# Patient Record
Sex: MALE | Race: WHITE | NOT HISPANIC OR LATINO | Employment: FULL TIME | URBAN - METROPOLITAN AREA
[De-identification: names, ages, dates, MRNs, and addresses within clinical notes are randomized per-mention and may not be internally consistent; named-entity substitution may affect disease eponyms.]

---

## 2017-01-18 ENCOUNTER — ALLSCRIPTS OFFICE VISIT (OUTPATIENT)
Dept: OTHER | Facility: OTHER | Age: 47
End: 2017-01-18

## 2017-02-06 ENCOUNTER — ALLSCRIPTS OFFICE VISIT (OUTPATIENT)
Dept: OTHER | Facility: OTHER | Age: 47
End: 2017-02-06

## 2017-02-06 DIAGNOSIS — E10.9 TYPE 1 DIABETES MELLITUS WITHOUT COMPLICATIONS (HCC): ICD-10-CM

## 2017-02-08 ENCOUNTER — GENERIC CONVERSION - ENCOUNTER (OUTPATIENT)
Dept: OTHER | Facility: OTHER | Age: 47
End: 2017-02-08

## 2017-02-21 ENCOUNTER — LAB (OUTPATIENT)
Dept: LAB | Facility: CLINIC | Age: 47
End: 2017-02-21
Payer: COMMERCIAL

## 2017-02-21 DIAGNOSIS — D64.9 ANEMIA, UNSPECIFIED TYPE: ICD-10-CM

## 2017-02-21 DIAGNOSIS — E10.9 TYPE 1 DIABETES MELLITUS WITHOUT COMPLICATIONS (HCC): ICD-10-CM

## 2017-02-21 DIAGNOSIS — K27.9 PEPTIC ULCER DISEASE: ICD-10-CM

## 2017-02-21 DIAGNOSIS — K22.719 BARRETT'S ESOPHAGUS WITH DYSPLASIA: Primary | ICD-10-CM

## 2017-02-21 LAB
ALBUMIN SERPL BCP-MCNC: 3.8 G/DL (ref 3.5–5)
ALP SERPL-CCNC: 101 U/L (ref 46–116)
ALT SERPL W P-5'-P-CCNC: 23 U/L (ref 12–78)
ANION GAP SERPL CALCULATED.3IONS-SCNC: 8 MMOL/L (ref 4–13)
AST SERPL W P-5'-P-CCNC: 8 U/L (ref 5–45)
BASOPHILS # BLD AUTO: 0.04 THOUSANDS/ΜL (ref 0–0.1)
BASOPHILS NFR BLD AUTO: 0 % (ref 0–1)
BILIRUB SERPL-MCNC: 0.3 MG/DL (ref 0.2–1)
BUN SERPL-MCNC: 26 MG/DL (ref 5–25)
CALCIUM SERPL-MCNC: 8.9 MG/DL (ref 8.3–10.1)
CHLORIDE SERPL-SCNC: 98 MMOL/L (ref 100–108)
CO2 SERPL-SCNC: 29 MMOL/L (ref 21–32)
CREAT SERPL-MCNC: 1.05 MG/DL (ref 0.6–1.3)
EOSINOPHIL # BLD AUTO: 0.34 THOUSAND/ΜL (ref 0–0.61)
EOSINOPHIL NFR BLD AUTO: 3 % (ref 0–6)
ERYTHROCYTE [DISTWIDTH] IN BLOOD BY AUTOMATED COUNT: 12.7 % (ref 11.6–15.1)
EST. AVERAGE GLUCOSE BLD GHB EST-MCNC: 180 MG/DL
FERRITIN SERPL-MCNC: 193 NG/ML (ref 8–388)
GFR SERPL CREATININE-BSD FRML MDRD: >60 ML/MIN/1.73SQ M
GLUCOSE SERPL-MCNC: 261 MG/DL (ref 65–140)
HBA1C MFR BLD: 7.9 % (ref 4.2–6.3)
HCT VFR BLD AUTO: 46.3 % (ref 36.5–49.3)
HGB BLD-MCNC: 16 G/DL (ref 12–17)
IRON SATN MFR SERPL: 23 %
IRON SERPL-MCNC: 78 UG/DL (ref 65–175)
LYMPHOCYTES # BLD AUTO: 3.37 THOUSANDS/ΜL (ref 0.6–4.47)
LYMPHOCYTES NFR BLD AUTO: 27 % (ref 14–44)
MCH RBC QN AUTO: 31.9 PG (ref 26.8–34.3)
MCHC RBC AUTO-ENTMCNC: 34.6 G/DL (ref 31.4–37.4)
MCV RBC AUTO: 92 FL (ref 82–98)
MONOCYTES # BLD AUTO: 0.73 THOUSAND/ΜL (ref 0.17–1.22)
MONOCYTES NFR BLD AUTO: 6 % (ref 4–12)
NEUTROPHILS # BLD AUTO: 7.85 THOUSANDS/ΜL (ref 1.85–7.62)
NEUTS SEG NFR BLD AUTO: 64 % (ref 43–75)
NRBC BLD AUTO-RTO: 0 /100 WBCS
PLATELET # BLD AUTO: 201 THOUSANDS/UL (ref 149–390)
PMV BLD AUTO: 9.7 FL (ref 8.9–12.7)
POTASSIUM SERPL-SCNC: 4.4 MMOL/L (ref 3.5–5.3)
PROT SERPL-MCNC: 7.9 G/DL (ref 6.4–8.2)
RBC # BLD AUTO: 5.01 MILLION/UL (ref 3.88–5.62)
SODIUM SERPL-SCNC: 135 MMOL/L (ref 136–145)
TIBC SERPL-MCNC: 345 UG/DL (ref 250–450)
WBC # BLD AUTO: 12.36 THOUSAND/UL (ref 4.31–10.16)

## 2017-02-21 PROCEDURE — 83540 ASSAY OF IRON: CPT

## 2017-02-21 PROCEDURE — 36415 COLL VENOUS BLD VENIPUNCTURE: CPT

## 2017-02-21 PROCEDURE — 80053 COMPREHEN METABOLIC PANEL: CPT

## 2017-02-21 PROCEDURE — 83550 IRON BINDING TEST: CPT

## 2017-02-21 PROCEDURE — 83036 HEMOGLOBIN GLYCOSYLATED A1C: CPT

## 2017-02-21 PROCEDURE — 85025 COMPLETE CBC W/AUTO DIFF WBC: CPT

## 2017-02-21 PROCEDURE — 82728 ASSAY OF FERRITIN: CPT

## 2017-02-22 ENCOUNTER — GENERIC CONVERSION - ENCOUNTER (OUTPATIENT)
Dept: OTHER | Facility: OTHER | Age: 47
End: 2017-02-22

## 2017-03-01 ENCOUNTER — ALLSCRIPTS OFFICE VISIT (OUTPATIENT)
Dept: OTHER | Facility: OTHER | Age: 47
End: 2017-03-01

## 2017-03-29 ENCOUNTER — ALLSCRIPTS OFFICE VISIT (OUTPATIENT)
Dept: OTHER | Facility: OTHER | Age: 47
End: 2017-03-29

## 2017-06-28 ENCOUNTER — GENERIC CONVERSION - ENCOUNTER (OUTPATIENT)
Dept: OTHER | Facility: OTHER | Age: 47
End: 2017-06-28

## 2017-08-23 ENCOUNTER — GENERIC CONVERSION - ENCOUNTER (OUTPATIENT)
Dept: OTHER | Facility: OTHER | Age: 47
End: 2017-08-23

## 2017-08-25 ENCOUNTER — ALLSCRIPTS OFFICE VISIT (OUTPATIENT)
Dept: OTHER | Facility: OTHER | Age: 47
End: 2017-08-25

## 2017-08-25 DIAGNOSIS — K86.1 OTHER CHRONIC PANCREATITIS (HCC): ICD-10-CM

## 2017-08-25 DIAGNOSIS — F10.10 UNCOMPLICATED ALCOHOL ABUSE: ICD-10-CM

## 2017-08-25 DIAGNOSIS — Z12.5 ENCOUNTER FOR SCREENING FOR MALIGNANT NEOPLASM OF PROSTATE: ICD-10-CM

## 2017-08-25 DIAGNOSIS — E10.9 TYPE 1 DIABETES MELLITUS WITHOUT COMPLICATIONS (HCC): ICD-10-CM

## 2017-08-25 DIAGNOSIS — I10 ESSENTIAL (PRIMARY) HYPERTENSION: ICD-10-CM

## 2017-08-25 DIAGNOSIS — E78.5 HYPERLIPIDEMIA: ICD-10-CM

## 2017-09-05 ENCOUNTER — APPOINTMENT (OUTPATIENT)
Dept: LAB | Facility: CLINIC | Age: 47
End: 2017-09-05
Payer: COMMERCIAL

## 2017-09-05 ENCOUNTER — GENERIC CONVERSION - ENCOUNTER (OUTPATIENT)
Dept: OTHER | Facility: OTHER | Age: 47
End: 2017-09-05

## 2017-09-05 ENCOUNTER — TRANSCRIBE ORDERS (OUTPATIENT)
Dept: LAB | Facility: CLINIC | Age: 47
End: 2017-09-05

## 2017-09-05 DIAGNOSIS — F10.10 UNCOMPLICATED ALCOHOL ABUSE: ICD-10-CM

## 2017-09-05 DIAGNOSIS — I10 ESSENTIAL (PRIMARY) HYPERTENSION: ICD-10-CM

## 2017-09-05 DIAGNOSIS — E10.9 TYPE 1 DIABETES MELLITUS WITHOUT COMPLICATIONS (HCC): ICD-10-CM

## 2017-09-05 DIAGNOSIS — K86.1 OTHER CHRONIC PANCREATITIS (HCC): ICD-10-CM

## 2017-09-05 DIAGNOSIS — E78.5 HYPERLIPIDEMIA: ICD-10-CM

## 2017-09-05 DIAGNOSIS — Z12.5 ENCOUNTER FOR SCREENING FOR MALIGNANT NEOPLASM OF PROSTATE: ICD-10-CM

## 2017-09-05 LAB
ALBUMIN SERPL BCP-MCNC: 3.4 G/DL (ref 3.5–5)
ALP SERPL-CCNC: 116 U/L (ref 46–116)
ALT SERPL W P-5'-P-CCNC: 15 U/L (ref 12–78)
AMYLASE SERPL-CCNC: 49 IU/L (ref 25–115)
ANION GAP SERPL CALCULATED.3IONS-SCNC: 7 MMOL/L (ref 4–13)
AST SERPL W P-5'-P-CCNC: 11 U/L (ref 5–45)
BASOPHILS # BLD AUTO: 0.04 THOUSANDS/ΜL (ref 0–0.1)
BASOPHILS NFR BLD AUTO: 0 % (ref 0–1)
BILIRUB SERPL-MCNC: 0.25 MG/DL (ref 0.2–1)
BUN SERPL-MCNC: 22 MG/DL (ref 5–25)
CALCIUM SERPL-MCNC: 9 MG/DL (ref 8.3–10.1)
CHLORIDE SERPL-SCNC: 103 MMOL/L (ref 100–108)
CHOLEST SERPL-MCNC: 171 MG/DL (ref 50–200)
CO2 SERPL-SCNC: 27 MMOL/L (ref 21–32)
CREAT SERPL-MCNC: 1.04 MG/DL (ref 0.6–1.3)
CREAT UR-MCNC: 228 MG/DL
EOSINOPHIL # BLD AUTO: 0.28 THOUSAND/ΜL (ref 0–0.61)
EOSINOPHIL NFR BLD AUTO: 3 % (ref 0–6)
ERYTHROCYTE [DISTWIDTH] IN BLOOD BY AUTOMATED COUNT: 13.1 % (ref 11.6–15.1)
EST. AVERAGE GLUCOSE BLD GHB EST-MCNC: 203 MG/DL
FOLATE SERPL-MCNC: 7.8 NG/ML (ref 3.1–17.5)
GFR SERPL CREATININE-BSD FRML MDRD: 84 ML/MIN/1.73SQ M
GLUCOSE P FAST SERPL-MCNC: 80 MG/DL (ref 65–99)
HBA1C MFR BLD: 8.7 % (ref 4.2–6.3)
HCT VFR BLD AUTO: 40.6 % (ref 36.5–49.3)
HDLC SERPL-MCNC: 50 MG/DL (ref 40–60)
HGB BLD-MCNC: 13.9 G/DL (ref 12–17)
LDLC SERPL CALC-MCNC: 82 MG/DL (ref 0–100)
LIPASE SERPL-CCNC: 149 U/L (ref 73–393)
LYMPHOCYTES # BLD AUTO: 2.98 THOUSANDS/ΜL (ref 0.6–4.47)
LYMPHOCYTES NFR BLD AUTO: 33 % (ref 14–44)
MAGNESIUM SERPL-MCNC: 1.9 MG/DL (ref 1.6–2.6)
MCH RBC QN AUTO: 30.6 PG (ref 26.8–34.3)
MCHC RBC AUTO-ENTMCNC: 34.2 G/DL (ref 31.4–37.4)
MCV RBC AUTO: 89 FL (ref 82–98)
MICROALBUMIN UR-MCNC: 7.2 MG/L (ref 0–20)
MICROALBUMIN/CREAT 24H UR: 3 MG/G CREATININE (ref 0–30)
MONOCYTES # BLD AUTO: 0.69 THOUSAND/ΜL (ref 0.17–1.22)
MONOCYTES NFR BLD AUTO: 8 % (ref 4–12)
NEUTROPHILS # BLD AUTO: 5.14 THOUSANDS/ΜL (ref 1.85–7.62)
NEUTS SEG NFR BLD AUTO: 56 % (ref 43–75)
NRBC BLD AUTO-RTO: 0 /100 WBCS
PLATELET # BLD AUTO: 270 THOUSANDS/UL (ref 149–390)
PMV BLD AUTO: 9.2 FL (ref 8.9–12.7)
POTASSIUM SERPL-SCNC: 4 MMOL/L (ref 3.5–5.3)
PROT SERPL-MCNC: 7.5 G/DL (ref 6.4–8.2)
PSA SERPL-MCNC: 0.3 NG/ML (ref 0–4)
RBC # BLD AUTO: 4.54 MILLION/UL (ref 3.88–5.62)
SODIUM SERPL-SCNC: 137 MMOL/L (ref 136–145)
TRIGL SERPL-MCNC: 195 MG/DL
TSH SERPL DL<=0.05 MIU/L-ACNC: 2.46 UIU/ML (ref 0.36–3.74)
VIT B12 SERPL-MCNC: 515 PG/ML (ref 100–900)
WBC # BLD AUTO: 9.17 THOUSAND/UL (ref 4.31–10.16)

## 2017-09-05 PROCEDURE — 82607 VITAMIN B-12: CPT

## 2017-09-05 PROCEDURE — 82570 ASSAY OF URINE CREATININE: CPT

## 2017-09-05 PROCEDURE — 83735 ASSAY OF MAGNESIUM: CPT

## 2017-09-05 PROCEDURE — 83036 HEMOGLOBIN GLYCOSYLATED A1C: CPT

## 2017-09-05 PROCEDURE — 85025 COMPLETE CBC W/AUTO DIFF WBC: CPT

## 2017-09-05 PROCEDURE — 82746 ASSAY OF FOLIC ACID SERUM: CPT

## 2017-09-05 PROCEDURE — 83690 ASSAY OF LIPASE: CPT

## 2017-09-05 PROCEDURE — 80061 LIPID PANEL: CPT

## 2017-09-05 PROCEDURE — 80053 COMPREHEN METABOLIC PANEL: CPT

## 2017-09-05 PROCEDURE — 80307 DRUG TEST PRSMV CHEM ANLYZR: CPT

## 2017-09-05 PROCEDURE — 84443 ASSAY THYROID STIM HORMONE: CPT

## 2017-09-05 PROCEDURE — 82043 UR ALBUMIN QUANTITATIVE: CPT

## 2017-09-05 PROCEDURE — G0103 PSA SCREENING: HCPCS

## 2017-09-05 PROCEDURE — 36415 COLL VENOUS BLD VENIPUNCTURE: CPT

## 2017-09-05 PROCEDURE — 82150 ASSAY OF AMYLASE: CPT

## 2017-09-06 ENCOUNTER — GENERIC CONVERSION - ENCOUNTER (OUTPATIENT)
Dept: OTHER | Facility: OTHER | Age: 47
End: 2017-09-06

## 2017-09-07 LAB
LABORATORY COMMENT REPORT: NORMAL
TRAMADOL UR QL SCN: POSITIVE NG/ML

## 2017-09-09 LAB
AMPHETAMINES UR QL SCN: NEGATIVE NG/ML
BARBITURATES UR QL SCN: NEGATIVE NG/ML
BENZODIAZ UR QL SCN: NEGATIVE
BZE UR QL SCN: NEGATIVE NG/ML
CANNABINOIDS UR QL SCN: NEGATIVE NG/ML
METHADONE UR QL SCN: NEGATIVE NG/ML
OPIATES UR QL: NEGATIVE NG/ML
PCP UR QL: NEGATIVE NG/ML
PROPOXYPH UR QL: NEGATIVE NG/ML

## 2017-09-20 ENCOUNTER — GENERIC CONVERSION - ENCOUNTER (OUTPATIENT)
Dept: OTHER | Facility: OTHER | Age: 47
End: 2017-09-20

## 2017-09-25 ENCOUNTER — ALLSCRIPTS OFFICE VISIT (OUTPATIENT)
Dept: OTHER | Facility: OTHER | Age: 47
End: 2017-09-25

## 2017-10-25 ENCOUNTER — GENERIC CONVERSION - ENCOUNTER (OUTPATIENT)
Dept: OTHER | Facility: OTHER | Age: 47
End: 2017-10-25

## 2017-11-24 ENCOUNTER — GENERIC CONVERSION - ENCOUNTER (OUTPATIENT)
Dept: OTHER | Facility: OTHER | Age: 47
End: 2017-11-24

## 2017-12-19 ENCOUNTER — ALLSCRIPTS OFFICE VISIT (OUTPATIENT)
Dept: OTHER | Facility: OTHER | Age: 47
End: 2017-12-19

## 2017-12-19 LAB — HBA1C MFR BLD HPLC: 7.3 %

## 2018-01-03 ENCOUNTER — GENERIC CONVERSION - ENCOUNTER (OUTPATIENT)
Dept: OTHER | Facility: OTHER | Age: 48
End: 2018-01-03

## 2018-01-03 ENCOUNTER — GENERIC CONVERSION - ENCOUNTER (OUTPATIENT)
Dept: FAMILY MEDICINE CLINIC | Facility: CLINIC | Age: 48
End: 2018-01-03

## 2018-01-04 ENCOUNTER — GENERIC CONVERSION - ENCOUNTER (OUTPATIENT)
Dept: OTHER | Facility: OTHER | Age: 48
End: 2018-01-04

## 2018-01-09 NOTE — RESULT NOTES
Verified Results  (1) COMPREHENSIVE METABOLIC PANEL 47VGO0885 31:67YU Sotero Quiñones Order Number: [id number]  TW Order Number: [id number]     Test Name Result Flag Reference   GLUCOSE,RANDM 122 mg/dL     If the patient is fasting, the ADA then defines impaired fasting glucose as > 100 mg/dL and diabetes as > or equal to 123 mg/dL  SODIUM 145 mmol/L  136-145   POTASSIUM 4 7 mmol/L  3 5-5 3   CHLORIDE 106 mmol/L  100-108   CARBON DIOXIDE 28 mmol/L  21-32   ANION GAP (CALC) 11 mmol/L  4-13   BLOOD UREA NITROGEN 12 mg/dL  5-25   CREATININE 1 00 mg/dL  0 60-1 30   Standardized to IDMS reference method   CALCIUM 9 4 mg/dL  8 3-10 1   BILI, TOTAL 0 40 mg/dL  0 20-1 00   ALK PHOSPHATAS 118 U/L H    ALT (SGPT) 27 U/L  12-78   AST(SGOT) 19 U/L  5-45   ALBUMIN 3 7 g/dL  3 5-5 0   TOTAL PROTEIN 8 1 g/dL  6 4-8 2   eGFR Non-African American      >60 0 ml/min/1 73sq [facility] [employer] Disease Education Program recommendations are as follows:  GFR calculation is accurate only with a steady state creatinine  Chronic Kidney disease less than 60 ml/min/1 73 sq  meters  Kidney failure less than 15 ml/min/1 73 sq  meters  (1) HEMOGLOBIN A1C 06Icy9228 08:44AM Sotero Quiñones Order Number: [id number]  TW Order Number: [id number]     Test Name Result Flag Reference   HEMOGLOBIN A1C 7 0 % H 4 2-6 3   EST  AVG  GLUCOSE 154 mg/dl       (1) PSA (SCREEN) (Dx V76 44 Screen for Prostate Cancer) 76Ikp2406 08:44AM Sotero Quiñones Order Number: [id number]  TW Order Number: [id number]     Test Name Result Flag Reference   PROSTATE SPECIFIC ANTIGEN 0 5 ng/mL  0 0-4 0       Plan  Health Maintenance    · (1) PSA (SCREEN) (Dx V76 44 Screen for Prostate Cancer) ; every 1 year;  Last  93LUR5805; Status:Active

## 2018-01-10 NOTE — RESULT NOTES
Verified Results  (1) COMPREHENSIVE METABOLIC PANEL [id number] 70:16AS [facility] Kidney Disease Education Program recommendations are as follows:  GFR calculation is accurate only with a steady state creatinine  Chronic Kidney disease less than 60 ml/min/1 73 sq  meters  Kidney failure less than 15 ml/min/1 73 sq  meters  Test Name Result Flag Reference   GLUCOSE,RANDM 174 mg/dL H    If the patient is fasting, the ADA then defines impaired fasting glucose as > 100 mg/dL and diabetes as > or equal to 123 mg/dL     SODIUM 137 mmol/L  136-145   POTASSIUM 4 9 mmol/L  3 5-5 3   CHLORIDE 102 mmol/L  100-108   CARBON DIOXIDE 21 mmol/L  21-32   ANION GAP (CALC) 14 mmol/L H 4-13   BLOOD UREA NITROGEN 24 mg/dL  5-25   CREATININE 0 80 mg/dL  0 60-1 30   Standardized to IDMS reference method   CALCIUM 8 9 mg/dL  8 3-10 1   BILI, TOTAL 0 16 mg/dL L 0 20-1 00   ALK PHOSPHATAS 121 U/L H    ALT (SGPT) 21 U/L  12-78   AST(SGOT) 13 U/L  5-45   ALBUMIN 3 8 g/dL  3 5-5 0   TOTAL PROTEIN 7 5 g/dL  6 4-8 2   eGFR Non-African American      >60 0 ml/min/1 73sq m     (1) MICROALBUMIN CREATININE RATIO, RANDOM URINE 02Mar2016 10:58AM [place] Faehem Order Number: [id number]     Test Name Result Flag Reference   MICROALBUMIN/ CREAT R 11 mg/g creatinine  0-30   MICROALBUMIN,URINE 12 7 mg/L  0 0-20 0   CREATININE URINE 116 0 mg/dL       (1) LIPID PANEL FASTING W DIRECT LDL REFLEX 87OOF3271 10:58AM [place] [employer]ust[employer] Order Number: [id number]      Triglyceride:         Normal              <150 mg/dl       Borderline High    150-199 mg/dl       High               200-499 mg/dl       Very High          >499 mg/dl  Cholesterol:         Desirable        <200 mg/dl      Borderline High  200-239 mg/dl      High             >239 mg/dl  HDL Cholesterol:        High    >59 mg/dL      Low     <41 mg/dL  LDL Cholesterol:        Optimal          <100 mg/dl         Near Optimal     100-129 mg/dl        Above Optimal Borderline High   130-159 mg/dl          High              160-189 mg/dl          Very High        >189 mg/dl  LDL CALCULATED:    This screening LDL is a calculated result  It does not have the accuracy of the Direct Measured LDL in the monitoring of patients with hyperlipidemia and/or statin therapy  Direct Measure LDL (WOU010) must be ordered separately in these patients       Test Name Result Flag Reference   CHOLESTEROL 169 mg/dL     LDL CHOLESTEROL CALCULATED 71 mg/dL  0-100   TRIGLYCERIDES 319 mg/dL H <=150   HDL,DIRECT 34 mg/dL L 40-60     (1) CBC/PLT/DIFF 08UZY6223 10:58AM [facility] Order Number: [id number]     Order Number: [id number]     Test Name Result Flag Reference   WBC COUNT 8 66 Thousand/uL  4 31-10 16   RBC COUNT 4 93 Million/uL  3 88-5 62   HEMOGLOBIN 14 8 g/dL  12 0-17 0   HEMATOCRIT 44 0 %  36 5-49 3   MCV 89 fL  82-98   MCH 30 0 pg  26 8-34 3   MCHC 33 6 g/dL  31 4-37 4   RDW 13 7 %  11 6-15 1   MPV 9 9 fL  8 9-12 7   PLATELET COUNT 776 Thousands/uL  149-390   NEUTROPHILS RELATIVE PERCENT 71 %  43-75   LYMPHOCYTES RELATIVE PERCENT 19 %  14-44   MONOCYTES RELATIVE PERCENT 6 %  4-12   EOSINOPHILS RELATIVE PERCENT 3 %  0-6   BASOPHILS RELATIVE PERCENT 1 %  0-1   NEUTROPHILS ABSOLUTE COUNT 6 19 Thousands/µL  1 85-7 62   LYMPHOCYTES ABSOLUTE COUNT 1 66 Thousands/µL  0 60-4 47   MONOCYTES ABSOLUTE COUNT 0 55 Thousand/µL  0 17-1 22   EOSINOPHILS ABSOLUTE COUNT 0 22 Thousand/µL  0 00-0 61   BASOPHILS ABSOLUTE COUNT 0 04 Thousands/µL  0 00-0 10     (1) FERRITIN 76DHP9485 10:58AM Chen Jamul Order Number: [id number]     Test Name Result Flag Reference   FERRITIN 74 ng/mL  8-388     (1) IRON 30HAM9369 10:58AM [facility] Order Number: [id number]     Test Name Result Flag Reference   IRON 51 ug/dL L      (1) TIBC 77FKV5979 10:58AM [facility] Order Number: [id number]     Test Name Result Flag Reference   TOTAL IRON BINDING CAPACITY 365 ug/dL 250-450     (1) AMYLASE 72GYK8334 10:58AM Marcus Sessions Order Number: [id number]     Test Name Result Flag Reference   AMYLASE 38 IU/L       (1) LIPASE 38SML8105 10:58AM Marcus Sessions Order Number: [id number]     Test Name Result Flag Reference   LIPASE 88 u/L

## 2018-01-11 NOTE — MISCELLANEOUS
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 (829 14) (N02 57)   2  Recurrent pancreatitis (577 1) (K86 1)   3  Alcohol abuse (305 00) (F10 10)   4  Suicidal ideations (V62 84) (R45 851)   5  MDD (major depressive disorder), single episode (296 20) (F32 9)   6  Chronic narcotic use (305 50) (F11 90)    Plan  Benign essential hypertension    · (1) CBC/PLT/DIFF; Status:Active; Requested for:90Lej2952;    Perform:[facility]; Due:52Asj5545; Ordered; For:Benign essential hypertension; Ordered By:Franko Gutierrez;   · (1) COMPREHENSIVE METABOLIC PANEL; Status:Active; Requested for:16Iqe6291;    Perform:[facility]; Due:57Igs6146; Ordered; For:Benign essential hypertension; Ordered By:Franko Gutierrez;   · (1) MAGNESIUM; Status:Active; Requested for:52Wnq0391;    Perform:[facility]; Due:60Jsy7023; Ordered; For:Benign essential hypertension; Ordered By:Franko Gutierrez;   · (1) MICROALBUMIN CREATININE RATIO, RANDOM URINE; Status:Active; Requested  for:35Yts0945;    Perform:[facility]; Due:07Wuc0615; Ordered; For:Benign essential hypertension; Ordered By:Franko Gutierrez;   · A diet low in sodium and high in potassium, magnesium, and calcium can help your  blood pressure ; Status:Complete;   Done: 29LCA4189   Ordered; For:Benign essential hypertension; Ordered By:Talia Gutierrez;   · Begin a limited exercise program ; Status:Complete;   Done: 32QUW7811   Ordered; For:Benign essential hypertension; Ordered By:Franko Gutierrez;   · Begin or continue regular aerobic exercise  Gradually work up to at least {count1}  sessions of {dur1} of exercise a week ; Status:Complete;   Done: 37NLO4349   Ordered;   For:Benign essential hypertension; Ordered By:Franko Gutierrez;  COPD (chronic obstructive pulmonary disease)    · Spiriva HandiHaler 18 MCG Inhalation Capsule; INHALE CONTENTS OF 1  CAPSULE ONCE DAILY   Rx By: Daryle Baldy; Dispense: 90 Days ; #:3 Capsule; Refill: 3; For: COPD (chronic obstructive pulmonary disease); YOGESH = N; Verified Transmission to [facility]; Last Updated By: System, [employer]; 8/25/2017 2:29:08 PM  Fibromyalgia, Other chronic pain    · TraMADol HCl - 50 MG Oral Tablet; TAKE ONE TO TWO TABLETS BY MOUTH  EVERY 4 TO 6 HOURS AS NEEDED   Rx By: Beverly Lopez; Dispense: 30 Days ; #:240 Tablet; Refill: 0; For: Fibromyalgia, Other chronic pain; YOGESH = N; Print Rx  Hyperlipidemia    · (1) LIPID PANEL FASTING W DIRECT LDL REFLEX; Status:Active; Requested  for:89Tqq8663;    Perform:[facility]; Due:60Smx9804; Ordered;  Sudhakar Moctezuma; Ordered By:Franko Gutierrez;   · (1) TSH; Status:Active; Requested for:45Srx8741;    Perform:[facility]; Due:64Qbp5585; Ordered;  Sudhakar Moctezuma; Ordered By:Franko Gutierrez;  Recurrent pancreatitis    · (1) AMYLASE; Status:Active; Requested for:07Qrm9219;    Perform:[facility]; Due:42Cnd0014; Ordered; For:Recurrent pancreatitis; Ordered By:Franko Gutierrez;   · (1) LIPASE; Status:Active; Requested for:75Ipq4479;    Perform:[facility]; Due:23Vmy8843; Ordered; For:Recurrent pancreatitis; Ordered By:Franko Gutierrez;  Screening for prostate cancer    · (1) PSA (SCREEN) (Dx V76 44 Screen for Prostate Cancer); Status:Active; Requested  for:44Xza9022;    Perform:[facility]; Due:63Ery1261; Ordered; For:Screening for prostate cancer; Ordered By:Franko Gutierrez;  SocHx: Alcohol abuse    · (1) DRUG ABUSE SCREEN, URINE ROUTINE; Status:Active; Requested for:83Qxx2752;    Perform:[facility]; Due:26Iwv1244; Ordered; For:SocHx: Alcohol abuse; Ordered By:Franko Gutierrez;   · (1) FOLATE; Status:Active; Requested for:48Wlj4011;    Perform:[facility]; Due:54Vfs9181; Ordered; For:SocHx: Alcohol abuse; Ordered By:Franko Gutierrez;   · (1) TRAMADOL, URINE; Status:Active; Requested for:84Vks3339;    Perform:[facility]; Due:98Mul4506; Ordered; For:SocHx: Alcohol abuse; Ordered By:Franko Gutierrez;   · (1) VITAMIN B12; Status:Active; Requested for:42Mbu2248;    Perform:[facility]; Due:47Zci9592; Ordered; For:SocHx: Alcohol abuse; Ordered By:Franko Gutierrez;  SocHx: Tobacco use    · You need to quit smoking ; Status:Complete;   Done: 01KZR0031   Ordered; For:SocHx: Tobacco use; Ordered By:Franko Gutierrez;  Type 1 diabetes mellitus without complication    · (1) HEMOGLOBIN A1C; Status:Active; Requested for:12Eyu1348;    Perform:[facility]; Due:72Ftx8566; Ordered; For:Type 1 diabetes mellitus without complication; Ordered By:Steve Gutierrez; Discussion/Summary  Discussion Summary:   Due for labs  Continue current treatment and monitor sugars now that his appetite is normalizing and no longer drinking  Continue with OP psyche  ??vivatrol for the ETOH  RTC four weeks for routine and see if insulin needs adjustment  Medication SE Review and Pt Understands Tx: Possible side effects of new medications were reviewed with the patient/guardian today  The treatment plan was reviewed with the patient/guardian  The patient/guardian understands and agrees with the treatment plan      Chief Complaint  Chief Complaint Free Text Note Form: Pt here for f/u from [facility] admission, pancreatitis and spent one week on behavioral floor  Pt doing better, he's in a partial hospitalization program       History of Present Illness  TCM Communication [facility]: The patient is being contacted for follow-up after hospitalization and Pt scheduled for hospital f/u on 08/25/2017  Hospital records were not available  He was hospitalized at [facility]  The date of discharge: 08/11/2017  Diagnosis: Alcoholic Pancreatitis  He was discharged to home  Medications reviewed and updated today  He scheduled a follow up appointment     Communication performed and completed by   HPI: WM seen for a NICK after being admitted to [place] [address] for recurrent ETOH induced pancreatitis and then to the geriatric psych due to suicide ideations with MDD and MERLYN  Pt lost his job several months ago and his benefits ran out last week, prompting the pt's psych issues to flare and increase ETOH  Pt was unable to afford some of his meds and stopped taking them  Treated conservatively and improved  Since d/c, doing better with no abd pain, no n/v, and appetite improving  Attending partial OP program for ETOH, MDD, and MERLYN  Sugars were in the 300's until the past few days when they were down to the mid 100's  Review of Systems  Complete-Male:   Constitutional: feeling tired, but no fever, not feeling poorly and no chills  Cardiovascular: no chest pain, no intermittent leg claudication, no palpitations and no extremity edema  Respiratory: no shortness of breath, no cough, no orthopnea, no wheezing, no shortness of breath during exertion and no PND  Gastrointestinal: no abdominal pain, no nausea, no constipation and no diarrhea  Genitourinary: no dysuria  Musculoskeletal: No complaints of arthralgia, no myalgias, no joint swelling or stiffness, no limb pain or swelling  Integumentary: No complaints of skin rash or skin lesions, no itching, no skin wound, no dry skin  Neurological: no headache, no confusion and no convulsions  Psychiatric: anxiety and depression, but not suicidal and no sleep disturbances  Endocrine: No complaints of proptosis, no hot flashes, no muscle weakness, no erectile dysfunction, no deepening of the voice, no feelings of weakness  Hematologic/Lymphatic: No complaints of swollen glands, no swollen glands in the neck, does not bleed easily, no easy bruising  Active Problems    1  Acute bronchitis (466 0) (J20 9)   2  Acute gastritis (535 00) (K29 00)   3  Allergic rhinitis (477 9) (J30 9)   4  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)   5  Benign essential hypertension (401 1) (I10)   6  COPD (chronic obstructive pulmonary disease) (496) (J44 9)   7   Fatigue (780 79) (R53 83)   8  Fatty liver (571 8) (K76 0)   9  Fibromyalgia (729 1) (M79 7)   10  Fistula, anal (565 1) (K60 3)   11  Flu vaccine need (V04 81) (Z23)   12  Gallbladder calculus without cholecystitis and no obstruction (574 20) (K80 20)   13  Gastric ulcer (531 90) (K25 9)   14  Generalized anxiety disorder (300 02) (F41 1)   15  Hemorrhoids, external (455 3) (K64 4)   16  Hyperlipidemia (272 4) (E78 5)   17  Iron deficiency anemia (280 9) (D50 9)   18  MDD (major depressive disorder), single episode (296 20) (F32 9)   19  Need for influenza vaccination (V04 81) (Z23)   20  Nephrolithiasis (592 0) (N20 0)   21  Other chronic pain (338 29) (G89 29)   22  Screening for prostate cancer (V76 44) (Z12 5)   23  Sleep disorder (780 50) (G47 9)   24  Tobacco use (305 1) (Z72 0)   25  Type 1 diabetes mellitus with other neurologic complication (334 66) (K78 21)   26  Type 1 diabetes mellitus without complication (976 40) (K42 6)    Past Medical History    1  History of Atypical chest pain (786 59) (R07 89)   2  History of Bronchitis, asthmatic (493 90) (J45 909)   3  History of Cough (786 2) (R05)   4  History of acute sinusitis (V12 69) (Z87 09)   5  History of acute sinusitis (V12 69) (Z87 09)   6  History of allergic rhinitis (V12 69) (Z87 09)   7  History of cholelithiasis (V12 79) (Z87 19)   8  History of esophagitis (V12 79) (Z87 19)   9  History of esophagitis (V12 79) (Z87 19)   10  History of gastritis (V12 79) (Z87 19)   11  History of low back pain (V13 59) (Z87 39)   12  History of nausea (V12 79) (Z87 898)   13  History of nausea and vomiting (V12 79) (Z87 898)   14  History of nausea and vomiting (V12 79) (Z87 898)   15  History of Metatarsalgia, unspecified laterality (726 70) (M77 40)   16  History of Nephrolithiasis (V13 01)   17  History of Nonspecific abnormal finding (796 9) (R68 89)   18  History of Pancreatitis (577 9)   19   History of Visit for pre-operative examination (V72 84) (P98 060)    Surgical History    1  History of Cholecystectomy Laparoscopic   2  History of Foot Surgery   3  History of Knee Arthroscopy (Therapeutic)   4  History of Surgery Vas Deferens Vasectomy  Surgical History Reviewed: The surgical history was reviewed and updated today  Family History  Mother    1  Family history of Cancer   2  Family history of Coronary Artery Disease (V17 49)   3  Family history of Diabetes Mellitus (V18 0)  Father    4  Family history of Coronary Artery Disease (V17 49)   5  Family history of Prostate Cancer (J77 17)  Sister    6  Family history of Diabetes Mellitus (V18 0)  Family History    7  Family history of Coronary Artery Disease (V17 49)  Family History Reviewed: The family history was reviewed and updated today  Social History    · Current smoker (305 1) (F17 200)   · Marital History - Currently    · Never Drank Alcohol   · Tobacco use (305 1) (Z72 0)  Social History Reviewed: The social history was reviewed and updated today  The social history was reviewed and is unchanged  Current Meds   1  BD Pen Needle Short U/F 31G X 8 MM Miscellaneous; use as directed; Therapy: 73PQE1962 to (Last Rx:03Mar2016)  Requested for: 65TJE5323 Ordered   2  Lisinopril 10 MG Oral Tablet; take 1 tablet by mouth twice daily; Therapy: 25Sqt0435 to (Last Rx:70Hnx8752)  Requested for: 70SBL6481 Ordered   3  Omeprazole 20 MG Oral Capsule Delayed Release Recorded   4  OneTouch Ultra Blue In [employer]; TEST three times a day; Therapy: 49FIE2780 to (Last Rx:10Aug2016)  Requested for: 15Jza7381 Ordered   5  OneTouch Verio In [employer]; TEST 3 TIMES DAILY; Therapy: 53HHM6362 to (Evaluate:17Jan2015)  Requested for: 03XGX3774; Last   Rx:21Pmk2836 Ordered   6  Spiriva HandiHaler 18 MCG Inhalation Capsule; INHALE CONTENTS OF 1 CAPSULE   ONCE DAILY; Therapy: 75RDT2569 to (Evaluate:24Mar2018)  Requested for: 26MLS9531; Last   Rx:29Mar2017 Ordered   7   Toujeo SoloStar 300 UNIT/ML Subcutaneous Solution Pen-injector; INJECT 40-50 UNITS   SUBCUTANEOUSLY AT BED TIME; Therapy: 01DZC9450 to (Evaluate:96Yzx7632)  Requested for: 19Daz6038; Last   Rx:82Dpk0376 Ordered   8  TraMADol HCl - 50 MG Oral Tablet; TAKE ONE TO TWO TABLETS BY MOUTH EVERY 4   TO 6 HOURS AS NEEDED; Therapy: 47LMM1179 to (Evaluate:79Xdq0607)  Requested for: 19Fcw7583; Last   Rx:00Roq6592; Status: ACTIVE - Renewal Denied Ordered   9  Venlafaxine HCl  MG Oral Capsule Extended Release 24 Hour; TAKE ONE   CAPSULE BY MOUTH TWICE DAILY; Therapy: 26Pvy8889 to (Evaluate:54Ymh1847)  Requested for: 83Lbw6837; Last   Rx:58Dyh7523 Ordered   10  Ventolin  (90 Base) MCG/ACT Inhalation Aerosol Solution; INHALE 2 PUFFS    EVERY 4 HOURS AS NEEDED; Therapy: 01SJD5668 to (Evaluate:27Jun2017)  Requested for: 48BPD1666; Last    Rx:29Mar2017 Ordered  Medication List Reviewed: The medication list was reviewed and updated today  Allergies    1  No Known Drug Allergies    Vitals  Signs   Recorded: 25Aug2017 02:19PM   Temperature: 99 5 F, Tympanic  Heart Rate: 76  Respiration: 16  Systolic: 419, LUE, Sitting  Diastolic: 70, LUE, Sitting  Height: 5 ft 11 in  Weight: 188 lb   BMI Calculated: 26 22  BSA Calculated: 2 05    Physical Exam    Constitutional   General appearance: No acute distress, well appearing and well nourished  Eyes   Conjunctiva and lids: No swelling, erythema, or discharge  Pupils and irises: Equal, round and reactive to light  Ears, Nose, Mouth, and Throat   Oropharynx: Normal with no erythema, edema, exudate or lesions  Pulmonary   Respiratory effort: No increased work of breathing or signs of respiratory distress  Auscultation of lungs: Clear to auscultation, equal breath sounds bilaterally, no wheezes, no rales, no rhonci  Cardiovascular   Auscultation of heart: Normal rate and rhythm, normal S1 and S2, without murmurs      Examination of extremities for edema and/or varicosities: Normal     Carotid pulses: Normal     Abdomen   Abdomen: Non-tender, no masses  Musculoskeletal   Gait and station: Normal     Psychiatric   Orientation to person, place and time: Normal     Mood and affect: Normal          Health Management  Health Maintenance   (1) PSA (SCREEN) (Dx V76 44 Screen for Prostate Cancer); every 1 year; Last 63WHH8837; Next  Due: 34Cdw1119;  Overdue    Future Appointments    Date/Time Provider Specialty Site   09/05/2017 09:30 AM Hawa Solo [facility]     Signatures   Electronically signed by : CHACE Zambrano ; Aug 25 2017  2:54PM EST                       (Author)

## 2018-01-12 NOTE — RESULT NOTES
Verified Results  (1) CBC/PLT/DIFF 66Evx0377 07:36AM Melinda Alvarenga Order Number: [id number]     Test Name Result Flag Reference   WBC COUNT 9 17 Thousand/uL  4 31-10 16   RBC COUNT 4 54 Million/uL  3 88-5 62   HEMOGLOBIN 13 9 g/dL  12 0-17 0   HEMATOCRIT 40 6 %  36 5-49 3   MCV 89 fL  82-98   MCH 30 6 pg  26 8-34 3   MCHC 34 2 g/dL  31 4-37 4   RDW 13 1 %  11 6-15 1   MPV 9 2 fL  8 9-12 7   PLATELET COUNT 206 Thousands/uL  149-390   nRBC AUTOMATED 0 /100 WBCs     NEUTROPHILS RELATIVE PERCENT 56 %  43-75   LYMPHOCYTES RELATIVE PERCENT 33 %  14-44   MONOCYTES RELATIVE PERCENT 8 %  4-12   EOSINOPHILS RELATIVE PERCENT 3 %  0-6   BASOPHILS RELATIVE PERCENT 0 %  0-1   NEUTROPHILS ABSOLUTE COUNT 5 14 Thousands/? ??L  1 85-7 62   LYMPHOCYTES ABSOLUTE COUNT 2 98 Thousands/? ??L  0 60-4 47   MONOCYTES ABSOLUTE COUNT 0 69 Thousand/? ??L  0 17-1 22   EOSINOPHILS ABSOLUTE COUNT 0 28 Thousand/? ??L  0 00-0 61   BASOPHILS ABSOLUTE COUNT 0 04 Thousands/? ??L  0 00-0 10     (1) COMPREHENSIVE METABOLIC PANEL 97XZB8084 40:63GS Melinda Alvarenga Order Number: [id number]     Test Name Result Flag Reference   SODIUM 137 mmol/L  136-145   POTASSIUM 4 0 mmol/L  3 5-5 3   CHLORIDE 103 mmol/L  100-108   CARBON DIOXIDE 27 mmol/L  21-32   ANION GAP (CALC) 7 mmol/L  4-13   BLOOD UREA NITROGEN 22 mg/dL  5-25   CREATININE 1 04 mg/dL  0 60-1 30   Standardized to IDMS reference method   CALCIUM 9 0 mg/dL  8 3-10 1   BILI, TOTAL 0 25 mg/dL  0 20-1 00   ALK PHOSPHATAS 116 U/L     ALT (SGPT) 15 U/L  12-78   Specimen collection should occur prior to Sulfasalazine and/or Sulfapyridine administration due to the potential for falsely depressed results  AST(SGOT) 11 U/L  5-45   Specimen collection should occur prior to Sulfasalazine administration due to the potential for falsely depressed results     ALBUMIN 3 4 g/dL L 3 5-5 0   TOTAL PROTEIN 7 5 g/dL  6 4-8 2   eGFR 84 ml/min/1 73sq m     National Kidney Disease Education Program recommendations are as follows:  GFR calculation is accurate only with a steady state creatinine  Chronic Kidney disease less than 60 ml/min/1 73 sq  meters  Kidney failure less than 15 ml/min/1 73 sq  meters  GLUCOSE FASTING 80 mg/dL  65-99   Specimen collection should occur prior to Sulfasalazine administration due to the potential for falsely depressed results  Specimen collection should occur prior to Sulfapyridine administration due to the potential for falsely elevated results  (1) MAGNESIUM 44Qrp6469 07:36AM [employer] Order Number: [id number]     Test Name Result Flag Reference   MAGNESIUM 1 9 mg/dL  1 6-2 6     (1) MICROALBUMIN CREATININE RATIO, RANDOM URINE 02Gsx8417 07:36AM [employer] Order Number: [id number]     Test Name Result Flag Reference   MICROALBUMIN/ CREAT R 3 mg/g creatinine  0-30   MICROALBUMIN,URINE 7 2 mg/L  0 0-20 0   CREATININE URINE 228 0 mg/dL       (1) LIPID PANEL FASTING W DIRECT LDL REFLEX 05Sep2017 07:36AM [employer] Order Number: [id number]     Test Name Result Flag Reference   CHOLESTEROL 171 mg/dL     LDL CHOLESTEROL CALCULATED 82 mg/dL  0-100   Triglyceride:        Normal ??? ??? ??? ??? ??? ??? ??? <150 mg/dl   ??? ??? ???Borderline High ??? ??? 150-199 mg/dl   ??? ??? ? ?? High ??? ??? ??? ??? ??? ??? ??? 200-499 mg/dl   ??? ??? ? ??Very High ??? ??? ??? ??? ??? >499 mg/dl      Cholesterol:       Desirable ??? ??? ??? ??? <200 mg/dl   ??? ??? Borderline High ??? 200-239 mg/dl   ??? ??? High ??? ??? ??? ??? ??? ??? >239 mg/dl      This screening LDL is a calculated result  It does not have the accuracy of the Direct Measured LDL in the monitoring of patients with hyperlipidemia and/or statin therapy  Direct Measure LDL (RAV930) must be ordered separately in these patients    HDL Cholesterol:       High ??? ???>59 mg/dL   ??? ??? Low ??? ??? <41 mg/dL      HDL Cholesterol:       High ??? ???>59 mg/dL ??? ??? Low ??? ??? <41 mg/dL   TRIGLYCERIDES 195 mg/dL H <=150   Specimen collection should occur prior to N-Acetylcysteine or Metamizole administration due to the potential for falsely depressed results  HDL,DIRECT 50 mg/dL  40-60   Specimen collection should occur prior to Metamizole administration due to the potential for falsley depressed results  (1) [facility] 05[facility]2017 07[facility]36 [facility]: [id number]     Test Name Result Flag Reference   TSH 2 460 uIU/mL  0 358-3 740   Patients undergoing fluorescein dye angiography may retain small amounts of fluorescein in the body for 48-72 hours post procedure  Samples containing fluorescein can produce falsely depressed TSH values  If the patient had this procedure,a specimen should be resubmitted post fluorescein clearance  (1) HEMOGLOBIN A1C 05Sep2017 07:36 [employer] Order Number: [id number]     Test Name Result Flag Reference   HEMOGLOBIN A1C 8 7 % H 4 2-6 3   EST  AVG   GLUCOSE 203 mg/dl       (1) [facility]YLASE 05Sep2017 07:36AM [facility] Number: [id number]     Test Name Result Flag Reference   AMYLASE 49 IU/L       (1) LIPASE 73INW0330 07:36AM [employer] Order Number: [id number]     Test Name Result Flag Reference   LIPASE 149 u/L       (1) FOLATE 45ECM4879 07:36AM [employer] Order Number: [id number]     Test Name Result Flag Reference   FOLATE 7 8 ng/mL  3 1-17 5     (1) VITAMIN B12 05Sep2017 07:36AM [employer] Order Number: [id number]     Test Name Result Flag Reference   VITAMIN B12 515 pg/mL  100-900     (1) PSA (SCREEN) (Dx V76 44 Screen for Prostate Cancer) 05Sep2017 07:36AM [employer] Order Number: [id number]     Test Name Result Flag Reference   PROSTATE SPECIFIC ANTIGEN 0 3 ng/mL  0 0-4 0   American Urological Association Guidelines define biochemical recurrence of prostate cancer as a detectable or rising PSA value post-radical prostatectomy that is greater than or equal to 0 2 ng/mL with a second confirmatory level of greater than or equal to 0 2 ng/mL  Plan  Health Maintenance    · (1) PSA (SCREEN) (Dx V76 44 Screen for Prostate Cancer) ; every 1 year;  Last  34BYO1110; Status:Active

## 2018-01-13 VITALS
DIASTOLIC BLOOD PRESSURE: 60 MMHG | HEIGHT: 71 IN | SYSTOLIC BLOOD PRESSURE: 90 MMHG | RESPIRATION RATE: 18 BRPM | BODY MASS INDEX: 27.72 KG/M2 | HEART RATE: 82 BPM | TEMPERATURE: 97.9 F | WEIGHT: 198 LBS

## 2018-01-13 VITALS
RESPIRATION RATE: 16 BRPM | HEIGHT: 71 IN | WEIGHT: 198 LBS | SYSTOLIC BLOOD PRESSURE: 98 MMHG | TEMPERATURE: 97.4 F | DIASTOLIC BLOOD PRESSURE: 60 MMHG | BODY MASS INDEX: 27.72 KG/M2 | HEART RATE: 92 BPM

## 2018-01-13 VITALS
BODY MASS INDEX: 28.28 KG/M2 | HEIGHT: 71 IN | WEIGHT: 202 LBS | DIASTOLIC BLOOD PRESSURE: 72 MMHG | SYSTOLIC BLOOD PRESSURE: 112 MMHG | TEMPERATURE: 97.9 F | HEART RATE: 94 BPM | RESPIRATION RATE: 16 BRPM

## 2018-01-14 VITALS
HEART RATE: 76 BPM | WEIGHT: 188 LBS | TEMPERATURE: 99.5 F | RESPIRATION RATE: 16 BRPM | HEIGHT: 71 IN | DIASTOLIC BLOOD PRESSURE: 70 MMHG | SYSTOLIC BLOOD PRESSURE: 118 MMHG | BODY MASS INDEX: 26.32 KG/M2

## 2018-01-14 NOTE — RESULT NOTES
Verified Results  (1) HEMOGLOBIN A1C 18Oct2016 08:18AM [facility] Order Number: [id number]     Test Name Result Flag Reference   HEMOGLOBIN A1C 8 5 % H 4 2-6 3   EST  AVG  GLUCOSE 197 mg/dl       (1) LDL,DIRECT 94HEX4183 08:18AM [facility] Order Number: [id number]     Test Name Result Flag Reference   LDL, DIRECT 112 mg/dl H 0-100   - Patient Instructions: This is a fasting blood test  Water,black tea or black  coffee only after 9:00pm the night before test   Drink 2 glasses of water the morning of test     - Patient Instructions: This is a fasting blood test  Water,black tea or black  coffee only after 9:00pm the night before test Drink 2 glasses of water the morning of test   LDL Cholesterol:        Optimal          <100 mg/dl        Near Optimal     100-129 mg/dl        Above Optimal          Borderline High   130-159 mg/dl          High              160-189 mg/dl          Very High        >189 mg/dl     (1) TRIGLYCERIDE 18Oct2016 08:18AM [facility] Order Number: [id number]     Test Name Result Flag Reference   TRIGLYCERIDES 230 mg/dL H <=150   Specimen collection should occur prior to N-Acetylcysteine or Metamizole administration due to the potential for falsely depressed results  - Patient Instructions:  This is a fasting blood test  Water,black tea or black  coffee only after 9:00pm the night before test Drink 2 glasses of water the morning of test   Triglyceride:         Normal              <150 mg/dl       Borderline High    150-199 mg/dl       High               200-499 mg/dl       Very High          >499 mg/dl

## 2018-01-14 NOTE — RESULT NOTES
Verified Results  (1) HEMOGLOBIN A1C 73IWB8337 10:58AM Franko Gutierrez   5 7-6 4% impaired fasting glucose  >=6 5% diagnosis of diabetes    Falsely low levels are seen in conditions linked to short RBC life span-  hemolytic anemia, and splenomegaly  Falsely elevated levels are seen in situations where there is an increased production of RBC- receipt of erythropoietin or blood transfusions  Adopted from ADA-Clinical Practice Recommendations     Test Name Result Flag Reference   HEMOGLOBIN A1C 6 7 % H 4 0-5 6   EST  AVG   GLUCOSE 146 mg/dl

## 2018-01-15 NOTE — RESULT NOTES
Verified Results  (1) COMPREHENSIVE METABOLIC PANEL 16LGX8571 69:79AK Elliott Strong Order Number: [id number]     Test Name Result Flag Reference   GLUCOSE,RANDM 261 mg/dL H    If the patient is fasting, the ADA then defines impaired fasting glucose as > 100 mg/dL and diabetes as > or equal to 123 mg/dL  SODIUM 135 mmol/L L 136-145   POTASSIUM 4 4 mmol/L  3 5-5 3   CHLORIDE 98 mmol/L L 100-108   CARBON DIOXIDE 29 mmol/L  21-32   ANION GAP (CALC) 8 mmol/L  4-13   BLOOD UREA NITROGEN 26 mg/dL H 5-25   CREATININE 1 05 mg/dL  0 60-1 30   Standardized to IDMS reference method   CALCIUM 8 9 mg/dL  8 3-10 1   BILI, TOTAL 0 30 mg/dL  0 20-1 00   ALK PHOSPHATAS 101 U/L     ALT (SGPT) 23 U/L  12-78   AST(SGOT) 8 U/L  5-45   ALBUMIN 3 8 g/dL  3 5-5 0   TOTAL PROTEIN 7 9 g/dL  6 4-8 2   eGFR Non-African American      >60 0 ml/min/1 73sq [facility] [employer] Disease Education Program recommendations are as follows:  GFR calculation is accurate only with a steady state creatinine  Chronic Kidney disease less than 60 ml/min/1 73 sq  meters  Kidney failure less than 15 ml/min/1 73 sq  meters  (1) HEMOGLOBIN A1C 65Ffn7406 07:24AM Elliott Strong Order Number: [id number]     Test Name Result Flag Reference   HEMOGLOBIN A1C 7 9 % H 4 2-6 3   EST  AVG   GLUCOSE 180 mg/dl

## 2018-01-16 NOTE — MISCELLANEOUS
Message  Return to work or school:   Miriam Sethi is under my professional care  He was seen in my office on 5/18/16   He is able to return to work on  5/23/16      He is released back to work without any restrictions          Signatures   Electronically signed by : Cornelia Steele, ; May 18 2016  2:18PM EST                       (Author)

## 2018-01-17 NOTE — PROGRESS NOTES
Assessment    1  Generalized anxiety disorder (300 02) (F41 1)   2  Major depressive disorder, single episode (296 20) (F32 9)   3  Sleep disorder (780 50) (G47 9)    Plan  Generalized anxiety disorder    · ARIPiprazole 5 MG Oral Tablet (Abilify)  Generalized anxiety disorder, Major depressive disorder, single episode    · Abilify 5 MG Oral Tablet (ARIPiprazole); TAKE 1 TABLET DAILY    Discussion/Summary  Discussion Summary:   Continue current regime  Coupon given for Abilify  RTO 3 months  Counseling Documentation With Imm: The patient was counseled regarding instructions for management, risk factor reductions, patient and family education, risks and benefits of treatment options, importance of compliance with treatment  Medication SE Review and Pt Understands Tx: Possible side effects of new medications were reviewed with the patient/guardian today  The treatment plan was reviewed with the patient/guardian  The patient/guardian understands and agrees with the treatment plan      Chief Complaint  Chief Complaint Free Text Note Form: Pt is here for a FU appt for depression/anxiety  Pt states he is feeling better overall  However, pt states he has good and bad days  Pt states he is worried about the cost of Abilify  History of Present Illness  HPI: Pt presents for follow up on depression and anxiety  He is noting overall improvement  He has more good days than bad  He is concerned about the cost of abilify as it is over $200 for him  Pt denies any new complaints or concerns  He still has some trouble managing multiple responsibilities within his household  Review of Systems  Complete-Male:   Constitutional: as noted in HPI  Eyes: No complaints of eye pain, no red eyes, no discharge from eyes, no itchy eyes  ENT: no complaints of earache, no hearing loss, no nosebleeds, no nasal discharge, no sore throat, no hoarseness     Cardiovascular: No complaints of slow heart rate, no fast heart rate, no chest pain, no palpitations, no leg claudication, no lower extremity  Respiratory: No complaints of shortness of breath, no wheezing, no cough, no SOB on exertion, no orthopnea or PND  Gastrointestinal: No complaints of abdominal pain, no constipation, no nausea or vomiting, no diarrhea or bloody stools  Genitourinary: No complaints of dysuria, no incontinence, no hesitancy, no nocturia, no genital lesion, no testicular pain  Musculoskeletal: No complaints of arthralgia, no myalgias, no joint swelling or stiffness, no limb pain or swelling  Integumentary: No complaints of skin rash or skin lesions, no itching, no skin wound, no dry skin  Neurological: No compliants of headache, no confusion, no convulsions, no numbness or tingling, no dizziness or fainting, no limb weakness, no difficulty walking  Psychiatric: as noted in HPI  Endocrine: No complaints of proptosis, no hot flashes, no muscle weakness, no erectile dysfunction, no deepening of the voice, no feelings of weakness  Hematologic/Lymphatic: No complaints of swollen glands, no swollen glands in the neck, does not bleed easily, no easy bruising  ROS Reviewed:   ROS reviewed  Active Problems    1  Acute bronchitis (466 0) (J20 9)   2  Acute gastritis (535 00) (K29 00)   3  Alcohol abuse, in remission (305 03) (F10 10)   4  Allergic rhinitis (477 9) (J30 9)   5  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)   6  Benign essential hypertension (401 1) (I10)   7  Fatigue (780 79) (R53 83)   8  Fatty liver (571 8) (K76 0)   9  Fibromyalgia (729 1) (M79 7)   10  Fistula, anal (565 1) (K60 3)   11  Flu vaccine need (V04 81) (Z23)   12  Gallbladder calculus without cholecystitis and no obstruction (574 20) (K80 20)   13  Gastric ulcer (531 90) (K25 9)   14  Generalized anxiety disorder (300 02) (F41 1)   15  Hemorrhoids, external (455 3) (K64 4)   16  Hyperlipidemia (272 4) (E78 5)   17  Iron deficiency anemia (280 9) (D50 9)   18   Major depressive disorder, single episode (296 20) (F32 9)   19  Need for influenza vaccination (V04 81) (Z23)   20  Nephrolithiasis (592 0) (N20 0)   21  Other chronic pain (338 29) (G89 29)   22  Pancreatitis (577 0) (K85 9)   23  Screening for prostate cancer (V76 44) (Z12 5)   24  Sleep disorder (780 50) (G47 9)   25  Tobacco use (305 1) (Z72 0)   26  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)   27  Type 1 diabetes mellitus without complication (369 43) (R51 1)    Past Medical History    1  History of Atypical chest pain (786 59) (R07 89)   2  History of Bronchitis, asthmatic (493 90) (J45 909)   3  History of Cough (786 2) (R05)   4  History of acute sinusitis (V12 69) (Z87 09)   5  History of acute sinusitis (V12 69) (Z87 09)   6  History of allergic rhinitis (V12 69) (Z87 09)   7  History of cholelithiasis (V12 79) (Z87 19)   8  History of esophagitis (V12 79) (Z87 19)   9  History of esophagitis (V12 79) (Z87 19)   10  History of gastritis (V12 79) (Z87 19)   11  History of low back pain (V13 59) (Z87 39)   12  History of nausea (V12 79) (Z87 898)   13  History of nausea and vomiting (V12 79) (Z87 898)   14  History of nausea and vomiting (V12 79) (Z87 898)   15  History of Metatarsalgia, unspecified laterality (726 70) (M77 40)   16  History of Nephrolithiasis (V13 01)   17  History of Nonspecific abnormal finding (796 9) (R68 89)   18  History of Pancreatitis (577 9)   19  History of Visit for pre-operative examination (V72 84) (R99 215)  Active Problems And Past Medical History Reviewed: The active problems and past medical history were reviewed and updated today  Surgical History    1  History of Cholecystectomy Laparoscopic   2  History of Foot Surgery   3  History of Knee Arthroscopy   4  History of Surgery Vas Deferens Vasectomy  Surgical History Reviewed: The surgical history was reviewed and updated today  Family History  Mother    1   Family history of Cancer   2  Family history of Coronary Artery Disease (V17 49)   3  Family history of Diabetes Mellitus (V18 0)  Father    4  Family history of Coronary Artery Disease (V17 49)   5  Family history of Prostate Cancer (G17 30)  Sister    6  Family history of Diabetes Mellitus (V18 0)  Family History    7  Family history of Coronary Artery Disease (V17 49)  Family History Reviewed: The family history was reviewed and updated today  Social History    · Current smoker (305 1) (F17 200)   · Marital History - Currently    · Never Drank Alcohol   · Tobacco use (305 1) (Z72 0)  Social History Reviewed: The social history was reviewed and updated today  The social history was reviewed and is unchanged  Current Meds   1  ALPRAZolam 0 5 MG Oral Tablet; TAKE 1 TABLET EVERY 8 HOURS AS NEEDED; Therapy: 99MOR7287 to (Evaluate:98Kkl8005)  Requested for: 08QCX5147; Last Rx:14Jun2016   Ordered   2  ARIPiprazole 5 MG Oral Tablet; Take 1 tablet daily; Therapy: 93LVL7342 to (Last Rx:18May2016)  Requested for: 55DKC2189 Ordered   3  BD Pen Needle Short U/F 31G X 8 MM Miscellaneous; use as directed; Therapy: 08PDW4777 to (Last Rx:03Mar2016)  Requested for: 86JHK1392 Ordered   4  Ferrous Sulfate 325 (65 Fe) MG Oral Tablet; TAKE 1 TABLET DAILY AS DIRECTED; Therapy: 37CFJ1257 to (Brandee Miller)  Requested for: 86GDL0489; Last Rx:09Mar2016   Ordered   5  Lisinopril 10 MG Oral Tablet; take 1 tablet by mouth twice daily; Therapy: 32Nih8936 to (Last Rx:22Apr2016)  Requested for: 22Apr2016 Ordered   6  Omeprazole 20 MG Oral Capsule Delayed Release Recorded   7  OneTouch Ultra Blue In Vitro Strip; TEST 3 TIMES DAILY; Therapy: 46ACP5316 to (Avril Jordan)  Requested for: 56YLS2753; Last Rx:27Mar2015   Ordered   8  OneTouch Verio In [employer]; TEST 3 TIMES DAILY; Therapy: 08RJD9470 to (Evaluate:17Jan2015)  Requested for: 67ZRR7828; Last Rx:75Lcg0502   Ordered   9   Toujeo SoloStar 300 UNIT/ML Subcutaneous Solution Pen-injector; Inject 40-50 units sq q hs;   Therapy: 62ALM8610 to (Last Rx:02Mar2016)  Requested for: 76RGC8042 Ordered   10  TraMADol HCl - 50 MG Oral Tablet; TAKE 1  OR 2 TABLETS EVERY 4-6 PRN; Therapy: 91RGM4608 to (Evaluate:10Qtu8711)  Requested for: 63HMF2211; Last Rx:49Lfg0667    Ordered   11  Vascepa 1 GM Oral Capsule; TAKE 2 CAPSULE Twice daily; Therapy: 99UUD5448 to (Evaluate:69Teu4113) Recorded   12  Venlafaxine HCl  MG Oral Capsule Extended Release 24 Hour; TAKE ONE CAPSULE BY    MOUTH TWICE A DAY; Therapy: 40Dir7690 to (Last Garth Roof)  Requested for: 29Apr2016 Ordered  Medication List Reviewed: The medication list was reviewed and updated today  Allergies    1  No Known Drug Allergies    Vitals  Vital Signs [Data Includes: Current Encounter]    Recorded: 95Vvc5221 08:22AM   Temperature 96 1 F   Heart Rate 84   Respiration 18   Systolic 279   Diastolic 72   Height 5 ft 11 5 in   Weight 190 lb 6 08 oz   BMI Calculated 26 18   BSA Calculated 2 07   O2 Saturation 98     Physical Exam    Constitutional   General appearance: No acute distress, well appearing and well nourished  Eyes   Conjunctiva and lids: No swelling, erythema, or discharge  Pulmonary   Respiratory effort: No increased work of breathing or signs of respiratory distress  Auscultation of lungs: Clear to auscultation, equal breath sounds bilaterally, no wheezes, no rales, no rhonci  Cardiovascular   Auscultation of heart: Normal rate and rhythm, normal S1 and S2, without murmurs  Examination of extremities for edema and/or varicosities: Normal     Carotid pulses: Normal     Abdomen   Abdomen: Non-tender, no masses  Skin   Skin and subcutaneous tissue: Normal without rashes or lesions      Psychiatric   Orientation to person, place and time: Normal     Mood and affect: Normal          Health Management  Health Maintenance   (1) PSA (SCREEN) (Dx V76 44 Screen for Prostate Cancer); every 1 year; Last 48Cau2835; Next  Due: 27XWZ2656; Near Due    Future Appointments    Date/Time Provider Specialty Site   10/18/2016 08:30 AM [facility]   10/17/2016 04:15 PM CHACE Deluca   Internal Medicine [address]   Electronically signed by : Judi Kunz, [facility]; Jul 12 2016  2:47PM EST                       (Author)    Electronically signed by : CHACE Schwartz ; Jul 12 2016  3:00PM EST                       (Author)

## 2018-01-22 VITALS
WEIGHT: 195 LBS | SYSTOLIC BLOOD PRESSURE: 100 MMHG | HEART RATE: 118 BPM | DIASTOLIC BLOOD PRESSURE: 62 MMHG | RESPIRATION RATE: 18 BRPM | BODY MASS INDEX: 27.3 KG/M2 | HEIGHT: 71 IN | TEMPERATURE: 97.7 F | OXYGEN SATURATION: 98 %

## 2018-01-22 VITALS
HEIGHT: 71 IN | HEART RATE: 88 BPM | RESPIRATION RATE: 18 BRPM | WEIGHT: 201.13 LBS | TEMPERATURE: 97.6 F | DIASTOLIC BLOOD PRESSURE: 68 MMHG | SYSTOLIC BLOOD PRESSURE: 114 MMHG | BODY MASS INDEX: 28.16 KG/M2 | OXYGEN SATURATION: 98 %

## 2018-01-22 VITALS
WEIGHT: 199 LBS | HEART RATE: 78 BPM | DIASTOLIC BLOOD PRESSURE: 60 MMHG | BODY MASS INDEX: 27.86 KG/M2 | RESPIRATION RATE: 18 BRPM | SYSTOLIC BLOOD PRESSURE: 100 MMHG | HEIGHT: 71 IN | TEMPERATURE: 97.5 F

## 2018-01-24 VITALS
RESPIRATION RATE: 18 BRPM | TEMPERATURE: 97.5 F | BODY MASS INDEX: 27.58 KG/M2 | OXYGEN SATURATION: 97 % | DIASTOLIC BLOOD PRESSURE: 64 MMHG | SYSTOLIC BLOOD PRESSURE: 112 MMHG | HEART RATE: 103 BPM | WEIGHT: 197 LBS | HEIGHT: 71 IN

## 2018-01-31 ENCOUNTER — TELEPHONE (OUTPATIENT)
Dept: INTERNAL MEDICINE CLINIC | Facility: CLINIC | Age: 48
End: 2018-01-31

## 2018-01-31 RX ORDER — TRAMADOL HYDROCHLORIDE 50 MG/1
TABLET ORAL
COMMUNITY
Start: 2011-11-29 | End: 2018-02-02 | Stop reason: SDUPTHER

## 2018-02-02 DIAGNOSIS — M79.7 FIBROMYALGIA: Primary | ICD-10-CM

## 2018-02-02 DIAGNOSIS — G89.4 CHRONIC PAIN SYNDROME: ICD-10-CM

## 2018-02-02 RX ORDER — TRAMADOL HYDROCHLORIDE 50 MG/1
50 TABLET ORAL EVERY 6 HOURS PRN
Qty: 120 TABLET | Refills: 0 | Status: SHIPPED | OUTPATIENT
Start: 2018-02-02 | End: 2018-05-29

## 2018-02-06 NOTE — TELEPHONE ENCOUNTER
Still waiting for the answer to come through he fax, if I do not hear anything tomorrow I will call the insurance company

## 2018-03-05 DIAGNOSIS — F32.A DEPRESSION, UNSPECIFIED DEPRESSION TYPE: Primary | ICD-10-CM

## 2018-03-05 RX ORDER — RISPERIDONE 0.5 MG/1
TABLET, FILM COATED ORAL
Qty: 60 TABLET | Refills: 3 | Status: SHIPPED | OUTPATIENT
Start: 2018-03-05 | End: 2018-09-12 | Stop reason: SDUPTHER

## 2018-03-07 NOTE — PROGRESS NOTES
To whom it may concern,  This is a letter in regards to my pt, Mr Mosqueda Richa (: 1970)  I am his PCP and he currently takes Ultram for chronic pain and I am the prescribing doctor  Call with any questions  Sincerely,    INDRA Gutierrez DO  Electronically signed Alfredo IZQUIERDO    Dec 11 2017 12:59PM EST

## 2018-03-19 ENCOUNTER — TELEPHONE (OUTPATIENT)
Dept: INTERNAL MEDICINE CLINIC | Facility: CLINIC | Age: 48
End: 2018-03-19

## 2018-04-17 DIAGNOSIS — F41.1 GAD (GENERALIZED ANXIETY DISORDER): Primary | ICD-10-CM

## 2018-04-17 DIAGNOSIS — E10.8 TYPE 1 DIABETES MELLITUS WITH COMPLICATION (HCC): Primary | ICD-10-CM

## 2018-04-17 RX ORDER — HYDROXYZINE PAMOATE 50 MG/1
CAPSULE ORAL
Qty: 90 CAPSULE | Refills: 0 | Status: SHIPPED | OUTPATIENT
Start: 2018-04-17 | End: 2018-05-28 | Stop reason: SDUPTHER

## 2018-04-18 RX ORDER — INSULIN GLARGINE 300 U/ML
INJECTION, SOLUTION SUBCUTANEOUS
Qty: 6 PEN | Refills: 0 | Status: SHIPPED | OUTPATIENT
Start: 2018-04-18 | End: 2018-05-28 | Stop reason: SDUPTHER

## 2018-05-28 DIAGNOSIS — E10.8 TYPE 1 DIABETES MELLITUS WITH COMPLICATION (HCC): ICD-10-CM

## 2018-05-28 DIAGNOSIS — F41.1 GAD (GENERALIZED ANXIETY DISORDER): ICD-10-CM

## 2018-05-29 ENCOUNTER — OFFICE VISIT (OUTPATIENT)
Dept: INTERNAL MEDICINE CLINIC | Facility: CLINIC | Age: 48
End: 2018-05-29
Payer: COMMERCIAL

## 2018-05-29 VITALS
HEART RATE: 84 BPM | BODY MASS INDEX: 26.95 KG/M2 | TEMPERATURE: 97.8 F | DIASTOLIC BLOOD PRESSURE: 80 MMHG | RESPIRATION RATE: 18 BRPM | HEIGHT: 72 IN | SYSTOLIC BLOOD PRESSURE: 130 MMHG | WEIGHT: 199 LBS

## 2018-05-29 DIAGNOSIS — R11.0 NAUSEA: ICD-10-CM

## 2018-05-29 DIAGNOSIS — F10.10 ALCOHOL ABUSE: ICD-10-CM

## 2018-05-29 DIAGNOSIS — R10.13 EPIGASTRIC PAIN: Primary | ICD-10-CM

## 2018-05-29 PROBLEM — G47.00 INSOMNIA: Status: ACTIVE | Noted: 2017-09-05

## 2018-05-29 PROBLEM — K85.90 RECURRENT PANCREATITIS: Status: ACTIVE | Noted: 2017-08-25

## 2018-05-29 PROBLEM — J44.9 COPD (CHRONIC OBSTRUCTIVE PULMONARY DISEASE) (HCC): Status: ACTIVE | Noted: 2017-03-29

## 2018-05-29 PROCEDURE — 99211 OFF/OP EST MAY X REQ PHY/QHP: CPT | Performed by: INTERNAL MEDICINE

## 2018-05-29 PROCEDURE — 3725F SCREEN DEPRESSION PERFORMED: CPT | Performed by: INTERNAL MEDICINE

## 2018-05-29 RX ORDER — HYDROXYZINE PAMOATE 50 MG/1
CAPSULE ORAL
Qty: 90 CAPSULE | Refills: 0 | Status: SHIPPED | OUTPATIENT
Start: 2018-05-29 | End: 2018-07-03 | Stop reason: SDUPTHER

## 2018-05-29 RX ORDER — TRAZODONE HYDROCHLORIDE 50 MG/1
1 TABLET ORAL
COMMUNITY
End: 2018-09-18 | Stop reason: SDUPTHER

## 2018-05-29 RX ORDER — INSULIN GLARGINE 300 U/ML
INJECTION, SOLUTION SUBCUTANEOUS
Qty: 5 PEN | Refills: 0 | Status: SHIPPED | OUTPATIENT
Start: 2018-05-29 | End: 2018-06-11 | Stop reason: SDUPTHER

## 2018-05-29 NOTE — PROGRESS NOTES
Assessment/Plan:    No problem-specific Assessment & Plan notes found for this encounter  Diagnoses and all orders for this visit:    Epigastric pain    Nausea    Alcohol abuse    Other orders  -     glucose blood (ONE TOUCH ULTRA TEST) test strip; by In Vitro route 3 (three) times a day  -     glucose blood (ONETOUCH VERIO) test strip; by In Vitro route 3 (three) times a day  -     traZODone (DESYREL) 50 mg tablet; Take 1 tablet by mouth      A/P: Suspect recurrent pancreatitis, but GERD, gastric ulcers, etc still a possibility  Diabetic and could cause the pancreatitis as well  Needs labs and xrays  HOWEVER, pt has no insurance and just wants a note for work  Discussed the risk and pt doesn't want any testing or the ER  Advised to start clear liquids and if worsens, to call or go to the ER  Discussed diabetic management given his s/s  No s/s of withdrawing and doesn't appear to be at risk of acute withdraw  Advised pt to get back into AA  RTC prn  Will call for an update in 48 hours  Subjective:      Patient ID: Lori Moe is a 46 y o  male  WM, with h/o recurrent alcohol induced pancreatitis, presents with a 1-2 day h/o epigastric pain and nausea w/o vomiting  Has started drinking again over the past several weeks, but denies heavy use  Abusing both whiskey and beer  No longer in AA  No fever or chills  No changes in stools and last BM was earlier today and was normal  No yellowing of the eyes or skin  No pruritis  No GB  Abdominal Pain   Associated symptoms include nausea  Pertinent negatives include no arthralgias, diarrhea, dysuria, fever, frequency, headaches, myalgias or vomiting  The following portions of the patient's history were reviewed and updated as appropriate:   He  has a past medical history of Allergic rhinitis; Bronchitis, asthmatic; Cholelithiasis; and Metatarsalgia    He   Patient Active Problem List    Diagnosis Date Noted    Insomnia 09/05/2017    Recurrent pancreatitis ([facility] ) 08/25/2017    COPD (chronic obstructive pulmonary disease) ([place] [address] ) 03/29/2017    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    Iron deficiency anemia 04/05/2016    Diabetes mellitus type I ([place] [address] ) 04/05/2016    Fibromyalgia 07/29/2013    Fatty liver 12/05/2012    Nephrolithiasis 12/05/2012    Hyperlipidemia 09/11/2012    Benign essential hypertension 07/02/2012    MDD (major depressive disorder), single episode 07/02/2012    Other chronic pain 07/02/2012    Sleep disorder 07/02/2012    Generalized anxiety disorder 06/13/2012     He  has a past surgical history that includes CHOLECYSTECTOMY LAPAROSCOPIC; Foot surgery; Knee arthroscopy; and Vasectomy  His family history includes Cancer in his mother; Coronary artery disease in his family, father, and mother; Diabetes in his mother and sister; Prostate cancer in his father  He  reports that he has been smoking  He has never used smokeless tobacco  He reports that he drinks alcohol  He reports that he does not use drugs    Current Outpatient Prescriptions   Medication Sig Dispense Refill    glucose blood (ONE TOUCH ULTRA TEST) test strip by In Vitro route 3 (three) times a day      glucose blood (ONETOUCH VERIO) test strip by In Vitro route 3 (three) times a day      hydrOXYzine pamoate (VISTARIL) 50 mg capsule TAKE 1 CAPSULE BY MOUTH THREE TIMES DAILY AS NEEDED 90 capsule 0    Insulin Pen Needle (B-D ULTRAFINE III SHORT PEN) 31G X 8 MM MISC by Does not apply route      lisinopril (ZESTRIL) 10 mg tablet Take 1 tablet by mouth 2 (two) times a day      omeprazole (PriLOSEC) 20 mg delayed release capsule Take by mouth      risperiDONE (RisperDAL) 0 5 mg tablet TAKE ONE TABLET BY MOUTH TWICE DAILY 60 tablet 3    TOUJEO SOLOSTAR 300 units/mL CONCETRATED U-300 injection pen INJECT 40-50 UNITS SUBCUTANEOUSLY AT BED TIME 5 pen 0    traZODone (DESYREL) 50 mg tablet Take 1 tablet by mouth      venlafaxine (EFFEXOR XR) 75 mg 24 hr capsule Take 1 capsule by mouth 3 (three) times a day       No current facility-administered medications for this visit  Current Outpatient Prescriptions on File Prior to Visit   Medication Sig    hydrOXYzine pamoate (VISTARIL) 50 mg capsule TAKE 1 CAPSULE BY MOUTH THREE TIMES DAILY AS NEEDED    Insulin Pen Needle (B-D ULTRAFINE III SHORT PEN) 31G X 8 MM MISC by Does not apply route    lisinopril (ZESTRIL) 10 mg tablet Take 1 tablet by mouth 2 (two) times a day    omeprazole (PriLOSEC) 20 mg delayed release capsule Take by mouth    risperiDONE (RisperDAL) 0 5 mg tablet TAKE ONE TABLET BY MOUTH TWICE DAILY    TOUJEO SOLOSTAR 300 units/mL CONCETRATED U-300 injection pen INJECT 40-50 UNITS SUBCUTANEOUSLY AT BED TIME    venlafaxine (EFFEXOR XR) 75 mg 24 hr capsule Take 1 capsule by mouth 3 (three) times a day    [DISCONTINUED] omega-3-acid ethyl esters (LOVAZA) 1 g capsule Take 2 capsules by mouth 2 (two) times a day    [DISCONTINUED] traMADol (ULTRAM) 50 mg tablet Take 1 tablet (50 mg total) by mouth every 6 (six) hours as needed for moderate pain     No current facility-administered medications on file prior to visit  He has No Known Allergies       Review of Systems   Constitutional: Positive for activity change and fatigue  Negative for chills, diaphoresis and fever  HENT: Negative  Eyes: Negative for visual disturbance  Respiratory: Negative for cough, chest tightness, shortness of breath and wheezing  Cardiovascular: Negative for chest pain, palpitations and leg swelling  Gastrointestinal: Positive for abdominal pain and nausea  Negative for abdominal distention, anal bleeding, blood in stool, diarrhea and vomiting  Genitourinary: Negative for difficulty urinating, dysuria and frequency  Musculoskeletal: Negative for arthralgias, gait problem and myalgias  Neurological: Negative for light-headedness and headaches  Psychiatric/Behavioral: Negative for confusion  The patient is not nervous/anxious  Objective:      /80   Pulse 84   Temp 97 8 °F (36 6 °C) (Tympanic)   Resp 18   Ht 5' 11 5" (1 816 m)   Wt 90 3 kg (199 lb)   BMI 27 37 kg/m²          Physical Exam   Constitutional: He is oriented to person, place, and time  He appears well-developed and well-nourished  No distress  HENT:   Head: Normocephalic and atraumatic  Mouth/Throat: Oropharynx is clear and moist  No oropharyngeal exudate  Eyes: Conjunctivae and EOM are normal  Pupils are equal, round, and reactive to light  No scleral icterus  Neck: Neck supple  No JVD present  Cardiovascular: Normal rate, regular rhythm and normal heart sounds  No murmur heard  Pulmonary/Chest: Effort normal and breath sounds normal  No respiratory distress  He has no wheezes  Abdominal: Soft  He exhibits no distension and no mass  There is tenderness  There is no rebound and no guarding  Soft/ND with BS present, but slightly decreased  Diffuse tenderness epigastric and bilat upper quads  No guarding or rebound  No masses  NO HSM>    Musculoskeletal: Normal range of motion  He exhibits no edema  Lymphadenopathy:     He has no cervical adenopathy  Neurological: He is alert and oriented to person, place, and time  Psychiatric: He has a normal mood and affect  His behavior is normal  Judgment and thought content normal    Nursing note and vitals reviewed

## 2018-05-30 LAB
ALBUMIN SERPL BCP-MCNC: 3.9 G/DL (ref 3.5–5.7)
ALP SERPL-CCNC: 109 IU/L (ref 40–150)
ALT SERPL W P-5'-P-CCNC: 10 IU/L (ref 0–50)
AMPHETAMINES (HISTORICAL): NEGATIVE
AMYLASE (HISTORICAL): 339 U/L (ref 29–103)
ANION GAP SERPL CALCULATED.3IONS-SCNC: 14.2 MM/L
APTT PPP: 31.7 SEC (ref 24.4–37.6)
AST SERPL W P-5'-P-CCNC: 12 U/L (ref 8–27)
BACTERIA UR QL AUTO: ABNORMAL
BARBITURATES (HISTORICAL): NEGATIVE
BASOPHILS # BLD AUTO: 0.1 X3/UL (ref 0–0.3)
BASOPHILS # BLD AUTO: 1 % (ref 0–2)
BENZODIAZEPINES (HISTORICAL): NEGATIVE
BILIRUB SERPL-MCNC: 0.7 MG/DL (ref 0.3–1)
BILIRUB UR QL STRIP: NEGATIVE
BUN SERPL-MCNC: 19 MG/DL (ref 7–25)
CALCIUM SERPL-MCNC: 8.8 MG/DL (ref 8.6–10.5)
CANNABINOIDS (HISTORICAL): NEGATIVE
CHLORIDE SERPL-SCNC: 96 MM/L (ref 98–107)
CK SERPL-CCNC: 69 IU/L (ref 30–223)
CLARITY UR: CLEAR
CO2 SERPL-SCNC: 23 MM/L (ref 21–31)
COCAINE (HISTORICAL): NEGATIVE
COLOR UR: YELLOW
CREAT SERPL-MCNC: 1.3 MG/DL (ref 0.7–1.3)
DEPRECATED RDW RBC AUTO: 13.1 % (ref 11.5–14.5)
EGFR (HISTORICAL): 58 GFR
EGFR AFRICAN AMERICAN (HISTORICAL): > 60 GFR
EOSINOPHIL # BLD AUTO: 0.1 X3/UL (ref 0–0.5)
EOSINOPHIL NFR BLD AUTO: 0.9 % (ref 0–5)
ETHANOL (HISTORICAL): < 10 MG/DL
FOLATE SERPL-MCNC: 12.07 NG/ML (ref 0.5–20)
GLUCOSE (HISTORICAL): 282 MG/DL (ref 65–99)
GLUCOSE UR STRIP-MCNC: ABNORMAL MG/DL
HCT VFR BLD AUTO: 44.7 % (ref 42–52)
HGB BLD-MCNC: 15.7 G/DL (ref 14–18)
HGB UR QL STRIP.AUTO: NEGATIVE
HYALINE CASTS #/AREA URNS LPF: ABNORMAL /LPF
INR PPP: 0.99 (ref 0.9–1.5)
IRON SERPL-MCNC: 44 UG/DL (ref 50–212)
KETONES UR STRIP-MCNC: ABNORMAL MG/DL
LEUKOCYTE ESTERASE UR QL STRIP: NEGATIVE
LIPASE SERPL-CCNC: 1289 U/L (ref 11–82)
LYMPHOCYTES # BLD AUTO: 1.5 X3/UL (ref 1.2–4.2)
LYMPHOCYTES NFR BLD AUTO: 10.8 % (ref 20.5–51.1)
MAGNESIUM SERPL-MCNC: 1.4 MG/DL (ref 1.9–2.7)
MCH RBC QN AUTO: 30.2 PG (ref 26–34)
MCHC RBC AUTO-ENTMCNC: 35.1 G/DL (ref 31–36)
MCV RBC AUTO: 86 FL (ref 81–99)
MEDICAL ALCOHOL (HISTORICAL): 0 %
METHADONE (HISTORICAL): NEGATIVE
MONOCYTES # BLD AUTO: 0.7 X3/UL (ref 0–1)
MONOCYTES NFR BLD AUTO: 4.9 % (ref 1.7–12)
MUCUS THREADS (HISTORICAL): ABNORMAL /HPF
NEUTROPHILS # BLD AUTO: 11.7 X3/UL (ref 1.4–6.5)
NEUTS SEG NFR BLD AUTO: 82.4 % (ref 42.2–75.2)
NITRITE UR QL STRIP: NEGATIVE
NON-SQ EPI CELLS URNS QL MICRO: ABNORMAL /HPF
OPIATES (HISTORICAL): NEGATIVE
OSMOLALITY, SERUM (HISTORICAL): 271 MOSM (ref 262–291)
OTHER CELLS (HISTORICAL): ABNORMAL
OXYCODONE (HISTORICAL): NEGATIVE
PH UR STRIP.AUTO: 6 [PH] (ref 4.5–8)
PHENCYCLIDINE URINE (HISTORICAL): NEGATIVE
PLATELET # BLD AUTO: 231 X3/UL (ref 130–400)
PLEASE NOTE (HISTORICAL): YES
PMV BLD AUTO: 6.8 FL (ref 8.6–11.7)
POTASSIUM SERPL-SCNC: 4.2 MM/L (ref 3.5–5.5)
PREALBUMIN (HISTORICAL): 20.9 MG/DL (ref 17–34)
PROPOXYPHENE (HISTORICAL): NEGATIVE
PROT UR STRIP-MCNC: ABNORMAL MG/DL
PROTHROMBIN TIME (HISTORICAL): 11.5 SEC (ref 10.1–12.9)
RBC # BLD AUTO: 5.2 X6/UL (ref 4.3–5.9)
RBC #/AREA URNS AUTO: ABNORMAL /HPF
SODIUM SERPL-SCNC: 129 MM/L (ref 134–143)
SP GR UR STRIP.AUTO: 1.02 (ref 1–1.03)
TOTAL PROTEIN (HISTORICAL): 6.7 G/DL (ref 6.4–8.9)
TROPONIN I SERPL-MCNC: < 0.03 NG/ML
UROBILINOGEN UR QL STRIP.AUTO: 0.2 EU/DL (ref 0.2–8)
VIT B12 SERPL-MCNC: 372 PG/ML (ref 180–914)
WBC # BLD AUTO: 14.1 X3/UL (ref 4.8–10.8)
WBC #/AREA URNS AUTO: ABNORMAL /HPF

## 2018-05-31 LAB
%SAT (HISTORICAL): 14 % (ref 20–55)
ALBUMIN SERPL BCP-MCNC: 3.6 G/DL (ref 3.5–5.7)
ALP SERPL-CCNC: 97 IU/L (ref 40–150)
ALT SERPL W P-5'-P-CCNC: 9 IU/L (ref 0–50)
ANION GAP SERPL CALCULATED.3IONS-SCNC: 13.2 MM/L
AST SERPL W P-5'-P-CCNC: 11 U/L (ref 8–27)
BASOPHILS # BLD AUTO: 0.1 X3/UL (ref 0–0.3)
BASOPHILS # BLD AUTO: 0.5 % (ref 0–2)
BILIRUB SERPL-MCNC: 0.6 MG/DL (ref 0.3–1)
BUN SERPL-MCNC: 16 MG/DL (ref 7–25)
CALCIUM SERPL-MCNC: 8 MG/DL (ref 8.6–10.5)
CHLORIDE SERPL-SCNC: 102 MM/L (ref 98–107)
CO2 SERPL-SCNC: 22 MM/L (ref 21–31)
CREAT SERPL-MCNC: 0.82 MG/DL (ref 0.7–1.3)
DEPRECATED RDW RBC AUTO: 13.3 % (ref 11.5–14.5)
EGFR (HISTORICAL): > 60 GFR
EGFR AFRICAN AMERICAN (HISTORICAL): > 60 GFR
EOSINOPHIL # BLD AUTO: 0.1 X3/UL (ref 0–0.5)
EOSINOPHIL NFR BLD AUTO: 1.3 % (ref 0–5)
GLUCOSE (HISTORICAL): 229 MG/DL (ref 65–99)
HCT VFR BLD AUTO: 39.2 % (ref 42–52)
HGB BLD-MCNC: 13.5 G/DL (ref 14–18)
INR PPP: 1.09 (ref 0.9–1.5)
IRON SERPL-MCNC: 41 UG/DL (ref 50–212)
LIPASE SERPL-CCNC: 426 U/L (ref 11–82)
LYMPHOCYTES # BLD AUTO: 1.5 X3/UL (ref 1.2–4.2)
LYMPHOCYTES NFR BLD AUTO: 13.5 % (ref 20.5–51.1)
MCH RBC QN AUTO: 30.2 PG (ref 26–34)
MCHC RBC AUTO-ENTMCNC: 34.6 G/DL (ref 31–36)
MCV RBC AUTO: 87.4 FL (ref 81–99)
MONOCYTES # BLD AUTO: 0.9 X3/UL (ref 0–1)
MONOCYTES NFR BLD AUTO: 7.9 % (ref 1.7–12)
NEUTROPHILS # BLD AUTO: 8.7 X3/UL (ref 1.4–6.5)
NEUTS SEG NFR BLD AUTO: 76.8 % (ref 42.2–75.2)
OSMOLALITY, SERUM (HISTORICAL): 275 MOSM (ref 262–291)
PLATELET # BLD AUTO: 199 X3/UL (ref 130–400)
PMV BLD AUTO: 6.9 FL (ref 8.6–11.7)
POTASSIUM SERPL-SCNC: 4.2 MM/L (ref 3.5–5.5)
PROTHROMBIN TIME (HISTORICAL): 12.7 SEC (ref 10.1–12.9)
RBC # BLD AUTO: 4.48 X6/UL (ref 4.3–5.9)
SODIUM SERPL-SCNC: 133 MM/L (ref 134–143)
TIBC SERPL-MCNC: 298 UG/DL (ref 250–425)
TOTAL PROTEIN (HISTORICAL): 6.1 G/DL (ref 6.4–8.9)
TRANSFERRIN (HISTORICAL): 213 MG/DL (ref 215–365)
WBC # BLD AUTO: 11.3 X3/UL (ref 4.8–10.8)

## 2018-06-01 ENCOUNTER — TELEPHONE (OUTPATIENT)
Dept: INTERNAL MEDICINE CLINIC | Facility: CLINIC | Age: 48
End: 2018-06-01

## 2018-06-01 LAB
ALBUMIN SERPL BCP-MCNC: 3.2 G/DL (ref 3.5–5.7)
ALP SERPL-CCNC: 83 IU/L (ref 40–150)
ALT SERPL W P-5'-P-CCNC: 7 IU/L (ref 0–50)
ANION GAP SERPL CALCULATED.3IONS-SCNC: 10.1 MM/L
AST SERPL W P-5'-P-CCNC: 15 U/L (ref 8–27)
BASOPHILS # BLD AUTO: 0.1 X3/UL (ref 0–0.3)
BASOPHILS # BLD AUTO: 0.5 % (ref 0–2)
BILIRUB SERPL-MCNC: 0.5 MG/DL (ref 0.3–1)
BUN SERPL-MCNC: 13 MG/DL (ref 7–25)
CALCIUM SERPL-MCNC: 8 MG/DL (ref 8.6–10.5)
CHLORIDE SERPL-SCNC: 104 MM/L (ref 98–107)
CO2 SERPL-SCNC: 24 MM/L (ref 21–31)
CREAT SERPL-MCNC: 0.9 MG/DL (ref 0.7–1.3)
DEPRECATED RDW RBC AUTO: 13.4 % (ref 11.5–14.5)
EGFR (HISTORICAL): > 60 GFR
EGFR AFRICAN AMERICAN (HISTORICAL): > 60 GFR
EOSINOPHIL # BLD AUTO: 0.3 X3/UL (ref 0–0.5)
EOSINOPHIL NFR BLD AUTO: 2.4 % (ref 0–5)
GLUCOSE (HISTORICAL): 104 MG/DL (ref 65–99)
HCT VFR BLD AUTO: 35.1 % (ref 42–52)
HGB BLD-MCNC: 12.2 G/DL (ref 14–18)
LIPASE SERPL-CCNC: 52 U/L (ref 11–82)
LYMPHOCYTES # BLD AUTO: 2 X3/UL (ref 1.2–4.2)
LYMPHOCYTES NFR BLD AUTO: 18.4 % (ref 20.5–51.1)
MAGNESIUM SERPL-MCNC: 2 MG/DL (ref 1.9–2.7)
MCH RBC QN AUTO: 30.5 PG (ref 26–34)
MCHC RBC AUTO-ENTMCNC: 34.9 G/DL (ref 31–36)
MCV RBC AUTO: 87.5 FL (ref 81–99)
MONOCYTES # BLD AUTO: 0.9 X3/UL (ref 0–1)
MONOCYTES NFR BLD AUTO: 8.3 % (ref 1.7–12)
NEUTROPHILS # BLD AUTO: 7.5 X3/UL (ref 1.4–6.5)
NEUTS SEG NFR BLD AUTO: 70.4 % (ref 42.2–75.2)
OSMOLALITY, SERUM (HISTORICAL): 269 MOSM (ref 262–291)
PLATELET # BLD AUTO: 182 X3/UL (ref 130–400)
PMV BLD AUTO: 7.3 FL (ref 8.6–11.7)
POTASSIUM SERPL-SCNC: 4.1 MM/L (ref 3.5–5.5)
RBC # BLD AUTO: 4.01 X6/UL (ref 4.3–5.9)
SODIUM SERPL-SCNC: 134 MM/L (ref 134–143)
TOTAL PROTEIN (HISTORICAL): 5.7 G/DL (ref 6.4–8.9)
WBC # BLD AUTO: 10.7 X3/UL (ref 4.8–10.8)

## 2018-06-01 NOTE — TELEPHONE ENCOUNTER
----- Message from Jessica DO Kalee sent at 5/29/2018 10:21 AM EDT -----  Call pt in two days and get up date on condition

## 2018-06-03 LAB
ALBUMIN SERPL BCP-MCNC: 3 G/DL (ref 3.5–5.7)
ALP SERPL-CCNC: 77 IU/L (ref 40–150)
ALT SERPL W P-5'-P-CCNC: 10 IU/L (ref 0–50)
ANION GAP SERPL CALCULATED.3IONS-SCNC: 13.3 MM/L
AST SERPL W P-5'-P-CCNC: 19 U/L (ref 8–27)
BASOPHILS # BLD AUTO: 0.1 X3/UL (ref 0–0.3)
BASOPHILS # BLD AUTO: 1 % (ref 0–2)
BILIRUB SERPL-MCNC: 0.4 MG/DL (ref 0.3–1)
BUN SERPL-MCNC: 10 MG/DL (ref 7–25)
CALCIUM SERPL-MCNC: 7.9 MG/DL (ref 8.6–10.5)
CHLORIDE SERPL-SCNC: 104 MM/L (ref 98–107)
CO2 SERPL-SCNC: 24 MM/L (ref 21–31)
CREAT SERPL-MCNC: 0.81 MG/DL (ref 0.7–1.3)
DEPRECATED RDW RBC AUTO: 13.1 % (ref 11.5–14.5)
EGFR (HISTORICAL): > 60 GFR
EGFR AFRICAN AMERICAN (HISTORICAL): > 60 GFR
EOSINOPHIL # BLD AUTO: 0.3 X3/UL (ref 0–0.5)
EOSINOPHIL NFR BLD AUTO: 3.3 % (ref 0–5)
GLUCOSE (HISTORICAL): 86 MG/DL (ref 65–99)
HCT VFR BLD AUTO: 33.1 % (ref 42–52)
HGB BLD-MCNC: 11.4 G/DL (ref 14–18)
INR PPP: 1.16 (ref 0.9–1.5)
LIPASE SERPL-CCNC: 14 U/L (ref 11–82)
LYMPHOCYTES # BLD AUTO: 1.8 X3/UL (ref 1.2–4.2)
LYMPHOCYTES NFR BLD AUTO: 19.3 % (ref 20.5–51.1)
MAGNESIUM SERPL-MCNC: 1.7 MG/DL (ref 1.9–2.7)
MCH RBC QN AUTO: 30.6 PG (ref 26–34)
MCHC RBC AUTO-ENTMCNC: 34.4 G/DL (ref 31–36)
MCV RBC AUTO: 89 FL (ref 81–99)
MONOCYTES # BLD AUTO: 0.9 X3/UL (ref 0–1)
MONOCYTES NFR BLD AUTO: 10 % (ref 1.7–12)
NEUTROPHILS # BLD AUTO: 6.2 X3/UL (ref 1.4–6.5)
NEUTS SEG NFR BLD AUTO: 66.4 % (ref 42.2–75.2)
OSMOLALITY, SERUM (HISTORICAL): 272 MOSM (ref 262–291)
PLATELET # BLD AUTO: 204 X3/UL (ref 130–400)
PMV BLD AUTO: 7.1 FL (ref 8.6–11.7)
POTASSIUM SERPL-SCNC: 4.3 MM/L (ref 3.5–5.5)
PROTHROMBIN TIME (HISTORICAL): 13.5 SEC (ref 10.1–12.9)
RBC # BLD AUTO: 3.72 X6/UL (ref 4.3–5.9)
SODIUM SERPL-SCNC: 137 MM/L (ref 134–143)
TOTAL PROTEIN (HISTORICAL): 5.4 G/DL (ref 6.4–8.9)
WBC # BLD AUTO: 9.4 X3/UL (ref 4.8–10.8)

## 2018-06-04 LAB
ALBUMIN SERPL BCP-MCNC: 3 G/DL (ref 3.5–5.7)
ALP SERPL-CCNC: 80 IU/L (ref 40–150)
ALT SERPL W P-5'-P-CCNC: 14 IU/L (ref 0–50)
ANION GAP SERPL CALCULATED.3IONS-SCNC: 11.9 MM/L
AST SERPL W P-5'-P-CCNC: 22 U/L (ref 8–27)
BASOPHILS # BLD AUTO: 0 X3/UL (ref 0–0.3)
BASOPHILS # BLD AUTO: 0.5 % (ref 0–2)
BILIRUB SERPL-MCNC: 0.4 MG/DL (ref 0.3–1)
BUN SERPL-MCNC: 7 MG/DL (ref 7–25)
CALCIUM SERPL-MCNC: 7.8 MG/DL (ref 8.6–10.5)
CHLORIDE SERPL-SCNC: 104 MM/L (ref 98–107)
CO2 SERPL-SCNC: 26 MM/L (ref 21–31)
CREAT SERPL-MCNC: 0.73 MG/DL (ref 0.7–1.3)
DEPRECATED RDW RBC AUTO: 13 % (ref 11.5–14.5)
EGFR (HISTORICAL): > 60 GFR
EGFR AFRICAN AMERICAN (HISTORICAL): > 60 GFR
EOSINOPHIL # BLD AUTO: 0.3 X3/UL (ref 0–0.5)
EOSINOPHIL NFR BLD AUTO: 4.6 % (ref 0–5)
GLUCOSE (HISTORICAL): 77 MG/DL (ref 65–99)
HCT VFR BLD AUTO: 31.7 % (ref 42–52)
HGB BLD-MCNC: 10.9 G/DL (ref 14–18)
INR PPP: 1.24 (ref 0.9–1.5)
LYMPHOCYTES # BLD AUTO: 1.2 X3/UL (ref 1.2–4.2)
LYMPHOCYTES NFR BLD AUTO: 18.2 % (ref 20.5–51.1)
MAGNESIUM SERPL-MCNC: 1.7 MG/DL (ref 1.9–2.7)
MCH RBC QN AUTO: 30.5 PG (ref 26–34)
MCHC RBC AUTO-ENTMCNC: 34.5 G/DL (ref 31–36)
MCV RBC AUTO: 88.4 FL (ref 81–99)
MONOCYTES # BLD AUTO: 0.8 X3/UL (ref 0–1)
MONOCYTES NFR BLD AUTO: 11.6 % (ref 1.7–12)
NEUTROPHILS # BLD AUTO: 4.2 X3/UL (ref 1.4–6.5)
NEUTS SEG NFR BLD AUTO: 65.1 % (ref 42.2–75.2)
OSMOLALITY, SERUM (HISTORICAL): 272 MOSM (ref 262–291)
PLATELET # BLD AUTO: 217 X3/UL (ref 130–400)
PMV BLD AUTO: 7.1 FL (ref 8.6–11.7)
POTASSIUM SERPL-SCNC: 3.9 MM/L (ref 3.5–5.5)
PROTHROMBIN TIME (HISTORICAL): 14.5 SEC (ref 10.1–12.9)
RBC # BLD AUTO: 3.58 X6/UL (ref 4.3–5.9)
SODIUM SERPL-SCNC: 138 MM/L (ref 134–143)
TOTAL PROTEIN (HISTORICAL): 5.7 G/DL (ref 6.4–8.9)
WBC # BLD AUTO: 6.5 X3/UL (ref 4.8–10.8)

## 2018-06-05 LAB
ANION GAP SERPL CALCULATED.3IONS-SCNC: 17.9 MM/L
BASOPHILS # BLD AUTO: 0 X3/UL (ref 0–0.3)
BASOPHILS # BLD AUTO: 0.7 % (ref 0–2)
BUN SERPL-MCNC: 6 MG/DL (ref 7–25)
CALCIUM SERPL-MCNC: 7.6 MG/DL (ref 8.6–10.5)
CHLORIDE SERPL-SCNC: 103 MM/L (ref 98–107)
CO2 SERPL-SCNC: 21 MM/L (ref 21–31)
CREAT SERPL-MCNC: 0.75 MG/DL (ref 0.7–1.3)
DEPRECATED RDW RBC AUTO: 12.9 % (ref 11.5–14.5)
EGFR (HISTORICAL): > 60 GFR
EGFR AFRICAN AMERICAN (HISTORICAL): > 60 GFR
EOSINOPHIL # BLD AUTO: 0.3 X3/UL (ref 0–0.5)
EOSINOPHIL NFR BLD AUTO: 4.7 % (ref 0–5)
GLUCOSE (HISTORICAL): 81 MG/DL (ref 65–99)
HCT VFR BLD AUTO: 30 % (ref 42–52)
HGB BLD-MCNC: 10.7 G/DL (ref 14–18)
LYMPHOCYTES # BLD AUTO: 1.1 X3/UL (ref 1.2–4.2)
LYMPHOCYTES NFR BLD AUTO: 18.9 % (ref 20.5–51.1)
MCH RBC QN AUTO: 31.2 PG (ref 26–34)
MCHC RBC AUTO-ENTMCNC: 35.6 G/DL (ref 31–36)
MCV RBC AUTO: 87.7 FL (ref 81–99)
MONOCYTES # BLD AUTO: 0.6 X3/UL (ref 0–1)
MONOCYTES NFR BLD AUTO: 10.6 % (ref 1.7–12)
NEUTROPHILS # BLD AUTO: 3.9 X3/UL (ref 1.4–6.5)
NEUTS SEG NFR BLD AUTO: 65.1 % (ref 42.2–75.2)
OSMOLALITY, SERUM (HISTORICAL): 272 MOSM (ref 262–291)
PLATELET # BLD AUTO: 239 X3/UL (ref 130–400)
PMV BLD AUTO: 6.9 FL (ref 8.6–11.7)
POTASSIUM SERPL-SCNC: 3.9 MM/L (ref 3.5–5.5)
RBC # BLD AUTO: 3.42 X6/UL (ref 4.3–5.9)
SODIUM SERPL-SCNC: 138 MM/L (ref 134–143)
WBC # BLD AUTO: 6 X3/UL (ref 4.8–10.8)

## 2018-06-06 LAB
ALBUMIN SERPL BCP-MCNC: 2.7 G/DL (ref 3.5–5.7)
ALP SERPL-CCNC: 70 IU/L (ref 40–150)
ALT SERPL W P-5'-P-CCNC: 12 IU/L (ref 0–50)
ANION GAP SERPL CALCULATED.3IONS-SCNC: 14.9 MM/L
AST SERPL W P-5'-P-CCNC: 12 U/L (ref 8–27)
BASOPHILS # BLD AUTO: 0 X3/UL (ref 0–0.3)
BASOPHILS # BLD AUTO: 0.6 % (ref 0–2)
BILIRUB SERPL-MCNC: 0.4 MG/DL (ref 0.3–1)
BUN SERPL-MCNC: 6 MG/DL (ref 7–25)
CALCIUM SERPL-MCNC: 7.7 MG/DL (ref 8.6–10.5)
CHLORIDE SERPL-SCNC: 105 MM/L (ref 98–107)
CO2 SERPL-SCNC: 23 MM/L (ref 21–31)
CREAT SERPL-MCNC: 0.77 MG/DL (ref 0.7–1.3)
DEPRECATED RDW RBC AUTO: 12.8 % (ref 11.5–14.5)
EGFR (HISTORICAL): > 60 GFR
EGFR AFRICAN AMERICAN (HISTORICAL): > 60 GFR
EOSINOPHIL # BLD AUTO: 0.3 X3/UL (ref 0–0.5)
EOSINOPHIL NFR BLD AUTO: 4.7 % (ref 0–5)
GLUCOSE (HISTORICAL): 148 MG/DL (ref 65–99)
HCT VFR BLD AUTO: 30.2 % (ref 42–52)
HGB BLD-MCNC: 10.6 G/DL (ref 14–18)
LIPASE SERPL-CCNC: 6 U/L (ref 11–82)
LYMPHOCYTES # BLD AUTO: 1.3 X3/UL (ref 1.2–4.2)
LYMPHOCYTES NFR BLD AUTO: 20.2 % (ref 20.5–51.1)
MAGNESIUM SERPL-MCNC: 1.8 MG/DL (ref 1.9–2.7)
MCH RBC QN AUTO: 30.5 PG (ref 26–34)
MCHC RBC AUTO-ENTMCNC: 35 G/DL (ref 31–36)
MCV RBC AUTO: 87 FL (ref 81–99)
MONOCYTES # BLD AUTO: 0.6 X3/UL (ref 0–1)
MONOCYTES NFR BLD AUTO: 9.6 % (ref 1.7–12)
NEUTROPHILS # BLD AUTO: 4.1 X3/UL (ref 1.4–6.5)
NEUTS SEG NFR BLD AUTO: 64.9 % (ref 42.2–75.2)
OSMOLALITY, SERUM (HISTORICAL): 278 MOSM (ref 262–291)
PHOSPHATE SERPL-MCNC: 3 MG/DL (ref 3–5.5)
PLATELET # BLD AUTO: 274 X3/UL (ref 130–400)
PMV BLD AUTO: 6.7 FL (ref 8.6–11.7)
POTASSIUM SERPL-SCNC: 3.9 MM/L (ref 3.5–5.5)
RBC # BLD AUTO: 3.47 X6/UL (ref 4.3–5.9)
SODIUM SERPL-SCNC: 139 MM/L (ref 134–143)
TOTAL PROTEIN (HISTORICAL): 5.3 G/DL (ref 6.4–8.9)
WBC # BLD AUTO: 6.3 X3/UL (ref 4.8–10.8)

## 2018-06-07 LAB — CANCER AG19-9 SERPL-ACNC: 6 U/ML (ref 0–35)

## 2018-06-11 ENCOUNTER — TELEPHONE (OUTPATIENT)
Dept: INTERNAL MEDICINE CLINIC | Facility: CLINIC | Age: 48
End: 2018-06-11

## 2018-06-11 DIAGNOSIS — E10.8 TYPE 1 DIABETES MELLITUS WITH COMPLICATION (HCC): ICD-10-CM

## 2018-06-11 NOTE — TELEPHONE ENCOUNTER
Pt called to renew his rx's , also stated he was d/c from [facility], offered him [facility] appt, pt refused, does not have ins right now, wants to hold off for an appt

## 2018-06-11 NOTE — TELEPHONE ENCOUNTER
Will not fill that, he has not had it filled since December  Too risky with a benzodiazepine and alcohol use   He also just had a short supply of oxycodone filled

## 2018-06-14 ENCOUNTER — TELEPHONE (OUTPATIENT)
Dept: INTERNAL MEDICINE CLINIC | Facility: CLINIC | Age: 48
End: 2018-06-14

## 2018-06-14 DIAGNOSIS — E10.9 TYPE 1 DIABETES MELLITUS WITHOUT COMPLICATION (HCC): Primary | ICD-10-CM

## 2018-07-03 DIAGNOSIS — F41.1 GAD (GENERALIZED ANXIETY DISORDER): ICD-10-CM

## 2018-07-03 RX ORDER — HYDROXYZINE PAMOATE 50 MG/1
CAPSULE ORAL
Qty: 90 CAPSULE | Refills: 0 | Status: SHIPPED | OUTPATIENT
Start: 2018-07-03 | End: 2018-07-26 | Stop reason: SDUPTHER

## 2018-07-26 DIAGNOSIS — E10.8 TYPE 1 DIABETES MELLITUS WITH COMPLICATION (HCC): ICD-10-CM

## 2018-07-26 DIAGNOSIS — F41.1 GAD (GENERALIZED ANXIETY DISORDER): ICD-10-CM

## 2018-07-26 RX ORDER — INSULIN GLARGINE 300 U/ML
INJECTION, SOLUTION SUBCUTANEOUS
Qty: 6 PEN | Refills: 0 | Status: ON HOLD | OUTPATIENT
Start: 2018-07-26 | End: 2018-08-14

## 2018-07-26 RX ORDER — HYDROXYZINE PAMOATE 50 MG/1
CAPSULE ORAL
Qty: 90 CAPSULE | Refills: 0 | Status: SHIPPED | OUTPATIENT
Start: 2018-07-26 | End: 2018-11-19 | Stop reason: SDUPTHER

## 2018-08-09 ENCOUNTER — HOSPITAL ENCOUNTER (INPATIENT)
Facility: HOSPITAL | Age: 48
LOS: 3 days | DRG: 282 | End: 2018-08-12
Attending: EMERGENCY MEDICINE | Admitting: HOSPITALIST
Payer: COMMERCIAL

## 2018-08-09 DIAGNOSIS — E87.1 HYPONATREMIA: ICD-10-CM

## 2018-08-09 DIAGNOSIS — R10.11 RIGHT UPPER QUADRANT ABDOMINAL PAIN: ICD-10-CM

## 2018-08-09 DIAGNOSIS — R73.9 HYPERGLYCEMIA: ICD-10-CM

## 2018-08-09 DIAGNOSIS — K85.90 RECURRENT PANCREATITIS: Primary | ICD-10-CM

## 2018-08-09 PROBLEM — F10.10 ALCOHOL ABUSE: Chronic | Status: ACTIVE | Noted: 2018-08-09

## 2018-08-09 PROBLEM — N17.9 ACUTE KIDNEY INJURY (HCC): Status: ACTIVE | Noted: 2018-08-09

## 2018-08-09 PROBLEM — E87.5 ACUTE HYPERKALEMIA: Status: ACTIVE | Noted: 2018-08-09

## 2018-08-09 PROBLEM — Z72.0 TOBACCO ABUSE: Chronic | Status: ACTIVE | Noted: 2018-08-09

## 2018-08-09 PROBLEM — D72.823 LEUKEMOID REACTION: Status: ACTIVE | Noted: 2018-08-09

## 2018-08-09 PROBLEM — F41.9 ANXIETY AND DEPRESSION: Chronic | Status: ACTIVE | Noted: 2018-08-09

## 2018-08-09 PROBLEM — K21.9 GERD (GASTROESOPHAGEAL REFLUX DISEASE): Chronic | Status: ACTIVE | Noted: 2018-08-09

## 2018-08-09 PROBLEM — F32.A ANXIETY AND DEPRESSION: Chronic | Status: ACTIVE | Noted: 2018-08-09

## 2018-08-09 PROBLEM — R10.9 ABDOMINAL PAIN: Status: ACTIVE | Noted: 2018-08-09

## 2018-08-09 LAB
ALBUMIN SERPL BCP-MCNC: 4.4 G/DL (ref 3.5–5.7)
ALP SERPL-CCNC: 120 U/L (ref 40–150)
ALT SERPL W P-5'-P-CCNC: 10 U/L (ref 7–52)
ANION GAP SERPL CALCULATED.3IONS-SCNC: 10 MMOL/L (ref 4–13)
AST SERPL W P-5'-P-CCNC: 9 U/L (ref 13–39)
BASOPHILS # BLD AUTO: 0.1 THOUSANDS/ΜL (ref 0–0.1)
BASOPHILS NFR BLD AUTO: 1 % (ref 0–2)
BILIRUB SERPL-MCNC: 0.8 MG/DL (ref 0.2–1)
BUN SERPL-MCNC: 23 MG/DL (ref 7–25)
CALCIUM SERPL-MCNC: 10.8 MG/DL (ref 8.6–10.5)
CHLORIDE SERPL-SCNC: 87 MMOL/L (ref 98–107)
CO2 SERPL-SCNC: 23 MMOL/L (ref 21–31)
CREAT SERPL-MCNC: 1.36 MG/DL (ref 0.7–1.3)
EOSINOPHIL # BLD AUTO: 0 THOUSAND/ΜL (ref 0–0.61)
EOSINOPHIL NFR BLD AUTO: 0 % (ref 0–5)
ERYTHROCYTE [DISTWIDTH] IN BLOOD BY AUTOMATED COUNT: 13 % (ref 11.5–14.5)
GFR SERPL CREATININE-BSD FRML MDRD: 60 ML/MIN/1.73SQ M
GLUCOSE SERPL-MCNC: 331 MG/DL (ref 65–140)
GLUCOSE SERPL-MCNC: >500 MG/DL (ref 65–140)
GLUCOSE SERPL-MCNC: >700 MG/DL (ref 65–99)
HCT VFR BLD AUTO: 39.6 % (ref 36.5–49.3)
HGB BLD-MCNC: 13.5 G/DL (ref 14–18)
LIPASE SERPL-CCNC: 831 U/L (ref 11–82)
LYMPHOCYTES # BLD AUTO: 1.2 THOUSANDS/ΜL (ref 0.6–4.47)
LYMPHOCYTES NFR BLD AUTO: 11 % (ref 21–51)
MCH RBC QN AUTO: 29.1 PG (ref 26–34)
MCHC RBC AUTO-ENTMCNC: 34 G/DL (ref 31–37)
MCV RBC AUTO: 85 FL (ref 81–99)
MONOCYTES # BLD AUTO: 0.7 THOUSAND/ΜL (ref 0.17–1.22)
MONOCYTES NFR BLD AUTO: 6 % (ref 2–12)
NEUTROPHILS # BLD AUTO: 9.4 THOUSANDS/ΜL (ref 1.4–6.5)
NEUTS SEG NFR BLD AUTO: 82 % (ref 42–75)
NRBC BLD AUTO-RTO: 0 /100 WBCS
PLATELET # BLD AUTO: 208 THOUSANDS/UL (ref 149–390)
PMV BLD AUTO: 7 FL (ref 8.6–11.7)
POTASSIUM SERPL-SCNC: 5.8 MMOL/L (ref 3.5–5.5)
PROT SERPL-MCNC: 7.9 G/DL (ref 6.4–8.9)
RBC # BLD AUTO: 4.63 MILLION/UL (ref 4.3–5.9)
SODIUM SERPL-SCNC: 120 MMOL/L (ref 134–143)
WBC # BLD AUTO: 11.4 THOUSAND/UL (ref 4.8–10.8)

## 2018-08-09 PROCEDURE — 36415 COLL VENOUS BLD VENIPUNCTURE: CPT | Performed by: EMERGENCY MEDICINE

## 2018-08-09 PROCEDURE — 99223 1ST HOSP IP/OBS HIGH 75: CPT | Performed by: HOSPITALIST

## 2018-08-09 PROCEDURE — 80053 COMPREHEN METABOLIC PANEL: CPT | Performed by: EMERGENCY MEDICINE

## 2018-08-09 PROCEDURE — 83690 ASSAY OF LIPASE: CPT | Performed by: EMERGENCY MEDICINE

## 2018-08-09 PROCEDURE — 96374 THER/PROPH/DIAG INJ IV PUSH: CPT

## 2018-08-09 PROCEDURE — 96375 TX/PRO/DX INJ NEW DRUG ADDON: CPT

## 2018-08-09 PROCEDURE — 96361 HYDRATE IV INFUSION ADD-ON: CPT

## 2018-08-09 PROCEDURE — 82948 REAGENT STRIP/BLOOD GLUCOSE: CPT

## 2018-08-09 PROCEDURE — 85025 COMPLETE CBC W/AUTO DIFF WBC: CPT | Performed by: EMERGENCY MEDICINE

## 2018-08-09 PROCEDURE — 96372 THER/PROPH/DIAG INJ SC/IM: CPT

## 2018-08-09 RX ORDER — SODIUM CHLORIDE 9 MG/ML
1000 INJECTION, SOLUTION INTRAVENOUS CONTINUOUS
Status: DISCONTINUED | OUTPATIENT
Start: 2018-08-09 | End: 2018-08-10 | Stop reason: SDUPTHER

## 2018-08-09 RX ORDER — ONDANSETRON 2 MG/ML
4 INJECTION INTRAMUSCULAR; INTRAVENOUS ONCE
Status: COMPLETED | OUTPATIENT
Start: 2018-08-09 | End: 2018-08-09

## 2018-08-09 RX ADMIN — SODIUM CHLORIDE 1000 ML/HR: 0.9 INJECTION, SOLUTION INTRAVENOUS at 21:31

## 2018-08-09 RX ADMIN — ONDANSETRON 4 MG: 2 INJECTION INTRAMUSCULAR; INTRAVENOUS at 21:30

## 2018-08-09 RX ADMIN — INSULIN HUMAN 10 UNITS: 100 INJECTION, SOLUTION PARENTERAL at 21:21

## 2018-08-09 RX ADMIN — HYDROMORPHONE HYDROCHLORIDE 1 MG: 1 INJECTION, SOLUTION INTRAMUSCULAR; INTRAVENOUS; SUBCUTANEOUS at 21:30

## 2018-08-10 ENCOUNTER — APPOINTMENT (INPATIENT)
Dept: CT IMAGING | Facility: HOSPITAL | Age: 48
DRG: 282 | End: 2018-08-10
Payer: COMMERCIAL

## 2018-08-10 LAB
ALBUMIN SERPL BCP-MCNC: 4.1 G/DL (ref 3.5–5.7)
ALP SERPL-CCNC: 121 U/L (ref 40–150)
ALT SERPL W P-5'-P-CCNC: 7 U/L (ref 7–52)
AMYLASE SERPL-CCNC: 176 IU/L (ref 29–103)
ANION GAP SERPL CALCULATED.3IONS-SCNC: 8 MMOL/L (ref 4–13)
AST SERPL W P-5'-P-CCNC: 9 U/L (ref 13–39)
BASOPHILS # BLD AUTO: 0 THOUSANDS/ΜL (ref 0–0.1)
BASOPHILS NFR BLD AUTO: 0 % (ref 0–2)
BILIRUB SERPL-MCNC: 0.6 MG/DL (ref 0.2–1)
BUN SERPL-MCNC: 20 MG/DL (ref 7–25)
CALCIUM SERPL-MCNC: 9.9 MG/DL (ref 8.6–10.5)
CHLORIDE SERPL-SCNC: 99 MMOL/L (ref 98–107)
CO2 SERPL-SCNC: 26 MMOL/L (ref 21–31)
CREAT SERPL-MCNC: 0.98 MG/DL (ref 0.7–1.3)
EOSINOPHIL # BLD AUTO: 0.1 THOUSAND/ΜL (ref 0–0.61)
EOSINOPHIL NFR BLD AUTO: 1 % (ref 0–5)
ERYTHROCYTE [DISTWIDTH] IN BLOOD BY AUTOMATED COUNT: 13.6 % (ref 11.5–14.5)
EST. AVERAGE GLUCOSE BLD GHB EST-MCNC: 252 MG/DL
GFR SERPL CREATININE-BSD FRML MDRD: 90 ML/MIN/1.73SQ M
GLUCOSE SERPL-MCNC: 105 MG/DL (ref 65–140)
GLUCOSE SERPL-MCNC: 108 MG/DL (ref 65–140)
GLUCOSE SERPL-MCNC: 169 MG/DL (ref 65–140)
GLUCOSE SERPL-MCNC: 261 MG/DL (ref 65–99)
GLUCOSE SERPL-MCNC: 270 MG/DL (ref 65–140)
GLUCOSE SERPL-MCNC: 318 MG/DL (ref 65–140)
HBA1C MFR BLD: 10.4 % (ref 4.2–6.3)
HCT VFR BLD AUTO: 36.6 % (ref 36.5–49.3)
HGB BLD-MCNC: 12.6 G/DL (ref 14–18)
LYMPHOCYTES # BLD AUTO: 1.4 THOUSANDS/ΜL (ref 0.6–4.47)
LYMPHOCYTES NFR BLD AUTO: 10 % (ref 21–51)
MAGNESIUM SERPL-MCNC: 1.6 MG/DL (ref 1.9–2.7)
MCH RBC QN AUTO: 29 PG (ref 26–34)
MCHC RBC AUTO-ENTMCNC: 34.5 G/DL (ref 31–37)
MCV RBC AUTO: 84 FL (ref 81–99)
MONOCYTES # BLD AUTO: 1 THOUSAND/ΜL (ref 0.17–1.22)
MONOCYTES NFR BLD AUTO: 7 % (ref 2–12)
NEUTROPHILS # BLD AUTO: 11.9 THOUSANDS/ΜL (ref 1.4–6.5)
NEUTS SEG NFR BLD AUTO: 82 % (ref 42–75)
NRBC BLD AUTO-RTO: 0 /100 WBCS
PLATELET # BLD AUTO: 199 THOUSANDS/UL (ref 149–390)
PMV BLD AUTO: 7.3 FL (ref 8.6–11.7)
POTASSIUM SERPL-SCNC: 3.8 MMOL/L (ref 3.5–5.5)
PREALB SERPL-MCNC: 24.7 MG/DL (ref 18–40)
PROT SERPL-MCNC: 7.4 G/DL (ref 6.4–8.9)
RBC # BLD AUTO: 4.36 MILLION/UL (ref 4.3–5.9)
SODIUM SERPL-SCNC: 133 MMOL/L (ref 134–143)
TRIGL SERPL-MCNC: 416 MG/DL (ref 44–166)
WBC # BLD AUTO: 14.5 THOUSAND/UL (ref 4.8–10.8)

## 2018-08-10 PROCEDURE — 97162 PT EVAL MOD COMPLEX 30 MIN: CPT

## 2018-08-10 PROCEDURE — 82150 ASSAY OF AMYLASE: CPT | Performed by: NURSE PRACTITIONER

## 2018-08-10 PROCEDURE — 97166 OT EVAL MOD COMPLEX 45 MIN: CPT

## 2018-08-10 PROCEDURE — 82948 REAGENT STRIP/BLOOD GLUCOSE: CPT

## 2018-08-10 PROCEDURE — 83036 HEMOGLOBIN GLYCOSYLATED A1C: CPT | Performed by: NURSE PRACTITIONER

## 2018-08-10 PROCEDURE — 84478 ASSAY OF TRIGLYCERIDES: CPT | Performed by: NURSE PRACTITIONER

## 2018-08-10 PROCEDURE — 99285 EMERGENCY DEPT VISIT HI MDM: CPT

## 2018-08-10 PROCEDURE — G8978 MOBILITY CURRENT STATUS: HCPCS

## 2018-08-10 PROCEDURE — G8988 SELF CARE GOAL STATUS: HCPCS

## 2018-08-10 PROCEDURE — 94762 N-INVAS EAR/PLS OXIMTRY CONT: CPT

## 2018-08-10 PROCEDURE — 85025 COMPLETE CBC W/AUTO DIFF WBC: CPT | Performed by: NURSE PRACTITIONER

## 2018-08-10 PROCEDURE — 99233 SBSQ HOSP IP/OBS HIGH 50: CPT | Performed by: HOSPITALIST

## 2018-08-10 PROCEDURE — G8987 SELF CARE CURRENT STATUS: HCPCS

## 2018-08-10 PROCEDURE — 84134 ASSAY OF PREALBUMIN: CPT | Performed by: NURSE PRACTITIONER

## 2018-08-10 PROCEDURE — 83735 ASSAY OF MAGNESIUM: CPT | Performed by: NURSE PRACTITIONER

## 2018-08-10 PROCEDURE — 80053 COMPREHEN METABOLIC PANEL: CPT | Performed by: NURSE PRACTITIONER

## 2018-08-10 PROCEDURE — 74177 CT ABD & PELVIS W/CONTRAST: CPT

## 2018-08-10 PROCEDURE — 94760 N-INVAS EAR/PLS OXIMETRY 1: CPT

## 2018-08-10 PROCEDURE — G8979 MOBILITY GOAL STATUS: HCPCS

## 2018-08-10 RX ORDER — VENLAFAXINE 75 MG/1
75 TABLET ORAL 3 TIMES DAILY
Status: DISCONTINUED | OUTPATIENT
Start: 2018-08-10 | End: 2018-08-12 | Stop reason: HOSPADM

## 2018-08-10 RX ORDER — ASCORBIC ACID, VITAMIN A PALMITATE, CHOLECALCIFEROL, THIAMINE HYDROCHLORIDE, RIBOFLAVIN-5 PHOSPHATE SODIUM, PYRIDOXINE HYDROCHLORIDE, NIACINAMIDE, DEXPANTHENOL, ALPHA-TOCOPHEROL ACETATE, VITAMIN K1, FOLIC ACID, BIOTIN, CYANOCOBALAMIN 200; 3300; 200; 6; 3.6; 6; 40; 15; 10; 150; 600; 60; 5 MG/10ML; [IU]/10ML; [IU]/10ML; MG/10ML; MG/10ML; MG/10ML; MG/10ML; MG/10ML; [IU]/10ML; UG/10ML; UG/10ML; UG/10ML; UG/10ML
10 INJECTION, SOLUTION INTRAVENOUS ONCE
Status: DISCONTINUED | OUTPATIENT
Start: 2018-08-10 | End: 2018-08-10

## 2018-08-10 RX ORDER — HYDROXYZINE HYDROCHLORIDE 25 MG/1
50 TABLET, FILM COATED ORAL 3 TIMES DAILY PRN
Status: DISCONTINUED | OUTPATIENT
Start: 2018-08-10 | End: 2018-08-12 | Stop reason: HOSPADM

## 2018-08-10 RX ORDER — SODIUM CHLORIDE 9 MG/ML
150 INJECTION, SOLUTION INTRAVENOUS CONTINUOUS
Status: DISCONTINUED | OUTPATIENT
Start: 2018-08-10 | End: 2018-08-12 | Stop reason: HOSPADM

## 2018-08-10 RX ORDER — ASCORBIC ACID, VITAMIN A PALMITATE, CHOLECALCIFEROL, THIAMINE HYDROCHLORIDE, RIBOFLAVIN-5 PHOSPHATE SODIUM, PYRIDOXINE HYDROCHLORIDE, NIACINAMIDE, DEXPANTHENOL, ALPHA-TOCOPHEROL ACETATE, VITAMIN K1, FOLIC ACID, BIOTIN, CYANOCOBALAMIN 200; 3300; 200; 6; 3.6; 6; 40; 15; 10; 150; 600; 60; 5 MG/10ML; [IU]/10ML; [IU]/10ML; MG/10ML; MG/10ML; MG/10ML; MG/10ML; MG/10ML; [IU]/10ML; UG/10ML; UG/10ML; UG/10ML; UG/10ML
10 INJECTION, SOLUTION INTRAVENOUS ONCE
Status: COMPLETED | OUTPATIENT
Start: 2018-08-10 | End: 2018-08-10

## 2018-08-10 RX ORDER — TRAZODONE HYDROCHLORIDE 50 MG/1
50 TABLET ORAL
Status: DISCONTINUED | OUTPATIENT
Start: 2018-08-10 | End: 2018-08-12 | Stop reason: HOSPADM

## 2018-08-10 RX ORDER — ACETAMINOPHEN 325 MG/1
650 TABLET ORAL EVERY 6 HOURS PRN
Status: DISCONTINUED | OUTPATIENT
Start: 2018-08-10 | End: 2018-08-12 | Stop reason: HOSPADM

## 2018-08-10 RX ORDER — PANTOPRAZOLE SODIUM 40 MG/1
40 TABLET, DELAYED RELEASE ORAL
Status: DISCONTINUED | OUTPATIENT
Start: 2018-08-10 | End: 2018-08-12 | Stop reason: HOSPADM

## 2018-08-10 RX ORDER — RISPERIDONE 0.25 MG/1
0.5 TABLET, FILM COATED ORAL 2 TIMES DAILY
Status: DISCONTINUED | OUTPATIENT
Start: 2018-08-10 | End: 2018-08-12 | Stop reason: HOSPADM

## 2018-08-10 RX ORDER — FOLIC ACID 1 MG/1
1 TABLET ORAL DAILY
Status: DISCONTINUED | OUTPATIENT
Start: 2018-08-10 | End: 2018-08-12 | Stop reason: HOSPADM

## 2018-08-10 RX ORDER — FENOFIBRATE 145 MG/1
145 TABLET, COATED ORAL DAILY
Status: DISCONTINUED | OUTPATIENT
Start: 2018-08-10 | End: 2018-08-12 | Stop reason: HOSPADM

## 2018-08-10 RX ORDER — LISINOPRIL 10 MG/1
10 TABLET ORAL 2 TIMES DAILY
Status: DISCONTINUED | OUTPATIENT
Start: 2018-08-10 | End: 2018-08-12 | Stop reason: HOSPADM

## 2018-08-10 RX ORDER — THIAMINE MONONITRATE (VIT B1) 100 MG
100 TABLET ORAL DAILY
Status: DISCONTINUED | OUTPATIENT
Start: 2018-08-10 | End: 2018-08-12 | Stop reason: HOSPADM

## 2018-08-10 RX ORDER — NICOTINE 21 MG/24HR
1 PATCH, TRANSDERMAL 24 HOURS TRANSDERMAL DAILY
Status: DISCONTINUED | OUTPATIENT
Start: 2018-08-10 | End: 2018-08-12 | Stop reason: HOSPADM

## 2018-08-10 RX ORDER — FOLIC ACID 5 MG/ML
1 INJECTION, SOLUTION INTRAMUSCULAR; INTRAVENOUS; SUBCUTANEOUS ONCE
Status: COMPLETED | OUTPATIENT
Start: 2018-08-10 | End: 2018-08-10

## 2018-08-10 RX ORDER — ONDANSETRON 2 MG/ML
4 INJECTION INTRAMUSCULAR; INTRAVENOUS EVERY 6 HOURS PRN
Status: DISCONTINUED | OUTPATIENT
Start: 2018-08-10 | End: 2018-08-12 | Stop reason: HOSPADM

## 2018-08-10 RX ORDER — HEPARIN SODIUM 5000 [USP'U]/ML
5000 INJECTION, SOLUTION INTRAVENOUS; SUBCUTANEOUS EVERY 8 HOURS SCHEDULED
Status: DISCONTINUED | OUTPATIENT
Start: 2018-08-10 | End: 2018-08-12 | Stop reason: HOSPADM

## 2018-08-10 RX ADMIN — VENLAFAXINE 75 MG: 75 TABLET ORAL at 15:54

## 2018-08-10 RX ADMIN — HEPARIN SODIUM 5000 UNITS: 5000 INJECTION INTRAVENOUS; SUBCUTANEOUS at 14:47

## 2018-08-10 RX ADMIN — PIPERACILLIN SODIUM AND TAZOBACTAM SODIUM 3.38 G: 3; .375 INJECTION, POWDER, LYOPHILIZED, FOR SOLUTION INTRAVENOUS at 23:01

## 2018-08-10 RX ADMIN — HYDROMORPHONE HYDROCHLORIDE 2 MG: 1 INJECTION, SOLUTION INTRAMUSCULAR; INTRAVENOUS; SUBCUTANEOUS at 23:01

## 2018-08-10 RX ADMIN — TRAZODONE HYDROCHLORIDE 50 MG: 50 TABLET ORAL at 21:28

## 2018-08-10 RX ADMIN — PANTOPRAZOLE SODIUM 40 MG: 40 TABLET, DELAYED RELEASE ORAL at 05:20

## 2018-08-10 RX ADMIN — SODIUM CHLORIDE 200 ML/HR: 9 INJECTION, SOLUTION INTRAVENOUS at 19:45

## 2018-08-10 RX ADMIN — HYDROMORPHONE HYDROCHLORIDE 1 MG: 1 INJECTION, SOLUTION INTRAMUSCULAR; INTRAVENOUS; SUBCUTANEOUS at 01:00

## 2018-08-10 RX ADMIN — RISPERIDONE 0.5 MG: 0.25 TABLET, FILM COATED ORAL at 17:27

## 2018-08-10 RX ADMIN — PIPERACILLIN SODIUM AND TAZOBACTAM SODIUM 3.38 G: 3; .375 INJECTION, POWDER, LYOPHILIZED, FOR SOLUTION INTRAVENOUS at 17:27

## 2018-08-10 RX ADMIN — SODIUM CHLORIDE 200 ML/HR: 9 INJECTION, SOLUTION INTRAVENOUS at 06:28

## 2018-08-10 RX ADMIN — FOLIC ACID 1 MG: 5 INJECTION, SOLUTION INTRAMUSCULAR; INTRAVENOUS; SUBCUTANEOUS at 01:56

## 2018-08-10 RX ADMIN — HEPARIN SODIUM 5000 UNITS: 5000 INJECTION INTRAVENOUS; SUBCUTANEOUS at 08:19

## 2018-08-10 RX ADMIN — Medication 100 MG: at 08:21

## 2018-08-10 RX ADMIN — SODIUM CHLORIDE 200 ML/HR: 9 INJECTION, SOLUTION INTRAVENOUS at 12:40

## 2018-08-10 RX ADMIN — HEPARIN SODIUM 5000 UNITS: 5000 INJECTION INTRAVENOUS; SUBCUTANEOUS at 21:28

## 2018-08-10 RX ADMIN — TRAZODONE HYDROCHLORIDE 50 MG: 50 TABLET ORAL at 00:59

## 2018-08-10 RX ADMIN — HEPARIN SODIUM 5000 UNITS: 5000 INJECTION INTRAVENOUS; SUBCUTANEOUS at 00:58

## 2018-08-10 RX ADMIN — ASCORBIC ACID, VITAMIN A PALMITATE, CHOLECALCIFEROL, THIAMINE HYDROCHLORIDE, RIBOFLAVIN-5 PHOSPHATE SODIUM, PYRIDOXINE HYDROCHLORIDE, NIACINAMIDE, DEXPANTHENOL, ALPHA-TOCOPHEROL ACETATE, VITAMIN K1, FOLIC ACID, BIOTIN, CYANOCOBALAMIN 10 ML: 200; 3300; 200; 6; 3.6; 6; 40; 15; 10; 150; 600; 60; 5 INJECTION, SOLUTION INTRAVENOUS at 01:52

## 2018-08-10 RX ADMIN — HYDROMORPHONE HYDROCHLORIDE 1 MG: 1 INJECTION, SOLUTION INTRAMUSCULAR; INTRAVENOUS; SUBCUTANEOUS at 10:51

## 2018-08-10 RX ADMIN — VENLAFAXINE 75 MG: 75 TABLET ORAL at 21:29

## 2018-08-10 RX ADMIN — INSULIN LISPRO 2 UNITS: 100 INJECTION, SOLUTION INTRAVENOUS; SUBCUTANEOUS at 12:35

## 2018-08-10 RX ADMIN — LISINOPRIL 10 MG: 10 TABLET ORAL at 17:28

## 2018-08-10 RX ADMIN — CEFTRIAXONE 1000 MG: 1 INJECTION, SOLUTION INTRAVENOUS at 01:31

## 2018-08-10 RX ADMIN — HYDROMORPHONE HYDROCHLORIDE 2 MG: 1 INJECTION, SOLUTION INTRAMUSCULAR; INTRAVENOUS; SUBCUTANEOUS at 18:55

## 2018-08-10 RX ADMIN — INSULIN DETEMIR 45 UNITS: 100 INJECTION, SOLUTION SUBCUTANEOUS at 08:19

## 2018-08-10 RX ADMIN — SODIUM CHLORIDE 200 ML/HR: 9 INJECTION, SOLUTION INTRAVENOUS at 01:30

## 2018-08-10 RX ADMIN — RISPERIDONE 0.5 MG: 0.25 TABLET, FILM COATED ORAL at 08:21

## 2018-08-10 RX ADMIN — FOLIC ACID 1 MG: 1 TABLET ORAL at 08:19

## 2018-08-10 RX ADMIN — FENOFIBRATE 145 MG: 145 TABLET, FILM COATED ORAL at 09:51

## 2018-08-10 RX ADMIN — INSULIN LISPRO 6 UNITS: 100 INJECTION, SOLUTION INTRAVENOUS; SUBCUTANEOUS at 08:20

## 2018-08-10 RX ADMIN — PIPERACILLIN SODIUM AND TAZOBACTAM SODIUM 3.38 G: 3; .375 INJECTION, POWDER, LYOPHILIZED, FOR SOLUTION INTRAVENOUS at 12:35

## 2018-08-10 RX ADMIN — IOHEXOL 50 ML: 240 INJECTION, SOLUTION INTRATHECAL; INTRAVASCULAR; INTRAVENOUS; ORAL at 11:30

## 2018-08-10 RX ADMIN — HYDROMORPHONE HYDROCHLORIDE 1 MG: 1 INJECTION, SOLUTION INTRAMUSCULAR; INTRAVENOUS; SUBCUTANEOUS at 04:13

## 2018-08-10 RX ADMIN — IOHEXOL 80 ML: 350 INJECTION, SOLUTION INTRAVENOUS at 13:12

## 2018-08-10 RX ADMIN — LISINOPRIL 10 MG: 10 TABLET ORAL at 08:20

## 2018-08-10 RX ADMIN — HYDROMORPHONE HYDROCHLORIDE 2 MG: 1 INJECTION, SOLUTION INTRAMUSCULAR; INTRAVENOUS; SUBCUTANEOUS at 14:47

## 2018-08-10 RX ADMIN — HYDROMORPHONE HYDROCHLORIDE 1 MG: 1 INJECTION, SOLUTION INTRAMUSCULAR; INTRAVENOUS; SUBCUTANEOUS at 08:12

## 2018-08-10 RX ADMIN — VENLAFAXINE 75 MG: 75 TABLET ORAL at 08:21

## 2018-08-10 RX ADMIN — ASCORBIC ACID, VITAMIN A PALMITATE, CHOLECALCIFEROL, THIAMINE HYDROCHLORIDE, RIBOFLAVIN-5 PHOSPHATE SODIUM, PYRIDOXINE HYDROCHLORIDE, NIACINAMIDE, DEXPANTHENOL, ALPHA-TOCOPHEROL ACETATE, VITAMIN K1, FOLIC ACID, BIOTIN, CYANOCOBALAMIN 10 ML: 200; 3300; 200; 6; 3.6; 6; 40; 15; 10; 150; 600; 60; 5 INJECTION, SOLUTION INTRAVENOUS at 01:54

## 2018-08-10 RX ADMIN — NICOTINE 1 PATCH: 21 PATCH, EXTENDED RELEASE TRANSDERMAL at 08:20

## 2018-08-10 RX ADMIN — INSULIN DETEMIR 45 UNITS: 100 INJECTION, SOLUTION SUBCUTANEOUS at 00:58

## 2018-08-10 NOTE — ASSESSMENT & PLAN NOTE
· NOT Secondary to alcohol abuse  · IS related to Poor Diabetes Control  · Acute on chronic  · Elevated lipase  · Calcium is elevated at 10 8  · Clear liquid diet  · Check a magnesium  · CIWA protocol  · Consult GI  · Start IV fluids at increased rate  · Start ceftriaxone IV daily  · I ordered a banana bag, and oral thiamine and folate and multivitamin as well    · Dilaudid for pain control

## 2018-08-10 NOTE — ED PROVIDER NOTES
History  Chief Complaint   Patient presents with    Abdominal Pain     vomiting    Hyperglycemia - no symptoms     took BGL at home >HI , has no insulin at home      48 YOM WITH A HX OF PANCREATITIS NOW WITH EPIGASTRIC ABDOMINAL PAIN ASSOCIATE WITH MULTIPLE EPISODES OF DIARRHEA, TYPICAL OF EXACERBATIONS OF CHRONIC PANCREATITIS  NO CHANGE IN BLADDER HABITS  NO COUGH OR SOB  BS AT HOME GREATER THAN 500            Prior to Admission Medications   Prescriptions Last Dose Informant Patient Reported? Taking?    Insulin Pen Needle (B-D ULTRAFINE III SHORT PEN) 31G X 8 MM MISC   Yes No   Sig: by Does not apply route   TOUJEO SOLOSTAR 300 units/mL CONCETRATED U-300 injection pen   No No   Sig: INJECT 45 UNITS SUBCUTANEOUSLY ONCE DAILY AT BEDTIME   glucose blood (ONE TOUCH ULTRA TEST) test strip   Yes No   Sig: by In Vitro route 3 (three) times a day   glucose blood (ONETOUCH VERIO) test strip   Yes No   Sig: by In Vitro route 3 (three) times a day   hydrOXYzine pamoate (VISTARIL) 50 mg capsule 8/9/2018 at Unknown time  No Yes   Sig: TAKE 1 CAPSULE BY MOUTH THREE TIMES DAILY AS NEEDED   insulin detemir (LEVEMIR FLEXPEN) 100 Units/mL injection pen   No No   Sig: Inject 45 Units under the skin daily   lisinopril (ZESTRIL) 10 mg tablet 8/9/2018 at Unknown time  Yes Yes   Sig: Take 1 tablet by mouth 2 (two) times a day   omeprazole (PriLOSEC) 20 mg delayed release capsule   Yes No   Sig: Take by mouth   risperiDONE (RisperDAL) 0 5 mg tablet 8/9/2018 at Unknown time  No Yes   Sig: TAKE ONE TABLET BY MOUTH TWICE DAILY   traZODone (DESYREL) 50 mg tablet   Yes No   Sig: Take 1 tablet by mouth   venlafaxine (EFFEXOR XR) 75 mg 24 hr capsule 8/9/2018 at Unknown time  Yes Yes   Sig: Take 1 capsule by mouth 3 (three) times a day      Facility-Administered Medications: None       Past Medical History:   Diagnosis Date    Allergic rhinitis     last assessed: 12/5/2012    Quiros esophagus     Bronchitis, asthmatic     last assessed: 9/15/2014    Cholelithiasis     Chronic pain     Diabetes mellitus ([facility] )     GERD (gastroesophageal reflux disease)     Metatarsalgia     last assessed: 8/11/2014, unspecified laterality, ? cause  PE with changes  To see DPM tomorrow   Pancreatitis        Past Surgical History:   Procedure Laterality Date    CHOLECYSTECTOMY LAPAROSCOPIC      FOOT SURGERY      KNEE ARTHROSCOPY      (therapeutic)    VASECTOMY      vas deferens       Family History   Problem Relation Age of Onset    Cancer Mother     Coronary artery disease Mother     Diabetes Mother     Coronary artery disease Father     Prostate cancer Father     Diabetes Sister     Coronary artery disease Family      I have reviewed and agree with the history as documented  Social History   Substance Use Topics    Smoking status: Current Every Day Smoker     Packs/day: 1 00    Smokeless tobacco: Never Used      Comment: Tobacco use [employer] "How to Quit" literature given to pat   Alcohol use Yes      Comment: Last drink in June        Review of Systems   Constitutional: Positive for appetite change  Negative for chills and fever  HENT: Negative for ear pain, rhinorrhea and sore throat  Eyes: Negative for pain, redness and visual disturbance  Respiratory: Negative for cough and shortness of breath  Cardiovascular: Negative for chest pain and leg swelling  Gastrointestinal: Positive for abdominal pain, diarrhea, nausea and vomiting  Genitourinary: Negative  Negative for dysuria, flank pain, frequency and urgency  Musculoskeletal: Negative  Negative for back pain, myalgias and neck pain  Skin: Negative for rash  Neurological: Negative for dizziness, weakness, light-headedness and headaches  Hematological: Negative  Psychiatric/Behavioral: Negative for agitation, confusion and suicidal ideas  The patient is not nervous/anxious  All other systems reviewed and are negative        Physical Exam  Physical Exam Constitutional: He is oriented to person, place, and time  He appears well-developed and well-nourished  He appears distressed  HENT:   Nose: Nose normal    Mouth/Throat: Oropharynx is clear and moist  No oropharyngeal exudate  Eyes: Conjunctivae and EOM are normal  Pupils are equal, round, and reactive to light  No scleral icterus  Neck: Normal range of motion  Neck supple  No JVD present  No tracheal deviation present  Cardiovascular: Normal rate, regular rhythm and normal heart sounds  No murmur heard  Pulmonary/Chest: Effort normal and breath sounds normal  No respiratory distress  He has no wheezes  He has no rales  Abdominal: Soft  He exhibits no distension and no mass  There is tenderness (FIFFUSE; GREATER IN RUQ AND EPIGASTRIUM)  There is no rebound and no guarding  No hernia  Musculoskeletal: Normal range of motion  He exhibits no edema or tenderness  Neurological: He is alert and oriented to person, place, and time  No cranial nerve deficit or sensory deficit  He exhibits normal muscle tone  5/5 motor, nl sens   Skin: Skin is warm and dry  Psychiatric: He has a normal mood and affect  His behavior is normal    Nursing note and vitals reviewed  Vital Signs  ED Triage Vitals [08/09/18 2047]   Temperature Pulse Respirations Blood Pressure SpO2   98 1 °F (36 7 °C) (!) 115 18 165/85 96 %      Temp Source Heart Rate Source Patient Position - Orthostatic VS BP Location FiO2 (%)   Temporal Monitor Lying -- --      Pain Score       9           Vitals:    08/09/18 2047   BP: 165/85   Pulse: (!) 115   Patient Position - Orthostatic VS: Lying       Visual Acuity      ED Medications  Medications - No data to display    Diagnostic Studies  Results Reviewed     None                 No orders to display              Procedures  Procedures       Phone Contacts  ED Phone Contact    ED Course     AT 2220, LABORATORIES RETURNED WITH A BS OF   GREATER THAN 700 AND LIPASE     THE SODIUM WAS 120    AT 2215, THE PATIENT WAS PRESENTED TO AND ACCEPTED BY MANAV JONES  CritCare Time    Disposition  Final diagnoses:   None     ED Disposition     None      Follow-up Information    None         Patient's Medications   Discharge Prescriptions    No medications on file     No discharge procedures on file      ED Provider  Electronically Signed by           Darlyn Rivera MD  08/09/18 0109       Darlyn Rivera MD  08/09/18 0464

## 2018-08-10 NOTE — NURSING NOTE
CURRENT  CIWA 0  MORE  COMFORTABLE  NOW  AND  PAIN  A 5/10    RESP  EASY  PULE  0X  CONTINUOUS MAINTAINED  AND  SAT  94   NO  C/O  AT  PRESENT  IVF  CONTINUE

## 2018-08-10 NOTE — PROGRESS NOTES
Progress Note - Jose Mallory 1970, 46 y o  male MRN: [id number]    Unit/Bed#: -01 Encounter: [id number]    Primary Care Provider: Deanna Nair DO   Date and time admitted to hospital: 8/9/2018  8:52 PM        Abdominal pain   Assessment & Plan    · Persists  · Secondary to recurrent pancreatitis  · Patient was on Dilaudid at 1 mg IV q 3 hours p r n  -dosing going forward will be 2 mg IV q 4 hours p r n         * Recurrent pancreatitis ([facility] )   Assessment & Plan    · Patient continues to have pain  · He is status post a GI evaluation  · Continue NPO status  · Continue IV fluids, okay for ice chips, and will adjust the pain medication as outlined above  · Will change ceftriaxone to Zosyn  · Will check a CT scan of the abdomen and pelvis  · This could be secondary to hypertriglyceridemia -continue fenofibrate therapy  · Check a lipid panel in the a m    · Will follow additional GI recommendations         Diabetes mellitus type I ([facility] )   Assessment & Plan    · Blood sugars are more controlled  ·  Continue Levemir and regular insulin coverage  · Continue Accu-Cheks AC and HS        Acute hyponatremia   Assessment & Plan    · Resolved        Benign essential hypertension   Assessment & Plan    · We will continue patient's outpatient lisinopril        Acute kidney injury ([facility] )   Assessment & Plan    · This was pre renal and it has resolved with IV fluid therapy        Leukemoid reaction   Assessment & Plan    · Ceftriaxone has been changed to Zosyn for better coverage for patients with pancreatitis         Acute hyperkalemia   Assessment & Plan    · Resolved -  continue to monitor         Iron deficiency anemia   Assessment & Plan    · Hemoglobin is currently stable  · Monitor labs        Insomnia   Assessment & Plan    · Continue trazodone        Other chronic pain   Assessment & Plan    · Patient does have a history of fibromyalgia  · We will continue Dilaudid for pancreatic pain          VTE Pharmacologic Prophylaxis: Pharmacologic: Heparin    Patient Centered Rounds: I have performed bedside rounds with nursing staff today  Discussions with Specialists or Other Care Team Provider:   GI  Education and Discussions with Family / Patient:   Patient    Time Spent for Care: 20 minutes  More than 50% of total time spent on counseling and coordination of care as described above  Current Length of Stay: 1 day(s)    Current Patient Status: Inpatient   Certification Statement: The patient will continue to require additional inpatient hospital stay due to Continued IV antibiotics, IV pain management as well as further management of his pancreatitis    Discharge Plan:   Discharge planning will commence once the patient is medically stable    Code Status: Level 1 - Full Code    Subjective:   Patient seen and examined  Is complaining that the current Dilaudid dose is not lasting long enough  He continues to have abdominal pain  Objective:     Vitals:   Temp (24hrs), Av 7 °F (36 5 °C), Min:97 °F (36 1 °C), Max:98 2 °F (36 8 °C)    HR:  [] 98  Resp:  [18-20] 20  BP: (128-165)/(62-85) 150/62  SpO2:  [84 %-98 %] 96 %  Body mass index is 27 12 kg/m²  Input and Output Summary (last 24 hours): Intake/Output Summary (Last 24 hours) at 08/10/18 1157  Last data filed at 08/10/18 0501   Gross per 24 hour   Intake             1200 ml   Output                0 ml   Net             1200 ml       Physical Exam:     Physical Exam   Constitutional: He is oriented to person, place, and time  He appears well-developed and well-nourished  HENT:   Head: Normocephalic and atraumatic  Nose: Nose normal    Mouth/Throat: Oropharynx is clear and moist    Eyes: Conjunctivae and EOM are normal  Pupils are equal, round, and reactive to light  Neck: Normal range of motion  Neck supple  No JVD present  No thyromegaly present  Cardiovascular: Normal rate, regular rhythm and intact distal pulses    Exam reveals no gallop and no friction rub  No murmur heard  Pulmonary/Chest: Effort normal and breath sounds normal  No respiratory distress  Abdominal: Soft  He exhibits distension  He exhibits no mass  There is tenderness  There is no rebound and no guarding  Bowel sounds hypoactive   Musculoskeletal: Normal range of motion  He exhibits no edema  Lymphadenopathy:     He has no cervical adenopathy  Neurological: He is alert and oriented to person, place, and time  No cranial nerve deficit  Skin: Skin is warm  No rash noted  No erythema  Psychiatric: He has a normal mood and affect  His behavior is normal    Nursing note and vitals reviewed  Additional Data:     Labs:      Results from last 7 days  Lab Units 08/10/18  0509   WBC Thousand/uL 14 50*   HEMOGLOBIN g/dL 12 6*   HEMATOCRIT % 36 6   PLATELETS Thousands/uL 199   NEUTROS PCT % 82*   LYMPHS PCT % 10*   MONOS PCT % 7   EOS PCT % 1       Results from last 7 days  Lab Units 08/10/18  0509   SODIUM mmol/L 133*   POTASSIUM mmol/L 3 8   CHLORIDE mmol/L 99   CO2 mmol/L 26   BUN mg/dL 20   CREATININE mg/dL 0 98   CALCIUM mg/dL 9 9   TOTAL PROTEIN g/dL 7 4   BILIRUBIN TOTAL mg/dL 0 60   ALK PHOS U/L 121   ALT U/L 7   AST U/L 9*   GLUCOSE RANDOM mg/dL 261*           Results from last 7 days  Lab Units 08/10/18  0629 08/10/18  0022 08/09/18  2256 08/09/18  2057   POC GLUCOSE mg/dl 270* 318* 331* >500*           * I Have Reviewed All Lab Data Listed Above  * Additional Pertinent Lab Tests Reviewed:  [facility]  Reviewed    Imaging:  Imaging Reports Reviewed Today Include:   None    Recent Cultures (last 7 days):           Last 24 Hours Medication List:     Current Facility-Administered Medications:  acetaminophen 650 mg Oral Q6H PRN TYE Menjivar    fenofibrate 145 mg Oral Daily Corie Curtis PA-C    folic acid 1 mg Oral Daily TYE Menjivar    heparin (porcine) 5,000 Units Subcutaneous Q8H Franko Corona 1620, TYE HYDROmorphone 2 mg Intravenous Q4H PRN Kenan Negron MD    hydrOXYzine HCL 50 mg Oral TID PRN Marita Osuna, TYE    insulin detemir 45 Units Subcutaneous Daily Irma Osuna, TYE    insulin lispro 2-12 Units Subcutaneous TID AC Irma Osuna, TYE    lisinopril 10 mg Oral BID Irma Osuna, TYE    nicotine 1 patch Transdermal Daily TYE Menjivar    ondansetron 4 mg Intravenous Q6H PRN Irma Osuna, TYE    pantoprazole 40 mg Oral Early Morning TYE Menjivar    piperacillin-tazobactam 3 375 g Intravenous Q6H Kenan Negron MD    risperiDONE 0 5 mg Oral BID TYE Menjivar    sodium chloride 200 mL/hr Intravenous Continuous TYE Menjivar Last Rate: 200 mL/hr (08/10/18 7885)   thiamine 100 mg Oral Daily Irma Osuna, TYE    traZODone 50 mg Oral HS TYE Menjivar    venlafaxine 75 mg Oral TID TYE Galvez         Today, Patient Was Seen By: Kenan Negron MD    ** Please Note: Dictation voice to text software may have been used in the creation of this document   **

## 2018-08-10 NOTE — PHYSICAL THERAPY NOTE
Physical Therapy Evaluation     Patient's Name: Dianna Henderson    Admitting Diagnosis  Recurrent pancreatitis ([facility] ) [K86 1]  Hyponatremia [E87 1]  Abdominal pain [R10 9]  Hyperglycemia [R73 9]  Right upper quadrant abdominal pain [R10 11]    Problem List  Patient Active Problem List   Diagnosis    Quiros esophagus    Benign essential hypertension    COPD (chronic obstructive pulmonary disease) ([place] [address] )    Fatty liver    Fibromyalgia    Generalized anxiety disorder    Hyperlipidemia    Insomnia    Iron deficiency anemia    MDD (major depressive disorder), single episode    Nephrolithiasis    Other chronic pain    Recurrent pancreatitis ([place] [address] )    Sleep disorder    Diabetes mellitus type I ([place] [address] )    Abdominal pain    Acute hyponatremia    Acute hyperkalemia    Acute kidney injury ([place] [address] )    GERD (gastroesophageal reflux disease)    Anxiety and depression    Tobacco abuse    Alcohol abuse    Leukemoid reaction       Past Medical History  Past Medical History:   Diagnosis Date    Allergic rhinitis     last assessed: 12/5/2012    Quiros esophagus     Bronchitis, asthmatic     last assessed: 9/15/2014    Cholelithiasis     Chronic pain     COPD (chronic obstructive pulmonary disease) ([place] [address] )     Diabetes mellitus (HCC)     GERD (gastroesophageal reflux disease)     Hypertension     Metatarsalgia     last assessed: 8/11/2014, unspecified laterality, ? cause  PE with changes  To see DPM tomorrow      Pancreatitis     Psychiatric disorder     Renal disorder        Past Surgical History  Past Surgical History:   Procedure Laterality Date    CHOLECYSTECTOMY LAPAROSCOPIC      FOOT SURGERY      KNEE ARTHROSCOPY      (therapeutic)    VASECTOMY      vas deferens      08/10/18 0856   Note Type   Note type Eval/Treat   Pain Assessment   Pain Assessment 0-10   Pain Score 7   Pain Location Abdomen   Pain Orientation Anterior;Mid   Pain Descriptors Burning;Aching   Pain Frequency Intermittent   Pain Onset Ongoing   Clinical Progression Not changed   Hospital Pain Intervention(s) Medication (See MAR); Ambulation/increased activity  (patient reports decrease pain w/positional changes)   Home Living   Type of [address] [place] [address] Two level;Stairs to enter with rails  (8 [address], full flight of steps to second floor)   Bathroom Shower/Tub Walk-in shower   [facility] Other (Comment)  (none)   Prior Function   Level of [place] Independent with ADLs and functional mobility   Lives With Spouse   Receives Help From Family   ADL Assistance Independent   IADLs Independent   Falls in the last 6 months 0   Restrictions/Precautions   Weight Bearing Precautions Per Order No   Other Precautions Pain   Cognition   Overall Cognitive Status WFL   Arousal/Participation Alert   Orientation Level Oriented X4   Memory Within functional limits   Following Commands Follows one step commands without difficulty   RUE Assessment   RUE Assessment WFL   LUE Assessment   LUE Assessment WFL   RLE Assessment   RLE Assessment WFL   LLE Assessment   LLE Assessment WFL   Bed Mobility   Rolling R 6  Modified independent   Additional items Bedrails   Supine to Sit 6  Modified independent   Additional items Bedrails   Sit to Supine 6  Modified independent   Additional items Bedrails   Transfers   Sit to Stand 6  Modified independent   Additional items Bedrails   Stand to Sit 6  Modified independent   Additional items Bedrails   Additional Comments patient declined progression of mobility due to severe abdominal pain, reports pain is decreased with sitting up  I provided a chair next to the bed, however patient declined as he was tired/wanted to nap     Balance   Static Sitting Normal   Dynamic Sitting Normal   Static Standing Good   Endurance Deficit   Endurance Deficit Yes   Endurance Deficit Description patient reported increase pain with positonal change that decreased with static sitting   Activity Tolerance   Activity Tolerance Patient limited by fatigue;Patient limited by pain   Assessment   Prognosis Good   Problem List Decreased mobility;Pain; Impaired balance;Decreased endurance   Assessment Pt is 46 y o  male seen for PT evaluation s/p admit to [address] on 8/9/2018 w/ Recurrent pancreatitis ([facility] )  PT consulted to assess pt's functional mobility and d/c needs  Order placed for PT eval and tx, w/ activity as tolerated order  Comorbidities affecting pt's physical performance at time of assessment include: tobacco/alcohol abuse, pancreatitis, anxiety, HTN, COPD, anemia  PTA, pt was independent w/ all functional mobility w/ o AD  Personal factors affecting pt at time of IE include: inaccessible home environment, lives in two story house, inability to navigate community distances, inability to perform IADLs, inability to perform ADLs and severe pain inhibiting functional tolerance  Please find objective findings from PT assessment regarding body systems outlined above with impairments and limitations including impaired balance, decreased endurance, pain, decreased activity tolerance and decreased functional mobility tolerance  The following objective measures performed on IE also reveal limitations: Barthel Index: 60/100  Pt's clinical presentation is currently evolving seen in pt's presentation of fluctuating pain levels contributing to decrease activity tolerance  Pt to benefit from continued PT tx to address deficits as defined above and maximize level of functional independent mobility and consistency  From PT/mobility standpoint, recommendation at time of d/c would be anticipate no needs pending progress in order to facilitate return to PLOF  Goals   Patient Goals have less pain, sleep   STG Expiration Date 08/15/18   Short Term Goal #1 Pt will: Perform bed mobility tasks to independent to decrease burden of care   Perform transfers to independent to increase Indep in home environment  Perform ambulation without AD for 150 feet, supervision of 1  To improve activity tolerance, improve ability to perform upright tasks at home and increase Indep in prior living environment  Pt  Will exhibit increased  dynamic ambulatory  balance to Good+, occasional tactile cues during treatment session to increase safety, decrease risk of falls, and enable safe ambulation to toilet/hallway to decrease risk of medical complications due to immobility  LTG Expiration Date 08/17/18   Long Term Goal #1 Perform ambulation without AD for 300 feet, independently To improve activity tolerance, improve ability to perform upright tasks at home and increase Indep in prior living environment  Pt  Will exhibit increased  dynamic ambulatory  balance to normal, without tactile cues during treatment session to increase safety, decrease risk of falls, and enable safe ambulation to toilet/hallway  Plan   Treatment/Interventions Functional transfer training; Therapeutic exercise; Endurance training;Patient/family training;Gait training; Compensatory technique education   PT Frequency 1-2x/wk   Recommendation   Recommendation Home independently   PT - OK to Discharge No   Barthel Index   Feeding 10   Bathing 5   Grooming Score 5   Dressing Score 5   Bladder Score 10   Bowels Score 10   Toilet Use Score 5   Transfers (Bed/Chair) Score 10   Mobility (Level Surface) Score 0   Stairs Score 0   Barthel Index Score 60     Ivana Major, PT

## 2018-08-10 NOTE — NURSING NOTE
TEY HENDERSON  AWARE  CIWA RESULT  WAS  A  3,  AND  THAN  REPEATED,   AND  RESULT  1  LAYO,  THAN  PROCEEDED  TO  D/C  CIWA  NURSE  BASE  PROTOCOL  ALL  VITAMIN  BASED  MEDS WERE  ADDED  TO  PT  PERIPHERAL  IV  OF  1000NN  AT  200/HR,  AS  PER  ASHLEY GAMBLE  ORDERS  PY  IS  RESTING  COMFORTABLY  AND  HIS  PAIN  IS  NOW  A  4-5/10, DOWN FROM ORIGINAL 10/10

## 2018-08-10 NOTE — CASE MANAGEMENT
[address] [place] [address] in the [facility] by Aniceto Moreland for 2017  Network Utilization Review Department  Phone: [phone]; Fax [phone]  ATTENTION: The Network Utilization Review Department is now centralized for our 7 Facilities  Please call with any questions or concerns to [phone] and carefully follow the prompts so that you are directed to the right person  All voicemails are confidential  Fax any determinations, approvals, denials, and requests for initial or continue stay review clinical to [phone]  Due to HIGH CALL volume, it would be easier if you could please send faxed requests to expedite your requests and in part, help us provide discharge notifications faster   /////////////////////////////////////////////////////////////////////////////////////////////////////////////////    Initial Clinical Review    Admission: Date/Time/Statement: 8/9/18 @ 2254     Orders Placed This Encounter   Procedures    Inpatient Admission     Standing Status:   Standing     Number of Occurrences:   1     Order Specific Question:   Admitting Physician     Answer:   Poncho Ferrell [[id number]]     Order Specific Question:   Level of Care     Answer:   Med Surg [[id number]]     Order Specific Question:   Estimated length of stay     Answer:   More than 2 Midnights     Order Specific Question:   Certification     Answer:   I certify that inpatient services are medically necessary for this patient for a duration of greater than two midnights  See H&P and MD Progress Notes for additional information about the patient's course of treatment           ED: Date/Time/Mode of Arrival:   ED Arrival Information     Expected Arrival Acuity Means of Arrival Escorted By Service Admission Type    - 8/9/2018 20:25 Emergent Walk-In - General Medicine Emergency    Arrival Complaint    abdominal pain          Chief Complaint:   Chief Complaint   Patient presents with    Abdominal Pain vomiting    Hyperglycemia - no symptoms     took BGL at home >HI , has no insulin at home      History of Illness:    46 y o  male who presents with left upper quadrant abdominal pain, elevated blood sugar greater than 500 on home machine  Patient states thatHe has been without his insulin for approximately 1 week, he tells me that even though he has diabetes, and has prescriptions for insulin, his insurance requires him to have a referral for every insulin every month  The patient states that he has had a call into the physician's office, however he forgets to call him again to remind him to send for the referral      The patient states that his abdominal pain is his typical pancreatitis pain  He states this is the 1st time he has ever had this significant pancreatic attic pain without having alcohol    He does deny any type of alcohol ingestion in fact he states the last time he had any alcohol was in the end of June of 2018 when he was admitted here for alcoholic pancreatitis      In the emergency department he was started on normal saline solution, he was given Zofran, Dilaudid, 10 units of regular insulin subcutaneously, and 10 units of regular insulin given IV     08/10/18 90 7 kg (200 lb)       08/10/18 0855  --  --  --  --   84 %  None (Room air)  --   SpO2: O2 applied to Pt 's nares @ 2l/m via NC  at 08/10/18 0855   08/10/18 0709  97 9 °F (36 6 °C)  90  20  150/78  95 %  None (Room air)  Lying   08/10/18 0507  --  84  --  128/70  --  --  --   08/10/18 0107  98 °F (36 7 °C)  88  20  132/72  --  --  Lying   08/10/18 0034  --  --  --  --  95 %  OFF  --   08/10/18 0007   97 °F (36 1 °C)  99  18  136/85  98 %  None (Room air)  Sitting   08/09/18 2355  --  --  --  --  98 %  None (Room air)  --   08/09/18 2133  98 2 °F (36 8 °C)  98  --  --  --  --  --   08/09/18 2047  98 1 °F (36 7 °C)   115  18  165/85  96 %  None (Room air)        8/10 @  0009 ciwa score = 3       ciwa score = 1     ciwa score = 0    Abnormal Labs/Diagnostic Test Results:   Na  120   133  K  5 8    3 8  Cl  87     99   crt  1 36   0 98  Glucose  >700   331   261  Ca  10 8    9 9   AST  9   9  Mg  1 6  amulase  176  Lipase  831  WBC  11 40    14 50      ED Treatment:   Medication Administration from 08/09/2018 2025 to 08/10/2018 0005       Date/Time Order Dose Route Action Action by Comments                08/09/2018 2131 sodium chloride 0 9 % infusion 1,000 mL/hr Intravenous New Bag Brandi Martinez RN      08/09/2018 2130 ondansetron (ZOFRAN) injection 4 mg 4 mg Intravenous Given Brandi Martinez RN      08/09/2018 2130 HYDROmorphone (DILAUDID) injection 1 mg 1 mg Intravenous Given Brandi Martinez RN      08/09/2018 2121 insulin regular (HumuLIN R,NovoLIN R) injection 10 Units 10 Units Subcutaneous Given Brandi Martinez RN      08/09/2018 2121 insulin regular (HumuLIN R,NovoLIN R) injection 10 Units 10 Units Intravenous Given Brandi Martinez RN           Past Medical/Surgical History:    Active Ambulatory Problems     Diagnosis Date Noted    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    Benign essential hypertension 07/02/2012    COPD (chronic obstructive pulmonary disease) ([facility] ) 03/29/2017    Fatty liver 12/05/2012    Fibromyalgia 07/29/2013    Generalized anxiety disorder 06/13/2012    Hyperlipidemia 09/11/2012    Insomnia 09/05/2017    Iron deficiency anemia 04/05/2016    MDD (major depressive disorder), single episode 07/02/2012    Nephrolithiasis 12/05/2012    Other chronic pain 07/02/2012    Recurrent pancreatitis ([facility] ) 08/25/2017    Sleep disorder 07/02/2012    Diabetes mellitus type I ([facility] ) 04/05/2016     Resolved Ambulatory Problems     Diagnosis Date Noted    No Resolved Ambulatory Problems     Past Medical History:   Diagnosis Date    Allergic rhinitis     Quiros esophagus     Bronchitis, asthmatic     Cholelithiasis     Chronic pain     COPD (chronic obstructive pulmonary disease) ([facility] )     Diabetes mellitus ([place] [address] )     GERD (gastroesophageal reflux disease)     Hypertension     Metatarsalgia     Pancreatitis     Psychiatric disorder     Renal disorder        Admitting Diagnosis: [facility] ) [K86 1]  Hyponatremia [E87 1]  Abdominal pain [R10 9]  Hyperglycemia [R73 9]  Right upper quadrant abdominal pain [R10 11]    Assessment/Plan:       * Recurrent pancreatitis (HCC)   Assessment & Plan     · NOT Secondary to alcohol abuse  · IS related to Poor Diabetes Control  · Acute on chronic  · Elevated lipase  · Calcium is elevated at 10 8  · Clear liquid diet  · Check a magnesium  · CIWA protocol  · Consult GI  · Start IV fluids at increased rate  · Start ceftriaxone IV daily  · I ordered a banana bag, and oral thiamine and folate and multivitamin as well  · Dilaudid for pain control           Alcohol abuse   Assessment & Plan     · Place patient on CIWA protocol          Abdominal pain   Assessment & Plan     · Secondary to recurrent pancreatitis  · Continue with pain medications of Dilaudid           Diabetes mellitus type I [facility])   Assessment & Plan             Lab Results   Component Value Date     HGBA1C 7 3 12/19/2017              Recent Labs      08/09/18 2057 08/09/18   2256   POCGLU  >500*  331*         Blood Sugar Average: Last 72 hrs:  (P) 165  5      · Accu-Cheks with sliding scale insulin  · Levemir  · Patient's anion gap is 10          Acute hyponatremia   Assessment & Plan     · Does correct to 134  · Continue IV fluids          Acute hyperkalemia   Assessment & Plan     · Will continue IV fluids  · Patient did receive regular insulin in the emergency department  · Monitor labs          Acute kidney injury [facility])   Assessment & Plan     · Continue IV fluids  · Monitor labs          GERD (gastroesophageal reflux disease)   Assessment & Plan     · Continue Prilosec          Anxiety and depression   Assessment & Plan     · Continue Vistaril, risperidone, trazodone, Effexor          Insomnia   Assessment & Plan     · Continue trazodone          Other chronic pain   Assessment & Plan     · Patient does have a history of fibromyalgia  · We will continue Dilaudid for pancreatic pain          Tobacco abuse   Assessment & Plan     · Nicotine patch          Iron deficiency anemia   Assessment & Plan     · Hemoglobin is currently stable  · Monitor labs          Benign essential hypertension   Assessment & Plan     · We will continue patient's outpatient lisinopril          Leukemoid reaction   Assessment & Plan     · This is refractory to his acute on chronic pancreatitis  · We will continue ceftriaxone           VTE Prophylaxis: Heparin     Patient will be admitted on an Inpatient basis with an anticipated length of stay of  > 2 midnights  Justification for Hospital Stay:  Acute on chronic pancreatitis with elevated lipase, elevated blood sugar secondary to pancreatitis      Admission Orders:  Admit telemetry  Sequential compression device to b/l LE   PT/OT evals  CTAP   Continuous pulse ox  I/o  accucks qid w/sliding scale insulin  CIWA scores per protocol  UP w/assist  NPO    Scheduled Meds:   Current Facility-Administered Medications:  acetaminophen 650 mg Oral Q6H PRN TYE Menjivar    cefTRIAXone 1,000 mg Intravenous Q24H TYE Menjivar Last Rate: 1,000 mg (08/10/18 0131)   fenofibrate 145 mg Oral Daily Corie Curtis PA-C    folic acid 1 mg Oral Daily TYE Menjivar    heparin (porcine) 5,000 Units Subcutaneous Q8H [address] TYE Menjivar    HYDROmorphone 1 mg Intravenous Q3H PRN TYE Menjivar    hydrOXYzine HCL 50 mg Oral TID PRN TYE Menjivar    insulin detemir 45 Units Subcutaneous Daily TYE Menjivar    insulin lispro 2-12 Units Subcutaneous TID AC TYE Menjivar    lisinopril 10 mg Oral BID TYE Menjivar    nicotine 1 patch Transdermal Daily TYE Menjivar    ondansetron 4 mg Intravenous Q6H PRN TYE Menjivar    pantoprazole 40 mg Oral Early Morning TYE Menjivar    risperiDONE 0 5 mg Oral BID TYE Menjivar    sodium chloride 200 mL/hr Intravenous Continuous TYE Menjivar Last Rate: 200 mL/hr (08/10/18 8038)   thiamine 100 mg Oral Daily TYE Menjivar    traZODone 50 mg Oral HS TYE Menjivar    venlafaxine 75 mg Oral TID TYE Ortiz        8/10/2018    Dilaudid IV  @  0100,  0413,  9254,  9267  IVF @ 200 cc/hr       GI   Assessment:  Pancreatitis  Abdominal pain  History of alcohol abuse, but no recent alcohol consumption per patient  Uncontrolled diabetes due to lack of Insulin for over 1 week per patient  Anemia  Plan:  NPO today with only ice chips  Continue to monitor labs (lipase daily), control blood glucose, replace fluids, sodium and magnesium  Start medication for hypertriglyceridemia     Continue to monitor H&H, consider repeat EGD if significant decline in H&H or if pain does not resolve once Lipase has normalized

## 2018-08-10 NOTE — SOCIAL WORK
Evaluated the pt at the bedside  Pt lives in a 2 story home with his spouse  Pt has 3 steps in the front of home and 12 steps in the  back  Has 12 steps inside  Pt states he is independent and able to drive  Pt gets his medications from [address]  Pt reports he will have no needs  Pt drove himself to the hospital  CM reviewed d/c planning process including the following: identifying help at home, patient preference for d/c planning needs, availability of treatment team to discuss questions or concerns patient and/or family may have regarding understanding medications and recognizing signs and symptoms once discharged  CM also encouraged patient to follow up with all recommended appointments after discharge  Patient advised of importance for patient and family to participate in managing patients medical well being

## 2018-08-10 NOTE — ASSESSMENT & PLAN NOTE
· Persists  · Secondary to recurrent pancreatitis  · Patient was on Dilaudid at 1 mg IV q 3 hours p r n  -dosing going forward will be 2 mg IV q 4 hours p r n

## 2018-08-10 NOTE — NURSING NOTE
Report received from the  Prior shift, no changes in the pts assess throughout the day, abd pain controlled with the meds that are ordered at this time, ivf's infusing, pt presently in bed in no acute distress watching tv, sats 96% on 2 liters, callbell in reach of the pt, informed to ring with any needs and the pt agree, remains npo

## 2018-08-10 NOTE — PLAN OF CARE
Problem: PHYSICAL THERAPY ADULT  Goal: Performs mobility at highest level of function for planned discharge setting  See evaluation for individualized goals  Treatment/Interventions: Functional transfer training, Therapeutic exercise, Endurance training, Patient/family training, Gait training, Compensatory technique education  Jane De Paz, PT            See flowsheet documentation for full assessment, interventions and recommendations  Outcome: Progressing  Prognosis: Good  Problem List: Decreased mobility, Pain, Impaired balance, Decreased endurance  Assessment: Pt is 46 y o  male seen for PT evaluation s/p admit to [address] [place] [address] on 8/9/2018 w/ Recurrent pancreatitis ([address] )  PT consulted to assess pt's functional mobility and d/c needs  Order placed for PT eval and tx, w/ activity as tolerated order  Comorbidities affecting pt's physical performance at time of assessment include: tobacco/alcohol abuse, pancreatitis, anxiety, HTN, COPD, anemia  PTA, pt was independent w/ all functional mobility w/ o AD  Personal factors affecting pt at time of IE include: inaccessible home environment, lives in two story house, inability to navigate community distances, inability to perform IADLs, inability to perform ADLs and severe pain inhibiting functional tolerance  Please find objective findings from PT assessment regarding body systems outlined above with impairments and limitations including impaired balance, decreased endurance, pain, decreased activity tolerance and decreased functional mobility tolerance  The following objective measures performed on IE also reveal limitations: Barthel Index: 60/100  Pt's clinical presentation is currently evolving seen in pt's presentation of fluctuating pain levels contributing to decrease activity tolerance  Pt to benefit from continued PT tx to address deficits as defined above and maximize level of functional independent mobility and consistency   From PT/mobility standpoint, recommendation at time of d/c would be anticipate no needs pending progress in order to facilitate return to PLOF  Recommendation: Home independently     PT - OK to Discharge: No    See flowsheet documentation for full assessment     Jocelyne Sanchez, PT

## 2018-08-10 NOTE — OCCUPATIONAL THERAPY NOTE
Occupational Therapy Evaluation      Sary Rice     Dx: Pancreatitis, recurrent    8/10/2018    Patient Active Problem List   Diagnosis    Quiros esophagus    Benign essential hypertension    COPD (chronic obstructive pulmonary disease) (HCC)    Fatty liver    Fibromyalgia    Generalized anxiety disorder    Hyperlipidemia    Insomnia    Iron deficiency anemia    MDD (major depressive disorder), single episode    Nephrolithiasis    Other chronic pain    Recurrent pancreatitis (HCC)    Sleep disorder    Diabetes mellitus type I ([facility]    Abdominal pain    Acute hyponatremia    Acute hyperkalemia    Acute kidney injury ([facility]    GERD (gastroesophageal reflux disease)    Anxiety and depression    Tobacco abuse    Alcohol abuse    Leukemoid reaction       Past Medical History:   Diagnosis Date    Allergic rhinitis     last assessed: 12/5/2012    Quiros esophagus     Bronchitis, asthmatic     last assessed: 9/15/2014    Cholelithiasis     Chronic pain     COPD (chronic obstructive pulmonary disease) (HCC)     Diabetes mellitus ([facility]     GERD (gastroesophageal reflux disease)     Hypertension     Metatarsalgia     last assessed: 8/11/2014, unspecified laterality, ? cause  PE with changes  To see DPM tomorrow      Pancreatitis     Psychiatric disorder     Renal disorder        Past Surgical History:   Procedure Laterality Date    CHOLECYSTECTOMY LAPAROSCOPIC      FOOT SURGERY      KNEE ARTHROSCOPY      (therapeutic)    VASECTOMY      vas deferens      08/10/18 0818   Note Type   Note type Eval/Treat   Restrictions/Precautions   Weight Bearing Precautions Per Order No   Pain Assessment   Pain Assessment 0-10   Pain Score 9   Pain Location Abdomen   Pain Orientation (all over)   Pain Descriptors Burning;Aching  (I've had this before, I can't get good relief this time)   Pain Onset Ongoing   Clinical Progression Not changed   Hospital Pain Intervention(s) Medication (See MAR)   Home Living   Type of [address] [place] [address] Two level;Stairs to enter with rails   Bathroom Shower/Tub Walk-in shower   Bathroom Toilet Standard   Bathroom Accessibility Accessible   Prior Function   Level of [place] Independent with ADLs and functional mobility   Lives With Spouse; Family   Receives Help From Family   ADL Assistance Independent   IADLs Independent   Falls in the last 6 months 0   Comments (Pt does all driving, as spouse is legally blind)   Psychosocial   Psychosocial (WDL) WDL   Subjective   Subjective Pt states feels weak overall  (I didn't get my medicine in time, I think that triggered it)   ADL   Eating Assistance 7  Independent   Grooming Assistance 7  Independent   Grooming Deficit Setup   UB Bathing Assistance 5  Supervision/Setup   LB [address] 4  Minimal Assistance   LB Bathing Deficit (does not want to reach LE's r/t pain)   [address] 4  Minimal Assistance   LB Dressing Deficit (r/t pain, does not want to complete)   Toileting Deficit Other (Comment)  (pt declined OOB at this time, I urinal)   Bed Mobility   Rolling R 6  Modified independent   Additional items Bedrails   Rolling L 6  Modified independent   Additional items Bedrails   Supine to Sit 6  Modified independent   Additional items Bedrails   Sit to Supine 6  Modified independent   Additional items Bedrails   Balance   Static Sitting Normal   Dynamic Sitting Normal   Activity Tolerance   Activity Tolerance Patient limited by fatigue;Patient limited by pain   RUE Assessment   RUE Assessment (feels weak when moving UE's)   LUE Assessment   LUE Assessment (feels weak when moving UE's)   Hand Function   Gross Motor Coordination Functional   Fine Motor Coordination Functional   Cognition   Overall Cognitive Status WFL   Arousal/Participation Alert; Cooperative   Attention Within functional limits   Orientation Level Oriented X4   Memory Within functional limits Following Commands Follows all commands and directions without difficulty   Comments (s/w distracted r/t presense of high pain levels)   Assessment   Limitation Decreased endurance;Decreased UE strength;Decreased self-care trans;Decreased high-level ADLs; Decreased ADL status   Prognosis Good   Assessment Pt is a 46 y o  male seen for OT evaluation s/p admit to [address] on 8/9/2018 w/ Recurrent pancreatitis ([address] )  Comorbidities affecting pt's functional performance at time of assessment include: DM, HTN, COPD and Fibromyalgia, Recurrent Pancreatitis, MERLYN, Anxiety and Depression, Chronic Pain, ETOH use, Tobacco use, Sleep D/O, Nephrolithiasis, animia  Personal factors affecting pt at time of IE include:difficulty performing ADLS and difficulty performing IADLS   Prior to admission, pt was I with all self care ADL's, I all IADL's, I ambulation w/o devices  Upon evaluation: Pt requires increased assistance with functional mobility and self care completion r/t high pain levels and fatigue/weakness presently  r/t the following deficits impacting occupational performance: decreased strength, decreased tolerance and increased pain  Pt to benefit from continued skilled OT tx while in the hospital to address deficits as defined above and maximize level of functional independence w ADL's and functional mobility  Occupational Performance areas to address include: bathing/shower, toilet hygiene, dressing and functional mobility  From OT standpoint, recommendation at time of d/c would be d/c home       Goals   Patient Goals pain decreased so I could sleep and function   STG Time Frame 3-5   Short Term Goal  Increase knowledge re: adaptations , as indicated, to good, to increase ability to participate in self care/daily tasks   LTG Time Frame 10-14   Long Term Goal #1 increase UE strength to G/N, for safety with functional transfers and ability to participate in IADL's   Long Term Goal #2 Increase Functional Activity tolerance/functional Mobility in room to > 10', for IADL participation, with good safety demonstrated   Functional Transfer Goals   Pt Will Perform All Functional Transfers (transfers I'ly)   ADL Goals   Pt Will Perform Bathing Independently   Pt Will Perform UE Dressing With stand by assist   Pt Will Perform LE Dressing With mod indep   Pt Will Perform Toileting Independently   Plan   Treatment Interventions Energy conservation   Goal Expiration Date 08/20/18   Treatment Day 0   OT Frequency 3-5x/wk   Recommendation   OT Discharge Recommendation Home with family support   Barthel Index   Feeding 10   Bathing 0   Grooming Score 5   Dressing Score 5   Bladder Score 10   Bowels Score 10   Toilet Use Score 5   Transfers (Bed/Chair) Score 10   Mobility (Level Surface) Score 0   Stairs Score 0   Barthel Index Score [address] [facility], OT

## 2018-08-10 NOTE — ASSESSMENT & PLAN NOTE
· Blood sugars are more controlled  ·  Continue Levemir and regular insulin coverage  · Continue Accu-Cheks AC and HS

## 2018-08-10 NOTE — PLAN OF CARE
Problem: DISCHARGE PLANNING - CARE MANAGEMENT  Goal: Discharge to post-acute care or home with appropriate resources  INTERVENTIONS:  - Conduct assessment to determine patient/family and health care team treatment goals, and need for post-acute services based on payer coverage, community resources, and patient preferences, and barriers to discharge  - Address psychosocial, clinical, and financial barriers to discharge as identified in assessment in conjunction with the patient/family and health care team  - Arrange appropriate level of post-acute services according to patient's   needs and preference and payer coverage in collaboration with the physician and health care team  - Communicate with and update the patient/family, physician, and health care team regarding progress on the discharge plan  - Arrange appropriate transportation to post-acute venues  Pt is independent and drove to the hospital   Will have no needs upon discharge    Outcome: Progressing

## 2018-08-10 NOTE — PLAN OF CARE
Problem: OCCUPATIONAL THERAPY ADULT  Goal: Performs self-care activities at highest level of function for planned discharge setting  See evaluation for individualized goals  Treatment Interventions: Energy conservation          See flowsheet documentation for full assessment, interventions and recommendations  Outcome: Progressing  Limitation: Decreased endurance, Decreased UE strength, Decreased self-care trans, Decreased high-level ADLs, Decreased ADL status  Prognosis: Good  Assessment: Pt is a 46 y o  male seen for OT evaluation s/p admit to [address] on 8/9/2018 w/ Recurrent pancreatitis ([address] )  Comorbidities affecting pt's functional performance at time of assessment include: DM, HTN, COPD and Fibromyalgia, Recurrent Pancreatitis, MERLYN, Anxiety and Depression, Chronic Pain, ETOH use, Tobacco use, Sleep D/O, Nephrolithiasis, animia  Personal factors affecting pt at time of IE include:difficulty performing ADLS and difficulty performing IADLS   Prior to admission, pt was I with all self care ADL's, I all IADL's, I ambulation w/o devices  Upon evaluation: Pt requires increased assistance with functional mobility and self care completion r/t high pain levels and fatigue/weakness presently  r/t the following deficits impacting occupational performance: decreased strength, decreased tolerance and increased pain  Pt to benefit from continued skilled OT tx while in the hospital to address deficits as defined above and maximize level of functional independence w ADL's and functional mobility  Occupational Performance areas to address include: bathing/shower, toilet hygiene, dressing and functional mobility  From OT standpoint, recommendation at time of d/c would be d/c home         OT Discharge Recommendation: Home with family support   Thaddeus Dow OT

## 2018-08-10 NOTE — PLAN OF CARE
Problem: PAIN - ADULT  Goal: Verbalizes/displays adequate comfort level or baseline comfort level  Interventions:  - Encourage patient to monitor pain and request assistance  - Assess pain using appropriate pain scale  - Administer analgesics based on type and severity of pain and evaluate response  - Implement non-pharmacological measures as appropriate and evaluate response  - Consider cultural and social influences on pain and pain management  - Notify physician/advanced practitioner if interventions unsuccessful or patient reports new pain  Outcome: Not Progressing  PT  ADMITTED  WITH  RECURRENT  PANCREATITIIS  TO  ROOM  108-1  PAIN  IS  NOW  10/10    AND  TO  BE  MEDICATED WITH  IV  DILAUDID

## 2018-08-10 NOTE — CONSULTS
Consultation - GI   Sary Rice 46 y o  male MRN: [id number]  Unit/Bed#: -01 Encounter: [id number]      Assessment/Plan     Assessment:  Pancreatitis  Abdominal pain  History of alcohol abuse, but no recent alcohol consumption per patient  Uncontrolled diabetes due to lack of Insulin for over 1 week per patient  Anemia  Plan:  NPO today with only ice chips  Continue to monitor labs (lipase daily), control blood glucose, replace fluids, sodium and magnesium  Start medication for hypertriglyceridemia  Continue to monitor H&H, consider repeat EGD if significant decline in H&H or if pain does not resolve once Lipase has normalized  History of Present Illness   Physician Requesting Consult: Mikey Lantigua MD  Reason for Consult / Principal Problem: Recurrent Pancreatitis  RUQ pain  Hx and PE limited by:   HPI: Sary Rice is a 46y o  year old male who is well known to our office with history of multiple admissions for acute on chronic pancretitis  He presented to the ER last night with severe abdominal pain, significant fatigue, vomiting and diarrhea  Labs revealed hyperkalemia, hyponatremia, hypomagnesia, hyperglycemia (Glucose>500), slight anemia (HGB=13 5),  Hypertriglyceridemia, and Lipase=800  Normal LFT's  He was hospitalized more recently in early June with similar symptoms  This admission however he says he did not drink any alcohol prior to symptom onset  He says he was out of his Insulin for about 1 week due to issues with insurance coverage  He ate dinner Wednesday evening and after that is when his symptoms started  He is currently rating his pain as severe and requesting pain medications  Pain radiates from the mid-abdomen to the chest and back  Pain is constant and exacerbated by eating and drinking  He has a tray of clear liquids, but says he did not drink any of it  Denies dysphagia, choking, headache, blurry vision, sore throat, SOB   Denies constipation, but did have some diarrhea  Denies rectal bleeding, blood in the stool or black stools  Denies hematemesis  Consults    Review of Systems   Constitutional: Positive for fatigue  Negative for appetite change and fever  HENT: Negative for congestion, ear pain, facial swelling, hearing loss and sore throat  Eyes: Negative for pain, redness and visual disturbance  Respiratory: Negative for cough, choking, chest tightness and shortness of breath  Cardiovascular: Positive for chest pain  Negative for palpitations and leg swelling  Abdominal pain radiates into the chest    Gastrointestinal: Positive for abdominal pain, diarrhea, nausea and vomiting  Negative for anal bleeding and blood in stool  Genitourinary: Negative for dysuria, frequency and hematuria  Musculoskeletal: Negative for arthralgias and joint swelling  Skin: Negative for rash  Allergic/Immunologic: Negative for food allergies  Neurological: Negative for dizziness, facial asymmetry, light-headedness, numbness and headaches  Hematological: Negative for adenopathy  Historical Information   Past Medical History:   Diagnosis Date    Allergic rhinitis     last assessed: 12/5/2012    Quiros esophagus     Bronchitis, asthmatic     last assessed: 9/15/2014    Cholelithiasis     Chronic pain     COPD (chronic obstructive pulmonary disease) (HCC)     Diabetes mellitus (HCC)     GERD (gastroesophageal reflux disease)     Hypertension     Metatarsalgia     last assessed: 8/11/2014, unspecified laterality, ? cause  PE with changes  To see DPM tomorrow      Pancreatitis     Psychiatric disorder     Renal disorder      Past Surgical History:   Procedure Laterality Date    CHOLECYSTECTOMY LAPAROSCOPIC      FOOT SURGERY      KNEE ARTHROSCOPY      (therapeutic)    VASECTOMY      vas deferens     Social History   History   Alcohol Use    Yes     Comment: Last drink in June     History   Drug Use No     Comment: chronic narcotic use History   Smoking Status    Current Every Day Smoker    Packs/day: 1 00   Smokeless Tobacco    Never Used     Comment: Tobacco use [facility]'s "How to Quit" literature given to pat       Family History: non-contributory    Meds/Allergies    Current Facility-Administered Medications   Medication Dose Route Frequency Provider Last Rate Last Dose    acetaminophen (TYLENOL) tablet 650 mg  650 mg Oral Q6H PRN TYE Menjivar        cefTRIAXone (ROCEPHIN) IVPB (premix) 1,000 mg  1,000 mg Intravenous Q24H TYE Menjivar 100 mL/hr at 08/10/18 0131 1,000 mg at 99/93/64 3960    folic acid (FOLVITE) tablet 1 mg  1 mg Oral Daily TYE Menjivar        heparin (porcine) subcutaneous injection 5,000 Units  5,000 Units Subcutaneous Q8H [facility] TYE eMnjivar   5,000 Units at 08/10/18 0058    HYDROmorphone (DILAUDID) injection 1 mg  1 mg Intravenous Q3H PRN TYE Andrade   1 mg at 08/10/18 0413    hydrOXYzine HCL (ATARAX) tablet 50 mg  50 mg Oral TID PRN TYE Andrade        insulin detemir (LEVEMIR) subcutaneous injection 45 Units  45 Units Subcutaneous Daily TYE Andrade   45 Units at 08/10/18 0058    insulin lispro (HumaLOG) 100 units/mL subcutaneous injection 2-12 Units  2-12 Units Subcutaneous TID AC TYE Menjivar        lisinopril (ZESTRIL) tablet 10 mg  10 mg Oral BID TYE Menjivar        nicotine (NICODERM CQ) 21 mg/24 hr TD 24 hr patch 1 patch  1 patch Transdermal Daily TYE Menjivar        ondansetron (ZOFRAN) injection 4 mg  4 mg Intravenous Q6H PRN TYE Menjivar        pantoprazole (PROTONIX) EC tablet 40 mg  40 mg Oral Early Morning TYE Menjivar   40 mg at 08/10/18 0520    risperiDONE (RisperDAL) tablet 0 5 mg  0 5 mg Oral BID TYE Menjivar        sodium chloride 0 9 % infusion  200 mL/hr Intravenous Continuous TYE Menjivar 200 mL/hr at 08/10/18 0628 200 mL/hr at 08/10/18 6058    thiamine (VITAMIN B1) tablet 100 mg  100 mg Oral Daily TYE Menjivar        traZODone (DESYREL) tablet 50 mg  50 mg Oral HS TYE Menjivar   50 mg at 08/10/18 0059    venlafaxine (EFFEXOR) tablet 75 mg  75 mg Oral TID TYE Menjivar             No Known Allergies    Objective       Intake/Output Summary (Last 24 hours) at 08/10/18 0807  Last data filed at 08/10/18 0501   Gross per 24 hour   Intake             1200 ml   Output                0 ml   Net             1200 ml       Invasive Devices:   Peripheral IV 08/09/18 Right Arm (Active)   Site Assessment Clean;Dry; Intact 8/10/2018 12:26 AM   Dressing Type Transparent 8/10/2018 12:26 AM   Line Status Infusing 8/10/2018 12:26 AM   Dressing Status Clean;Dry; Intact 8/10/2018 12:26 AM     Vitals:    08/10/18 0709   BP: 150/78   Pulse: 90   Resp: 20   Temp: 97 9 °F (36 6 °C)   SpO2: 95%     Physical Exam   Constitutional: He is oriented to person, place, and time  He appears well-developed and well-nourished  HENT:   Head: Normocephalic and atraumatic  Eyes: Conjunctivae are normal  Pupils are equal, round, and reactive to light  Neck: Neck supple  No JVD present  Cardiovascular: Normal rate and regular rhythm  No murmur heard  Pulmonary/Chest: Effort normal and breath sounds normal    Abdominal: Soft  There is tenderness  Diffuse abdominal tenderness with light palpation with voluntary guarding  Genitourinary:   Genitourinary Comments: Deferred  Musculoskeletal: He exhibits no edema or deformity  Neurological: He is alert and oriented to person, place, and time  No cranial nerve deficit  Skin: Skin is warm and dry  No rash noted  Psychiatric: He has a normal mood and affect  Lab Results: I have personally reviewed pertinent reports  Imaging Studies: I have personally reviewed pertinent reports  EKG, Pathology, and Other Studies: I have personally reviewed pertinent reports        VTE Prophylaxis: Heparin    Counseling / Coordination of Care  Total floor / unit time spent today 60 minutes  Greater than 50% of total time was spent with the patient and / or coordination of care  A description of the counseling / coordination of care: with patient at bedside, nursing staff and reviewing current/past ER and office notes

## 2018-08-10 NOTE — PLAN OF CARE
Problem: METABOLIC, FLUID AND ELECTROLYTES - ADULT  Goal: Glucose maintained within target range  INTERVENTIONS:  - Monitor Blood Glucose as ordered  - Assess for signs and symptoms of hyperglycemia and hypoglycemia  - Administer ordered medications to maintain glucose within target range  - Assess nutritional intake and initiate nutrition service referral as needed  Outcome: Progressing  PT  BLOOD  SUGAR  WILL  BE  MANAGED  AND  RETURNED  TO NORMAL  STATUS

## 2018-08-10 NOTE — ASSESSMENT & PLAN NOTE
· Will continue IV fluids  · Patient did receive regular insulin in the emergency department  · Monitor labs

## 2018-08-10 NOTE — ASSESSMENT & PLAN NOTE
Lab Results   Component Value Date    HGBA1C 7 3 12/19/2017       Recent Labs      08/09/18 2057 08/09/18   2256   POCGLU  >500*  331*       Blood Sugar Average: Last 72 hrs:  (P) 165 5     · Accu-Cheks with sliding scale insulin  · Levemir  · Patient's anion gap is 10

## 2018-08-10 NOTE — H&P
H&P- Yonathanlroy Carlos 1970, 46 y o  male MRN: [id number]    Unit/Bed#: -01 Encounter: [id number]    Primary Care Provider: Kaitlin Garcia DO   Date and time admitted to hospital: 8/9/2018  8:52 PM        * Recurrent pancreatitis ([facility] [address] )   Assessment & Plan    · NOT Secondary to alcohol abuse  · IS related to Poor Diabetes Control  · Acute on chronic  · Elevated lipase  · Calcium is elevated at 10 8  · Clear liquid diet  · Check a magnesium  · CIWA protocol  · Consult GI  · Start IV fluids at increased rate  · Start ceftriaxone IV daily  · I ordered a banana bag, and oral thiamine and folate and multivitamin as well  · Dilaudid for pain control         Alcohol abuse   Assessment & Plan    · Place patient on CIWA protocol        Abdominal pain   Assessment & Plan    · Secondary to recurrent pancreatitis  · Continue with pain medications of Dilaudid          Diabetes mellitus type I [facility])   Assessment & Plan    Lab Results   Component Value Date    HGBA1C 7 3 12/19/2017       Recent Labs      08/09/18 2057 08/09/18   2256   POCGLU  >500*  331*       Blood Sugar Average: Last 72 hrs:  (P) 165 5     · Accu-Cheks with sliding scale insulin  · Levemir  · Patient's anion gap is 10        Acute hyponatremia   Assessment & Plan    · Does correct to 134  · Continue IV fluids        Acute hyperkalemia   Assessment & Plan    · Will continue IV fluids  · Patient did receive regular insulin in the emergency department  · Monitor labs        Acute kidney injury [facility])   Assessment & Plan    · Continue IV fluids  · Monitor labs        GERD (gastroesophageal reflux disease)   Assessment & Plan    · Continue Prilosec        Anxiety and depression   Assessment & Plan    · Continue Vistaril, risperidone, trazodone, Effexor        Insomnia   Assessment & Plan    · Continue trazodone        Other chronic pain   Assessment & Plan    · Patient does have a history of fibromyalgia  · We will continue Dilaudid for pancreatic pain Tobacco abuse   Assessment & Plan    · Nicotine patch        Iron deficiency anemia   Assessment & Plan    · Hemoglobin is currently stable  · Monitor labs        Benign essential hypertension   Assessment & Plan    · We will continue patient's outpatient lisinopril        Leukemoid reaction   Assessment & Plan    · This is refractory to his acute on chronic pancreatitis  · We will continue ceftriaxone              VTE Prophylaxis: Heparin  Code Status:  Full  POLST: POLST is not applicable to this patient  Discussion with family:  Use of IV fluids and IV antibiotics    Anticipated Length of Stay:  Patient will be admitted on an Inpatient basis with an anticipated length of stay of  > 2 midnights  Justification for Hospital Stay:  Acute on chronic pancreatitis with elevated lipase, elevated blood sugar secondary to pancreatitis  Total Time for Visit, including Counseling / Coordination of Care: 45 minutes  Greater than 50% of this total time spent on direct patient counseling and coordination of care  Chief Complaint:  Left upper quadrant abdominal pain, elevated blood sugar greater than 500 on home machine  History of Present Illness:    Vance Moody is a 46 y o  male who presents with left upper quadrant abdominal pain, elevated blood sugar greater than 500 on home machine  Patient states thatHe has been without his insulin for approximately 1 week, he tells me that even though he has diabetes, and has prescriptions for insulin, his insurance requires him to have a referral for every insulin every month  The patient states that he has had a call into the physician's office, however he forgets to call him again to remind him to send for the referral   Patient states that he has checked with the pharmacy numerous times, and that his insulin is they are ready however without the referral they cannot release insulin to him      The patient states that his abdominal pain is his typical pancreatitis pain  He states this is the 1st time he has ever had this significant pancreatic attic pain without having alcohol  He does deny any type of alcohol ingestion in fact he states the last time he had any alcohol was in the end of June of 2018 when he was admitted here for alcoholic pancreatitis  The patient does state that his left upper quadrant abdominal pain is the type of pain that causes him to feel slightly short of breath  He states that when he lays down on his back he feels as if the pain shoots directly through him and into his back  In the emergency department he was started on normal saline solution, he was given Zofran, Dilaudid, 10 units of regular insulin subcutaneously, and 10 units of regular insulin given IV  Review of Systems:  Review of Systems   Constitutional: Positive for appetite change  HENT: Negative  Eyes: Negative  Respiratory: Positive for shortness of breath  Cardiovascular: Negative  Gastrointestinal: Positive for abdominal pain, nausea and vomiting  Endocrine: Negative  Genitourinary: Negative  Musculoskeletal: Negative  Skin: Negative  Allergic/Immunologic: Negative  Neurological: Negative  Hematological: Negative  Psychiatric/Behavioral: Negative  Past Medical and Surgical History:   Past Medical History:   Diagnosis Date    Allergic rhinitis     last assessed: 12/5/2012    Quiros esophagus     Bronchitis, asthmatic     last assessed: 9/15/2014    Cholelithiasis     Chronic pain     COPD (chronic obstructive pulmonary disease) (HCC)     Diabetes mellitus (HCC)     GERD (gastroesophageal reflux disease)     Hypertension     Metatarsalgia     last assessed: 8/11/2014, unspecified laterality, ? cause  PE with changes  To see DPM tomorrow      Pancreatitis     Psychiatric disorder     Renal disorder        Past Surgical History:   Procedure Laterality Date    CHOLECYSTECTOMY LAPAROSCOPIC      FOOT SURGERY      KNEE ARTHROSCOPY      (therapeutic)    VASECTOMY      vas deferens       Meds/Allergies:  Prior to Admission medications    Medication Sig Start Date End Date Taking? Authorizing Provider   hydrOXYzine pamoate (VISTARIL) 50 mg capsule TAKE 1 CAPSULE BY MOUTH THREE TIMES DAILY AS NEEDED 7/26/18  Yes Denisse mSith DO   lisinopril (ZESTRIL) 10 mg tablet Take 1 tablet by mouth 2 (two) times a day 4/17/12  Yes Historical Provider, MD   risperiDONE (RisperDAL) 0 5 mg tablet TAKE ONE TABLET BY MOUTH TWICE DAILY 3/5/18  Yes Denisse Smith DO   venlafaxine (EFFEXOR XR) 75 mg 24 hr capsule Take 1 capsule by mouth 3 (three) times a day 1/4/18  Yes Historical Provider, MD   glucose blood (ONE TOUCH ULTRA TEST) test strip by In Vitro route 3 (three) times a day 12/29/14   Historical Provider, MD   glucose blood (ONETOUCH VERIO) test strip by In Vitro route 3 (three) times a day 12/18/14   Historical Provider, MD   insulin detemir (LEVEMIR FLEXPEN) 100 Units/mL injection pen Inject 45 Units under the skin daily 6/14/18   Meenu Nevarez PA-C   Insulin Pen Needle (B-D ULTRAFINE III SHORT PEN) 31G X 8 MM MISC by Does not apply route 2/16/12   Historical Provider, MD   omeprazole (PriLOSEC) 20 mg delayed release capsule Take by mouth    Historical Provider, MD ALONZO CARTER 300 units/mL CONCETRATED U-300 injection pen INJECT 45 UNITS SUBCUTANEOUSLY ONCE DAILY AT BEDTIME 7/26/18   Meenu Nevarez PA-C   traZODone (DESYREL) 50 mg tablet Take 1 tablet by mouth    Historical Provider, MD     I have reviewed home medications with patient personally      Allergies: No Known Allergies    Social History:  Marital Status: /Civil Union   Occupation:   He is currently unemployed  Patient Pre-hospital Living Situation:   At home  Patient Pre-hospital Level of Mobility:  Full mobility  Patient Pre-hospital Diet Restrictions:  Diabetic  Substance Use History:     History   Alcohol Use    Yes     Comment: Last drink in June     History Smoking Status    Current Every Day Smoker    Packs/day: 1 00   Smokeless Tobacco    Never Used     Comment: Tobacco use [employer]'s "How to Quit" literature given to pat  History   Drug Use No     Comment: chronic narcotic use       Family History:  I have reviewed the patients family history    Physical Exam:   Vitals:   Blood Pressure: (P) 136/85 (08/10/18 0007)  Pulse: (P) 99 (08/10/18 0007)  Temperature: (!) (P) 97 °F (36 1 °C) (08/10/18 0007)  Temp Source: (P) Tympanic (08/10/18 0007)  Respirations: (P) 18 (08/10/18 0007)  Height: (P) 6' (182 9 cm) (08/10/18 0007)  Weight - Scale: (P) 90 7 kg (200 lb) (08/10/18 0007)  SpO2: 98 % (08/10/18 0007)    Physical Exam   Constitutional: He is oriented to person, place, and time  Vital signs are normal  He appears well-developed and well-nourished  He appears distressed  HENT:   Head: Normocephalic and atraumatic  Nose: Nose normal    Mouth/Throat: Mucous membranes are dry  Eyes: Conjunctivae are normal  Pupils are equal, round, and reactive to light  Neck: Neck supple  Cardiovascular: Regular rhythm, S1 normal, S2 normal and normal pulses  Tachycardia present  Pulmonary/Chest: Effort normal and breath sounds normal    Abdominal: Soft  Normal appearance and bowel sounds are normal  There is tenderness in the left upper quadrant  There is CVA tenderness  Musculoskeletal: Normal range of motion  Neurological: He is alert and oriented to person, place, and time  Skin: Skin is warm, dry and intact  Psychiatric: He has a normal mood and affect  His speech is normal and behavior is normal  Thought content normal        Additional Data:   Lab Results: I have personally reviewed pertinent reports          Results from last 7 days  Lab Units 08/09/18  2129   WBC Thousand/uL 11 40*   HEMOGLOBIN g/dL 13 5*   HEMATOCRIT % 39 6   PLATELETS Thousands/uL 208   NEUTROS PCT % 82*   LYMPHS PCT % 11*   MONOS PCT % 6   EOS PCT % 0       Results from last 7 days  Lab Units 08/09/18  2129   SODIUM mmol/L 120*   POTASSIUM mmol/L 5 8*   CHLORIDE mmol/L 87*   CO2 mmol/L 23   BUN mg/dL 23   CREATININE mg/dL 1 36*   CALCIUM mg/dL 10 8*   TOTAL PROTEIN g/dL 7 9   BILIRUBIN TOTAL mg/dL 0 80   ALK PHOS U/L 120   ALT U/L 10   AST U/L 9*   GLUCOSE RANDOM mg/dL >700*           Results from last 7 days  Lab Units 08/09/18  2256 08/09/18  2057   POC GLUCOSE mg/dl 331* >500*           Imaging: I have personally reviewed pertinent reports  No orders to display       EKG, Pathology, and Other Studies Reviewed on Admission:   · EKG:  Not applicable    [facility] Records Reviewed: Yes     ** Please Note: This note has been constructed using a voice recognition system   **

## 2018-08-10 NOTE — ASSESSMENT & PLAN NOTE
· Patient continues to have pain  · He is status post a GI evaluation  · Continue NPO status  · Continue IV fluids, okay for ice chips, and will adjust the pain medication as outlined above  · Will change ceftriaxone to Zosyn  · Will check a CT scan of the abdomen and pelvis  · This could be secondary to hypertriglyceridemia -continue fenofibrate therapy  · Check a lipid panel in the a m    · Will follow additional GI recommendations

## 2018-08-11 ENCOUNTER — APPOINTMENT (INPATIENT)
Dept: MRI IMAGING | Facility: HOSPITAL | Age: 48
DRG: 282 | End: 2018-08-11
Payer: COMMERCIAL

## 2018-08-11 LAB
ALBUMIN SERPL BCP-MCNC: 3.2 G/DL (ref 3.5–5.7)
ALP SERPL-CCNC: 152 U/L (ref 40–150)
ALT SERPL W P-5'-P-CCNC: 99 U/L (ref 7–52)
ANION GAP SERPL CALCULATED.3IONS-SCNC: 7 MMOL/L (ref 4–13)
AST SERPL W P-5'-P-CCNC: 145 U/L (ref 13–39)
BASOPHILS # BLD AUTO: 0 THOUSANDS/ΜL (ref 0–0.1)
BASOPHILS NFR BLD AUTO: 0 % (ref 0–2)
BILIRUB SERPL-MCNC: 1.8 MG/DL (ref 0.2–1)
BUN SERPL-MCNC: 11 MG/DL (ref 7–25)
CALCIUM SERPL-MCNC: 8.5 MG/DL (ref 8.6–10.5)
CHLORIDE SERPL-SCNC: 104 MMOL/L (ref 98–107)
CO2 SERPL-SCNC: 24 MMOL/L (ref 21–31)
CREAT SERPL-MCNC: 0.77 MG/DL (ref 0.7–1.3)
EOSINOPHIL # BLD AUTO: 0.1 THOUSAND/ΜL (ref 0–0.61)
EOSINOPHIL NFR BLD AUTO: 1 % (ref 0–5)
ERYTHROCYTE [DISTWIDTH] IN BLOOD BY AUTOMATED COUNT: 13.4 % (ref 11.5–14.5)
GFR SERPL CREATININE-BSD FRML MDRD: 106 ML/MIN/1.73SQ M
GLUCOSE SERPL-MCNC: 126 MG/DL (ref 65–140)
GLUCOSE SERPL-MCNC: 84 MG/DL (ref 65–99)
GLUCOSE SERPL-MCNC: 85 MG/DL (ref 65–140)
GLUCOSE SERPL-MCNC: 89 MG/DL (ref 65–140)
GLUCOSE SERPL-MCNC: 97 MG/DL (ref 65–140)
HCT VFR BLD AUTO: 35.4 % (ref 36.5–49.3)
HGB BLD-MCNC: 11.9 G/DL (ref 14–18)
INR PPP: 1.13 (ref 0.9–1.5)
LIPASE SERPL-CCNC: 78 U/L (ref 11–82)
LYMPHOCYTES # BLD AUTO: 1.4 THOUSANDS/ΜL (ref 0.6–4.47)
LYMPHOCYTES NFR BLD AUTO: 11 % (ref 21–51)
MAGNESIUM SERPL-MCNC: 1.4 MG/DL (ref 1.9–2.7)
MCH RBC QN AUTO: 28.4 PG (ref 26–34)
MCHC RBC AUTO-ENTMCNC: 33.6 G/DL (ref 31–37)
MCV RBC AUTO: 85 FL (ref 81–99)
MONOCYTES # BLD AUTO: 0.9 THOUSAND/ΜL (ref 0.17–1.22)
MONOCYTES NFR BLD AUTO: 7 % (ref 2–12)
NEUTROPHILS # BLD AUTO: 10.2 THOUSANDS/ΜL (ref 1.4–6.5)
NEUTS SEG NFR BLD AUTO: 81 % (ref 42–75)
NRBC BLD AUTO-RTO: 0 /100 WBCS
PHOSPHATE SERPL-MCNC: 3.4 MG/DL (ref 3–5.5)
PLATELET # BLD AUTO: 168 THOUSANDS/UL (ref 149–390)
PMV BLD AUTO: 7.2 FL (ref 8.6–11.7)
POTASSIUM SERPL-SCNC: 3.9 MMOL/L (ref 3.5–5.5)
PROT SERPL-MCNC: 6 G/DL (ref 6.4–8.9)
PROTHROMBIN TIME: 13.1 SECONDS (ref 10.1–12.9)
RBC # BLD AUTO: 4.18 MILLION/UL (ref 4.3–5.9)
SODIUM SERPL-SCNC: 135 MMOL/L (ref 134–143)
WBC # BLD AUTO: 12.6 THOUSAND/UL (ref 4.8–10.8)

## 2018-08-11 PROCEDURE — 82948 REAGENT STRIP/BLOOD GLUCOSE: CPT

## 2018-08-11 PROCEDURE — 99232 SBSQ HOSP IP/OBS MODERATE 35: CPT | Performed by: HOSPITALIST

## 2018-08-11 PROCEDURE — 85610 PROTHROMBIN TIME: CPT | Performed by: NURSE PRACTITIONER

## 2018-08-11 PROCEDURE — 94762 N-INVAS EAR/PLS OXIMTRY CONT: CPT

## 2018-08-11 PROCEDURE — 74181 MRI ABDOMEN W/O CONTRAST: CPT

## 2018-08-11 PROCEDURE — B40BYZZ PLAIN RADIOGRAPHY OF OTHER INTRA-ABDOMINAL ARTERIES USING OTHER CONTRAST: ICD-10-PCS | Performed by: HOSPITALIST

## 2018-08-11 PROCEDURE — 80053 COMPREHEN METABOLIC PANEL: CPT | Performed by: HOSPITALIST

## 2018-08-11 PROCEDURE — 94760 N-INVAS EAR/PLS OXIMETRY 1: CPT

## 2018-08-11 PROCEDURE — 97116 GAIT TRAINING THERAPY: CPT

## 2018-08-11 PROCEDURE — 83690 ASSAY OF LIPASE: CPT | Performed by: HOSPITALIST

## 2018-08-11 PROCEDURE — 85025 COMPLETE CBC W/AUTO DIFF WBC: CPT | Performed by: HOSPITALIST

## 2018-08-11 PROCEDURE — 83735 ASSAY OF MAGNESIUM: CPT | Performed by: HOSPITALIST

## 2018-08-11 PROCEDURE — 84100 ASSAY OF PHOSPHORUS: CPT | Performed by: HOSPITALIST

## 2018-08-11 RX ORDER — FUROSEMIDE 10 MG/ML
40 INJECTION INTRAMUSCULAR; INTRAVENOUS ONCE
Status: COMPLETED | OUTPATIENT
Start: 2018-08-12 | End: 2018-08-12

## 2018-08-11 RX ADMIN — HYDROMORPHONE HYDROCHLORIDE 2 MG: 1 INJECTION, SOLUTION INTRAMUSCULAR; INTRAVENOUS; SUBCUTANEOUS at 15:26

## 2018-08-11 RX ADMIN — LISINOPRIL 10 MG: 10 TABLET ORAL at 09:06

## 2018-08-11 RX ADMIN — NICOTINE 1 PATCH: 21 PATCH, EXTENDED RELEASE TRANSDERMAL at 09:07

## 2018-08-11 RX ADMIN — PIPERACILLIN SODIUM AND TAZOBACTAM SODIUM 3.38 G: 3; .375 INJECTION, POWDER, LYOPHILIZED, FOR SOLUTION INTRAVENOUS at 12:07

## 2018-08-11 RX ADMIN — PIPERACILLIN SODIUM AND TAZOBACTAM SODIUM 3.38 G: 3; .375 INJECTION, POWDER, LYOPHILIZED, FOR SOLUTION INTRAVENOUS at 22:35

## 2018-08-11 RX ADMIN — FENOFIBRATE 145 MG: 145 TABLET, FILM COATED ORAL at 09:06

## 2018-08-11 RX ADMIN — SODIUM CHLORIDE 200 ML/HR: 9 INJECTION, SOLUTION INTRAVENOUS at 01:04

## 2018-08-11 RX ADMIN — Medication 100 MG: at 09:07

## 2018-08-11 RX ADMIN — HYDROMORPHONE HYDROCHLORIDE 2 MG: 1 INJECTION, SOLUTION INTRAMUSCULAR; INTRAVENOUS; SUBCUTANEOUS at 07:23

## 2018-08-11 RX ADMIN — VENLAFAXINE 75 MG: 75 TABLET ORAL at 09:07

## 2018-08-11 RX ADMIN — VENLAFAXINE 75 MG: 75 TABLET ORAL at 20:02

## 2018-08-11 RX ADMIN — HEPARIN SODIUM 5000 UNITS: 5000 INJECTION INTRAVENOUS; SUBCUTANEOUS at 07:23

## 2018-08-11 RX ADMIN — PIPERACILLIN SODIUM AND TAZOBACTAM SODIUM 3.38 G: 3; .375 INJECTION, POWDER, LYOPHILIZED, FOR SOLUTION INTRAVENOUS at 17:41

## 2018-08-11 RX ADMIN — TRAZODONE HYDROCHLORIDE 50 MG: 50 TABLET ORAL at 22:35

## 2018-08-11 RX ADMIN — SODIUM CHLORIDE 200 ML/HR: 9 INJECTION, SOLUTION INTRAVENOUS at 12:07

## 2018-08-11 RX ADMIN — HYDROMORPHONE HYDROCHLORIDE 2 MG: 1 INJECTION, SOLUTION INTRAMUSCULAR; INTRAVENOUS; SUBCUTANEOUS at 03:02

## 2018-08-11 RX ADMIN — PIPERACILLIN SODIUM AND TAZOBACTAM SODIUM 3.38 G: 3; .375 INJECTION, POWDER, LYOPHILIZED, FOR SOLUTION INTRAVENOUS at 05:49

## 2018-08-11 RX ADMIN — HEPARIN SODIUM 5000 UNITS: 5000 INJECTION INTRAVENOUS; SUBCUTANEOUS at 22:35

## 2018-08-11 RX ADMIN — FOLIC ACID 1 MG: 1 TABLET ORAL at 09:07

## 2018-08-11 RX ADMIN — HYDROMORPHONE HYDROCHLORIDE 2 MG: 1 INJECTION, SOLUTION INTRAMUSCULAR; INTRAVENOUS; SUBCUTANEOUS at 11:07

## 2018-08-11 RX ADMIN — HYDROMORPHONE HYDROCHLORIDE 2 MG: 1 INJECTION, SOLUTION INTRAMUSCULAR; INTRAVENOUS; SUBCUTANEOUS at 20:02

## 2018-08-11 RX ADMIN — SODIUM CHLORIDE 200 ML/HR: 9 INJECTION, SOLUTION INTRAVENOUS at 17:42

## 2018-08-11 RX ADMIN — PANTOPRAZOLE SODIUM 40 MG: 40 TABLET, DELAYED RELEASE ORAL at 05:49

## 2018-08-11 RX ADMIN — SODIUM CHLORIDE 200 ML/HR: 9 INJECTION, SOLUTION INTRAVENOUS at 05:49

## 2018-08-11 RX ADMIN — RISPERIDONE 0.5 MG: 0.25 TABLET, FILM COATED ORAL at 17:41

## 2018-08-11 RX ADMIN — LISINOPRIL 10 MG: 10 TABLET ORAL at 17:41

## 2018-08-11 RX ADMIN — HYDROMORPHONE HYDROCHLORIDE 2 MG: 1 INJECTION, SOLUTION INTRAMUSCULAR; INTRAVENOUS; SUBCUTANEOUS at 23:50

## 2018-08-11 RX ADMIN — RISPERIDONE 0.5 MG: 0.25 TABLET, FILM COATED ORAL at 09:07

## 2018-08-11 RX ADMIN — VENLAFAXINE 75 MG: 75 TABLET ORAL at 15:26

## 2018-08-11 RX ADMIN — HEPARIN SODIUM 5000 UNITS: 5000 INJECTION INTRAVENOUS; SUBCUTANEOUS at 15:25

## 2018-08-11 NOTE — PHYSICAL THERAPY NOTE
Physical Therapy Evaluation     Patient's Name: Bebeto Spencer    Admitting Diagnosis  [facility] [K86 1]  Hyponatremia [E87 1]  Abdominal pain [R10 9]  Hyperglycemia [R73 9]  Right upper quadrant abdominal pain [R10 11]    Problem List  Patient Active Problem List   Diagnosis    Quiros esophagus    Benign essential hypertension    COPD (chronic obstructive pulmonary disease) ([facility] )    Fatty liver    Fibromyalgia    Generalized anxiety disorder    Hyperlipidemia    Insomnia    Iron deficiency anemia    MDD (major depressive disorder), single episode    Nephrolithiasis    Other chronic pain    Recurrent pancreatitis ([facility] )    Sleep disorder    Diabetes mellitus type I ([place] [address] )    Abdominal pain    Acute hyponatremia    Acute hyperkalemia    Acute kidney injury ([place] [address] )    GERD (gastroesophageal reflux disease)    Anxiety and depression    Tobacco abuse    Alcohol abuse    Leukemoid reaction       Past Medical History  Past Medical History:   Diagnosis Date    Allergic rhinitis     last assessed: 12/5/2012    Quiros esophagus     Bronchitis, asthmatic     last assessed: 9/15/2014    Cholelithiasis     Chronic pain     COPD (chronic obstructive pulmonary disease) ([facility] )     Diabetes mellitus (HCC)     GERD (gastroesophageal reflux disease)     Hypertension     Metatarsalgia     last assessed: 8/11/2014, unspecified laterality, ? cause  PE with changes  To see DPM tomorrow      Pancreatitis     Psychiatric disorder     Renal disorder        Past Surgical History  Past Surgical History:   Procedure Laterality Date    CHOLECYSTECTOMY LAPAROSCOPIC      FOOT SURGERY      KNEE ARTHROSCOPY      (therapeutic)    VASECTOMY      vas deferens        08/11/18 1035   Pain Assessment   Pain Assessment 0-10   Pain Score 7   Pain Type Acute pain   Pain Location Abdomen   Pain Descriptors (sharp pain that radiates)   General   Chart Reviewed Yes   Cognition   Overall Cognitive Status [facility]   Arousal/Participation Alert   Attention Within functional limits   Orientation Level Oriented X4   Memory Within functional limits   Following Commands Follows all commands and directions without difficulty   Subjective   Subjective Pt reported that pain was increasing and his pain meds were due soon     Transfers   Sit to Stand 7  Independent   Stand to Sit 7  Independent   Ambulation/Elevation   Gait pattern WNL   Gait Assistance 6  Modified independent   Balance   Dynamic Standing Normal   Ambulatory Normal   Activity Tolerance   Activity Tolerance Patient limited by pain   Assessment   Prognosis Excellent   Barriers to Discharge None   Goals   Patient Goals go fishing   Treatment Day 1   Plan   Progress Discontinue PT   Recommendation   Recommendation Home independently   PT - OK to Discharge Yes   Valarie Connor, PT          Silas Rose, PT

## 2018-08-11 NOTE — PROGRESS NOTES
Progress Note - Lucio Weldon 46 y o  male MRN: [id number]    Unit/Bed#: -01 Encounter: [id number]      Assessment:  1  Transaminitis of unclear etiology-?choledocholithiasis vs alcohol hepatitis  2  Acute pancreatitis  3  History of alcohol abuse  4  GERD    Plan:  1  Monitor LRTs  MRCP report pending  Avoid hepatotoxic meds  Would obtain viral hep panel, SKIP, ASMA, Tsat, cerulplasmin if LRTs continue to trend up  2  Abdominal pain improving  Would start clear liquids and advance diet as tolerated  Pending results of MRCP, may benefit from EUS as outpatient as well to evaluate for etiology of pancreatitis as patient states abstinence from alcohol since June 2018  3  MVI/thiamine/folate  CIWA score  Ativan prn     4  Dietary and lifestyle modification  PPI    Subjective:   Patient seen and examined  Abdominal pain is improving  Denies nausea or vomiting  His LRTs have trended up since yesterday  He had an MRCP this am but results are pending  Objective:     Vitals: Blood pressure 134/72, pulse (!) 107, temperature 99 2 °F (37 3 °C), resp  rate 20, height 6' (1 829 m), weight 90 7 kg (199 lb 15 3 oz), SpO2 93 %  ,Body mass index is 27 12 kg/m²  Intake/Output Summary (Last 24 hours) at 08/11/18 1514  Last data filed at 08/10/18 1817   Gross per 24 hour   Intake             1430 ml   Output              250 ml   Net             1180 ml       Physical Exam: /58 (BP Location: Right arm)   Pulse (!) 112   Temp 98 9 °F (37 2 °C) (Tympanic)   Resp 20   Ht 6' (1 829 m)   Wt 90 7 kg (199 lb 15 3 oz)   SpO2 94%   BMI 27 12 kg/m²     General Appearance:    Alert, cooperative, no distress, appears stated age   Head:    Normocephalic, without obvious abnormality, atraumatic   Eyes:    No scleral icterus     Ears:    Normal TM's and external ear canals, both ears   Nose:   Nares normal, septum midline, mucosa normal, no drainage    or sinus tenderness   Throat:   Lips, mucosa, and tongue normal; teeth and gums normal   Neck:   Supple, symmetrical, trachea midline, no adenopathy;        thyroid:  No enlargement/tenderness/nodules; no carotid    bruit or JVD   Back:     Symmetric, no curvature, ROM normal, no CVA tenderness   Lungs:     Clear to auscultation bilaterally, respirations unlabored   Chest wall:    No tenderness or deformity   Heart:    Regular rate and rhythm, S1 and S2 normal, no murmur, rub   or gallop   Abdomen:     Soft, mild epigastric tenderness to deep palpation, no rebound or guarding, bowel sounds active all four quadrants,     no masses, no organomegaly   Genitalia:    Normal male without lesion, discharge or tenderness   Rectal:    Normal tone, normal prostate, no masses or tenderness;    guaiac negative stool   Extremities:   Extremities normal, atraumatic, no cyanosis or edema   Pulses:   2+ and symmetric all extremities   Skin:   Skin color, texture, turgor normal, no rashes or lesions   Lymph nodes:   Cervical, supraclavicular, and axillary nodes normal   Neurologic:  Alert, awake and oriented x 3        Invasive Devices     Peripheral Intravenous Line            Peripheral IV 08/09/18 Right Arm 1 day                Lab, Imaging and other studies: I have personally reviewed pertinent reports

## 2018-08-11 NOTE — PLAN OF CARE
Problem: PHYSICAL THERAPY ADULT  Goal: Performs mobility at highest level of function for planned discharge setting  See evaluation for individualized goals  Treatment/Interventions: Functional transfer training, Therapeutic exercise, Endurance training, Patient/family training, Gait training, Compensatory technique education  Denise Berry, PT            See flowsheet documentation for full assessment, interventions and recommendations  Outcome: Adequate for Discharge  Prognosis: Excellent  Problem List: Decreased mobility, Pain, Impaired balance, Decreased endurance  Assessment: Pt is 46 y o  male seen for PT evaluation s/p admit to [address] [place] [address] on 8/9/2018 w/ Recurrent pancreatitis ([facility] [address] )  PT consulted to assess pt's functional mobility and d/c needs  Order placed for PT eval and tx, w/ activity as tolerated order  Comorbidities affecting pt's physical performance at time of assessment include: tobacco/alcohol abuse, pancreatitis, anxiety, HTN, COPD, anemia  PTA, pt was independent w/ all functional mobility w/ o AD  Personal factors affecting pt at time of IE include: inaccessible home environment, lives in two story house, inability to navigate community distances, inability to perform IADLs, inability to perform ADLs and severe pain inhibiting functional tolerance  Please find objective findings from PT assessment regarding body systems outlined above with impairments and limitations including impaired balance, decreased endurance, pain, decreased activity tolerance and decreased functional mobility tolerance  The following objective measures performed on IE also reveal limitations: Barthel Index: 60/100  Pt's clinical presentation is currently evolving seen in pt's presentation of fluctuating pain levels contributing to decrease activity tolerance  Pt to benefit from continued PT tx to address deficits as defined above and maximize level of functional independent mobility and consistency   From PT/mobility standpoint, recommendation at time of d/c would be anticipate no needs pending progress in order to facilitate return to PLOF  Barriers to Discharge: None     Recommendation: Home independently     PT - OK to Discharge: Yes    See flowsheet documentation for full assessment     Deepa Terrazas, PT

## 2018-08-11 NOTE — PLAN OF CARE
Problem: Nutrition/Hydration-ADULT  Goal: Nutrient/Hydration intake appropriate for improving, restoring or maintaining nutritional needs  Monitor and assess patient's nutrition/hydration status for malnutrition (ex- brittle hair, bruises, dry skin, pale skin and conjunctiva, muscle wasting, smooth red tongue, and disorientation)  Collaborate with interdisciplinary team and initiate plan and interventions as ordered  Monitor patient's weight and dietary intake as ordered or per policy  Utilize nutrition screening tool and intervene per policy  Determine patient's food preferences and provide high-protein, high-caloric foods as appropriate  INTERVENTIONS:  - Monitor oral intake, urinary output, labs, and treatment plans  - Assess nutrition and hydration status and recommend course of action  - Evaluate amount of meals eaten  - Assist patient with eating if necessary   - Allow adequate time for meals  - Recommend/ encourage appropriate diets, oral nutritional supplements, and vitamin/mineral supplements  - Order, calculate, and assess calorie counts as needed  - Recommend, monitor, and adjust tube feedings and TPN/PPN based on assessed needs  - Assess need for intravenous fluids  - Provide specific nutrition/hydration education as appropriate  - Include patient/family/caregiver in decisions related to nutrition  Outcome: Progressing  Goal: Pt will transition to and tolerate PO diet within the next 24-48 hours

## 2018-08-11 NOTE — PROGRESS NOTES
Progress Note - Electa Seip 1970, 46 y o  male MRN: [id number]    Unit/Bed#: -01 Encounter: [id number]    Primary Care Provider: Azul Petty DO   Date and time admitted to hospital: 8/9/2018  8:52 PM        Abdominal pain   Assessment & Plan    · Has improved  · CT scan confirms recurrent pancreatitis  · This morning his LFTs are abnormal  · Will check an MRCP  · GI is following  · Continue NPO status, continue IV fluids, continue IV pain management with Dilaudid        * Recurrent pancreatitis ([facility] )   Assessment & Plan    · Continue NPO status  · Continue IV fluids, okay for ice chips, will consider diet advancement after the MRCP  · Continue Zosyn  · Hypertriglyceridemia could be a contributing factor-continue fenofibrate therapy           Diabetes mellitus type I ([place] [address] )   Assessment & Plan    · Blood sugars are more controlled  ·  Continue Levemir and regular insulin coverage  · Continue Accu-Cheks AC and HS        Acute hyponatremia   Assessment & Plan    · Resolved        Benign essential hypertension   Assessment & Plan    · We will continue patient's outpatient lisinopril        Acute kidney injury ([facility] )   Assessment & Plan    · This was pre renal and it has resolved with IV fluid therapy        Leukemoid reaction   Assessment & Plan    · White blood cell count has improved - continue Zosyn        Acute hyperkalemia   Assessment & Plan    · Resolved -  continue to monitor         Iron deficiency anemia   Assessment & Plan    · Hemoglobin is currently stable  · Monitor labs        Alcohol abuse   Assessment & Plan    · Place patient on CIWA protocol        GERD (gastroesophageal reflux disease)   Assessment & Plan    · Continue Protonix        Anxiety and depression   Assessment & Plan    · Continue Vistaril, risperidone, trazodone, Effexor        Insomnia   Assessment & Plan    · Continue trazodone        Other chronic pain   Assessment & Plan    · Patient does have a history of fibromyalgia  · We will continue Dilaudid for pancreatic pain          VTE Pharmacologic Prophylaxis: Pharmacologic: Heparin    Patient Centered Rounds: I have performed bedside rounds with nursing staff today  Discussions with Specialists or Other Care Team Provider:   None  Education and Discussions with Family / Patient:   Patient:    Time Spent for Care: 20 minutes  More than 50% of total time spent on counseling and coordination of care as described above  Current Length of Stay: 2 day(s)    Current Patient Status: Inpatient   Certification Statement: The patient will continue to require additional inpatient hospital stay due to Worsening liver function, and continued management of his pancreatitis    Discharge Plan:  Discharge planning will commence once the patient is medically stable    Code Status: Level 1 - Full Code    Subjective:   Patient seen and examined  His pain is better controlled yesterday  Does not feel hungry at this time  Objective:     Vitals:   Temp (24hrs), Av 2 °F (37 3 °C), Min:97 °F (36 1 °C), Max:100 5 °F (38 1 °C)    HR:  [] 108  Resp:  [18-22] 20  BP: (122-150)/(62-82) 132/80  SpO2:  [91 %-97 %] 95 %  Body mass index is 27 12 kg/m²  Input and Output Summary (last 24 hours): Intake/Output Summary (Last 24 hours) at 18 1012  Last data filed at 08/10/18 1817   Gross per 24 hour   Intake             1430 ml   Output              250 ml   Net             1180 ml       Physical Exam:     Physical Exam   Constitutional: He is oriented to person, place, and time  He appears well-developed and well-nourished  HENT:   Head: Normocephalic and atraumatic  Nose: Nose normal    Mouth/Throat: Oropharynx is clear and moist    Eyes: Conjunctivae and EOM are normal  Pupils are equal, round, and reactive to light  Neck: Normal range of motion  Neck supple  No JVD present  No thyromegaly present     Cardiovascular: Normal rate, regular rhythm and intact distal pulses  Exam reveals no gallop and no friction rub  No murmur heard  Pulmonary/Chest: Effort normal and breath sounds normal  No respiratory distress  Abdominal: Soft  Bowel sounds are normal  He exhibits no distension and no mass  There is no tenderness  There is no guarding  Patient has epigastric tenderness to deep palpation   Musculoskeletal: Normal range of motion  He exhibits no edema  Lymphadenopathy:     He has no cervical adenopathy  Neurological: He is alert and oriented to person, place, and time  No cranial nerve deficit  Skin: Skin is warm  No rash noted  No erythema  Psychiatric: He has a normal mood and affect  His behavior is normal    Nursing note and vitals reviewed  Additional Data:     Labs:      Results from last 7 days  Lab Units 08/11/18  0459   WBC Thousand/uL 12 60*   HEMOGLOBIN g/dL 11 9*   HEMATOCRIT % 35 4*   PLATELETS Thousands/uL 168   NEUTROS PCT % 81*   LYMPHS PCT % 11*   MONOS PCT % 7   EOS PCT % 1       Results from last 7 days  Lab Units 08/11/18  0459   SODIUM mmol/L 135   POTASSIUM mmol/L 3 9   CHLORIDE mmol/L 104   CO2 mmol/L 24   BUN mg/dL 11   CREATININE mg/dL 0 77   CALCIUM mg/dL 8 5*   TOTAL PROTEIN g/dL 6 0*   BILIRUBIN TOTAL mg/dL 1 80*   ALK PHOS U/L 152*   ALT U/L 99*   AST U/L 145*   GLUCOSE RANDOM mg/dL 84       Results from last 7 days  Lab Units 08/11/18  0500   INR  1 13       Results from last 7 days  Lab Units 08/11/18  0629 08/10/18  2019 08/10/18  1603 08/10/18  1101 08/10/18  0629 08/10/18  0022 08/09/18  2256 08/09/18  2057   POC GLUCOSE mg/dl 85 105 108 169* 270* 318* 331* >500*       Results from last 7 days  Lab Units 08/10/18  0509   HEMOGLOBIN A1C % 10 4*       * I Have Reviewed All Lab Data Listed Above  * Additional Pertinent Lab Tests Reviewed:  [facility] Admission  Reviewed    Imaging:  Imaging Reports Reviewed Today Include:   CT scan of the abdomen and pelvis    Recent Cultures (last 7 days):           Last 24 Hours Medication List:     Current Facility-Administered Medications:  acetaminophen 650 mg Oral Q6H PRN Irma A Thao, CRMANAV    fenofibrate 145 mg Oral Daily Corie Curtis PA-C    folic acid 1 mg Oral Daily Irma A Thao, TYE    heparin (porcine) 5,000 Units Subcutaneous Q8H [facility] Irma A Thao, SETHNP    HYDROmorphone 2 mg Intravenous Q4H PRN Kiran Saez MD    hydrOXYzine HCL 50 mg Oral TID PRN Worley Zion EPIFANIO Osuna, TYE    insulin detemir 45 Units Subcutaneous Daily Irma A Aurorakes, CRNP    insulin lispro 2-12 Units Subcutaneous TID AC Irma A Thao, TYE    lisinopril 10 mg Oral BID Irma A Aurorakes, TYE    nicotine 1 patch Transdermal Daily Irma A Aurorakes, TYE    ondansetron 4 mg Intravenous Q6H PRN Irma A Thao, CRNP    pantoprazole 40 mg Oral Early Morning Irma A Thao, TYE    piperacillin-tazobactam 3 375 g Intravenous Q6H Kiran Saez MD Last Rate: Stopped (08/11/18 0808)   risperiDONE 0 5 mg Oral BID Irma A Thao, SETHNP    sodium chloride 200 mL/hr Intravenous Continuous TYE Menjivar Last Rate: 200 mL/hr (08/11/18 5645)   thiamine 100 mg Oral Daily Irma A Thao, SETHNP    traZODone 50 mg Oral HS Irma A Thao, TYE    venlafaxine 75 mg Oral TID TYE Dubon         Today, Patient Was Seen By: Kiran Saez MD    ** Please Note: Dictation voice to text software may have been used in the creation of this document   **

## 2018-08-11 NOTE — ASSESSMENT & PLAN NOTE
· Has improved  · CT scan confirms recurrent pancreatitis  · This morning his LFTs are abnormal  · Will check an MRCP  · GI is following  · Continue NPO status, continue IV fluids, continue IV pain management with Dilaudid

## 2018-08-11 NOTE — PLAN OF CARE
DISCHARGE PLANNING - CARE MANAGEMENT     Discharge to post-acute care or home with appropriate resources Progressing        Knowledge Deficit     Patient/family/caregiver demonstrates understanding of disease process, treatment plan, medications, and discharge instructions Progressing        METABOLIC, FLUID AND ELECTROLYTES - ADULT     Electrolytes maintained within normal limits Progressing     Fluid balance maintained Progressing     Glucose maintained within target range Progressing        PAIN - ADULT     Verbalizes/displays adequate comfort level or baseline comfort level Progressing        Potential for Falls     Patient will remain free of falls Progressing

## 2018-08-11 NOTE — ASSESSMENT & PLAN NOTE
· Continue NPO status  · Continue IV fluids, okay for ice chips, will consider diet advancement after the MRCP  · Continue Zosyn  · Hypertriglyceridemia could be a contributing factor-continue fenofibrate therapy

## 2018-08-11 NOTE — PLAN OF CARE
Problem: PAIN - ADULT  Goal: Verbalizes/displays adequate comfort level or baseline comfort level  Interventions:  - Encourage patient to monitor pain and request assistance  - Assess pain using appropriate pain scale  - Administer analgesics based on type and severity of pain and evaluate response  - Implement non-pharmacological measures as appropriate and evaluate response  - Consider cultural and social influences on pain and pain management  - Notify physician/advanced practitioner if interventions unsuccessful or patient reports new pain   Outcome: Progressing  Patient educated on pain scale and pain medications ordered  Assess pain every shift and as needed  Patient verbalized understanding of pain scale and PRN pain medications ordered  Medicated for pain times two thus far with PRN dilaudid IV  Will monitor  Detail Level: Detailed Size Of Lesion: 4mm Size Of Lesion: 5mm Size Of Lesion: 3mm

## 2018-08-12 ENCOUNTER — HOSPITAL ENCOUNTER (INPATIENT)
Facility: HOSPITAL | Age: 48
LOS: 3 days | Discharge: HOME | DRG: 282 | End: 2018-08-15
Attending: INTERNAL MEDICINE | Admitting: INTERNAL MEDICINE
Payer: COMMERCIAL

## 2018-08-12 VITALS
TEMPERATURE: 99.2 F | HEIGHT: 72 IN | DIASTOLIC BLOOD PRESSURE: 72 MMHG | HEART RATE: 114 BPM | BODY MASS INDEX: 27.08 KG/M2 | RESPIRATION RATE: 22 BRPM | SYSTOLIC BLOOD PRESSURE: 134 MMHG | OXYGEN SATURATION: 93 % | WEIGHT: 199.96 LBS

## 2018-08-12 DIAGNOSIS — E13.9 DIABETES MELLITUS TYPE 1.5, MANAGED AS TYPE 1 (HCC): ICD-10-CM

## 2018-08-12 DIAGNOSIS — F17.200 TOBACCO DEPENDENCE: ICD-10-CM

## 2018-08-12 DIAGNOSIS — E78.5 HYPERLIPIDEMIA, UNSPECIFIED HYPERLIPIDEMIA TYPE: ICD-10-CM

## 2018-08-12 DIAGNOSIS — K85.90 RECURRENT PANCREATITIS: Primary | ICD-10-CM

## 2018-08-12 DIAGNOSIS — E10.8 TYPE 1 DIABETES MELLITUS WITH COMPLICATION (HCC): ICD-10-CM

## 2018-08-12 PROBLEM — F10.11 H/O ALCOHOL ABUSE: Status: ACTIVE | Noted: 2018-08-09

## 2018-08-12 PROBLEM — K86.1 ACUTE ON CHRONIC PANCREATITIS (HCC): Status: ACTIVE | Noted: 2018-08-12

## 2018-08-12 LAB
ALBUMIN SERPL BCP-MCNC: 3.1 G/DL (ref 3.5–5.7)
ALP SERPL-CCNC: 162 U/L (ref 40–150)
ALT SERPL W P-5'-P-CCNC: 69 U/L (ref 7–52)
ANION GAP SERPL CALCULATED.3IONS-SCNC: 11 MMOL/L (ref 4–13)
AST SERPL W P-5'-P-CCNC: 48 U/L (ref 13–39)
BASOPHILS # BLD AUTO: 0.1 THOUSANDS/ΜL (ref 0–0.1)
BASOPHILS NFR BLD AUTO: 0 % (ref 0–2)
BILIRUB SERPL-MCNC: 1.3 MG/DL (ref 0.2–1)
BUN SERPL-MCNC: 11 MG/DL (ref 7–25)
CALCIUM SERPL-MCNC: 8.2 MG/DL (ref 8.6–10.5)
CHLORIDE SERPL-SCNC: 102 MMOL/L (ref 98–107)
CO2 SERPL-SCNC: 22 MMOL/L (ref 21–31)
CREAT SERPL-MCNC: 0.89 MG/DL (ref 0.7–1.3)
EOSINOPHIL # BLD AUTO: 0.2 THOUSAND/ΜL (ref 0–0.61)
EOSINOPHIL NFR BLD AUTO: 2 % (ref 0–5)
ERYTHROCYTE [DISTWIDTH] IN BLOOD BY AUTOMATED COUNT: 13.2 % (ref 11.5–14.5)
GFR SERPL CREATININE-BSD FRML MDRD: 100 ML/MIN/1.73SQ M
GLUCOSE SERPL-MCNC: 113 MG/DL (ref 65–140)
GLUCOSE SERPL-MCNC: 120 MG/DL (ref 65–99)
GLUCOSE SERPL-MCNC: 131 MG/DL (ref 65–140)
GLUCOSE SERPL-MCNC: 139 MG/DL (ref 65–140)
GLUCOSE SERPL-MCNC: 51 MG/DL (ref 65–140)
GLUCOSE SERPL-MCNC: 63 MG/DL (ref 65–140)
HCT VFR BLD AUTO: 31.6 % (ref 36.5–49.3)
HGB BLD-MCNC: 10.9 G/DL (ref 14–18)
LYMPHOCYTES # BLD AUTO: 1.2 THOUSANDS/ΜL (ref 0.6–4.47)
LYMPHOCYTES NFR BLD AUTO: 10 % (ref 21–51)
MCH RBC QN AUTO: 29.3 PG (ref 26–34)
MCHC RBC AUTO-ENTMCNC: 34.5 G/DL (ref 31–37)
MCV RBC AUTO: 85 FL (ref 81–99)
MONOCYTES # BLD AUTO: 0.9 THOUSAND/ΜL (ref 0.17–1.22)
MONOCYTES NFR BLD AUTO: 8 % (ref 2–12)
NEUTROPHILS # BLD AUTO: 9.5 THOUSANDS/ΜL (ref 1.4–6.5)
NEUTS SEG NFR BLD AUTO: 80 % (ref 42–75)
NRBC BLD AUTO-RTO: 0 /100 WBCS
PLATELET # BLD AUTO: 144 THOUSANDS/UL (ref 149–390)
PMV BLD AUTO: 6.7 FL (ref 8.6–11.7)
POTASSIUM SERPL-SCNC: 3.5 MMOL/L (ref 3.5–5.5)
PROT SERPL-MCNC: 5.8 G/DL (ref 6.4–8.9)
RBC # BLD AUTO: 3.73 MILLION/UL (ref 4.3–5.9)
SODIUM SERPL-SCNC: 135 MMOL/L (ref 134–143)
WBC # BLD AUTO: 11.9 THOUSAND/UL (ref 4.8–10.8)

## 2018-08-12 PROCEDURE — 99239 HOSP IP/OBS DSCHRG MGMT >30: CPT | Performed by: HOSPITALIST

## 2018-08-12 PROCEDURE — 80053 COMPREHEN METABOLIC PANEL: CPT | Performed by: HOSPITALIST

## 2018-08-12 PROCEDURE — 82948 REAGENT STRIP/BLOOD GLUCOSE: CPT

## 2018-08-12 PROCEDURE — 99223 1ST HOSP IP/OBS HIGH 75: CPT | Performed by: INTERNAL MEDICINE

## 2018-08-12 PROCEDURE — 85025 COMPLETE CBC W/AUTO DIFF WBC: CPT | Performed by: HOSPITALIST

## 2018-08-12 RX ORDER — THIAMINE MONONITRATE (VIT B1) 100 MG
100 TABLET ORAL DAILY
Status: DISCONTINUED | OUTPATIENT
Start: 2018-08-13 | End: 2018-08-15 | Stop reason: HOSPADM

## 2018-08-12 RX ORDER — SODIUM CHLORIDE 9 MG/ML
150 INJECTION, SOLUTION INTRAVENOUS CONTINUOUS
Status: DISCONTINUED | OUTPATIENT
Start: 2018-08-12 | End: 2018-08-12

## 2018-08-12 RX ORDER — ONDANSETRON 2 MG/ML
4 INJECTION INTRAMUSCULAR; INTRAVENOUS EVERY 6 HOURS PRN
Status: DISCONTINUED | OUTPATIENT
Start: 2018-08-12 | End: 2018-08-15 | Stop reason: HOSPADM

## 2018-08-12 RX ORDER — HYDROMORPHONE HCL 110MG/55ML
2 PATIENT CONTROLLED ANALGESIA SYRINGE INTRAVENOUS EVERY 4 HOURS PRN
Status: DISCONTINUED | OUTPATIENT
Start: 2018-08-12 | End: 2018-08-14

## 2018-08-12 RX ORDER — PANTOPRAZOLE SODIUM 40 MG/1
40 TABLET, DELAYED RELEASE ORAL
Status: DISCONTINUED | OUTPATIENT
Start: 2018-08-13 | End: 2018-08-15 | Stop reason: HOSPADM

## 2018-08-12 RX ORDER — FOLIC ACID 1 MG/1
1 TABLET ORAL DAILY
Status: DISCONTINUED | OUTPATIENT
Start: 2018-08-13 | End: 2018-08-15 | Stop reason: HOSPADM

## 2018-08-12 RX ORDER — LISINOPRIL 10 MG/1
10 TABLET ORAL 2 TIMES DAILY
Status: DISCONTINUED | OUTPATIENT
Start: 2018-08-12 | End: 2018-08-15 | Stop reason: HOSPADM

## 2018-08-12 RX ORDER — VENLAFAXINE 37.5 MG/1
75 TABLET ORAL 3 TIMES DAILY
Status: DISCONTINUED | OUTPATIENT
Start: 2018-08-12 | End: 2018-08-15 | Stop reason: HOSPADM

## 2018-08-12 RX ORDER — ACETAMINOPHEN 325 MG/1
650 TABLET ORAL EVERY 6 HOURS PRN
Status: DISCONTINUED | OUTPATIENT
Start: 2018-08-12 | End: 2018-08-15 | Stop reason: HOSPADM

## 2018-08-12 RX ORDER — TRAZODONE HYDROCHLORIDE 50 MG/1
50 TABLET ORAL
Status: DISCONTINUED | OUTPATIENT
Start: 2018-08-12 | End: 2018-08-15 | Stop reason: HOSPADM

## 2018-08-12 RX ORDER — VENLAFAXINE 75 MG/1
75 TABLET ORAL 3 TIMES DAILY
Status: CANCELLED | OUTPATIENT
Start: 2018-08-12

## 2018-08-12 RX ORDER — HYDROXYZINE HYDROCHLORIDE 25 MG/1
50 TABLET, FILM COATED ORAL 3 TIMES DAILY PRN
Status: DISCONTINUED | OUTPATIENT
Start: 2018-08-12 | End: 2018-08-15 | Stop reason: HOSPADM

## 2018-08-12 RX ORDER — DEXTROSE MONOHYDRATE 25 G/50ML
25 INJECTION, SOLUTION INTRAVENOUS ONCE
Status: DISCONTINUED | OUTPATIENT
Start: 2018-08-12 | End: 2018-08-12 | Stop reason: HOSPADM

## 2018-08-12 RX ORDER — SODIUM CHLORIDE, SODIUM LACTATE, POTASSIUM CHLORIDE, CALCIUM CHLORIDE 600; 310; 30; 20 MG/100ML; MG/100ML; MG/100ML; MG/100ML
150 INJECTION, SOLUTION INTRAVENOUS CONTINUOUS
Status: DISCONTINUED | OUTPATIENT
Start: 2018-08-12 | End: 2018-08-13

## 2018-08-12 RX ORDER — THIAMINE MONONITRATE (VIT B1) 100 MG
100 TABLET ORAL DAILY
Status: CANCELLED | OUTPATIENT
Start: 2018-08-13

## 2018-08-12 RX ORDER — FOLIC ACID 1 MG/1
1 TABLET ORAL DAILY
Status: CANCELLED | OUTPATIENT
Start: 2018-08-13

## 2018-08-12 RX ORDER — RISPERIDONE 0.25 MG/1
0.5 TABLET, FILM COATED ORAL 2 TIMES DAILY
Status: DISCONTINUED | OUTPATIENT
Start: 2018-08-12 | End: 2018-08-15 | Stop reason: HOSPADM

## 2018-08-12 RX ORDER — HYDROXYZINE HYDROCHLORIDE 25 MG/1
50 TABLET, FILM COATED ORAL 3 TIMES DAILY PRN
Status: CANCELLED | OUTPATIENT
Start: 2018-08-12

## 2018-08-12 RX ORDER — HEPARIN SODIUM 5000 [USP'U]/ML
5000 INJECTION, SOLUTION INTRAVENOUS; SUBCUTANEOUS EVERY 8 HOURS SCHEDULED
Status: DISCONTINUED | OUTPATIENT
Start: 2018-08-12 | End: 2018-08-15 | Stop reason: HOSPADM

## 2018-08-12 RX ORDER — NICOTINE 21 MG/24HR
1 PATCH, TRANSDERMAL 24 HOURS TRANSDERMAL DAILY
Status: DISCONTINUED | OUTPATIENT
Start: 2018-08-13 | End: 2018-08-15 | Stop reason: HOSPADM

## 2018-08-12 RX ORDER — PANTOPRAZOLE SODIUM 40 MG/1
40 TABLET, DELAYED RELEASE ORAL
Status: CANCELLED | OUTPATIENT
Start: 2018-08-13

## 2018-08-12 RX ORDER — ONDANSETRON 2 MG/ML
4 INJECTION INTRAMUSCULAR; INTRAVENOUS EVERY 6 HOURS PRN
Status: CANCELLED | OUTPATIENT
Start: 2018-08-12

## 2018-08-12 RX ORDER — ACETAMINOPHEN 325 MG/1
650 TABLET ORAL EVERY 6 HOURS PRN
Status: CANCELLED | OUTPATIENT
Start: 2018-08-12

## 2018-08-12 RX ORDER — NICOTINE 21 MG/24HR
1 PATCH, TRANSDERMAL 24 HOURS TRANSDERMAL DAILY
Status: CANCELLED | OUTPATIENT
Start: 2018-08-13

## 2018-08-12 RX ORDER — FENOFIBRATE 145 MG/1
145 TABLET, COATED ORAL DAILY
Status: DISCONTINUED | OUTPATIENT
Start: 2018-08-13 | End: 2018-08-15 | Stop reason: HOSPADM

## 2018-08-12 RX ORDER — TRAZODONE HYDROCHLORIDE 50 MG/1
50 TABLET ORAL
Status: CANCELLED | OUTPATIENT
Start: 2018-08-12

## 2018-08-12 RX ORDER — DEXTROSE MONOHYDRATE 25 G/50ML
INJECTION, SOLUTION INTRAVENOUS
Status: COMPLETED
Start: 2018-08-12 | End: 2018-08-12

## 2018-08-12 RX ORDER — HEPARIN SODIUM 5000 [USP'U]/ML
5000 INJECTION, SOLUTION INTRAVENOUS; SUBCUTANEOUS EVERY 8 HOURS SCHEDULED
Status: CANCELLED | OUTPATIENT
Start: 2018-08-12

## 2018-08-12 RX ORDER — SODIUM CHLORIDE 9 MG/ML
150 INJECTION, SOLUTION INTRAVENOUS CONTINUOUS
Status: CANCELLED | OUTPATIENT
Start: 2018-08-12

## 2018-08-12 RX ORDER — RISPERIDONE 0.25 MG/1
0.5 TABLET, FILM COATED ORAL 2 TIMES DAILY
Status: CANCELLED | OUTPATIENT
Start: 2018-08-12

## 2018-08-12 RX ORDER — FENOFIBRATE 145 MG/1
145 TABLET, COATED ORAL DAILY
Status: CANCELLED | OUTPATIENT
Start: 2018-08-13

## 2018-08-12 RX ORDER — LISINOPRIL 10 MG/1
10 TABLET ORAL 2 TIMES DAILY
Status: CANCELLED | OUTPATIENT
Start: 2018-08-12

## 2018-08-12 RX ADMIN — DEXTROSE MONOHYDRATE 50 ML: 25 INJECTION, SOLUTION INTRAVENOUS at 13:42

## 2018-08-12 RX ADMIN — SODIUM CHLORIDE 150 ML/HR: 9 INJECTION, SOLUTION INTRAVENOUS at 08:26

## 2018-08-12 RX ADMIN — HYDROMORPHONE HYDROCHLORIDE 2 MG: 2 INJECTION INTRAMUSCULAR; INTRAVENOUS; SUBCUTANEOUS at 17:24

## 2018-08-12 RX ADMIN — RISPERIDONE 0.5 MG: 0.25 TABLET ORAL at 17:30

## 2018-08-12 RX ADMIN — HYDROMORPHONE HYDROCHLORIDE 2 MG: 1 INJECTION, SOLUTION INTRAMUSCULAR; INTRAVENOUS; SUBCUTANEOUS at 13:42

## 2018-08-12 RX ADMIN — SODIUM CHLORIDE, SODIUM LACTATE, POTASSIUM CHLORIDE, AND CALCIUM CHLORIDE 150 ML/HR: .6; .31; .03; .02 INJECTION, SOLUTION INTRAVENOUS at 17:57

## 2018-08-12 RX ADMIN — SODIUM CHLORIDE 150 ML/HR: 9 INJECTION, SOLUTION INTRAVENOUS at 00:05

## 2018-08-12 RX ADMIN — LISINOPRIL 10 MG: 10 TABLET ORAL at 17:30

## 2018-08-12 RX ADMIN — FOLIC ACID 1 MG: 1 TABLET ORAL at 08:27

## 2018-08-12 RX ADMIN — VENLAFAXINE 75 MG: 75 TABLET ORAL at 08:27

## 2018-08-12 RX ADMIN — HEPARIN SODIUM 5000 UNITS: 5000 INJECTION INTRAVENOUS; SUBCUTANEOUS at 08:28

## 2018-08-12 RX ADMIN — HEPARIN SODIUM 5000 UNITS: 5000 INJECTION INTRAVENOUS; SUBCUTANEOUS at 13:42

## 2018-08-12 RX ADMIN — LISINOPRIL 10 MG: 10 TABLET ORAL at 08:27

## 2018-08-12 RX ADMIN — PIPERACILLIN SODIUM AND TAZOBACTAM SODIUM 3.38 G: 3; .375 INJECTION, POWDER, LYOPHILIZED, FOR SOLUTION INTRAVENOUS at 11:45

## 2018-08-12 RX ADMIN — INSULIN DETEMIR 45 UNITS: 100 INJECTION, SOLUTION SUBCUTANEOUS at 08:27

## 2018-08-12 RX ADMIN — HYDROMORPHONE HYDROCHLORIDE 2 MG: 2 INJECTION INTRAMUSCULAR; INTRAVENOUS; SUBCUTANEOUS at 21:15

## 2018-08-12 RX ADMIN — RISPERIDONE 0.5 MG: 0.25 TABLET, FILM COATED ORAL at 08:27

## 2018-08-12 RX ADMIN — PANTOPRAZOLE SODIUM 40 MG: 40 TABLET, DELAYED RELEASE ORAL at 05:36

## 2018-08-12 RX ADMIN — FENOFIBRATE 145 MG: 145 TABLET, FILM COATED ORAL at 08:27

## 2018-08-12 RX ADMIN — VENLAFAXINE 75 MG: 37.5 TABLET ORAL at 21:03

## 2018-08-12 RX ADMIN — PIPERACILLIN SODIUM AND TAZOBACTAM SODIUM 3.38 G: 3; .375 INJECTION, POWDER, LYOPHILIZED, FOR SOLUTION INTRAVENOUS at 05:44

## 2018-08-12 RX ADMIN — Medication 100 MG: at 08:27

## 2018-08-12 RX ADMIN — HYDROMORPHONE HYDROCHLORIDE 2 MG: 1 INJECTION, SOLUTION INTRAMUSCULAR; INTRAVENOUS; SUBCUTANEOUS at 05:36

## 2018-08-12 RX ADMIN — FUROSEMIDE 40 MG: 10 INJECTION, SOLUTION INTRAVENOUS at 00:05

## 2018-08-12 RX ADMIN — DEXTROSE MONOHYDRATE 50 ML: 25 INJECTION, SOLUTION INTRAVENOUS at 17:26

## 2018-08-12 RX ADMIN — HEPARIN SODIUM 5000 UNITS: 5000 INJECTION, SOLUTION INTRAVENOUS; SUBCUTANEOUS at 21:04

## 2018-08-12 RX ADMIN — TRAZODONE HYDROCHLORIDE 50 MG: 50 TABLET ORAL at 21:03

## 2018-08-12 RX ADMIN — NICOTINE 1 PATCH: 21 PATCH, EXTENDED RELEASE TRANSDERMAL at 08:27

## 2018-08-12 RX ADMIN — HYDROMORPHONE HYDROCHLORIDE 2 MG: 1 INJECTION, SOLUTION INTRAMUSCULAR; INTRAVENOUS; SUBCUTANEOUS at 09:41

## 2018-08-12 NOTE — ASSESSMENT & PLAN NOTE
· Is waxing and waning  · This is secondary to an acute on chronic pancreatitis  · Patient had an MRCP performed yesterday -results as follows:  Patient appears to have acute pancreatitis superimposed upon chronic  pancreatitis   The main pancreatic duct appears to abruptly truncate in  the area of prior pseudocyst or fat necrosis within the tail of the  pancreas, which is unchanged   However, the main pancreatic duct in the  head and body of the pancreas appears to be mildly more dilated compared  to the most recent MRI which may be indicative of reactive change in acute  pancreatitis, obstruction not excluded  Belinda Benavides may be a small dilated side  branch which may represent IPMN and/or reactive change   Interval  follow-up imaging is advised  · After reviewing these results with the GI physician Stephania Camargo -she recommended we transfer the patient to a larger tertiary care facility for an ERCP and/or an endoscopic ultrasound  · I spoke with Dr Lincoln at [facility] -who accepted the patient in transfer    CBD dilation remains stable, no biliary ductal stone or stricture was  noted   This may represent postcholecystectomy change

## 2018-08-12 NOTE — ASSESSMENT & PLAN NOTE
POA at [facility]  Most likely because of severe hyperglycemia  Improved once sugars are better controlled  Last sodium was 135 this morning

## 2018-08-12 NOTE — PLAN OF CARE
DISCHARGE PLANNING - CARE MANAGEMENT     Discharge to post-acute care or home with appropriate resources Progressing        Knowledge Deficit     Patient/family/caregiver demonstrates understanding of disease process, treatment plan, medications, and discharge instructions Progressing        METABOLIC, FLUID AND ELECTROLYTES - ADULT     Electrolytes maintained within normal limits Progressing     Fluid balance maintained Progressing     Glucose maintained within target range Progressing        Nutrition/Hydration-ADULT     Nutrient/Hydration intake appropriate for improving, restoring or maintaining nutritional needs Progressing        PAIN - ADULT     Verbalizes/displays adequate comfort level or baseline comfort level Progressing        Potential for Falls     Patient will remain free of falls Progressing

## 2018-08-12 NOTE — ASSESSMENT & PLAN NOTE
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  MRCP was done that showed acute pancreatitis, increase in the dilation of the main pancreatic duct in the head and body and also small dilated side branch which may represent intraductal papillary mucinous neoplasm/reactive change which is the reason why patient was transferred to [place] for ERCP/EUS  Will start the patient on clears his is not nauseous anymore  Although still has mild abdominal pain  Start IV fluids Ringer's lactate at 150 cc  As per patient he was given Lasix at [facility] as the doctor noticed some fluid in his lungs on exam   Although he was asymptomatic  He was given 1 dose of Lasix  Continue to monitor respiratory status with IV fluids  Patient was started on IV Zosyn for suspected necrotic pancreatitis but MRI is negative for necrosis so will stop his antibiotics  Consult GI for further evaluation/procedures  Continue Dilaudid p r n  for pain

## 2018-08-12 NOTE — ASSESSMENT & PLAN NOTE
· Continue NPO status  · Continue IV fluids, IV Zosyn, and IV Dilaudid   · Further management as per the GI physician at [facility]

## 2018-08-12 NOTE — EMTALA/ACUTE CARE TRANSFER
[address] [facility] SUR[address] UNIT  [address] [facility] [address] [id number]  [phone]  Dept: [phone]      ACUTE CARE TRANSFER CONSENT    NAME Rajeev Silverman                                         1970                              MRN [id number]    I have been informed of my rights regarding examination, treatment, and transfer   by Dr Thuy Tate MD    Benefits:   ERCP and/or endoscopic ultrasound of the pancreas    Risks:   road traffic accident      Transfer Request:  I acknowledge that my medical condition has been evaluated and explained to me by the treating physician or other qualified medical person and/or my attending physician who has recommended and offered to me further medical examination and treatment  I understand the Hospital's obligation with respect to the treatment and stabilization of my medical condition  I nevertheless request to be transferred  I release the Hospital, the doctor, and any other persons caring for me from all responsibility or liability for any injury or ill effects that may result from my transfer and agree to accept all responsibility for the consequences of my choice to transfer, rather than receive stabilizing treatment at the Hospital  I understand that because the transfer is my request, my insurance may not provide reimbursement for the services  The Hospital will assist and direct me and my family in how to make arrangements for transfer, but the hospital is not liable for any fees charged by the transport service  In spite of this understanding, I refuse to consent to further medical examination and treatment which has been offered to me, and request transfer to    I authorize the performance of emergency medical procedures and treatments upon me in both transit and upon arrival at the receiving facility    Additionally, I authorize the release of any and all medical records to the receiving facility and request they be transported with me, if possible  I authorize the performance of emergency medical procedures and treatments upon me in both transit and upon arrival at the receiving facility  Additionally, I authorize the release of any and all medical records to the receiving facility and request they be transported with me, if possible  I understand that the safest mode of transportation during a medical emergency is an ambulance and that the Hospital advocates the use of this mode of transport  Risks of traveling to the receiving facility by car, including absence of medical control, life sustaining equipment, such as oxygen, and medical personnel has been explained to me and I fully understand them  (EMMANUELLE CORRECT BOX BELOW)  [  ]  I consent to the stated transfer and to be transported by ambulance/helicopter  [  ]  I consent to the stated transfer, but refuse transportation by ambulance and accept full responsibility for my transportation by car  I understand the risks of non-ambulance transfers and I exonerate the Hospital and its staff from any deterioration in my condition that results from this refusal     X___________________________________________    DATE  18  TIME________  Signature of patient or legally responsible individual signing on patient behalf           RELATIONSHIP TO PATIENT_________________________          Provider Certification    NAME Bebeto Spencer                                        [facility] 1970                              MRN [id number]    A medical screening exam was performed on the above named patient    Based on the examination:    Condition Necessitating Transfer abdominal pain, rule out pancreatic malignancy    Patient Condition:   stable    Reason for Transfer:   need for ERCP    Transfer Requirements: Facility     · Space available and qualified personnel available for treatment as acknowledged by    · Agreed to accept transfer and to provide appropriate medical treatment as acknowledged by          · Appropriate medical records of the examination and treatment of the patient are provided at the time of transfer   [address] [facility], [address] _______  · Transfer will be performed by qualified personnel from    and appropriate transfer equipment as required, including the use of necessary and appropriate life support measures  Provider Certification: I have examined the patient and explained the following risks and benefits of being transferred/refusing transfer to the patient/family:         Based on these reasonable risks and benefits to the patient and/or the unborn child(shaina), and based upon the information available at the time of the patients examination, I certify that the medical benefits reasonably to be expected from the provision of appropriate medical treatments at another medical facility outweigh the increasing risks, if any, to the individuals medical condition, and in the case of labor to the unborn child, from effecting the transfer      X____________________________________________ DATE 08/12/18        TIME_______      ORIGINAL - SEND TO MEDICAL RECORDS   COPY - SEND WITH PATIENT DURING TRANSFER

## 2018-08-12 NOTE — H&P
H&P- Joe Bella 1970, 46 y o  male MRN: [id number]    Unit/Bed#: [facility] 612-01 Encounter: [id number]    Primary Care Provider: Omar Huber DO   Date and time admitted to hospital: 8/12/2018  4:37 PM        * Acute on chronic pancreatitis [facility])   Assessment & Plan    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  MRCP was done that showed acute pancreatitis, increase in the dilation of the main pancreatic duct in the head and body and also small dilated side branch which may represent intraductal papillary mucinous neoplasm/reactive change which is the reason why patient was transferred to [place] for ERCP/EUS  Will start the patient on clears his is not nauseous anymore  Although still has mild abdominal pain  Start IV fluids Ringer's lactate at 150 cc  As per patient he was given Lasix at [facility] hospital as the doctor noticed some fluid in his lungs on exam   Although he was asymptomatic  He was given 1 dose of Lasix  Continue to monitor respiratory status with IV fluids  Patient was started on IV Zosyn for suspected necrotic pancreatitis but MRI is negative for necrosis so will stop his antibiotics  Consult GI for further evaluation/procedures  Continue Dilaudid p r n  for pain  Diabetes mellitus type 1 5, managed as type 1 [facility])   Assessment & Plan    Lab Results   Component Value Date    HGBA1C 10 4 (H) 08/10/2018       Recent Labs      08/12/18   0655  08/12/18   1100  08/12/18   1337  08/12/18   1715   POCGLU  113  131  51*  63*       Blood Sugar Average: Last 72 hrs:     He was diagnosed with diabetes approximately 10 years ago  He was told by and endocrinologist that he might have type 1 diabetes as he has family history of type 1 diabetes in his sister    Was initially started on oral anti diabetics but were not helping so was switched to insulin  He also could have diabetes because of chronic pancreatitis  His blood glucose was very high on admission at [facility] > 700 because he was not using his insulin approximately 2 weeks prior as he ran out of insulin and he could not fill the script because he needed prior authorization from insurance  Improved after starting insulin  He was starting on long-acting insulin along with short-acting insulin as per sliding scale  He was getting long-acting insulin Levemir 45 units but he has not been eating or drinking  His blood glucose was low before he left from [facility] hospital any had symptoms of hypoglycemia for which she was given 1 ampule of D50  Here also sugars on arrival were still in 60s so patient had to be given 1 more amp of D50  So for now will stop Levemir although patient has possible type 1 diabetes  Continue with sliding scale insulin  Continue to monitor sugars closely and if continues to be high with sliding scale, can start low-dose long-acting insulin        H/O alcohol abuse   Assessment & Plan    With multiple episodes of acute pancreatitis related to alcohol abuse  As per him, patient has stopped drinking since his last episode in June 2018  Acute hyponatremia   Assessment & Plan    POA at [facility]  Most likely because of severe hyperglycemia  Improved once sugars are better controlled  Last sodium was 135 this morning  VTE Prophylaxis: Heparin  / sequential compression device   Code Status: FUll  POLST: POLST form is not discussed and not completed at this time  Anticipated Length of Stay:  Patient will be admitted on an Inpatient basis with an anticipated length of stay of  > 2 midnights  Justification for Hospital Stay: above    Total Time for Visit, including Counseling / Coordination of Care: 45 minutes    Greater than 50% of this total time spent on direct patient counseling and coordination of care  Chief Complaint:   Transferred from [facility] for GI  Presented there with 2 days of worsening abdominal pain  History of Present Illness:    Rustam Bateman is a 46 y o  male who is transferred from [facility] for her GI evaluation for acute on chronic pancreatitis and possibly requiring ERCP/EUS to rule out pancreatic cancer  AT [facility],  He presented  with 2 days of gradually worsening abdominal pain  The pain started Wednesday evening after dinner and has been gradually worsening  It was mostly epigastric initially but it became generalized and also in his chest and radiated to the back when he laid flat  Pain was worse with eating or drinking so he did not eat or drink for 1-2 days  He has known history of chronic alcohol abuse with multiple episodes of acute pancreatitis, the last 1 was in June 2018  As per patient he has not been drinking alcohol since the last episode  This episode was not related to alcohol use  After being admitted there he was initially made NPO, started on IV fluids and pain medications, CT of the abdomen was done which was concerning for acute pancreatitis  Was evaluated by GI  MRI/MRCP was done that showed acute pancreatitis with increasing dilation of pancreatic duct and a branch of the duct with possible IPMN/reactive changes  GI recommended transferring the patient to [facility] for [employer] which is not available there  He has known history of diabetes mellitus, possibly type 1, and ran out of insulin 2 weeks prior to presentation to [facility] because of problems with insurance authorization  So his sugars were severely elevated on admission there > 700 with hyponatremia and hyperkalemia  Sugars improved after starting IV fluids and insulin and electrolyte abnormalities also improved  Currently,  Patient continues to have abdominal pain but is not as severe  He still requiring IV Dilaudid for pain control    Denies any nausea or vomiting  GI recommended starting him on clears there but he was little reluctant  He has been drinking water without any significant problems  He is very anxious what the findings on the MRI  Denies any unintentional weight loss or loss of appetite  Review of Systems:    Review of Systems   Constitutional: Negative for activity change, appetite change, fatigue and fever  HENT: Negative for congestion and sore throat  Respiratory: Positive for cough  Negative for chest tightness and shortness of breath  Cardiovascular: Negative for chest pain and palpitations  Gastrointestinal: Positive for abdominal pain, nausea and vomiting  Negative for constipation, diarrhea and rectal pain  Endocrine: Negative for cold intolerance and heat intolerance  Genitourinary: Negative for difficulty urinating and flank pain  Musculoskeletal: Negative for arthralgias and myalgias  Skin: Negative for rash  Neurological: Negative for dizziness and light-headedness  Psychiatric/Behavioral: Negative for agitation and behavioral problems  Past Medical and Surgical History:     Past Medical History:   Diagnosis Date    Allergic rhinitis     last assessed: 12/5/2012    Quiros esophagus     Bronchitis, asthmatic     last assessed: 9/15/2014    Cholelithiasis     Chronic pain     COPD (chronic obstructive pulmonary disease) (HCC)     Diabetes mellitus (HCC)     GERD (gastroesophageal reflux disease)     Hypertension     Metatarsalgia     last assessed: 8/11/2014, unspecified laterality, ? cause  PE with changes  To see DPM tomorrow   Pancreatitis     Psychiatric disorder     Renal disorder        Past Surgical History:   Procedure Laterality Date    CHOLECYSTECTOMY LAPAROSCOPIC      FOOT SURGERY      KNEE ARTHROSCOPY      (therapeutic)    VASECTOMY      vas deferens       Meds/Allergies:    Prior to Admission medications    Medication Sig Start Date End Date Taking? Authorizing Provider   glucose blood (ONE TOUCH ULTRA TEST) test strip by In Vitro route 3 (three) times a day 12/29/14   Historical Provider, MD   glucose blood (ONETOUCH VERIO) test strip by In Vitro route 3 (three) times a day 12/18/14   Historical Provider, MD   hydrOXYzine pamoate (VISTARIL) 50 mg capsule TAKE 1 CAPSULE BY MOUTH THREE TIMES DAILY AS NEEDED 7/26/18   Aleksandra Marsh DO   insulin detemir (LEVEMIR FLEXPEN) 100 Units/mL injection pen Inject 45 Units under the skin daily 6/14/18   Meenu Nevarez PA-C   Insulin Pen Needle (B-D ULTRAFINE III SHORT PEN) 31G X 8 MM MISC by Does not apply route 2/16/12   Historical Provider, MD   lisinopril (ZESTRIL) 10 mg tablet Take 1 tablet by mouth 2 (two) times a day 4/17/12   Historical Provider, MD   omeprazole (PriLOSEC) 20 mg delayed release capsule Take by mouth    Historical Provider, MD   risperiDONE (RisperDAL) 0 5 mg tablet TAKE ONE TABLET BY MOUTH TWICE DAILY 3/5/18   Aleksandra Marsh DO   TOUJEO SOLOSTAR 300 units/mL CONCETRATED U-300 injection pen INJECT 45 UNITS SUBCUTANEOUSLY ONCE DAILY AT BEDTIME 7/26/18   Meenu Nevarez PA-C   traZODone (DESYREL) 50 mg tablet Take 1 tablet by mouth    Historical Provider, MD   venlafaxine (EFFEXOR XR) 75 mg 24 hr capsule Take 1 capsule by mouth 3 (three) times a day 1/4/18   Historical Provider, MD     I have reviewed home medications with patient personally  Allergies: No Known Allergies    Social History:     Marital Status: /Civil Union   Occupation:   Patient Pre-hospital Living Situation:   Patient Pre-hospital Level of Mobility:   Patient Pre-hospital Diet Restrictions:   Substance Use History:   History   Alcohol Use    Yes     Comment: Last drink in June     History   Smoking Status    Current Every Day Smoker    Packs/day: 1 00   Smokeless Tobacco    Never Used     Comment: Tobacco use [employer]'s "How to Quit" literature given to pat       History   Drug Use No     Comment: chronic narcotic use Family History:    Family History   Problem Relation Age of Onset   Leatha Silence Cancer Mother     Coronary artery disease Mother     Diabetes Mother     Coronary artery disease Father     Prostate cancer Father     Diabetes Sister     Coronary artery disease Family        Physical Exam:     Vitals:   Blood Pressure: 132/78 (08/12/18 1642)  Pulse: 93 (08/12/18 1642)  Temperature: 98 4 °F (36 9 °C) (08/12/18 1642)  Respirations: 20 (08/12/18 1642)  Height: 6' (182 9 cm) (08/12/18 1640)  Weight - Scale: 88 8 kg (195 lb 12 8 oz) (08/12/18 1640)  SpO2: 97 % (08/12/18 1642)    Physical Exam    Constitutional: Pt appears well-developed and well-nourished  Not in any acute distress  HENT:   Head: Normocephalic and atraumatic  Eyes: EOM are normal    Neck: Neck supple  Cardiovascular: Normal rate, regular rhythm, normal heart sounds  Exam reveals no gallop and no friction rub  No murmur heard  Pulmonary/Chest: Effort normal and breath sounds normal  No respiratory distress  Pt has no wheezes or rales  Abdominal: Soft  Non-distended, tender to palpation generalized more in upper abdomen, no guarding or rigidity  Bowel sounds are normal    Musculoskeletal: Normal range of motion  Neurological: alert and oriented to person, place, and time  Normal strength and sensations  Psychiatric: normal mood and affect  Additional Data:     Lab Results: I have personally reviewed pertinent reports          Results from last 7 days  Lab Units 08/12/18  0627   WBC Thousand/uL 11 90*   HEMOGLOBIN g/dL 10 9*   HEMATOCRIT % 31 6*   PLATELETS Thousands/uL 144*   NEUTROS PCT % 80*   LYMPHS PCT % 10*   MONOS PCT % 8   EOS PCT % 2       Results from last 7 days  Lab Units 08/12/18  0627   SODIUM mmol/L 135   POTASSIUM mmol/L 3 5   CHLORIDE mmol/L 102   CO2 mmol/L 22   BUN mg/dL 11   CREATININE mg/dL 0 89   CALCIUM mg/dL 8 2*   TOTAL PROTEIN g/dL 5 8*   BILIRUBIN TOTAL mg/dL 1 30*   ALK PHOS U/L 162*   ALT U/L 69*   AST U/L 48* GLUCOSE RANDOM mg/dL 120*       Results from last 7 days  Lab Units 08/11/18  0500   INR  1 13       Results from last 7 days  Lab Units 08/12/18  1715 08/12/18  1337 08/12/18  1100 08/12/18  0655 08/11/18  2026 08/11/18  1602 08/11/18  1100 08/11/18  0629 08/10/18  2019 08/10/18  1603 08/10/18  1101 08/10/18  0629   POC GLUCOSE mg/dl 63* 51* 131 113 97 89 126 85 105 108 169* 270*       Results from last 7 days  Lab Units 08/10/18  0509   HEMOGLOBIN A1C % 10 4*       Imaging: I have personally reviewed pertinent reports  No orders to display       [facility] Epic Records Reviewed: Yes     ** Please Note: This note has been constructed using a voice recognition system   **

## 2018-08-12 NOTE — NURSING NOTE
Pt reassessed after decreasing rate of IV fluids  Lung sounds improving with crackle still in right lower lobe  NP notified  Orders received  Will continue to monitor

## 2018-08-12 NOTE — DISCHARGE SUMMARY
Discharge- Dmitriy Cartwright 1970, 46 y o  male MRN: [id number]    Unit/Bed#: -01 Encounter: [id number]    Primary Care Provider: Monik Stack DO   Date and time admitted to hospital: 8/9/2018  8:52 PM        Abdominal pain   Assessment & Plan    · Is waxing and waning  · This is secondary to an acute on chronic pancreatitis  · Patient had an MRCP performed yesterday -results as follows:  Patient appears to have acute pancreatitis superimposed upon chronic  pancreatitis   The main pancreatic duct appears to abruptly truncate in  the area of prior pseudocyst or fat necrosis within the tail of the  pancreas, which is unchanged   However, the main pancreatic duct in the  head and body of the pancreas appears to be mildly more dilated compared  to the most recent MRI which may be indicative of reactive change in acute  pancreatitis, obstruction not excluded   There may be a small dilated side  branch which may represent IPMN and/or reactive change   Interval  follow-up imaging is advised  · After reviewing these results with the GI physician Gypsy Cranker -she recommended we transfer the patient to a larger tertiary care facility for an ERCP and/or an endoscopic ultrasound  · I spoke with Dr Lincoln at [facility] -who accepted the patient in transfer    CBD dilation remains stable, no biliary ductal stone or stricture was  noted   This may represent postcholecystectomy change          * Recurrent pancreatitis ([facility] [address] )   Assessment & Plan    · Continue NPO status  · Continue IV fluids, IV Zosyn, and IV Dilaudid   · Further management as per the GI physician at [facility]        Diabetes mellitus type I ([facility] [address] )   Assessment & Plan    · Blood sugars are more controlled  ·  Continue Levemir and regular insulin coverage  · Continue Accu-Cheks AC and HS        Acute hyponatremia   Assessment & Plan    · Resolved        Benign essential hypertension   Assessment & Plan    · We will continue patient's outpatient lisinopril        Acute kidney injury ([facility] [address] )   Assessment & Plan    · This was pre renal and it has resolved with IV fluid therapy        Leukemoid reaction   Assessment & Plan    · White blood cell count has further improved - continue Zosyn        Acute hyperkalemia   Assessment & Plan    · Resolved -  continue to monitor         Iron deficiency anemia   Assessment & Plan    · Hemoglobin is currently stable  · Monitor labs        Insomnia   Assessment & Plan    · Continue trazodone        Other chronic pain   Assessment & Plan    · Patient does have a history of fibromyalgia  · We will continue Dilaudid for pancreatic pain            Discharging Physician / Practitioner: Ayde Mcbride MD  PCP: Shadia Mayfield DO  Admission Date:   Admission Orders     Ordered        08/09/18 2302  Inpatient Admission  Once             Discharge Date: 08/12/18    Resolved Problems  Date Reviewed: 8/10/2018    None          Consultations During Hospital Stay:  · Gastroenterology    Procedures Performed:   · None    Significant Findings / Test Results:   · MRCP  -see results as outlined above  · CT abdomen pelvis-acute on chronic pancreatitis    Incidental Findings:   · None     Test Results Pending at Discharge (will require follow up): · None     Outpatient Tests Requested:  · None    Complications:  None    Reason for Admission:   Abdominal painReason for Admission:    Hospital Course:     Bebeto Spencer is a 46 y o  male patient who originally presented to the hospital on 8/9/2018 due to abdominal pain and recurrent pancreatitis  In the past, patient has been treated for alcohol induced pancreatitis  Patient did not consume alcohol prior to this admission  He was admitted up to med surg, kept NPO, given IV fluids and IV narcotics for pain management  GI consultation was obtained  CT of his abdomen and pelvis was performed  This was suggestive of the recurrent acute on chronic pancreatitis    On day 2 of his stay here, his LFTs did increase  An MRCP was ordered  Please see above for the findings  Based on the MRCP read, the GI physician recommended that the patient be transferred to higher level of care for an ERCP and/or an endoscopic ultrasound of the pancreas  I spoke with Dr Lincoln at [facility] who accepted the patient in transfer  Further management as per to [facility] physicians and GI physicians    Please see above list of diagnoses and related plan for additional information  Condition at Discharge: fair     Discharge Day Visit / Exam:     Subjective:  No complaints other than ongoing abdominal pain that waxes and wanes but is controlled with Dilaudid  Vitals: Blood Pressure: 129/75 (08/12/18 1057)  Pulse: 103 (08/12/18 1057)  Temperature: 98 1 °F (36 7 °C) (08/12/18 1057)  Temp Source: Tympanic (08/12/18 1057)  Respirations: 20 (08/12/18 1057)  Height: 6' (182 9 cm) (08/11/18 1448)  Weight - Scale: 90 7 kg (199 lb 15 3 oz) (08/11/18 1447)  SpO2: 98 % (08/12/18 1057)  Exam:   Physical Exam   Constitutional: He is oriented to person, place, and time  He appears well-developed and well-nourished  HENT:   Head: Normocephalic and atraumatic  Nose: Nose normal    Mouth/Throat: Oropharynx is clear and moist    Eyes: Conjunctivae and EOM are normal  Pupils are equal, round, and reactive to light  Neck: Normal range of motion  Neck supple  No JVD present  No thyromegaly present  Cardiovascular: Normal rate, regular rhythm and intact distal pulses  Exam reveals no gallop and no friction rub  No murmur heard  Pulmonary/Chest: Effort normal and breath sounds normal  No respiratory distress  Abdominal: Soft  Bowel sounds are normal  He exhibits no distension and no mass  There is tenderness  There is no guarding  Musculoskeletal: Normal range of motion  He exhibits no edema  Lymphadenopathy:     He has no cervical adenopathy     Neurological: He is alert and oriented to person, place, and time  No cranial nerve deficit  Skin: Skin is warm  No rash noted  No erythema  Psychiatric: He has a normal mood and affect  His behavior is normal    Nursing note and vitals reviewed  Discussion with Family:  No family was present the time of my discharge evaluation    Discharge instructions/Information to patient and family:   See after visit summary for information provided to patient and family  Provisions for Follow-Up Care:  See after visit summary for information related to follow-up care and any pertinent home health orders  Disposition:     [address] Transfer to [facility] to [facility] SNF:   · Not Applicable to this Patient - Not Applicable to this Patient    Planned Readmission:  None     Discharge Statement:  I spent 40 minutes discharging the patient  This time was spent on the day of discharge  I had direct contact with the patient on the day of discharge  Greater than 50% of the total time was spent examining patient, answering all patient questions, arranging and discussing plan of care with patient as well as directly providing post-discharge instructions  Additional time then spent on discharge activities  Discharge Medications:  See after visit summary for reconciled discharge medications provided to patient and family        ** Please Note: This note has been constructed using a voice recognition system **

## 2018-08-12 NOTE — ASSESSMENT & PLAN NOTE
Lab Results   Component Value Date    HGBA1C 10 4 (H) 08/10/2018       Recent Labs      08/12/18   0655  08/12/18   1100  08/12/18   1337  08/12/18   1715   POCGLU  113  131  51*  63*       Blood Sugar Average: Last 72 hrs:     He was diagnosed with diabetes approximately 10 years ago  He was told by and endocrinologist that he might have type 1 diabetes as he has family history of type 1 diabetes in his sister  Was initially started on oral anti diabetics but were not helping so was switched to insulin  He also could have diabetes because of chronic pancreatitis  His blood glucose was very high on admission at [address] [place] [address] > 700 because he was not using his insulin approximately 2 weeks prior as he ran out of insulin and he could not fill the script because he needed prior authorization from insurance  Improved after starting insulin  He was starting on long-acting insulin along with short-acting insulin as per sliding scale  He was getting long-acting insulin Levemir 45 units but he has not been eating or drinking  His blood glucose was low before he left from [address] [place] [address] any had symptoms of hypoglycemia for which she was given 1 ampule of D50  Here also sugars on arrival were still in 60s so patient had to be given 1 more amp of D50  So for now will stop Levemir although patient has possible type 1 diabetes  Continue with sliding scale insulin    Continue to monitor sugars closely and if continues to be high with sliding scale, can start low-dose long-acting insulin

## 2018-08-12 NOTE — NURSING NOTE
Patient assessed for crackles in lungs  NP notified, orders received  IV fluid rate turned down to 150ml/hr  Will continue to monitor

## 2018-08-12 NOTE — ASSESSMENT & PLAN NOTE
With multiple episodes of acute pancreatitis related to alcohol abuse  As per him, patient has stopped drinking since his last episode in June 2018

## 2018-08-13 ENCOUNTER — TRANSITIONAL CARE MANAGEMENT (OUTPATIENT)
Dept: FAMILY MEDICINE CLINIC | Facility: CLINIC | Age: 48
End: 2018-08-13

## 2018-08-13 PROBLEM — E87.1 ACUTE HYPONATREMIA: Status: RESOLVED | Noted: 2018-08-09 | Resolved: 2018-08-13

## 2018-08-13 PROBLEM — F17.200 TOBACCO DEPENDENCE: Status: ACTIVE | Noted: 2018-08-13

## 2018-08-13 PROBLEM — R74.01 TRANSAMINITIS: Status: ACTIVE | Noted: 2018-08-13

## 2018-08-13 LAB
ALBUMIN SERPL BCP-MCNC: 2.3 G/DL (ref 3.5–5)
ALP SERPL-CCNC: 153 U/L (ref 46–116)
ALT SERPL W P-5'-P-CCNC: 50 U/L (ref 12–78)
ANION GAP SERPL CALCULATED.3IONS-SCNC: 8 MMOL/L (ref 4–13)
AST SERPL W P-5'-P-CCNC: 23 U/L (ref 5–45)
BILIRUB SERPL-MCNC: 0.62 MG/DL (ref 0.2–1)
BUN SERPL-MCNC: 5 MG/DL (ref 5–25)
CALCIUM SERPL-MCNC: 8.4 MG/DL (ref 8.3–10.1)
CHLORIDE SERPL-SCNC: 105 MMOL/L (ref 100–108)
CO2 SERPL-SCNC: 25 MMOL/L (ref 21–32)
CREAT SERPL-MCNC: 0.77 MG/DL (ref 0.6–1.3)
GFR SERPL CREATININE-BSD FRML MDRD: 106 ML/MIN/1.73SQ M
GLUCOSE SERPL-MCNC: 160 MG/DL (ref 65–140)
GLUCOSE SERPL-MCNC: 181 MG/DL (ref 65–140)
GLUCOSE SERPL-MCNC: 199 MG/DL (ref 65–140)
GLUCOSE SERPL-MCNC: 206 MG/DL (ref 65–140)
GLUCOSE SERPL-MCNC: 220 MG/DL (ref 65–140)
POTASSIUM SERPL-SCNC: 3.5 MMOL/L (ref 3.5–5.3)
PROT SERPL-MCNC: 6.3 G/DL (ref 6.4–8.2)
SODIUM SERPL-SCNC: 138 MMOL/L (ref 136–145)

## 2018-08-13 PROCEDURE — G8988 SELF CARE GOAL STATUS: HCPCS

## 2018-08-13 PROCEDURE — G8987 SELF CARE CURRENT STATUS: HCPCS

## 2018-08-13 PROCEDURE — 99232 SBSQ HOSP IP/OBS MODERATE 35: CPT | Performed by: NURSE PRACTITIONER

## 2018-08-13 PROCEDURE — 99254 IP/OBS CNSLTJ NEW/EST MOD 60: CPT | Performed by: INTERNAL MEDICINE

## 2018-08-13 PROCEDURE — 82948 REAGENT STRIP/BLOOD GLUCOSE: CPT

## 2018-08-13 PROCEDURE — 80053 COMPREHEN METABOLIC PANEL: CPT | Performed by: PHYSICIAN ASSISTANT

## 2018-08-13 PROCEDURE — 97166 OT EVAL MOD COMPLEX 45 MIN: CPT

## 2018-08-13 PROCEDURE — G8989 SELF CARE D/C STATUS: HCPCS

## 2018-08-13 RX ORDER — SODIUM CHLORIDE, SODIUM LACTATE, POTASSIUM CHLORIDE, CALCIUM CHLORIDE 600; 310; 30; 20 MG/100ML; MG/100ML; MG/100ML; MG/100ML
100 INJECTION, SOLUTION INTRAVENOUS CONTINUOUS
Status: DISCONTINUED | OUTPATIENT
Start: 2018-08-13 | End: 2018-08-14

## 2018-08-13 RX ADMIN — FENOFIBRATE 145 MG: 145 TABLET ORAL at 08:00

## 2018-08-13 RX ADMIN — NICOTINE 1 PATCH: 21 PATCH, EXTENDED RELEASE TRANSDERMAL at 09:46

## 2018-08-13 RX ADMIN — INSULIN LISPRO 2 UNITS: 100 INJECTION, SOLUTION INTRAVENOUS; SUBCUTANEOUS at 18:33

## 2018-08-13 RX ADMIN — INSULIN LISPRO 2 UNITS: 100 INJECTION, SOLUTION INTRAVENOUS; SUBCUTANEOUS at 09:16

## 2018-08-13 RX ADMIN — INSULIN LISPRO 2 UNITS: 100 INJECTION, SOLUTION INTRAVENOUS; SUBCUTANEOUS at 21:41

## 2018-08-13 RX ADMIN — HEPARIN SODIUM 5000 UNITS: 5000 INJECTION, SOLUTION INTRAVENOUS; SUBCUTANEOUS at 21:25

## 2018-08-13 RX ADMIN — HYDROMORPHONE HYDROCHLORIDE 2 MG: 2 INJECTION INTRAMUSCULAR; INTRAVENOUS; SUBCUTANEOUS at 20:30

## 2018-08-13 RX ADMIN — RISPERIDONE 0.5 MG: 0.25 TABLET ORAL at 08:00

## 2018-08-13 RX ADMIN — VENLAFAXINE 75 MG: 37.5 TABLET ORAL at 16:38

## 2018-08-13 RX ADMIN — HYDROMORPHONE HYDROCHLORIDE 2 MG: 2 INJECTION INTRAMUSCULAR; INTRAVENOUS; SUBCUTANEOUS at 09:28

## 2018-08-13 RX ADMIN — PANTOPRAZOLE SODIUM 40 MG: 40 TABLET, DELAYED RELEASE ORAL at 05:17

## 2018-08-13 RX ADMIN — SODIUM CHLORIDE, SODIUM LACTATE, POTASSIUM CHLORIDE, AND CALCIUM CHLORIDE 150 ML/HR: .6; .31; .03; .02 INJECTION, SOLUTION INTRAVENOUS at 00:11

## 2018-08-13 RX ADMIN — SODIUM CHLORIDE, SODIUM LACTATE, POTASSIUM CHLORIDE, AND CALCIUM CHLORIDE 100 ML/HR: .6; .31; .03; .02 INJECTION, SOLUTION INTRAVENOUS at 13:50

## 2018-08-13 RX ADMIN — LISINOPRIL 10 MG: 10 TABLET ORAL at 18:52

## 2018-08-13 RX ADMIN — TRAZODONE HYDROCHLORIDE 50 MG: 50 TABLET ORAL at 21:25

## 2018-08-13 RX ADMIN — SODIUM CHLORIDE, SODIUM LACTATE, POTASSIUM CHLORIDE, AND CALCIUM CHLORIDE 100 ML/HR: .6; .31; .03; .02 INJECTION, SOLUTION INTRAVENOUS at 21:27

## 2018-08-13 RX ADMIN — FOLIC ACID 1 MG: 1 TABLET ORAL at 09:46

## 2018-08-13 RX ADMIN — HYDROMORPHONE HYDROCHLORIDE 2 MG: 2 INJECTION INTRAMUSCULAR; INTRAVENOUS; SUBCUTANEOUS at 16:27

## 2018-08-13 RX ADMIN — INSULIN LISPRO 2 UNITS: 100 INJECTION, SOLUTION INTRAVENOUS; SUBCUTANEOUS at 12:28

## 2018-08-13 RX ADMIN — ACETAMINOPHEN 650 MG: 325 TABLET, FILM COATED ORAL at 03:34

## 2018-08-13 RX ADMIN — VENLAFAXINE 75 MG: 37.5 TABLET ORAL at 20:30

## 2018-08-13 RX ADMIN — HYDROMORPHONE HYDROCHLORIDE 2 MG: 2 INJECTION INTRAMUSCULAR; INTRAVENOUS; SUBCUTANEOUS at 13:31

## 2018-08-13 RX ADMIN — SODIUM CHLORIDE, SODIUM LACTATE, POTASSIUM CHLORIDE, AND CALCIUM CHLORIDE 150 ML/HR: .6; .31; .03; .02 INJECTION, SOLUTION INTRAVENOUS at 12:30

## 2018-08-13 RX ADMIN — HYDROMORPHONE HYDROCHLORIDE 2 MG: 2 INJECTION INTRAMUSCULAR; INTRAVENOUS; SUBCUTANEOUS at 01:18

## 2018-08-13 RX ADMIN — SODIUM CHLORIDE, SODIUM LACTATE, POTASSIUM CHLORIDE, AND CALCIUM CHLORIDE 150 ML/HR: .6; .31; .03; .02 INJECTION, SOLUTION INTRAVENOUS at 07:32

## 2018-08-13 RX ADMIN — RISPERIDONE 0.5 MG: 0.25 TABLET ORAL at 18:52

## 2018-08-13 RX ADMIN — ACETAMINOPHEN 650 MG: 325 TABLET, FILM COATED ORAL at 21:25

## 2018-08-13 RX ADMIN — HEPARIN SODIUM 5000 UNITS: 5000 INJECTION, SOLUTION INTRAVENOUS; SUBCUTANEOUS at 07:55

## 2018-08-13 RX ADMIN — LISINOPRIL 10 MG: 10 TABLET ORAL at 08:00

## 2018-08-13 RX ADMIN — VENLAFAXINE 75 MG: 37.5 TABLET ORAL at 08:00

## 2018-08-13 RX ADMIN — HYDROMORPHONE HYDROCHLORIDE 2 MG: 2 INJECTION INTRAMUSCULAR; INTRAVENOUS; SUBCUTANEOUS at 05:18

## 2018-08-13 RX ADMIN — Medication 100 MG: at 08:02

## 2018-08-13 RX ADMIN — HEPARIN SODIUM 5000 UNITS: 5000 INJECTION, SOLUTION INTRAVENOUS; SUBCUTANEOUS at 15:07

## 2018-08-13 NOTE — PLAN OF CARE
DISCHARGE PLANNING     Discharge to home or other facility with appropriate resources Progressing        GASTROINTESTINAL - ADULT     Minimal or absence of nausea and/or vomiting Progressing     Maintains or returns to baseline bowel function Progressing     Maintains adequate nutritional intake Progressing     Establish and maintain optimal ostomy function Progressing        METABOLIC, FLUID AND ELECTROLYTES - ADULT     Electrolytes maintained within normal limits Progressing     Fluid balance maintained Progressing     Glucose maintained within target range Progressing        PAIN - ADULT     Verbalizes/displays adequate comfort level or baseline comfort level Progressing

## 2018-08-13 NOTE — ASSESSMENT & PLAN NOTE
· With multiple episodes of acute pancreatitis related to alcohol abuse as above  · As per him, patient has stopped drinking since his last episode earlier this year in June/July 2018

## 2018-08-13 NOTE — CASE MANAGEMENT
Initial Clinical Review    Admission: Date/Time/Statement: 8/12/18 @ 1705    Orders Placed This Encounter   Procedures    Inpatient Admission     Standing Status:   Standing     Number of Occurrences:   1     Order Specific Question:   Admitting Physician     Answer:   Nati Malin [[id number]]     Order Specific Question:   Level of Care     Answer:   Med Surg [[id number]]     Order Specific Question:   Estimated length of stay     Answer:   More than 2 Midnights     Order Specific Question:   Certification     Answer:   I certify that inpatient services are medically necessary for this patient for a duration of greater than two midnights  See H&P and MD Progress Notes for additional information about the patient's course of treatment  History of Illness: 46 y o  male who is transferred from [facility] for her GI evaluation for acute on chronic pancreatitis and possibly requiring ERCP/EUS to rule out pancreatic cancer      AT [facility],  He presented  with 2 days of gradually worsening abdominal pain  The pain started Wednesday evening after dinner and has been gradually worsening  He has known history of chronic alcohol abuse with multiple episodes of acute pancreatitis, the last 1 was in June 2018  As per patient he has not been drinking alcohol since the last episode  This episode was not related to alcohol use  After being admitted there he was initially made NPO, started on IV fluids and pain medications, CT of the abdomen was done which was concerning for acute pancreatitis  Was evaluated by GI  MRI/MRCP was done that showed acute pancreatitis with increasing dilation of pancreatic duct and a branch of the duct with possible IPMN/reactive changes    GI recommended transferring the patient to [facility] for [employer] which is not available here       He has known history of diabetes mellitus, possibly type 1, and ran out of insulin 2 weeks prior to presentation to [facility] because of problems with insurance authorization  So his sugars were severely elevated on admission there > 700 with hyponatremia and hyperkalemia  Sugars improved after starting IV fluids and insulin and electrolyte abnormalities also improved  88 8 kg   195 lb  12 8 oz    08/13/18 0657  98 1 °F (36 7 °C)   118  18  113/71  --  --   08/12/18 2301  --  --  --  --  --  Nasal cannula   08/12/18 2211  99 1 °F (37 3 °C)  104  18  133/76  92 %  2L    --   08/12/18 1930  --   106  --  --  93 %  --   08/12/18 1900  --   106  --  --  97 %  --   08/12/18 1830  --  101  --  --  92 %  --   08/12/18 1708  --  --  --  --  --  None (Room air)   08/12/18 1642  98 4 °F (36 9 °C)  93  20  132/78  97 %  --   08/12/18 1640  --  --  --  --  --  None (Room air)     Abnormal Labs/Diagnostic Test Results:   Na   135  K  3 5   AST  ALT  ALK PHOS    Tot bili  Wbc  11 90  hgb   10 9  hct   31 6  PT  13 1    Past Medical/Surgical History:    Active Ambulatory Problems     Diagnosis Date Noted    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    Benign essential hypertension 07/02/2012    COPD (chronic obstructive pulmonary disease) ([facility] ) 03/29/2017    Fatty liver 12/05/2012    Fibromyalgia 07/29/2013    Generalized anxiety disorder 06/13/2012    Hyperlipidemia 09/11/2012    Insomnia 09/05/2017    Iron deficiency anemia 04/05/2016    MDD (major depressive disorder), single episode 07/02/2012    Nephrolithiasis 12/05/2012    Other chronic pain 07/02/2012    Recurrent pancreatitis ([facility] ) 08/25/2017    Sleep disorder 07/02/2012    Diabetes mellitus type 1 5, managed as type 1 ([facility] ) 04/05/2016    Abdominal pain 08/09/2018    Acute hyponatremia 08/09/2018    Acute hyperkalemia 08/09/2018    Acute kidney injury ([facility] ) 08/09/2018    GERD (gastroesophageal reflux disease) 08/09/2018    Anxiety and depression 08/09/2018    Tobacco abuse 08/09/2018    H/O alcohol abuse 08/09/2018    Leukemoid reaction 08/09/2018     Resolved Ambulatory Problems Diagnosis Date Noted    No Resolved Ambulatory Problems     Past Medical History:   Diagnosis Date    Allergic rhinitis     Quiros esophagus     Bronchitis, asthmatic     Cholelithiasis     Chronic pain     COPD (chronic obstructive pulmonary disease) (HCC)     Diabetes mellitus (HCC)     GERD (gastroesophageal reflux disease)     Hypertension     Metatarsalgia     Pancreatitis     Psychiatric disorder     Renal disorder        Admitting Diagnosis: Abdominal pain, acute [R10 9]    * Acute on chronic pancreatitis [facility])   Assessment & Plan     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  MRCP was done that showed acute pancreatitis, increase in the dilation of the main pancreatic duct in the head and body and also small dilated side branch which may represent intraductal papillary mucinous neoplasm/reactive change which is the reason why patient was transferred to [place] for ERCP/EUS      Will start the patient on clears his is not nauseous anymore  Although still has mild abdominal pain  Start IV fluids Ringer's lactate at 150 cc  As per patient he was given Lasix at [facility] as the doctor noticed some fluid in his lungs on exam   Although he was asymptomatic  He was given 1 dose of Lasix  Continue to monitor respiratory status with IV fluids  Patient was started on IV Zosyn for suspected necrotic pancreatitis but MRI is negative for necrosis so will stop his antibiotics  Consult GI for further evaluation/procedures  Continue Dilaudid p r n  for pain           Diabetes mellitus type 1 5, managed as type 1 ([facility] )   Assessment & Plan     He was diagnosed with diabetes approximately 10 years ago    He was told by and endocrinologist that he might have type 1 diabetes as he has family history of type 1 diabetes in his sister  Was initially started on oral anti diabetics but were not helping so was switched to insulin  He also could have diabetes because of chronic pancreatitis      His blood glucose was very high on admission at [address] [place] [address] > 700 because he was not using his insulin approximately 2 weeks prior as he ran out of insulin and he could not fill the script because he needed prior authorization from insurance  Improved after starting insulin  He was starting on long-acting insulin along with short-acting insulin as per sliding scale      He was getting long-acting insulin Levemir 45 units but he has not been eating or drinking  His blood glucose was low before he left from [address] [place] [address] any had symptoms of hypoglycemia for which she was given 1 ampule of D50  Here also sugars on arrival were still in 60s so patient had to be given 1 more amp of D50      So for now will stop Levemir although patient has possible type 1 diabetes  Continue with sliding scale insulin  Continue to monitor sugars closely and if continues to be high with sliding scale, can start low-dose long-acting insulin          H/O alcohol abuse   Assessment & Plan     With multiple episodes of acute pancreatitis related to alcohol abuse  As per him, patient has stopped drinking since his last episode in June 2018           Acute hyponatremia   Assessment & Plan     POA at [facility]  Most likely because of severe hyperglycemia  Improved once sugars are better controlled  Last sodium was 135 this morning                    VTE Prophylaxis: Heparin  / sequential compression device      Patient will be admitted on an Inpatient basis with an anticipated length of stay of  > 2 midnights         Admission Orders:  Admit telemetry with continuous pulse ox  PT/OT eval and treat  Consult  GI  I/O  accuck qid w/sliding scale insulin  CIWA protocol for withdrawal  Up w/assist  Diet clears   Up w/assist  IVF @ 150  IV dilaudid  x2 Scheduled Meds:   Current Facility-Administered Medications:  acetaminophen 650 mg Oral Q6H PRN Tushar Morgan MD    fenofibrate 145 mg Oral Daily Tushar Morgan MD    folic acid 1 mg Oral Daily Tushar Morgan MD    heparin (porcine) 5,000 Units Subcutaneous Q8H [facility] Tushar Morgan MD    HYDROmorphone 2 mg Intravenous Q4H PRN Tushar Mogran MD    hydrOXYzine HCL 50 mg Oral TID PRN Tushar Morgan MD    insulin lispro 2-12 Units Subcutaneous TID  Tushar Morgan MD    lactated ringers 150 mL/hr Intravenous Continuous Tushar Morgan MD Last Rate: 150 mL/hr (08/13/18 0732)   lisinopril 10 mg Oral BID Tushar Morgan MD    nicotine 1 patch Transdermal Daily Tushar Morgan MD    ondansetron 4 mg Intravenous Q6H PRN Tushar Morgan MD    pantoprazole 40 mg Oral Early Morning Tushar Morgan MD    risperiDONE 0 5 mg Oral BID Tushar Morgan MD    thiamine 100 mg Oral Daily Tushar Morgan MD    traZODone 50 mg Oral HS Tushar Morgan MD    venlafaxine 75 mg Oral TID Tushar Morgan MD      8/13/2018  Dilaudid IV x2

## 2018-08-13 NOTE — CONSULTS
Consultation -  [facility]  Mago Camacho 46 y o  male MRN: [id number]  [address]Bed#: [address] Encounter: [id number]        Consults    ASSESSMENT/ PLAN:    45 yo male pmh alcoholic pancreatitis, COPD, DM was transferred from [facility] for acute on chronic pancreatitis  1  Acute on chronic pancreatitis: He continues to complain of diffuse abdominal pain worse after clear liquids but improved from admission  His lipase on admission was 831 and this was normal on 8/11  He underwent MRI of the abdomen without contrast, which revealed acute on chronic pancreatitis, the main pancreatic duct appears to abruptly truncated in the area of prior pseudocyst or fat necrosis within the tail of the pancreas, this is unchanged  The main pancreatic duct and head and body of the pancreas is mildly more dilated compared to previous MRI concerning for reactive change versus obstruction  Also possible small dilated side branch which may represent IPMN versus reactive changes  CBD dilation remained stable without any evidence of stone or stricture  He should have EUS with FNA, timing of this procedure is to be determined  -monitor abdominal pain  -pain management   -continue IVF  -continue clear liquid diet for now  -he will need EUS in the future, timing to be determined      2  Transaminitis: LFTs were elevated on 8/11 and started to down trend yesterday from -->48, ALT 99-->69, alk phos 152-->162 and tbili of 1 8-->1 3  Will follow up on LFTs today, if they do not continue to down trend, will check chronic hep panel, SKIP, Anti-smooth muscle antibody and ceruloplasmin   -monitor LFTs    Reason for Consult / Principal Problem: acute on chronic pancreatitis    HPI: Rc Carranza is a 45 yo male with past medical history alcoholic pancreatitis, COPD, diabetes mellitus who originally presented to [facility] on 8/9 with worsening epigastric abdominal pain with vomiting   He was transferred to [facility] for possible ERCP/EUS for abnormal MRI findings  Ce Ross states that he was eating dinner Wednesday evening and was in his normal state of health when he developed diarrhea and upset stomach  The pain worsened and he developed nausea with vomiting  He did not originally think it was pancreatitis given he has not had alcohol since 6/2018  The pain worsened and he presented to the ED  He had CT of the abdomen that revealed acute pancreatitis  MRI without contrast revealed acute on chronic pancreatitis, the main pancreatic duct appears to abruptly truncated in the area of prior pseudocyst or fat necrosis within the tail of the pancreas, this is unchanged  The main pancreatic duct and head and body of the pancreas is mildly more dilated compared to previous MRI concerning for reactive change versus obstruction  Also possible small dilated side branch which may represent IPMN versus reactive changes  He was recommended to be transferred for ERCP vs EUS with FNA  He continues to experience diffuse abdominal pain, he is tolerating clears but does admit to pain  He continues to require pain medication  He denies alcohol use since June 2018    REVIEW OF SYSTEMS:     CONSTITUTIONAL: Denies any fever, chills, or rigors  Poor appetite  HEENT: No earache or tinnitus  Denies hearing loss or visual disturbances  CARDIOVASCULAR: No chest pain or palpitations  RESPIRATORY: Denies any cough, hemoptysis, shortness of breath or dyspnea on exertion  GASTROINTESTINAL: As noted in the History of Present Illness  GENITOURINARY: No problems with urination  Denies any hematuria or dysuria  NEUROLOGIC: No dizziness or vertigo, denies headaches  MUSCULOSKELETAL: Denies any muscle or joint pain  SKIN: Denies skin rashes or itching  ENDOCRINE: Denies excessive thirst  Denies intolerance to heat or cold  PSYCHOSOCIAL: Denies depression or anxiety  Denies any recent memory loss         Historical Information Past Medical History:   Diagnosis Date    Allergic rhinitis     last assessed: 12/5/2012    Quiros esophagus     Bronchitis, asthmatic     last assessed: 9/15/2014    Cholelithiasis     Chronic pain     COPD (chronic obstructive pulmonary disease) (HCC)     Diabetes mellitus (HCC)     GERD (gastroesophageal reflux disease)     Hypertension     Metatarsalgia     last assessed: 8/11/2014, unspecified laterality, ? cause  PE with changes  To see DPM tomorrow   Pancreatitis     Psychiatric disorder     Renal disorder      Past Surgical History:   Procedure Laterality Date    CHOLECYSTECTOMY LAPAROSCOPIC      FOOT SURGERY      KNEE ARTHROSCOPY      (therapeutic)    VASECTOMY      vas deferens     Social History   History   Alcohol Use    Yes     Comment: Last drink in June     History   Drug Use No     Comment: chronic narcotic use     History   Smoking Status    Current Every Day Smoker    Packs/day: 1 00   Smokeless Tobacco    Never Used     Comment: Tobacco use [employer]'s "How to Quit" literature given to pat       Family History   Problem Relation Age of Onset    Cancer Mother     Coronary artery disease Mother     Diabetes Mother     Coronary artery disease Father     Prostate cancer Father     Diabetes Sister     Coronary artery disease Family        Meds/Allergies     Prescriptions Prior to Admission   Medication    glucose blood (ONE TOUCH ULTRA TEST) test strip    glucose blood (ONETOUCH VERIO) test strip    hydrOXYzine pamoate (VISTARIL) 50 mg capsule    insulin detemir (LEVEMIR FLEXPEN) 100 Units/mL injection pen    Insulin Pen Needle (B-D ULTRAFINE III SHORT PEN) 31G X 8 MM MISC    lisinopril (ZESTRIL) 10 mg tablet    omeprazole (PriLOSEC) 20 mg delayed release capsule    risperiDONE (RisperDAL) 0 5 mg tablet    TOUJEO SOLOSTAR 300 units/mL CONCETRATED U-300 injection pen    traZODone (DESYREL) 50 mg tablet    venlafaxine (EFFEXOR XR) 75 mg 24 hr capsule     Current Facility-Administered Medications   Medication Dose Route Frequency    acetaminophen (TYLENOL) tablet 650 mg  650 mg Oral Q6H PRN    fenofibrate (TRICOR) tablet 145 mg  145 mg Oral Daily    folic acid (FOLVITE) tablet 1 mg  1 mg Oral Daily    heparin (porcine) subcutaneous injection 5,000 Units  5,000 Units Subcutaneous Q8H [facility]    HYDROmorphone (DILAUDID) injection 2 mg  2 mg Intravenous Q4H PRN    hydrOXYzine HCL (ATARAX) tablet 50 mg  50 mg Oral TID PRN    insulin lispro (HumaLOG) 100 units/mL subcutaneous injection 2-12 Units  2-12 Units Subcutaneous TID AC    lactated ringers infusion  150 mL/hr Intravenous Continuous    lisinopril (ZESTRIL) tablet 10 mg  10 mg Oral BID    nicotine (NICODERM CQ) 21 mg/24 hr TD 24 hr patch 1 patch  1 patch Transdermal Daily    ondansetron (ZOFRAN) injection 4 mg  4 mg Intravenous Q6H PRN    pantoprazole (PROTONIX) EC tablet 40 mg  40 mg Oral Early Morning    risperiDONE (RisperDAL) tablet 0 5 mg  0 5 mg Oral BID    thiamine (VITAMIN B1) tablet 100 mg  100 mg Oral Daily    traZODone (DESYREL) tablet 50 mg  50 mg Oral HS    venlafaxine (EFFEXOR) tablet 75 mg  75 mg Oral TID       No Known Allergies        Objective     Blood pressure 113/71, pulse (!) 118, temperature 98 1 °F (36 7 °C), temperature source Oral, resp  rate 18, height 6' (1 829 m), weight 88 8 kg (195 lb 12 8 oz), SpO2 92 %  Intake/Output Summary (Last 24 hours) at 08/13/18 0957  Last data filed at 08/13/18 0946   Gross per 24 hour   Intake           2632 5 ml   Output             1580 ml   Net           1052 5 ml         PHYSICAL EXAM:       General Appearance:   A&Ox3, cooperative, no distress, appears stated age    HEENT:   Normocephalic, atraumatic      Neck:  Supple, symmetrical, trachea midline   Lungs:   Clear to auscultation bilaterally; no rales, rhonchi or wheezing    Heart[de-identified]   S1 and S2 normal; regular rate and rhythm; no murmur, rub, or gallop     Abdomen:   Soft, mild diffuse tenderness, mildly distended; normal bowel sounds; no masses, no organomegaly    Genitalia:   Deferred    Rectal:   Deferred    Extremities:  No cyanosis, clubbing or edema        Skin:  Skin color, texture, turgor normal, no rashes or lesions          Lab Results:   Admission on 08/12/2018   Component Date Value    POC Glucose 08/12/2018 139     POC Glucose 08/13/2018 160*       Imaging Studies: I have personally reviewed pertinent imaging studies  No results found  This patient was seen and examined by Dr Chloe Bowling  All perez medical decisions were made by Dr Chloe Bowling  Thank you for allowing us to participate in the care of this patient  We will follow up closely with you

## 2018-08-13 NOTE — ASSESSMENT & PLAN NOTE
· Patient has known history of multiple episodes of acute pancreatitis in the past related to alcohol abuse  Denies any alcohol use since his last episode in July 2018  Also has pancreatic cyst from recurrent pancreatitis  Lipase on admit 831  · MRCP was done that showed acute pancreatitis, increase in the dilation of the main pancreatic duct in the head and body and also small dilated side branch which may represent intraductal papillary mucinous neoplasm/reactive change which is the reason why patient was transferred to [facility] for ERCP/EUS, timing is to be determined  · GI following, appreciate ongoing recommendations  · Tolerating clear liquid diet for now  · IVF, will decrease rate given some rales in right lung base     · Pain management  · Serial abdominal exams

## 2018-08-13 NOTE — ASSESSMENT & PLAN NOTE
Lab Results   Component Value Date    HGBA1C 10 4 (H) 08/10/2018       Recent Labs      08/12/18   1715  08/12/18   2123  08/13/18   0816  08/13/18   1213   POCGLU  63*  139  160*  199*       Blood Sugar Average: Last 72 hrs:  (P) 166   He was diagnosed with diabetes approximately 10 years ago  He was told by and endocrinologist that he might have type 1 diabetes as he has family history of type 1 diabetes in his sister  Was initially started on oral anti diabetics but were not helping so was switched to insulin  He also could have diabetes because of chronic pancreatitis  His blood glucose was very high on admission at [address] [place] [address] > 700 because he was not using his insulin approximately 2 weeks prior as he ran out of insulin and he could not fill the script because he needed prior authorization from insurance  Improved after starting insulin  He was starting on long-acting insulin along with short-acting insulin as per sliding scale  He was getting long-acting insulin Levemir 45 units but he has not been eating or drinking  His blood glucose was low before he left from [address] [place] [address] any had symptoms of hypoglycemia for which she was given 1 ampule of D50  Here also sugars on arrival were still in 60s so patient had to be given 1 more amp of D50  For now continue to hold Levemir although patient has possible type 1 diabetes  Continue with sliding scale insulin for now as patient is only on clear liquid diet  Continue to monitor sugars closely and adjust as needed once dietary allowance has been increased  Will need endocrine f/u as outpatient

## 2018-08-13 NOTE — PROGRESS NOTES
[facility]    Progress Note - Kyree Bangura 1970, 46 y o  male MRN: [id number]    Unit/Bed#: [facility] 612-01 Encounter: [id number]    Primary Care Provider: Efrain Herman DO   Date and time admitted to hospital: 8/12/2018  4:37 PM    * Acute on chronic pancreatitis [facility])   Assessment & Plan    · Patient has known history of multiple episodes of acute pancreatitis in the past related to alcohol abuse  Denies any alcohol use since his last episode in July 2018  Also has pancreatic cyst from recurrent pancreatitis  Lipase on admit 831  · MRCP was done that showed acute pancreatitis, increase in the dilation of the main pancreatic duct in the head and body and also small dilated side branch which may represent intraductal papillary mucinous neoplasm/reactive change which is the reason why patient was transferred to [place] for ERCP/EUS, timing is to be determined  · GI following, appreciate ongoing recommendations  · Tolerating clear liquid diet for now  · IVF, will decrease rate given some rales in right lung base  · Pain management  · Serial abdominal exams            H/O alcohol abuse   Assessment & Plan    · With multiple episodes of acute pancreatitis related to alcohol abuse as above  · As per him, patient has stopped drinking since his last episode earlier this year in June/July 2018  Diabetes mellitus type 1 5, managed as type 1 [facility])   Assessment & Plan    Lab Results   Component Value Date    HGBA1C 10 4 (H) 08/10/2018       Recent Labs      08/12/18   1715  08/12/18   2123  08/13/18   0816  08/13/18   1213   POCGLU  63*  139  160*  199*       Blood Sugar Average: Last 72 hrs:  (P) 166   He was diagnosed with diabetes approximately 10 years ago  He was told by and endocrinologist that he might have type 1 diabetes as he has family history of type 1 diabetes in his sister  Was initially started on oral anti diabetics but were not helping so was switched to insulin    He also could have diabetes because of chronic pancreatitis  His blood glucose was very high on admission at [facility] hospital > 700 because he was not using his insulin approximately 2 weeks prior as he ran out of insulin and he could not fill the script because he needed prior authorization from insurance  Improved after starting insulin  He was starting on long-acting insulin along with short-acting insulin as per sliding scale  He was getting long-acting insulin Levemir 45 units but he has not been eating or drinking  His blood glucose was low before he left from [facility] hospital any had symptoms of hypoglycemia for which she was given 1 ampule of D50  Here also sugars on arrival were still in 60s so patient had to be given 1 more amp of D50  For now continue to hold Levemir although patient has possible type 1 diabetes  Continue with sliding scale insulin for now as patient is only on clear liquid diet  Continue to monitor sugars closely and adjust as needed once dietary allowance has been increased  Will need endocrine f/u as outpatient  Acute hyponatremia-resolved as of 8/13/2018   Assessment & Plan    · POA at [facility], resolved,  today  Transaminitis   Assessment & Plan    · LFT's continue to trend down, alk phos remains mildly elevated  · Continue to trend  Tobacco dependence   Assessment & Plan    · Smokes a pack and half a day, discussed with patient and strongly encouraged cessation  · Continue nicotine patch          Pharmacologic: Heparin  Mechanical VTE Prophylaxis in Place: Yes    Patient Centered Rounds: I have evaluated patient without nursing staff present due to attempted to reach RN, unavailable  Discussed over phone with Mayco George and reviewed plan  Discussions with Specialists or Other Care Team Provider: nursing    Education and Discussions with Family / Patient: Patient     Time Spent for Care: 30 minutes    More than 50% of total time spent on counseling and coordination of care as described above  Current Length of Stay: 1 day(s)    Current Patient Status: Inpatient   Certification Statement: The patient will continue to require additional inpatient hospital stay due to additional Gi recommendations, EUS with FNA--timing to be determined  Discharge Plan / Estimated Discharge Date: Not medically stable  Code Status: Level 1 - Full Code      Subjective:   Patient states that pain has been about the same since being transferred to [place] but overall improved  Denies any nausea, vomiting or diarrhea  States he is tolerating clear liquids without difficulty  States he smokes approximately a pack and half per day, discussed cessation with him  Objective:     Vitals:   Temp (24hrs), Av 7 °F (37 1 °C), Min:98 1 °F (36 7 °C), Max:99 2 °F (37 3 °C)    HR:  [] 118  Resp:  [18-22] 18  BP: (113-134)/(71-78) 113/71  SpO2:  [92 %-97 %] 95 %  Body mass index is 26 56 kg/m²  Input and Output Summary (last 24 hours): Intake/Output Summary (Last 24 hours) at 18 1352  Last data filed at 18 1348   Gross per 24 hour   Intake           3907 5 ml   Output             2730 ml   Net           1177 5 ml       Physical Exam:     Physical Exam   Constitutional: He is oriented to person, place, and time  No distress  HENT:   Head: Normocephalic  Eyes: Pupils are equal, round, and reactive to light  Neck: Normal range of motion  Cardiovascular: Normal rate, regular rhythm and intact distal pulses  Pulmonary/Chest: Effort normal  He has rales (right base)  Abdominal: Soft  Bowel sounds are normal  There is tenderness (epigastric area)  Musculoskeletal: Normal range of motion  He exhibits no edema  Neurological: He is alert and oriented to person, place, and time  Skin: Skin is warm and dry  Psychiatric: He has a normal mood and affect  Nursing note and vitals reviewed        Additional Data: Labs:      Results from last 7 days  Lab Units 08/12/18  0627   WBC Thousand/uL 11 90*   HEMOGLOBIN g/dL 10 9*   HEMATOCRIT % 31 6*   PLATELETS Thousands/uL 144*   NEUTROS PCT % 80*   LYMPHS PCT % 10*   MONOS PCT % 8   EOS PCT % 2       Results from last 7 days  Lab Units 08/13/18  1030   SODIUM mmol/L 138   POTASSIUM mmol/L 3 5   CHLORIDE mmol/L 105   CO2 mmol/L 25   BUN mg/dL 5   CREATININE mg/dL 0 77   CALCIUM mg/dL 8 4   TOTAL PROTEIN g/dL 6 3*   BILIRUBIN TOTAL mg/dL 0 62   ALK PHOS U/L 153*   ALT U/L 50   AST U/L 23   GLUCOSE RANDOM mg/dL 220*       Results from last 7 days  Lab Units 08/11/18  0500   INR  1 13         Recent Cultures (last 7 days):           Last 24 Hours Medication List:     Current Facility-Administered Medications:  acetaminophen 650 mg Oral Q6H PRN Brandee Klein MD    fenofibrate 145 mg Oral Daily Brandee Klein MD    folic acid 1 mg Oral Daily Brandee Klein MD    heparin (porcine) 5,000 Units Subcutaneous Q8H [facility] Brandee Klein MD    HYDROmorphone 2 mg Intravenous Q4H PRN Brandee Klein MD    hydrOXYzine HCL 50 mg Oral TID PRN Brandee Klein MD    insulin lispro 2-12 Units Subcutaneous TID AC Brandee Klein MD    lactated ringers 100 mL/hr Intravenous Continuous TYE Vega Last Rate: 100 mL/hr (08/13/18 1350)   lisinopril 10 mg Oral BID Brandee Klein MD    nicotine 1 patch Transdermal Daily Brandee Klein MD    ondansetron 4 mg Intravenous Q6H PRN Brandee Klein MD    pantoprazole 40 mg Oral Early Morning Brandee Klein MD    risperiDONE 0 5 mg Oral BID Brandee Klein MD    thiamine 100 mg Oral Daily Brandee Klein MD    traZODone 50 mg Oral HS Brandee Klein MD    venlafaxine 75 mg Oral TID Brandee Klein MD         Today, Patient Was Seen By: TYE De Souza    ** Please Note: Dragon 360 Dictation voice to text software may have been used in the creation of this document  **

## 2018-08-13 NOTE — CASE MANAGEMENT
Notification of Discharge  This is a Notification of Discharge from our facility [address]  Please be advised that this patient has been discharge from our facility  Below you will find the admission and discharge date and time including the patients disposition  PRESENTATION DATE: 8/9/2018  8:52 PM  IP ADMISSION DATE: 8/9/18 2254  DISCHARGE DATE: 8/12/2018  3:15 PM  DISPOSITION: [facility] the [facility] Util[facility] Review Department  Phone: [phone]; Fax [phone]  ATTENTION: The Network Utilization Review Department is now centralized for our 9 Facilities  Make a note that we have a new phone and fax numbers for our Department  Please call with any questions or concerns to [phone] and carefully follow the prompts so that you are directed to the right person  All voicemails are confidential  Fax any determinations, approvals, denials, and requests for initial or continue stay review clinical to [phone]  Due to HIGH CALL volume, it would be easier if you could please send faxed requests to expedite your requests and in part, help us provide discharge notifications faster

## 2018-08-13 NOTE — OCCUPATIONAL THERAPY NOTE
[address]  Evaluation     Patient Name: Corrinne Dauphin  NGPIC'I Date: 8/13/2018  Problem List  Patient Active Problem List   Diagnosis    Quiros esophagus    Benign essential hypertension    COPD (chronic obstructive pulmonary disease) ([facility]    Fatty liver    Fibromyalgia    Generalized anxiety disorder    Hyperlipidemia    Insomnia    Iron deficiency anemia    MDD (major depressive disorder), single episode    Nephrolithiasis    Other chronic pain    Recurrent pancreatitis ([facility]    Sleep disorder    Diabetes mellitus type 1 5, managed as type 1 ([facility] )    Abdominal pain    Acute hyperkalemia    [facility]    GERD (gastroesophageal reflux disease)    Anxiety and depression    Tobacco abuse    H/O alcohol abuse    Leukemoid reaction    Acute [facility] chronic [facility]    Transaminitis    Tobacco dependence     Past Medical History  Past Medical History:   Diagnosis Date    Allergic rhinitis     last assessed: 12/5/2012    Quiros esophagus     Bronchitis, asthmatic     last assessed: 9/15/2014    Cholelithiasis     Chronic pain     COPD (chronic obstructive pulmonary disease) (HCC)     Diabetes mellitus (HCC)     GERD (gastroesophageal reflux disease)     Hypertension     Metatarsalgia     last assessed: 8/11/2014, unspecified laterality, ? cause  PE with changes  To see DPM tomorrow      Pancreatitis     Psychiatric disorder     Renal disorder      Past Surgical History  Past Surgical History:   Procedure Laterality Date    CHOLECYSTECTOMY LAPAROSCOPIC      FOOT SURGERY      KNEE ARTHROSCOPY      (therapeutic)    VASECTOMY      vas deferens         08/13/18 1750   Note Type   Note type Eval only   Restrictions/Precautions   Weight Bearing Precautions Per Order No   Other Precautions Pain   Pain Assessment   Pain Assessment 0-10   Pain Score 3   Pain Type Acute pain   Pain Location Abdomen   Pain Orientation Bilateral   Hospital Pain Intervention(s) Ambulation/increased activity   Response to Interventions tolerated   Home Living   Type of [facility] Two level;Stairs to enter with rails   Bathroom Shower/Tub Walk-in shower   Bathroom Toilet Standard   Bathroom Accessibility Accessible   Prior Function   Level of [place] Independent with ADLs and functional mobility   Lives With Spouse; Family   Receives Help From Family   ADL Assistance Independent   IADLs Independent   Falls in the last 6 months 0   Vocational Unemployed   Lifestyle   Autonomy Pt is I w/ ADLs, IADLs, and driving   No DME use at baseline   Reciprocal Relationships Pt lives w/ spouse and 2 teenage children who can assist as needed   Service to Others Currently unemployed   [address] Enjoys hunting and fishing   Psychosocial   Psychosocial (WDL) WDL   ADL   Where Assessed Edge of bed   Eating Assistance 5  Supervision/Setup   Eating Deficit Setup   Grooming Assistance 5  Supervision/Setup   UB Bathing Assistance 5  Supervision/Setup   LB Bathing Assistance 5  Supervision/Setup   UB Dressing Assistance 5  Supervision/Setup   LB Dressing Assistance 5  Supervision/Setup   Toileting Assistance  5  Supervision/Setup   Functional Assistance 5  Supervision/Setup   Bed Mobility   Supine to Sit 6  Modified independent   Sit to Supine 6  Modified independent   Transfers   Sit to Stand 6  Modified independent   Stand to Sit 6  Modified independent   Toilet transfer 6  Modified independent   Balance   Static Sitting Good   Dynamic Sitting Fair +   Static Standing Fair +   Dynamic Standing Fair +   Activity Tolerance   Activity Tolerance Patient tolerated treatment well;Patient limited by pain   Nurse Made Aware Okay to see per Ml ARMIJO Assessment   RUE Assessment WFL  (reports mild shoulder pain)   LUE Assessment   LUE Assessment WFL  (reports mild shoulder pain)   Hand Function   Gross Motor Coordination Functional   Fine Motor Coordination Functional Cognition   Overall Cognitive Status WFL   Arousal/Participation Responsive; Alert; Cooperative   Attention Within functional limits   Orientation Level Oriented X4   Memory Within functional limits   Following Commands Follows all commands and directions without difficulty   Comments Pt pleasant and agreeable to session   Assessment   Limitation Decreased endurance   Prognosis Good   Assessment Pt is a 46 y o  male who was admitted to [facility] on 8/12/2018 with Acute on chronic pancreatitis ([facility] )  Pt's comorbidities include fibromyalgia, generalized anxiety disorder, HLD, major depressive disorder (1 episode), DM, GERD, h/o alcohol abuse, iron deficiency anemia, and tobacco dependence  At baseline pt was I w/ ADLs, IADLs, and driving  No DME use  Pt lives w/ spouse and 2 teenage children in 2  w/ 3 PRASANNA  Currently pt requires S for overall ADLS and is mod I for functional mobility/transfers  Pt's limitations include decreased endurance, pain, and B/L UE weakness  Educated pt on simple UE exercises to complete at home  Pt may benefit from outpatient therapy services to improve UE strength  Pt verbalized understanding and reports he was supposed to go to outpt in the past but did not follow through  Pt reports family will be able to assist upon d/c  Rec pt cont pparticipation in self care w/ nrsg staff after s/u and cont mobility w/ non OT staff as needed while in the [address] [place] [address]  Rec home w/ increased family support upon d/c  No further acute OT needs warranted at this time  D/C OT      Goals   Patient Goals to return to PLOF   Plan   OT Frequency Eval only   Recommendation   OT Discharge Recommendation Home with family support   OT - OK to Discharge Yes   Barthel Index   Feeding 10   Bathing 5   Grooming Score 5   Dressing Score 10   Bladder Score 10   Bowels Score 10   Toilet Use Score 10   Transfers (Bed/Chair) Score 15   Mobility (Level Surface) Score 10   Stairs Score 0   Barthel Index Score 85 Modified Muriel Scale   Modified [place] Scale 2       Cynthia Umanzor, OTR/L

## 2018-08-13 NOTE — SOCIAL WORK
CM met with pt at bedside and explained CM role  Pt lives with his wife Katie Guardado in a 2 story home with 3 PRASANNA  PTA pt was independent with ADL's and did not require the use of DME for ambulation  Pt is able to drive and uses [address]  Pt reports no hx of VNA/STR services  Pt has a hx of Major Depressive Disorder which is treated through his PCP  Pt reports that he had 1 psych admission the past year  Pt does not have a  in the community  Pt reports a hx of ETOH rehabilitation in 2016  CM discussed HOST program with pt for if additional resources were needed  Pt declines referral, and does not feel he needs additional resources  Pt does not have a POA  Pt's primary contact is his wife Soto Case [phone]  Pt reports that he was having difficulty obtaining his prescription for insulin PTA due to the need for a prior authorization  Pt requests that his prescription for insulin be [facility] at [facility] prior to d/c  CM to follow  CM reviewed d/c planning process including the following: identifying help at home, patient preference for d/c planning needs, Discharge Lounge, [facility] Meds to Bed program, availability of treatment team to discuss questions or concerns patient and/or family may have regarding understanding medications and recognizing signs and symptoms once discharged  CM also encouraged patient to follow up with all recommended appointments after discharge  Patient advised of importance for patient and family to participate in managing patients medical well being  Patient/caregiver received discharge checklist   Content reviewed  Patient/caregiver encouraged to participate in discharge plan of care prior to discharge home

## 2018-08-13 NOTE — ASSESSMENT & PLAN NOTE
· Smokes a pack and half a day, discussed with patient and strongly encouraged cessation    · Continue nicotine patch

## 2018-08-13 NOTE — PROGRESS NOTES
I called Ander Voss will call us later today-he was in [place] getting a test done   He needs a NICK appt-js

## 2018-08-13 NOTE — PLAN OF CARE
GASTROINTESTINAL - ADULT     Minimal or absence of nausea and/or vomiting Progressing     Maintains or returns to baseline bowel function Progressing     Maintains adequate nutritional intake Progressing     Establish and maintain optimal ostomy function Progressing        METABOLIC, FLUID AND ELECTROLYTES - ADULT     Electrolytes maintained within normal limits Progressing     Fluid balance maintained Progressing     Glucose maintained within target range Progressing        PAIN - ADULT     Verbalizes/displays adequate comfort level or baseline comfort level Progressing

## 2018-08-14 LAB
ALBUMIN SERPL BCP-MCNC: 2.1 G/DL (ref 3.5–5)
ALP SERPL-CCNC: 134 U/L (ref 46–116)
ALT SERPL W P-5'-P-CCNC: 39 U/L (ref 12–78)
ANION GAP SERPL CALCULATED.3IONS-SCNC: 6 MMOL/L (ref 4–13)
AST SERPL W P-5'-P-CCNC: 13 U/L (ref 5–45)
BASOPHILS # BLD AUTO: 0.04 THOUSANDS/ΜL (ref 0–0.1)
BASOPHILS NFR BLD AUTO: 1 % (ref 0–1)
BILIRUB SERPL-MCNC: 0.52 MG/DL (ref 0.2–1)
BUN SERPL-MCNC: 2 MG/DL (ref 5–25)
CALCIUM SERPL-MCNC: 8.4 MG/DL (ref 8.3–10.1)
CHLORIDE SERPL-SCNC: 105 MMOL/L (ref 100–108)
CO2 SERPL-SCNC: 27 MMOL/L (ref 21–32)
CREAT SERPL-MCNC: 0.6 MG/DL (ref 0.6–1.3)
EOSINOPHIL # BLD AUTO: 0.2 THOUSAND/ΜL (ref 0–0.61)
EOSINOPHIL NFR BLD AUTO: 3 % (ref 0–6)
ERYTHROCYTE [DISTWIDTH] IN BLOOD BY AUTOMATED COUNT: 12.6 % (ref 11.6–15.1)
GFR SERPL CREATININE-BSD FRML MDRD: 117 ML/MIN/1.73SQ M
GLUCOSE SERPL-MCNC: 175 MG/DL (ref 65–140)
GLUCOSE SERPL-MCNC: 190 MG/DL (ref 65–140)
GLUCOSE SERPL-MCNC: 212 MG/DL (ref 65–140)
GLUCOSE SERPL-MCNC: 216 MG/DL (ref 65–140)
GLUCOSE SERPL-MCNC: 228 MG/DL (ref 65–140)
HCT VFR BLD AUTO: 30 % (ref 36.5–49.3)
HGB BLD-MCNC: 9.7 G/DL (ref 12–17)
IMM GRANULOCYTES # BLD AUTO: 0.02 THOUSAND/UL (ref 0–0.2)
IMM GRANULOCYTES NFR BLD AUTO: 0 % (ref 0–2)
LIPASE SERPL-CCNC: 92 U/L (ref 73–393)
LYMPHOCYTES # BLD AUTO: 1.18 THOUSANDS/ΜL (ref 0.6–4.47)
LYMPHOCYTES NFR BLD AUTO: 17 % (ref 14–44)
MCH RBC QN AUTO: 27.9 PG (ref 26.8–34.3)
MCHC RBC AUTO-ENTMCNC: 32.3 G/DL (ref 31.4–37.4)
MCV RBC AUTO: 86 FL (ref 82–98)
MONOCYTES # BLD AUTO: 0.57 THOUSAND/ΜL (ref 0.17–1.22)
MONOCYTES NFR BLD AUTO: 8 % (ref 4–12)
NEUTROPHILS # BLD AUTO: 4.96 THOUSANDS/ΜL (ref 1.85–7.62)
NEUTS SEG NFR BLD AUTO: 71 % (ref 43–75)
NRBC BLD AUTO-RTO: 0 /100 WBCS
PLATELET # BLD AUTO: 176 THOUSANDS/UL (ref 149–390)
PMV BLD AUTO: 9.4 FL (ref 8.9–12.7)
POTASSIUM SERPL-SCNC: 3.3 MMOL/L (ref 3.5–5.3)
PROT SERPL-MCNC: 6 G/DL (ref 6.4–8.2)
RBC # BLD AUTO: 3.48 MILLION/UL (ref 3.88–5.62)
SODIUM SERPL-SCNC: 138 MMOL/L (ref 136–145)
WBC # BLD AUTO: 6.97 THOUSAND/UL (ref 4.31–10.16)

## 2018-08-14 PROCEDURE — 82784 ASSAY IGA/IGD/IGG/IGM EACH: CPT | Performed by: PHYSICIAN ASSISTANT

## 2018-08-14 PROCEDURE — 97116 GAIT TRAINING THERAPY: CPT

## 2018-08-14 PROCEDURE — G8978 MOBILITY CURRENT STATUS: HCPCS

## 2018-08-14 PROCEDURE — G8980 MOBILITY D/C STATUS: HCPCS

## 2018-08-14 PROCEDURE — 97163 PT EVAL HIGH COMPLEX 45 MIN: CPT

## 2018-08-14 PROCEDURE — 82787 IGG 1 2 3 OR 4 EACH: CPT | Performed by: PHYSICIAN ASSISTANT

## 2018-08-14 PROCEDURE — 83690 ASSAY OF LIPASE: CPT | Performed by: PHYSICIAN ASSISTANT

## 2018-08-14 PROCEDURE — 80053 COMPREHEN METABOLIC PANEL: CPT | Performed by: NURSE PRACTITIONER

## 2018-08-14 PROCEDURE — 82948 REAGENT STRIP/BLOOD GLUCOSE: CPT

## 2018-08-14 PROCEDURE — 99232 SBSQ HOSP IP/OBS MODERATE 35: CPT | Performed by: INTERNAL MEDICINE

## 2018-08-14 PROCEDURE — G8979 MOBILITY GOAL STATUS: HCPCS

## 2018-08-14 PROCEDURE — 85025 COMPLETE CBC W/AUTO DIFF WBC: CPT | Performed by: NURSE PRACTITIONER

## 2018-08-14 PROCEDURE — 99232 SBSQ HOSP IP/OBS MODERATE 35: CPT | Performed by: NURSE PRACTITIONER

## 2018-08-14 RX ORDER — ELECTROLYTES/DEXTROSE
SOLUTION, ORAL ORAL
Qty: 45 EACH | Refills: 0 | Status: SHIPPED | OUTPATIENT
Start: 2018-08-14

## 2018-08-14 RX ORDER — INSULIN GLARGINE 100 [IU]/ML
5 INJECTION, SOLUTION SUBCUTANEOUS
Status: DISCONTINUED | OUTPATIENT
Start: 2018-08-14 | End: 2018-08-15 | Stop reason: HOSPADM

## 2018-08-14 RX ORDER — POTASSIUM CHLORIDE 20 MEQ/1
40 TABLET, EXTENDED RELEASE ORAL ONCE
Status: COMPLETED | OUTPATIENT
Start: 2018-08-14 | End: 2018-08-14

## 2018-08-14 RX ORDER — OXYCODONE HYDROCHLORIDE 10 MG/1
10 TABLET ORAL EVERY 4 HOURS PRN
Status: DISCONTINUED | OUTPATIENT
Start: 2018-08-14 | End: 2018-08-15 | Stop reason: HOSPADM

## 2018-08-14 RX ORDER — SENNOSIDES 8.6 MG
1 TABLET ORAL
Status: DISCONTINUED | OUTPATIENT
Start: 2018-08-14 | End: 2018-08-15 | Stop reason: HOSPADM

## 2018-08-14 RX ORDER — POLYETHYLENE GLYCOL 3350 17 G/17G
17 POWDER, FOR SOLUTION ORAL DAILY
Status: DISCONTINUED | OUTPATIENT
Start: 2018-08-14 | End: 2018-08-15 | Stop reason: HOSPADM

## 2018-08-14 RX ORDER — BISACODYL 10 MG
10 SUPPOSITORY, RECTAL RECTAL DAILY PRN
Status: DISCONTINUED | OUTPATIENT
Start: 2018-08-14 | End: 2018-08-15 | Stop reason: HOSPADM

## 2018-08-14 RX ORDER — OXYCODONE HYDROCHLORIDE 5 MG/1
5 TABLET ORAL EVERY 4 HOURS PRN
Status: DISCONTINUED | OUTPATIENT
Start: 2018-08-14 | End: 2018-08-15 | Stop reason: HOSPADM

## 2018-08-14 RX ADMIN — HYDROMORPHONE HYDROCHLORIDE 2 MG: 2 INJECTION INTRAMUSCULAR; INTRAVENOUS; SUBCUTANEOUS at 09:21

## 2018-08-14 RX ADMIN — TRAZODONE HYDROCHLORIDE 50 MG: 50 TABLET ORAL at 22:08

## 2018-08-14 RX ADMIN — VENLAFAXINE 75 MG: 37.5 TABLET ORAL at 17:06

## 2018-08-14 RX ADMIN — HEPARIN SODIUM 5000 UNITS: 5000 INJECTION, SOLUTION INTRAVENOUS; SUBCUTANEOUS at 13:58

## 2018-08-14 RX ADMIN — INSULIN LISPRO 4 UNITS: 100 INJECTION, SOLUTION INTRAVENOUS; SUBCUTANEOUS at 18:20

## 2018-08-14 RX ADMIN — HEPARIN SODIUM 5000 UNITS: 5000 INJECTION, SOLUTION INTRAVENOUS; SUBCUTANEOUS at 08:05

## 2018-08-14 RX ADMIN — VENLAFAXINE 75 MG: 37.5 TABLET ORAL at 20:08

## 2018-08-14 RX ADMIN — FENOFIBRATE 145 MG: 145 TABLET ORAL at 08:07

## 2018-08-14 RX ADMIN — HYDROMORPHONE HYDROCHLORIDE 0.2 MG: 1 INJECTION, SOLUTION INTRAMUSCULAR; INTRAVENOUS; SUBCUTANEOUS at 20:06

## 2018-08-14 RX ADMIN — OXYCODONE HYDROCHLORIDE 5 MG: 5 TABLET ORAL at 11:07

## 2018-08-14 RX ADMIN — Medication 100 MG: at 08:06

## 2018-08-14 RX ADMIN — OXYCODONE HYDROCHLORIDE 10 MG: 10 TABLET ORAL at 22:09

## 2018-08-14 RX ADMIN — OXYCODONE HYDROCHLORIDE 5 MG: 10 TABLET ORAL at 17:05

## 2018-08-14 RX ADMIN — OXYCODONE HYDROCHLORIDE 5 MG: 5 TABLET ORAL at 16:01

## 2018-08-14 RX ADMIN — POTASSIUM CHLORIDE 40 MEQ: 1500 TABLET, EXTENDED RELEASE ORAL at 11:07

## 2018-08-14 RX ADMIN — POLYETHYLENE GLYCOL 3350 17 G: 17 POWDER, FOR SOLUTION ORAL at 22:07

## 2018-08-14 RX ADMIN — INSULIN GLARGINE 5 UNITS: 100 INJECTION, SOLUTION SUBCUTANEOUS at 22:08

## 2018-08-14 RX ADMIN — HYDROMORPHONE HYDROCHLORIDE 2 MG: 2 INJECTION INTRAMUSCULAR; INTRAVENOUS; SUBCUTANEOUS at 05:03

## 2018-08-14 RX ADMIN — LISINOPRIL 10 MG: 10 TABLET ORAL at 08:06

## 2018-08-14 RX ADMIN — RISPERIDONE 0.5 MG: 0.25 TABLET ORAL at 17:05

## 2018-08-14 RX ADMIN — HYDROMORPHONE HYDROCHLORIDE 0.2 MG: 1 INJECTION, SOLUTION INTRAMUSCULAR; INTRAVENOUS; SUBCUTANEOUS at 13:58

## 2018-08-14 RX ADMIN — FOLIC ACID 1 MG: 1 TABLET ORAL at 08:05

## 2018-08-14 RX ADMIN — SENNOSIDES 8.6 MG: 8.6 TABLET, FILM COATED ORAL at 22:08

## 2018-08-14 RX ADMIN — RISPERIDONE 0.5 MG: 0.25 TABLET ORAL at 08:06

## 2018-08-14 RX ADMIN — INSULIN LISPRO 4 UNITS: 100 INJECTION, SOLUTION INTRAVENOUS; SUBCUTANEOUS at 12:44

## 2018-08-14 RX ADMIN — INSULIN LISPRO 2 UNITS: 100 INJECTION, SOLUTION INTRAVENOUS; SUBCUTANEOUS at 22:11

## 2018-08-14 RX ADMIN — NICOTINE 1 PATCH: 21 PATCH, EXTENDED RELEASE TRANSDERMAL at 08:06

## 2018-08-14 RX ADMIN — HEPARIN SODIUM 5000 UNITS: 5000 INJECTION, SOLUTION INTRAVENOUS; SUBCUTANEOUS at 22:08

## 2018-08-14 RX ADMIN — INSULIN LISPRO 2 UNITS: 100 INJECTION, SOLUTION INTRAVENOUS; SUBCUTANEOUS at 09:21

## 2018-08-14 RX ADMIN — LISINOPRIL 10 MG: 10 TABLET ORAL at 17:05

## 2018-08-14 RX ADMIN — HYDROMORPHONE HYDROCHLORIDE 2 MG: 2 INJECTION INTRAMUSCULAR; INTRAVENOUS; SUBCUTANEOUS at 01:00

## 2018-08-14 RX ADMIN — VENLAFAXINE 75 MG: 37.5 TABLET ORAL at 08:07

## 2018-08-14 RX ADMIN — PANTOPRAZOLE SODIUM 40 MG: 40 TABLET, DELAYED RELEASE ORAL at 05:03

## 2018-08-14 NOTE — ASSESSMENT & PLAN NOTE
· LFT's continue to trend down, alk phos remains mildly elevated but continues to trend down as well  · Continue to trend

## 2018-08-14 NOTE — PROGRESS NOTES
Progress Note - Andrea Rasheed 46 y o  male MRN: [id number]    Unit/Bed#: [facility] 1-0 Encounter: [id number]      ASSESSMENT/ PLAN:   45 yo male pmh alcoholic pancreatitis, COPD, DM was [facility] from [facility] for acute on chronic pancreatitis  1  Acute on chronic pancreatitis: Lipase wnl  He continues to have LUQ pain but states it is improved from yesterday  MRI of the abdomen without contrast revealed acute on chronic pancreatitis, the main pancreatic duct appears to abruptly truncated in the area of prior pseudocyst or fat necrosis within the tail of the pancreas, this is unchanged  The main pancreatic duct and head and body of the pancreas is mildly more dilated compared to previous MRI concerning for reactive change versus obstruction  Also possible small dilated side branch which may represent IPMN versus reactive changes  CBD dilation remained stable without any evidence of stone or stricture  He should have EUS as outpatient for further evaluation  Will rule out AI pancreatitis with IgG4  Triglycerides elevated but unlikely source of pancreatitis  He quit drinking alcohol but this could be related to smoking   -monitor abdominal pain  -pain management   -continue IVF  -advised smoking cessation   -advance to full liquid diet, if tolerates advance to low fat  -outpatient EUS     2  Transaminitis: LFTs were elevated on 8/11 and started to down trend 8/12 from -->48, ALT 99-->69, alk phos 152-->162 and tbili of 1 8-->1 3  He has had a hard time getting blood work done and no updated LFTs  -recheck hepatic panel     -discussed with SLIM    Subjective:     Patient was seen and examined  He admits to some improvement in his abdominal pain but continues to experience LUQ pain worse with liquids  Objective:     Vitals: Blood pressure 144/94, pulse 97, temperature 97 9 °F (36 6 °C), resp  rate 18, height 6' (1 829 m), weight 88 8 kg (195 lb 12 8 oz), SpO2 99 %  ,Body mass index is 26 56 kg/m²  Intake/Output Summary (Last 24 hours) at 08/14/18 1111  Last data filed at 08/14/18 2665   Gross per 24 hour   Intake             4905 ml   Output             2870 ml   Net             2035 ml       Physical Exam:   Physical Exam   Constitutional: He is oriented to person, place, and time  He appears well-developed and well-nourished  No distress  HENT:   Head: Normocephalic and atraumatic  Nose: Nose normal    Eyes: Right eye exhibits no discharge  Left eye exhibits no discharge  No scleral icterus  Neck: Normal range of motion  Neck supple  No tracheal deviation present  Cardiovascular: Normal rate, regular rhythm and normal heart sounds  Exam reveals no gallop and no friction rub  No murmur heard  Pulmonary/Chest: Effort normal and breath sounds normal  No stridor  No respiratory distress  He has no wheezes  He has no rales  Abdominal: Soft  Bowel sounds are normal  He exhibits no distension  There is tenderness in the left upper quadrant  There is no rebound and no guarding  Musculoskeletal: Normal range of motion  He exhibits no edema, tenderness or deformity  Neurological: He is alert and oriented to person, place, and time  Skin: Skin is warm and dry  No rash noted  He is not diaphoretic  No erythema  No pallor  Psychiatric: He has a normal mood and affect   His behavior is normal          Invasive Devices     Peripheral Intravenous Line            Peripheral IV 08/13/18 Right;Ventral (anterior) Forearm 1 day                Lab Results:      Results from last 7 days  Lab Units 08/14/18  0619   WBC Thousand/uL 6 97   HEMOGLOBIN g/dL 9 7*   HEMATOCRIT % 30 0*   PLATELETS Thousands/uL 176   NEUTROS PCT % 71   LYMPHS PCT % 17   MONOS PCT % 8   EOS PCT % 3       Results from last 7 days  Lab Units 08/14/18  0619   SODIUM mmol/L 138   POTASSIUM mmol/L 3 3*   CHLORIDE mmol/L 105   CO2 mmol/L 27   BUN mg/dL 2*   CREATININE mg/dL 0 60   CALCIUM mg/dL 8 4   TOTAL PROTEIN g/dL 6 0* BILIRUBIN TOTAL mg/dL 0 52   ALK PHOS U/L 134*   ALT U/L 39   AST U/L 13   GLUCOSE RANDOM mg/dL 175*       Results from last 7 days  Lab Units 08/11/18  0500   INR  1 13       Results from last 7 days  Lab Units 08/14/18  0619  08/10/18  0509   LIPASE u/L 92  < >  --    AMYLASE IU/L  --   --  176*   < > = values in this interval not displayed  Imaging Studies: I have personally reviewed pertinent imaging studies  No results found

## 2018-08-14 NOTE — PHYSICAL THERAPY NOTE
Physical Therapy Tx Session:       08/14/18 1050   Pain Assessment   Pain Assessment 0-10   Pain Score 6   Pain Type Acute pain   Pain Location Abdomen   Pain Orientation Bilateral   Hospital Pain Intervention(s) Repositioned; Ambulation/increased activity; Emotional support; Environmental changes; Rest   Restrictions/Precautions   Other Precautions Pain;Telemetry;Multiple lines   General   Chart Reviewed Yes   Family/Caregiver Present No   Cognition   Overall Cognitive Status WFL   Arousal/Participation Cooperative; Alert   Attention Within functional limits   Orientation Level Oriented X4   Following Commands Follows one step commands without difficulty   Subjective   Subjective pt willing and agreeable to ambulate further distance and to perform stair training;"when I feel better I can do more"   Transfers   Sit to Stand 5  Supervision   Additional items Assist x 1; Armrests; Verbal cues   Stand to Sit 5  Supervision   Additional items Assist x 1; Armrests; Verbal cues   Additional Comments limited number of steps performed 2* IV line length   Ambulation/Elevation   Gait pattern Narrow MIMI; Forward Flexion; Antalgic; Foward flexed; Short stride   Gait Assistance 5  Supervision   Additional items Assist x 1;Verbal cues   Assistive Device None   Distance additional 200 feet without use of DME On various surfaces;no LOB noted and/or observed during upright mobility   Stair Management Assistance 5  Supervision   Additional items Assist x 1;Verbal cues   Stair Management Technique No rails; Alternating pattern; Foreward;Reciprocal   Number of Stairs 5   Balance   Static Sitting Good   Dynamic Sitting Fair   Static Standing Fair   Dynamic Standing Fair   Ambulatory Fair   Activity Tolerance   Activity Tolerance (good)   Equipment Use   Comments pt able to score 23/24 on DGI balance test with results of minimal to no risk for future falls   Assessment   Prognosis Good   Problem List Pain   Assessment pt able to ambulate additional 200 feet without use of DME on various surfaces S level of A,no LOB noted and/or observed  Pt able to perform sit to stand transfers to and from neutral surface S level of A  Pt able to go up and down 5 steps without use of rail reciprical gait pattern S level of A  Limited number of steps performed 2* IV line length  Pt cont to report pain ABD area,dec with mobility  Encouraged and educated pt to ambulate with restorative therapy aide vs family vs self daily, pt agreed  No further skilled inpt PT services needed at this time,D/C from PT  Pt scored 23/24 DGI with results of minimal to no risk for future falls     Goals   Patient Goals to dec the pain   Treatment Day 1   Plan   Treatment/Interventions Spoke to nursing;Spoke to case management   Progress Discontinue PT   Recommendation   Recommendation Home with family support   PT - OK to Discharge Yes  (once cleared by MD)

## 2018-08-14 NOTE — SOCIAL WORK
CM provided with pt's insulin prescription in order to check the cost  CM informed [facility] Dakin with [facility] that the prescriptions are not covered by insurance  As per Charna Dakin, pt's insurance will need to be contacted by a physician in order to either obtain authorizations or to be provided with a list of medications that are covered by insurance  CM notified Marlena Cockayne of same

## 2018-08-14 NOTE — ASSESSMENT & PLAN NOTE
· Patient has known history of multiple episodes of acute pancreatitis in the past related to alcohol abuse  Denies any alcohol use since his last episode in July 2018  Also has pancreatic cyst from recurrent pancreatitis  Lipase on admit 831  · MRCP was done that showed acute pancreatitis, increase in the dilation of the main pancreatic duct in the head and body and also small dilated side branch which may represent intraductal papillary mucinous neoplasm/reactive change which is the reason why patient was transferred to [facility] for ERCP/EUS  As per GI, we will plan for this to be done as outpatient  · GI following, appreciate ongoing recommendations  IGG 1,2,3,4 pending  · Tolerating clear liquid diet, discussed with GI will upgrade diet to diabetic/low fat diet  · Can stop IVF  · Pain management, will add in oral meds and wean off Dilaudid     · Serial abdominal exams  · Encourage continued alcohol cessation and encourage smoking cessation

## 2018-08-14 NOTE — ASSESSMENT & PLAN NOTE
· Smokes a pack and half a day, discussed with patient and strongly encouraged cessation especially with recurrent pancreatitis    · Continue nicotine patch

## 2018-08-14 NOTE — PHYSICAL THERAPY NOTE
Physical Therapy Evaluation:    2 forms of pt ID verified:name,birthdate and pt ID carmen    Patient's Name: Lorene Escamilla    Admitting Diagnosis  Abdominal pain, acute [R10 9]    Problem List  Patient Active Problem List   Diagnosis    Quiros esophagus    Benign essential hypertension    [facility]ch[facility]) [facility]    Fatty liver    Fibromyalgia    Generalized anxiety disorder    Hyperlipidemia    Insomnia    Iron deficiency anemia    MDD (major depressive disorder), single episode    Nephrolithiasis    Other chronic pain    Recurrent pancreatitis ([place] [address] )    Sleep disorder    Diabetes mellitus type 1 5, managed as type 1 ([place] [address] )    Abdominal pain    Acute hyperkalemia    Acute kidney injury ([place] [address] )    GERD (gastroesophageal reflux disease)    Anxiety and depression    Tobacco abuse    H/O alcohol abuse    Leukemoid reaction    Acute on chronic pancreatitis ([place] [address] )    Transaminitis    Tobacco dependence       Past Medical History  Past Medical History:   Diagnosis Date    Allergic rhinitis     last assessed: 12/5/2012    Quiros esophagus     Bronchitis, asthmatic     last assessed: 9/15/2014    Cholelithiasis     Chronic pain     COPD (chronic obstructive pulmonary disease) ([place] [address] )     Diabetes mellitus (HCC)     GERD (gastroesophageal reflux disease)     Hypertension     Metatarsalgia     last assessed: 8/11/2014, unspecified laterality, ? cause  PE with changes  To see DPM tomorrow      Pancreatitis     Psychiatric disorder     Renal disorder        Past Surgical History  Past Surgical History:   Procedure Laterality Date    CHOLECYSTECTOMY LAPAROSCOPIC      FOOT SURGERY      KNEE ARTHROSCOPY      (therapeutic)    VASECTOMY      vas deferens      08/14/18 1035   Note Type   Note type Eval/Treat   Pain Assessment   Pain Assessment 0-10   Pain Score 6   Pain Type Acute pain   Pain Location Abdomen   Pain Orientation Bilateral   Hospital Pain Intervention(s) Repositioned; Ambulation/increased activity; Emotional support; Environmental changes; Rest   Home Living   Type of [address] Two level;Bed/bath upstairs;Stairs to enter without rails  (3 PRASANNA)   Home Equipment Other (Comment)  (no use of DME PTA)   Additional Comments pt reports being completely I PTA,lives with family and wife who are able to A upon D/C   Prior Function   Level of [place] Independent with ADLs and functional mobility  (per pt PTA)   Lives With Spouse; Family  (pt reports 15 y/o child able to A pt as needed during day)   Receives Help From Family  (per pt as needed PTA)   ADL Assistance Independent   IADLs Independent   Falls in the last 6 months 0   Vocational Unemployed   Restrictions/Precautions   Other Precautions Pain;Multiple lines;Telemetry   General   Additional Pertinent History acute ABD pain,acute pancreatitis   Family/Caregiver Present No   Cognition   Overall Cognitive Status WFL   Arousal/Participation Cooperative   Attention Within functional limits   Orientation Level Oriented X4   Following Commands Follows one step commands without difficulty   RLE Assessment   RLE Assessment WFL   LLE Assessment   LLE Assessment WFL   Coordination   Movements are Fluid and Coordinated 0   Coordination and Movement Description slow mobility,pain   Sensation WFL   Light Touch   RLE Light Touch Grossly intact   LLE Light Touch Grossly intact   Bed Mobility   Supine to Sit 5  Supervision   Additional items Assist x 1;Bedrails; Increased time required;Verbal cues   Transfers   Sit to Stand 5  Supervision   Additional items Assist x 1;Bedrails; Increased time required;Verbal cues   Stand to Sit 5  Supervision   Additional items Assist x 1; Armrests; Increased time required;Verbal cues  (for safety and education)   Additional Comments static  BR for use of urinal independently lasting 1-2 minutes   Ambulation/Elevation   Gait pattern Narrow MIMI; Forward Flexion; Foward flexed; Short stride   Gait Assistance 5  Supervision   Additional items Assist x 1;Verbal cues   Assistive Device None   Distance 150 feet without use of DME on tile and hardwood ninfa;no LOB noted and/or observed during upright mobility   Balance   Static Sitting Good   Dynamic Sitting Fair   Static Standing Fair   Dynamic Standing Fair   Ambulatory Fair   Endurance Deficit   Endurance Deficit Yes   Endurance Deficit Description pain   Activity Tolerance   Activity Tolerance Patient limited by pain  (good)   Medical Staff Made Aware CM Anthony Kent)   Nurse Made Aware yes   Assessment   Prognosis Good   Problem List Decreased endurance; Impaired balance;Decreased mobility; Decreased skin integrity;Pain   Assessment Pt is a 47 y/o male admitted to [facility] 2* acute pancreatitis and acute ABD pain  Pt lives with wife and family in 2 story home,(+)PRASANNA without use of rail,no use of DME PTA,reports no recent falls,currently unemployeed and reports being completely independent PTA  Pt reports A from family upon D/C from hospital to home  Pt currently is not at functional mobility baseline,needs Ax1 for mobility,inc ABD pain at rest and during mobility,multiple lines,VS monitored continuously,IV medicine managment and ongoing medical care  Pt demonstrates limited mobility and gait 2* dec endurance,dec balance,ataxic and unsteady gait at times and needs S for transfers,BM and gait without use of DME  Pt would cont to benefit from skilled inpt PT services to maximize functional independence     Goals   Patient Goals to feel better and to go home   STG Expiration Date 08/19/18   Short Term Goal #1 in 3-5 days:pt will be able to ambulate >200 feet without use of DME on various surfaces without LOB and no rest breaks,activity tolerance:45mins/45mins,inc balance 1/2 grade to dec fall risk,inc BLE strength 1/2 grade to inc balance and endurance,up and down 1-2 flights of steps without use of rail S level of A to navigate stairs at home,pt will be able to perform BM and transfers independently to dec caregiver burden   Treatment Day 0   Plan   Treatment/Interventions Functional transfer training;Elevations; Endurance training;Patient/family training;Equipment eval/education; Bed mobility;Gait training;Spoke to nursing;Spoke to case management   PT Frequency Other (Comment)  (3-5x/week;restorative therapy aide for mobility)   Recommendation   Recommendation Home with family support   Barthel Index   Feeding 10   Bathing 5   Grooming Score 5   Dressing Score 10   Bladder Score 10   Bowels Score 10   Toilet Use Score 10   Transfers (Bed/Chair) Score 15   Mobility (Level Surface) Score 10   Stairs Score 0   Barthel Index Score 85           @Estefani Dixon, PT, DPT@

## 2018-08-14 NOTE — ASSESSMENT & PLAN NOTE
Lab Results   Component Value Date    HGBA1C 10 4 (H) 08/10/2018       Recent Labs      08/13/18   1213  08/13/18   1735  08/13/18   2051  08/14/18   0803   POCGLU  199*  181*  206*  190*       Blood Sugar Average: Last 72 hrs:  (P) 426 [id number]   He was diagnosed with diabetes approximately 10 years ago  He was told by and endocrinologist that he might have type 1 diabetes as he has family history of type 1 diabetes in his sister  Was initially started on oral anti diabetics but were not helping so was switched to insulin  He also could have diabetes because of chronic pancreatitis  His blood glucose was very high on admission at [address] [place] [address] > 700 because he was not using his insulin approximately 2 weeks prior as he ran out of insulin and he could not fill the script because he needed prior authorization from insurance  Improved after starting insulin  He was starting on long-acting insulin along with short-acting insulin as per sliding scale  He was getting long-acting insulin Levemir 45 units but he has not been eating or drinking, he takes Toujeo at home 45/50 units  His blood glucose was low before he left from [address] [place] [address] any had symptoms of hypoglycemia for which she was given 1 ampule of D50  Here also sugars on arrival were still in 60s so patient had to be given 1 more amp of D50  Will start 5 units Lantus HS tonight now that diet has been advanced  FBG this morning was 171  Continue with sliding scale insulin for now as patient is only on liquids  Continue to monitor sugars closely and adjust as needed once dietary allowance has been increased  Will need endocrine f/u as outpatient

## 2018-08-14 NOTE — PLAN OF CARE
Problem: PHYSICAL THERAPY ADULT  Goal: Performs mobility at highest level of function for planned discharge setting  See evaluation for individualized goals  Treatment/Interventions: Functional transfer training, Elevations, Endurance training, Patient/family training, Equipment eval/education, Bed mobility, Gait training, Spoke to nursing, Spoke to case management          See flowsheet documentation for full assessment, interventions and recommendations  Prognosis: Good  Problem List: Decreased endurance, Impaired balance, Decreased mobility, Decreased skin integrity, Pain  Assessment: Pt is a 47 y/o male admitted to [facility] 2* acute pancreatitis and acute ABD pain  Pt lives with wife and family in 2 story home,(+)PRASANNA without use of rail,no use of DME PTA,reports no recent falls,currently unemployeed and reports being completely independent PTA  Pt reports A from family upon D/C from hospital to home  Pt currently is not at functional mobility baseline,needs Ax1 for mobility,inc ABD pain at rest and during mobility,multiple lines,VS monitored continuously,IV medicine managment and ongoing medical care  Pt demonstrates limited mobility and gait 2* dec endurance,dec balance,ataxic and unsteady gait at times and needs S for transfers,BM and gait without use of DME  Pt would cont to benefit from skilled inpt PT services to maximize functional independence  Recommendation: Home with family support          See flowsheet documentation for full assessment

## 2018-08-14 NOTE — PROGRESS NOTES
[facility]    Progress Note - Edith Torres 1970, 46 y o  male MRN: [id number]    Unit/Bed#: [facility] 612-01 Encounter: [id number]    Primary Care Provider: Mesha Lowry DO   Date and time admitted to hospital: 8/12/2018  4:37 PM    * Acute on chronic pancreatitis [facility])   Assessment & Plan    · Patient has known history of multiple episodes of acute pancreatitis in the past related to alcohol abuse  Denies any alcohol use since his last episode in July 2018  Also has pancreatic cyst from recurrent pancreatitis  Lipase on admit 831  · MRCP was done that showed acute pancreatitis, increase in the dilation of the main pancreatic duct in the head and body and also small dilated side branch which may represent intraductal papillary mucinous neoplasm/reactive change which is the reason why patient was transferred to [place] for ERCP/EUS  As per GI, we will plan for this to be done as outpatient  · GI following, appreciate ongoing recommendations  IGG 1,2,3,4 pending  · Tolerating clear liquid diet, discussed with GI will upgrade diet to diabetic/low fat diet  · Can stop IVF  · Pain management, will add in oral meds and wean off Dilaudid  · Serial abdominal exams  · Encourage continued alcohol cessation and encourage smoking cessation             H/O alcohol abuse   Assessment & Plan    · With multiple episodes of acute pancreatitis related to alcohol abuse as above  · As per him, patient has stopped drinking since his last episode earlier this year in June/July 2018  Diabetes mellitus type 1 5, managed as type 1 [facility])   Assessment & Plan    Lab Results   Component Value Date    HGBA1C 10 4 (H) 08/10/2018       Recent Labs      08/13/18   1213  08/13/18   1735  08/13/18   2051  08/14/18   0803   POCGLU  199*  181*  206*  190*       Blood Sugar Average: Last 72 hrs:  (P) 203 [id number]   He was diagnosed with diabetes approximately 10 years ago    He was told by and endocrinologist that he might have type 1 diabetes as he has family history of type 1 diabetes in his sister  Was initially started on oral anti diabetics but were not helping so was switched to insulin  He also could have diabetes because of chronic pancreatitis  His blood glucose was very high on admission at [facility] hospital > 700 because he was not using his insulin approximately 2 weeks prior as he ran out of insulin and he could not fill the script because he needed prior authorization from insurance  Improved after starting insulin  He was starting on long-acting insulin along with short-acting insulin as per sliding scale  He was getting long-acting insulin Levemir 45 units but he has not been eating or drinking, he takes Toujeo at home 45/50 units  His blood glucose was low before he left from [facility] hospital any had symptoms of hypoglycemia for which she was given 1 ampule of D50  Here also sugars on arrival were still in 60s so patient had to be given 1 more amp of D50  Will start 5 units Lantus HS tonight now that diet has been advanced  FBG this morning was 171  Continue with sliding scale insulin for now as patient is only on liquids  Continue to monitor sugars closely and adjust as needed once dietary allowance has been increased  Will need endocrine f/u as outpatient  Transaminitis   Assessment & Plan    · LFT's continue to trend down, alk phos remains mildly elevated but continues to trend down as well  · Continue to trend  Tobacco dependence   Assessment & Plan    · Smokes a pack and half a day, discussed with patient and strongly encouraged cessation especially with recurrent pancreatitis  · Continue nicotine patch          Pharmacologic: Heparin  Mechanical VTE Prophylaxis in Place: No    Patient Centered Rounds: I have performed bedside rounds with nursing staff today      Discussions with Specialists or Other Care Team Provider: nursing, GI, case management regarding insulin     Education and Discussions with Family / Patient: Patient     Time Spent for Care: 30 minutes  More than 50% of total time spent on counseling and coordination of care as described above  Current Length of Stay: 2 day(s)    Current Patient Status: Inpatient   Certification Statement: The patient will continue to require additional inpatient hospital stay due to pain control, labs pending, gi recommendations  Discharge Plan / Estimated Discharge Date: Likely tomorrow  If able to tolerate full with toast/crackers then upgrade to low fat tomorrow, if tolerates can be discharged home with plan for EUS at outpatient  Code Status: Level 1 - Full Code      Subjective:   Patient states the pain is better today but still there  Wants to try low-fat diet  Denies any nausea or vomiting  States that he does not have any insulin at home and was not taking it for approximately 2 weeks prior to admission  Objective:     Vitals:   Temp (24hrs), Av 1 °F (36 7 °C), Min:97 9 °F (36 6 °C), Max:98 2 °F (36 8 °C)    HR:  [89-97] 97  Resp:  [18-20] 18  BP: (134-144)/(77-94) 144/94  SpO2:  [88 %-99 %] 99 %  Body mass index is 26 56 kg/m²  Input and Output Summary (last 24 hours): Intake/Output Summary (Last 24 hours) at 18 1057  Last data filed at 18 5766   Gross per 24 hour   Intake             4905 ml   Output             2870 ml   Net             2035 ml       Physical Exam:     Physical Exam   Constitutional: He is oriented to person, place, and time  No distress  HENT:   Head: Normocephalic  Eyes: Pupils are equal, round, and reactive to light  Neck: Normal range of motion  No JVD present  Cardiovascular: Normal rate, regular rhythm and intact distal pulses  No murmur heard  Pulmonary/Chest: Effort normal and breath sounds normal    Abdominal: Soft  Bowel sounds are decreased  There is tenderness (LUQ but improved today )     Musculoskeletal: Normal range of motion  He exhibits no edema  Neurological: He is alert and oriented to person, place, and time  Skin: Skin is warm and dry  Psychiatric: He has a normal mood and affect  Nursing note and vitals reviewed        Additional Data:     Labs:      Results from last 7 days  Lab Units 08/14/18  0619   WBC Thousand/uL 6 97   HEMOGLOBIN g/dL 9 7*   HEMATOCRIT % 30 0*   PLATELETS Thousands/uL 176   NEUTROS PCT % 71   LYMPHS PCT % 17   MONOS PCT % 8   EOS PCT % 3       Results from last 7 days  Lab Units 08/14/18  0619   SODIUM mmol/L 138   POTASSIUM mmol/L 3 3*   CHLORIDE mmol/L 105   CO2 mmol/L 27   BUN mg/dL 2*   CREATININE mg/dL 0 60   CALCIUM mg/dL 8 4   TOTAL PROTEIN g/dL 6 0*   BILIRUBIN TOTAL mg/dL 0 52   ALK PHOS U/L 134*   ALT U/L 39   AST U/L 13   GLUCOSE RANDOM mg/dL 175*       Results from last 7 days  Lab Units 08/11/18  0500   INR  1 13         Recent Cultures (last 7 days):           Last 24 Hours Medication List:     Current Facility-Administered Medications:  acetaminophen 650 mg Oral Q6H PRN Brandee Klein MD    fenofibrate 145 mg Oral Daily Brandee Klein MD    folic acid 1 mg Oral Daily Brandee Klein MD    heparin (porcine) 5,000 Units Subcutaneous Q8H [address] Brandee Klein MD    HYDROmorphone 0 2 mg Intravenous Q6H PRN TYE Vega    hydrOXYzine HCL 50 mg Oral TID PRN Brandee Klein MD    insulin glargine 5 Units Subcutaneous HS TYE Vega    insulin lispro 1-6 Units Subcutaneous HS Maria Guadalupe Renteria PA-C    insulin lispro 2-12 Units Subcutaneous TID AC Brandee Klein MD    lactated ringers 100 mL/hr Intravenous Continuous TYE Gtz Last Rate: Stopped (08/14/18 0804)   lisinopril 10 mg Oral BID Brandee Klein MD    nicotine 1 patch Transdermal Daily Brandee Klein MD    ondansetron 4 mg Intravenous Q6H PRN Brandee Klein MD    oxyCODONE 10 mg Oral Q4H PRN TYE Vega    oxyCODONE 5 mg Oral Q4H PRN Margarita Shukla TYE    pantoprazole 40 mg Oral Early Morning Ivette Ghotra MD    potassium chloride 40 mEq Oral Once Margarita M Ivy, CRMANAV    risperiDONE 0 5 mg Oral BID Ivette Ghotra MD    thiamine 100 mg Oral Daily Ivette Ghotra MD    traZODone 50 mg Oral HS Ivette Ghotra MD    venlafaxine 75 mg Oral TID Ivette Ghotra MD         Today, Patient Was Seen By: TYE Vargas    ** Please Note: Dragon 360 Dictation voice to text software may have been used in the creation of this document   **

## 2018-08-14 NOTE — PLAN OF CARE
DISCHARGE PLANNING     Discharge to home or other facility with appropriate resources Progressing        DISCHARGE PLANNING - CARE MANAGEMENT     Discharge to post-acute care or home with appropriate resources Progressing        GASTROINTESTINAL - ADULT     Minimal or absence of nausea and/or vomiting Progressing     Maintains or returns to baseline bowel function Progressing     Maintains adequate nutritional intake Progressing     Establish and maintain optimal ostomy function Progressing        METABOLIC, FLUID AND ELECTROLYTES - ADULT     Electrolytes maintained within normal limits Progressing     Fluid balance maintained Progressing     Glucose maintained within target range Progressing        PAIN - ADULT     Verbalizes/displays adequate comfort level or baseline comfort level Progressing

## 2018-08-15 ENCOUNTER — TRANSITIONAL CARE MANAGEMENT (OUTPATIENT)
Dept: INTERNAL MEDICINE CLINIC | Facility: CLINIC | Age: 48
End: 2018-08-15

## 2018-08-15 VITALS
WEIGHT: 195.8 LBS | RESPIRATION RATE: 18 BRPM | HEART RATE: 66 BPM | TEMPERATURE: 98.1 F | SYSTOLIC BLOOD PRESSURE: 122 MMHG | HEIGHT: 72 IN | DIASTOLIC BLOOD PRESSURE: 73 MMHG | BODY MASS INDEX: 26.52 KG/M2 | OXYGEN SATURATION: 93 %

## 2018-08-15 LAB
ALBUMIN SERPL BCP-MCNC: 2.3 G/DL (ref 3.5–5)
ALP SERPL-CCNC: 126 U/L (ref 46–116)
ALT SERPL W P-5'-P-CCNC: 31 U/L (ref 12–78)
ANION GAP SERPL CALCULATED.3IONS-SCNC: 7 MMOL/L (ref 4–13)
AST SERPL W P-5'-P-CCNC: 14 U/L (ref 5–45)
BASOPHILS # BLD AUTO: 0.02 THOUSANDS/ΜL (ref 0–0.1)
BASOPHILS NFR BLD AUTO: 0 % (ref 0–1)
BILIRUB SERPL-MCNC: 0.4 MG/DL (ref 0.2–1)
BUN SERPL-MCNC: 3 MG/DL (ref 5–25)
CALCIUM SERPL-MCNC: 8.5 MG/DL (ref 8.3–10.1)
CHLORIDE SERPL-SCNC: 103 MMOL/L (ref 100–108)
CO2 SERPL-SCNC: 28 MMOL/L (ref 21–32)
CREAT SERPL-MCNC: 0.78 MG/DL (ref 0.6–1.3)
EOSINOPHIL # BLD AUTO: 0.19 THOUSAND/ΜL (ref 0–0.61)
EOSINOPHIL NFR BLD AUTO: 3 % (ref 0–6)
ERYTHROCYTE [DISTWIDTH] IN BLOOD BY AUTOMATED COUNT: 12.6 % (ref 11.6–15.1)
GFR SERPL CREATININE-BSD FRML MDRD: 105 ML/MIN/1.73SQ M
GLUCOSE SERPL-MCNC: 164 MG/DL (ref 65–140)
GLUCOSE SERPL-MCNC: 167 MG/DL (ref 65–140)
GLUCOSE SERPL-MCNC: 381 MG/DL (ref 65–140)
HCT VFR BLD AUTO: 30.4 % (ref 36.5–49.3)
HGB BLD-MCNC: 9.8 G/DL (ref 12–17)
IMM GRANULOCYTES # BLD AUTO: 0.02 THOUSAND/UL (ref 0–0.2)
IMM GRANULOCYTES NFR BLD AUTO: 0 % (ref 0–2)
LYMPHOCYTES # BLD AUTO: 1.82 THOUSANDS/ΜL (ref 0.6–4.47)
LYMPHOCYTES NFR BLD AUTO: 28 % (ref 14–44)
MCH RBC QN AUTO: 27.8 PG (ref 26.8–34.3)
MCHC RBC AUTO-ENTMCNC: 32.2 G/DL (ref 31.4–37.4)
MCV RBC AUTO: 86 FL (ref 82–98)
MONOCYTES # BLD AUTO: 0.57 THOUSAND/ΜL (ref 0.17–1.22)
MONOCYTES NFR BLD AUTO: 9 % (ref 4–12)
NEUTROPHILS # BLD AUTO: 3.95 THOUSANDS/ΜL (ref 1.85–7.62)
NEUTS SEG NFR BLD AUTO: 60 % (ref 43–75)
NRBC BLD AUTO-RTO: 0 /100 WBCS
PLATELET # BLD AUTO: 189 THOUSANDS/UL (ref 149–390)
PMV BLD AUTO: 9.2 FL (ref 8.9–12.7)
POTASSIUM SERPL-SCNC: 3.3 MMOL/L (ref 3.5–5.3)
PROT SERPL-MCNC: 6.1 G/DL (ref 6.4–8.2)
RBC # BLD AUTO: 3.53 MILLION/UL (ref 3.88–5.62)
SODIUM SERPL-SCNC: 138 MMOL/L (ref 136–145)
WBC # BLD AUTO: 6.57 THOUSAND/UL (ref 4.31–10.16)

## 2018-08-15 PROCEDURE — 99239 HOSP IP/OBS DSCHRG MGMT >30: CPT | Performed by: NURSE PRACTITIONER

## 2018-08-15 PROCEDURE — 85025 COMPLETE CBC W/AUTO DIFF WBC: CPT | Performed by: NURSE PRACTITIONER

## 2018-08-15 PROCEDURE — 80053 COMPREHEN METABOLIC PANEL: CPT | Performed by: NURSE PRACTITIONER

## 2018-08-15 PROCEDURE — 99232 SBSQ HOSP IP/OBS MODERATE 35: CPT | Performed by: INTERNAL MEDICINE

## 2018-08-15 PROCEDURE — 82948 REAGENT STRIP/BLOOD GLUCOSE: CPT

## 2018-08-15 RX ORDER — OXYCODONE HYDROCHLORIDE 5 MG/1
5 TABLET ORAL EVERY 6 HOURS PRN
Qty: 20 TABLET | Refills: 0 | Status: SHIPPED | OUTPATIENT
Start: 2018-08-15 | End: 2018-08-20

## 2018-08-15 RX ORDER — NICOTINE 21 MG/24HR
1 PATCH, TRANSDERMAL 24 HOURS TRANSDERMAL DAILY
Qty: 28 PATCH | Refills: 0 | Status: SHIPPED | OUTPATIENT
Start: 2018-08-16 | End: 2018-09-04

## 2018-08-15 RX ORDER — FENOFIBRATE 145 MG/1
145 TABLET, COATED ORAL DAILY
Qty: 30 TABLET | Refills: 0 | Status: SHIPPED | OUTPATIENT
Start: 2018-08-16 | End: 2018-11-09 | Stop reason: SDUPTHER

## 2018-08-15 RX ADMIN — RISPERIDONE 0.5 MG: 0.25 TABLET ORAL at 09:43

## 2018-08-15 RX ADMIN — INSULIN LISPRO 2 UNITS: 100 INJECTION, SOLUTION INTRAVENOUS; SUBCUTANEOUS at 09:45

## 2018-08-15 RX ADMIN — HEPARIN SODIUM 5000 UNITS: 5000 INJECTION, SOLUTION INTRAVENOUS; SUBCUTANEOUS at 09:43

## 2018-08-15 RX ADMIN — INSULIN LISPRO 10 UNITS: 100 INJECTION, SOLUTION INTRAVENOUS; SUBCUTANEOUS at 13:29

## 2018-08-15 RX ADMIN — OXYCODONE HYDROCHLORIDE 10 MG: 10 TABLET ORAL at 06:14

## 2018-08-15 RX ADMIN — LISINOPRIL 10 MG: 10 TABLET ORAL at 09:43

## 2018-08-15 RX ADMIN — FENOFIBRATE 145 MG: 145 TABLET ORAL at 09:44

## 2018-08-15 RX ADMIN — OXYCODONE HYDROCHLORIDE 10 MG: 10 TABLET ORAL at 13:29

## 2018-08-15 RX ADMIN — POLYETHYLENE GLYCOL 3350 17 G: 17 POWDER, FOR SOLUTION ORAL at 09:43

## 2018-08-15 RX ADMIN — NICOTINE 1 PATCH: 21 PATCH, EXTENDED RELEASE TRANSDERMAL at 09:43

## 2018-08-15 RX ADMIN — HYDROMORPHONE HYDROCHLORIDE 0.2 MG: 1 INJECTION, SOLUTION INTRAMUSCULAR; INTRAVENOUS; SUBCUTANEOUS at 02:56

## 2018-08-15 RX ADMIN — FOLIC ACID 1 MG: 1 TABLET ORAL at 09:43

## 2018-08-15 RX ADMIN — Medication 100 MG: at 09:43

## 2018-08-15 RX ADMIN — OXYCODONE HYDROCHLORIDE 10 MG: 10 TABLET ORAL at 02:21

## 2018-08-15 RX ADMIN — VENLAFAXINE 75 MG: 37.5 TABLET ORAL at 09:44

## 2018-08-15 RX ADMIN — PANTOPRAZOLE SODIUM 40 MG: 40 TABLET, DELAYED RELEASE ORAL at 06:14

## 2018-08-15 NOTE — DISCHARGE INSTR - AVS FIRST PAGE
· Remain on a low fat diet for now  You may take Oxycodone as needed for moderate to severe pain  · Follow up with your primary care provider within one week  · Check you blood sugars in the morning, before meals and at bedtime to show your primary care doctor  Your insulin has been decreased and will likely need to be titrated back up as your appetite improves  You should receive a call from the gastroenterology office to schedule your outpatient EUS (ultrasound) if you do not hear anything, please call the office at the phone number listed below  · Quit smoking and continue to refrain from drinking  · Your triglyceride levels were elevated and you will be on a new medication Fenofibrate (Tricor) for that

## 2018-08-15 NOTE — PROGRESS NOTES
Progress Note - Lorene Escamilla 46 y o  male MRN: [id number]    Unit/Bed#: [facility] 1-0 Encounter: [id number]      ASSESSMENT/ PLAN:   45 yo male pmh alcoholic pancreatitis, COPD, DM was transferred from [facility] for acute on chronic pancreatitis  1  Acute on chronic pancreatitis: Lipase wnl  He continues to have diffuse abdominal pain worse in LUQ pain but states it is improved from yesterday  MRI of the abdomen without contrast revealed acute on chronic pancreatitis, the main pancreatic duct appears to abruptly truncated in the area of prior pseudocyst or fat necrosis within the tail of the pancreas, this is unchanged   The main pancreatic duct and head and body of the pancreas is mildly more dilated compared to previous MRI concerning for reactive change versus obstruction   Also possible small dilated side branch which may represent IPMN versus reactive changes  CBD dilation remained stable without any evidence of stone or stricture  He should have EUS as outpatient for further evaluation  Will rule out AI pancreatitis with IgG4  Triglycerides elevated but unlikely source of pancreatitis  He quit drinking alcohol but this could be related to smoking   -monitor abdominal pain  -f/u AI markers  -pain management   -continue IVF  -advised smoking cessation   -advance diet as tolerated  -outpatient EUS  -outpatient follow up     2  Transaminitis: LFTs were elevated on 8/11 and are now wnl with the exception of his alk phos which is 126 but also continues to downtrend   -continue to monitor LFTs    Subjective:     Patient seen and examined  Admits to abdominal pain overnight but continued overall improvement     Objective:     Vitals: Blood pressure 122/73, pulse 66, temperature 98 1 °F (36 7 °C), resp  rate 18, height 6' (1 829 m), weight 88 8 kg (195 lb 12 8 oz), SpO2 93 %  ,Body mass index is 26 56 kg/m²        Intake/Output Summary (Last 24 hours) at 08/15/18 1108  Last data filed at 08/15/18 1015 Gross per 24 hour   Intake             1070 ml   Output             2755 ml   Net            -1685 ml       Physical Exam:   Physical Exam   Constitutional: He is oriented to person, place, and time  He appears well-developed and well-nourished  No distress  HENT:   Head: Normocephalic and atraumatic  Nose: Nose normal    Eyes: Right eye exhibits no discharge  Left eye exhibits no discharge  No scleral icterus  Neck: Normal range of motion  Neck supple  No tracheal deviation present  Cardiovascular: Normal rate, regular rhythm and normal heart sounds  Exam reveals no gallop and no friction rub  No murmur heard  Pulmonary/Chest: Effort normal and breath sounds normal  No stridor  No respiratory distress  He has no wheezes  He has no rales  Abdominal: Soft  Bowel sounds are normal  He exhibits no distension and no mass  There is generalized tenderness  There is no rebound and no guarding  Musculoskeletal: Normal range of motion  He exhibits no edema, tenderness or deformity  Neurological: He is alert and oriented to person, place, and time  Skin: Skin is warm and dry  No rash noted  He is not diaphoretic  No erythema  No pallor  Psychiatric: He has a normal mood and affect   His behavior is normal          Invasive Devices     Peripheral Intravenous Line            Peripheral IV 08/13/18 Right;Ventral (anterior) Forearm 2 days                Lab Results:      Results from last 7 days  Lab Units 08/15/18  0526   WBC Thousand/uL 6 57   HEMOGLOBIN g/dL 9 8*   HEMATOCRIT % 30 4*   PLATELETS Thousands/uL 189   NEUTROS PCT % 60   LYMPHS PCT % 28   MONOS PCT % 9   EOS PCT % 3       Results from last 7 days  Lab Units 08/15/18  0526   SODIUM mmol/L 138   POTASSIUM mmol/L 3 3*   CHLORIDE mmol/L 103   CO2 mmol/L 28   BUN mg/dL 3*   CREATININE mg/dL 0 78   CALCIUM mg/dL 8 5   TOTAL PROTEIN g/dL 6 1*   BILIRUBIN TOTAL mg/dL 0 40   ALK PHOS U/L 126*   ALT U/L 31   AST U/L 14   GLUCOSE RANDOM mg/dL 164* Results from last 7 days  Lab Units 08/11/18  0500   INR  1 13       Results from last 7 days  Lab Units 08/14/18  0619  08/10/18  0509   LIPASE u/L 92  < >  --    AMYLASE IU/L  --   --  176*   < > = values in this interval not displayed  Imaging Studies: I have personally reviewed pertinent imaging studies  No results found

## 2018-08-15 NOTE — DISCHARGE SUMMARY
Discharge Summary - [facility] Internal Medicine    Patient Information: Brennen Santoyo 46 y o  male MRN: [id number]  [facility] [address] [facility] Encounter: [id number]    Discharging Physician / Practitioner: TYE March  PCP: Denisse Smith DO  Admission Date: 8/12/2018  Discharge Date: 08/15/18    Reason for Admission:  Abdominal pain, nausea    Diagnosis at discharge:  Acute recurrent pancreatitis    Discharge Diagnoses:     Principal Problem:    Acute on chronic pancreatitis ([facility] [facility] )  Active Problems:    H/O alcohol abuse    Diabetes mellitus type 1 5, managed as type 1 ([facility] [address] )    Transaminitis    Tobacco dependence  Resolved Problems:    Acute hyponatremia      Consultations During Hospital Stay:  · Gastroenterology    Procedures Performed:     · None    Significant Findings / Test Results:     · CT abdomen pelvis with contrast: Redemonstrated pancreatitis  No significant interval change  · MRCP: Patient appears to have acute pancreatitis superimposed upon chronic pancreatitis  The main pancreatic duct appears to abruptly truncate in the area of prior pseudocyst or fat necrosis within the tail of the pancreas, which is unchanged  However, the main pancreatic duct in the head and body of the pancreas appears to be mildly more dilated comparedto the most recent MRI which may be indicative of reactive change in acutepancreatitis, obstruction not excluded  There may be a small dilated side branch which may represent IPMN and/or reactive change  Intervalfollow-up imaging is advised  CBD dilation remains stable, no biliary ductal stone or stricture wasnoted  This may represent postcholecystectomy change  · Triglycerides 416  · A1c 10 4      Incidental Findings:   · None    Test Results Pending at Discharge (will require follow up):   · IGG4     Outpatient Tests Requested:  · Outpatient follow-up with GI for EU S  Office is to call patient to schedule    · Follow-up with primary care provider within 1 week  · Establish care with endocrinology as outpatient as well    Complications:  None    Hospital Course:     Lucio Weldon is a 46 y o  male patient with past medical history of alcohol abuse, recurrent pancreatitis, hypertension, diabetes ? Possibly type 1who originally presented to the hospital on 8/12/2018 after being transferred from [address] [place] [address] for possible EU S  Patient had presented to the ER for recurrent pancreatitis  He does have a history of alcohol abuse but has not been drinking since June  He was started on intravenous fluids and kept NPO  MRCP showed acute pancreatitis with increasing dilatation of pancreatic duct and branch of the duct with possible IPMN/reactive changes  He was transferred to [place] for possible EUS  Gastroenterology was consulted  He was managed with supportive care for acute pancreatitis and patient improved  GI is recommending EUS, but this should be done as outpatient once inflammation has completely resolved  Discussed with GI on day of discharge, patient is tolerating a low-fat diet which I will recommend to continue at discharge  He does have a history of possible type 1 diabetes versus type 2 and ran out of his insulin prior to admission for approximately 2 weeks  I have provided him with a prescription for to shelter  He was previously taking 50 units HS however his sugars have been on the lower side here, likely secondary to the fact he was on clear liquids for quite some time  At discharge, I will recommend that he take 15 U HS  I did discuss with him that his sugars were likely increase as his appetite improves  He states that he does have experience with titrating his own insulin  He does check his blood sugars at home  I did encourage him to check his blood sugars 1st thing in the morning, before meals and at bedtime and keep a log for his primary care provider for further adjustment recommendations    He does not see an endocrinologist but I have recommended that he see 1 as he is not even sure if he is type 1 or type 2 diabetic  On day of discharge the patient is stable  He is tolerating a low-fat diet  Pain has significantly improved but he does have intermittent pain, I will continue oxycodone at discharge for now  Patient is to follow up with GI as above, office will call patient to schedule  I have provided him the phone number in the event he does not hear from them  Patient should also follow up with his primary care provider in 1 week for post hospital visit and blood sugar/insulin management  Patient will also be started on Tricor or for elevated triglycerides in which she should continue at discharge  Patient was strongly encouraged to continue to not drink and to consider smoking cessation as this can contribute to recurrent pancreatitis  He has been provided nicotine patches  Condition at Discharge: stable     Discharge Day Visit / Exam:     Subjective:  Patient states that his pain is significantly better  Denies any nausea or vomiting  Tolerating low-fat diet  Verbalized understanding of all discharge instructions including need for outpatient EUS and further monitoring of his blood sugars  Vitals: Blood Pressure: 122/73 (08/15/18 0655)  Pulse: 66 (08/15/18 0655)  Temperature: 98 1 °F (36 7 °C) (08/15/18 0640)  Temp Source: Oral (08/14/18 2300)  Respirations: 18 (08/15/18 0640)  Height: 6' (182 9 cm) (08/12/18 1640)  Weight - Scale: 88 8 kg (195 lb 12 8 oz) (08/12/18 1640)  SpO2: 93 % (08/15/18 0655)     Exam:   Physical Exam   Constitutional: No distress  HENT:   Head: Normocephalic  Eyes: Pupils are equal, round, and reactive to light  Neck: Normal range of motion  Cardiovascular: Normal rate, regular rhythm and intact distal pulses  No murmur heard  Pulmonary/Chest: Effort normal and breath sounds normal    Abdominal: Soft  Bowel sounds are normal  He exhibits no distension   There is no tenderness  Musculoskeletal: Normal range of motion  He exhibits no edema  Neurological: He is alert  Skin: Skin is warm  Psychiatric: He has a normal mood and affect  Nursing note and vitals reviewed  Discussion with Family:  Patient    Discharge instructions/Information to patient and family:   See after visit summary for information provided to patient and family  Provisions for Follow-Up Care:  See after visit summary for information related to follow-up care and any pertinent home health orders  Disposition:     Home    For Discharges to [facility] SNF:   · Not Applicable to this Patient - Not Applicable to this Patient    Planned Readmission: no     Discharge Statement:  I spent 45 minutes discharging the patient  This time was spent on the day of discharge  I had direct contact with the patient on the day of discharge  Greater than 50% of the total time was spent examining patient, answering all patient questions, arranging and discussing plan of care with patient as well as directly providing post-discharge instructions  Additional time then spent on discharge activities  Discharge Medications:  See after visit summary for reconciled discharge medications provided to patient and family        ** Please Note: This note has been constructed using a voice recognition system **

## 2018-08-15 NOTE — SOCIAL WORK
CM spoke with pt's insurance provider [phone] regarding medication coverage  As per Paris Torrez with [employer], prescription needs to be written for a 27 day supply in order to be covered by insurance  As per Paris Torrez, prior authorization for his prescription for Insulin Glargine (Severo Coward) is active until 2/8/19  CM informed CRNP Margarita of same  CM was provided with additional scripts, and faxed to [facility] for price check

## 2018-08-15 NOTE — NURSING NOTE
Pt d/c instructions reviewed with him  No questions at this time  IV pulled  Masimo white and grey piece in cradle   Pt filled prescriptions with [facility] and getting tolijeo insulin through his regular pharmacy

## 2018-08-16 ENCOUNTER — PATIENT OUTREACH (OUTPATIENT)
Dept: INTERNAL MEDICINE CLINIC | Facility: CLINIC | Age: 48
End: 2018-08-16

## 2018-08-16 LAB
IGG SERPL-MCNC: 876 MG/DL (ref 700–1600)
IGG1 SER-MCNC: 567 MG/DL (ref 248–810)
IGG2 SER-MCNC: 255 MG/DL (ref 130–555)
IGG3 SER-MCNC: 88 MG/DL (ref 15–102)
IGG4 SER-MCNC: 57 MG/DL (ref 2–96)

## 2018-08-20 ENCOUNTER — OFFICE VISIT (OUTPATIENT)
Dept: INTERNAL MEDICINE CLINIC | Facility: CLINIC | Age: 48
End: 2018-08-20
Payer: COMMERCIAL

## 2018-08-20 ENCOUNTER — TELEPHONE (OUTPATIENT)
Dept: GASTROENTEROLOGY | Facility: CLINIC | Age: 48
End: 2018-08-20

## 2018-08-20 ENCOUNTER — TRANSCRIBE ORDERS (OUTPATIENT)
Dept: GASTROENTEROLOGY | Facility: CLINIC | Age: 48
End: 2018-08-20

## 2018-08-20 VITALS
RESPIRATION RATE: 18 BRPM | HEIGHT: 73 IN | DIASTOLIC BLOOD PRESSURE: 84 MMHG | BODY MASS INDEX: 24.65 KG/M2 | TEMPERATURE: 97.7 F | WEIGHT: 186 LBS | SYSTOLIC BLOOD PRESSURE: 150 MMHG | HEART RATE: 90 BPM

## 2018-08-20 DIAGNOSIS — K85.90 ACUTE ON CHRONIC PANCREATITIS (HCC): ICD-10-CM

## 2018-08-20 DIAGNOSIS — F10.11 H/O ALCOHOL ABUSE: ICD-10-CM

## 2018-08-20 DIAGNOSIS — F41.1 GAD (GENERALIZED ANXIETY DISORDER): ICD-10-CM

## 2018-08-20 DIAGNOSIS — Z59.89 INSURANCE COVERAGE PROBLEMS: ICD-10-CM

## 2018-08-20 DIAGNOSIS — I10 ESSENTIAL HYPERTENSION: Primary | ICD-10-CM

## 2018-08-20 DIAGNOSIS — E78.2 MIXED HYPERLIPIDEMIA: ICD-10-CM

## 2018-08-20 DIAGNOSIS — E13.9 DIABETES MELLITUS TYPE 1.5, MANAGED AS TYPE 1 (HCC): ICD-10-CM

## 2018-08-20 DIAGNOSIS — K86.1 ACUTE ON CHRONIC PANCREATITIS (HCC): ICD-10-CM

## 2018-08-20 DIAGNOSIS — I10 BENIGN ESSENTIAL HYPERTENSION: ICD-10-CM

## 2018-08-20 DIAGNOSIS — E10.8 TYPE 1 DIABETES MELLITUS WITH COMPLICATION (HCC): ICD-10-CM

## 2018-08-20 PROCEDURE — 1111F DSCHRG MED/CURRENT MED MERGE: CPT | Performed by: INTERNAL MEDICINE

## 2018-08-20 PROCEDURE — 99496 TRANSJ CARE MGMT HIGH F2F 7D: CPT | Performed by: INTERNAL MEDICINE

## 2018-08-20 RX ORDER — LISINOPRIL 10 MG/1
10 TABLET ORAL 2 TIMES DAILY
Qty: 60 TABLET | Refills: 5 | Status: SHIPPED | OUTPATIENT
Start: 2018-08-20 | End: 2018-08-21 | Stop reason: SDUPTHER

## 2018-08-20 RX ORDER — OXYCODONE HYDROCHLORIDE 5 MG/1
5 TABLET ORAL EVERY 4 HOURS PRN
Qty: 30 TABLET | Refills: 0 | Status: SHIPPED | OUTPATIENT
Start: 2018-08-20 | End: 2018-09-04

## 2018-08-20 RX ORDER — ARIPIPRAZOLE 5 MG/1
5 TABLET ORAL
COMMUNITY
End: 2018-09-04

## 2018-08-20 SDOH — ECONOMIC STABILITY - INCOME SECURITY: OTHER PROBLEMS RELATED TO HOUSING AND ECONOMIC CIRCUMSTANCES: Z59.89

## 2018-08-20 NOTE — PROGRESS NOTES
Assessment/Plan:     No problem-specific Assessment & Plan notes found for this encounter  Diagnoses and all orders for this visit:    Essential hypertension  -     lisinopril (ZESTRIL) 10 mg tablet; Take 1 tablet (10 mg total) by mouth 2 (two) times a day    Acute on chronic pancreatitis (HCC)  -     Amylase; Future  -     CBC and differential; Future  -     Comprehensive metabolic panel; Future  -     Hemoglobin A1C; Future  -     Ambulatory referral to Gastroenterology; Future  -     Lipase; Future  -     oxyCODONE (ROXICODONE) 5 mg immediate release tablet; Take 1 tablet (5 mg total) by mouth every 4 (four) hours as needed for moderate pain Max Daily Amount: 30 mg    Mixed hyperlipidemia    H/O alcohol abuse    Diabetes mellitus type 1 5, managed as type 1 (HCC)  -     Hemoglobin A1C; Future    MERLYN (generalized anxiety disorder)    [facility]    Insurance coverage problems    Benign essential hypertension    Other orders  -     ARIPiprazole (ABILIFY) 5 mg tablet; Take 5 mg by mouth     A/P: Doing a little better  Continue off the ETOH  Now on fenofibrate for the TG  Insulin back on board, sugars are still up and appetite isn't the best  Having issues with the insurance in regards to the insulin  Will increase to the 25 units for several days and see how his sugars and his appetite go  May need to titrate further  Will get the  involved about getting the insulin involved  Will give a few more days of pain meds and monitor his BP  If it stays up, adjust meds  Refer to GI for OP EUS  Check labs  RTC two weeks  Subjective:     Patient ID: Kim Gonzalez is a 46 y o  male  WM presents for TCM after admission to [facility] and subsequent transfer to [facility] for abdominal pain  Pt with h/o ETOH abuse but hasn't drank in several months, type 1 5 dm,  and chronic pancreatitis  W/u revealed acute on chronic pancreatitis   Had an MRCP and found to have no obstructions, but dilated ducts and OP EUS suggested  Pt treated conservatively and improved  Pt's reports being off his insulin several weeks prior to the admission and it was restarted at a lower dose in the hospital due to poor appetite  Since d/c's, doing a little better  Still with some epigastric pain, but less  Ran out of his pain meds  No fever or chills  No CP or SOB  No N/v  Able to take some food and fluids in  Still off the ETOH  Passing stool and urine  No yellowing of the eyes or skin  No pruritis  Sugars are in the 200's, and was on 10 units Toujeo, but just increased to 25 units yesterday  Having issues with the insurance and coverage of the insulin  Review of Systems   Constitutional: Negative for activity change, chills, diaphoresis, fatigue and fever  HENT: Negative  Eyes: Negative for visual disturbance  Respiratory: Negative for cough, chest tightness, shortness of breath and wheezing  Cardiovascular: Negative for chest pain, palpitations and leg swelling  Gastrointestinal: Positive for abdominal pain  Negative for abdominal distention, anal bleeding, blood in stool, constipation, diarrhea, nausea, rectal pain and vomiting  Endocrine: Negative for cold intolerance and heat intolerance  Genitourinary: Negative for difficulty urinating, dysuria and frequency  Musculoskeletal: Negative for arthralgias, gait problem and myalgias  Neurological: Negative for dizziness, tremors, seizures, weakness, light-headedness, numbness and headaches  Psychiatric/Behavioral: Positive for dysphoric mood  Negative for confusion and sleep disturbance  The patient is nervous/anxious  Objective:     Physical Exam   Constitutional: He is oriented to person, place, and time  He appears well-developed and well-nourished  No distress  HENT:   Head: Normocephalic and atraumatic     Mouth/Throat: Oropharynx is clear and moist    Eyes: Conjunctivae and EOM are normal  Pupils are equal, round, and reactive to light  No scleral icterus  Neck: Normal range of motion  Neck supple  No JVD present  Cardiovascular: Normal rate, regular rhythm and normal heart sounds  Pulmonary/Chest: Effort normal and breath sounds normal  No respiratory distress  He has no wheezes  Abdominal: Soft  Bowel sounds are normal  He exhibits no distension  There is tenderness  There is no rebound and no guarding  Soft, ND with slight tenderness epigastric and RUQ area  BS wnl  Musculoskeletal: He exhibits no edema  Neurological: He is alert and oriented to person, place, and time  Psychiatric: He has a normal mood and affect  His behavior is normal  Judgment and thought content normal    Nursing note and vitals reviewed  Vitals:    08/20/18 1233   BP: 150/84   Pulse: 90   Resp: 18   Temp: 97 7 °F (36 5 °C)   TempSrc: Tympanic   Weight: 84 4 kg (186 lb)   Height: 6' 1" (1 854 m)       Transitional Care Management Review:  Anish Moseley is a 46 y o  male here for TCM follow up       During the TCM phone call patient stated:    Date and time hospital follow up call was made:  8/15/2018  1:29 PM  Patient was hopsitalized at:  [facility]  Date of admission:  8/12/18  Date of discharge:  8/15/18  Diagnosis:  panreatitis  Disposition:  Home  I have advised the patient to call PCP with any new or worsening symptoms (please type in name along with any credentials):  damir Nathan DO

## 2018-08-20 NOTE — TELEPHONE ENCOUNTER
Request from disability for patients medical records faxed to [facility] and uploaded to patients chart on 8/20/18

## 2018-08-21 ENCOUNTER — PATIENT OUTREACH (OUTPATIENT)
Dept: INTERNAL MEDICINE CLINIC | Facility: CLINIC | Age: 48
End: 2018-08-21

## 2018-08-21 ENCOUNTER — TELEPHONE (OUTPATIENT)
Dept: INTERNAL MEDICINE CLINIC | Facility: CLINIC | Age: 48
End: 2018-08-21

## 2018-08-21 ENCOUNTER — TELEPHONE (OUTPATIENT)
Dept: GASTROENTEROLOGY | Facility: HOSPITAL | Age: 48
End: 2018-08-21

## 2018-08-21 DIAGNOSIS — I10 ESSENTIAL HYPERTENSION: ICD-10-CM

## 2018-08-21 DIAGNOSIS — E13.9 DIABETES MELLITUS TYPE 1.5, MANAGED AS TYPE 1 (HCC): Primary | ICD-10-CM

## 2018-08-21 RX ORDER — LISINOPRIL 20 MG/1
20 TABLET ORAL DAILY
Qty: 30 TABLET | Refills: 5 | Status: SHIPPED | OUTPATIENT
Start: 2018-08-21 | End: 2019-06-27 | Stop reason: SDUPTHER

## 2018-08-21 NOTE — PROGRESS NOTES
Outpatient Care Management Note:  Received return call from patient  He is still having abdominal pain which makes him hesitate to eat since he knows it will make the pain worse  Per the pharmacy, the oxycodone that was ordered for the pain needs a prior authorization and Dr Dian Ramírez office is working on that  I spoke to the office and assured the patient the office would call as soon as it was completed  He remains independent in his ADL's  Per patient he has not had any alcohol since discharge because he knows that will make the pain worse  Verified that he has my contact information  Instructed to call myself or PCP office with any increase in symptoms, questions or concerns

## 2018-08-21 NOTE — TELEPHONE ENCOUNTER
Insurance will not pat for lisinopril 10mg 2xdaily, can you switch to 20mg once a day with a 90 day supply?

## 2018-08-21 NOTE — TELEPHONE ENCOUNTER
PT SAID 2 OUT OF 3 RX'S NEED PRIOR AUTHOR-    LISINOPRIL AND OXYCODONE NEED PRIOR AUTHOR WITH HIS INSUR[employer]    ALSO, HE SAID THEY DISCUSSED INULIN AND NOTHING WAS RX'D PER PATIENT-    HE WOULD LIKE A CALL ASAP WHEN IT IS APPROVED

## 2018-08-22 ENCOUNTER — TELEPHONE (OUTPATIENT)
Dept: INTERNAL MEDICINE CLINIC | Facility: CLINIC | Age: 48
End: 2018-08-22

## 2018-08-22 DIAGNOSIS — K86.1 ACUTE ON CHRONIC PANCREATITIS (HCC): Primary | ICD-10-CM

## 2018-08-22 DIAGNOSIS — K85.90 ACUTE ON CHRONIC PANCREATITIS (HCC): Primary | ICD-10-CM

## 2018-08-22 RX ORDER — TRAMADOL HYDROCHLORIDE 50 MG/1
50 TABLET ORAL EVERY 6 HOURS PRN
Qty: 30 TABLET | Refills: 0 | Status: SHIPPED | OUTPATIENT
Start: 2018-08-22 | End: 2018-09-04

## 2018-08-22 NOTE — TELEPHONE ENCOUNTER
Oxycodone was denied because of missing information, they are sending me another form to appeal the denialal, in the mean time, he gave the ok for ultram to be called in till this is taken care off

## 2018-08-23 ENCOUNTER — PATIENT OUTREACH (OUTPATIENT)
Dept: INTERNAL MEDICINE CLINIC | Facility: CLINIC | Age: 48
End: 2018-08-23

## 2018-08-23 NOTE — CASE MANAGEMENT
Notification of Discharge  This is a Notification of Discharge from our facility [address]  Please be advised that this patient has been discharge from our facility  Below you will find the admission and discharge date and time including the patients disposition  PRESENTATION DATE: 8/12/2018  4:37 PM  IP ADMISSION DATE: 8/12/18 1637  DISCHARGE DATE: 8/15/2018  2:29 PM  DISPOSITION: [address] [place] [address] in [facility]  Phone: [phone]; Fax [phone]  ATTENTION: The Network Utilization Review Department is now centralized for our 9 Facilities  Make a note that we have a new phone and fax numbers for our Department  Please call with any questions or concerns to [phone] and carefully follow the prompts so that you are directed to the right person  All voicemails are confidential  Fax any determinations, approvals, denials, and requests for initial or continue stay review clinical to [phone]  Due to HIGH CALL volume, it would be easier if you could please send faxed requests to expedite your requests and in part, help us provide discharge notifications faster

## 2018-08-23 NOTE — PROGRESS NOTES
Outpatient Care Management Note:  Message left for [address] regarding his oxycodone  [facility] has denied the pre-authorization, his disease and care do not meet the criteria  As discussed yesterday Dr Minnie Galvan did send an order for Ultram to the pharmacy  Encouraged to call with questions  Contact information left on machine

## 2018-08-28 ENCOUNTER — PATIENT OUTREACH (OUTPATIENT)
Dept: INTERNAL MEDICINE CLINIC | Facility: CLINIC | Age: 48
End: 2018-08-28

## 2018-08-30 ENCOUNTER — PATIENT OUTREACH (OUTPATIENT)
Dept: INTERNAL MEDICINE CLINIC | Facility: CLINIC | Age: 48
End: 2018-08-30

## 2018-08-30 ENCOUNTER — TELEPHONE (OUTPATIENT)
Dept: GASTROENTEROLOGY | Facility: HOSPITAL | Age: 48
End: 2018-08-30

## 2018-08-30 NOTE — PROGRESS NOTES
Outpatient Care Management Note:  Received return call from patient  His pain is improved on the oxycodone  Patient denies any alcohol consumption since discharge  He is concerned that he accidently erased a voicemail concerning scheduling his endoscopic ultrasound  Provided the number for Dr Tyra Moses office and encouraged him to call to schedule an appointment as soon as possible  He is independent with his ADL's and has no other needs at this time  verified that he has my contact information  Encouraged to call with a change in symptoms, questions or cocnerns

## 2018-08-30 NOTE — TELEPHONE ENCOUNTER
Patient called office back  He does not want to come in for office visit prior to procedure, as he feels it is unnecessary   Can patient be scheduled for procedure and follow up at [facility] after? Please advise

## 2018-08-31 ENCOUNTER — APPOINTMENT (OUTPATIENT)
Dept: LAB | Facility: CLINIC | Age: 48
End: 2018-08-31
Payer: COMMERCIAL

## 2018-08-31 DIAGNOSIS — E13.9 DIABETES MELLITUS TYPE 1.5, MANAGED AS TYPE 1 (HCC): ICD-10-CM

## 2018-08-31 DIAGNOSIS — K85.90 ACUTE ON CHRONIC PANCREATITIS (HCC): ICD-10-CM

## 2018-08-31 DIAGNOSIS — K86.1 ACUTE ON CHRONIC PANCREATITIS (HCC): ICD-10-CM

## 2018-08-31 LAB
ALBUMIN SERPL BCP-MCNC: 3.4 G/DL (ref 3.5–5)
ALP SERPL-CCNC: 116 U/L (ref 46–116)
ALT SERPL W P-5'-P-CCNC: 15 U/L (ref 12–78)
AMYLASE SERPL-CCNC: 46 IU/L (ref 25–115)
ANION GAP SERPL CALCULATED.3IONS-SCNC: 12 MMOL/L (ref 4–13)
AST SERPL W P-5'-P-CCNC: 9 U/L (ref 5–45)
BASOPHILS # BLD AUTO: 0.08 THOUSANDS/ΜL (ref 0–0.1)
BASOPHILS NFR BLD AUTO: 1 % (ref 0–1)
BILIRUB SERPL-MCNC: 0.45 MG/DL (ref 0.2–1)
BUN SERPL-MCNC: 15 MG/DL (ref 5–25)
CALCIUM SERPL-MCNC: 9 MG/DL (ref 8.3–10.1)
CHLORIDE SERPL-SCNC: 97 MMOL/L (ref 100–108)
CO2 SERPL-SCNC: 24 MMOL/L (ref 21–32)
CREAT SERPL-MCNC: 1.07 MG/DL (ref 0.6–1.3)
EOSINOPHIL # BLD AUTO: 0.27 THOUSAND/ΜL (ref 0–0.61)
EOSINOPHIL NFR BLD AUTO: 2 % (ref 0–6)
ERYTHROCYTE [DISTWIDTH] IN BLOOD BY AUTOMATED COUNT: 13.6 % (ref 11.6–15.1)
EST. AVERAGE GLUCOSE BLD GHB EST-MCNC: 266 MG/DL
GFR SERPL CREATININE-BSD FRML MDRD: 81 ML/MIN/1.73SQ M
GLUCOSE P FAST SERPL-MCNC: 248 MG/DL (ref 65–99)
HBA1C MFR BLD: 10.9 % (ref 4.2–6.3)
HCT VFR BLD AUTO: 40.7 % (ref 36.5–49.3)
HGB BLD-MCNC: 13.2 G/DL (ref 12–17)
IMM GRANULOCYTES # BLD AUTO: 0.04 THOUSAND/UL (ref 0–0.2)
IMM GRANULOCYTES NFR BLD AUTO: 0 % (ref 0–2)
LIPASE SERPL-CCNC: 116 U/L (ref 73–393)
LYMPHOCYTES # BLD AUTO: 2.45 THOUSANDS/ΜL (ref 0.6–4.47)
LYMPHOCYTES NFR BLD AUTO: 19 % (ref 14–44)
MCH RBC QN AUTO: 28.3 PG (ref 26.8–34.3)
MCHC RBC AUTO-ENTMCNC: 32.4 G/DL (ref 31.4–37.4)
MCV RBC AUTO: 87 FL (ref 82–98)
MONOCYTES # BLD AUTO: 0.55 THOUSAND/ΜL (ref 0.17–1.22)
MONOCYTES NFR BLD AUTO: 4 % (ref 4–12)
NEUTROPHILS # BLD AUTO: 9.22 THOUSANDS/ΜL (ref 1.85–7.62)
NEUTS SEG NFR BLD AUTO: 74 % (ref 43–75)
NRBC BLD AUTO-RTO: 0 /100 WBCS
PLATELET # BLD AUTO: 249 THOUSANDS/UL (ref 149–390)
PMV BLD AUTO: 10.3 FL (ref 8.9–12.7)
POTASSIUM SERPL-SCNC: 3.5 MMOL/L (ref 3.5–5.3)
PROT SERPL-MCNC: 7.8 G/DL (ref 6.4–8.2)
RBC # BLD AUTO: 4.67 MILLION/UL (ref 3.88–5.62)
SODIUM SERPL-SCNC: 133 MMOL/L (ref 136–145)
WBC # BLD AUTO: 12.61 THOUSAND/UL (ref 4.31–10.16)

## 2018-08-31 PROCEDURE — 83036 HEMOGLOBIN GLYCOSYLATED A1C: CPT

## 2018-08-31 PROCEDURE — 85025 COMPLETE CBC W/AUTO DIFF WBC: CPT

## 2018-08-31 PROCEDURE — 83690 ASSAY OF LIPASE: CPT

## 2018-08-31 PROCEDURE — 80053 COMPREHEN METABOLIC PANEL: CPT

## 2018-08-31 PROCEDURE — 36415 COLL VENOUS BLD VENIPUNCTURE: CPT

## 2018-08-31 PROCEDURE — 82150 ASSAY OF AMYLASE: CPT

## 2018-09-04 ENCOUNTER — OFFICE VISIT (OUTPATIENT)
Dept: INTERNAL MEDICINE CLINIC | Facility: CLINIC | Age: 48
End: 2018-09-04
Payer: COMMERCIAL

## 2018-09-04 VITALS
SYSTOLIC BLOOD PRESSURE: 122 MMHG | DIASTOLIC BLOOD PRESSURE: 60 MMHG | HEART RATE: 110 BPM | TEMPERATURE: 97.2 F | OXYGEN SATURATION: 96 % | RESPIRATION RATE: 18 BRPM | WEIGHT: 186 LBS | BODY MASS INDEX: 24.65 KG/M2 | HEIGHT: 73 IN

## 2018-09-04 DIAGNOSIS — E13.9 DIABETES MELLITUS TYPE 1.5, MANAGED AS TYPE 1 (HCC): Primary | ICD-10-CM

## 2018-09-04 DIAGNOSIS — F41.9 ANXIETY AND DEPRESSION: Chronic | ICD-10-CM

## 2018-09-04 DIAGNOSIS — K86.1 ACUTE ON CHRONIC PANCREATITIS (HCC): ICD-10-CM

## 2018-09-04 DIAGNOSIS — I10 BENIGN ESSENTIAL HYPERTENSION: ICD-10-CM

## 2018-09-04 DIAGNOSIS — K85.90 ACUTE ON CHRONIC PANCREATITIS (HCC): ICD-10-CM

## 2018-09-04 DIAGNOSIS — F32.A ANXIETY AND DEPRESSION: Chronic | ICD-10-CM

## 2018-09-04 PROBLEM — N17.9 ACUTE KIDNEY INJURY (HCC): Status: RESOLVED | Noted: 2018-08-09 | Resolved: 2018-09-04

## 2018-09-04 PROBLEM — E87.5 ACUTE HYPERKALEMIA: Status: RESOLVED | Noted: 2018-08-09 | Resolved: 2018-09-04

## 2018-09-04 PROBLEM — Z72.0 TOBACCO ABUSE: Chronic | Status: RESOLVED | Noted: 2018-08-09 | Resolved: 2018-09-04

## 2018-09-04 PROBLEM — R10.9 ABDOMINAL PAIN: Status: RESOLVED | Noted: 2018-08-09 | Resolved: 2018-09-04

## 2018-09-04 PROCEDURE — 3078F DIAST BP <80 MM HG: CPT | Performed by: INTERNAL MEDICINE

## 2018-09-04 PROCEDURE — 99214 OFFICE O/P EST MOD 30 MIN: CPT | Performed by: INTERNAL MEDICINE

## 2018-09-04 PROCEDURE — 3074F SYST BP LT 130 MM HG: CPT | Performed by: INTERNAL MEDICINE

## 2018-09-04 NOTE — PROGRESS NOTES
Assessment/Plan:    No problem-specific Assessment & Plan notes found for this encounter  Diagnoses and all orders for this visit:    Diabetes mellitus type 1 5, managed as type 1 ([facility] [address] )  -     Cancel: Comprehensive metabolic panel; Future  -     Cancel: Hemoglobin A1C; Future  -     Cancel: Microalbumin / creatinine urine ratio  -     insulin glargine (BASAGLAR KWIKPEN) 100 units/mL injection pen; Inject 60 units sq q HS  -     insulin aspart (NOVOLOG FLEXPEN) 100 Units/mL injection pen; Per sliding scale sq q meals  Acute on chronic pancreatitis (HCC)  -     Cancel: Amylase; Future  -     Cancel: CBC and differential; Future  -     Cancel: Comprehensive metabolic panel; Future  -     Cancel: Lipase; Future  -     Cancel: Magnesium; Future    Benign essential hypertension  -     Cancel: Microalbumin / creatinine urine ratio    Anxiety and depression    Other orders  -     Cancel: Lipid Panel with Direct LDL reflex; Future  -     Cancel: TSH, 3rd generation with Free T4 reflex; Future  -     Cancel: PSA, Total Screen; Future      A/P: Doing a little better, but sugars are up and had to change his normal insulin due to insurance coverage  Current dose was what he was on prior  ??due to recent pancreatitis causing his beta cells to stop producing any endogenous insulin or the type of insulin  Will increase his basal to 60 units @ HS and will start a sliding scale of rapid acting insulin with each meal  Will see if the  can get him seen at  sooner  Will continue current treatment otherwise  Due for routine labs, but just had labs and will hold off for several months  DEFERRED the flu vaccine until next visit in two weeks  RTC two weeks for f/u  Subjective:      Patient ID: Dmitriy Cartwright is a 46 y o  male   RTC for f/u dm, acute on chronic pancreatitis, etc  Doing ok, but sugars are still up and had to change insulins due to insurance coverage   Sugars in the -300 and is now up to 50 units sq at HS  Several episodes of low sugars after he failed to eat meals  Abdominal pain is better  No N/V  Appetite is improving  Remains off the ETOH  MERLYN and MDD are stable  No new c/o's  Just had labs done that were suppose to be done right after his last visit  Labs were better except for elevated sugars  Didn't get f/u with GI yet because no one called him yet  The following portions of the patient's history were reviewed and updated as appropriate:   He  has a past medical history of Allergic rhinitis; Anemia; Quiros esophagus; Bronchitis, asthmatic; Cholelithiasis; Chronic pain; COPD (chronic obstructive pulmonary disease) ([facility] ); Depression; Diabetes mellitus ([place] [address] ); GERD (gastroesophageal reflux disease); Hypertension; Metatarsalgia; Pancreatitis; Psychiatric disorder; and Renal disorder  He   Patient Active Problem List    Diagnosis Date Noted    Transaminitis 08/13/2018    Tobacco dependence 08/13/2018    Acute on chronic pancreatitis ([place] [address] ) 08/12/2018    GERD (gastroesophageal reflux disease) 08/09/2018    Anxiety and depression 08/09/2018    H/O alcohol abuse 08/09/2018    Leukemoid reaction 08/09/2018    Insomnia 09/05/2017    Recurrent pancreatitis ([place] [address] ) 08/25/2017    COPD (chronic obstructive pulmonary disease) ([place] [address] ) 03/29/2017    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    Iron deficiency anemia 04/05/2016    Diabetes mellitus type 1 5, managed as type 1 ([place] [address] ) 04/05/2016    Fibromyalgia 07/29/2013    Fatty liver 12/05/2012    Nephrolithiasis 12/05/2012    Hyperlipidemia 09/11/2012    Benign essential hypertension 07/02/2012    MDD (major depressive disorder), single episode 07/02/2012    Other chronic pain 07/02/2012    Sleep disorder 07/02/2012    Generalized anxiety disorder 06/13/2012     He  has a past surgical history that includes CHOLECYSTECTOMY LAPAROSCOPIC; Foot surgery; Knee arthroscopy; and Vasectomy    His family history includes Cancer in his mother; Coronary artery disease in his family, father, and mother; Diabetes in his mother and sister; Prostate cancer in his father  He  reports that he has been smoking  He has been smoking about 1 00 pack per day  He has never used smokeless tobacco  He reports that he drinks alcohol  He reports that he does not use drugs  Current Outpatient Prescriptions   Medication Sig Dispense Refill    fenofibrate (TRICOR) 145 mg tablet Take 1 tablet (145 mg total) by mouth daily 30 tablet 0    glucose blood (ONE TOUCH ULTRA TEST) test strip by In Vitro route 3 (three) times a day      glucose blood (ONETOUCH VERIO) test strip by In Vitro route 3 (three) times a day      hydrOXYzine pamoate (VISTARIL) 50 mg capsule TAKE 1 CAPSULE BY MOUTH THREE TIMES DAILY AS NEEDED 90 capsule 0    insulin glargine (BASAGLAR KWIKPEN) 100 units/mL injection pen Inject 60 units sq q HS  5 pen 0    Insulin Pen Needle (B-D ULTRAFINE III SHORT PEN) 31G X 8 MM MISC by Does not apply route      Insulin Pen Needle (PEN NEEDLES) 29G X 12MM MISC by Does not apply route daily at bedtime Substitute what fits Touejo pen and what is covered by insurance  45 each 0    lisinopril (ZESTRIL) 20 mg tablet Take 1 tablet (20 mg total) by mouth daily 30 tablet 5    omeprazole (PriLOSEC) 20 mg delayed release capsule Take by mouth      risperiDONE (RisperDAL) 0 5 mg tablet TAKE ONE TABLET BY MOUTH TWICE DAILY 60 tablet 3    traZODone (DESYREL) 50 mg tablet Take 1 tablet by mouth      venlafaxine (EFFEXOR XR) 75 mg 24 hr capsule Take 1 capsule by mouth 3 (three) times a day      insulin aspart (NOVOLOG FLEXPEN) 100 Units/mL injection pen Per sliding scale sq q meals  5 pen 0     No current facility-administered medications for this visit        Current Outpatient Prescriptions on File Prior to Visit   Medication Sig    fenofibrate (TRICOR) 145 mg tablet Take 1 tablet (145 mg total) by mouth daily    glucose blood (ONE TOUCH ULTRA TEST) test strip by In Vitro route 3 (three) times a day    glucose blood (ONETOUCH VERIO) test strip by In Vitro route 3 (three) times a day    hydrOXYzine pamoate (VISTARIL) 50 mg capsule TAKE 1 CAPSULE BY MOUTH THREE TIMES DAILY AS NEEDED    Insulin Pen Needle (B-D ULTRAFINE III SHORT PEN) 31G X 8 MM MISC by Does not apply route    Insulin Pen Needle (PEN NEEDLES) 29G X 12MM MISC by Does not apply route daily at bedtime Substitute what fits Touejo pen and what is covered by insurance   lisinopril (ZESTRIL) 20 mg tablet Take 1 tablet (20 mg total) by mouth daily    omeprazole (PriLOSEC) 20 mg delayed release capsule Take by mouth    risperiDONE (RisperDAL) 0 5 mg tablet TAKE ONE TABLET BY MOUTH TWICE DAILY    traZODone (DESYREL) 50 mg tablet Take 1 tablet by mouth    venlafaxine (EFFEXOR XR) 75 mg 24 hr capsule Take 1 capsule by mouth 3 (three) times a day    [DISCONTINUED] insulin glargine (BASAGLAR KWIKPEN) 100 units/mL injection pen Inject 25 units sq q HS   [DISCONTINUED] ARIPiprazole (ABILIFY) 5 mg tablet Take 5 mg by mouth    [DISCONTINUED] nicotine (NICODERM CQ) 21 mg/24 hr TD 24 hr patch Place 1 patch on the skin daily (Patient not taking: Reported on 9/4/2018 )    [DISCONTINUED] oxyCODONE (ROXICODONE) 5 mg immediate release tablet Take 1 tablet (5 mg total) by mouth every 4 (four) hours as needed for moderate pain Max Daily Amount: 30 mg (Patient not taking: Reported on 9/4/2018 )    [DISCONTINUED] traMADol (ULTRAM) 50 mg tablet Take 1 tablet (50 mg total) by mouth every 6 (six) hours as needed for moderate pain (Patient not taking: Reported on 9/4/2018 )     No current facility-administered medications on file prior to visit  He has No Known Allergies       Review of Systems   Constitutional: Positive for fatigue  Negative for activity change, chills, diaphoresis and fever  HENT: Negative  Eyes: Positive for visual disturbance  Respiratory: Negative for cough, chest tightness, shortness of breath and wheezing  Cardiovascular: Negative for chest pain, palpitations and leg swelling  Gastrointestinal: Negative for abdominal pain, constipation, diarrhea, nausea and vomiting  Genitourinary: Negative for difficulty urinating, dysuria and frequency  Musculoskeletal: Negative for arthralgias, gait problem and myalgias  Neurological: Positive for weakness  Negative for dizziness, syncope, light-headedness, numbness and headaches  Psychiatric/Behavioral: Positive for dysphoric mood and sleep disturbance  Negative for confusion and suicidal ideas  The patient is nervous/anxious  Objective:      /60 (BP Location: Left arm, Patient Position: Sitting, Cuff Size: Adult)   Pulse (!) 110   Temp (!) 97 2 °F (36 2 °C) (Tympanic)   Resp 18   Ht 6' 1" (1 854 m)   Wt 84 4 kg (186 lb)   SpO2 96%   BMI 24 54 kg/m²          Physical Exam   Constitutional: He is oriented to person, place, and time  He appears well-developed and well-nourished  No distress  HENT:   Head: Normocephalic and atraumatic  Mouth/Throat: Oropharynx is clear and moist    Eyes: Conjunctivae and EOM are normal  Pupils are equal, round, and reactive to light  No scleral icterus  Neck: Neck supple  No JVD present  Cardiovascular: Normal rate, regular rhythm and normal heart sounds  No murmur heard  Pulmonary/Chest: Effort normal  No respiratory distress  He has no wheezes  Abdominal: Soft  Bowel sounds are normal  He exhibits no distension and no mass  There is no tenderness  There is no rebound and no guarding  Musculoskeletal: He exhibits no edema  Lymphadenopathy:     He has no cervical adenopathy  Neurological: He is alert and oriented to person, place, and time  Psychiatric: He has a normal mood and affect  His behavior is normal  Judgment and thought content normal    Nursing note and vitals reviewed

## 2018-09-05 ENCOUNTER — TELEPHONE (OUTPATIENT)
Dept: INTERNAL MEDICINE CLINIC | Facility: CLINIC | Age: 48
End: 2018-09-05

## 2018-09-12 DIAGNOSIS — F32.A DEPRESSION, UNSPECIFIED DEPRESSION TYPE: ICD-10-CM

## 2018-09-12 RX ORDER — RISPERIDONE 0.5 MG/1
TABLET, FILM COATED ORAL
Qty: 60 TABLET | Refills: 3 | Status: SHIPPED | OUTPATIENT
Start: 2018-09-12 | End: 2018-11-19

## 2018-09-13 ENCOUNTER — PATIENT OUTREACH (OUTPATIENT)
Dept: INTERNAL MEDICINE CLINIC | Facility: CLINIC | Age: 48
End: 2018-09-13

## 2018-09-13 PROBLEM — K86.1 CHRONIC PANCREATITIS (HCC): Status: ACTIVE | Noted: 2018-09-13

## 2018-09-17 ENCOUNTER — PATIENT OUTREACH (OUTPATIENT)
Dept: INTERNAL MEDICINE CLINIC | Facility: CLINIC | Age: 48
End: 2018-09-17

## 2018-09-18 DIAGNOSIS — G47.00 INSOMNIA, UNSPECIFIED TYPE: Primary | ICD-10-CM

## 2018-09-18 RX ORDER — TRAZODONE HYDROCHLORIDE 50 MG/1
50 TABLET ORAL
Qty: 30 TABLET | Refills: 0 | Status: SHIPPED | OUTPATIENT
Start: 2018-09-18 | End: 2018-10-22 | Stop reason: SDUPTHER

## 2018-09-20 ENCOUNTER — TELEPHONE (OUTPATIENT)
Dept: GASTROENTEROLOGY | Facility: CLINIC | Age: 48
End: 2018-09-20

## 2018-09-20 NOTE — TELEPHONE ENCOUNTER
----- Message from Stephania Lemus MD sent at 9/19/2018  5:49 PM EDT -----  Regarding: RE: EUS  We can do EUS and then see hinm  Thanks    ----- Message -----  From: Shira Whitten MA  Sent: 9/13/2018  11:51 AM  To: Stephania Lemus MD  Subject: EUS                                              Dr Ward,    He was transported to [facility] from [place] [address]  Pt is being referred for EUS dx pancreatitis  Pt refuses to come in for an office visit  I put him on the schedule for 10/17/2018  Just to reserve spot  Please let me know if you need to see him first  Thanks!

## 2018-09-20 NOTE — TELEPHONE ENCOUNTER
EUS scheduled with Dr Ward in [facility] on 10/17/2018  I gave pt verbal instructions/mailed  Pt is Diabetic

## 2018-09-21 ENCOUNTER — DOCUMENTATION (OUTPATIENT)
Dept: INTERNAL MEDICINE CLINIC | Facility: CLINIC | Age: 48
End: 2018-09-21

## 2018-09-21 ENCOUNTER — OFFICE VISIT (OUTPATIENT)
Dept: INTERNAL MEDICINE CLINIC | Facility: CLINIC | Age: 48
End: 2018-09-21
Payer: COMMERCIAL

## 2018-09-21 VITALS
RESPIRATION RATE: 18 BRPM | WEIGHT: 190 LBS | OXYGEN SATURATION: 94 % | BODY MASS INDEX: 25.18 KG/M2 | TEMPERATURE: 97.9 F | DIASTOLIC BLOOD PRESSURE: 62 MMHG | SYSTOLIC BLOOD PRESSURE: 102 MMHG | HEIGHT: 73 IN | HEART RATE: 100 BPM

## 2018-09-21 DIAGNOSIS — K86.0 ALCOHOL-INDUCED CHRONIC PANCREATITIS (HCC): ICD-10-CM

## 2018-09-21 DIAGNOSIS — R53.82 CHRONIC FATIGUE: ICD-10-CM

## 2018-09-21 DIAGNOSIS — F41.9 ANXIETY AND DEPRESSION: Chronic | ICD-10-CM

## 2018-09-21 DIAGNOSIS — E13.9 DIABETES MELLITUS TYPE 1.5, MANAGED AS TYPE 1 (HCC): Primary | ICD-10-CM

## 2018-09-21 DIAGNOSIS — R20.2 PARESTHESIA OF BOTH LOWER EXTREMITIES: ICD-10-CM

## 2018-09-21 DIAGNOSIS — G47.9 SLEEP DISORDER: ICD-10-CM

## 2018-09-21 DIAGNOSIS — F32.A ANXIETY AND DEPRESSION: Chronic | ICD-10-CM

## 2018-09-21 PROCEDURE — 3008F BODY MASS INDEX DOCD: CPT | Performed by: INTERNAL MEDICINE

## 2018-09-21 PROCEDURE — 99214 OFFICE O/P EST MOD 30 MIN: CPT | Performed by: INTERNAL MEDICINE

## 2018-09-21 RX ORDER — VENLAFAXINE HYDROCHLORIDE 150 MG/1
150 CAPSULE, EXTENDED RELEASE ORAL DAILY
Qty: 90 CAPSULE | Refills: 0 | Status: SHIPPED | OUTPATIENT
Start: 2018-09-21 | End: 2019-01-31

## 2018-09-21 NOTE — PROGRESS NOTES
Assessment/Plan:    No problem-specific Assessment & Plan notes found for this encounter  Diagnoses and all orders for this visit:    Diabetes mellitus type 1 5, managed as type 1 (HCC)  -     insulin lispro (HumaLOG) 100 units/mL injection pen; Per sliding scale with meals  Alcohol-induced chronic pancreatitis (HCC)    Anxiety and depression  -     venlafaxine (EFFEXOR XR) 150 mg 24 hr capsule; Take 1 capsule (150 mg total) by mouth daily    Paresthesia of both lower extremities  -     EMG 2 limb lower extremity; Future    Sleep disorder  -     venlafaxine (EFFEXOR XR) 150 mg 24 hr capsule; Take 1 capsule (150 mg total) by mouth daily    Chronic fatigue  -     venlafaxine (EFFEXOR XR) 150 mg 24 hr capsule; Take 1 capsule (150 mg total) by mouth daily      A/P: Will try adding humalog  Will notify the  to see if she can help  ??leg issues and most likely combination of diabetic neuropathy and neuropathy form ETOH  Radiculopathy also possible  Sugars need better control  Will increase the SNRI for pain, sleep, and depression  Need to get up to 225mg  Avoid the ultram  Will check an EMG  Discussed sleep hygiene and pt to start his trazodone for both sleep and depression  COntinue current treatment otherwise  RTC two weeks  Refused the flu vaccine  Subjective:      Patient ID: Dianna Henderson is a 46 y o  male  WM RTC for f/u dm, chronic pancreatitis, etc  Doing ok, but reports sugars are around and over 200  Apparently, the insurance wouldn't cover the novalog, but the pt never informed us  No abd pain  Notes increase bilat leg, left greater than the right tingling, burning, and pain  Used to be on ultram  Trouble sleeping, but is napping during the day  Also, increase fatigue and not wanting to do anything  Abstaining from ETOH  The following portions of the patient's history were reviewed and updated as appropriate:   He  has a past medical history of Allergic rhinitis;  Anemia; Quiros esophagus; Bronchitis, asthmatic; Cholelithiasis; Chronic pain; COPD (chronic obstructive pulmonary disease) ([place] [address] ); Depression; Diabetes mellitus ([place] [address] ); GERD (gastroesophageal reflux disease); Hypertension; Kidney stone; Metatarsalgia; Pancreatitis; Psychiatric disorder; and Renal disorder  He   Patient Active Problem List    Diagnosis Date Noted    Chronic pancreatitis ([place] [address] ) 09/13/2018    Transaminitis 08/13/2018    Tobacco dependence 08/13/2018    Acute on chronic pancreatitis ([place] [address] ) 08/12/2018    GERD (gastroesophageal reflux disease) 08/09/2018    Anxiety and depression 08/09/2018    H/O alcohol abuse 08/09/2018    Leukemoid reaction 08/09/2018    Insomnia 09/05/2017    Recurrent pancreatitis ([place] [address] ) 08/25/2017    COPD (chronic obstructive pulmonary disease) ([place] [address] ) 03/29/2017    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    Iron deficiency anemia 04/05/2016    Diabetes mellitus type 1 5, managed as type 1 ([place] [address] ) 04/05/2016    Fibromyalgia 07/29/2013    Fatty liver 12/05/2012    Nephrolithiasis 12/05/2012    Hyperlipidemia 09/11/2012    Benign essential hypertension 07/02/2012    MDD (major depressive disorder), single episode 07/02/2012    Other chronic pain 07/02/2012    Sleep disorder 07/02/2012    Generalized anxiety disorder 06/13/2012     He  has a past surgical history that includes CHOLECYSTECTOMY LAPAROSCOPIC; Foot surgery; Knee arthroscopy; and Vasectomy  His family history includes Cancer in his mother; Coronary artery disease in his family, father, and mother; Diabetes in his mother and sister; Prostate cancer in his father  He  reports that he has been smoking  He has been smoking about 1 00 pack per day  He has never used smokeless tobacco  He reports that he drinks alcohol  He reports that he does not use drugs    Current Outpatient Prescriptions   Medication Sig Dispense Refill    fenofibrate (TRICOR) 145 mg tablet Take 1 tablet (145 mg total) by mouth daily 30 tablet 0    glucose blood (ONE TOUCH ULTRA TEST) test strip by In Vitro route 3 (three) times a day      glucose blood (ONETOUCH VERIO) test strip by In Vitro route 3 (three) times a day      hydrOXYzine pamoate (VISTARIL) 50 mg capsule TAKE 1 CAPSULE BY MOUTH THREE TIMES DAILY AS NEEDED 90 capsule 0    insulin glargine (BASAGLAR KWIKPEN) 100 units/mL injection pen Inject 60 units sq q HS  5 pen 0    Insulin Pen Needle (B-D ULTRAFINE III SHORT PEN) 31G X 8 MM MISC by Does not apply route      Insulin Pen Needle (PEN NEEDLES) 29G X 12MM MISC by Does not apply route daily at bedtime Substitute what fits Touejo pen and what is covered by insurance  45 each 0    lisinopril (ZESTRIL) 20 mg tablet Take 1 tablet (20 mg total) by mouth daily 30 tablet 5    omeprazole (PriLOSEC) 20 mg delayed release capsule Take by mouth      risperiDONE (RisperDAL) 0 5 mg tablet TAKE 1 TABLET BY MOUTH TWICE DAILY 60 tablet 3    traZODone (DESYREL) 50 mg tablet Take 1 tablet (50 mg total) by mouth daily at bedtime 30 tablet 0    insulin lispro (HumaLOG) 100 units/mL injection pen Per sliding scale with meals  5 pen 0    venlafaxine (EFFEXOR XR) 150 mg 24 hr capsule Take 1 capsule (150 mg total) by mouth daily 90 capsule 0     No current facility-administered medications for this visit  Current Outpatient Prescriptions on File Prior to Visit   Medication Sig    fenofibrate (TRICOR) 145 mg tablet Take 1 tablet (145 mg total) by mouth daily    glucose blood (ONE TOUCH ULTRA TEST) test strip by In Vitro route 3 (three) times a day    glucose blood (ONETOUCH VERIO) test strip by In Vitro route 3 (three) times a day    hydrOXYzine pamoate (VISTARIL) 50 mg capsule TAKE 1 CAPSULE BY MOUTH THREE TIMES DAILY AS NEEDED    insulin glargine (BASAGLAR KWIKPEN) 100 units/mL injection pen Inject 60 units sq q HS      Insulin Pen Needle (B-D ULTRAFINE III SHORT PEN) 31G X 8 MM MISC by Does not apply route    Insulin Pen Needle (PEN NEEDLES) 29G X 12MM MISC by Does not apply route daily at bedtime Substitute what fits Touejo pen and what is covered by insurance   lisinopril (ZESTRIL) 20 mg tablet Take 1 tablet (20 mg total) by mouth daily    omeprazole (PriLOSEC) 20 mg delayed release capsule Take by mouth    risperiDONE (RisperDAL) 0 5 mg tablet TAKE 1 TABLET BY MOUTH TWICE DAILY    traZODone (DESYREL) 50 mg tablet Take 1 tablet (50 mg total) by mouth daily at bedtime    [DISCONTINUED] insulin aspart (NOVOLOG FLEXPEN) 100 Units/mL injection pen Per sliding scale sq q meals   [DISCONTINUED] venlafaxine (EFFEXOR XR) 75 mg 24 hr capsule Take 1 capsule by mouth 3 (three) times a day     No current facility-administered medications on file prior to visit  He has No Known Allergies       Review of Systems   Constitutional: Positive for activity change and fatigue  Negative for chills, diaphoresis and fever  Respiratory: Negative for cough, chest tightness, shortness of breath and wheezing  Cardiovascular: Negative for chest pain, palpitations and leg swelling  Gastrointestinal: Negative for abdominal pain, constipation, diarrhea, nausea and vomiting  Genitourinary: Negative for difficulty urinating, dysuria and frequency  Musculoskeletal: Negative for arthralgias, gait problem and myalgias  Burning and tingling of the legs  Neurological: Negative for dizziness, tremors, seizures, weakness, light-headedness, numbness and headaches  Psychiatric/Behavioral: Positive for dysphoric mood and sleep disturbance  Negative for confusion  The patient is nervous/anxious  Objective:      /62 (BP Location: Left arm, Patient Position: Sitting, Cuff Size: Adult)   Pulse 100   Temp 97 9 °F (36 6 °C) (Tympanic)   Resp 18   Ht 6' 1" (1 854 m)   Wt 86 2 kg (190 lb)   SpO2 94%   BMI 25 07 kg/m²          Physical Exam   Constitutional: He is oriented to person, place, and time  He appears well-developed and well-nourished   No distress  HENT:   Head: Normocephalic and atraumatic  Mouth/Throat: Oropharynx is clear and moist    Eyes: Conjunctivae are normal  Pupils are equal, round, and reactive to light  Neck: Neck supple  No JVD present  Cardiovascular: Normal rate, regular rhythm and normal heart sounds  No murmur heard  Pulmonary/Chest: Effort normal and breath sounds normal  No respiratory distress  He has no wheezes  Abdominal: Soft  Bowel sounds are normal  He exhibits no distension  There is no tenderness  Musculoskeletal: He exhibits no edema  Neurological: He is alert and oriented to person, place, and time  He has normal reflexes  No cranial nerve deficit  Coordination normal    Psychiatric: He has a normal mood and affect  His behavior is normal  Judgment and thought content normal    Nursing note and vitals reviewed

## 2018-09-24 ENCOUNTER — PATIENT OUTREACH (OUTPATIENT)
Dept: INTERNAL MEDICINE CLINIC | Facility: CLINIC | Age: 48
End: 2018-09-24

## 2018-09-28 ENCOUNTER — HOSPITAL ENCOUNTER (OUTPATIENT)
Dept: NEUROLOGY | Facility: CLINIC | Age: 48
Discharge: HOME | End: 2018-09-28
Payer: COMMERCIAL

## 2018-09-28 DIAGNOSIS — R20.2 PARESTHESIA OF BOTH LOWER EXTREMITIES: ICD-10-CM

## 2018-09-28 PROCEDURE — 95910 NRV CNDJ TEST 7-8 STUDIES: CPT | Performed by: PSYCHIATRY & NEUROLOGY

## 2018-09-28 PROCEDURE — 95886 MUSC TEST DONE W/N TEST COMP: CPT | Performed by: PSYCHIATRY & NEUROLOGY

## 2018-10-01 ENCOUNTER — PATIENT OUTREACH (OUTPATIENT)
Dept: INTERNAL MEDICINE CLINIC | Facility: CLINIC | Age: 48
End: 2018-10-01

## 2018-10-08 ENCOUNTER — OFFICE VISIT (OUTPATIENT)
Dept: INTERNAL MEDICINE CLINIC | Facility: CLINIC | Age: 48
End: 2018-10-08
Payer: COMMERCIAL

## 2018-10-08 VITALS
OXYGEN SATURATION: 94 % | WEIGHT: 187 LBS | HEIGHT: 73 IN | SYSTOLIC BLOOD PRESSURE: 120 MMHG | RESPIRATION RATE: 18 BRPM | DIASTOLIC BLOOD PRESSURE: 60 MMHG | BODY MASS INDEX: 24.78 KG/M2 | TEMPERATURE: 97.6 F | HEART RATE: 114 BPM

## 2018-10-08 DIAGNOSIS — G47.9 SLEEP DISORDER: ICD-10-CM

## 2018-10-08 DIAGNOSIS — R20.2 PARESTHESIA OF BOTH LOWER EXTREMITIES: ICD-10-CM

## 2018-10-08 DIAGNOSIS — F32.A ANXIETY AND DEPRESSION: ICD-10-CM

## 2018-10-08 DIAGNOSIS — E13.9 DIABETES MELLITUS TYPE 1.5, MANAGED AS TYPE 1 (HCC): Primary | ICD-10-CM

## 2018-10-08 DIAGNOSIS — K86.0 ALCOHOL-INDUCED CHRONIC PANCREATITIS (HCC): ICD-10-CM

## 2018-10-08 DIAGNOSIS — E11.9 ENCOUNTER FOR DIABETIC FOOT EXAM (HCC): ICD-10-CM

## 2018-10-08 DIAGNOSIS — Z23 ENCOUNTER FOR VACCINATION: ICD-10-CM

## 2018-10-08 DIAGNOSIS — F41.9 ANXIETY AND DEPRESSION: ICD-10-CM

## 2018-10-08 PROCEDURE — 90471 IMMUNIZATION ADMIN: CPT | Performed by: INTERNAL MEDICINE

## 2018-10-08 PROCEDURE — 99214 OFFICE O/P EST MOD 30 MIN: CPT | Performed by: INTERNAL MEDICINE

## 2018-10-08 PROCEDURE — 90682 RIV4 VACC RECOMBINANT DNA IM: CPT

## 2018-10-11 ENCOUNTER — TELEPHONE (OUTPATIENT)
Dept: INTERNAL MEDICINE CLINIC | Facility: CLINIC | Age: 48
End: 2018-10-11

## 2018-10-11 NOTE — TELEPHONE ENCOUNTER
I reached out to the pharmacy, there is no problem with his insulin he just never went to pick it up, I called the patient and left him a message that his letting him know it was available  for him to  with no cost

## 2018-10-16 ENCOUNTER — ANESTHESIA EVENT (OUTPATIENT)
Dept: GASTROENTEROLOGY | Facility: HOSPITAL | Age: 48
End: 2018-10-16
Payer: COMMERCIAL

## 2018-10-16 NOTE — ANESTHESIA PREPROCEDURE EVALUATION
Review of Systems/Medical History  Patient summary reviewed  Chart reviewed  No history of anesthetic complications     Cardiovascular  Hyperlipidemia, Hypertension ,    Pulmonary  Smoker (1 ppd, smoked this am) cigarette smoker  , COPD (PRN inhaler) , Asthma ,        GI/Hepatic    GERD , Liver disease (Fatty liver) , Pancreatic problem (Chronic pancreatitis),   Comment: H/O ETOH abuse--d/c 2 months     Kidney stones,        Endo/Other  Diabetes (with neuropathy) type 1 Insulin,      GYN  Negative gynecology ROS          Hematology  Anemia iron deficiency anemia,     Musculoskeletal  Back pain ,   Comment: Fibromyalgia      Neurology   Psychology   Anxiety, Depression ,   Chronic pain (Back, shoulders, LE),            Physical Exam    Airway    Mallampati score: II  TM Distance: >3 FB       Dental   implants,     Cardiovascular  Rhythm: regular,     Pulmonary  Breath sounds clear to auscultation,     Other Findings        Anesthesia Plan  ASA Score- 3     Anesthesia Type- IV sedation with anesthesia with ASA Monitors  Additional Monitors:   Airway Plan:         Plan Factors-    Induction- intravenous  Postoperative Plan-     Informed Consent- Anesthetic plan and risks discussed with patient  I personally reviewed this patient with the CRNA  Discussed and agreed on the Anesthesia Plan with the CRNA  Nate Nelson

## 2018-10-17 ENCOUNTER — ANESTHESIA (OUTPATIENT)
Dept: GASTROENTEROLOGY | Facility: HOSPITAL | Age: 48
End: 2018-10-17
Payer: COMMERCIAL

## 2018-10-17 ENCOUNTER — HOSPITAL ENCOUNTER (OUTPATIENT)
Facility: HOSPITAL | Age: 48
Setting detail: OUTPATIENT SURGERY
Discharge: HOME | End: 2018-10-17
Attending: INTERNAL MEDICINE | Admitting: INTERNAL MEDICINE
Payer: COMMERCIAL

## 2018-10-17 VITALS
OXYGEN SATURATION: 92 % | TEMPERATURE: 97.8 F | RESPIRATION RATE: 16 BRPM | HEART RATE: 89 BPM | WEIGHT: 190 LBS | DIASTOLIC BLOOD PRESSURE: 71 MMHG | BODY MASS INDEX: 25.73 KG/M2 | HEIGHT: 72 IN | SYSTOLIC BLOOD PRESSURE: 102 MMHG

## 2018-10-17 DIAGNOSIS — K86.1 CHRONIC PANCREATITIS, UNSPECIFIED PANCREATITIS TYPE (HCC): ICD-10-CM

## 2018-10-17 PROCEDURE — 43238 EGD US FINE NEEDLE BX/ASPIR: CPT | Performed by: INTERNAL MEDICINE

## 2018-10-17 PROCEDURE — 88342 IMHCHEM/IMCYTCHM 1ST ANTB: CPT | Performed by: PATHOLOGY

## 2018-10-17 PROCEDURE — 88173 CYTOPATH EVAL FNA REPORT: CPT | Performed by: PATHOLOGY

## 2018-10-17 PROCEDURE — 88305 TISSUE EXAM BY PATHOLOGIST: CPT | Performed by: PATHOLOGY

## 2018-10-17 PROCEDURE — 43239 EGD BIOPSY SINGLE/MULTIPLE: CPT | Performed by: INTERNAL MEDICINE

## 2018-10-17 RX ORDER — PROPOFOL 10 MG/ML
INJECTION, EMULSION INTRAVENOUS CONTINUOUS PRN
Status: DISCONTINUED | OUTPATIENT
Start: 2018-10-17 | End: 2018-10-17 | Stop reason: SURG

## 2018-10-17 RX ORDER — SODIUM CHLORIDE 9 MG/ML
50 INJECTION, SOLUTION INTRAVENOUS CONTINUOUS
Status: DISCONTINUED | OUTPATIENT
Start: 2018-10-17 | End: 2018-10-17 | Stop reason: HOSPADM

## 2018-10-17 RX ORDER — LIDOCAINE HYDROCHLORIDE 10 MG/ML
INJECTION, SOLUTION INFILTRATION; PERINEURAL AS NEEDED
Status: DISCONTINUED | OUTPATIENT
Start: 2018-10-17 | End: 2018-10-17 | Stop reason: SURG

## 2018-10-17 RX ORDER — PROPOFOL 10 MG/ML
INJECTION, EMULSION INTRAVENOUS AS NEEDED
Status: DISCONTINUED | OUTPATIENT
Start: 2018-10-17 | End: 2018-10-17 | Stop reason: SURG

## 2018-10-17 RX ORDER — GLYCOPYRROLATE 0.2 MG/ML
INJECTION INTRAMUSCULAR; INTRAVENOUS AS NEEDED
Status: DISCONTINUED | OUTPATIENT
Start: 2018-10-17 | End: 2018-10-17 | Stop reason: SURG

## 2018-10-17 RX ADMIN — PROPOFOL 20 MG: 10 INJECTION, EMULSION INTRAVENOUS at 11:07

## 2018-10-17 RX ADMIN — Medication 20 MG: at 10:42

## 2018-10-17 RX ADMIN — Medication 10 MG: at 10:52

## 2018-10-17 RX ADMIN — GLYCOPYRROLATE 0.2 MG: 0.2 INJECTION, SOLUTION INTRAMUSCULAR; INTRAVENOUS at 10:29

## 2018-10-17 RX ADMIN — PROPOFOL 20 MG: 10 INJECTION, EMULSION INTRAVENOUS at 11:03

## 2018-10-17 RX ADMIN — PROPOFOL 20 MG: 10 INJECTION, EMULSION INTRAVENOUS at 11:00

## 2018-10-17 RX ADMIN — PROPOFOL 20 MG: 10 INJECTION, EMULSION INTRAVENOUS at 10:50

## 2018-10-17 RX ADMIN — LIDOCAINE HYDROCHLORIDE 50 MG: 10 INJECTION, SOLUTION INFILTRATION; PERINEURAL at 10:31

## 2018-10-17 RX ADMIN — PROPOFOL 120 MCG/KG/MIN: 10 INJECTION, EMULSION INTRAVENOUS at 10:31

## 2018-10-17 RX ADMIN — PROPOFOL 150 MG: 10 INJECTION, EMULSION INTRAVENOUS at 10:31

## 2018-10-17 RX ADMIN — Medication 10 MG: at 11:07

## 2018-10-17 RX ADMIN — SODIUM CHLORIDE 50 ML/HR: 0.9 INJECTION, SOLUTION INTRAVENOUS at 10:08

## 2018-10-17 RX ADMIN — PROPOFOL 40 MG: 10 INJECTION, EMULSION INTRAVENOUS at 10:41

## 2018-10-17 NOTE — ANESTHESIA POSTPROCEDURE EVALUATION
Post-Op Assessment Note      CV Status:  Stable    Mental Status:  Somnolent    Hydration Status:  Euvolemic    PONV Controlled:  Controlled    Airway Patency:  Patent    Post Op Vitals Reviewed: Yes          Staff: MARY           BP (!) 82/53 (10/17/18 1117)    Temp 97 8 °F (36 6 °C) (10/17/18 1117)    Pulse 99 (10/17/18 1117)   Resp 16 (10/17/18 1117)    SpO2 92 % (10/17/18 1117)

## 2018-10-17 NOTE — OP NOTE
OPERATIVE REPORT  PATIENT NAME: Ximena Kirby    :  1970  MRN: [id number]  Pt Location: [facility]    SURGERY DATE: 10/17/2018    Surgeon(s) and Role:     Kiya Abraham MD - Primary    Preop Diagnosis:  Chronic pancreatitis, unspecified pancreatitis type ([facility] [address] ) [K86 1]    Post-Op Diagnosis Codes:     * Chronic pancreatitis, unspecified pancreatitis type ([facility] [address] ) [K86 1]    Procedure(s) (LRB):  LINEAR ENDOSCOPIC U/S (N/A)    Specimen(s):  ID Type Source Tests Collected by Time Destination   1 : pancreatic head lesion FNA FNA FINE NEEDLE ASPIRATION Yahaira De La Cruz MD 10/17/2018 1044    2 : antrum & Body Tissue Stomach TISSUE EXAM Yahaira De La Cruz MD 10/17/2018 1110    3 : r/o martinez's Tissue Esophagogastric junction TISSUE EXAM Yahaira De La Cruz MD 10/17/2018 1111        ENDOSCOPIC ULTRASOUND    SEDATION: Monitored anesthesia care, check anesthesia records    INDICATIONS:  Acute on chronic pancreatitis  CONSENT:  Informed consent was obtained for the procedure, including sedation after explaining the risks and benefits of the procedure  Risks including but not limited to bleeding, perforation, infection, and missed lesion  PREPARATION:   Telemetry, pulse oximetry, blood pressure were monitored throughout the procedure  Patient was identified by myself both verbally and by visual inspection of ID band  DESCRIPTION:   Patient was placed in the left lateral decubitus position and was sedated with the above medication  The gastroscope was introduced in to the oropharynx and the esophagus was intubated under direct visualization  Scope was passed down the esophagus up to 2nd part of the duodenum  A careful inspection was made as the gastroscope was withdrawn, including a retroflexed view of the stomach; findings and interventions are described below  FINDINGS:    EGD FINDINGS:    #1  Esophagus- normal esophagus  Possible short-segment Martinez's esophagus at the distal esophagus/GE junction  Biopsies were done    GE junction at 41 cm from the incisors  #2  Stomach- mild erosive gastritis in the antrum otherwise normal stomach  Biopsies were done  #3  Duodenum- normal duodenal bulb and 2nd portion  EUS FINDINGS:    Pancreas-in the region of the pancreatic uncinate/head there was a 2 6x2 cm round lesion which was very heterogenous with significant hyperechoic foci but hypoechoic areas as well  FNA was done using a 25 gauge needle  Three passes were taken  It appeared to be inflammatory cells  The pancreatic duct in the head of the pancreas was dilated to 5 mm  The CBD also measured 6 mm with tapering at the ampulla to 4 mm  The pancreatic parenchyma was only visualize in the head, neck and a short portion of the body  It had significant hyperechoic foci  The pancreatic duct was followed up to the pancreatic body where terminated in the region of a cystic lesion  This cyst had a hyperechoic wall with shadowing  It measured 3 cm  Debris was seen within the cyst   The tail of the pancreas could not be seen  No other cystic lesions were seen  Common bile duct  The CBD measured 6 mm tapering to 4 mm in the ampulla  Liver  Visualized areas of the left in the right hepatic lobes appeared to be normal   Celiac axis  This was normal   Lymphadenopathy  No lymphadenopathy was visualized  IMPRESSIONS:      1  Pancreatic changes suggestive of chronic pancreatitis with significant hyperechoic foci, ectatic duct and pancreatic cystic lesion in the body/tail of the pancreas suggestive of pseudocyst   2  There was a hyperechoic/hypoechoic heterogenous area in the head/uncinate area of the pancreas  FNA was done  This however appeared to be more related to chronic pancreatitis  3  CBD measured 6 mm  4   Possible short-segment Quiros's esophagus of the distal esophagus  Biopsies were done  5  Mild erosive gastritis  Biopsies were done  RECOMMENDATIONS:     1   Follow up with the results of the biopsies  2  Complete smoking and alcohol cessation is recommended  3  Follow up in office  COMPLICATIONS:  None; patient tolerated the procedure well            DISPOSITION: PACU           CONDITION: Stable

## 2018-10-17 NOTE — H&P
History and Physical - [facility]  Everardo Donovan 46 y o  male MRN: [id number]    HPI: Everardo Donovan is a 46y o  year old male who presents with history of acute on chronic pancreatitis  MRI suggested Patient appears to have acute pancreatitis superimposed upon chronic  pancreatitis  The main pancreatic duct appears to abruptly truncate in  the area of prior pseudocyst or fat necrosis within the tail of the  pancreas, which is unchanged  However, the main pancreatic duct in the  head and body of the pancreas appears to be mildly more dilated compared  to the most recent MRI which may be indicative of reactive change in acute  pancreatitis, obstruction not excluded  There may be a small dilated side  branch which may represent IPMN and/or reactive change  He is presenting for EUS for evaluation of the pancreatitis  He has stopped drinking about 3 months ago  Last episode of pancreatitis was 2 months ago  Review of Systems    Historical Information   Past Medical History:   Diagnosis Date    Allergic rhinitis     last assessed: 12/5/2012    Anemia     Anxiety and depression     Quiros esophagus     Cholelithiasis     Chronic pain     COPD (chronic obstructive pulmonary disease) (HCC)     Depression     Diabetes mellitus (HCC)     Emphysema lung (HCC)     Generalized anxiety disorder     GERD (gastroesophageal reflux disease)     Hyperlipidemia     Hypertension     Kidney stone     Metatarsalgia     last assessed: 8/11/2014, unspecified laterality, ? cause  PE with changes  To see DPM tomorrow      Pancreatitis     Psychiatric disorder     Renal disorder     Shortness of breath     with activity     Past Surgical History:   Procedure Laterality Date    CHOLECYSTECTOMY LAPAROSCOPIC      FOOT SURGERY      B/L great toe joint replacement    KNEE ARTHROSCOPY      (therapeutic) right knee    VASECTOMY      vas deferens     Social History   History   Alcohol Use    Yes     Comment: Last drink in June     History   Drug Use No     Comment: chronic narcotic use     History   Smoking Status    Current Every Day Smoker    Packs/day: 1 00   Smokeless Tobacco    Never Used     Comment: Tobacco use [employer] "How to Quit" literature given to pat  Family History   Problem Relation Age of Onset    Cancer Mother     Coronary artery disease Mother     Diabetes Mother     Coronary artery disease Father     Prostate cancer Father     Diabetes Sister     Coronary artery disease Family        Meds/Allergies     Prescriptions Prior to Admission   Medication    fenofibrate (TRICOR) 145 mg tablet    glucose blood (ONE TOUCH ULTRA TEST) test strip    glucose blood (ONETOUCH VERIO) test strip    hydrOXYzine pamoate (VISTARIL) 50 mg capsule    lisinopril (ZESTRIL) 20 mg tablet    omeprazole (PriLOSEC) 20 mg delayed release capsule    risperiDONE (RisperDAL) 0 5 mg tablet    traZODone (DESYREL) 50 mg tablet    venlafaxine (EFFEXOR XR) 150 mg 24 hr capsule    insulin glargine (BASAGLAR KWIKPEN) 100 units/mL injection pen    insulin lispro (HumaLOG) 100 units/mL injection pen    Insulin Pen Needle (B-D ULTRAFINE III SHORT PEN) 31G X 8 MM MISC    Insulin Pen Needle (PEN NEEDLES) 29G X 12MM MISC       No Known Allergies    Objective     Blood pressure 115/68, pulse 85, temperature (!) 97 3 °F (36 3 °C), temperature source Tympanic, resp  rate 18, height 6' (1 829 m), weight 86 2 kg (190 lb), SpO2 98 %  PHYSICAL EXAM    Gen: NAD  CV: RRR  CHEST: Clear  ABD: soft, NT/ND  EXT: no edema  Neuro: AAO      ASSESSMENT/PLAN:  This is a 46y o  year old male here for EUS for evaluation of acute on chronic pancreatitis  PLAN:   Procedure:  EUS

## 2018-10-17 NOTE — DISCHARGE INSTR - AVS FIRST PAGE
OPERATIVE REPORT  PATIENT NAME: Kendall Cuevas    :  1970  MRN: [id number]  Pt Location: [facility]    SURGERY DATE: 10/17/2018    Surgeon(s) and Role:     Mindy Richmond MD - Primary    Preop Diagnosis:  Chronic pancreatitis, unspecified pancreatitis type ([facility] [address] ) [K86 1]    Post-Op Diagnosis Codes:     * Chronic pancreatitis, unspecified pancreatitis type ([facility] [address] ) [K86 1]    Procedure(s) (LRB):  LINEAR ENDOSCOPIC U/S (N/A)    Specimen(s):  ID Type Source Tests Collected by Time Destination   1 : pancreatic head lesion FNA FNA FINE NEEDLE ASPIRATION Sheyla Leary MD 10/17/2018 1044    2 : antrum & Body Tissue Stomach TISSUE EXAM Sheyla Leary MD 10/17/2018 1110    3 : r/o martinez's Tissue Esophagogastric junction TISSUE EXAM hSeyla Leary MD 10/17/2018 1111        ENDOSCOPIC ULTRASOUND    SEDATION: Monitored anesthesia care, check anesthesia records    INDICATIONS:  Acute on chronic pancreatitis  CONSENT:  Informed consent was obtained for the procedure, including sedation after explaining the risks and benefits of the procedure  Risks including but not limited to bleeding, perforation, infection, and missed lesion  PREPARATION:   Telemetry, pulse oximetry, blood pressure were monitored throughout the procedure  Patient was identified by myself both verbally and by visual inspection of ID band  DESCRIPTION:   Patient was placed in the left lateral decubitus position and was sedated with the above medication  The gastroscope was introduced in to the oropharynx and the esophagus was intubated under direct visualization  Scope was passed down the esophagus up to 2nd part of the duodenum  A careful inspection was made as the gastroscope was withdrawn, including a retroflexed view of the stomach; findings and interventions are described below  FINDINGS:    EGD FINDINGS:    #1  Esophagus- normal esophagus  Possible short-segment Martinez's esophagus at the distal esophagus/GE junction  Biopsies were done    GE junction at 41 cm from the incisors  #2  Stomach- mild erosive gastritis in the antrum otherwise normal stomach  Biopsies were done  #3  Duodenum- normal duodenal bulb and 2nd portion  EUS FINDINGS:    Pancreas-in the region of the pancreatic uncinate/head there was a 2 6x2 cm round lesion which was very heterogenous with significant hyperechoic foci but hypoechoic areas as well  FNA was done using a 25 gauge needle  Three passes were taken  It appeared to be inflammatory cells  The pancreatic duct in the head of the pancreas was dilated to 5 mm  The CBD also measured 6 mm with tapering at the ampulla to 4 mm  The pancreatic parenchyma was only visualize in the head, neck and a short portion of the body  It had significant hyperechoic foci  The pancreatic duct was followed up to the pancreatic body where terminated in the region of a cystic lesion  This cyst had a hyperechoic wall with shadowing  It measured 3 cm  Debris was seen within the cyst   The tail of the pancreas could not be seen  No other cystic lesions were seen  Common bile duct  The CBD measured 6 mm tapering to 4 mm in the ampulla  Liver  Visualized areas of the left in the right hepatic lobes appeared to be normal   Celiac axis  This was normal   Lymphadenopathy  No lymphadenopathy was visualized  IMPRESSIONS:      1  Pancreatic changes suggestive of chronic pancreatitis with significant hyperechoic foci, ectatic duct and pancreatic cystic lesion in the body/tail of the pancreas suggestive of pseudocyst   2  There was a hyperechoic/hypoechoic heterogenous area in the head/uncinate area of the pancreas  FNA was done  This however appeared to be more related to chronic pancreatitis  3  CBD measured 6 mm  4   Possible short-segment Quiros's esophagus of the distal esophagus  Biopsies were done  5  Mild erosive gastritis  Biopsies were done  RECOMMENDATIONS:     1   Follow up with the results of the biopsies  2  Complete smoking and alcohol cessation is recommended  3  Follow up in office  COMPLICATIONS:  None; patient tolerated the procedure well            DISPOSITION: PACU           CONDITION: Stable

## 2018-10-22 ENCOUNTER — HOSPITAL ENCOUNTER (INPATIENT)
Facility: HOSPITAL | Age: 48
LOS: 3 days | Discharge: HOME | DRG: 469 | End: 2018-10-26
Attending: EMERGENCY MEDICINE | Admitting: INTERNAL MEDICINE
Payer: COMMERCIAL

## 2018-10-22 DIAGNOSIS — N17.9 AKI (ACUTE KIDNEY INJURY) (HCC): ICD-10-CM

## 2018-10-22 DIAGNOSIS — R10.9 ABDOMINAL PAIN: Primary | ICD-10-CM

## 2018-10-22 DIAGNOSIS — F10.11 H/O ALCOHOL ABUSE: ICD-10-CM

## 2018-10-22 DIAGNOSIS — F41.9 ANXIETY AND DEPRESSION: Chronic | ICD-10-CM

## 2018-10-22 DIAGNOSIS — G47.00 INSOMNIA, UNSPECIFIED TYPE: ICD-10-CM

## 2018-10-22 DIAGNOSIS — F32.A ANXIETY AND DEPRESSION: Chronic | ICD-10-CM

## 2018-10-22 LAB
BASOPHILS # BLD AUTO: 0.1 THOUSANDS/ΜL (ref 0–0.1)
BASOPHILS NFR BLD AUTO: 0 % (ref 0–2)
EOSINOPHIL # BLD AUTO: 0 THOUSAND/ΜL (ref 0–0.61)
EOSINOPHIL NFR BLD AUTO: 0 % (ref 0–5)
ERYTHROCYTE [DISTWIDTH] IN BLOOD BY AUTOMATED COUNT: 15.8 % (ref 11.5–14.5)
HCT VFR BLD AUTO: 44.8 % (ref 36.5–49.3)
HGB BLD-MCNC: 14.4 G/DL (ref 14–18)
LYMPHOCYTES # BLD AUTO: 2 THOUSANDS/ΜL (ref 0.6–4.47)
LYMPHOCYTES NFR BLD AUTO: 11 % (ref 21–51)
MCH RBC QN AUTO: 29 PG (ref 26–34)
MCHC RBC AUTO-ENTMCNC: 32.2 G/DL (ref 31–37)
MCV RBC AUTO: 90 FL (ref 81–99)
MONOCYTES # BLD AUTO: 0.6 THOUSAND/ΜL (ref 0.17–1.22)
MONOCYTES NFR BLD AUTO: 3 % (ref 2–12)
NEUTROPHILS # BLD AUTO: 15.6 THOUSANDS/ΜL (ref 1.4–6.5)
NEUTS SEG NFR BLD AUTO: 85 % (ref 42–75)
NRBC BLD AUTO-RTO: 0 /100 WBCS
PLATELET # BLD AUTO: 295 THOUSANDS/UL (ref 149–390)
PMV BLD AUTO: 6.8 FL (ref 8.6–11.7)
RBC # BLD AUTO: 4.98 MILLION/UL (ref 4.3–5.9)
WBC # BLD AUTO: 18.3 THOUSAND/UL (ref 4.8–10.8)

## 2018-10-22 PROCEDURE — 85025 COMPLETE CBC W/AUTO DIFF WBC: CPT | Performed by: EMERGENCY MEDICINE

## 2018-10-22 PROCEDURE — 96361 HYDRATE IV INFUSION ADD-ON: CPT

## 2018-10-22 PROCEDURE — 96374 THER/PROPH/DIAG INJ IV PUSH: CPT

## 2018-10-22 PROCEDURE — 93005 ELECTROCARDIOGRAM TRACING: CPT

## 2018-10-22 PROCEDURE — 83690 ASSAY OF LIPASE: CPT | Performed by: EMERGENCY MEDICINE

## 2018-10-22 PROCEDURE — 80053 COMPREHEN METABOLIC PANEL: CPT | Performed by: EMERGENCY MEDICINE

## 2018-10-22 PROCEDURE — 96375 TX/PRO/DX INJ NEW DRUG ADDON: CPT

## 2018-10-22 PROCEDURE — 36415 COLL VENOUS BLD VENIPUNCTURE: CPT | Performed by: EMERGENCY MEDICINE

## 2018-10-22 PROCEDURE — 99285 EMERGENCY DEPT VISIT HI MDM: CPT

## 2018-10-22 RX ORDER — PANTOPRAZOLE SODIUM 40 MG/1
40 INJECTION, POWDER, FOR SOLUTION INTRAVENOUS ONCE
Status: COMPLETED | OUTPATIENT
Start: 2018-10-23 | End: 2018-10-22

## 2018-10-22 RX ORDER — SODIUM CHLORIDE 9 MG/ML
100 INJECTION, SOLUTION INTRAVENOUS CONTINUOUS
Status: DISCONTINUED | OUTPATIENT
Start: 2018-10-23 | End: 2018-10-26

## 2018-10-22 RX ORDER — ONDANSETRON 2 MG/ML
4 INJECTION INTRAMUSCULAR; INTRAVENOUS ONCE
Status: COMPLETED | OUTPATIENT
Start: 2018-10-23 | End: 2018-10-22

## 2018-10-22 RX ORDER — TRAZODONE HYDROCHLORIDE 50 MG/1
TABLET ORAL
Qty: 30 TABLET | Refills: 0 | Status: SHIPPED | OUTPATIENT
Start: 2018-10-22 | End: 2018-11-19 | Stop reason: SDUPTHER

## 2018-10-22 RX ADMIN — PANTOPRAZOLE SODIUM 40 MG: 40 INJECTION, POWDER, FOR SOLUTION INTRAVENOUS at 23:57

## 2018-10-22 RX ADMIN — ONDANSETRON 4 MG: 2 INJECTION INTRAMUSCULAR; INTRAVENOUS at 23:57

## 2018-10-22 RX ADMIN — SODIUM CHLORIDE 1000 ML/HR: 9 INJECTION, SOLUTION INTRAVENOUS at 23:46

## 2018-10-23 ENCOUNTER — APPOINTMENT (EMERGENCY)
Dept: RADIOLOGY | Facility: HOSPITAL | Age: 48
DRG: 469 | End: 2018-10-23
Payer: COMMERCIAL

## 2018-10-23 ENCOUNTER — APPOINTMENT (INPATIENT)
Dept: ULTRASOUND IMAGING | Facility: HOSPITAL | Age: 48
DRG: 469 | End: 2018-10-23
Payer: COMMERCIAL

## 2018-10-23 ENCOUNTER — APPOINTMENT (INPATIENT)
Dept: CT IMAGING | Facility: HOSPITAL | Age: 48
DRG: 469 | End: 2018-10-23
Payer: COMMERCIAL

## 2018-10-23 PROBLEM — D72.829 LEUKOCYTOSIS: Status: ACTIVE | Noted: 2018-10-23

## 2018-10-23 PROBLEM — I95.9 HYPOTENSION: Status: ACTIVE | Noted: 2018-10-23

## 2018-10-23 LAB
ALBUMIN SERPL BCP-MCNC: 3.9 G/DL (ref 3.5–5.7)
ALBUMIN SERPL BCP-MCNC: 4 G/DL (ref 3.5–5.7)
ALP SERPL-CCNC: 100 U/L (ref 40–150)
ALP SERPL-CCNC: 108 U/L (ref 40–150)
ALT SERPL W P-5'-P-CCNC: 15 U/L (ref 7–52)
ALT SERPL W P-5'-P-CCNC: 17 U/L (ref 7–52)
ANION GAP SERPL CALCULATED.3IONS-SCNC: 11 MMOL/L (ref 4–13)
ANION GAP SERPL CALCULATED.3IONS-SCNC: 18 MMOL/L (ref 4–13)
AST SERPL W P-5'-P-CCNC: 20 U/L (ref 13–39)
AST SERPL W P-5'-P-CCNC: 24 U/L (ref 13–39)
ATRIAL RATE: 74 BPM
BACTERIA UR QL AUTO: ABNORMAL /HPF
BILIRUB SERPL-MCNC: 0.3 MG/DL (ref 0.2–1)
BILIRUB SERPL-MCNC: 0.4 MG/DL (ref 0.2–1)
BILIRUB UR QL STRIP: NEGATIVE
BUN SERPL-MCNC: 26 MG/DL (ref 7–25)
BUN SERPL-MCNC: 26 MG/DL (ref 7–25)
CALCIUM SERPL-MCNC: 8.8 MG/DL (ref 8.6–10.5)
CALCIUM SERPL-MCNC: 9.3 MG/DL (ref 8.6–10.5)
CHLORIDE SERPL-SCNC: 95 MMOL/L (ref 98–107)
CHLORIDE SERPL-SCNC: 96 MMOL/L (ref 98–107)
CLARITY UR: CLEAR
CO2 SERPL-SCNC: 18 MMOL/L (ref 21–31)
CO2 SERPL-SCNC: 24 MMOL/L (ref 21–31)
COLOR UR: YELLOW
CREAT SERPL-MCNC: 1.35 MG/DL (ref 0.7–1.3)
CREAT SERPL-MCNC: 2.16 MG/DL (ref 0.7–1.3)
CREAT UR-MCNC: 230 MG/DL
ERYTHROCYTE [DISTWIDTH] IN BLOOD BY AUTOMATED COUNT: 15.6 % (ref 11.5–14.5)
GFR SERPL CREATININE-BSD FRML MDRD: 34 ML/MIN/1.73SQ M
GFR SERPL CREATININE-BSD FRML MDRD: 61 ML/MIN/1.73SQ M
GLUCOSE SERPL-MCNC: 102 MG/DL (ref 65–140)
GLUCOSE SERPL-MCNC: 164 MG/DL (ref 65–99)
GLUCOSE SERPL-MCNC: 200 MG/DL (ref 65–140)
GLUCOSE SERPL-MCNC: 204 MG/DL (ref 65–99)
GLUCOSE SERPL-MCNC: 77 MG/DL (ref 65–140)
GLUCOSE SERPL-MCNC: 94 MG/DL (ref 65–140)
GLUCOSE UR STRIP-MCNC: ABNORMAL MG/DL
HCT VFR BLD AUTO: 46.8 % (ref 36.5–49.3)
HGB BLD-MCNC: 15.1 G/DL (ref 14–18)
HGB UR QL STRIP.AUTO: NEGATIVE
HYALINE CASTS #/AREA URNS LPF: ABNORMAL /LPF
KETONES UR STRIP-MCNC: ABNORMAL MG/DL
LEUKOCYTE ESTERASE UR QL STRIP: NEGATIVE
LIPASE SERPL-CCNC: 13 U/L (ref 11–82)
MAGNESIUM SERPL-MCNC: 1.5 MG/DL (ref 1.9–2.7)
MCH RBC QN AUTO: 29.1 PG (ref 26–34)
MCHC RBC AUTO-ENTMCNC: 32.3 G/DL (ref 31–37)
MCV RBC AUTO: 90 FL (ref 81–99)
MICROALBUMIN UR-MCNC: 62 MG/L (ref 0–20)
MICROALBUMIN/CREAT 24H UR: 27 MG/G CREATININE (ref 0–30)
NITRITE UR QL STRIP: NEGATIVE
NON-SQ EPI CELLS URNS QL MICRO: ABNORMAL /HPF
P AXIS: 51 DEGREES
PH UR STRIP.AUTO: 6 [PH] (ref 5–8)
PLATELET # BLD AUTO: 235 THOUSANDS/UL (ref 149–390)
PMV BLD AUTO: 7 FL (ref 8.6–11.7)
POTASSIUM SERPL-SCNC: 4.2 MMOL/L (ref 3.5–5.5)
POTASSIUM SERPL-SCNC: 4.7 MMOL/L (ref 3.5–5.5)
PR INTERVAL: 144 MS
PROT SERPL-MCNC: 7.2 G/DL (ref 6.4–8.9)
PROT SERPL-MCNC: 7.3 G/DL (ref 6.4–8.9)
PROT UR STRIP-MCNC: ABNORMAL MG/DL
PROT UR-MCNC: 60 MG/DL
PROT/CREAT UR: 0.26 MG/G{CREAT} (ref 0–0.1)
QRS AXIS: 80 DEGREES
QRSD INTERVAL: 86 MS
QT INTERVAL: 404 MS
QTC INTERVAL: 448 MS
RBC # BLD AUTO: 5.19 MILLION/UL (ref 4.3–5.9)
RBC #/AREA URNS AUTO: ABNORMAL /HPF
SODIUM 24H UR-SCNC: 75 MOL/L
SODIUM SERPL-SCNC: 131 MMOL/L (ref 134–143)
SODIUM SERPL-SCNC: 131 MMOL/L (ref 134–143)
SP GR UR STRIP.AUTO: 1.02 (ref 1–1.03)
T WAVE AXIS: 44 DEGREES
TSH SERPL DL<=0.05 MIU/L-ACNC: 1.34 UIU/ML (ref 0.45–5.33)
UROBILINOGEN UR QL STRIP.AUTO: 0.2 E.U./DL
VENTRICULAR RATE: 74 BPM
WBC # BLD AUTO: 15.5 THOUSAND/UL (ref 4.8–10.8)
WBC #/AREA URNS AUTO: ABNORMAL /HPF

## 2018-10-23 PROCEDURE — 82948 REAGENT STRIP/BLOOD GLUCOSE: CPT

## 2018-10-23 PROCEDURE — 82570 ASSAY OF URINE CREATININE: CPT | Performed by: INTERNAL MEDICINE

## 2018-10-23 PROCEDURE — 80053 COMPREHEN METABOLIC PANEL: CPT | Performed by: PHYSICIAN ASSISTANT

## 2018-10-23 PROCEDURE — 93010 ELECTROCARDIOGRAM REPORT: CPT | Performed by: INTERNAL MEDICINE

## 2018-10-23 PROCEDURE — 85027 COMPLETE CBC AUTOMATED: CPT | Performed by: PHYSICIAN ASSISTANT

## 2018-10-23 PROCEDURE — 96361 HYDRATE IV INFUSION ADD-ON: CPT

## 2018-10-23 PROCEDURE — 84300 ASSAY OF URINE SODIUM: CPT | Performed by: INTERNAL MEDICINE

## 2018-10-23 PROCEDURE — 83735 ASSAY OF MAGNESIUM: CPT | Performed by: INTERNAL MEDICINE

## 2018-10-23 PROCEDURE — 84156 ASSAY OF PROTEIN URINE: CPT | Performed by: INTERNAL MEDICINE

## 2018-10-23 PROCEDURE — 96375 TX/PRO/DX INJ NEW DRUG ADDON: CPT

## 2018-10-23 PROCEDURE — 81001 URINALYSIS AUTO W/SCOPE: CPT | Performed by: EMERGENCY MEDICINE

## 2018-10-23 PROCEDURE — 84443 ASSAY THYROID STIM HORMONE: CPT | Performed by: INTERNAL MEDICINE

## 2018-10-23 PROCEDURE — 99222 1ST HOSP IP/OBS MODERATE 55: CPT | Performed by: PHYSICIAN ASSISTANT

## 2018-10-23 PROCEDURE — 82043 UR ALBUMIN QUANTITATIVE: CPT | Performed by: INTERNAL MEDICINE

## 2018-10-23 PROCEDURE — 84154 ASSAY OF PSA FREE: CPT | Performed by: INTERNAL MEDICINE

## 2018-10-23 PROCEDURE — 74022 RADEX COMPL AQT ABD SERIES: CPT

## 2018-10-23 PROCEDURE — 84153 ASSAY OF PSA TOTAL: CPT | Performed by: INTERNAL MEDICINE

## 2018-10-23 PROCEDURE — C9113 INJ PANTOPRAZOLE SODIUM, VIA: HCPCS | Performed by: EMERGENCY MEDICINE

## 2018-10-23 PROCEDURE — 76770 US EXAM ABDO BACK WALL COMP: CPT

## 2018-10-23 PROCEDURE — 74176 CT ABD & PELVIS W/O CONTRAST: CPT

## 2018-10-23 RX ORDER — METOCLOPRAMIDE HYDROCHLORIDE 5 MG/ML
10 INJECTION INTRAMUSCULAR; INTRAVENOUS EVERY 6 HOURS PRN
Status: DISCONTINUED | OUTPATIENT
Start: 2018-10-23 | End: 2018-10-26 | Stop reason: HOSPADM

## 2018-10-23 RX ORDER — CHLORDIAZEPOXIDE HYDROCHLORIDE 25 MG/1
25 CAPSULE, GELATIN COATED ORAL DAILY
Status: DISCONTINUED | OUTPATIENT
Start: 2018-10-26 | End: 2018-10-26 | Stop reason: HOSPADM

## 2018-10-23 RX ORDER — HYDROMORPHONE HCL/PF 1 MG/ML
1 SYRINGE (ML) INJECTION ONCE
Status: COMPLETED | OUTPATIENT
Start: 2018-10-23 | End: 2018-10-23

## 2018-10-23 RX ORDER — VENLAFAXINE HYDROCHLORIDE 150 MG/1
150 CAPSULE, EXTENDED RELEASE ORAL DAILY
Status: DISCONTINUED | OUTPATIENT
Start: 2018-10-23 | End: 2018-10-26 | Stop reason: HOSPADM

## 2018-10-23 RX ORDER — PANTOPRAZOLE SODIUM 40 MG/1
40 TABLET, DELAYED RELEASE ORAL
Status: DISCONTINUED | OUTPATIENT
Start: 2018-10-23 | End: 2018-10-26 | Stop reason: HOSPADM

## 2018-10-23 RX ORDER — RISPERIDONE 0.25 MG/1
0.5 TABLET, FILM COATED ORAL 2 TIMES DAILY
Status: DISCONTINUED | OUTPATIENT
Start: 2018-10-23 | End: 2018-10-26 | Stop reason: HOSPADM

## 2018-10-23 RX ORDER — LORAZEPAM 2 MG/ML
1 INJECTION INTRAMUSCULAR EVERY 4 HOURS PRN
Status: DISCONTINUED | OUTPATIENT
Start: 2018-10-23 | End: 2018-10-26 | Stop reason: HOSPADM

## 2018-10-23 RX ORDER — CLONIDINE HYDROCHLORIDE 0.1 MG/1
0.1 TABLET ORAL
Status: DISCONTINUED | OUTPATIENT
Start: 2018-10-23 | End: 2018-10-26 | Stop reason: HOSPADM

## 2018-10-23 RX ORDER — CHLORDIAZEPOXIDE HYDROCHLORIDE 25 MG/1
25 CAPSULE, GELATIN COATED ORAL EVERY 8 HOURS SCHEDULED
Status: DISPENSED | OUTPATIENT
Start: 2018-10-23 | End: 2018-10-25

## 2018-10-23 RX ORDER — CHLORDIAZEPOXIDE HYDROCHLORIDE 25 MG/1
25 CAPSULE, GELATIN COATED ORAL EVERY 12 HOURS
Status: COMPLETED | OUTPATIENT
Start: 2018-10-25 | End: 2018-10-26

## 2018-10-23 RX ORDER — THIAMINE MONONITRATE (VIT B1) 100 MG
100 TABLET ORAL DAILY
Status: DISCONTINUED | OUTPATIENT
Start: 2018-10-23 | End: 2018-10-26 | Stop reason: HOSPADM

## 2018-10-23 RX ORDER — HEPARIN SODIUM 5000 [USP'U]/ML
5000 INJECTION, SOLUTION INTRAVENOUS; SUBCUTANEOUS EVERY 8 HOURS SCHEDULED
Status: DISCONTINUED | OUTPATIENT
Start: 2018-10-23 | End: 2018-10-26 | Stop reason: HOSPADM

## 2018-10-23 RX ORDER — CHLORDIAZEPOXIDE HYDROCHLORIDE 25 MG/1
25 CAPSULE, GELATIN COATED ORAL EVERY 6 HOURS PRN
Status: DISCONTINUED | OUTPATIENT
Start: 2018-10-23 | End: 2018-10-26 | Stop reason: HOSPADM

## 2018-10-23 RX ORDER — FENTANYL CITRATE 50 UG/ML
100 INJECTION, SOLUTION INTRAMUSCULAR; INTRAVENOUS ONCE
Status: COMPLETED | OUTPATIENT
Start: 2018-10-23 | End: 2018-10-23

## 2018-10-23 RX ORDER — METOCLOPRAMIDE HYDROCHLORIDE 5 MG/ML
10 INJECTION INTRAMUSCULAR; INTRAVENOUS ONCE
Status: COMPLETED | OUTPATIENT
Start: 2018-10-23 | End: 2018-10-23

## 2018-10-23 RX ORDER — ONDANSETRON 2 MG/ML
4 INJECTION INTRAMUSCULAR; INTRAVENOUS EVERY 6 HOURS PRN
Status: DISCONTINUED | OUTPATIENT
Start: 2018-10-23 | End: 2018-10-26 | Stop reason: HOSPADM

## 2018-10-23 RX ORDER — FOLIC ACID 1 MG/1
1 TABLET ORAL DAILY
Status: DISCONTINUED | OUTPATIENT
Start: 2018-10-23 | End: 2018-10-26 | Stop reason: HOSPADM

## 2018-10-23 RX ORDER — HYDROXYZINE HYDROCHLORIDE 25 MG/1
50 TABLET, FILM COATED ORAL 3 TIMES DAILY PRN
Status: DISCONTINUED | OUTPATIENT
Start: 2018-10-23 | End: 2018-10-26 | Stop reason: HOSPADM

## 2018-10-23 RX ORDER — TRAZODONE HYDROCHLORIDE 50 MG/1
50 TABLET ORAL
Status: DISCONTINUED | OUTPATIENT
Start: 2018-10-23 | End: 2018-10-26 | Stop reason: HOSPADM

## 2018-10-23 RX ORDER — FENOFIBRATE 145 MG/1
145 TABLET, COATED ORAL DAILY
Status: DISCONTINUED | OUTPATIENT
Start: 2018-10-23 | End: 2018-10-26 | Stop reason: HOSPADM

## 2018-10-23 RX ORDER — NICOTINE 21 MG/24HR
1 PATCH, TRANSDERMAL 24 HOURS TRANSDERMAL DAILY
Status: DISCONTINUED | OUTPATIENT
Start: 2018-10-23 | End: 2018-10-26 | Stop reason: HOSPADM

## 2018-10-23 RX ADMIN — RISPERIDONE 0.5 MG: 0.25 TABLET ORAL at 18:38

## 2018-10-23 RX ADMIN — HEPARIN SODIUM 5000 UNITS: 5000 INJECTION INTRAVENOUS; SUBCUTANEOUS at 06:19

## 2018-10-23 RX ADMIN — HEPARIN SODIUM 5000 UNITS: 5000 INJECTION INTRAVENOUS; SUBCUTANEOUS at 22:29

## 2018-10-23 RX ADMIN — SODIUM CHLORIDE 125 ML/HR: 9 INJECTION, SOLUTION INTRAVENOUS at 17:43

## 2018-10-23 RX ADMIN — ONDANSETRON HYDROCHLORIDE 4 MG: 2 INJECTION INTRAMUSCULAR; INTRAVENOUS at 16:11

## 2018-10-23 RX ADMIN — MORPHINE SULFATE 2 MG: 2 INJECTION, SOLUTION INTRAMUSCULAR; INTRAVENOUS at 03:37

## 2018-10-23 RX ADMIN — RISPERIDONE 0.5 MG: 0.25 TABLET ORAL at 10:02

## 2018-10-23 RX ADMIN — HYDROMORPHONE HYDROCHLORIDE 1 MG: 1 INJECTION, SOLUTION INTRAMUSCULAR; INTRAVENOUS; SUBCUTANEOUS at 02:49

## 2018-10-23 RX ADMIN — INSULIN LISPRO 2 UNITS: 100 INJECTION, SOLUTION INTRAVENOUS; SUBCUTANEOUS at 06:39

## 2018-10-23 RX ADMIN — FENOFIBRATE 145 MG: 145 TABLET, FILM COATED ORAL at 10:02

## 2018-10-23 RX ADMIN — MORPHINE SULFATE 2 MG: 2 INJECTION, SOLUTION INTRAMUSCULAR; INTRAVENOUS at 13:15

## 2018-10-23 RX ADMIN — TRAZODONE HYDROCHLORIDE 50 MG: 50 TABLET ORAL at 22:29

## 2018-10-23 RX ADMIN — MORPHINE SULFATE 2 MG: 2 INJECTION, SOLUTION INTRAMUSCULAR; INTRAVENOUS at 06:12

## 2018-10-23 RX ADMIN — MORPHINE SULFATE 2 MG: 2 INJECTION, SOLUTION INTRAMUSCULAR; INTRAVENOUS at 22:29

## 2018-10-23 RX ADMIN — SODIUM CHLORIDE 1000 ML/HR: 9 INJECTION, SOLUTION INTRAVENOUS at 00:47

## 2018-10-23 RX ADMIN — MORPHINE SULFATE 2 MG: 2 INJECTION, SOLUTION INTRAMUSCULAR; INTRAVENOUS at 09:37

## 2018-10-23 RX ADMIN — HEPARIN SODIUM 5000 UNITS: 5000 INJECTION INTRAVENOUS; SUBCUTANEOUS at 13:51

## 2018-10-23 RX ADMIN — METOCLOPRAMIDE 10 MG: 5 INJECTION, SOLUTION INTRAMUSCULAR; INTRAVENOUS at 02:46

## 2018-10-23 RX ADMIN — Medication 1 TABLET: at 11:12

## 2018-10-23 RX ADMIN — PANTOPRAZOLE SODIUM 40 MG: 40 TABLET, DELAYED RELEASE ORAL at 06:19

## 2018-10-23 RX ADMIN — VENLAFAXINE HYDROCHLORIDE 150 MG: 150 CAPSULE, EXTENDED RELEASE ORAL at 10:02

## 2018-10-23 RX ADMIN — SODIUM CHLORIDE 1000 ML/HR: 9 INJECTION, SOLUTION INTRAVENOUS at 09:48

## 2018-10-23 RX ADMIN — CHLORDIAZEPOXIDE HYDROCHLORIDE 25 MG: 25 CAPSULE ORAL at 22:29

## 2018-10-23 RX ADMIN — MORPHINE SULFATE 2 MG: 2 INJECTION, SOLUTION INTRAMUSCULAR; INTRAVENOUS at 18:38

## 2018-10-23 RX ADMIN — FOLIC ACID 1 MG: 1 TABLET ORAL at 11:13

## 2018-10-23 RX ADMIN — CHLORDIAZEPOXIDE HYDROCHLORIDE 25 MG: 25 CAPSULE ORAL at 11:16

## 2018-10-23 RX ADMIN — Medication 100 MG: at 11:13

## 2018-10-23 RX ADMIN — LORAZEPAM 1 MG: 2 INJECTION INTRAMUSCULAR; INTRAVENOUS at 16:06

## 2018-10-23 RX ADMIN — FENTANYL CITRATE 100 MCG: 50 INJECTION INTRAMUSCULAR; INTRAVENOUS at 00:47

## 2018-10-23 NOTE — SOCIAL WORK
Chart reviewed by case management, assessment completed, pt lives with his wife, he states he is unemployed, he lives in a 2 story home with 12-14 steps inside and 12 steps in the back and 3 steps in the front outside of his home, he has a rx plan at [address], I received a call from his former outpt cm and the pt has his rx for insulin still at the pharmacy waiting for him to pick it up, I did make the doctor aware, pt did state he had quit alcohol in the past and I asked if he would like to speak to someone about stopping again he stated "yes", I did make the doctor aware and a psych and crisis consult will be ordered, pt does have a kx of depression, I will continue to follow and assess for any additional needs, outpt cm referral was made for this patient  CM reviewed d/c planning process including the following: identifying help at home, patient preference for d/c planning needs, availability of treatment team to discuss questions or concerns patient and/or family may have regarding understanding medications and recognizing signs and symptoms once discharged  CM also encouraged patient to follow up with all recommended appointments after discharge  Patient advised of importance for patient and family to participate in managing patients medical well being

## 2018-10-23 NOTE — ASSESSMENT & PLAN NOTE
Patient has a history of chronic pancreatitis though his lipase is normal today, CT abdomen and pelvis is currently pending, will make patient NPO at this time and give pain control

## 2018-10-23 NOTE — PLAN OF CARE
Problem: DISCHARGE PLANNING - CARE MANAGEMENT  Goal: Discharge to post-acute care or home with appropriate resources  INTERVENTIONS:  - Conduct assessment to determine patient/family and health care team treatment goals, and need for post-acute services based on payer coverage, community resources, and patient preferences, and barriers to discharge  - Address psychosocial, clinical, and financial barriers to discharge as identified in assessment in conjunction with the patient/family and health care team  - Arrange appropriate level of post-acute services according to patient's   needs and preference and payer coverage in collaboration with the physician and health care team  - Communicate with and update the patient/family, physician, and health care team regarding progress on the discharge plan  - Arrange appropriate transportation to post-acute venues      D/c  Home with spouse  Outcome: Progressing

## 2018-10-23 NOTE — ASSESSMENT & PLAN NOTE
· This is likely prerenal in origin secondary to volume depletion   · This is resolved     · Nephrology input appreciated  · lisinoprill has been on hold and will resume this in AM    · Continue with IVF   · Follow up with renal function in AM

## 2018-10-23 NOTE — ED PROVIDER NOTES
History  No chief complaint on file  46YEAR-OLD MALE WITH A HISTORY OF PANCREATITIS PRESENTS TO THE EMERGENCY DEPARTMENT WITH ABDOMINAL PAIN APPROXIMATELY 2 HOURS IN DURATION  PAIN IS ESCALATED IN INTENSITY OVER THE PAST 30 MINUTES   ONE EPISODE OF VOMITING  SEVERAL EPISODES OF DIARRHEA  PAIN IS NONDESCRIPT AND NONRADIATING  NO BLOODY EMESIS OR STOOL  THIS PAIN IS DIFFERENT THAN THE PAIN OF HIS PRIOR EPISODES OF PANCREATIC EXACERBATION  DENIES OTHER COMPLAINTS  Cannot display prior to admission medications because the patient has not been admitted in this contact  Past Medical History:   Diagnosis Date    Allergic rhinitis     last assessed: 12/5/2012    Anemia     Anxiety and depression     Quiros esophagus     Cholelithiasis     Chronic pain     COPD (chronic obstructive pulmonary disease) (HCC)     Depression     Diabetes mellitus (HCC)     Emphysema lung (HCC)     Generalized anxiety disorder     GERD (gastroesophageal reflux disease)     Hyperlipidemia     Hypertension     Kidney stone     Metatarsalgia     last assessed: 8/11/2014, unspecified laterality, ? cause  PE with changes  To see DPM tomorrow   Pancreatitis     Psychiatric disorder     Renal disorder     Shortness of breath     with activity       Past Surgical History:   Procedure Laterality Date    CHOLECYSTECTOMY LAPAROSCOPIC      FOOT SURGERY      B/L great toe joint replacement    KNEE ARTHROSCOPY      (therapeutic) right knee    [place] EDG US EXAM SURGICAL ALTER STOM DUODENUM/JEJUNUM N/A 10/17/2018    Procedure: LINEAR ENDOSCOPIC U/S;  Surgeon: Sheyla Leary MD;  Location: [facility];   Service: Gastroenterology    VASECTOMY      vas deferens       Family History   Problem Relation Age of Onset    Cancer Mother     Coronary artery disease Mother     Diabetes Mother     Coronary artery disease Father     Prostate cancer Father     Diabetes Sister     Coronary artery disease Family      I have reviewed and agree with the history as documented  Social History   Substance Use Topics    Smoking status: Current Every Day Smoker     Packs/day: 1 00    Smokeless tobacco: Never Used      Comment: Tobacco use [employer] "How to Quit" literature given to pat   Alcohol use Yes      Comment: Last drink in June        Review of Systems   Constitutional: Negative for chills and fever  HENT: Negative for ear pain, rhinorrhea and sore throat  Eyes: Negative for pain, redness and visual disturbance  Respiratory: Negative for cough and shortness of breath  Cardiovascular: Negative for chest pain and leg swelling  Gastrointestinal: Positive for abdominal pain, diarrhea and nausea  Negative for vomiting  Genitourinary: Negative for dysuria, flank pain, frequency and urgency  Musculoskeletal: Negative for back pain, myalgias and neck pain  Skin: Negative for rash  Neurological: Negative for dizziness, weakness, light-headedness and headaches  Hematological: Negative  Psychiatric/Behavioral: Negative for agitation, confusion and suicidal ideas  The patient is not nervous/anxious  All other systems reviewed and are negative  Physical Exam  Physical Exam   Constitutional: He is oriented to person, place, and time  He appears well-developed and well-nourished  HENT:   Nose: Nose normal    Mouth/Throat: Oropharynx is clear and moist  No oropharyngeal exudate  Eyes: Pupils are equal, round, and reactive to light  Conjunctivae and EOM are normal  No scleral icterus  Neck: Normal range of motion  Neck supple  No JVD present  No tracheal deviation present  Cardiovascular: Normal rate, regular rhythm and normal heart sounds  No murmur heard  Pulmonary/Chest: Effort normal and breath sounds normal  No respiratory distress  He has no wheezes  He has no rales  Abdominal: Soft  Bowel sounds are normal  There is tenderness (DIFFUSE)  There is no guarding     Musculoskeletal: Normal range of motion  He exhibits no edema or tenderness  Neurological: He is alert and oriented to person, place, and time  No cranial nerve deficit or sensory deficit  He exhibits normal muscle tone  5/5 motor, nl sens   Skin: Skin is warm and dry  Psychiatric: He has a normal mood and affect  His behavior is normal    Nursing note and vitals reviewed  Vital Signs  ED Triage Vitals   Temp Pulse Resp BP SpO2   -- -- -- -- --      Temp src Heart Rate Source Patient Position - Orthostatic VS BP Location FiO2 (%)   -- -- -- -- --      Pain Score       --           There were no vitals filed for this visit  Visual Acuity      ED Medications  Medications - No data to display    Diagnostic Studies  Results Reviewed     None                 No orders to display              Procedures  ECG 12 Lead Documentation  Date/Time: 10/22/2018 11:23 PM  Performed by: Ayden Jeffrey  Authorized by: James IZQUIERDO     Indications / Diagnosis:  HYPOTENSION  ECG reviewed by me, the ED Provider: yes    Patient location:  ED  Previous ECG:     Previous ECG:  Unavailable    Comparison to cardiac monitor: No    Interpretation:     Interpretation: normal    Rate:     ECG rate assessment: normal    Rhythm:     Rhythm: sinus rhythm    Ectopy:     Ectopy: none    QRS:     QRS axis:  Normal    QRS intervals:  Normal  Conduction:     Conduction: normal    ST segments:     ST segments:  Normal  T waves:     T waves: normal             Phone Contacts  ED Phone Contact    ED Course                               [facility]  Crit[facility] Time    Disposition  Final diagnoses:   None     ED Disposition     None      Follow-up Information    None         Patient's Medications   Discharge Prescriptions    No medications on file     No discharge procedures on file      ED Provider  Electronically Signed by           Kyaw Skinner MD  10/22/18 9824

## 2018-10-23 NOTE — ASSESSMENT & PLAN NOTE
Lab Results   Component Value Date    HGBA1C 10 9 (H) 08/31/2018       No results for input(s): POCGLU in the last 72 hours  Blood Sugar Average: Last 72 hrs:     Patient is NPO at this time will obtain Accu-Cheks q 6 hours with algorithm 3 correction dose q 6 hours

## 2018-10-23 NOTE — ASSESSMENT & PLAN NOTE
· Continue hydroxyzine, Risperdal, trazodone and Effexor   · Psychiatry will see patient when medically cleared

## 2018-10-23 NOTE — UTILIZATION REVIEW
Initial Clinical Review    Admission: Date/Time/Statement: 10/23/18 @ 0152 INPATIENT    Orders Placed This Encounter   Procedures    Inpatient Admission     Standing Status:   Standing     Number of Occurrences:   1     Order Specific Question:   Admitting Physician     Answer:   Mer Boggs [[id number]]     Order Specific Question:   Level of Care     Answer:   Med Surg [16]     Order Specific Question:   Estimated length of stay     Answer:   More than 2 Midnights     Order Specific Question:   Certification     Answer:   I certify that inpatient services are medically necessary for this patient for a duration of greater than two midnights  See H&P and MD Progress Notes for additional information about the patient's course of treatment  ED: Date/Time/Mode of Arrival:   ED Arrival Information     Expected Arrival Acuity Means of Arrival Escorted By Service Admission Type    - 10/22/2018 23:18 Emergent Ambulance [address] [facility] Ambulance General Medicine Emergency    Arrival Complaint    belly pain        Chief Complaint:   Chief Complaint   Patient presents with    Hypotension     History of Illness:     46 y o  male who presents with abdominal pain x1 day  Patient reports epigastric pain which is 10/10 and achy and sharp in nature without radiation no aggravating or alleviating factors  Patient additionally reports that he had 2 episodes of vomiting today  Patient has a history of chronic pancreatitis,  was recently at [facility] where he underwent an endoscopic ultrasound approximately 5 days ago      Patient has a history of alcohol abuse but he states that he had been sober for approximately 4 months until today to which point he drank approximately a half of a 5th of whiskey  Patient additionally reports that he had 3 near syncopal episodes today where he felt dizzy and lightheaded and fell without injury,  questionable short loss of consciousness          ED Vital Signs:   ED Triage Vitals [10/22/18 2341]   Temperature Pulse Respirations Blood Pressure SpO2   (!) 96 5 °F (35 8 °C) 72 22 (!) 74/41 100 %   Worst Possible Pain        Wt Readings from Last 1 Encounters:   10/23/18 88 6 kg (195 lb 6 oz)     Vital Signs:  WNL    Abnormal Labs/Diagnostic Test Results:     BUN/Creatinine = 26/2 16 (BUN/Creatinine 8/31/18 = 15/1 07)    Sodium = 131, Glucose = 164, WBC = 18 30, ANC = 15 60     CT abdomen and pelvis: no acute intra-abdominal pathology identified  There is complete fatty atrophy of the pancreas with stable presumed calcified pseudocyst in the expected location of the pancreatic tail  Abdomen obstruction series:   No acute intra-abdominal findings  Chronic calcified density in the left upper quadrant abdomen noted  ED Treatment:   Medication Administration from 10/22/2018 2318 to 10/23/2018 0305       Date/Time Order Dose Route Action     10/23/2018 0047 sodium chloride 0 9 % infusion 1,000 mL/hr Intravenous New Bag     10/22/2018 2346 sodium chloride 0 9 % infusion 1,000 mL/hr Intravenous New Bag     10/22/2018 2357 ondansetron (ZOFRAN) injection 4 mg 4 mg Intravenous Given     10/22/2018 2357 pantoprazole (PROTONIX) injection 40 mg 40 mg Intravenous Given     10/23/2018 0047 fentanyl citrate (PF) 100 MCG/2ML 100 mcg 100 mcg Intravenous Given     10/23/2018 0249 HYDROmorphone (DILAUDID) injection 1 mg 1 mg Intravenous Given     10/23/2018 0246 metoclopramide (REGLAN) injection 10 mg 10 mg Intravenous Given        Past Medical/Surgical History:    Active Ambulatory Problems     Diagnosis Date Noted    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    Benign essential hypertension 07/02/2012    COPD (chronic obstructive pulmonary disease) ([facility] [address] ) 03/29/2017    Fatty liver 12/05/2012    Fibromyalgia 07/29/2013    Generalized anxiety disorder 06/13/2012    Hyperlipidemia 09/11/2012    Insomnia 09/05/2017    Iron deficiency anemia 04/05/2016    MDD (major depressive disorder), single episode 07/02/2012  Nephrolithiasis 12/05/2012    Other chronic pain 07/02/2012    [facility] 08/25/2017    Sleep disorder 07/02/2012    Diabetes mellitus type 1 5, managed as type 1 ([place] [address] ) 04/05/2016    Abdominal pain 08/09/2018    [facility]NACIA (acute kidney [facility]) [facility] ) 08/09/2018    GERD (gastroesophageal reflux disease) 08/09/2018    Anxiety and depression 08/09/2018    H/O alcohol abuse 08/09/2018    Leukemoid reaction 08/09/2018    Acute on chronic pancreatitis ([facility] ) 08/12/2018    Transaminitis 08/13/2018    Tobacco dependence 08/13/2018    Chronic pancreatitis ([place] [address] ) 09/13/2018    Paresthesia of both lower extremities      Past Medical History:   Diagnosis Date    Allergic rhinitis     Anemia     Anxiety and depression     Quiros esophagus     Cholelithiasis     Chronic pain     COPD (chronic obstructive pulmonary disease) ([place] [address] )     Depression     Diabetes mellitus (HCC)     Emphysema lung (HCC)     Generalized anxiety disorder     GERD (gastroesophageal reflux disease)     Hyperlipidemia     Hypertension     Kidney stone     Metatarsalgia     Pancreatitis     Psychiatric disorder     Renal disorder     Shortness of breath      Admitting Diagnosis: Abdominal pain [R10 9]  Hypotension [I95 9]  IGNACIA (acute kidney injury) ([place] [address] ) [N17 9]    Age/Sex: 46 y o  male    Assessment/Plan:     IGNACIA (acute kidney injury) ([place] [address] )   Assessment & Plan     This is likely prerenal in origin secondary to dehydration, will hold lisinopril at this time, hydrate with normal saline 125 cc/hour and consult Nephrology       Abdominal pain   Assessment & Plan     Patient has a history of chronic pancreatitis, will make patient NPO at this time and give pain control       Leukocytosis   Assessment & Plan     Unclear etiology   Will hydrate as per above repeat labs in a m       Diabetes mellitus type 1 5, managed as type 1 [facility])   Assessment & Plan             Lab Results   Component Value Date     HGBA1C 10 9 (H) 08/31/2018       Blood Sugar Average: Last 72 hrs:     Patient is NPO at this time will obtain Accu-Cheks q 6 hours with algorithm 3 correction dose q 6 hours       H/O alcohol abuse   Assessment & Plan     Will place on [facility] protocol      Benign essential hypertension   Assessment & Plan     Will hold lisinopril at this time secondary to acute kidney injury, will give Catapres 0 1 mg p  o  q 3 hours p r n  for systolic blood pressure greater greater than 170        Admission Orders: Gastroenterology and Nephrology consults, NPO, CIWA Protocol, OOB  Scheduled Meds:   Current Facility-Administered Medications:  cloNIDine 0 1 mg Oral Q3H PRN   fenofibrate 145 mg Oral Daily   heparin (porcine) 5,000 Units Subcutaneous Q8H [address]   hydrOXYzine HCL 50 mg Oral TID PRN   insulin lispro 1-6 Units Subcutaneous Q6H [address]   metoclopramide 10 mg Intravenous Q6H PRN   morphine injection 2 mg Intravenous Q3H PRN x 3 doses @ 0337, 0612, 0937   nicotine 1 patch Transdermal Daily   ondansetron 4 mg Intravenous Q6H PRN   pantoprazole 40 mg Oral Early Morning   risperiDONE 0 5 mg Oral BID   sodium chloride 1,000 mL/hr Intravenous Continuous   traZODone 50 mg Oral HS   venlafaxine 150 mg Oral Daily     Continuous Infusions:   sodium chloride 125 ml/hr                       Thank you,  [address]  Utilization Review Department  Phone: [phone]; Fax [phone]  ATTENTION: Please call with any questions or concerns to [phone]  and carefully follow the prompts so that you are directed to the right person  Send all requests for admission clinical reviews, approved or denied determinations and any other requests to fax [phone]   All voicemails are confidential

## 2018-10-23 NOTE — CONSULTS
Consultation - Nephrology   Shantell Barton 46 y o  male MRN: [id number]  Unit/Bed#: -01 Encounter: [id number]      A/P:  1  Acute kidney injury: due to volume depletion due to binge drinking yesterday  He is responding to IVF  Will check urine electrolytes and a kidney ultrasound  2  Hyponatremia: due to hypovolemia[de-identified] he is receiving normal saline  3  Abdominal pain: has a pancreatic pseudocyst and vomited after binge drinking  4  DM-insulin requiring with hyperglycemia  Will check a c-peptide  He has not been checking his sugar at home  5  COPD: current smoker         Thank you for allowing us to participate in the care of your patient  Please feel free to contact us regarding the care of this patient, or any other questions/concerns that may be applicable  Patient Active Problem List   Diagnosis    Quiros esophagus    Benign essential hypertension    COPD (chronic obstructive pulmonary disease) (HCC)    Fatty liver    Fibromyalgia    Generalized anxiety disorder    Hyperlipidemia    Insomnia    Iron deficiency anemia    MDD (major depressive disorder), single episode    Nephrolithiasis    Other chronic pain    Recurrent [facility] )    Sleep disorder    Diabetes mellitus type 1 5, managed as type 1 ([address] )    Abdominal pain    IGNACIA (acute kidney injury) ([address] )    GERD (gastroesophageal reflux disease)    Anxiety and depression    H/O alcohol abuse    Leukemoid reaction    Acute on chronic pancreatitis (HCC)    Transaminitis    Tobacco dependence    Chronic pancreatitis (HCC)    Paresthesia of both lower extremities    Leukocytosis    Hypotension       History of Present Illness   Physician Requesting Consult: Brian Aceves MD  Reason for Consult / Principal Problem: acute kidney injury  Hx and PE limited by:   HPI: Shantell Barton is a 46y o  year old male who presents with abnormal kidney function and very concentrated urine    The patient states that he had been sober for 4 months  However, yesterday he drank 16 oz of vodka over a period of the day  He did eat and drink during that time  He then became dizzy and disoriented which concerned his family  He vomited once during the night and then developed epigastric pain  He presented with acute kidney injury and hyponatremia  Says that he has been eating and drinking normally prior to this and his urinary flow is fair but he does urinate frequently at night  He has a history of pancreatitis in the past with a pseudocyst and had been trying to abstain from alcohol  He has no prior history of kidney disease  Takes Advil/Aleve approximately once a week  He has been taking  lisinopril  He has diabetes for 10 years and has just been on basal insulin  Recently short-acting insulin was ordered but he did not use it secondary to insurance reasons    History obtained from chart review and the patient    Review of Systems - Negative except as mentioned above in HPI, more specifics as mentioned below    Review of Systems - General ROS: negative  Ophthalmic ROS: negative  ENT ROS: negative  Respiratory ROS: no cough, shortness of breath, or wheezing  Cardiovascular ROS: no chest pain or dyspnea on exertion  Gastrointestinal ROS: positive for - abdominal pain, diarrhea and nausea/vomiting  Genito-Urinary ROS: positive for - urinary frequency/urgency  Musculoskeletal ROS: negative  Neurological ROS: no TIA or stroke symptoms  Dermatological ROS: negative    Historical Information   Past Medical History:   Diagnosis Date    Allergic rhinitis     last assessed: 12/5/2012    Anemia     Anxiety and depression     Quiros esophagus     Cholelithiasis     Chronic pain     COPD (chronic obstructive pulmonary disease) (HCC)     Depression     Diabetes mellitus (HCC)     Emphysema lung (HCC)     Generalized anxiety disorder     GERD (gastroesophageal reflux disease)     Hyperlipidemia     Hypertension     Kidney stone     Metatarsalgia     last assessed: 8/11/2014, unspecified laterality, ? cause  PE with changes  To see DPM tomorrow   Pancreatitis     Psychiatric disorder     Renal disorder     Shortness of breath     with activity     Past Surgical History:   Procedure Laterality Date    CHOLECYSTECTOMY LAPAROSCOPIC      FOOT SURGERY      B/L great toe joint replacement    KNEE ARTHROSCOPY      (therapeutic) right knee    AR EDG US EXAM SURGICAL ALTER STOM DUODENUM/JEJUNUM N/A 10/17/2018    Procedure: LINEAR ENDOSCOPIC U/S;  Surgeon: Yahaira De La Cruz MD;  Location: [facility]; Service: Gastroenterology    VASECTOMY      vas deferens     Social History   History   Alcohol Use    Yes     Comment: Drank a 5th vodka today     History   Drug Use No     Comment: chronic narcotic use     History   Smoking Status    Current Every Day Smoker    Packs/day: 1 00   Smokeless Tobacco    Never Used     Comment: Tobacco use [employer]'s "How to Quit" literature given to pat       Family History   Problem Relation Age of Onset    Cancer Mother     Coronary artery disease Mother     Diabetes Mother     Coronary artery disease Father     Prostate cancer Father     Diabetes Sister     Coronary artery disease Family        Meds/Allergies   all current active meds have been reviewed, current meds: Current Facility-Administered Medications   Medication Dose Route Frequency    chlordiazePOXIDE (LIBRIUM) capsule 25 mg  25 mg Oral Q8H [address]    Followed by   Karsten Mejia ON 10/25/2018] chlordiazePOXIDE (LIBRIUM) capsule 25 mg  25 mg Oral Q12H    [START ON 10/26/2018] chlordiazePOXIDE (LIBRIUM) capsule 25 mg  25 mg Oral Daily    chlordiazePOXIDE (LIBRIUM) capsule 25 mg  25 mg Oral Q6H PRN    cloNIDine (CATAPRES) tablet 0 1 mg  0 1 mg Oral Q3H PRN    fenofibrate (TRICOR) tablet 145 mg  145 mg Oral Daily    folic acid (FOLVITE) tablet 1 mg  1 mg Oral Daily    heparin (porcine) subcutaneous injection 5,000 Units  5,000 Units Subcutaneous Q8H [address]    hydrOXYzine HCL (ATARAX) tablet 50 mg  50 mg Oral TID PRN    insulin lispro (HumaLOG) 100 units/mL subcutaneous injection 1-6 Units  1-6 Units Subcutaneous Q6H [address]    LORazepam (ATIVAN) 2 mg/mL injection 1 mg  1 mg Intravenous Q4H PRN    metoclopramide (REGLAN) injection 10 mg  10 mg Intravenous Q6H PRN    morphine injection 2 mg  2 mg Intravenous Q3H PRN    multivitamin-minerals (CENTRUM) tablet 1 tablet  1 tablet Oral Daily    nicotine (NICODERM CQ) 21 mg/24 hr TD 24 hr patch 1 patch  1 patch Transdermal Daily    ondansetron (ZOFRAN) injection 4 mg  4 mg Intravenous Q6H PRN    pantoprazole (PROTONIX) EC tablet 40 mg  40 mg Oral Early Morning    risperiDONE (RisperDAL) tablet 0 5 mg  0 5 mg Oral BID    sodium chloride 0 9 % infusion  125 mL/hr Intravenous Continuous    thiamine (VITAMIN B1) tablet 100 mg  100 mg Oral Daily    traZODone (DESYREL) tablet 50 mg  50 mg Oral HS    venlafaxine (EFFEXOR-XR) 24 hr capsule 150 mg  150 mg Oral Daily    and PTA meds:  Prescriptions Prior to Admission   Medication    hydrOXYzine pamoate (VISTARIL) 50 mg capsule    fenofibrate (TRICOR) 145 mg tablet    glucose blood (ONE TOUCH ULTRA TEST) test strip    glucose blood (ONETOUCH VERIO) test strip    insulin glargine (BASAGLAR KWIKPEN) 100 units/mL injection pen    insulin lispro (HumaLOG) 100 units/mL injection pen    Insulin Pen Needle (B-D ULTRAFINE III SHORT PEN) 31G X 8 MM MISC    Insulin Pen Needle (PEN NEEDLES) 29G X 12MM MISC    lisinopril (ZESTRIL) 20 mg tablet    omeprazole (PriLOSEC) 20 mg delayed release capsule    risperiDONE (RisperDAL) 0 5 mg tablet    traZODone (DESYREL) 50 mg tablet    venlafaxine (EFFEXOR XR) 150 mg 24 hr capsule         No Known Allergies    Objective     Intake/Output Summary (Last 24 hours) at 10/23/18 1148  Last data filed at 10/23/18 1001   Gross per 24 hour   Intake             6000 ml   Output              200 ml   Net             5800 ml       Invasive Devices: Physical Exam      I/O last 3 completed shifts: In: 4000 [I V :4000]  Out: 200 [Urine:200]    Vitals:    10/23/18 0728   BP: 142/70   Pulse: 86   Resp: 16   Temp: 97 9 °F (36 6 °C)   SpO2: 98%       Gen: in NAD, Alert/Awake  HEENT: no sclerous icterus, MMM, neck supple  CV: +S1/S2, RRR  Lungs: CTA bilaterally  Abd: +BS, soft mildly tender  Ext: all four extremities are warm  Skin: no rashes noted  Neuro: CN II-XII intact    Current Weight: Weight - Scale: 88 6 kg (195 lb 6 oz)  First Weight: Weight - Scale: 86 2 kg (190 lb)    Lab Results:  I have personally reviewed pertinent labs      CBC: Lab Results   Component Value Date    WBC 15 50 (H) 10/23/2018    HGB 15 1 10/23/2018    HCT 46 8 10/23/2018    MCV 90 10/23/2018     10/23/2018    MCH 29 1 10/23/2018    MCHC 32 3 10/23/2018    RDW 15 6 (H) 10/23/2018    MPV 7 0 (L) 10/23/2018    NRBC 0 10/22/2018     CMP: Lab Results   Component Value Date     (L) 10/23/2018    K 4 7 10/23/2018    CL 96 (L) 10/23/2018    CO2 24 10/23/2018    BUN 26 (H) 10/23/2018    CREATININE 1 35 (H) 10/23/2018    CALCIUM 8 8 10/23/2018    AST 24 10/23/2018    ALT 17 10/23/2018    ALKPHOS 108 10/23/2018    EGFR 61 10/23/2018     Phosphorus: No results found for: PHOS  Magnesium: No results found for: MG  Urinalysis: Lab Results   Component Value Date    COLORU Yellow 10/23/2018    CLARITYU Clear 10/23/2018    SPECGRAV 1 025 10/23/2018    PHUR 6 0 10/23/2018    LEUKOCYTESUR Negative 10/23/2018    NITRITE Negative 10/23/2018    PROTEINUA Trace (A) 10/23/2018    GLUCOSEU Trace (A) 10/23/2018    KETONESU Trace (A) 10/23/2018    BILIRUBINUR Negative 10/23/2018    BLOODU Negative 10/23/2018     Ionized Calcium: No results found for: CAION  Coagulation: No results found for: PT, INR, APTT  Troponin: No results found for: TROPONINI  ABG: No results found for: PHART, KEM5OXG, PO2ART, TJD8WZO, S5OQWSWY, BEART, SOURCE      Results from last 7 days  Lab Units 10/23/18  0520 10/22/18  2358   SODIUM mmol/L 131* 131*   POTASSIUM mmol/L 4 7 4 2   CHLORIDE mmol/L 96* 95*   CO2 mmol/L 24 18*   BUN mg/dL 26* 26*   CREATININE mg/dL 1 35* 2 16*   CALCIUM mg/dL 8 8 9 3   ALK PHOS U/L 108 100   ALT U/L 17 15   AST U/L 24 20       Radiology review:  Procedure: Ct Abdomen Pelvis Wo Contrast    Result Date: 10/23/2018  Narrative: INDICATION:  Leukocytosis, 18 3  Abdominal pain  Fever  Normal lipase  Abscess suspected  Additional History:  Pancreatitis  Cholecystectomy  ORDERING PROVIDER:  WALTER THURMAN  TECHNIQUE:  CT of the abdomen and pelvis was performed without intravenous contrast   RADIATION AMOUNT:  424 60 mGy-cm  COMPARISON:  XR abdomen 10/23/2018  CT AP 8/10/2018  FINDINGS: Abdomen: Chronic interstitial fibrotic changes are seen at the lung bases, nonspecific and right greater than left  Mild centrilobular emphysema is seen bilaterally  Cardiac size is normal  Patient is status post cholecystectomy  The liver, spleen, adrenal glands, and kidneys are suboptimally evaluated on these unenhanced images, but demonstrate no acute pathology  Pancreas is atrophied with a stable peripherally calcified collection in the region of the expected location of the pancreatic tail measuring 3 1 x 5 8 cm in diameter, unchanged compared to the prior study and possibly representing a chronic calcified pseudocyst  There is no free air or lymph node enlargement  Abdominal aorta is not aneurysmal  Pelvis: There is no bowel wall thickening or obstruction  Appendix appears normal   Terminal ileum is unremarkable  There is no free fluid  Lymph nodes are not enlarged  Urinary bladder is unremarkable  Small fat-containing right inguinal hernia is noted  Skeleton:  There are no acute fractures  No suspicious bony lesions  Impression: No definite acute intra-abdominal pathology identified    There is complete fatty atrophy of the pancreas with stable presumed calcified pseudocyst in the expected location of the pancreatic tail  Signed by Saqib Alexandra    Procedure: Xr Abdomen Obstruction Series    Result Date: 10/23/2018  Narrative: INDICATION:  Pain above umbilical area for a couple hours  History of gallbladder surgery and pancreatitis  ORDERING PROVIDER:  Dilan Green  TECHNIQUE:  Single view of the chest and two view(s) of the abdomen (five images)  COMPARISON:  1/18/12 XR  FINDINGS:  The cardiomediastinal silhouette is normal in size  Lungs are hypoaerated  Reticular interstitial pattern of lungs is present suggestive of mild edema  Underlying fibrosis not excluded  There is no consolidation or atelectasis in either lung  There are no pleural effusions  There is no pneumothorax  No acute osseous process  Bowel gas pattern is normal without obstruction or ileus  Calcification in the left upper quadrant again noted measuring approximately 5 3 cm in size  This is presumably a partially calcified pseudocyst of the pancreas  Cholecystectomy clips are noted  No focal osseous abnormalities  Impression: 1  No acute intra-abdominal findings  Chronic calcified density in the left upper quadrant abdomen noted  2   Reticular interstitial pattern of lungs suggestive of mild edema  Underlying fibrosis not excluded  Signed by Magen Rand MD        EKG, Pathology, and Other Studies: reviewed      Counseling / Coordination of Care  Total ADDITIONAL floor / unit time spent today 35 minutes  Greater than 50% of total time was spent with the patient and / or family counseling and / or coordination of care   A description of the counseling / coordination of care: follows    Shira Granados MD

## 2018-10-23 NOTE — ASSESSMENT & PLAN NOTE
Lab Results   Component Value Date    HGBA1C 10 9 (H) 08/31/2018       Recent Labs      10/23/18   0616  10/23/18   1130   POCGLU  200*  102       Blood Sugar Average: Last 72 hrs:  (P) 151   ·  Accu-Cheks q 6 hours with algorithm 3 correction dose q 6 hours  · Diet is advanced to clear   Monitor closely

## 2018-10-23 NOTE — ASSESSMENT & PLAN NOTE
Will hold lisinopril at this time secondary to acute kidney injury, will give Catapres 0 1 mg p  o  q 3 hours p r n  for systolic blood pressure greater greater than 170

## 2018-10-23 NOTE — H&P
H&P- Ximena Payor 1970, 46 y o  male MRN: [id number]    Unit/Bed#: ED 05 Encounter: [id number]    Primary Care Provider: Leidy Garcia DO   Date and time admitted to hospital: 10/22/2018 11:36 PM        IGNACIA (acute kidney injury) [facility])   Assessment & Plan    This is likely prerenal in origin secondary to dehydration, will hold lisinopril at this time, hydrate with normal saline 125 cc/hour and consult Nephrology in a m  Abdominal pain   Assessment & Plan    Patient has a history of chronic pancreatitis though his lipase is normal today, CT abdomen and pelvis is currently pending, will make patient NPO at this time and give pain control  Leukocytosis   Assessment & Plan    Unclear etiology as patient does not have a clear source of infection, CT abdomen is pending as well as UA will hydrate as per above repeat labs in a m  Diabetes mellitus type 1 5, managed as type 1 [facility])   Assessment & Plan    Lab Results   Component Value Date    HGBA1C 10 9 (H) 08/31/2018       No results for input(s): POCGLU in the last 72 hours  Blood Sugar Average: Last 72 hrs:     Patient is NPO at this time will obtain Accu-Cheks q 6 hours with algorithm 3 correction dose q 6 hours  Tobacco dependence   Assessment & Plan    Will give nicotine 21 mg transdermal daily and consult for smoking cessation education in a m       H/O alcohol abuse   Assessment & Plan    Will place on CIWA protocol     Anxiety and depression   Assessment & Plan    Continue pre-hospital Effexor  mg p o  daily     GERD (gastroesophageal reflux disease)   Assessment & Plan    Will give pantoprazole 40 mg p o  daily     Hyperlipidemia   Assessment & Plan    Continue pre-hospital Tricor 145 mg p o  daily     Generalized anxiety disorder   Assessment & Plan    Continue pre-hospital hydroxyzine 50 mg p o  t i d  p r n , Risperdal 0 5 mg p o  b i d , trazodone 50 mg p o  q h s  and Effexor  mg p o  daily     Benign essential hypertension   Assessment & Plan    Will hold lisinopril at this time secondary to acute kidney injury, will give Catapres 0 1 mg p  o  q 3 hours p r n  for systolic blood pressure greater greater than 170     Quiros esophagus   Assessment & Plan    Continue pre-hospital pantoprazole 40 mg p o  daily       VTE Prophylaxis: Heparin  Code Status:   Level 1  POLST: There is no POLST form on file for this patient (pre-hospital)  Discussion with family:  None present at bedside at time of exam    Anticipated Length of Stay:  Patient will be admitted on an Inpatient basis with an anticipated length of stay of  > 2 midnights  Justification for Hospital Stay:   Acute kidney injury, abdominal pain, intractable vomiting, leukocytosis    Total Time for Visit, including Counseling / Coordination of Care: 45 minutes  Greater than 50% of this total time spent on direct patient counseling and coordination of care  Chief Complaint:     Abdominal pain x1 day    History of Present Illness:    Elpidio Sweeney is a 46 y o  male who presents with abdominal pain x1 day  Patient reports epigastric pain which is 10/10 and achy and sharp in nature without radiation no aggravating or alleviating factors  Patient additionally reports that he had 2 episodes of vomiting today  Patient has a history of chronic pancreatitis was recently at [facility] where he underwent an endoscopic ultrasound approximately 5 days ago  Patient has a history of alcohol abuse but he states that he had been sober for approximately 4 months until today to which point he drank approximately a half of a 5th of whiskey  Patient additionally reports that he had 3 near syncopal episodes today where he felt dizzy and lightheaded and fell without injury questionable short loss of consciousness  Patient denies any seizure activity and no loss of bladder or bowel control  Patient denies any fever chills, no recent sick contacts or bad food exposure  Denies any urinary complaints to include hematuria, dysuria or increased urinary frequency  Patient denies history of chronic renal insufficiency though he does state that he has had 1 previous episode of acute kidney injury but he does not currently follow with Nephrology in is on lisinopril for his hypertension  Review of Systems:  Review of Systems   Constitutional: Negative for chills, diaphoresis, fatigue and fever  Respiratory: Negative for cough, chest tightness, shortness of breath and wheezing  Cardiovascular: Negative for chest pain, palpitations and leg swelling  Gastrointestinal: Positive for abdominal pain, diarrhea, nausea and vomiting  Negative for abdominal distention  Genitourinary: Negative for dysuria, frequency and hematuria  Neurological: Positive for dizziness, syncope and light-headedness  Negative for seizures  Past Medical and Surgical History:   Past Medical History:   Diagnosis Date    Allergic rhinitis     last assessed: 12/5/2012    Anemia     Anxiety and depression     Quiros esophagus     Cholelithiasis     Chronic pain     COPD (chronic obstructive pulmonary disease) (HCC)     Depression     Diabetes mellitus (HCC)     Emphysema lung (HCC)     Generalized anxiety disorder     GERD (gastroesophageal reflux disease)     Hyperlipidemia     Hypertension     Kidney stone     Metatarsalgia     last assessed: 8/11/2014, unspecified laterality, ? cause  PE with changes  To see DPM tomorrow   Pancreatitis     Psychiatric disorder     Renal disorder     Shortness of breath     with activity       Past Surgical History:   Procedure Laterality Date    CHOLECYSTECTOMY LAPAROSCOPIC      FOOT SURGERY      B/L great toe joint replacement    KNEE ARTHROSCOPY      (therapeutic) right knee    [place] EDG US EXAM SURGICAL ALTER STOM DUODENUM/JEJUNUM N/A 10/17/2018    Procedure: LINEAR ENDOSCOPIC U/S;  Surgeon: Poppy Larsen MD;  Location: [facility];   Service: Gastroenterology    VASECTOMY      vas deferens       Meds/Allergies:  Prior to Admission medications    Medication Sig Start Date End Date Taking? Authorizing Provider   fenofibrate (TRICOR) 145 mg tablet Take 1 tablet (145 mg total) by mouth daily 8/16/18   TYE Vega   glucose blood (ONE TOUCH ULTRA TEST) test strip by In Vitro route 3 (three) times a day 12/29/14   Historical Provider, MD   glucose blood (ONETOUCH VERIO) test strip by In Vitro route 3 (three) times a day 12/18/14   Historical Provider, MD   hydrOXYzine pamoate (VISTARIL) 50 mg capsule TAKE 1 CAPSULE BY MOUTH THREE TIMES DAILY AS NEEDED 7/26/18   Shad Valles, DO   insulin glargine [facility]) 100 units/mL injection pen Inject 60 units sq q HS  10/8/18   Shad Valles, DO   insulin lispro (HumaLOG) 100 units/mL injection pen Per sliding scale with meals  Patient taking differently: Inject 42 Units under the skin Per sliding scale with meals  9/21/18   Shad Valles, DO   Insulin Pen Needle (B-D ULTRAFINE III SHORT PEN) 31G X 8 MM MISC by Does not apply route 2/16/12   Historical Provider, MD   Insulin Pen Needle (PEN NEEDLES) 29G X 12MM MISC by Does not apply route daily at bedtime Substitute what fits [place] pen and what is covered by insurance  8/14/18   TYE [place]   lisinopril (ZESTRIL) 20 mg tablet Take 1 tablet (20 mg total) by mouth daily 8/21/18   Shad Valles,    omeprazole (PriLOSEC) 20 mg delayed release capsule Take by mouth    Historical Provider, MD   risperiDONE (RisperDAL) 0 5 mg tablet TAKE 1 TABLET BY MOUTH TWICE DAILY 9/12/18   Shad Valles, DO   traZODone (DESYREL) 50 mg tablet TAKE 1 TABLET BY MOUTH DAILY AT BEDTIME 10/22/18   Shad Valles, DO   venlafaxine (EFFEXOR XR) 150 mg 24 hr capsule Take 1 capsule (150 mg total) by mouth daily 9/21/18   Shad Valles, DO     I have reviewed home medications with patient personally      Allergies: No Known Allergies    Social History:  Marital Status: /Civil Union   Occupation:   Unemployed  Patient Pre-hospital Living Situation:   Resides at home  Patient Pre-hospital Level of Mobility:   Full without assist  Patient Pre-hospital Diet Restrictions:   Diabetic  Substance Use History:     History   Alcohol Use    Yes     Comment: Drank a 5th vodka today     History   Smoking Status    Current Every Day Smoker    Packs/day: 1 00   Smokeless Tobacco    Never Used     Comment: Tobacco use [employer]'s "How to Quit" literature given to pat  History   Drug Use No     Comment: chronic narcotic use       Family History:  I have reviewed the patients family history    Physical Exam:   Vitals:   Blood Pressure: 120/65 (10/23/18 0130)  Pulse: 83 (10/23/18 0130)  Temperature: 98 °F (36 7 °C) (10/23/18 0130)  Temp Source: Temporal (10/23/18 0130)  Respirations: 18 (10/23/18 0130)  Height: 6' 1" (185 4 cm) (10/22/18 2341)  Weight - Scale: 86 2 kg (190 lb) (10/22/18 2341)  SpO2: 98 % (10/23/18 0130)    Physical Exam   Constitutional: He is oriented to person, place, and time  He appears well-developed and well-nourished  HENT:   Head: Normocephalic and atraumatic  Mouth/Throat: No oropharyngeal exudate  Eyes: Pupils are equal, round, and reactive to light  EOM are normal  No scleral icterus  Neck: Normal range of motion  Neck supple  No JVD present  Cardiovascular: Normal rate, regular rhythm and normal heart sounds  No murmur heard  Pulmonary/Chest: Effort normal and breath sounds normal  No respiratory distress  He has no wheezes  He has no rales  Abdominal: Soft  Bowel sounds are normal  There is tenderness  There is no rebound and no guarding  Mild tenderness diffusely throughout greatest in the epigastric region   Musculoskeletal: Normal range of motion  He exhibits no edema  Lymphadenopathy:     He has no cervical adenopathy  Neurological: He is alert and oriented to person, place, and time  Skin: Skin is warm and dry  No rash noted   No erythema  Psychiatric: He has a normal mood and affect  His behavior is normal    Nursing note and vitals reviewed  Additional Data:   Lab Results: I have personally reviewed pertinent reports  Results from last 7 days  Lab Units 10/22/18  2352   WBC Thousand/uL 18 30*   HEMOGLOBIN g/dL 14 4   HEMATOCRIT % 44 8   PLATELETS Thousands/uL 295   NEUTROS PCT % 85*   LYMPHS PCT % 11*   MONOS PCT % 3   EOS PCT % 0       Results from last 7 days  Lab Units 10/22/18  2358   SODIUM mmol/L 131*   POTASSIUM mmol/L 4 2   CHLORIDE mmol/L 95*   CO2 mmol/L 18*   BUN mg/dL 26*   CREATININE mg/dL 2 16*   CALCIUM mg/dL 9 3   ALK PHOS U/L 100   ALT U/L 15   AST U/L 20                   Imaging: I have personally reviewed pertinent reports  XR abdomen obstruction series   Final Result by Sylvie Pulliam (10/23 0101)   1  No acute intra-abdominal findings  Chronic calcified density in the   left upper quadrant abdomen noted  2   Reticular interstitial pattern of lungs suggestive of mild edema  Underlying fibrosis not excluded  Signed by Magen Rand MD      CT abdomen pelvis wo contrast    (Results Pending)       EKG, Pathology, and Other Studies Reviewed on Admission:   · EKG:   Normal sinus rhythm without ischemia    NetAccess/Epic Records Reviewed: Yes     ** Please Note: This note has been constructed using a voice recognition system   **

## 2018-10-23 NOTE — ASSESSMENT & PLAN NOTE
Likely secondary to dehydration as patient responded well to IV fluid resuscitation, will obtain orthostatics vital signs Q shift continue to hydrate as per above

## 2018-10-23 NOTE — ASSESSMENT & PLAN NOTE
Continue pre-hospital hydroxyzine 50 mg p o  t i d  p r n , Risperdal 0 5 mg p o  b i d , trazodone 50 mg p o  q h s  and Effexor  mg p o  daily

## 2018-10-23 NOTE — ASSESSMENT & PLAN NOTE
This is likely prerenal in origin secondary to dehydration, will hold lisinopril at this time, hydrate with normal saline 125 cc/hour and consult Nephrology in a m

## 2018-10-23 NOTE — ASSESSMENT & PLAN NOTE
· Will hold lisinopril at this time secondary to acute kidney injury  · BP has been stable   · Will resume lisinopril tomorrow     · will give Catapres 0 1 mg p  o  q 3 hours p r n  for systolic blood pressure greater greater than 170

## 2018-10-23 NOTE — ASSESSMENT & PLAN NOTE
· Unclear etiology as patient does not have a clear source of infection  The patient is afebrile  · This is resolved  · Will monitor this closely

## 2018-10-23 NOTE — ASSESSMENT & PLAN NOTE
Unclear etiology as patient does not have a clear source of infection, CT abdomen is pending as well as UA will hydrate as per above repeat labs in a m

## 2018-10-24 LAB
ALBUMIN SERPL BCP-MCNC: 3.3 G/DL (ref 3.5–5.7)
ALP SERPL-CCNC: 90 U/L (ref 40–150)
ALT SERPL W P-5'-P-CCNC: 16 U/L (ref 7–52)
ANION GAP SERPL CALCULATED.3IONS-SCNC: 8 MMOL/L (ref 4–13)
AST SERPL W P-5'-P-CCNC: 20 U/L (ref 13–39)
BASOPHILS # BLD AUTO: 0 THOUSANDS/ΜL (ref 0–0.1)
BASOPHILS NFR BLD AUTO: 0 % (ref 0–2)
BILIRUB SERPL-MCNC: 0.6 MG/DL (ref 0.2–1)
BUN SERPL-MCNC: 14 MG/DL (ref 7–25)
CALCIUM SERPL-MCNC: 8.5 MG/DL (ref 8.6–10.5)
CHLORIDE SERPL-SCNC: 101 MMOL/L (ref 98–107)
CO2 SERPL-SCNC: 25 MMOL/L (ref 21–31)
CREAT SERPL-MCNC: 0.96 MG/DL (ref 0.7–1.3)
EOSINOPHIL # BLD AUTO: 0 THOUSAND/ΜL (ref 0–0.61)
EOSINOPHIL NFR BLD AUTO: 0 % (ref 0–5)
ERYTHROCYTE [DISTWIDTH] IN BLOOD BY AUTOMATED COUNT: 15.3 % (ref 11.5–14.5)
GFR SERPL CREATININE-BSD FRML MDRD: 92 ML/MIN/1.73SQ M
GLUCOSE SERPL-MCNC: 139 MG/DL (ref 65–140)
GLUCOSE SERPL-MCNC: 156 MG/DL (ref 65–99)
GLUCOSE SERPL-MCNC: 164 MG/DL (ref 65–140)
GLUCOSE SERPL-MCNC: 225 MG/DL (ref 65–140)
GLUCOSE SERPL-MCNC: 236 MG/DL (ref 65–140)
GLUCOSE SERPL-MCNC: 85 MG/DL (ref 65–140)
HCT VFR BLD AUTO: 40.5 % (ref 36.5–49.3)
HGB BLD-MCNC: 12.9 G/DL (ref 14–18)
LIPASE SERPL-CCNC: 19 U/L (ref 11–82)
LYMPHOCYTES # BLD AUTO: 1.8 THOUSANDS/ΜL (ref 0.6–4.47)
LYMPHOCYTES NFR BLD AUTO: 17 % (ref 21–51)
MCH RBC QN AUTO: 29 PG (ref 26–34)
MCHC RBC AUTO-ENTMCNC: 32 G/DL (ref 31–37)
MCV RBC AUTO: 91 FL (ref 81–99)
MONOCYTES # BLD AUTO: 1 THOUSAND/ΜL (ref 0.17–1.22)
MONOCYTES NFR BLD AUTO: 10 % (ref 2–12)
NEUTROPHILS # BLD AUTO: 7.8 THOUSANDS/ΜL (ref 1.4–6.5)
NEUTS SEG NFR BLD AUTO: 73 % (ref 42–75)
NRBC BLD AUTO-RTO: 0 /100 WBCS
PLATELET # BLD AUTO: 176 THOUSANDS/UL (ref 149–390)
PMV BLD AUTO: 7.2 FL (ref 8.6–11.7)
POTASSIUM SERPL-SCNC: 4.3 MMOL/L (ref 3.5–5.5)
PROT SERPL-MCNC: 6.2 G/DL (ref 6.4–8.9)
PSA FREE MFR SERPL: 30 %
PSA FREE SERPL-MCNC: 0.12 NG/ML
PSA SERPL-MCNC: 0.4 NG/ML (ref 0–4)
RBC # BLD AUTO: 4.46 MILLION/UL (ref 4.3–5.9)
SODIUM SERPL-SCNC: 134 MMOL/L (ref 134–143)
URATE SERPL-MCNC: 2.6 MG/DL (ref 2.3–7.6)
WBC # BLD AUTO: 10.7 THOUSAND/UL (ref 4.8–10.8)

## 2018-10-24 PROCEDURE — 84550 ASSAY OF BLOOD/URIC ACID: CPT | Performed by: INTERNAL MEDICINE

## 2018-10-24 PROCEDURE — 87505 NFCT AGENT DETECTION GI: CPT | Performed by: NURSE PRACTITIONER

## 2018-10-24 PROCEDURE — 82948 REAGENT STRIP/BLOOD GLUCOSE: CPT

## 2018-10-24 PROCEDURE — 94760 N-INVAS EAR/PLS OXIMETRY 1: CPT

## 2018-10-24 PROCEDURE — 85025 COMPLETE CBC W/AUTO DIFF WBC: CPT | Performed by: INTERNAL MEDICINE

## 2018-10-24 PROCEDURE — 80053 COMPREHEN METABOLIC PANEL: CPT | Performed by: NURSE PRACTITIONER

## 2018-10-24 PROCEDURE — 99232 SBSQ HOSP IP/OBS MODERATE 35: CPT | Performed by: NURSE PRACTITIONER

## 2018-10-24 PROCEDURE — 87493 C DIFF AMPLIFIED PROBE: CPT | Performed by: NURSE PRACTITIONER

## 2018-10-24 PROCEDURE — 83690 ASSAY OF LIPASE: CPT | Performed by: NURSE PRACTITIONER

## 2018-10-24 RX ORDER — ACETAMINOPHEN 325 MG/1
650 TABLET ORAL EVERY 6 HOURS PRN
Status: DISCONTINUED | OUTPATIENT
Start: 2018-10-24 | End: 2018-10-26 | Stop reason: HOSPADM

## 2018-10-24 RX ORDER — LISINOPRIL 20 MG/1
20 TABLET ORAL DAILY
Status: DISCONTINUED | OUTPATIENT
Start: 2018-10-25 | End: 2018-10-26 | Stop reason: HOSPADM

## 2018-10-24 RX ORDER — MAGNESIUM SULFATE HEPTAHYDRATE 40 MG/ML
2 INJECTION, SOLUTION INTRAVENOUS ONCE
Status: COMPLETED | OUTPATIENT
Start: 2018-10-24 | End: 2018-10-24

## 2018-10-24 RX ADMIN — Medication 100 MG: at 10:18

## 2018-10-24 RX ADMIN — FENOFIBRATE 145 MG: 145 TABLET, FILM COATED ORAL at 10:18

## 2018-10-24 RX ADMIN — MAGNESIUM OXIDE TAB 400 MG (241.3 MG ELEMENTAL MG) 400 MG: 400 (241.3 MG) TAB at 18:08

## 2018-10-24 RX ADMIN — RISPERIDONE 0.5 MG: 0.25 TABLET ORAL at 18:08

## 2018-10-24 RX ADMIN — HEPARIN SODIUM 5000 UNITS: 5000 INJECTION INTRAVENOUS; SUBCUTANEOUS at 13:35

## 2018-10-24 RX ADMIN — FOLIC ACID 1 MG: 1 TABLET ORAL at 10:19

## 2018-10-24 RX ADMIN — CHLORDIAZEPOXIDE HYDROCHLORIDE 25 MG: 25 CAPSULE ORAL at 05:13

## 2018-10-24 RX ADMIN — NICOTINE 1 PATCH: 21 PATCH, EXTENDED RELEASE TRANSDERMAL at 10:18

## 2018-10-24 RX ADMIN — VENLAFAXINE HYDROCHLORIDE 150 MG: 150 CAPSULE, EXTENDED RELEASE ORAL at 10:18

## 2018-10-24 RX ADMIN — MORPHINE SULFATE 2 MG: 2 INJECTION, SOLUTION INTRAMUSCULAR; INTRAVENOUS at 05:12

## 2018-10-24 RX ADMIN — RISPERIDONE 0.5 MG: 0.25 TABLET ORAL at 10:18

## 2018-10-24 RX ADMIN — MORPHINE SULFATE 2 MG: 2 INJECTION, SOLUTION INTRAMUSCULAR; INTRAVENOUS at 15:40

## 2018-10-24 RX ADMIN — INSULIN LISPRO 1 UNITS: 100 INJECTION, SOLUTION INTRAVENOUS; SUBCUTANEOUS at 13:35

## 2018-10-24 RX ADMIN — TRAZODONE HYDROCHLORIDE 50 MG: 50 TABLET ORAL at 22:16

## 2018-10-24 RX ADMIN — CHLORDIAZEPOXIDE HYDROCHLORIDE 25 MG: 25 CAPSULE ORAL at 22:14

## 2018-10-24 RX ADMIN — MORPHINE SULFATE 2 MG: 2 INJECTION, SOLUTION INTRAMUSCULAR; INTRAVENOUS at 10:25

## 2018-10-24 RX ADMIN — ONDANSETRON HYDROCHLORIDE 4 MG: 2 INJECTION INTRAMUSCULAR; INTRAVENOUS at 00:19

## 2018-10-24 RX ADMIN — CHLORDIAZEPOXIDE HYDROCHLORIDE 25 MG: 25 CAPSULE ORAL at 13:35

## 2018-10-24 RX ADMIN — MAGNESIUM OXIDE TAB 400 MG (241.3 MG ELEMENTAL MG) 400 MG: 400 (241.3 MG) TAB at 10:18

## 2018-10-24 RX ADMIN — SODIUM CHLORIDE 125 ML/HR: 9 INJECTION, SOLUTION INTRAVENOUS at 23:08

## 2018-10-24 RX ADMIN — MORPHINE SULFATE 2 MG: 2 INJECTION, SOLUTION INTRAMUSCULAR; INTRAVENOUS at 20:20

## 2018-10-24 RX ADMIN — INSULIN LISPRO 3 UNITS: 100 INJECTION, SOLUTION INTRAVENOUS; SUBCUTANEOUS at 23:13

## 2018-10-24 RX ADMIN — MAGNESIUM SULFATE HEPTAHYDRATE 2 G: 40 INJECTION, SOLUTION INTRAVENOUS at 10:17

## 2018-10-24 RX ADMIN — HEPARIN SODIUM 5000 UNITS: 5000 INJECTION INTRAVENOUS; SUBCUTANEOUS at 22:16

## 2018-10-24 RX ADMIN — SODIUM CHLORIDE 125 ML/HR: 9 INJECTION, SOLUTION INTRAVENOUS at 10:25

## 2018-10-24 RX ADMIN — INSULIN LISPRO 2 UNITS: 100 INJECTION, SOLUTION INTRAVENOUS; SUBCUTANEOUS at 18:08

## 2018-10-24 RX ADMIN — PANTOPRAZOLE SODIUM 40 MG: 40 TABLET, DELAYED RELEASE ORAL at 05:13

## 2018-10-24 RX ADMIN — Medication 1 TABLET: at 10:19

## 2018-10-24 RX ADMIN — HEPARIN SODIUM 5000 UNITS: 5000 INJECTION INTRAVENOUS; SUBCUTANEOUS at 05:13

## 2018-10-24 RX ADMIN — SODIUM CHLORIDE 125 ML/HR: 9 INJECTION, SOLUTION INTRAVENOUS at 02:21

## 2018-10-24 RX ADMIN — ACETAMINOPHEN 650 MG: 325 TABLET ORAL at 02:16

## 2018-10-24 RX ADMIN — MORPHINE SULFATE 2 MG: 2 INJECTION, SOLUTION INTRAMUSCULAR; INTRAVENOUS at 23:49

## 2018-10-24 NOTE — PLAN OF CARE
DISCHARGE PLANNING     Discharge to home or other facility with appropriate resources Progressing        DISCHARGE PLANNING - CARE MANAGEMENT     Discharge to post-acute care or home with appropriate resources Progressing        GASTROINTESTINAL - ADULT     Minimal or absence of nausea and/or vomiting Progressing     Maintains or returns to baseline bowel function Progressing     Maintains adequate nutritional intake Progressing        INFECTION - ADULT     Absence or prevention of progression during hospitalization Progressing     Absence of fever/infection during neutropenic period Progressing        Knowledge Deficit     Patient/family/caregiver demonstrates understanding of disease process, treatment plan, medications, and discharge instructions Progressing        METABOLIC, FLUID AND ELECTROLYTES - ADULT     Electrolytes maintained within normal limits Progressing     Fluid balance maintained Progressing     Glucose maintained within target range Progressing        PAIN - ADULT     Verbalizes/displays adequate comfort level or baseline comfort level Progressing        Potential for Falls     Patient will remain free of falls Progressing        SAFETY ADULT     Maintain or return to baseline ADL function Progressing     Maintain or return mobility status to optimal level Progressing

## 2018-10-24 NOTE — CONSULTS
Consultation - GI   Julio Webber 46 y o  male MRN: [id number]  Unit/Bed#: -01 Encounter: [id number]      Assessment/Plan     Assessment:  Abdominal pain and diarrhea, suspect due to chronic pancreatitis and possibly also due to enterocolitis due to lower abdominal tenderness and diarrhea today  Plan:  Check stool studies  Continue clear liquids until tomorrow  At that point I would advance to soft low fat diet  History of Present Illness   Physician Requesting Consult: Ronda Monday, MD  Reason for Consult / Principal Problem:   Abdominal pain, history of chronic pancreatitis due to          alcohol  Hx and PE limited by:   HPI: Julio Webber is a 46y o  year old male who presents with sudden onset abdominal pain several hours after drinking hard liquor  The patient has a history alcohol-related pancreatitis and a small pseudocyst   CT scan did not reveal any obvious acute pancreatitis  Lipase was normal   The patient has been having diarrhea and lower abdominal pain today  It is unclear as to the cause of this  He denies any rectal bleeding  Consults    Review of Systems   Constitutional: Positive for activity change, appetite change and fatigue  HENT: Negative  Respiratory: Negative  Cardiovascular: Negative  Gastrointestinal: Positive for abdominal pain, diarrhea and nausea  Musculoskeletal: Positive for back pain  Neurological: Negative  Hematological: Negative  Psychiatric/Behavioral: Negative          Historical Information   Past Medical History:   Diagnosis Date    Allergic rhinitis     last assessed: 12/5/2012    Anemia     Anxiety and depression     Quiros esophagus     Cholelithiasis     Chronic pain     COPD (chronic obstructive pulmonary disease) (HCC)     Depression     Diabetes mellitus (HCC)     Emphysema lung (HCC)     Generalized anxiety disorder     GERD (gastroesophageal reflux disease)     Hyperlipidemia     Hypertension     Kidney stone     Metatarsalgia     last assessed: 8/11/2014, unspecified laterality, ? cause  PE with changes  To see DPM tomorrow   Pancreatitis     Psychiatric disorder     Renal disorder     Shortness of breath     with activity     Past Surgical History:   Procedure Laterality Date    CHOLECYSTECTOMY LAPAROSCOPIC      FOOT SURGERY      B/L great toe joint replacement    KNEE ARTHROSCOPY      (therapeutic) right knee    [place] EDG US EXAM SURGICAL ALTER STOM DUODENUM/JEJUNUM N/A 10/17/2018    Procedure: LINEAR ENDOSCOPIC U/S;  Surgeon: Barbara Eckert MD;  Location: [facility]; Service: Gastroenterology    VASECTOMY      vas deferens     Social History   History   Alcohol Use    Yes     Comment: Drank a 5th vodka today     History   Drug Use No     Comment: chronic narcotic use     History   Smoking Status    Current Every Day Smoker    Packs/day: 1 00   Smokeless Tobacco    Never Used     Comment: Tobacco use [employer]'s "How to Quit" literature given to pat       Family History: non-contributory    Meds/Allergies    Current Facility-Administered Medications   Medication Dose Route Frequency Provider Last Rate Last Dose    acetaminophen (TYLENOL) tablet 650 mg  650 mg Oral Q6H PRN Ninfa Malik PA-C   650 mg at 10/24/18 0216    chlordiazePOXIDE (LIBRIUM) capsule 25 mg  25 mg Oral [facility] TYE Nichole   25 mg at 10/24/18 1335    Followed by   Rosy Prajapati ON 10/25/2018] chlordiazePOXIDE (LIBRIUM) capsule 25 mg  25 mg Oral Q12H TYE Nichole        [START ON 10/26/2018] chlordiazePOXIDE (LIBRIUM) capsule 25 mg  25 mg Oral Daily TYE Nichole        chlordiazePOXIDE (LIBRIUM) capsule 25 mg  25 mg Oral Q6H PRN TYE Nichole        cloNIDine (CATAPRES) tablet 0 1 mg  0 1 mg Oral Q3H PRN Ninfa Malik PA-C        fenofibrate (TRICOR) tablet 145 mg  145 mg Oral Daily Ninfa Malik PA-C   145 mg at 68/89/90 4009    folic acid (FOLVITE) tablet 1 mg  1 mg Oral Daily TYE Nichole   1 mg at 10/24/18 1019    heparin (porcine) subcutaneous injection 5,000 Units  5,000 Units Subcutaneous [facility] Emily ZOYA Moe   5,000 Units at 10/24/18 1335    hydrOXYzine HCL (ATARAX) tablet 50 mg  50 mg Oral TID PRN Emily DEBRA MoeC        insulin lispro (HumaLOG) 100 units/mL subcutaneous injection 1-6 Units  1-6 Units Subcutaneous Q6H [address] Emily DEBRA MoeC   1 Units at 10/24/18 1335    [START ON 10/25/2018] lisinopril (ZESTRIL) tablet 20 mg  20 mg Oral Daily April Enamorado, CRNP        LORazepam (ATIVAN) 2 mg/mL injection 1 mg  1 mg Intravenous Q4H PRN April Enamorado, CRNP   1 mg at 10/23/18 1606    magnesium oxide (MAG-OX) tablet 400 mg  400 mg Oral BID April Enamorado, CRNP   400 mg at 10/24/18 1018    metoclopramide (REGLAN) injection 10 mg  10 mg Intravenous Q6H PRN Emily DEBRA MoeC        morphine injection 2 mg  2 mg Intravenous Q3H PRN Emily CYN Moe-C   2 mg at 10/24/18 1540    multivitamin-minerals (CENTRUM) tablet 1 tablet  1 tablet Oral Daily April Enamorado, CRNP   1 tablet at 10/24/18 1019    nicotine (NICODERM CQ) 21 mg/24 hr TD 24 hr patch 1 patch  1 patch Transdermal Daily Emily DEBRA MoeC   1 patch at 10/24/18 1018    ondansetron (ZOFRAN) injection 4 mg  4 mg Intravenous Q6H PRN Emily CYN Moe-C   4 mg at 10/24/18 0019    pantoprazole (PROTONIX) EC tablet 40 mg  40 mg Oral Early Morning Emily DEBRA MoeC   40 mg at 10/24/18 9708    risperiDONE (RisperDAL) tablet 0 5 mg  0 5 mg Oral BID Emily CYN Moe-C   0 5 mg at 10/24/18 1018    sodium chloride 0 9 % infusion  125 mL/hr Intravenous Continuous April Enamorado, CRNP 125 mL/hr at 10/24/18 1540 125 mL/hr at 10/24/18 1540    thiamine (VITAMIN B1) tablet 100 mg  100 mg Oral Daily Apirl Enamorado, CRNP   100 mg at 10/24/18 1018    traZODone (DESYREL) tablet 50 mg  50 mg Oral HS Emily CYN Moe-C   50 mg at 10/23/18 2229    venlafaxine (EFFEXOR-XR) 24 hr capsule 150 mg  150 mg Oral Daily Emily ZOYA Moe   150 mg at 10/24/18 1018     all current active meds have been reviewed    No Known Allergies    Objective       Intake/Output Summary (Last 24 hours) at 10/24/18 1542  Last data filed at 10/24/18 1415   Gross per 24 hour   Intake             1275 ml   Output             1800 ml   Net             -525 ml       Invasive Devices:   Peripheral IV 10/22/18 Left Arm (Active)   Site Assessment Clean;Dry; Intact 10/24/2018 10:18 AM   Dressing Type Transparent 10/24/2018 10:18 AM   Line Status Infusing 10/24/2018 10:18 AM     Vitals:    10/24/18 0600 10/24/18 0827 10/24/18 1300 10/24/18 1545   BP:  140/75 142/80 131/67   BP Location:  Right arm  Right arm   Pulse:  98 82 96   Resp:  18  18   Temp:  100 °F (37 8 °C)  98 4 °F (36 9 °C)   TempSrc:  Temporal  Temporal   SpO2:  95%  98%   Weight: 87 kg (191 lb 14 4 oz)      Height:         Physical Exam   Constitutional: He appears well-developed and well-nourished  HENT:   Head: Atraumatic  Neck: Normal range of motion  Neck supple  Cardiovascular: Normal rate and regular rhythm  Pulmonary/Chest: Effort normal and breath sounds normal    Abdominal: Soft  Bowel sounds are normal  There is tenderness  Mild epigastric tenderness and mild to moderate lower abdominal tenderness   Skin: Skin is warm, dry and intact  Psychiatric: He has a normal mood and affect  Lab Results: I have personally reviewed pertinent reports  Imaging Studies: I have personally reviewed pertinent reports  EKG, Pathology, and Other Studies: I have personally reviewed pertinent reports  VTE Prophylaxis: Reason for no pharmacologic prophylaxis Heparin    Counseling / Coordination of Care  Total floor / unit time spent today 75 minutes  Greater than 50% of total time was spent with the patient and / or family counseling and / coordination of care  A description of the counseling / coordination of care:   Case discussed with hospital physician patient and nurses

## 2018-10-24 NOTE — PROGRESS NOTES
Progress Note - Ciara Ratliff 1970, 46 y o  male MRN: [id number]    Unit/Bed#: -01 Encounter: [id number]    Primary Care Provider: Oneal Torres DO   Date and time admitted to hospital: 10/22/2018 11:36 PM        * IGNACIA (acute kidney injury) [facility])   Assessment & Plan    · This is likely prerenal in origin secondary to volume depletion   · This is resolved  · Nephrology input appreciated  · lisinoprill has been on hold and will resume this in AM    · Continue with IVF   · Follow up with renal function in AM     Abdominal pain   Assessment & Plan    · Patient has a history of chronic pancreatitis though his lipase is normal  · Suspected this to be secondary to alcohol use vs viral gastritis  · CT abdomen and pelvis shows no definite acute intra-abdominal pathology  There is complete fatty atrophy of the pancreas with stable presumed calcified pseudocyst inthe expected location of the pancreatic tail  · Abdominal pain is much improved  Will advance diet to clear  · Pending GI evaluation  Hypotension   Assessment & Plan    · Likely secondary to volume depletion as patient responded well to IV fluid resuscitation  · This is resolved  Leukocytosis   Assessment & Plan    · Unclear etiology as patient does not have a clear source of infection  The patient is afebrile  · This is resolved  · Will monitor this closely       Tobacco dependence   Assessment & Plan    · Will use nicotine patch daily  · Smoking cessation     H/O alcohol abuse   Assessment & Plan    · Continue on CIWA protocol  · No sign of withdrawal     Anxiety and depression   Assessment & Plan    · Continue Effexor     GERD (gastroesophageal reflux disease)   Assessment & Plan    · Continue with PPI     Diabetes mellitus type 1 5, managed as type 1 [facility])   Assessment & Plan    Lab Results   Component Value Date    HGBA1C 10 9 (H) 08/31/2018       Recent Labs      10/23/18   0616  10/23/18   1130   POCGLU  200*  102 Blood Sugar Average: Last 72 hrs:  (P) 151   ·  Accu-Cheks q 6 hours with algorithm 3 correction dose q 6 hours  · Diet is advanced to clear  Monitor closely      Hyperlipidemia   Assessment & Plan    · Continue fenofibrate     Generalized anxiety disorder   Assessment & Plan    · Continue hydroxyzine, Risperdal, trazodone and Effexor   · Psychiatry will see patient when medically cleared  Benign essential hypertension   Assessment & Plan    · Will hold lisinopril at this time secondary to acute kidney injury  · BP has been stable   · Will resume lisinopril tomorrow  · will give Catapres 0 1 mg p  o  q 3 hours p r n  for systolic blood pressure greater greater than 170     Quiros esophagus   Assessment & Plan    · Continue PPI         VTE Pharmacologic Prophylaxis: Pharmacologic: Enoxaparin (Lovenox)    Patient Centered Rounds: I have performed bedside rounds with nursing staff today  Discussions with Specialists or Other Care Team Provider: nursing, renal   Education and Discussions with Family / Patient: patient     Time Spent for Care: 20 minutes  More than 50% of total time spent on counseling and coordination of care as described above  Current Length of Stay: 1 day(s)    Current Patient Status: Inpatient   Certification Statement: The patient will continue to require additional inpatient hospital stay due to IGNACIA     Discharge Plan: pending hospital course likely in AM pending psychiatry evaluation     Code Status: Level 1 - Full Code    Subjective:   Feeling better  Abdominal pain is better today  No n/v      Objective:     Vitals:   Temp (24hrs), Av 1 °F (37 8 °C), Min:98 8 °F (37 1 °C), Max:101 8 °F (38 8 °C)    Temp:  [98 8 °F (37 1 °C)-101 8 °F (38 8 °C)] 100 °F (37 8 °C)  HR:  [] 98  Resp:  [16-18] 18  BP: (114-148)/(55-82) 140/75  SpO2:  [91 %-98 %] 95 %  Body mass index is 26 03 kg/m²  Input and Output Summary (last 24 hours):        Intake/Output Summary (Last 24 hours) at 10/24/18 1119  Last data filed at 10/24/18 0827   Gross per 24 hour   Intake             1000 ml   Output             1600 ml   Net             -600 ml       Physical Exam:     Physical Exam   Constitutional: He is oriented to person, place, and time  He appears well-developed  No distress  HENT:   Head: Normocephalic and atraumatic  Mouth/Throat: Oropharynx is clear and moist    Eyes: Pupils are equal, round, and reactive to light  Conjunctivae and EOM are normal    Neck: Normal range of motion  Neck supple  No thyromegaly present  Cardiovascular: Normal rate, regular rhythm, normal heart sounds and intact distal pulses  Pulmonary/Chest: Effort normal and breath sounds normal  No respiratory distress  He has no wheezes  Abdominal: Soft  Bowel sounds are normal  He exhibits no distension  There is tenderness (mild tenderness on epigastric )  Musculoskeletal: Normal range of motion  He exhibits no edema or deformity  Neurological: He is alert and oriented to person, place, and time  He has normal reflexes  Skin: Skin is warm and dry  No erythema  Psychiatric: His behavior is normal  Thought content normal    Somewhat flat affect     Vitals reviewed  Additional Data:     Labs:      Results from last 7 days  Lab Units 10/24/18  0534   WBC Thousand/uL 10 70   HEMOGLOBIN g/dL 12 9*   HEMATOCRIT % 40 5   PLATELETS Thousands/uL 176   NEUTROS PCT % 73   LYMPHS PCT % 17*   MONOS PCT % 10   EOS PCT % 0       Results from last 7 days  Lab Units 10/24/18  0534   SODIUM mmol/L 134   POTASSIUM mmol/L 4 3   CHLORIDE mmol/L 101   CO2 mmol/L 25   BUN mg/dL 14   CREATININE mg/dL 0 96   CALCIUM mg/dL 8 5*   ALK PHOS U/L 90   ALT U/L 16   AST U/L 20           Results from last 7 days  Lab Units 10/24/18  0641 10/24/18  0005 10/23/18  1850 10/23/18  1632 10/23/18  1130 10/23/18  0616   POC GLUCOSE mg/dl 139 85 94 77 102 200*           * I Have Reviewed All Lab Data Listed Above    * Additional Pertinent Lab Tests Reviewed:  [facility] Admission  Reviewed    Imaging:  Imaging Reports Reviewed Today Include: CT a/p    Recent Cultures (last 7 days):           Last 24 Hours Medication List:     Current Facility-Administered Medications:  acetaminophen 650 mg Oral Q6H PRN Rima Semen, PA-C    chlordiazePOXIDE 25 mg Oral Q8H [address] TYE Guzman    Followed by        Stephanie Mena ON 10/25/2018] chlordiazePOXIDE 25 mg Oral Q12H TYE Guzman    [START ON 10/26/2018] chlordiazePOXIDE 25 mg Oral Daily TYE Guzman    chlordiazePOXIDE 25 mg Oral Q6H PRN TYE Guzman    cloNIDine 0 1 mg Oral Q3H PRN Rima Semen, PA-PRICE    fenofibrate 145 mg Oral Daily [place] Semen, PA-PRICE    folic acid 1 mg Oral Daily TYE Guzman    heparin (porcine) 5,000 Units Subcutaneous [facility] Rima Semen, PA-C    hydrOXYzine HCL 50 mg Oral TID PRN Rima Semen, PA-C    insulin lispro 1-6 Units Subcutaneous Q6H [address] Rima Semen, PA-C    LORazepam 1 mg Intravenous Q4H PRN TYE Guzman    magnesium oxide 400 mg Oral BID TYE Guzman    magnesium sulfate 2 g Intravenous Once TYE Guzman    metoclopramide 10 mg Intravenous Q6H PRN [place] Semen, PA-PRICE    morphine injection 2 mg Intravenous Q3H PRN [place] Semen, PA-C    multivitamin-minerals 1 tablet Oral Daily TYE Guzman    nicotine 1 patch Transdermal Daily [place] Semen, PA-PRICE    ondansetron 4 mg Intravenous Q6H PRN Rima Semen, PA-C    pantoprazole 40 mg Oral Early Morning [place] Semen, PA-C    risperiDONE 0 5 mg Oral BID [place] Semen, PA-C    sodium chloride 100 mL/hr Intravenous Continuous TYE Guzman Last Rate: 125 mL/hr (10/24/18 1025)   thiamine 100 mg Oral Daily TYE Guzman    traZODone 50 mg Oral HS [place] Semen, PA-C    venlafaxine 150 mg Oral Daily Rima Semen, PA-C         Today, Patient Was Seen By: Hayden Peter, CRNP    ** Please Note: Dictation voice to text software may have been used in the creation of this document   **

## 2018-10-24 NOTE — SOCIAL WORK
Chart reviewed by case management, pt contines on a clear diet, Gi consult and psych consult to be completed, will need to reassess d/c plan after the consults have been completed,d/c plan was discussed at d/c planning meeting today

## 2018-10-24 NOTE — PROGRESS NOTES
Progress Note - Nephrology   Jere Hollis 46 y o  male MRN: [id number]  Unit/Bed#: -01 Encounter: [id number]    A/P:  1  Acute kidney injury: due to volume depletion from binge drinking  He is improved with crystalloid  2  Hyponatremia: improved with IVF  3  Pancreatitis: appears stable  4  COPD: stable  5  Barretts esophagus: no reflux today      Follow up reason for today's visit: acute kidney injury    IGNACIA (acute kidney injury) [facility])    Patient Active Problem List   Diagnosis    Quiros esophagus    Benign essential hypertension    COPD (chronic obstructive pulmonary disease) ([place] [address] )    Fatty liver    Fibromyalgia    Generalized anxiety disorder    Hyperlipidemia    Insomnia    Iron deficiency anemia    MDD (major depressive disorder), single episode    Nephrolithiasis    Other chronic pain    Recurrent pancreatitis ([place] [address] )    Sleep disorder    Diabetes mellitus type 1 5, managed as type 1 ([place] [address] )    Abdominal pain    IGNACIA (acute kidney injury) ([place] [address] )    GERD (gastroesophageal reflux disease)    Anxiety and depression    H/O alcohol abuse    Leukemoid reaction    Acute on chronic pancreatitis (HCC)    Transaminitis    Tobacco dependence    Chronic pancreatitis (HCC)    Paresthesia of both lower extremities    Leukocytosis    Hypotension         Subjective:   Denies headaches dizziness chest pain shortness of breath  Has abdominal discomfort on palpation  Is eating and drinking  He does feel better and is urinating well    Objective:     Vitals: Blood pressure 140/75, pulse 98, temperature 100 °F (37 8 °C), temperature source Temporal, resp  rate 18, height 6' (1 829 m), weight 87 kg (191 lb 14 4 oz), SpO2 95 %  ,Body mass index is 26 03 kg/m²      Weight (last 2 days)     Date/Time   Weight    10/24/18 0600  87 (191 9)    10/23/18 1157  87 6 (193 23)    10/23/18 0314  88 6 (195 38)    10/22/18 2341  86 2 (190)                Intake/Output Summary (Last 24 hours) at 10/24/18 0914  Last data filed at 10/23/18 1931   Gross per 24 hour   Intake             3000 ml   Output             1600 ml   Net             1400 ml     I/O last 3 completed shifts: In: 7000 [I V :7000]  Out: 1800 [Urine:1800]         Physical Exam: /75 (BP Location: Right arm)   Pulse 98   Temp 100 °F (37 8 °C) (Temporal)   Resp 18   Ht 6' (1 829 m)   Wt 87 kg (191 lb 14 4 oz)   SpO2 95%   BMI 26 03 kg/m²     General Appearance:    Alert, cooperative, no distress, appears stated age   Head:    Normocephalic, without obvious abnormality, atraumatic   Eyes:    Conjunctiva/corneas clear   Ears:    Normal external ears   Nose:   Nares normal, septum midline, mucosa normal, no drainage    or sinus tenderness   Throat:   Lips, mucosa, and tongue normal; teeth and gums normal   Neck:   Supple, symmetrical, trachea midline, no adenopathy;        thyroid:  No enlargement/tenderness/nodules; no carotid    bruit or JVD   Back:     Symmetric, no curvature, ROM normal, no CVA tenderness   Lungs:     Clear to auscultation bilaterally, respirations unlabored   Chest wall:    No tenderness or deformity   Heart:    Regular rate and rhythm, S1 and S2 normal, no murmur, rub   or gallop   Abdomen:     Soft, mild epigastric-tender, bowel sounds active   Extremities:   Extremities normal, atraumatic, no cyanosis or edema   Skin:   Skin color, texture, turgor normal, no rashes or lesions   Lymph nodes:   Cervical normal   Neurologic:   CNII-XII intact            Lab, Imaging and other studies: I have personally reviewed pertinent labs    CBC: Lab Results   Component Value Date    WBC 10 70 10/24/2018    HGB 12 9 (L) 10/24/2018    HCT 40 5 10/24/2018    MCV 91 10/24/2018     10/24/2018    MCH 29 0 10/24/2018    MCHC 32 0 10/24/2018    RDW 15 3 (H) 10/24/2018    MPV 7 2 (L) 10/24/2018    NRBC 0 10/24/2018     CMP: Lab Results   Component Value Date     10/24/2018    K 4 3 10/24/2018     10/24/2018    CO2 25 10/24/2018    BUN 14 10/24/2018    CREATININE 0 96 10/24/2018    CALCIUM 8 5 (L) 10/24/2018    AST 20 10/24/2018    ALT 16 10/24/2018    ALKPHOS 90 10/24/2018    EGFR 92 10/24/2018         Results from last 7 days  Lab Units 10/24/18  0534 10/23/18  0539 10/22/18  2358   SODIUM mmol/L 134 131* 131*   POTASSIUM mmol/L 4 3 4 7 4 2   CHLORIDE mmol/L 101 96* 95*   CO2 mmol/L 25 24 18*   BUN mg/dL 14 26* 26*   CREATININE mg/dL 0 96 1 35* 2 16*   CALCIUM mg/dL 8 5* 8 8 9 3   ALK PHOS U/L 90 108 100   ALT U/L 16 17 15   AST U/L 20 24 20         Phosphorus: No results found for: PHOS  Magnesium:   Lab Results   Component Value Date    MG 1 5 (L) 10/23/2018     Urinalysis: No results found for: COLORU, CLARITYU, SPECGRAV, PHUR, LEUKOCYTESUR, NITRITE, PROTEINUA, GLUCOSEU, KETONESU, BILIRUBINUR, BLOODU  Ionized Calcium: No results found for: CAION  Coagulation: No results found for: PT, INR, APTT  Troponin: No results found for: TROPONINI  ABG: No results found for: PHART, TMI8MMD, PO2ART, ECG4BPN, Y1PEADXN, BEART, SOURCE  Radiology review:     IMAGING  Procedure: Ct Abdomen Pelvis Wo Contrast    Result Date: 10/23/2018  Narrative: INDICATION:  Leukocytosis, 18 3  Abdominal pain  Fever  Normal lipase  Abscess suspected  Additional History:  Pancreatitis  Cholecystectomy  ORDERING PROVIDER:  WALTER THURMAN  TECHNIQUE:  CT of the abdomen and pelvis was performed without intravenous contrast   RADIATION AMOUNT:  424 60 mGy-cm  COMPARISON:  XR abdomen 10/23/2018  CT AP 8/10/2018  FINDINGS: Abdomen: Chronic interstitial fibrotic changes are seen at the lung bases, nonspecific and right greater than left  Mild centrilobular emphysema is seen bilaterally  Cardiac size is normal  Patient is status post cholecystectomy  The liver, spleen, adrenal glands, and kidneys are suboptimally evaluated on these unenhanced images, but demonstrate no acute pathology    Pancreas is atrophied with a stable peripherally calcified collection in the region of the expected location of the pancreatic tail measuring 3 1 x 5 8 cm in diameter, unchanged compared to the prior study and possibly representing a chronic calcified pseudocyst  There is no free air or lymph node enlargement  Abdominal aorta is not aneurysmal  Pelvis: There is no bowel wall thickening or obstruction  Appendix appears normal   Terminal ileum is unremarkable  There is no free fluid  Lymph nodes are not enlarged  Urinary bladder is unremarkable  Small fat-containing right inguinal hernia is noted  Skeleton:  There are no acute fractures  No suspicious bony lesions  Impression: No definite acute intra-abdominal pathology identified  There is complete fatty atrophy of the pancreas with stable presumed calcified pseudocyst in the expected location of the pancreatic tail  Signed by Mitchell Kwok    Procedure: Xr Abdomen Obstruction Series    Result Date: 10/23/2018  Narrative: INDICATION:  Pain above umbilical area for a couple hours  History of gallbladder surgery and pancreatitis  ORDERING PROVIDER:  Ruth Espinal  TECHNIQUE:  Single view of the chest and two view(s) of the abdomen (five images)  COMPARISON:  1/18/12 XR  FINDINGS:  The cardiomediastinal silhouette is normal in size  Lungs are hypoaerated  Reticular interstitial pattern of lungs is present suggestive of mild edema  Underlying fibrosis not excluded  There is no consolidation or atelectasis in either lung  There are no pleural effusions  There is no pneumothorax  No acute osseous process  Bowel gas pattern is normal without obstruction or ileus  Calcification in the left upper quadrant again noted measuring approximately 5 3 cm in size  This is presumably a partially calcified pseudocyst of the pancreas  Cholecystectomy clips are noted  No focal osseous abnormalities  Impression: 1  No acute intra-abdominal findings  Chronic calcified density in the left upper quadrant abdomen noted   2   Reticular interstitial pattern of lungs suggestive of mild edema  Underlying fibrosis not excluded  Signed by Mohini Forde MD    Procedure: Us Retroperitoneal Complete    Result Date: 10/23/2018  Narrative: INDICATION:  Acute renal failure, Diabetes TECHNIQUE:  Ultrasound of the kidneys  COMPARISON:  CT 10/23/2018, MR 08/11/2018, US 06/04/2018 FINDINGS: The right kidney measures 12 1 cm in length  Renal cortical echotexture is normal   There is no hydronephrosis  There are no stones  There are no cysts  The left kidney measures 12 8 cm in length  Renal cortical echotexture is normal   There is no hydronephrosis  There are no stones  There are no cysts  The bladder is distended  A simple cyst measuring 1 0 x 1 1 x 1 1 cm is incidentally noted within the prostate gland  This may represent a dilated prostatic urethra  Impression: No hydronephrosis bilaterally  Prostatic cyst versus dilated prostatic urethra   Signed by Mohini Forde MD      Current Facility-Administered Medications   Medication Dose Route Frequency    acetaminophen (TYLENOL) tablet 650 mg  650 mg Oral Q6H PRN    chlordiazePOXIDE (LIBRIUM) capsule 25 mg  25 mg Oral Q8H [address]    Followed by   Chris Specking ON 10/25/2018] chlordiazePOXIDE (LIBRIUM) capsule 25 mg  25 mg Oral Q12H    [START ON 10/26/2018] chlordiazePOXIDE (LIBRIUM) capsule 25 mg  25 mg Oral Daily    chlordiazePOXIDE (LIBRIUM) capsule 25 mg  25 mg Oral Q6H PRN    cloNIDine (CATAPRES) tablet 0 1 mg  0 1 mg Oral Q3H PRN    fenofibrate (TRICOR) tablet 145 mg  145 mg Oral Daily    folic acid (FOLVITE) tablet 1 mg  1 mg Oral Daily    heparin (porcine) subcutaneous injection 5,000 Units  5,000 Units Subcutaneous Q8H [address]    hydrOXYzine HCL (ATARAX) tablet 50 mg  50 mg Oral TID PRN    insulin lispro (HumaLOG) 100 units/mL subcutaneous injection 1-6 Units  1-6 Units Subcutaneous Q6H [address]    LORazepam (ATIVAN) 2 mg/mL injection 1 mg  1 mg Intravenous Q4H PRN    magnesium oxide (MAG-OX) tablet 400 mg 400 mg Oral BID    magnesium sulfate 2 g/50 mL IVPB (premix) 2 g  2 g Intravenous Once    metoclopramide (REGLAN) injection 10 mg  10 mg Intravenous Q6H PRN    morphine injection 2 mg  2 mg Intravenous Q3H PRN    multivitamin-minerals (CENTRUM) tablet 1 tablet  1 tablet Oral Daily    nicotine (NICODERM CQ) 21 mg/24 hr TD 24 hr patch 1 patch  1 patch Transdermal Daily    ondansetron (ZOFRAN) injection 4 mg  4 mg Intravenous Q6H PRN    pantoprazole (PROTONIX) EC tablet 40 mg  40 mg Oral Early Morning    risperiDONE (RisperDAL) tablet 0 5 mg  0 5 mg Oral BID    sodium chloride 0 9 % infusion  125 mL/hr Intravenous Continuous    thiamine (VITAMIN B1) tablet 100 mg  100 mg Oral Daily    traZODone (DESYREL) tablet 50 mg  50 mg Oral HS    venlafaxine (EFFEXOR-XR) 24 hr capsule 150 mg  150 mg Oral Daily     There are no discontinued medications      Farrukh Lopez MD

## 2018-10-25 PROBLEM — R19.7 DIARRHEA OF PRESUMED INFECTIOUS ORIGIN: Status: ACTIVE | Noted: 2018-10-25

## 2018-10-25 LAB
ALBUMIN SERPL BCP-MCNC: 3.3 G/DL (ref 3.5–5.7)
ALP SERPL-CCNC: 80 U/L (ref 40–150)
ALT SERPL W P-5'-P-CCNC: 11 U/L (ref 7–52)
ANION GAP SERPL CALCULATED.3IONS-SCNC: 5 MMOL/L (ref 4–13)
AST SERPL W P-5'-P-CCNC: 10 U/L (ref 13–39)
BILIRUB SERPL-MCNC: 0.5 MG/DL (ref 0.2–1)
BUN SERPL-MCNC: 8 MG/DL (ref 7–25)
C DIFF TOX GENS STL QL NAA+PROBE: NORMAL
CALCIUM SERPL-MCNC: 8.5 MG/DL (ref 8.6–10.5)
CAMPYLOBACTER DNA SPEC NAA+PROBE: NORMAL
CHLORIDE SERPL-SCNC: 103 MMOL/L (ref 98–107)
CO2 SERPL-SCNC: 26 MMOL/L (ref 21–31)
CREAT SERPL-MCNC: 0.72 MG/DL (ref 0.7–1.3)
ERYTHROCYTE [DISTWIDTH] IN BLOOD BY AUTOMATED COUNT: 15.4 % (ref 11.5–14.5)
GFR SERPL CREATININE-BSD FRML MDRD: 109 ML/MIN/1.73SQ M
GLUCOSE SERPL-MCNC: 184 MG/DL (ref 65–140)
GLUCOSE SERPL-MCNC: 190 MG/DL (ref 65–140)
GLUCOSE SERPL-MCNC: 190 MG/DL (ref 65–99)
GLUCOSE SERPL-MCNC: 279 MG/DL (ref 65–140)
GLUCOSE SERPL-MCNC: 284 MG/DL (ref 65–140)
HCT VFR BLD AUTO: 38 % (ref 36.5–49.3)
HGB BLD-MCNC: 12.6 G/DL (ref 14–18)
MAGNESIUM SERPL-MCNC: 2 MG/DL (ref 1.9–2.7)
MCH RBC QN AUTO: 29.7 PG (ref 26–34)
MCHC RBC AUTO-ENTMCNC: 33.2 G/DL (ref 31–37)
MCV RBC AUTO: 90 FL (ref 81–99)
PLATELET # BLD AUTO: 152 THOUSANDS/UL (ref 149–390)
PMV BLD AUTO: 6.9 FL (ref 8.6–11.7)
POTASSIUM SERPL-SCNC: 4.1 MMOL/L (ref 3.5–5.5)
PROT SERPL-MCNC: 6.3 G/DL (ref 6.4–8.9)
RBC # BLD AUTO: 4.23 MILLION/UL (ref 4.3–5.9)
SALMONELLA DNA SPEC QL NAA+PROBE: NORMAL
SHIGA TOXIN STX GENE SPEC NAA+PROBE: NORMAL
SHIGELLA DNA SPEC QL NAA+PROBE: NORMAL
SODIUM SERPL-SCNC: 134 MMOL/L (ref 134–143)
WBC # BLD AUTO: 9.1 THOUSAND/UL (ref 4.8–10.8)

## 2018-10-25 PROCEDURE — 83735 ASSAY OF MAGNESIUM: CPT | Performed by: NURSE PRACTITIONER

## 2018-10-25 PROCEDURE — 80053 COMPREHEN METABOLIC PANEL: CPT | Performed by: NURSE PRACTITIONER

## 2018-10-25 PROCEDURE — 99232 SBSQ HOSP IP/OBS MODERATE 35: CPT | Performed by: NURSE PRACTITIONER

## 2018-10-25 PROCEDURE — 82948 REAGENT STRIP/BLOOD GLUCOSE: CPT

## 2018-10-25 PROCEDURE — 99241 PR OFFICE CONSULTATION NEW/ESTAB PATIENT 15 MIN: CPT | Performed by: NURSE PRACTITIONER

## 2018-10-25 PROCEDURE — 85027 COMPLETE CBC AUTOMATED: CPT | Performed by: NURSE PRACTITIONER

## 2018-10-25 RX ORDER — HYDROMORPHONE HCL/PF 1 MG/ML
0.5 SYRINGE (ML) INJECTION
Status: DISCONTINUED | OUTPATIENT
Start: 2018-10-25 | End: 2018-10-26 | Stop reason: HOSPADM

## 2018-10-25 RX ADMIN — HEPARIN SODIUM 5000 UNITS: 5000 INJECTION INTRAVENOUS; SUBCUTANEOUS at 21:01

## 2018-10-25 RX ADMIN — NICOTINE 1 PATCH: 21 PATCH, EXTENDED RELEASE TRANSDERMAL at 08:04

## 2018-10-25 RX ADMIN — FOLIC ACID 1 MG: 1 TABLET ORAL at 08:02

## 2018-10-25 RX ADMIN — SODIUM CHLORIDE 100 ML/HR: 9 INJECTION, SOLUTION INTRAVENOUS at 17:28

## 2018-10-25 RX ADMIN — SODIUM CHLORIDE 125 ML/HR: 9 INJECTION, SOLUTION INTRAVENOUS at 07:58

## 2018-10-25 RX ADMIN — MAGNESIUM OXIDE TAB 400 MG (241.3 MG ELEMENTAL MG) 400 MG: 400 (241.3 MG) TAB at 17:00

## 2018-10-25 RX ADMIN — HYDROMORPHONE HYDROCHLORIDE 0.5 MG: 1 INJECTION, SOLUTION INTRAMUSCULAR; INTRAVENOUS; SUBCUTANEOUS at 13:41

## 2018-10-25 RX ADMIN — INSULIN LISPRO 4 UNITS: 100 INJECTION, SOLUTION INTRAVENOUS; SUBCUTANEOUS at 21:01

## 2018-10-25 RX ADMIN — MAGNESIUM OXIDE TAB 400 MG (241.3 MG ELEMENTAL MG) 400 MG: 400 (241.3 MG) TAB at 08:02

## 2018-10-25 RX ADMIN — LISINOPRIL 20 MG: 20 TABLET ORAL at 08:01

## 2018-10-25 RX ADMIN — VENLAFAXINE HYDROCHLORIDE 150 MG: 150 CAPSULE, EXTENDED RELEASE ORAL at 08:02

## 2018-10-25 RX ADMIN — HEPARIN SODIUM 5000 UNITS: 5000 INJECTION INTRAVENOUS; SUBCUTANEOUS at 13:41

## 2018-10-25 RX ADMIN — HYDROMORPHONE HYDROCHLORIDE 0.5 MG: 1 INJECTION, SOLUTION INTRAMUSCULAR; INTRAVENOUS; SUBCUTANEOUS at 23:20

## 2018-10-25 RX ADMIN — HYDROMORPHONE HYDROCHLORIDE 0.5 MG: 1 INJECTION, SOLUTION INTRAMUSCULAR; INTRAVENOUS; SUBCUTANEOUS at 20:14

## 2018-10-25 RX ADMIN — MORPHINE SULFATE 2 MG: 2 INJECTION, SOLUTION INTRAMUSCULAR; INTRAVENOUS at 04:10

## 2018-10-25 RX ADMIN — HEPARIN SODIUM 5000 UNITS: 5000 INJECTION INTRAVENOUS; SUBCUTANEOUS at 05:14

## 2018-10-25 RX ADMIN — RISPERIDONE 0.5 MG: 0.25 TABLET ORAL at 17:00

## 2018-10-25 RX ADMIN — Medication 100 MG: at 08:02

## 2018-10-25 RX ADMIN — INSULIN LISPRO 4 UNITS: 100 INJECTION, SOLUTION INTRAVENOUS; SUBCUTANEOUS at 12:11

## 2018-10-25 RX ADMIN — HYDROMORPHONE HYDROCHLORIDE 0.5 MG: 1 INJECTION, SOLUTION INTRAMUSCULAR; INTRAVENOUS; SUBCUTANEOUS at 10:31

## 2018-10-25 RX ADMIN — INSULIN LISPRO 1 UNITS: 100 INJECTION, SOLUTION INTRAVENOUS; SUBCUTANEOUS at 07:43

## 2018-10-25 RX ADMIN — FENOFIBRATE 145 MG: 145 TABLET, FILM COATED ORAL at 08:02

## 2018-10-25 RX ADMIN — INSULIN LISPRO 2 UNITS: 100 INJECTION, SOLUTION INTRAVENOUS; SUBCUTANEOUS at 16:54

## 2018-10-25 RX ADMIN — HYDROMORPHONE HYDROCHLORIDE 0.5 MG: 1 INJECTION, SOLUTION INTRAMUSCULAR; INTRAVENOUS; SUBCUTANEOUS at 16:55

## 2018-10-25 RX ADMIN — CHLORDIAZEPOXIDE HYDROCHLORIDE 25 MG: 25 CAPSULE ORAL at 13:41

## 2018-10-25 RX ADMIN — ONDANSETRON HYDROCHLORIDE 4 MG: 2 INJECTION INTRAMUSCULAR; INTRAVENOUS at 07:57

## 2018-10-25 RX ADMIN — RISPERIDONE 0.5 MG: 0.25 TABLET ORAL at 08:02

## 2018-10-25 RX ADMIN — TRAZODONE HYDROCHLORIDE 50 MG: 50 TABLET ORAL at 21:01

## 2018-10-25 RX ADMIN — PANTOPRAZOLE SODIUM 40 MG: 40 TABLET, DELAYED RELEASE ORAL at 05:13

## 2018-10-25 RX ADMIN — CHLORDIAZEPOXIDE HYDROCHLORIDE 25 MG: 25 CAPSULE ORAL at 05:13

## 2018-10-25 RX ADMIN — MORPHINE SULFATE 2 MG: 2 INJECTION, SOLUTION INTRAMUSCULAR; INTRAVENOUS at 07:43

## 2018-10-25 RX ADMIN — Medication 1 TABLET: at 08:02

## 2018-10-25 NOTE — ASSESSMENT & PLAN NOTE
· Patient has a history of chronic pancreatitis though his lipase is normal  · Suspected this to be secondary to alcohol use and/or viral gastritis  · CT abdomen and pelvis shows no definite acute intra-abdominal pathology  There is complete fatty atrophy of the pancreas with stable presumed calcified pseudocyst inthe expected location of the pancreatic tail  · Abdominal pain is improved  However, he is having more abdominal pain this AM  He was mistakenly received a food tray last night- popcorn shrimps and fried potatoes  Patient ate and was having abdominal pain all night per staff  · He is currently back on clear liquid  · GI input is appreciated  · At this time will continue with IV fluid and advance diet at dinner time if able to tolerate

## 2018-10-25 NOTE — ASSESSMENT & PLAN NOTE
· Continue have some diarrhea but that is much improved   · ?viral gastroenteritis  · Stool culture and C diff are negative     · Will continue to monitor closely

## 2018-10-25 NOTE — ASSESSMENT & PLAN NOTE
· This is likely prerenal in origin secondary to volume depletion   · This is resolved     · Nephrology input appreciated  · Will resume lisinopril   · Continue with IVF   · Follow up with renal function in AM

## 2018-10-25 NOTE — PROGRESS NOTES
Progress Note - Bethany Hubbard 1970, 46 y o  male MRN: [id number]    Unit/Bed#: -01 Encounter: [id number]    Primary Care Provider: Mortimer Simple, DO   Date and time admitted to hospital: 10/22/2018 11:36 PM        * IGNACIA (acute kidney injury) [facility])   Assessment & Plan    · This is likely prerenal in origin secondary to volume depletion   · This is resolved  · Nephrology input appreciated  · Will resume lisinopril   · Continue with IVF   · Follow up with renal function in AM     Abdominal pain   Assessment & Plan    · Patient has a history of chronic pancreatitis though his lipase is normal  · Suspected this to be secondary to alcohol use and/or viral gastritis  · CT abdomen and pelvis shows no definite acute intra-abdominal pathology  There is complete fatty atrophy of the pancreas with stable presumed calcified pseudocyst inthe expected location of the pancreatic tail  · Abdominal pain is improved  However, he is having more abdominal pain this AM  He was mistakenly received a food tray last night- popcorn shrimps and fried potatoes  Patient ate and was having abdominal pain all night per staff  · He is currently back on clear liquid  · GI input is appreciated  · At this time will continue with IV fluid and advance diet at dinner time if able to tolerate  Hypotension   Assessment & Plan    · Likely secondary to volume depletion as patient responded well to IV fluid resuscitation  · This is resolved  Leukocytosis   Assessment & Plan    · Unclear etiology as patient does not have a clear source of infection  ?viral gastritis vs reactive  The patient is afebrile  · This is resolved  · Will monitor this closely       Tobacco dependence   Assessment & Plan    · Will use nicotine patch daily  · Smoking cessation     H/O alcohol abuse   Assessment & Plan    · Continue on CIWA protocol  · No sign of withdrawal  · Continue with folic acid, MVI, and thiamine      Anxiety and depression Assessment & Plan    · Continue Effexor  · Pending psych evaluation      GERD (gastroesophageal reflux disease)   Assessment & Plan    · Continue with PPI     Diabetes mellitus type 1 5, managed as type 1 [facility])   Assessment & Plan    Lab Results   Component Value Date    HGBA1C 10 9 (H) 08/31/2018       Recent Labs      10/24/18   1134  10/24/18   1624  10/24/18   2034  10/25/18   0622   POCGLU  164*  225*  236*  184*       Blood Sugar Average: Last 72 hrs:  (P) 150 6   · Accu-Cheks q 6 hours with algorithm 3 correction dose q 6 hours  · Currently on clear liquid  Monitor closely      Hyperlipidemia   Assessment & Plan    · Continue fenofibrate     Generalized anxiety disorder   Assessment & Plan    · Continue hydroxyzine, Risperdal, trazodone and Effexor   · Pending psychiatry evaluation     Benign essential hypertension   Assessment & Plan    · BP has been stable   · Lisinopril is resumed today   · will give Catapres 0 1 mg p  o  q 3 hours p r n  for systolic blood pressure greater greater than 170     Quiros esophagus   Assessment & Plan    · Continue PPI     Diarrhea   · This has stopped this AM    · Stool culture and C diff are negative  · Will monitor closely     VTE Pharmacologic Prophylaxis: Pharmacologic: Heparin    Patient Centered Rounds: I have performed bedside rounds with nursing staff today  Discussions with Specialists or Other Care Team Provider: nursing, renal and GI   Education and Discussions with Family / Patient: patient     Time Spent for Care: 20 minutes  More than 50% of total time spent on counseling and coordination of care as described above  Current Length of Stay: 2 day(s)    Current Patient Status: Inpatient   Certification Statement: The patient will continue to require additional inpatient hospital stay due to abdominal pain     Discharge Plan: pending hospital course  Likely in AM     Code Status: Level 1 - Full Code    Subjective:   Abdominal pain is worse today   Some nausea but no vomiting  No diarrhea  Objective:     Vitals:   Temp (24hrs), Av 3 °F (37 4 °C), Min:98 4 °F (36 9 °C), Max:99 7 °F (37 6 °C)    Temp:  [98 4 °F (36 9 °C)-99 7 °F (37 6 °C)] 99 7 °F (37 6 °C)  HR:  [76-96] 89  Resp:  [18] 18  BP: (115-142)/(64-80) 122/64  SpO2:  [94 %-98 %] 97 %  Body mass index is 25 82 kg/m²  Input and Output Summary (last 24 hours): Intake/Output Summary (Last 24 hours) at 10/25/18 0930  Last data filed at 10/25/18 0553   Gross per 24 hour   Intake              275 ml   Output             2375 ml   Net            -2100 ml       Physical Exam:     Physical Exam   Constitutional: He is oriented to person, place, and time  He appears well-developed and well-nourished  No distress  HENT:   Head: Normocephalic and atraumatic  Mouth/Throat: Oropharynx is clear and moist    Eyes: Pupils are equal, round, and reactive to light  Conjunctivae and EOM are normal    Neck: Normal range of motion  Neck supple  No thyromegaly present  Cardiovascular: Normal rate, regular rhythm, normal heart sounds and intact distal pulses  Pulmonary/Chest: Effort normal and breath sounds normal  No respiratory distress  He has no wheezes  Abdominal: Soft  Bowel sounds are normal  He exhibits no distension  There is tenderness (epigastric and lower quadrant )  Musculoskeletal: Normal range of motion  He exhibits no edema or deformity  Neurological: He is alert and oriented to person, place, and time  He has normal reflexes  Skin: Skin is warm and dry  No erythema  Psychiatric: His behavior is normal  Thought content normal    Somewhat flat affect     Vitals reviewed        Additional Data:     Labs:      Results from last 7 days  Lab Units 10/25/18  0533 10/24/18  0534   WBC Thousand/uL 9 10 10 70   HEMOGLOBIN g/dL 12 6* 12 9*   HEMATOCRIT % 38 0 40 5   PLATELETS Thousands/uL 152 176   NEUTROS PCT %  --  73   LYMPHS PCT %  --  17*   MONOS PCT %  --  10   EOS PCT %  --  0 Results from last 7 days  Lab Units 10/25/18  0533   SODIUM mmol/L 134   POTASSIUM mmol/L 4 1   CHLORIDE mmol/L 103   CO2 mmol/L 26   BUN mg/dL 8   CREATININE mg/dL 0 72   CALCIUM mg/dL 8 5*   ALK PHOS U/L 80   ALT U/L 11   AST U/L 10*           Results from last 7 days  Lab Units 10/25/18  0622 10/24/18  2034 10/24/18  1624 10/24/18  1134 10/24/18  0641 10/24/18  0005 10/23/18  1850 10/23/18  1632 10/23/18  1130 10/23/18  0616   POC GLUCOSE mg/dl 184* 236* 225* 164* 139 85 94 77 102 200*           * I Have Reviewed All Lab Data Listed Above  * Additional Pertinent Lab Tests Reviewed: Fern [facility] Admission  Reviewed      Recent Cultures (last 7 days):       Results from last 7 days  Lab Units 10/24/18  1752   C DIFF TOXIN B  NEGATIVE for C difficle toxin by PCR          Last 24 Hours Medication List:     Current Facility-Administered Medications:  acetaminophen 650 mg Oral Q6H PRN Cristy Solares PA-C    chlordiazePOXIDE 25 mg Oral [facility] TYE Escobar    Followed by        chlordiazePOXIDE 25 mg Oral Q12H TYE Escobar    [START ON 10/26/2018] chlordiazePOXIDE 25 mg Oral Daily TYE Escobar    chlordiazePOXIDE 25 mg Oral Q6H PRN TYE Escobar    cloNIDine 0 1 mg Oral Q3H PRN Cristy Solares PA-C    fenofibrate 145 mg Oral Daily Cristy Solares PA-C    folic acid 1 mg Oral Daily TYE Escobar    heparin (porcine) 5,000 Units Subcutaneous [facility] Cristy Solares PA-C    HYDROmorphone 0 5 mg Intravenous Q3H PRN TYE Escobar    hydrOXYzine HCL 50 mg Oral TID PRN CYN Hobbs-PRICE    insulin lispro 1-6 Units Subcutaneous TID AC CYN Hobbs-PRICE    insulin lispro 1-6 Units Subcutaneous HS Cristy Solares PA-C    lisinopril 20 mg Oral Daily TYE Escobar    LORazepam 1 mg Intravenous Q4H PRN TYE Escobar    magnesium oxide 400 mg Oral BID TYE Escobar    metoclopramide 10 mg Intravenous Q6H PRN Cristy Solares PA-C multivitamin-minerals 1 tablet Oral Daily TYE Mitchell    nicotine 1 patch Transdermal Daily Lowell Carter PA-C    ondansetron 4 mg Intravenous Q6H PRN Lowell Carter PA-C    pantoprazole 40 mg Oral Early Morning Lowell Carter PA-C    risperiDONE 0 5 mg Oral BID Lowell Carter PA-C    sodium chloride 100 mL/hr Intravenous Continuous TYE Mitchell Last Rate: 125 mL/hr (10/25/18 0758)   thiamine 100 mg Oral Daily TYE Mitchell    traZODone 50 mg Oral HS Lowell Carter PA-C    venlafaxine 150 mg Oral Daily Lowell Carter PA-C         Today, Patient Was Seen By: TYE Mitchell    ** Please Note: Dictation voice to text software may have been used in the creation of this document   **

## 2018-10-25 NOTE — CONSULTS
Consultation - Behavioral Health     Identification Data: Samantha Escoto 46 y o  male MRN: [id number]  Unit/Bed#: -01 Encounter: [id number]    10/25/18  3:44 PM    Consults  Physician Requesting Consult: Jena Carrington MD  Principal Problem:IGNACIA (acute kidney injury) [facility])    Reason for Consult:  Depression and anxiety  History of Present Illness   Sophie Babin is a 47-year-old psychiatric patient admitted to the medical surgical unit for chronic pancreatitis  He has psychiatric history of alcoholism, depression, and anxiety  He receives psychiatric outpatient treatment through a psychiatric advanced practitioner located at his primary care physician's office  He is currently taking risperidone and Effexor  He relates that he has recently applied for disability and is having financial difficulties  His wife is legally blind so he is responsible for transporting his children to all of their activities in addition to his full-time job  With his health issues, this has been very stressful  He has started drinking again after 11 years of sobriety  He has been in rehabilitation for alcohol abuse in the past     On initial psychiatric evaluation the patient presented as calm pleasant and cooperative  He relates he is feeling slightly depressed but his major concern right now is anxiety  He denies any suicidal or homicidal ideations as well as any auditory or visual hallucinations  He feels his current psychiatric medications are helpful for controlling his depression although his anxiety has increased lately  He believes this is related to all the issues occurring in his life at this time  He is interested in attending alcoholics anonymous again but is not ready at this time to do so  He is satisfied with his psychiatric care on an outpatient basis            Past Medical History:   Diagnosis Date    Allergic rhinitis     last assessed: 12/5/2012    Anemia     Anxiety and depression     Quiros esophagus     Cholelithiasis     Chronic pain     COPD (chronic obstructive pulmonary disease) (HCC)     Depression     Diabetes mellitus (HCC)     Emphysema lung (HCC)     Generalized anxiety disorder     GERD (gastroesophageal reflux disease)     Hyperlipidemia     Hypertension     Kidney stone     Metatarsalgia     last assessed: 8/11/2014, unspecified laterality, ? cause  PE with changes  To see DPM tomorrow   Pancreatitis     Psychiatric disorder     Renal disorder     Shortness of breath     with activity     Past Surgical History:   Procedure Laterality Date    CHOLECYSTECTOMY LAPAROSCOPIC      FOOT SURGERY      B/L great toe joint replacement    KNEE ARTHROSCOPY      (therapeutic) right knee    FL EDG US EXAM SURGICAL ALTER STOM DUODENUM/JEJUNUM N/A 10/17/2018    Procedure: LINEAR ENDOSCOPIC U/S;  Surgeon: Chloe Goodrich MD;  Location: [facility]; Service: Gastroenterology    VASECTOMY      vas deferens         Medical Review Of Systems:    Pertinent items are noted in HPI  Allergies:    No Known Allergies    Medications: All current active medications have been reviewed      Objective     Vital signs in last 24 hours:    Temp:  [98 4 °F (36 9 °C)-99 7 °F (37 6 °C)] 99 6 °F (37 6 °C)  HR:  [76-96] 80  Resp:  [18] 18  BP: (105-140)/(64-75) 105/73    Intake/Output Summary (Last 24 hours) at 10/25/18 1544  Last data filed at 10/25/18 1456   Gross per 24 hour   Intake              300 ml   Output             2775 ml   Net            -2475 ml       Mental Status Evaluation:    Appearance:  dressed in hospital attire   Behavior:  normal, pleasant, cooperative   Speech:  normal rate and volume   Mood:   Slightly depressed and anxious   Affect:  normal range and intensity           Associations: intact associations   Thought Content:  normal   Perceptual Disturbances: none   Risk Potential: Suicidal ideation - None  Homicidal ideation - None  Potential for aggression - No   Sensorium:  oriented to person, place, time/date and situation   Memory:  recent and remote memory grossly intact   Consciousness:  alert and awake    Attention: attention span and concentration are age appropriate   Intellect: within normal limits       Insight:  fair   Judgment: fair                 Result Date: 10/23/2018    Code Status: Level 1 - Full Code  Advance Directive and Living Will:       Power of :      Assessment/Plan   Major depressive disorder recurrent current episode moderate without psychotic features    Principal Problem:    IGNACIA (acute kidney injury) ([facility] )  Active Problems:    Quiros esophagus    Benign essential hypertension    Generalized anxiety disorder    Hyperlipidemia    Diabetes mellitus type 1 5, managed as type 1 ([place] [id number] )    Abdominal pain    GERD (gastroesophageal reflux disease)    Anxiety and depression    H/O alcohol abuse    Tobacco dependence    Leukocytosis    Hypotension    Diarrhea of presumed infectious origin      Assessment:  Alexa Linda is suffering from depression anxiety for which he is currently being treated on outpatient basis  Importance of receiving help for alcohol addiction was discussed  Patient is aware of community resources although he declines at this time  Suggestion was made for patient to follow up with his psychiatric outpatient provider for medication adjustment for depressive and anxious symptoms  He does not meet criteria for inpatient psychiatric hospitalization at this time but will advise if his symptoms worsen or if he feels he needs to speak with psychiatry care team before he is discharged                 Current Facility-Administered Medications:  acetaminophen 650 mg Oral Q6H PRN Keri Wong PA-C    chlordiazePOXIDE 25 mg Oral Q12H SETH LauNP    [START ON 10/26/2018] chlordiazePOXIDE 25 mg Oral Daily Marc Ambrosia, CRNP    chlordiazePOXIDE 25 mg Oral Q6H PRN Marc Ambrosia, CRNP    cloNIDine 0 1 mg Oral Q3H PRN Keri Wong PA-C    fenofibrate 145 mg Oral Daily Gertrudis Locks, PA-C    folic acid 1 mg Oral Daily Narciso Melani, CRNP    heparin (porcine) 5,000 Units Subcutaneous [facility] Gertrudis Locks, PA-C    HYDROmorphone 0 5 mg Intravenous Q3H PRN Narciso Melani, CRNP    hydrOXYzine HCL 50 mg Oral TID PRN Gertrudis Locks, PA-C    insulin lispro 1-6 Units Subcutaneous TID AC Gertrudis Locks, PA-C    insulin lispro 1-6 Units Subcutaneous HS Gertrudis Locks, PA-C    lisinopril 20 mg Oral Daily Narciso Melani, CRNP    LORazepam 1 mg Intravenous Q4H PRN Narciso Melani, CRNP    magnesium oxide 400 mg Oral BID Narciso Melani, CRNP    metoclopramide 10 mg Intravenous Q6H PRN Gertrudis Locks, PA-C    multivitamin-minerals 1 tablet Oral Daily Narciso Melani, CRNP    nicotine 1 patch Transdermal Daily Gertrudis Locks, PA-C    ondansetron 4 mg Intravenous Q6H PRN Gertrudis Locks, PA-C    pantoprazole 40 mg Oral Early Morning Gertrudis Locks, PA-C    risperiDONE 0 5 mg Oral BID Gertrudis Locks, PA-C    sodium chloride 100 mL/hr Intravenous Continuous Narciso Melani, CRNP Last Rate: 100 mL/hr (10/25/18 1026)   thiamine 100 mg Oral Daily Narciso Melani, CRNP    traZODone 50 mg Oral HS Gertrudis Locks, PA-C    venlafaxine 150 mg Oral Daily Gertrudis Locks, PA-C

## 2018-10-25 NOTE — ASSESSMENT & PLAN NOTE
· Likely secondary to volume depletion as patient responded well to IV fluid resuscitation  · This is resolved

## 2018-10-25 NOTE — ASSESSMENT & PLAN NOTE
· BP has been stable   · Lisinopril is resumed today   · will give Catapres 0 1 mg p  o  q 3 hours p r n  for systolic blood pressure greater greater than 170

## 2018-10-25 NOTE — PROGRESS NOTES
Progress Note - Nephrology   Lukasz Mesa 46 y o  male MRN: [id number]  Unit/Bed#: -01 Encounter: [id number]    A/P:  1  Acute kidney injury: due to volume depletion from binge drinking  He is improved with crystalloid  10/25: electrolyte issues resolved  Will sign off  2  Hyponatremia: improved with IVF  3  Pancreatitis: appears stable  4  COPD: stable  5  Barretts esophagus: no reflux today      Follow up reason for today's visit: acute kidney injury    [facility]IA (acute kidney injury) [facility])    Patient Active Problem List   Diagnosis    Quiros esophagus    Benign essential hypertension    COPD (chronic obstructive pulmonary disease) ([place] [address] )    Fatty liver    Fibromyalgia    Generalized anxiety disorder    Hyperlipidemia    Insomnia    Iron deficiency anemia    MDD (major depressive disorder), single episode    Nephrolithiasis    Other chronic pain    Recurrent pancreatitis ([place] [address] )    Sleep disorder    Diabetes mellitus type 1 5, managed as type 1 ([place] [address] )    Abdominal pain    IGNACIA (acute kidney injury) ([place] [address] )    GERD (gastroesophageal reflux disease)    Anxiety and depression    H/O alcohol abuse    Leukemoid reaction    Acute on chronic pancreatitis (HCC)    Transaminitis    Tobacco dependence    Chronic pancreatitis (HCC)    Paresthesia of both lower extremities    Leukocytosis    Hypotension         Subjective:   Denies headaches dizziness chest pain shortness of breath  Has abdominal discomfort on palpation  Is eating and drinking  He does feel better and is urinating well    Objective:     Vitals: Blood pressure 122/64, pulse 89, temperature 99 7 °F (37 6 °C), temperature source Tympanic, resp  rate 18, height 6' (1 829 m), weight 86 4 kg (190 lb 6 oz), SpO2 97 %  ,Body mass index is 25 82 kg/m²      Weight (last 2 days)     Date/Time   Weight    10/25/18 0553  86 4 (190 38)    10/24/18 0600  87 (191 9)    10/23/18 1157  87 6 (193 23)    10/23/18 0314  88 6 (195 38) Intake/Output Summary (Last 24 hours) at 10/25/18 0935  Last data filed at 10/25/18 0553   Gross per 24 hour   Intake              275 ml   Output             2375 ml   Net            -2100 ml     I/O last 3 completed shifts: In: 275 [P O :275]  Out: 2675 [Urine:2675]         Physical Exam: /64 (BP Location: Right arm)   Pulse 89   Temp 99 7 °F (37 6 °C) (Tympanic)   Resp 18   Ht 6' (1 829 m)   Wt 86 4 kg (190 lb 6 oz)   SpO2 97%   BMI 25 82 kg/m²     General Appearance:    Alert, cooperative, no distress, appears stated age   Head:    Normocephalic, without obvious abnormality, atraumatic   Eyes:    Conjunctiva/corneas clear   Ears:    Normal external ears   Nose:   Nares normal, septum midline, mucosa normal, no drainage    or sinus tenderness   Throat:   Lips, mucosa, and tongue normal; teeth and gums normal   Neck:   Supple, symmetrical, trachea midline, no adenopathy;        thyroid:  No enlargement/tenderness/nodules; no carotid    bruit or JVD   Back:     Symmetric, no curvature, ROM normal, no CVA tenderness   Lungs:     Clear to auscultation bilaterally, respirations unlabored   Chest wall:    No tenderness or deformity   Heart:    Regular rate and rhythm, S1 and S2 normal, no murmur, rub   or gallop   Abdomen:     Soft, mild epigastric-tender, bowel sounds active   Extremities:   Extremities normal, atraumatic, no cyanosis or edema   Skin:   Skin color, texture, turgor normal, no rashes or lesions   Lymph nodes:   Cervical normal   Neurologic:   CNII-XII intact            Lab, Imaging and other studies: I have personally reviewed pertinent labs    CBC:   Lab Results   Component Value Date    WBC 9 10 10/25/2018    HGB 12 6 (L) 10/25/2018    HCT 38 0 10/25/2018    MCV 90 10/25/2018     10/25/2018    MCH 29 7 10/25/2018    MCHC 33 2 10/25/2018    RDW 15 4 (H) 10/25/2018    MPV 6 9 (L) 10/25/2018     CMP:   Lab Results   Component Value Date     10/25/2018    K 4 1 10/25/2018  10/25/2018    CO2 26 10/25/2018    BUN 8 10/25/2018    CREATININE 0 72 10/25/2018    CALCIUM 8 5 (L) 10/25/2018    AST 10 (L) 10/25/2018    ALT 11 10/25/2018    ALKPHOS 80 10/25/2018    EGFR 109 10/25/2018         Results from last 7 days  Lab Units 10/25/18  0533 10/24/18  0534 10/23/18  0539   SODIUM mmol/L 134 134 131*   POTASSIUM mmol/L 4 1 4 3 4 7   CHLORIDE mmol/L 103 101 96*   CO2 mmol/L 26 25 24   BUN mg/dL 8 14 26*   CREATININE mg/dL 0 72 0 96 1 35*   CALCIUM mg/dL 8 5* 8 5* 8 8   ALK PHOS U/L 80 90 108   ALT U/L 11 16 17   AST U/L 10* 20 24         Phosphorus: No results found for: PHOS  Magnesium:   Lab Results   Component Value Date    MG 2 0 10/25/2018     Urinalysis: No results found for: COLORU, CLARITYU, SPECGRAV, PHUR, LEUKOCYTESUR, NITRITE, PROTEINUA, GLUCOSEU, KETONESU, BILIRUBINUR, BLOODU  Ionized Calcium: No results found for: CAION  Coagulation: No results found for: PT, INR, APTT  Troponin: No results found for: TROPONINI  ABG: No results found for: PHART, ORT0NHL, PO2ART, FOA3RHP, F7OMDCFO, BEART, SOURCE  Radiology review:     IMAGING  Procedure: Ct Abdomen Pelvis Wo Contrast    Result Date: 10/23/2018  Narrative: INDICATION:  Leukocytosis, 18 3  Abdominal pain  Fever  Normal lipase  Abscess suspected  Additional History:  Pancreatitis  Cholecystectomy  ORDERING PROVIDER:  WALTER THURMAN  TECHNIQUE:  CT of the abdomen and pelvis was performed without intravenous contrast   RADIATION AMOUNT:  424 60 mGy-cm  COMPARISON:  XR abdomen 10/23/2018  CT AP 8/10/2018  FINDINGS: Abdomen: Chronic interstitial fibrotic changes are seen at the lung bases, nonspecific and right greater than left  Mild centrilobular emphysema is seen bilaterally  Cardiac size is normal  Patient is status post cholecystectomy  The liver, spleen, adrenal glands, and kidneys are suboptimally evaluated on these unenhanced images, but demonstrate no acute pathology    Pancreas is atrophied with a stable peripherally calcified collection in the region of the expected location of the pancreatic tail measuring 3 1 x 5 8 cm in diameter, unchanged compared to the prior study and possibly representing a chronic calcified pseudocyst  There is no free air or lymph node enlargement  Abdominal aorta is not aneurysmal  Pelvis: There is no bowel wall thickening or obstruction  Appendix appears normal   Terminal ileum is unremarkable  There is no free fluid  Lymph nodes are not enlarged  Urinary bladder is unremarkable  Small fat-containing right inguinal hernia is noted  Skeleton:  There are no acute fractures  No suspicious bony lesions  Impression: No definite acute intra-abdominal pathology identified  There is complete fatty atrophy of the pancreas with stable presumed calcified pseudocyst in the expected location of the pancreatic tail  Signed by Jarocho Mc    Procedure: Xr Abdomen Obstruction Series    Result Date: 10/23/2018  Narrative: INDICATION:  Pain above umbilical area for a couple hours  History of gallbladder surgery and pancreatitis  ORDERING PROVIDER:  Lefty Issa  TECHNIQUE:  Single view of the chest and two view(s) of the abdomen (five images)  COMPARISON:  1/18/12 XR  FINDINGS:  The cardiomediastinal silhouette is normal in size  Lungs are hypoaerated  Reticular interstitial pattern of lungs is present suggestive of mild edema  Underlying fibrosis not excluded  There is no consolidation or atelectasis in either lung  There are no pleural effusions  There is no pneumothorax  No acute osseous process  Bowel gas pattern is normal without obstruction or ileus  Calcification in the left upper quadrant again noted measuring approximately 5 3 cm in size  This is presumably a partially calcified pseudocyst of the pancreas  Cholecystectomy clips are noted  No focal osseous abnormalities  Impression: 1  No acute intra-abdominal findings   Chronic calcified density in the left upper quadrant abdomen noted  2   Reticular interstitial pattern of lungs suggestive of mild edema  Underlying fibrosis not excluded  Signed by Lisa Duffy MD    Procedure: Us Retroperitoneal Complete    Result Date: 10/23/2018  Narrative: INDICATION:  Acute renal failure, Diabetes TECHNIQUE:  Ultrasound of the kidneys  COMPARISON:  CT 10/23/2018, MR 08/11/2018, US 06/04/2018 FINDINGS: The right kidney measures 12 1 cm in length  Renal cortical echotexture is normal   There is no hydronephrosis  There are no stones  There are no cysts  The left kidney measures 12 8 cm in length  Renal cortical echotexture is normal   There is no hydronephrosis  There are no stones  There are no cysts  The bladder is distended  A simple cyst measuring 1 0 x 1 1 x 1 1 cm is incidentally noted within the prostate gland  This may represent a dilated prostatic urethra  Impression: No hydronephrosis bilaterally  Prostatic cyst versus dilated prostatic urethra   Signed by Lisa Duffy MD      Current Facility-Administered Medications   Medication Dose Route Frequency    acetaminophen (TYLENOL) tablet 650 mg  650 mg Oral Q6H PRN    chlordiazePOXIDE (LIBRIUM) capsule 25 mg  25 mg Oral Q8H [facility]    Followed by   [facility] chlordiazePOXIDE (LIBRIUM) capsule 25 mg  25 mg Oral Q12H    [START ON 10/26/2018] chlordiazePOXIDE (LIBRIUM) capsule 25 mg  25 mg Oral Daily    chlordiazePOXIDE (LIBRIUM) capsule 25 mg  25 mg Oral Q6H PRN    cloNIDine (CATAPRES) tablet 0 1 mg  0 1 mg Oral Q3H PRN    fenofibrate (TRICOR) tablet 145 mg  145 mg Oral Daily    folic acid (FOLVITE) tablet 1 mg  1 mg Oral Daily    heparin (porcine) subcutaneous injection 5,000 Units  5,000 Units Subcutaneous Q8H [facility]    HYDROmorphone (DILAUDID) injection 0 5 mg  0 5 mg Intravenous Q3H PRN    hydrOXYzine HCL (ATARAX) tablet 50 mg  50 mg Oral TID PRN    insulin lispro (HumaLOG) 100 units/mL subcutaneous injection 1-6 Units  1-6 Units Subcutaneous TID AC    insulin lispro (HumaLOG) 100 units/mL subcutaneous injection 1-6 Units  1-6 Units Subcutaneous HS    lisinopril (ZESTRIL) tablet 20 mg  20 mg Oral Daily    LORazepam (ATIVAN) 2 mg/mL injection 1 mg  1 mg Intravenous Q4H PRN    magnesium oxide (MAG-OX) tablet 400 mg  400 mg Oral BID    metoclopramide (REGLAN) injection 10 mg  10 mg Intravenous Q6H PRN    multivitamin-minerals (CENTRUM) tablet 1 tablet  1 tablet Oral Daily    nicotine (NICODERM CQ) 21 mg/24 hr TD 24 hr patch 1 patch  1 patch Transdermal Daily    ondansetron (ZOFRAN) injection 4 mg  4 mg Intravenous Q6H PRN    pantoprazole (PROTONIX) EC tablet 40 mg  40 mg Oral Early Morning    risperiDONE (RisperDAL) tablet 0 5 mg  0 5 mg Oral BID    sodium chloride 0 9 % infusion  100 mL/hr Intravenous Continuous    thiamine (VITAMIN B1) tablet 100 mg  100 mg Oral Daily    traZODone (DESYREL) tablet 50 mg  50 mg Oral HS    venlafaxine (EFFEXOR-XR) 24 hr capsule 150 mg  150 mg Oral Daily     Medications Discontinued During This Encounter   Medication Reason    insulin lispro (HumaLOG) 100 units/mL subcutaneous injection 1-6 Units     morphine injection 2 mg        Mariela Hastings MD

## 2018-10-25 NOTE — ASSESSMENT & PLAN NOTE
· Unclear etiology as patient does not have a clear source of infection  ?viral gastritis vs reactive  The patient is afebrile  · This is resolved  · Will monitor this closely

## 2018-10-25 NOTE — ASSESSMENT & PLAN NOTE
Lab Results   Component Value Date    HGBA1C 10 9 (H) 08/31/2018       Recent Labs      10/24/18   1134  10/24/18   1624  10/24/18   2034  10/25/18   0622   POCGLU  164*  225*  236*  184*       Blood Sugar Average: Last 72 hrs:  (P) 150 6   · Accu-Cheks q 6 hours with algorithm 3 correction dose q 6 hours  · Currently on clear liquid   Monitor closely

## 2018-10-26 VITALS
SYSTOLIC BLOOD PRESSURE: 126 MMHG | WEIGHT: 192.56 LBS | TEMPERATURE: 97.4 F | OXYGEN SATURATION: 91 % | DIASTOLIC BLOOD PRESSURE: 60 MMHG | RESPIRATION RATE: 18 BRPM | BODY MASS INDEX: 26.08 KG/M2 | HEART RATE: 90 BPM | HEIGHT: 72 IN

## 2018-10-26 LAB
ANION GAP SERPL CALCULATED.3IONS-SCNC: 4 MMOL/L (ref 4–13)
BUN SERPL-MCNC: 7 MG/DL (ref 7–25)
CALCIUM SERPL-MCNC: 8.5 MG/DL (ref 8.6–10.5)
CHLORIDE SERPL-SCNC: 103 MMOL/L (ref 98–107)
CO2 SERPL-SCNC: 30 MMOL/L (ref 21–31)
CREAT SERPL-MCNC: 0.84 MG/DL (ref 0.7–1.3)
ERYTHROCYTE [DISTWIDTH] IN BLOOD BY AUTOMATED COUNT: 15.1 % (ref 11.5–14.5)
GFR SERPL CREATININE-BSD FRML MDRD: 102 ML/MIN/1.73SQ M
GLUCOSE SERPL-MCNC: 226 MG/DL (ref 65–99)
GLUCOSE SERPL-MCNC: 283 MG/DL (ref 65–140)
HCT VFR BLD AUTO: 37.3 % (ref 36.5–49.3)
HGB BLD-MCNC: 12.2 G/DL (ref 14–18)
MCH RBC QN AUTO: 29.7 PG (ref 26–34)
MCHC RBC AUTO-ENTMCNC: 32.8 G/DL (ref 31–37)
MCV RBC AUTO: 91 FL (ref 81–99)
PLATELET # BLD AUTO: 141 THOUSANDS/UL (ref 149–390)
PMV BLD AUTO: 7.1 FL (ref 8.6–11.7)
POTASSIUM SERPL-SCNC: 4.5 MMOL/L (ref 3.5–5.5)
RBC # BLD AUTO: 4.12 MILLION/UL (ref 4.3–5.9)
SODIUM SERPL-SCNC: 137 MMOL/L (ref 134–143)
WBC # BLD AUTO: 5.5 THOUSAND/UL (ref 4.8–10.8)

## 2018-10-26 PROCEDURE — 82948 REAGENT STRIP/BLOOD GLUCOSE: CPT

## 2018-10-26 PROCEDURE — 99239 HOSP IP/OBS DSCHRG MGMT >30: CPT | Performed by: NURSE PRACTITIONER

## 2018-10-26 PROCEDURE — 85027 COMPLETE CBC AUTOMATED: CPT | Performed by: NURSE PRACTITIONER

## 2018-10-26 PROCEDURE — 80048 BASIC METABOLIC PNL TOTAL CA: CPT | Performed by: NURSE PRACTITIONER

## 2018-10-26 RX ORDER — OXYCODONE HYDROCHLORIDE 5 MG/1
5 TABLET ORAL EVERY 4 HOURS PRN
Qty: 30 TABLET | Refills: 0 | Status: SHIPPED | OUTPATIENT
Start: 2018-10-26 | End: 2018-10-30

## 2018-10-26 RX ORDER — KETOROLAC TROMETHAMINE 10 MG/1
10 TABLET, FILM COATED ORAL EVERY 6 HOURS PRN
Qty: 20 TABLET | Refills: 0 | Status: SHIPPED | OUTPATIENT
Start: 2018-10-26 | End: 2018-11-09

## 2018-10-26 RX ORDER — LANOLIN ALCOHOL/MO/W.PET/CERES
100 CREAM (GRAM) TOPICAL DAILY
Qty: 30 TABLET | Refills: 0 | Status: SHIPPED | OUTPATIENT
Start: 2018-10-27 | End: 2019-01-09

## 2018-10-26 RX ORDER — TRAMADOL HYDROCHLORIDE 50 MG/1
50 TABLET ORAL EVERY 6 HOURS PRN
Qty: 30 TABLET | Refills: 0 | Status: SHIPPED | OUTPATIENT
Start: 2018-10-26 | End: 2018-11-05

## 2018-10-26 RX ORDER — NICOTINE 21 MG/24HR
1 PATCH, TRANSDERMAL 24 HOURS TRANSDERMAL DAILY
Qty: 28 PATCH | Refills: 0 | Status: SHIPPED | OUTPATIENT
Start: 2018-10-27 | End: 2018-11-19

## 2018-10-26 RX ORDER — FOLIC ACID 1 MG/1
1 TABLET ORAL DAILY
Qty: 30 TABLET | Refills: 0 | Status: SHIPPED | OUTPATIENT
Start: 2018-10-27 | End: 2019-01-09

## 2018-10-26 RX ADMIN — CHLORDIAZEPOXIDE HYDROCHLORIDE 25 MG: 25 CAPSULE ORAL at 09:02

## 2018-10-26 RX ADMIN — FOLIC ACID 1 MG: 1 TABLET ORAL at 09:02

## 2018-10-26 RX ADMIN — Medication 1 TABLET: at 09:04

## 2018-10-26 RX ADMIN — HYDROMORPHONE HYDROCHLORIDE 0.5 MG: 1 INJECTION, SOLUTION INTRAMUSCULAR; INTRAVENOUS; SUBCUTANEOUS at 09:39

## 2018-10-26 RX ADMIN — VENLAFAXINE HYDROCHLORIDE 150 MG: 150 CAPSULE, EXTENDED RELEASE ORAL at 09:04

## 2018-10-26 RX ADMIN — HEPARIN SODIUM 5000 UNITS: 5000 INJECTION INTRAVENOUS; SUBCUTANEOUS at 05:31

## 2018-10-26 RX ADMIN — PANTOPRAZOLE SODIUM 40 MG: 40 TABLET, DELAYED RELEASE ORAL at 05:32

## 2018-10-26 RX ADMIN — MAGNESIUM OXIDE TAB 400 MG (241.3 MG ELEMENTAL MG) 400 MG: 400 (241.3 MG) TAB at 09:02

## 2018-10-26 RX ADMIN — FENOFIBRATE 145 MG: 145 TABLET, FILM COATED ORAL at 09:02

## 2018-10-26 RX ADMIN — RISPERIDONE 0.5 MG: 0.25 TABLET ORAL at 09:04

## 2018-10-26 RX ADMIN — HYDROMORPHONE HYDROCHLORIDE 0.5 MG: 1 INJECTION, SOLUTION INTRAMUSCULAR; INTRAVENOUS; SUBCUTANEOUS at 12:47

## 2018-10-26 RX ADMIN — INSULIN LISPRO 4 UNITS: 100 INJECTION, SOLUTION INTRAVENOUS; SUBCUTANEOUS at 11:48

## 2018-10-26 RX ADMIN — HYDROMORPHONE HYDROCHLORIDE 0.5 MG: 1 INJECTION, SOLUTION INTRAMUSCULAR; INTRAVENOUS; SUBCUTANEOUS at 03:01

## 2018-10-26 RX ADMIN — NICOTINE 1 PATCH: 21 PATCH, EXTENDED RELEASE TRANSDERMAL at 09:05

## 2018-10-26 RX ADMIN — LISINOPRIL 20 MG: 20 TABLET ORAL at 09:02

## 2018-10-26 RX ADMIN — Medication 100 MG: at 09:02

## 2018-10-26 RX ADMIN — INSULIN LISPRO 2 UNITS: 100 INJECTION, SOLUTION INTRAVENOUS; SUBCUTANEOUS at 09:04

## 2018-10-26 RX ADMIN — SODIUM CHLORIDE 100 ML/HR: 9 INJECTION, SOLUTION INTRAVENOUS at 03:38

## 2018-10-26 RX ADMIN — HYDROMORPHONE HYDROCHLORIDE 0.5 MG: 1 INJECTION, SOLUTION INTRAMUSCULAR; INTRAVENOUS; SUBCUTANEOUS at 06:20

## 2018-10-26 RX ADMIN — CHLORDIAZEPOXIDE HYDROCHLORIDE 25 MG: 25 CAPSULE ORAL at 02:32

## 2018-10-26 NOTE — PLAN OF CARE
Problem: DISCHARGE PLANNING - CARE MANAGEMENT  Goal: Discharge to post-acute care or home with appropriate resources  INTERVENTIONS:  - Conduct assessment to determine patient/family and health care team treatment goals, and need for post-acute services based on payer coverage, community resources, and patient preferences, and barriers to discharge  - Address psychosocial, clinical, and financial barriers to discharge as identified in assessment in conjunction with the patient/family and health care team  - Arrange appropriate level of post-acute services according to patient's   needs and preference and payer coverage in collaboration with the physician and health care team  - Communicate with and update the patient/family, physician, and health care team regarding progress on the discharge plan  - Arrange appropriate transportation to post-acute venues      D/c  Home with spouse   Outcome: Completed Date Met: 10/26/18

## 2018-10-26 NOTE — DISCHARGE SUMMARY
Discharge- Thompson Zhao 1970, 46 y o  male MRN: [id number]    Unit/Bed#: -01 Encounter: [id number]    Primary Care Provider: Sanjiv Nicole DO   Date and time admitted to hospital: 10/22/2018 11:36 PM         * IGNACIA (acute kidney injury) [facility])   Assessment & Plan    · Secondary to volume depletion   · This is resolved  · Nephrology input appreciated  · Continue with lisinopril        Abdominal pain   Assessment & Plan    · Patient has a history of chronic pancreatitis though his lipase is normal  · Suspected this to be secondary to alcohol use and/or viral gastritis  · CT abdomen and pelvis shows no definite acute intra-abdominal pathology  There is complete fatty atrophy of the pancreas with stable presumed calcified pseudocyst inthe expected location of the pancreatic tail  · Abdominal pain is improved  Able to tolerate diet- GI soft, low-fat  · Patient will need to follow up with GI- see Dr Cullen Brunner OP        Diarrhea of presumed infectious origin   Assessment & Plan    · Continue have some diarrhea but that is much improved   · ?viral gastroenteritis  · Stool culture and C diff are negative  · Will continue to monitor closely     Hypotension   Assessment & Plan    · Likely secondary to volume depletion as patient responded well to IV fluid resuscitation  · This is resolved  Leukocytosis   Assessment & Plan    · Unclear etiology as patient does not have a clear source of infection  ?viral gastritis vs reactive  The patient is afebrile  · This is resolved  Tobacco dependence   Assessment & Plan    · Will use nicotine patch daily  · Smoking cessation     H/O alcohol abuse   Assessment & Plan    · Continue on CIWA protocol  No sign of withdrawal   · No sign of withdrawal  · Continue with folic acid, MVI, and thiamine      Anxiety and depression   Assessment & Plan    · Continue Effexor  · Psychiatry input is appreciated     · I was notified by a  that she was contacted by the patient's outpatient   The patient has not followed up with his psychiatrist outpatient for more than a year  GERD (gastroesophageal reflux disease)   Assessment & Plan    · Continue with PPI     Diabetes mellitus type 1 5, managed as type 1 [facility])   Assessment & Plan    Lab Results   Component Value Date    HGBA1C 10 9 (H) 08/31/2018       Recent Labs      10/25/18   1122  10/25/18   1647  10/25/18   2011  10/26/18   1121   POCGLU  284*  190*  279*  283*       Blood Sugar Average: Last 72 hrs:  (P) 181 [id number]   · Accu-Cheks q 6 hours with algorithm 3 correction dose q 6 hours  Hyperlipidemia   Assessment & Plan    · Continue fenofibrate     Generalized anxiety disorder   Assessment & Plan    · Continue hydroxyzine, Risperdal, trazodone and Effexor   · Psychiatry input is appreciated  Benign essential hypertension   Assessment & Plan    · BP has been stable   · Continue with lisinopril  · will give Catapres 0 1 mg p  o  q 3 hours p r n  for systolic blood pressure greater greater than 170     Quiros esophagus   Assessment & Plan    · Continue PPI           Discharging Physician / Practitioner: Augustina Starkey  PCP: Favio Turner DO  Admission Date:   Admission Orders     Ordered        10/23/18 0158  Inpatient Admission  Once             Discharge Date: 10/26/18    Resolved Problems  Date Reviewed: 10/26/2018    None          Consultations During Hospital Stay:  · Psychiatry   · GI     Procedures Performed:   · None     Significant Findings / Test Results:   · 10/23 obstruction series shows no acute intra-abdominal findings  Chronic calcified density in the left upper quadrant of abdomen noted  · CT of abdomen and pelvis shows no definite acute intra-abdominal pathology  There is a complete fatty atrophy of the pancreas with stable presumed calcified pseudocyst in the expected location of the pancreatic tail    · Renal ultrasound shows no hydronephrosis bilaterally  Incidental Findings:   · None      Test Results Pending at Discharge (will require follow up): · None     Outpatient Tests Requested:  · None    Complications:  None    Reason for Admission:   Abdominal pain x1 day    Hospital Course:     Everardo Donovan is a 46 y o  male patient who originally presented to the hospital on 10/22/2018 due to abdominal pain with nausea and vomiting x1 day  Please refer to initial H&P initial presenting complaint  In brief the patient with history of chronic alcoholic pancreatitis and chronic abdominal pain who presented to the ER with increasing pain and nausea and vomiting  The patient states that he has been sober for approximately 4 months until the day of admission he drank approximately half of a 5th of whiskey  He also felt dizzy, lightheaded, and fell without any injury or loss of conscious  Patient was found to have acute kidney injury and was admitted  Nephrology evaluation was obtained  Because of acute kidney injury is secondary to volume depletion  The patient was started on IV fluid with much improvement of renal function currently is back to baseline  Lisinopril was held initially and that was resumed  Additional to abdominal pain with nausea the patient developed loose watery stools  Gastroenterology was consulted  This time gastroenterology recommends to continue supportive care  Stool culture and C diff were sent and they were both negative  This is suspected to be secondary to viral gastritis  Diarrhea is much improved today  Psychiatry evaluation was done  At this time psychiatry recommends the patient to continue follow up with Psychiatry outpatient  No recommendation on changing any medication at this time  The patient does not meet any criteria for inpatient behavior health admission  Alcohol rehab information was given by our      I have a lengthy discussions with the patient regarding to his alcohol cessation  The patient will need to follow up with Gastroenterology very closely as well  He will need continue with GI soft with low fat diet  Of note, I called [address] regarding to his narcotic prescriptions-Oxycodone and tramadol  At this time, the pharmacist states that patient's insurance will not allow him to fill narcotic prescriptions because of narcotic abuse in the past      Please see above list of diagnoses and related plan for additional information  Condition at Discharge: fair     Discharge Day Visit / Exam:     Subjective:  Continue to have some abdominal pain but that is much improved  He is able to tolerate food  Had 1 episodes of loose watery stools today  Nonbloody  No nausea vomiting  Vitals: Blood Pressure: 126/60 (10/26/18 0902)  Pulse: 90 (10/26/18 0713)  Temperature: (!) 97 4 °F (36 3 °C) (10/26/18 0713)  Temp Source: Tympanic (10/26/18 0713)  Respirations: 18 (10/26/18 0713)  Height: 6' (182 9 cm) (10/23/18 0314)  Weight - Scale: 87 3 kg (192 lb 9 oz) (10/26/18 0600)  SpO2: 91 % (10/26/18 1156)  Exam:   Physical Exam   Constitutional: He is oriented to person, place, and time  He appears well-developed and well-nourished  No distress  HENT:   Head: Normocephalic and atraumatic  Mouth/Throat: Oropharynx is clear and moist    Eyes: Pupils are equal, round, and reactive to light  Conjunctivae and EOM are normal    Neck: Normal range of motion  Neck supple  No thyromegaly present  Cardiovascular: Normal rate, regular rhythm, normal heart sounds and intact distal pulses  Pulmonary/Chest: Effort normal and breath sounds normal  No respiratory distress  He has no wheezes  Abdominal: Soft  Bowel sounds are normal  He exhibits no distension  There is tenderness (mild tenderness on epigastric area )  Musculoskeletal: Normal range of motion  He exhibits no edema or deformity  Neurological: He is alert and oriented to person, place, and time   He has normal reflexes  Skin: Skin is warm and dry  No erythema  Psychiatric: His behavior is normal  Thought content normal    Vitals reviewed  Discussion with Family: left message with wife    Discharge instructions/Information to patient and family:   See after visit summary for information provided to patient and family  Provisions for Follow-Up Care:  See after visit summary for information related to follow-up care and any pertinent home health orders  Disposition:     Home    For Discharges to [facility] SNF:   · Not Applicable to this Patient - Not Applicable to this Patient    Planned Readmission: no      Discharge Statement:  I spent 40 minutes discharging the patient  This time was spent on the day of discharge  I had direct contact with the patient on the day of discharge  Greater than 50% of the total time was spent examining patient, answering all patient questions, arranging and discussing plan of care with patient as well as directly providing post-discharge instructions  Additional time then spent on discharge activities  Discharge Medications:  See after visit summary for reconciled discharge medications provided to patient and family        ** Please Note: This note has been constructed using a voice recognition system **

## 2018-10-26 NOTE — NURSING NOTE
Patient's O2 level on room air 91%  Patient denies shortness of breath and does not appear dyspnic  Will continue to monitor

## 2018-10-26 NOTE — ASSESSMENT & PLAN NOTE
· BP has been stable   · Continue with lisinopril  · will give Catapres 0 1 mg p  o  q 3 hours p r n  for systolic blood pressure greater greater than 170

## 2018-10-26 NOTE — ASSESSMENT & PLAN NOTE
· Unclear etiology as patient does not have a clear source of infection  ?viral gastritis vs reactive  The patient is afebrile  · This is resolved

## 2018-10-26 NOTE — ASSESSMENT & PLAN NOTE
Lab Results   Component Value Date    HGBA1C 10 9 (H) 08/31/2018       Recent Labs      10/25/18   1122  10/25/18   1647  10/25/18   2011  10/26/18   1121   POCGLU  284*  190*  279*  283*       Blood Sugar Average: Last 72 hrs:  (P) 181 [id number]   · Accu-Cheks q 6 hours with algorithm 3 correction dose q 6 hours

## 2018-10-26 NOTE — ASSESSMENT & PLAN NOTE
· Continue Effexor  · Psychiatry input is appreciated  · I was notified by a  that she was contacted by the patient's outpatient   The patient has not followed up with his psychiatrist outpatient for more than a year    · I discussed with patient in details regarding to importance of follow up

## 2018-10-26 NOTE — ASSESSMENT & PLAN NOTE
· Secondary to volume depletion   · This is resolved     · Nephrology input appreciated  · Continue with lisinopril

## 2018-10-26 NOTE — SOCIAL WORK
Chart reviewed by case management, d/c order written, I called and spoke to Fifi and I shared with her the information that I received by the outpt  and I was told it did not change her assessment, she stated the pt does not need inpt admission, I did call crisis and spoke with Jb Oneil, she stated she does not need to see the patient unless psych states they need to see the pt, I did obtain information on rehab for alcohol abuse, pt states he wants to go to outpt services, I did speak with his wife this am and she stated she wants me to send him to a reha unit, I explained I cannot send someone to rehab if they do not want help, I did let her know I did ask for a psych and crisis consult, she was made aware the pt will be d/c today, I did ask the pt again if he would like rehab for his alcohol abuse and he declined, his concern was about getting his pain meds, , the doctor is calling the pharmacy to find out what medications are covered with his insurance, pt denies any other d/c needs, family will transport the pt home, d/c plan was discussed at d/c plan meeting today, pt in agreement with the d/cand d/c plan

## 2018-10-26 NOTE — ASSESSMENT & PLAN NOTE
· Continue on CIWA protocol     No sign of withdrawal   · No sign of withdrawal  · Continue with folic acid, MVI, and thiamine

## 2018-10-26 NOTE — ASSESSMENT & PLAN NOTE
· Patient has a history of chronic pancreatitis though his lipase is normal  · Suspected this to be secondary to alcohol use and/or viral gastritis  · CT abdomen and pelvis shows no definite acute intra-abdominal pathology  There is complete fatty atrophy of the pancreas with stable presumed calcified pseudocyst inthe expected location of the pancreatic tail  · Abdominal pain is improved   Able to tolerate diet- GI soft, low-fat  · Patient will need to follow up with GI- see Dr Octavio Torrez OP

## 2018-10-29 ENCOUNTER — PATIENT OUTREACH (OUTPATIENT)
Dept: INTERNAL MEDICINE CLINIC | Facility: CLINIC | Age: 48
End: 2018-10-29

## 2018-10-29 NOTE — UTILIZATION REVIEW
Notification of Discharge  This is a Notification of Discharge from our facility [address]  Please be advised that this patient has been discharge from our facility  Below you will find the admission and discharge date and time including the patients disposition  PRESENTATION DATE: 10/22/2018 11:36 PM  IP ADMISSION DATE: 10/23/18 0152  DISCHARGE DATE: 10/26/2018  3:50 PM  DISPOSITION: [address] [place] [address] in the [facility] [place] Utilization Review Department  Phone: [phone]; Fax [phone]  ATTENTION: The Network Utilization Review Department is now centralized for our 9 Facilities  Make a note that we have a new phone and fax numbers for our Department  Please call with any questions or concerns to [phone] and carefully follow the prompts so that you are directed to the right person  All voicemails are confidential  Fax any determinations, approvals, denials, and requests for initial or continue stay review clinical to [phone]  Due to HIGH CALL volume, it would be easier if you could please send faxed requests to expedite your requests and in part, help us provide discharge notifications faster

## 2018-10-30 ENCOUNTER — TRANSITIONAL CARE MANAGEMENT (OUTPATIENT)
Dept: INTERNAL MEDICINE CLINIC | Facility: CLINIC | Age: 48
End: 2018-10-30

## 2018-10-30 LAB — GLUCOSE SERPL-MCNC: 224 MG/DL (ref 65–140)

## 2018-10-31 ENCOUNTER — PATIENT OUTREACH (OUTPATIENT)
Dept: INTERNAL MEDICINE CLINIC | Facility: CLINIC | Age: 48
End: 2018-10-31

## 2018-10-31 ENCOUNTER — TELEPHONE (OUTPATIENT)
Dept: GASTROENTEROLOGY | Facility: AMBULARY SURGERY CENTER | Age: 48
End: 2018-10-31

## 2018-11-07 ENCOUNTER — PATIENT OUTREACH (OUTPATIENT)
Dept: INTERNAL MEDICINE CLINIC | Facility: CLINIC | Age: 48
End: 2018-11-07

## 2018-11-09 ENCOUNTER — OFFICE VISIT (OUTPATIENT)
Dept: INTERNAL MEDICINE CLINIC | Facility: CLINIC | Age: 48
End: 2018-11-09
Payer: COMMERCIAL

## 2018-11-09 ENCOUNTER — APPOINTMENT (OUTPATIENT)
Dept: LAB | Facility: CLINIC | Age: 48
End: 2018-11-09
Payer: COMMERCIAL

## 2018-11-09 ENCOUNTER — PATIENT OUTREACH (OUTPATIENT)
Dept: INTERNAL MEDICINE CLINIC | Facility: CLINIC | Age: 48
End: 2018-11-09

## 2018-11-09 VITALS
RESPIRATION RATE: 18 BRPM | HEART RATE: 94 BPM | WEIGHT: 191 LBS | SYSTOLIC BLOOD PRESSURE: 122 MMHG | BODY MASS INDEX: 25.87 KG/M2 | DIASTOLIC BLOOD PRESSURE: 76 MMHG | TEMPERATURE: 97.6 F | HEIGHT: 72 IN

## 2018-11-09 DIAGNOSIS — Z91.19 NONCOMPLIANCE: ICD-10-CM

## 2018-11-09 DIAGNOSIS — R10.13 EPIGASTRIC PAIN: ICD-10-CM

## 2018-11-09 DIAGNOSIS — E78.5 HYPERLIPIDEMIA, UNSPECIFIED HYPERLIPIDEMIA TYPE: ICD-10-CM

## 2018-11-09 DIAGNOSIS — K86.0 ALCOHOL-INDUCED CHRONIC PANCREATITIS (HCC): ICD-10-CM

## 2018-11-09 DIAGNOSIS — E13.9 DIABETES MELLITUS TYPE 1.5, MANAGED AS TYPE 1 (HCC): ICD-10-CM

## 2018-11-09 DIAGNOSIS — E86.0 DEHYDRATION: ICD-10-CM

## 2018-11-09 DIAGNOSIS — K52.9 GASTROENTERITIS: ICD-10-CM

## 2018-11-09 DIAGNOSIS — N17.9 AKI (ACUTE KIDNEY INJURY) (HCC): Primary | ICD-10-CM

## 2018-11-09 DIAGNOSIS — N17.9 AKI (ACUTE KIDNEY INJURY) (HCC): ICD-10-CM

## 2018-11-09 DIAGNOSIS — F10.10 ALCOHOL ABUSE, EPISODIC: ICD-10-CM

## 2018-11-09 PROBLEM — K86.1 ACUTE ON CHRONIC PANCREATITIS (HCC): Status: RESOLVED | Noted: 2018-08-12 | Resolved: 2018-11-09

## 2018-11-09 PROBLEM — K85.90 ACUTE ON CHRONIC PANCREATITIS (HCC): Status: RESOLVED | Noted: 2018-08-12 | Resolved: 2018-11-09

## 2018-11-09 LAB
ALBUMIN SERPL BCP-MCNC: 3.3 G/DL (ref 3.5–5)
ALP SERPL-CCNC: 127 U/L (ref 46–116)
ALT SERPL W P-5'-P-CCNC: 18 U/L (ref 12–78)
ANION GAP SERPL CALCULATED.3IONS-SCNC: 2 MMOL/L (ref 4–13)
AST SERPL W P-5'-P-CCNC: 9 U/L (ref 5–45)
BASOPHILS # BLD AUTO: 0.07 THOUSANDS/ΜL (ref 0–0.1)
BASOPHILS NFR BLD AUTO: 1 % (ref 0–1)
BILIRUB SERPL-MCNC: 0.29 MG/DL (ref 0.2–1)
BUN SERPL-MCNC: 15 MG/DL (ref 5–25)
CALCIUM SERPL-MCNC: 9.3 MG/DL (ref 8.3–10.1)
CHLORIDE SERPL-SCNC: 105 MMOL/L (ref 100–108)
CO2 SERPL-SCNC: 28 MMOL/L (ref 21–32)
CREAT SERPL-MCNC: 0.99 MG/DL (ref 0.6–1.3)
EOSINOPHIL # BLD AUTO: 0.56 THOUSAND/ΜL (ref 0–0.61)
EOSINOPHIL NFR BLD AUTO: 5 % (ref 0–6)
ERYTHROCYTE [DISTWIDTH] IN BLOOD BY AUTOMATED COUNT: 14 % (ref 11.6–15.1)
GFR SERPL CREATININE-BSD FRML MDRD: 88 ML/MIN/1.73SQ M
GLUCOSE SERPL-MCNC: 111 MG/DL (ref 65–140)
HCT VFR BLD AUTO: 44.3 % (ref 36.5–49.3)
HGB BLD-MCNC: 14.4 G/DL (ref 12–17)
IMM GRANULOCYTES # BLD AUTO: 0.06 THOUSAND/UL (ref 0–0.2)
IMM GRANULOCYTES NFR BLD AUTO: 1 % (ref 0–2)
LYMPHOCYTES # BLD AUTO: 2.1 THOUSANDS/ΜL (ref 0.6–4.47)
LYMPHOCYTES NFR BLD AUTO: 20 % (ref 14–44)
MCH RBC QN AUTO: 29.7 PG (ref 26.8–34.3)
MCHC RBC AUTO-ENTMCNC: 32.5 G/DL (ref 31.4–37.4)
MCV RBC AUTO: 91 FL (ref 82–98)
MONOCYTES # BLD AUTO: 0.64 THOUSAND/ΜL (ref 0.17–1.22)
MONOCYTES NFR BLD AUTO: 6 % (ref 4–12)
NEUTROPHILS # BLD AUTO: 6.91 THOUSANDS/ΜL (ref 1.85–7.62)
NEUTS SEG NFR BLD AUTO: 67 % (ref 43–75)
NRBC BLD AUTO-RTO: 0 /100 WBCS
PLATELET # BLD AUTO: 261 THOUSANDS/UL (ref 149–390)
PMV BLD AUTO: 9 FL (ref 8.9–12.7)
POTASSIUM SERPL-SCNC: 4.4 MMOL/L (ref 3.5–5.3)
PROT SERPL-MCNC: 7.9 G/DL (ref 6.4–8.2)
RBC # BLD AUTO: 4.85 MILLION/UL (ref 3.88–5.62)
SODIUM SERPL-SCNC: 135 MMOL/L (ref 136–145)
WBC # BLD AUTO: 10.34 THOUSAND/UL (ref 4.31–10.16)

## 2018-11-09 PROCEDURE — 36415 COLL VENOUS BLD VENIPUNCTURE: CPT

## 2018-11-09 PROCEDURE — 80053 COMPREHEN METABOLIC PANEL: CPT

## 2018-11-09 PROCEDURE — 85025 COMPLETE CBC W/AUTO DIFF WBC: CPT

## 2018-11-09 PROCEDURE — 99495 TRANSJ CARE MGMT MOD F2F 14D: CPT | Performed by: INTERNAL MEDICINE

## 2018-11-09 RX ORDER — FENOFIBRATE 145 MG/1
145 TABLET, COATED ORAL DAILY
Qty: 30 TABLET | Refills: 5 | Status: SHIPPED | OUTPATIENT
Start: 2018-11-09 | End: 2019-01-09 | Stop reason: SDUPTHER

## 2018-11-09 NOTE — PROGRESS NOTES
Assessment/Plan:     No problem-specific Assessment & Plan notes found for this encounter  Diagnoses and all orders for this visit:    IGNACIA (acute kidney injury) ([facility] )  -     CBC and differential; Future  -     Comprehensive metabolic panel; Future    Dehydration    Hyperlipidemia, unspecified hyperlipidemia type  -     fenofibrate (TRICOR) 145 mg tablet; Take 1 tablet (145 mg total) by mouth daily    Alcohol abuse, episodic    Gastroenteritis    Epigastric pain  -     CBC and differential; Future  -     Comprehensive metabolic panel; Future    Alcohol-induced chronic pancreatitis ([facility] )    Diabetes mellitus type 1 5, managed as type 1 ([facility] )    Noncompliance     A/P: Doing better, but for how long  Will recheck his labs  Tolerating his diet, meds, and activity  Discussed the need to keep his sugars controlled to keep from getting the pancreatitis again  Discussed the ETOH use and highly recommend AA several times a week and to keep f/u with his psych  Reminded that his meds are at the pharmacy  ??if pt may need pancreatic enzymes  RTC in two months for routine  Subjective:     Patient ID: Kendall Cuevas is a 46 y o  male  WM presents for a brief admission for abdominal pain  Pt with dm and recurrent pancreatitis due to ETOH use  Pt reportedly had been clean and then decided to binge drink  Developed abdominal pain and diarrhea  Admitted after w/u revealed AKD as well  Seen by GI and nephro  Merrill to be dehydrated due to Viral Gastroenteritis and ETOH use with pre-renal azotemia  Administered IVF's and s/s improved  Still smoking, but denies any ETOH use since d/c  Still with some chronic epigastric pain, especially after eating  No fever or chills  No n/v  Stools back to normal  Sugars still around 200, but still hasn't picked up the meal time insulin   Keeps reporting that the med isn't covered, but our  and myself have worked on the issues extensively and there is medicine available, but appears that the pt just hasn't picked it up  Review of Systems   Constitutional: Negative for activity change, chills, diaphoresis, fatigue and fever  HENT: Negative  Respiratory: Negative for cough, chest tightness, shortness of breath and wheezing  Cardiovascular: Negative for chest pain, palpitations and leg swelling  Gastrointestinal: Positive for abdominal pain  Negative for abdominal distention, anal bleeding, blood in stool, constipation, diarrhea, nausea, rectal pain and vomiting  Genitourinary: Negative for difficulty urinating, dysuria and frequency  Musculoskeletal: Negative for arthralgias, gait problem and myalgias  Neurological: Negative for light-headedness and headaches  Psychiatric/Behavioral: Positive for dysphoric mood and sleep disturbance  Negative for confusion and suicidal ideas  The patient is nervous/anxious  Objective:     Physical Exam   Constitutional: He is oriented to person, place, and time  He appears well-developed and well-nourished  No distress  HENT:   Head: Normocephalic and atraumatic  Mouth/Throat: Oropharynx is clear and moist    Eyes: Pupils are equal, round, and reactive to light  Conjunctivae and EOM are normal  No scleral icterus  Cardiovascular: Normal rate, regular rhythm and normal heart sounds  No murmur heard  Pulmonary/Chest: Effort normal and breath sounds normal  No respiratory distress  He has no wheezes  Abdominal: Soft  Bowel sounds are normal  He exhibits no distension and no mass  There is tenderness  There is no rebound and no guarding  Tenderness with deep palpation over the bilat UQ's  Musculoskeletal: He exhibits no edema  Neurological: He is alert and oriented to person, place, and time  Psychiatric: He has a normal mood and affect  His behavior is normal  Judgment and thought content normal    Nursing note and vitals reviewed          Vitals:    11/09/18 1103   BP: 122/76   Pulse: 94   Resp: 18 Temp: 97 6 °F (36 4 °C)   TempSrc: Tympanic   Weight: 86 6 kg (191 lb)   Height: 6' (1 829 m)       Transitional Care Management Review:  Shon Bach is a 46 y o  male here for TCM follow up       During the TCM phone call patient stated:    Date and time hospital follow up call was made:  10/30/2018  3:28 PM  Hospital care reviewed:  Records reviewed  Patient was hopsitalized at:  [address] [place] [address]  Date of admission:  10/22/18  Date of discharge:  10/26/18  Diagnosis:  IGNACIA  Disposition:  Home  Were the patients medicaitons reviewed and updated:  Yes  Current symptoms:  Back pain - right side, Back pain - left side  Back pain, left side, severity:  Mild  Back pain, left side, onset:  Gradual  Back pain, right side, severity:  Mild  Back pain, right side, onset:  Gradual  Post hospital issues:  None  Should patient be enrolled in anticoag monitoring?:  No  Scheduled for follow up?:  Yes  Did you obtain your prescribed medications:  Yes  Do you need help managing your perscriptions or medications:  No  Is transportation to your appointments needed:  No  I have advised the patient to call PCP with any new or worsening symptoms (please type in name along with any credentials):  mel/ma  Have you fallen in the last 12 months:  No  Interperter language line required?:  No  Counseling:  Patient  Counseling topics:  Diagnostic results, Prognosis             Ron Jasmine, DO

## 2018-11-09 NOTE — PROGRESS NOTES
Outpatient Care Management Note:  The attached letter was mailed to this patient after three unsuccessful phone contact attempts

## 2018-11-19 ENCOUNTER — OFFICE VISIT (OUTPATIENT)
Dept: FAMILY MEDICINE CLINIC | Facility: CLINIC | Age: 48
End: 2018-11-19
Payer: COMMERCIAL

## 2018-11-19 VITALS
OXYGEN SATURATION: 95 % | WEIGHT: 189 LBS | HEIGHT: 72 IN | RESPIRATION RATE: 18 BRPM | HEART RATE: 119 BPM | DIASTOLIC BLOOD PRESSURE: 70 MMHG | TEMPERATURE: 98.6 F | SYSTOLIC BLOOD PRESSURE: 118 MMHG | BODY MASS INDEX: 25.6 KG/M2

## 2018-11-19 DIAGNOSIS — F41.1 GAD (GENERALIZED ANXIETY DISORDER): ICD-10-CM

## 2018-11-19 DIAGNOSIS — F41.1 GENERALIZED ANXIETY DISORDER: ICD-10-CM

## 2018-11-19 DIAGNOSIS — G47.00 INSOMNIA, UNSPECIFIED TYPE: ICD-10-CM

## 2018-11-19 DIAGNOSIS — G47.9 SLEEP DISORDER: ICD-10-CM

## 2018-11-19 DIAGNOSIS — F32.5 MAJOR DEPRESSIVE DISORDER WITH SINGLE EPISODE, IN FULL REMISSION (HCC): Primary | ICD-10-CM

## 2018-11-19 PROBLEM — F41.9 ANXIETY AND DEPRESSION: Chronic | Status: RESOLVED | Noted: 2018-08-09 | Resolved: 2018-11-19

## 2018-11-19 PROBLEM — F32.A ANXIETY AND DEPRESSION: Chronic | Status: RESOLVED | Noted: 2018-08-09 | Resolved: 2018-11-19

## 2018-11-19 PROCEDURE — 99214 OFFICE O/P EST MOD 30 MIN: CPT | Performed by: PHYSICIAN ASSISTANT

## 2018-11-19 PROCEDURE — 3008F BODY MASS INDEX DOCD: CPT | Performed by: PHYSICIAN ASSISTANT

## 2018-11-19 RX ORDER — TRAZODONE HYDROCHLORIDE 50 MG/1
TABLET ORAL
Qty: 30 TABLET | Refills: 0 | Status: SHIPPED | OUTPATIENT
Start: 2018-11-19 | End: 2018-12-19 | Stop reason: SDUPTHER

## 2018-11-19 RX ORDER — HYDROXYZINE PAMOATE 50 MG/1
CAPSULE ORAL
Qty: 90 CAPSULE | Refills: 0 | Status: SHIPPED | OUTPATIENT
Start: 2018-11-19 | End: 2018-12-07 | Stop reason: SDUPTHER

## 2018-11-19 RX ORDER — QUETIAPINE FUMARATE 50 MG/1
50 TABLET, EXTENDED RELEASE ORAL
Qty: 30 TABLET | Refills: 2 | Status: SHIPPED | OUTPATIENT
Start: 2018-11-19 | End: 2019-01-07 | Stop reason: SDUPTHER

## 2018-11-19 NOTE — PROGRESS NOTES
Assessment/Plan:    Sleep disorder  Pt counseled to abstain from alcohol as this will interfere with his REM sleep  Will discontinue risperdal in favor of seroquel to improve sleep and augment his effexor  This should also help with his intense anxiety  May be able to discontinue trazodone and hydroxyzine if this is effective  Diagnoses and all orders for this visit:    Major depressive disorder with single episode, in full remission ([facility] )  -     QUEtiapine (SEROQUEL XR) 50 mg; Take 1 tablet (50 mg total) by mouth daily at bedtime    Generalized anxiety disorder  -     QUEtiapine (SEROQUEL XR) 50 mg; Take 1 tablet (50 mg total) by mouth daily at bedtime    Sleep disorder          Subjective:      Patient ID: Irene Escalante is a 46 y o  male  Pt presents for follow up on his depression and anxiety  He notes his anxiety has been quite high lately  He states he has decreased his alcohol use but not stopped it  He admits to financial stressors  He is currently on effexor, hydroxyzine, risperdal and trazodone  Sleep has been very bad lately  He doesn't sleep at night then sleeps all day  The following portions of the patient's history were reviewed and updated as appropriate:   He  has a past medical history of Allergic rhinitis; Anemia; Anxiety and depression; Quiros esophagus; Cholelithiasis; Chronic pain; COPD (chronic obstructive pulmonary disease) ([facility] ); Depression; Diabetes mellitus ([facility] ); Emphyse[facility] lung [facility] ); Generalized anxiety disorder; GERD (gastroesophageal reflux disease); Hyperlipidemia; Hypertension; Kidney stone; Metatarsalgia; Pancreatitis; Psychiatric disorder; Renal disorder; and Shortness of breath    He   Patient Active Problem List    Diagnosis Date Noted    Diarrhea of presumed infectious origin 10/25/2018    Leukocytosis 10/23/2018    Hypotension 10/23/2018    Paresthesia of both lower extremities     Chronic pancreatitis ([facility] ) 09/13/2018    Transaminitis 08/13/2018    Tobacco dependence 08/13/2018    Abdominal pain 08/09/2018    IGNACIA (acute kidney injury) ([place] [address] ) 08/09/2018    GERD (gastroesophageal reflux disease) 08/09/2018    H/O alcohol abuse 08/09/2018    Leukemoid reaction 08/09/2018    Insomnia 09/05/2017    Recurrent pancreatitis ([place] [address] ) 08/25/2017    COPD (chronic obstructive pulmonary disease) ([place] [address] ) 03/29/2017    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    Iron deficiency anemia 04/05/2016    Diabetes mellitus type 1 5, managed as type 1 ([place] [address] ) 04/05/2016    Fibromyalgia 07/29/2013    Fatty liver 12/05/2012    Nephrolithiasis 12/05/2012    Hyperlipidemia 09/11/2012    Benign essential hypertension 07/02/2012    MDD (major depressive disorder), single episode 07/02/2012    Other chronic pain 07/02/2012    Sleep disorder 07/02/2012    Generalized anxiety disorder 06/13/2012     He  has a past surgical history that includes CHOLECYSTECTOMY LAPAROSCOPIC; Vasectomy; Foot surgery; Knee arthroscopy; and pr edg us exam surgical alter stom duodenum/jejunum (N/A, 10/17/2018)  His family history includes Cancer in his mother; Coronary artery disease in his family, father, and mother; Diabetes in his mother and sister; Prostate cancer in his father  He  reports that he has been smoking  He has been smoking about 1 00 pack per day  He has never used smokeless tobacco  He reports that he drinks alcohol  He reports that he does not use drugs    Current Outpatient Prescriptions   Medication Sig Dispense Refill    fenofibrate (TRICOR) 145 mg tablet Take 1 tablet (145 mg total) by mouth daily 30 tablet 5    folic acid (FOLVITE) 1 mg tablet Take 1 tablet (1 mg total) by mouth daily 30 tablet 0    glucose blood (ONE TOUCH ULTRA TEST) test strip by In Vitro route 3 (three) times a day      hydrOXYzine pamoate (VISTARIL) 50 mg capsule TAKE 1 CAPSULE BY MOUTH THREE TIMES DAILY AS NEEDED 90 capsule 0    insulin glargine (BASAGLAR KWIKPEN) 100 units/mL injection pen Inject 60 units sq q HS  5 pen 0    Insulin Pen Needle (B-D ULTRAFINE III SHORT PEN) 31G X 8 MM MISC by Does not apply route      Insulin Pen Needle (PEN NEEDLES) 29G X 12MM MISC by Does not apply route daily at bedtime Substitute what fits [place] pen and what is covered by insurance  45 each 0    lisinopril (ZESTRIL) 20 mg tablet Take 1 tablet (20 mg total) by mouth daily 30 tablet 5    omeprazole (PriLOSEC) 20 mg delayed release capsule Take by mouth      thiamine 100 MG tablet Take 1 tablet (100 mg total) by mouth daily 30 tablet 0    traZODone (DESYREL) 50 mg tablet TAKE 1 TABLET BY MOUTH DAILY AT BEDTIME 30 tablet 0    venlafaxine (EFFEXOR XR) 150 mg 24 hr capsule Take 1 capsule (150 mg total) by mouth daily 90 capsule 0    QUEtiapine (SEROQUEL XR) 50 mg Take 1 tablet (50 mg total) by mouth daily at bedtime 30 tablet 2     No current facility-administered medications for this visit  Current Outpatient Prescriptions on File Prior to Visit   Medication Sig    fenofibrate (TRICOR) 145 mg tablet Take 1 tablet (145 mg total) by mouth daily    folic acid (FOLVITE) 1 mg tablet Take 1 tablet (1 mg total) by mouth daily    glucose blood (ONE TOUCH ULTRA TEST) test strip by In Vitro route 3 (three) times a day    insulin glargine (BASAGLAR KWIKPEN) 100 units/mL injection pen Inject 60 units sq q HS   Insulin Pen Needle (B-D ULTRAFINE III SHORT PEN) 31G X 8 MM MISC by Does not apply route    Insulin Pen Needle (PEN NEEDLES) 29G X 12MM MISC by Does not apply route daily at bedtime Substitute what fits [place] pen and what is covered by insurance      lisinopril (ZESTRIL) 20 mg tablet Take 1 tablet (20 mg total) by mouth daily    omeprazole (PriLOSEC) 20 mg delayed release capsule Take by mouth    thiamine 100 MG tablet Take 1 tablet (100 mg total) by mouth daily    venlafaxine (EFFEXOR XR) 150 mg 24 hr capsule Take 1 capsule (150 mg total) by mouth daily    [DISCONTINUED] risperiDONE (RisperDAL) 0 5 mg tablet TAKE 1 TABLET BY MOUTH TWICE DAILY    [DISCONTINUED] hydrOXYzine pamoate (VISTARIL) 50 mg capsule TAKE 1 CAPSULE BY MOUTH THREE TIMES DAILY AS NEEDED    [DISCONTINUED] insulin lispro (HumaLOG) 100 units/mL injection pen Per sliding scale with meals  (Patient not taking: Reported on 11/19/2018 )    [DISCONTINUED] nicotine (NICODERM CQ) 21 mg/24 hr TD 24 hr patch Place 1 patch on the skin daily (Patient not taking: Reported on 11/9/2018 )    [DISCONTINUED] traZODone (DESYREL) 50 mg tablet TAKE 1 TABLET BY MOUTH DAILY AT BEDTIME     No current facility-administered medications on file prior to visit  He has No Known Allergies       Review of Systems   Constitutional: Negative for chills and fever  HENT: Negative for congestion, ear pain, hearing loss, postnasal drip, rhinorrhea, sinus pain, sinus pressure, sore throat and trouble swallowing  Eyes: Negative for pain and visual disturbance  Respiratory: Negative for cough, chest tightness, shortness of breath and wheezing  Cardiovascular: Negative  Negative for chest pain, palpitations and leg swelling  Gastrointestinal: Negative for abdominal pain, blood in stool, constipation, diarrhea, nausea and vomiting  Endocrine: Negative for cold intolerance, heat intolerance, polydipsia, polyphagia and polyuria  Genitourinary: Negative for difficulty urinating, dysuria, flank pain and urgency  Musculoskeletal: Negative for arthralgias, back pain, gait problem and myalgias  Skin: Negative for rash  Allergic/Immunologic: Negative  Neurological: Negative for dizziness, weakness, light-headedness and headaches  Hematological: Negative  Psychiatric/Behavioral: Positive for dysphoric mood and sleep disturbance  Negative for behavioral problems  The patient is nervous/anxious            Objective:      /70 (BP Location: Left arm, Patient Position: Sitting, Cuff Size: Adult)   Pulse (!) 119   Temp 98 6 °F (37 °C) (Tympanic)   Resp 18   Ht 6' (1 829 m)   Wt 85 7 kg (189 lb)   SpO2 95%   BMI 25 63 kg/m²          Physical Exam   Constitutional: He is oriented to person, place, and time  He appears well-developed and well-nourished  No distress  HENT:   Head: Normocephalic and atraumatic  Right Ear: External ear normal    Left Ear: External ear normal    Nose: Nose normal    Mouth/Throat: Oropharynx is clear and moist  No oropharyngeal exudate  Eyes: Pupils are equal, round, and reactive to light  Conjunctivae and EOM are normal  Right eye exhibits no discharge  Left eye exhibits no discharge  No scleral icterus  Neck: Normal range of motion  Neck supple  No thyromegaly present  Cardiovascular: Normal rate, regular rhythm and normal heart sounds  Exam reveals no gallop and no friction rub  No murmur heard  Pulmonary/Chest: Effort normal and breath sounds normal  No respiratory distress  He has no wheezes  He has no rales  Abdominal: Soft  Bowel sounds are normal  He exhibits no distension  There is no tenderness  Musculoskeletal: Normal range of motion  He exhibits no edema, tenderness or deformity  Neurological: He is alert and oriented to person, place, and time  No cranial nerve deficit  Skin: Skin is warm and dry  He is not diaphoretic  Psychiatric: His behavior is normal  Judgment and thought content normal  His mood appears anxious  He exhibits a depressed mood

## 2018-11-19 NOTE — ASSESSMENT & PLAN NOTE
Pt counseled to abstain from alcohol as this will interfere with his REM sleep  Will discontinue risperdal in favor of seroquel to improve sleep and augment his effexor  This should also help with his intense anxiety  May be able to discontinue trazodone and hydroxyzine if this is effective

## 2018-12-07 DIAGNOSIS — F41.1 GAD (GENERALIZED ANXIETY DISORDER): ICD-10-CM

## 2018-12-08 RX ORDER — HYDROXYZINE PAMOATE 50 MG/1
CAPSULE ORAL
Qty: 90 CAPSULE | Refills: 0 | Status: SHIPPED | OUTPATIENT
Start: 2018-12-08 | End: 2019-02-25 | Stop reason: HOSPADM

## 2018-12-19 DIAGNOSIS — G47.00 INSOMNIA, UNSPECIFIED TYPE: ICD-10-CM

## 2018-12-19 RX ORDER — TRAZODONE HYDROCHLORIDE 50 MG/1
TABLET ORAL
Qty: 30 TABLET | Refills: 0 | Status: SHIPPED | OUTPATIENT
Start: 2018-12-19 | End: 2018-12-26 | Stop reason: SDUPTHER

## 2018-12-26 DIAGNOSIS — G47.00 INSOMNIA, UNSPECIFIED TYPE: ICD-10-CM

## 2018-12-26 RX ORDER — TRAZODONE HYDROCHLORIDE 50 MG/1
50 TABLET ORAL
Qty: 90 TABLET | Refills: 0 | Status: ON HOLD | OUTPATIENT
Start: 2018-12-26 | End: 2019-02-25 | Stop reason: SDUPTHER

## 2019-01-07 DIAGNOSIS — F41.1 GENERALIZED ANXIETY DISORDER: ICD-10-CM

## 2019-01-07 DIAGNOSIS — F33.41 RECURRENT MAJOR DEPRESSIVE DISORDER, IN PARTIAL REMISSION (HCC): Primary | ICD-10-CM

## 2019-01-07 DIAGNOSIS — F32.5 MAJOR DEPRESSIVE DISORDER WITH SINGLE EPISODE, IN FULL REMISSION (HCC): ICD-10-CM

## 2019-01-07 DIAGNOSIS — E13.9 DIABETES MELLITUS TYPE 1.5, MANAGED AS TYPE 1 (HCC): ICD-10-CM

## 2019-01-07 RX ORDER — VENLAFAXINE HYDROCHLORIDE 225 MG/1
TABLET, EXTENDED RELEASE ORAL
Qty: 30 TABLET | Refills: 2 | Status: SHIPPED | OUTPATIENT
Start: 2019-01-07 | End: 2019-01-31

## 2019-01-08 RX ORDER — QUETIAPINE FUMARATE 50 MG/1
TABLET, EXTENDED RELEASE ORAL
Qty: 60 TABLET | Refills: 0 | Status: SHIPPED | OUTPATIENT
Start: 2019-01-08 | End: 2019-01-09 | Stop reason: SDUPTHER

## 2019-01-09 ENCOUNTER — OFFICE VISIT (OUTPATIENT)
Dept: INTERNAL MEDICINE CLINIC | Facility: CLINIC | Age: 49
End: 2019-01-09
Payer: COMMERCIAL

## 2019-01-09 VITALS
WEIGHT: 191 LBS | TEMPERATURE: 97.6 F | DIASTOLIC BLOOD PRESSURE: 62 MMHG | HEART RATE: 84 BPM | RESPIRATION RATE: 16 BRPM | SYSTOLIC BLOOD PRESSURE: 102 MMHG | BODY MASS INDEX: 25.87 KG/M2 | HEIGHT: 72 IN

## 2019-01-09 DIAGNOSIS — F32.5 MAJOR DEPRESSIVE DISORDER WITH SINGLE EPISODE, IN FULL REMISSION (HCC): ICD-10-CM

## 2019-01-09 DIAGNOSIS — E78.5 HYPERLIPIDEMIA, UNSPECIFIED HYPERLIPIDEMIA TYPE: ICD-10-CM

## 2019-01-09 DIAGNOSIS — K22.719 BARRETT'S ESOPHAGUS WITH DYSPLASIA: ICD-10-CM

## 2019-01-09 DIAGNOSIS — E13.9 DIABETES MELLITUS TYPE 1.5, MANAGED AS TYPE 1 (HCC): ICD-10-CM

## 2019-01-09 DIAGNOSIS — K76.0 FATTY LIVER: ICD-10-CM

## 2019-01-09 DIAGNOSIS — D50.8 IRON DEFICIENCY ANEMIA SECONDARY TO INADEQUATE DIETARY IRON INTAKE: ICD-10-CM

## 2019-01-09 DIAGNOSIS — F41.1 GENERALIZED ANXIETY DISORDER: ICD-10-CM

## 2019-01-09 DIAGNOSIS — J44.9 CHRONIC OBSTRUCTIVE PULMONARY DISEASE, UNSPECIFIED COPD TYPE (HCC): Primary | ICD-10-CM

## 2019-01-09 DIAGNOSIS — K21.9 GASTROESOPHAGEAL REFLUX DISEASE WITHOUT ESOPHAGITIS: Chronic | ICD-10-CM

## 2019-01-09 DIAGNOSIS — F17.200 TOBACCO DEPENDENCE: ICD-10-CM

## 2019-01-09 DIAGNOSIS — K86.0 ALCOHOL-INDUCED CHRONIC PANCREATITIS (HCC): ICD-10-CM

## 2019-01-09 DIAGNOSIS — I10 BENIGN ESSENTIAL HYPERTENSION: ICD-10-CM

## 2019-01-09 PROBLEM — K85.90 RECURRENT PANCREATITIS: Status: RESOLVED | Noted: 2017-08-25 | Resolved: 2019-01-09

## 2019-01-09 PROBLEM — D72.829 LEUKOCYTOSIS: Status: RESOLVED | Noted: 2018-10-23 | Resolved: 2019-01-09

## 2019-01-09 PROBLEM — R10.9 ABDOMINAL PAIN: Status: RESOLVED | Noted: 2018-08-09 | Resolved: 2019-01-09

## 2019-01-09 PROBLEM — R19.7 DIARRHEA OF PRESUMED INFECTIOUS ORIGIN: Status: RESOLVED | Noted: 2018-10-25 | Resolved: 2019-01-09

## 2019-01-09 PROBLEM — I95.9 HYPOTENSION: Status: RESOLVED | Noted: 2018-10-23 | Resolved: 2019-01-09

## 2019-01-09 PROBLEM — N17.9 AKI (ACUTE KIDNEY INJURY) (HCC): Status: RESOLVED | Noted: 2018-08-09 | Resolved: 2019-01-09

## 2019-01-09 PROBLEM — D72.823 LEUKEMOID REACTION: Status: RESOLVED | Noted: 2018-08-09 | Resolved: 2019-01-09

## 2019-01-09 PROCEDURE — 99214 OFFICE O/P EST MOD 30 MIN: CPT | Performed by: INTERNAL MEDICINE

## 2019-01-09 PROCEDURE — 3078F DIAST BP <80 MM HG: CPT | Performed by: INTERNAL MEDICINE

## 2019-01-09 PROCEDURE — 3074F SYST BP LT 130 MM HG: CPT | Performed by: INTERNAL MEDICINE

## 2019-01-09 RX ORDER — FENOFIBRATE 145 MG/1
145 TABLET, COATED ORAL DAILY
Qty: 30 TABLET | Refills: 5 | Status: SHIPPED | OUTPATIENT
Start: 2019-01-09 | End: 2019-01-09 | Stop reason: SDUPTHER

## 2019-01-09 RX ORDER — QUETIAPINE FUMARATE 50 MG/1
TABLET, EXTENDED RELEASE ORAL
Qty: 60 TABLET | Refills: 5 | Status: SHIPPED | OUTPATIENT
Start: 2019-01-09 | End: 2019-01-31

## 2019-01-09 RX ORDER — FENOFIBRATE 145 MG/1
145 TABLET, COATED ORAL DAILY
Qty: 30 TABLET | Refills: 5 | Status: SHIPPED | OUTPATIENT
Start: 2019-01-09 | End: 2019-06-27 | Stop reason: SDUPTHER

## 2019-01-09 NOTE — PATIENT INSTRUCTIONS
Type 2 Diabetes in Adults   WHAT YOU NEED TO KNOW:   What is type 2 diabetes? Type 2 diabetes is a disease that affects how your body uses glucose (sugar)  Normally, when the blood sugar level increases, the pancreas makes more insulin  Insulin helps move sugar out of the blood so it can be used for energy  Type 2 diabetes develops because either the body cannot make enough insulin, or it cannot use the insulin correctly  After many years, your pancreas may stop making insulin  What increases my risk for type 2 diabetes? · Obesity    · Physical inactivity    · Older age    · High blood pressure or high cholesterol    · A history of heart disease, gestational diabetes, or polycystic ovary syndrome     · A family member with diabetes    · Being [place] American, , , [place] American, or [facility]  What are the signs and symptoms of type 2 diabetes? You may have high blood sugar levels for a long time before symptoms appear  You may have any of the following:  · More hunger or thirst than usual     · Frequent urination     · Weight loss without trying     · Blurred vision  How is type 2 diabetes diagnosed? You may need tests to check for type 2 diabetes starting at age 39  You may need any of the following:  · An A1c test  shows the average amount of sugar in your blood over the past 2 to 3 months  Your healthcare provider will tell you the A1c level that is right for you  The goal for your A1c is usually below 7%  Your provider can help you make changes if a check shows the A1c is too high  · A fasting plasma glucose test  is when your blood sugar level is tested after you have not eaten for 8 hours  · A 2-hour plasma glucose test  starts with a blood sugar level check after you have not eaten for 8 hours  You are then given a glucose drink  Your blood sugar level is checked after 2 hours       · A random glucose test  may be done any time of day, no matter how long ago you ate   How is type 2 diabetes treated? Type 2 diabetes can be controlled to prevent damage to your heart, blood vessels, and other organs  The goal is to keep your blood sugar at a normal level  You must eat the right foods, and exercise regularly  You may need 1 or more hypoglycemic medicines or insulin if you cannot control your blood sugar level with nutrition and exercise  You may also need medicine to lower your risk for heart disease  An example includes medicine to lower or control your cholesterol  How do I check my blood sugar level? You will be taught how to check a small drop of blood in a glucose monitor  You will need to check your blood sugar level at least 3 times each day if you are on insulin  Ask your healthcare provider when and how often to check during the day  If you check your blood sugar level before a meal , it should be between 80 and 130 mg/dL  If you check your blood sugar level 1 to 2 hours after a meal , it should be less than 180 mg/dL  Ask your healthcare provider if these are good goals for you  Write down your results, and show them to your healthcare provider  Your provider may use the results to make changes to your medicine, food, and exercise schedules  What should I do if my blood sugar level is too low? Your blood sugar level is too low if it goes below 70 mg/dL  If the level is too low, eat or drink 15 grams of fast-acting carbohydrate  These are found naturally in fruits  Fast-acting carbohydrates will raise your blood sugar level quickly  Examples of 15 grams of fast-acting carbohydrate are 4 ounces (½ cup) of fruit juice or 4 ounces of regular soda  Other examples are 2 tablespoons of raisins or 3 to 4 glucose tablets  Check your blood sugar level 15 minutes later  If the level is still low (less than 100 mg/dL), eat another 15 grams of carbohydrate  When the level returns to 100 mg/dL, eat a snack or meal that contains carbohydrates   This will help prevent another drop in blood sugar  Always carefully follow your healthcare provider's instructions on how to treat low blood sugar levels  What do I need to know about nutrition? A dietitian will help you make a meal plan to keep your blood sugar level steady  Do not skip meals  Your blood sugar level may drop too low if you have taken diabetes medicine and do not eat  · Keep track of carbohydrates (sugar and starchy foods)  Your blood sugar level can get too high if you eat too many carbohydrates  Eat fruits, legumes, vegetables, and whole grains  Your dietitian will help you plan meals and snacks that have the right amount of carbohydrates  · Eat low-fat foods , such as skinless chicken and low-fat milk  · Eat less sodium (salt)  Limit high-sodium foods, such as soy sauce, potato chips, and soup  Do not add salt to food you cook  Limit your use of table salt  You should have less than 2,300 mg of sodium per day  · Eat high-fiber foods , such as vegetables, whole-grain breads, and beans  · Limit alcohol  Alcohol affects your blood sugar level and can make it harder to manage your diabetes  Limit alcohol to 1 drink a day if you are a woman  Limit alcohol to 2 drinks a day if you are a man  A drink of alcohol is 12 ounces of beer, 5 ounces of wine, or 1½ ounces of liquor  How much exercise do I need? Exercise can help keep your blood sugar level steady, decrease your risk of heart disease, and help you lose weight  Stretch before and after you exercise  Exercise for at least 150 minutes every week  Spread this amount of exercise over at least 3 days a week  Do not skip exercise more than 2 days in a row  Include muscle strengthening activities 2 to 3 days each week  Older adults should include balance training 2 to 3 times each week  Activities that help increase balance include yoga and jamshid chi  Work with your healthcare provider to create an exercise plan    · Check your blood sugar level before and after exercise  Healthcare providers may tell you to change the amount of insulin you take or food you eat  If your blood sugar level is high, check your blood or urine for ketones before you exercise  Do not exercise if your blood sugar level is high and you have ketones  · If your blood sugar level is less than 100 mg/dL, have a carbohydrate snack before you exercise  Examples are 4 to 6 crackers, ½ banana, 8 ounces (1 cup) of milk, or 4 ounces (½ cup) of juice  Drink water or liquids that do not contain sugar before, during, and after exercise  Ask your dietitian or healthcare provider which liquids you should drink when you exercise  · Do not sit for longer than 30 minutes  If you cannot walk around, at least stand up  This will help you stay active and keep your blood circulating  What else can I do to manage type 2 diabetes? · Check your feet each day for sores  Wear shoes and socks that fit correctly  Do not trim your toenails  Ask your healthcare provider for more information about foot care  · Maintain a healthy weight  Ask your healthcare provider how much you should weigh  A healthy weight can help you control your diabetes and prevent heart disease  Ask your provider to help you create a weight loss plan if you are overweight  Together you can set manageable weight loss goals  · Do not smoke  Nicotine and other chemicals in cigarettes and cigars can cause lung damage and make it more difficult to manage your diabetes  Ask your healthcare provider for information if you currently smoke and need help to quit  Do not use e-cigarettes or smokeless tobacco in place of cigarettes or to help you quit  They still contain nicotine  · Check your blood pressure as directed  Ask your healthcare provider what your blood pressure should be  Most adults with diabetes and high blood pressure should have a systolic blood pressure (first number) less than 140   Your diastolic blood pressure (second number) should be less than 90  · Wear medical alert identification  Wear medical alert jewelry or carry a card that says you have diabetes  Ask your healthcare provider where to get these items  · Ask about vaccines  You have a higher risk for serious illness if you get the flu, pneumonia, or hepatitis  Ask your healthcare provider if you should get a flu, pneumonia, or hepatitis B vaccine, and when to get the vaccine  What are the risks of type 2 diabetes? Uncontrolled diabetes can damage your nerves, veins, and arteries  High blood sugar levels may damage other body tissue and organs over time  Damage to arteries may increase your risk for heart attack and stroke  Nerve damage may also lead to other heart, stomach, and nerve problems  Diabetes is life-threatening if it is not controlled  Control your blood glucose levels to prevent health problems  Call 911 for any of the following:   · You have any of the following signs of a stroke:      ¨ Numbness or drooping on one side of your face     ¨ Weakness in an arm or leg    ¨ Confusion or difficulty speaking    ¨ Dizziness, a severe headache, or vision loss    · You have any of the following signs of a heart attack:      ¨ Squeezing, pressure, or pain in your chest that lasts longer than 5 minutes or returns    ¨ Discomfort or pain in your back, neck, jaw, stomach, or arm     ¨ Trouble breathing    ¨ Nausea or vomiting    ¨ Lightheadedness or a sudden cold sweat, especially with chest pain or trouble breathing  When should I seek immediate care? · You have severe abdominal pain, or the pain spreads to your back  You may also be vomiting  · You have trouble staying awake or focusing  · You are shaking or sweating  · You have blurred or double vision  · Your breath has a fruity, sweet smell  · Your breathing is deep and labored, or rapid and shallow  · Your heartbeat is fast and weak    When should I contact my healthcare provider? · You are vomiting or have diarrhea  · You have an upset stomach and cannot eat the foods on your meal plan  · You feel weak or more tired than usual      · You feel dizzy, have headaches, or are easily irritated  · Your skin is red, warm, dry, or swollen  · You have a wound that does not heal      · You have numbness in your arms or legs  · You have trouble coping with your illness, or you feel anxious or depressed  · You have questions or concerns about your condition or care  CARE AGREEMENT:   You have the right to help plan your care  Learn about your health condition and how it may be treated  Discuss treatment options with your caregivers to decide what care you want to receive  You always have the right to refuse treatment  The above information is an  only  It is not intended as medical advice for individual conditions or treatments  Talk to your doctor, nurse or pharmacist before following any medical regimen to see if it is safe and effective for you  © 2017 [employer] Information is for End User's use only and may not be sold, redistributed or otherwise used for commercial purposes  All illustrations and images included in CareNotes® are the copyrighted property of [employer]  or [employer]

## 2019-01-09 NOTE — PROGRESS NOTES
Assessment/Plan:    No problem-specific Assessment & Plan notes found for this encounter  Diagnoses and all orders for this visit:    Chronic obstructive pulmonary disease, unspecified COPD type ([place] [address] )    Diabetes mellitus type 1 5, managed as type 1 ([place] [address] )  -     Comprehensive metabolic panel; Future  -     Hemoglobin A1C; Future  -     insulin glargine (BASAGLAR KWIKPEN) 100 units/mL injection pen; Inject 60 units sq q HS  Hyperlipidemia, unspecified hyperlipidemia type  -     Lipid Panel with Direct LDL reflex; Future  -     Discontinue: fenofibrate (TRICOR) 145 mg tablet; Take 1 tablet (145 mg total) by mouth daily  -     fenofibrate (TRICOR) 145 mg tablet; Take 1 tablet (145 mg total) by mouth daily    Major depressive disorder with single episode, in full remission (HCC)  -     QUEtiapine (SEROQUEL XR) 50 mg; 1 or 2 tablets daily    Generalized anxiety disorder  -     QUEtiapine (SEROQUEL XR) 50 mg; 1 or 2 tablets daily    Benign essential hypertension    Iron deficiency anemia secondary to inadequate dietary iron intake    Tobacco dependence    Quiros's esophagus with dysplasia    Alcohol-induced chronic pancreatitis ([place] [address] )    Fatty liver    Gastroesophageal reflux disease without esophagitis      A/P: Doing ok, but not really improving  Discussed that pt can't drink at all and should get into NA  Discussed counseling and will look into psych  Recommend increase his at home sugar checks  Recommend he make f/u with psych PA to adjust his meds further  Will check labs  Vaccines up to date  Continue current treatment pending the labs  RTC three months for routine  Subjective:      Patient ID: Mook Contreras is a 44 y o  male   RTC for f/u dm, htn, etc  Doing ok and no new issues, but past issues persists  No recent pancreatitis flares, but admits to drinking yet, but sporadically and not often  No longer going to NA   MERLYN and sleep issues are worse per pt, but seroquel recently added and sleep is a little better, but appears pt has missed several f/u with the psych PA  Sugars less than 150 in the AM, but not checking at any other times  No falls  Still smoking  Pain is no worse and continues to use meds for control  Some fatigue and not as active as he would like  Due for labs  Flu shot is up to date  The following portions of the patient's history were reviewed and updated as appropriate:   He  has a past medical history of Allergic rhinitis; Anemia; Anxiety and depression; Quiros esophagus; Cholelithiasis; Chronic pain; COPD (chronic obstructive pulmonary disease) ([facility] ); Depression; Diabetes mellitus ([facility] ); Emphysema lung ([facility] ); Generalized anxiety disorder; GERD (gastroesophageal reflux disease); Hyperlipidemia; Hypertension; Kidney stone; Metatarsalgia; Pancreatitis; Psychiatric disorder; Renal disorder; and Shortness of breath  He   Patient Active Problem List    Diagnosis Date Noted    Paresthesia of both lower extremities     Chronic pancreatitis ([facility] ) 09/13/2018    Transaminitis 08/13/2018    Tobacco dependence 08/13/2018    GERD (gastroesophageal reflux disease) 08/09/2018    H/O alcohol abuse 08/09/2018    Insomnia 09/05/2017    COPD (chronic obstructive pulmonary disease) ([facility] ) 03/29/2017    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    Iron deficiency anemia 04/05/2016    Diabetes mellitus type 1 5, managed as type 1 ([facility] ) 04/05/2016    Fibromyalgia 07/29/2013    Fatty liver 12/05/2012    Nephrolithiasis 12/05/2012    Hyperlipidemia 09/11/2012    Benign essential hypertension 07/02/2012    MDD (major depressive disorder), single episode 07/02/2012    Other chronic pain 07/02/2012    Sleep disorder 07/02/2012    Generalized anxiety disorder 06/13/2012     He  has a past surgical history that includes CHOLECYSTECTOMY LAPAROSCOPIC; Vasectomy; Foot surgery; Knee arthroscopy; and pr edg us exam surgical alter stom duodenum/jejunum (N/A, 10/17/2018)    His family history includes Cancer in his mother; Coronary artery disease in his family, father, and mother; Diabetes in his mother and sister; Prostate cancer in his father  He  reports that he has been smoking  He has been smoking about 1 00 pack per day  He has never used smokeless tobacco  He reports that he drinks alcohol  He reports that he does not use drugs  Current Outpatient Prescriptions   Medication Sig Dispense Refill    fenofibrate (TRICOR) 145 mg tablet Take 1 tablet (145 mg total) by mouth daily 30 tablet 5    glucose blood (ONE TOUCH ULTRA TEST) test strip by In Vitro route 3 (three) times a day      hydrOXYzine pamoate (VISTARIL) 50 mg capsule TAKE 1 CAPSULE BY MOUTH THREE TIMES DAILY AS NEEDED 90 capsule 0    insulin glargine (BASAGLAR KWIKPEN) 100 units/mL injection pen Inject 60 units sq q HS  10 pen 5    Insulin Pen Needle (B-D ULTRAFINE III SHORT PEN) 31G X 8 MM MISC by Does not apply route      Insulin Pen Needle (PEN NEEDLES) 29G X 12MM MISC by Does not apply route daily at bedtime Substitute what fits [place] pen and what is covered by insurance  45 each 0    lisinopril (ZESTRIL) 20 mg tablet Take 1 tablet (20 mg total) by mouth daily 30 tablet 5    omeprazole (PriLOSEC) 20 mg delayed release capsule Take by mouth      QUEtiapine (SEROQUEL XR) 50 mg 1 or 2 tablets daily 60 tablet 5    traZODone (DESYREL) 50 mg tablet Take 1 tablet (50 mg total) by mouth daily at bedtime 90 tablet 0    venlafaxine (EFFEXOR XR) 150 mg 24 hr capsule Take 1 capsule (150 mg total) by mouth daily 90 capsule 0    venlafaxine 225 MG TB24 TAKE ONE TABLET BY MOUTH ONCE DAILY 30 tablet 2     No current facility-administered medications for this visit        Current Outpatient Prescriptions on File Prior to Visit   Medication Sig    glucose blood (ONE TOUCH ULTRA TEST) test strip by In Vitro route 3 (three) times a day    hydrOXYzine pamoate (VISTARIL) 50 mg capsule TAKE 1 CAPSULE BY MOUTH THREE TIMES DAILY AS NEEDED    Insulin Pen Needle (B-D ULTRAFINE III SHORT PEN) 31G X 8 MM MISC by Does not apply route    Insulin Pen Needle (PEN NEEDLES) 29G X 12MM MISC by Does not apply route daily at bedtime Substitute what fits [place] pen and what is covered by insurance   lisinopril (ZESTRIL) 20 mg tablet Take 1 tablet (20 mg total) by mouth daily    omeprazole (PriLOSEC) 20 mg delayed release capsule Take by mouth    traZODone (DESYREL) 50 mg tablet Take 1 tablet (50 mg total) by mouth daily at bedtime    venlafaxine (EFFEXOR XR) 150 mg 24 hr capsule Take 1 capsule (150 mg total) by mouth daily    venlafaxine 225 MG TB24 TAKE ONE TABLET BY MOUTH ONCE DAILY    [DISCONTINUED] fenofibrate (TRICOR) 145 mg tablet Take 1 tablet (145 mg total) by mouth daily    [DISCONTINUED] insulin glargine (BASAGLAR KWIKPEN) 100 units/mL injection pen Inject 60 units sq q HS   [DISCONTINUED] QUEtiapine (SEROQUEL XR) 50 mg 1 or 2 tablets daily    [DISCONTINUED] folic acid (FOLVITE) 1 mg tablet Take 1 tablet (1 mg total) by mouth daily (Patient not taking: Reported on 1/9/2019 )    [DISCONTINUED] thiamine 100 MG tablet Take 1 tablet (100 mg total) by mouth daily (Patient not taking: Reported on 1/9/2019 )     No current facility-administered medications on file prior to visit  He has No Known Allergies       Review of Systems   Constitutional: Positive for activity change and fatigue  Negative for chills, diaphoresis and fever  HENT: Negative  Eyes: Negative for visual disturbance  Respiratory: Negative for cough, chest tightness, shortness of breath and wheezing  Cardiovascular: Negative for chest pain, palpitations and leg swelling  Gastrointestinal: Negative for abdominal pain, constipation, diarrhea, nausea and vomiting  Endocrine: Negative for cold intolerance and heat intolerance  Genitourinary: Negative for difficulty urinating, dysuria and frequency  Musculoskeletal: Negative for arthralgias, gait problem and myalgias  Neurological: Negative for dizziness, seizures, syncope, weakness, light-headedness and headaches  Psychiatric/Behavioral: Positive for sleep disturbance  Negative for confusion, dysphoric mood and suicidal ideas  The patient is nervous/anxious  Objective:      /62   Pulse 84   Temp 97 6 °F (36 4 °C) (Tympanic)   Resp 16   Ht 6' (1 829 m)   Wt 86 6 kg (191 lb)   BMI 25 90 kg/m²          Physical Exam   Constitutional: He is oriented to person, place, and time  He appears well-developed and well-nourished  No distress  HENT:   Head: Normocephalic and atraumatic  Mouth/Throat: Oropharynx is clear and moist    Eyes: Pupils are equal, round, and reactive to light  Conjunctivae and EOM are normal    Neck: Neck supple  No JVD present  Cardiovascular: Normal rate, regular rhythm and normal heart sounds  No murmur heard  Pulmonary/Chest: Effort normal and breath sounds normal  No respiratory distress  He has no wheezes  He has no rales  Abdominal: Soft  Bowel sounds are normal  He exhibits no distension  There is no tenderness  Musculoskeletal: He exhibits no edema  Neurological: He is alert and oriented to person, place, and time  Psychiatric: His behavior is normal  Judgment and thought content normal    anxious   Nursing note and vitals reviewed

## 2019-01-21 ENCOUNTER — APPOINTMENT (OUTPATIENT)
Dept: LAB | Facility: CLINIC | Age: 49
End: 2019-01-21
Payer: COMMERCIAL

## 2019-01-21 DIAGNOSIS — E13.9 DIABETES MELLITUS TYPE 1.5, MANAGED AS TYPE 1 (HCC): ICD-10-CM

## 2019-01-21 DIAGNOSIS — E78.5 HYPERLIPIDEMIA, UNSPECIFIED HYPERLIPIDEMIA TYPE: ICD-10-CM

## 2019-01-21 LAB
ALBUMIN SERPL BCP-MCNC: 3.5 G/DL (ref 3.5–5)
ALP SERPL-CCNC: 108 U/L (ref 46–116)
ALT SERPL W P-5'-P-CCNC: 13 U/L (ref 12–78)
ANION GAP SERPL CALCULATED.3IONS-SCNC: 4 MMOL/L (ref 4–13)
AST SERPL W P-5'-P-CCNC: 11 U/L (ref 5–45)
BILIRUB SERPL-MCNC: 0.21 MG/DL (ref 0.2–1)
BUN SERPL-MCNC: 19 MG/DL (ref 5–25)
CALCIUM SERPL-MCNC: 9 MG/DL (ref 8.3–10.1)
CHLORIDE SERPL-SCNC: 100 MMOL/L (ref 100–108)
CHOLEST SERPL-MCNC: 196 MG/DL (ref 50–200)
CO2 SERPL-SCNC: 30 MMOL/L (ref 21–32)
CREAT SERPL-MCNC: 1.06 MG/DL (ref 0.6–1.3)
EST. AVERAGE GLUCOSE BLD GHB EST-MCNC: 243 MG/DL
GFR SERPL CREATININE-BSD FRML MDRD: 81 ML/MIN/1.73SQ M
GLUCOSE P FAST SERPL-MCNC: 160 MG/DL (ref 65–99)
HBA1C MFR BLD: 10.1 % (ref 4.2–6.3)
HDLC SERPL-MCNC: 39 MG/DL (ref 40–60)
LDLC SERPL CALC-MCNC: 113 MG/DL (ref 0–100)
POTASSIUM SERPL-SCNC: 4.2 MMOL/L (ref 3.5–5.3)
PROT SERPL-MCNC: 7.8 G/DL (ref 6.4–8.2)
SODIUM SERPL-SCNC: 134 MMOL/L (ref 136–145)
TRIGL SERPL-MCNC: 219 MG/DL

## 2019-01-21 PROCEDURE — 80061 LIPID PANEL: CPT

## 2019-01-21 PROCEDURE — 36415 COLL VENOUS BLD VENIPUNCTURE: CPT

## 2019-01-21 PROCEDURE — 83036 HEMOGLOBIN GLYCOSYLATED A1C: CPT

## 2019-01-21 PROCEDURE — 80053 COMPREHEN METABOLIC PANEL: CPT

## 2019-01-31 ENCOUNTER — OFFICE VISIT (OUTPATIENT)
Dept: INTERNAL MEDICINE CLINIC | Facility: CLINIC | Age: 49
End: 2019-01-31
Payer: COMMERCIAL

## 2019-01-31 VITALS
HEIGHT: 72 IN | HEART RATE: 129 BPM | DIASTOLIC BLOOD PRESSURE: 76 MMHG | RESPIRATION RATE: 18 BRPM | OXYGEN SATURATION: 92 % | TEMPERATURE: 98.7 F | SYSTOLIC BLOOD PRESSURE: 102 MMHG | BODY MASS INDEX: 26.14 KG/M2 | WEIGHT: 193 LBS

## 2019-01-31 DIAGNOSIS — F41.1 GENERALIZED ANXIETY DISORDER: ICD-10-CM

## 2019-01-31 DIAGNOSIS — F32.1 CURRENT MODERATE EPISODE OF MAJOR DEPRESSIVE DISORDER WITHOUT PRIOR EPISODE (HCC): Primary | ICD-10-CM

## 2019-01-31 PROCEDURE — 99214 OFFICE O/P EST MOD 30 MIN: CPT | Performed by: PHYSICIAN ASSISTANT

## 2019-01-31 RX ORDER — DULOXETIN HYDROCHLORIDE 60 MG/1
60 CAPSULE, DELAYED RELEASE ORAL DAILY
Qty: 30 CAPSULE | Refills: 1 | Status: SHIPPED | OUTPATIENT
Start: 2019-01-31 | End: 2019-02-25 | Stop reason: HOSPADM

## 2019-01-31 RX ORDER — VENLAFAXINE HYDROCHLORIDE 75 MG/1
75 TABLET, EXTENDED RELEASE ORAL
Qty: 30 TABLET | Refills: 0 | Status: SHIPPED | OUTPATIENT
Start: 2019-01-31 | End: 2019-02-08

## 2019-01-31 RX ORDER — ARIPIPRAZOLE 5 MG/1
5 TABLET ORAL DAILY
Qty: 30 TABLET | Refills: 1 | Status: ON HOLD | OUTPATIENT
Start: 2019-01-31 | End: 2019-02-25 | Stop reason: SDUPTHER

## 2019-01-31 NOTE — ASSESSMENT & PLAN NOTE
Taper and discontinue effexor  In the meantime start cymbalta  Last LFTs normal  Discontinue seroquel and restart abilify  Directions for cross taper of medications written for pt  Recheck 1 month  I have spent 25 minutes with Patient  today in which greater than 50% of this time was spent in counseling/coordination of care regarding Intructions for management and Patient and family education

## 2019-01-31 NOTE — PROGRESS NOTES
Assessment/Plan:    MDD (major depressive disorder), single episode  Taper and discontinue effexor  In the meantime start cymbalta  Last LFTs normal  Discontinue seroquel and restart abilify  Directions for cross taper of medications written for pt  Recheck 1 month  Diagnoses and all orders for this visit:    Generalized anxiety disorder  -     DULoxetine (CYMBALTA) 60 mg delayed release capsule; Take 1 capsule (60 mg total) by mouth daily  -     ARIPiprazole (ABILIFY) 5 mg tablet; Take 1 tablet (5 mg total) by mouth daily  -     venlafaxine 75 mg 24 hr tablet; Take 1 tablet (75 mg total) by mouth daily with breakfast    Current moderate episode of major depressive disorder without prior episode (HCC)          Subjective:      Patient ID: Lukasz Mesa is a 44 y o  male  Pt presents for follow up on his depression and anxiety  Symptoms remain poorly controlled  He was denied his disability and is worried about having to return to work  He is on seroquel which helps him sleep but makes him too groggy the following day  He did better with abilify and desiring revisiting this  He no longer feels effexor is helpful and desires an alternative  He was on zoloft in the past with little benefit  He admits to passive SI but denies any active thoughts  He states he would not want to do that to his family  He is future oriented and able to contract for safety  The following portions of the patient's history were reviewed and updated as appropriate:   He  has a past medical history of Allergic rhinitis; Anemia; Anxiety and depression; Quiros esophagus; Cholelithiasis; Chronic pain; COPD (chronic obstructive pulmonary disease) ([facility] ); Depression; Diabetes mellitus ([facility] ); Emphysema lung ([facility] ); Generalized anxiety disorder; GERD (gastroesophageal reflux disease); Hyperlipidemia; Hypertension; Kidney stone; Metatarsalgia; Pancreatitis; Psychiatric disorder; Renal disorder; and Shortness of breath    He   Patient Active Problem List    Diagnosis Date Noted    Paresthesia of both lower extremities     Chronic pancreatitis ([facility] ) 09/13/2018    Transaminitis 08/13/2018    Tobacco dependence 08/13/2018    GERD (gastroesophageal reflux disease) 08/09/2018    H/O alcohol abuse 08/09/2018    Insomnia 09/05/2017    COPD (chronic obstructive pulmonary disease) ([place] [id number] ) 03/29/2017    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    Iron deficiency anemia 04/05/2016    Diabetes mellitus type 1 5, managed as type 1 ([place] [id number] ) 04/05/2016    Fibromyalgia 07/29/2013    Fatty liver 12/05/2012    Nephrolithiasis 12/05/2012    Hyperlipidemia 09/11/2012    Benign essential hypertension 07/02/2012    MDD (major depressive disorder), single episode 07/02/2012    Other chronic pain 07/02/2012    Sleep disorder 07/02/2012    Generalized anxiety disorder 06/13/2012     He  has a past surgical history that includes CHOLECYSTECTOMY LAPAROSCOPIC; Vasectomy; Foot surgery; Knee arthroscopy; and pr edg us exam surgical alter stom duodenum/jejunum (N/A, 10/17/2018)  His family history includes Cancer in his mother; Coronary artery disease in his family, father, and mother; Diabetes in his mother and sister; Prostate cancer in his father  He  reports that he has been smoking  He has been smoking about 1 00 pack per day  He has never used smokeless tobacco  He reports that he drinks alcohol  He reports that he does not use drugs  Current Outpatient Prescriptions   Medication Sig Dispense Refill    fenofibrate (TRICOR) 145 mg tablet Take 1 tablet (145 mg total) by mouth daily 30 tablet 5    glucose blood (ONE TOUCH ULTRA TEST) test strip by In Vitro route 3 (three) times a day      hydrOXYzine pamoate (VISTARIL) 50 mg capsule TAKE 1 CAPSULE BY MOUTH THREE TIMES DAILY AS NEEDED 90 capsule 0    insulin glargine (BASAGLAR KWIKPEN) 100 units/mL injection pen Inject 60 units sq q HS   10 pen 5    Insulin Pen Needle (B-D ULTRAFINE III SHORT PEN) 31G X 8 MM MISC by Does not apply route      Insulin Pen Needle (PEN NEEDLES) 29G X 12MM MISC by Does not apply route daily at bedtime Substitute what fits [place] pen and what is covered by insurance  45 each 0    lisinopril (ZESTRIL) 20 mg tablet Take 1 tablet (20 mg total) by mouth daily 30 tablet 5    omeprazole (PriLOSEC) 20 mg delayed release capsule Take by mouth      traZODone (DESYREL) 50 mg tablet Take 1 tablet (50 mg total) by mouth daily at bedtime 90 tablet 0    ARIPiprazole (ABILIFY) 5 mg tablet Take 1 tablet (5 mg total) by mouth daily 30 tablet 1    DULoxetine (CYMBALTA) 60 mg delayed release capsule Take 1 capsule (60 mg total) by mouth daily 30 capsule 1    venlafaxine 75 mg 24 hr tablet Take 1 tablet (75 mg total) by mouth daily with breakfast 30 tablet 0     No current facility-administered medications for this visit  Current Outpatient Prescriptions on File Prior to Visit   Medication Sig    fenofibrate (TRICOR) 145 mg tablet Take 1 tablet (145 mg total) by mouth daily    glucose blood (ONE TOUCH ULTRA TEST) test strip by In Vitro route 3 (three) times a day    hydrOXYzine pamoate (VISTARIL) 50 mg capsule TAKE 1 CAPSULE BY MOUTH THREE TIMES DAILY AS NEEDED    insulin glargine (BASAGLAR KWIKPEN) 100 units/mL injection pen Inject 60 units sq q HS   Insulin Pen Needle (B-D ULTRAFINE III SHORT PEN) 31G X 8 MM MISC by Does not apply route    Insulin Pen Needle (PEN NEEDLES) 29G X 12MM MISC by Does not apply route daily at bedtime Substitute what fits [employer] pen and what is covered by insurance      lisinopril (ZESTRIL) 20 mg tablet Take 1 tablet (20 mg total) by mouth daily    omeprazole (PriLOSEC) 20 mg delayed release capsule Take by mouth    traZODone (DESYREL) 50 mg tablet Take 1 tablet (50 mg total) by mouth daily at bedtime    [DISCONTINUED] QUEtiapine (SEROQUEL XR) 50 mg 1 or 2 tablets daily    [DISCONTINUED] venlafaxine 225 MG TB24 TAKE ONE TABLET BY MOUTH ONCE DAILY    [DISCONTINUED] venlafaxine (EFFEXOR XR) 150 mg 24 hr capsule Take 1 capsule (150 mg total) by mouth daily (Patient not taking: Reported on 1/31/2019 )     No current facility-administered medications on file prior to visit  He has No Known Allergies       Review of Systems   Constitutional: Negative for chills and fever  HENT: Negative for congestion, ear pain, hearing loss, postnasal drip, rhinorrhea, sinus pain, sinus pressure, sore throat and trouble swallowing  Eyes: Negative for pain and visual disturbance  Respiratory: Negative for cough, chest tightness, shortness of breath and wheezing  Cardiovascular: Negative  Negative for chest pain, palpitations and leg swelling  Gastrointestinal: Negative for abdominal pain, blood in stool, constipation, diarrhea, nausea and vomiting  Endocrine: Negative for cold intolerance, heat intolerance, polydipsia, polyphagia and polyuria  Genitourinary: Negative for difficulty urinating, dysuria, flank pain and urgency  Musculoskeletal: Negative for arthralgias, back pain, gait problem and myalgias  Skin: Negative for rash  Allergic/Immunologic: Negative  Neurological: Negative for dizziness, weakness, light-headedness and headaches  Hematological: Negative  Psychiatric/Behavioral: Positive for dysphoric mood  Negative for behavioral problems and sleep disturbance  The patient is nervous/anxious  Objective:      /76 (BP Location: Left arm, Patient Position: Sitting, Cuff Size: Adult)   Pulse (!) 129   Temp 98 7 °F (37 1 °C) (Tympanic)   Resp 18   Ht 6' (1 829 m)   Wt 87 5 kg (193 lb)   SpO2 92%   BMI 26 18 kg/m²          Physical Exam   Constitutional: He is oriented to person, place, and time  He appears well-developed and well-nourished  No distress  HENT:   Head: Normocephalic and atraumatic     Right Ear: External ear normal    Left Ear: External ear normal    Nose: Nose normal    Mouth/Throat: Oropharynx is clear and moist  No oropharyngeal exudate  Eyes: Pupils are equal, round, and reactive to light  Conjunctivae and EOM are normal  Right eye exhibits no discharge  Left eye exhibits no discharge  No scleral icterus  Neck: Normal range of motion  Neck supple  No thyromegaly present  Cardiovascular: Normal rate, regular rhythm and normal heart sounds  Exam reveals no gallop and no friction rub  No murmur heard  Pulmonary/Chest: Effort normal and breath sounds normal  No respiratory distress  He has no wheezes  He has no rales  Abdominal: Soft  Bowel sounds are normal  He exhibits no distension  There is no tenderness  Musculoskeletal: Normal range of motion  He exhibits no edema, tenderness or deformity  Neurological: He is alert and oriented to person, place, and time  No cranial nerve deficit  Skin: Skin is warm and dry  He is not diaphoretic  Psychiatric: His behavior is normal  Judgment and thought content normal  His mood appears anxious  He exhibits a depressed mood

## 2019-02-01 ENCOUNTER — OFFICE VISIT (OUTPATIENT)
Dept: INTERNAL MEDICINE CLINIC | Facility: CLINIC | Age: 49
End: 2019-02-01
Payer: COMMERCIAL

## 2019-02-01 VITALS
TEMPERATURE: 97.7 F | HEART RATE: 113 BPM | BODY MASS INDEX: 26.28 KG/M2 | OXYGEN SATURATION: 98 % | DIASTOLIC BLOOD PRESSURE: 62 MMHG | WEIGHT: 194 LBS | HEIGHT: 72 IN | RESPIRATION RATE: 18 BRPM | SYSTOLIC BLOOD PRESSURE: 104 MMHG

## 2019-02-01 DIAGNOSIS — E11.65 UNCONTROLLED TYPE 2 DIABETES MELLITUS WITH HYPERGLYCEMIA (HCC): Primary | ICD-10-CM

## 2019-02-01 DIAGNOSIS — E78.2 MIXED HYPERLIPIDEMIA: ICD-10-CM

## 2019-02-01 PROCEDURE — 99213 OFFICE O/P EST LOW 20 MIN: CPT | Performed by: INTERNAL MEDICINE

## 2019-02-01 RX ORDER — ROSUVASTATIN CALCIUM 10 MG/1
10 TABLET, COATED ORAL DAILY
Qty: 90 TABLET | Refills: 0 | Status: SHIPPED | OUTPATIENT
Start: 2019-02-01 | End: 2019-06-27 | Stop reason: SDUPTHER

## 2019-02-01 NOTE — PATIENT INSTRUCTIONS
Type 2 Diabetes in Adults   AMBULATORY CARE:   Type 2 diabetes  is a disease that affects how your body uses glucose (sugar)  Normally, when the blood sugar level increases, the pancreas makes more insulin  Insulin helps move sugar out of the blood so it can be used for energy  Type 2 diabetes develops because either the body cannot make enough insulin, or it cannot use the insulin correctly  After many years, your pancreas may stop making insulin  Common symptoms include the following:   · More hunger or thirst than usual     · Frequent urination     · Weight loss without trying     · Blurred vision  Call 911 if you have any of the following:   · You have any of the following signs of a stroke:      ¨ Numbness or drooping on one side of your face     ¨ Weakness in an arm or leg    ¨ Confusion or difficulty speaking    ¨ Dizziness, a severe headache, or vision loss    · You have any of the following signs of a heart attack:      ¨ Squeezing, pressure, or pain in your chest that lasts longer than 5 minutes or returns    ¨ Discomfort or pain in your back, neck, jaw, stomach, or arm     ¨ Trouble breathing    ¨ Nausea or vomiting    ¨ Lightheadedness or a sudden cold sweat, especially with chest pain or trouble breathing  Seek care immediately if:   · You have severe abdominal pain, or the pain spreads to your back  You may also be vomiting  · You have trouble staying awake or focusing  · You are shaking or sweating  · You have blurred or double vision  · Your breath has a fruity, sweet smell  · Your breathing is deep and labored, or rapid and shallow  · Your heartbeat is fast and weak  Contact your healthcare provider if:   · You are vomiting or have diarrhea  · You have an upset stomach and cannot eat the foods on your meal plan  · You feel weak or more tired than usual      · You feel dizzy, have headaches, or are easily irritated  · Your skin is red, warm, dry, or swollen  · You have a wound that does not heal      · You have numbness in your arms or legs  · You have trouble coping with your illness, or you feel anxious or depressed  · You have questions or concerns about your condition or care  Treatment for type 2 diabetes  includes keeping your blood sugar at a normal level  You must eat the right foods, and exercise regularly  You may need medicine if you cannot control your blood sugar level with nutrition and exercise  You may also need medicine to prevent heart disease, a complication of type 2 diabetes  You may  need any of the following:  · Hypoglycemic medicines or insulin  may be given to decrease the amount of sugar in your blood  · Blood pressure medicine  may be given to lower your blood pressure  Your blood pressure should be less than 140/90  · Cholesterol lowering medicine  may be given to prevent heart disease  · Antiplatelets , such as aspirin, help prevent blood clots  Take your antiplatelet medicine exactly as directed  These medicines make it more likely for you to bleed or bruise  If you are told to take aspirin, do not take acetaminophen or ibuprofen instead  · Take your medicine as directed  Contact your healthcare provider if you think your medicine is not helping or if you have side effects  Tell him or her if you are allergic to any medicine  Keep a list of the medicines, vitamins, and herbs you take  Include the amounts, and when and why you take them  Bring the list or the pill bottles to follow-up visits  Carry your medicine list with you in case of an emergency  Check your blood sugar level: You will be taught how to check a small drop of blood in a glucose monitor  You will need to check your blood sugar level at least 3 times each day if you are on insulin  Ask your healthcare provider when and how often to check during the day  If you check your blood sugar level before a meal , it should be between 80 and 130 mg/dL   If you check your blood sugar level 1 to 2 hours after a meal , it should be less than 180 mg/dL  Ask your healthcare provider if these are good goals for you  Write down your results, and show them to your healthcare provider  He may use the results to make changes to your medicine, food, and exercise schedules  If your blood sugar level is too low: Your blood sugar level is too low if it goes below 70 mg/dL  If the level is too low, eat or drink 15 grams of fast-acting carbohydrate  These are found naturally in fruits  Fast-acting carbohydrates will raise your blood sugar level quickly  Examples of 15 grams of fast-acting carbohydrate are 4 ounces (½ cup) of fruit juice or 4 ounces of regular soda  Other examples are 2 tablespoons of raisins or 3 to 4 glucose tablets  Check your blood sugar level 15 minutes later  If the level is still low (less than 100 mg/dL), eat another 15 grams of carbohydrate  When the level returns to 100 mg/dL, eat a snack or meal that contains carbohydrates  This will help prevent another drop in blood sugar  Always carefully follow your healthcare provider's instructions on how to treat low blood sugar levels  Check your feet each day for sores:  Wear shoes and socks that fit correctly  Do not trim your toenails  Ask your healthcare provider for more information about foot care  Follow your meal plan:  A dietitian will help you make a meal plan to keep your blood sugar level steady  Do not skip meals  Your blood sugar level may drop too low if you have taken diabetes medicine and do not eat  · Keep track of carbohydrates (sugar and starchy foods)  Your blood sugar level can get too high if you eat too many carbohydrates  Your dietitian will help you plan meals and snacks that have the right amount of carbohydrates  · Eat low-fat foods , such as skinless chicken and low-fat milk  · Eat less sodium (salt)    Limit high-sodium foods, such as soy sauce, potato chips, and soup  Do not add salt to food you cook  Limit your use of table salt  You should have less than 2,300 mg of sodium per day  · Eat high-fiber foods , such as vegetables, whole grain breads, and beans  · Limit alcohol  Alcohol affects your blood sugar level and can make it harder to manage your diabetes  Limit alcohol to 1 drink a day if you are a woman  Limit alcohol to 2 drinks a day if you are a man  A drink of alcohol is 12 ounces of beer, 5 ounces of wine, or 1½ ounces of liquor  Maintain a healthy weight:  Ask your healthcare provider how much you should weigh  A healthy weight can help you control your diabetes  Ask your provider to help you create a weight loss plan if you are overweight  Together you can set manageable weight loss goals  Exercise as directed:  Exercise can help keep your blood sugar level steady, decrease your risk of heart disease, and help you lose weight  Stretch before and after you exercise  Exercise for at least 150 minutes every week  Spread this amount of exercise over at least 3 days a week  Do not skip exercise more than 2 days in a row  Include muscle strengthening activities 2 to 3 days each week  Older adults should include balance training 2 to 3 times each week  Activities that help increase balance include yoga and jamshid chi  Work with your healthcare provider to create an exercise plan  · Check your blood sugar level before and after exercise  Healthcare providers may tell you to change the amount of insulin you take or food you eat  If your blood sugar level is high, check your blood or urine for ketones before you exercise  Do not exercise if your blood sugar level is high and you have ketones  · If your blood sugar level is less than 100 mg/dL, have a carbohydrate snack before you exercise  Examples are 4 to 6 crackers, ½ banana, 8 ounces (1 cup) of milk, or 4 ounces (½ cup) of juice   Drink water or liquids that do not contain sugar before, during, and after exercise  Ask your dietitian or healthcare provider which liquids you should drink when you exercise  · Do not sit for longer than 30 minutes  If you cannot walk around, at least stand up  This will help you stay active and keep your blood circulating  Do not smoke:  Nicotine and other chemicals in cigarettes and cigars can cause lung damage and make it more difficult to manage your diabetes  Ask your healthcare provider for information if you currently smoke and need help to quit  Do not use e-cigarettes or smokeless tobacco in place of cigarettes or to help you quit  They still contain nicotine  Check your blood pressure as directed:  Ask your healthcare provider what your blood pressure should be  Most adults with diabetes and high blood pressure should have a systolic blood pressure (first number) less than 140  Your diastolic blood pressure (second number) should be less than 90  Wear medical alert identification:  Wear medical alert jewelry or carry a card that says you have diabetes  Ask your healthcare provider where to get these items  Ask about vaccines: You have a higher risk for serious illness if you get the flu, pneumonia, or hepatitis  Ask your healthcare provider if you should get a flu, pneumonia, or hepatitis B vaccine, and when to get the vaccine  Follow up with your healthcare provider as directed: You may need to return to have your A1c checked every 3 months  You will need to return at least once each year to have your feet checked  You will need an eye exam once a year to check for retinopathy  You will also need urine tests every year to check for kidney problems  You may need tests to monitor for heart disease such as an EKG, stress test, blood pressure monitoring, and blood tests  Write down your questions so you remember to ask them during your visits     © 2017 [employer] Information is for End User's use only and may not be sold, redistributed or otherwise used for commercial purposes  All illustrations and images included in CareNotes® are the copyrighted property of [employer]  or [employer]  The above information is an  only  It is not intended as medical advice for individual conditions or treatments  Talk to your doctor, nurse or pharmacist before following any medical regimen to see if it is safe and effective for you

## 2019-02-01 NOTE — PROGRESS NOTES
Assessment/Plan:    No problem-specific Assessment & Plan notes found for this encounter  Diagnoses and all orders for this visit:    Uncontrolled type 2 diabetes mellitus with hyperglycemia (HCC)  -     insulin glargine (BASAGLAR KWIKPEN) 100 units/mL injection pen; 10 units sq q AM  70 units sq q PM  -     rosuvastatin (CRESTOR) 10 MG tablet; Take 1 tablet (10 mg total) by mouth daily    Mixed hyperlipidemia  -     rosuvastatin (CRESTOR) 10 MG tablet; Take 1 tablet (10 mg total) by mouth daily      A/P: I have spent 20 minutes with Patient  today in which greater than 50% of this time was spent in counseling/coordination of care regarding Diagnostic results, Prognosis, Risks and benefits of tx options, Intructions for management, Importance of tx compliance and Risk factor reductions  At this point, will increase PM basal insulin to 70 units and will start AM basal insulin 10 units since I assume that his evening sugars are up as well  Pt to start checking more sugars throughout the day  Since LFT's back to normal and will start crestor  Will need LFT's in several weeks  Will RTC in two weeks for f/u and may need additional adjustment  Subjective:      Patient ID: Yakelin Madison is a 44 y o  male  WM RTC at my request after recent labs continued to show HgA1c greater than 10 and elevated TG/LDL given his diabetes  Pt reports AM sugars around 150, but doesn't check any other readings  Not really watching his diet  Labs did show normal LFT's  No new c/o's and psyche meds just changed  No low sugars reported  The following portions of the patient's history were reviewed and updated as appropriate:   He  has a past medical history of Allergic rhinitis; Anemia; Anxiety and depression; Quiros esophagus; Cholelithiasis; Chronic pain; COPD (chronic obstructive pulmonary disease) ([facility] [facility] ); Depression; Diabetes mellitus ([facility] [facility] ); Emphysema lung ([facility] [address] );  Generalized anxiety disorder; GERD (gastroesophageal reflux disease); Hyperlipidemia; Hypertension; Kidney stone; Metatarsalgia; Pancreatitis; Psychiatric disorder; Renal disorder; and Shortness of breath  He   Patient Active Problem List    Diagnosis Date Noted    Paresthesia of both lower extremities     Chronic pancreatitis ([facility] ) 09/13/2018    Transaminitis 08/13/2018    Tobacco dependence 08/13/2018    GERD (gastroesophageal reflux disease) 08/09/2018    H/O alcohol abuse 08/09/2018    Insomnia 09/05/2017    COPD (chronic obstructive pulmonary disease) ([place] [address] ) 03/29/2017    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    Iron deficiency anemia 04/05/2016    Diabetes mellitus type 1 5, managed as type 1 ([place] [address] ) 04/05/2016    Fibromyalgia 07/29/2013    Fatty liver 12/05/2012    Nephrolithiasis 12/05/2012    Hyperlipidemia 09/11/2012    Benign essential hypertension 07/02/2012    MDD (major depressive disorder), single episode 07/02/2012    Other chronic pain 07/02/2012    Sleep disorder 07/02/2012    Generalized anxiety disorder 06/13/2012     He  has a past surgical history that includes CHOLECYSTECTOMY LAPAROSCOPIC; Vasectomy; Foot surgery; Knee arthroscopy; and pr edg us exam surgical alter stom duodenum/jejunum (N/A, 10/17/2018)  His family history includes Cancer in his mother; Coronary artery disease in his family, father, and mother; Diabetes in his mother and sister; Prostate cancer in his father  He  reports that he has been smoking  He has been smoking about 1 00 pack per day  He has never used smokeless tobacco  He reports that he drinks alcohol  He reports that he does not use drugs    Current Outpatient Prescriptions   Medication Sig Dispense Refill    ARIPiprazole (ABILIFY) 5 mg tablet Take 1 tablet (5 mg total) by mouth daily 30 tablet 1    DULoxetine (CYMBALTA) 60 mg delayed release capsule Take 1 capsule (60 mg total) by mouth daily 30 capsule 1    fenofibrate (TRICOR) 145 mg tablet Take 1 tablet (145 mg total) by mouth daily 30 tablet 5    glucose blood (ONE TOUCH ULTRA TEST) test strip by In Vitro route 3 (three) times a day      hydrOXYzine pamoate (VISTARIL) 50 mg capsule TAKE 1 CAPSULE BY MOUTH THREE TIMES DAILY AS NEEDED 90 capsule 0    Insulin Pen Needle (B-D ULTRAFINE III SHORT PEN) 31G X 8 MM MISC by Does not apply route      Insulin Pen Needle (PEN NEEDLES) 29G X 12MM MISC by Does not apply route daily at bedtime Substitute what fits [place] pen and what is covered by insurance  45 each 0    lisinopril (ZESTRIL) 20 mg tablet Take 1 tablet (20 mg total) by mouth daily 30 tablet 5    omeprazole (PriLOSEC) 20 mg delayed release capsule Take by mouth      traZODone (DESYREL) 50 mg tablet Take 1 tablet (50 mg total) by mouth daily at bedtime 90 tablet 0    venlafaxine 75 mg 24 hr tablet Take 1 tablet (75 mg total) by mouth daily with breakfast 30 tablet 0    insulin glargine (BASAGLAR KWIKPEN) 100 units/mL injection pen 10 units sq q AM  70 units sq q PM 5 pen 1    rosuvastatin (CRESTOR) 10 MG tablet Take 1 tablet (10 mg total) by mouth daily 90 tablet 0     No current facility-administered medications for this visit  Current Outpatient Prescriptions on File Prior to Visit   Medication Sig    ARIPiprazole (ABILIFY) 5 mg tablet Take 1 tablet (5 mg total) by mouth daily    DULoxetine (CYMBALTA) 60 mg delayed release capsule Take 1 capsule (60 mg total) by mouth daily    fenofibrate (TRICOR) 145 mg tablet Take 1 tablet (145 mg total) by mouth daily    glucose blood (ONE TOUCH ULTRA TEST) test strip by In Vitro route 3 (three) times a day    hydrOXYzine pamoate (VISTARIL) 50 mg capsule TAKE 1 CAPSULE BY MOUTH THREE TIMES DAILY AS NEEDED    Insulin Pen Needle (B-D ULTRAFINE III SHORT PEN) 31G X 8 MM MISC by Does not apply route    Insulin Pen Needle (PEN NEEDLES) 29G X 12MM MISC by Does not apply route daily at bedtime Substitute what fits [place] pen and what is covered by insurance      lisinopril (ZESTRIL) 20 mg tablet Take 1 tablet (20 mg total) by mouth daily    omeprazole (PriLOSEC) 20 mg delayed release capsule Take by mouth    traZODone (DESYREL) 50 mg tablet Take 1 tablet (50 mg total) by mouth daily at bedtime    venlafaxine 75 mg 24 hr tablet Take 1 tablet (75 mg total) by mouth daily with breakfast    [DISCONTINUED] insulin glargine (BASAGLAR KWIKPEN) 100 units/mL injection pen Inject 60 units sq q HS  No current facility-administered medications on file prior to visit  He has No Known Allergies       Review of Systems   Constitutional: Negative for activity change, chills, diaphoresis, fatigue and fever  Eyes: Negative for visual disturbance  Respiratory: Negative for cough, chest tightness, shortness of breath and wheezing  Cardiovascular: Negative for chest pain, palpitations and leg swelling  Gastrointestinal: Negative for abdominal pain, constipation, diarrhea, nausea and vomiting  Genitourinary: Negative for difficulty urinating, dysuria and frequency  Musculoskeletal: Negative for arthralgias, gait problem and myalgias  Neurological: Negative for dizziness, seizures, syncope, weakness, light-headedness and headaches  Psychiatric/Behavioral: Positive for dysphoric mood and sleep disturbance  Negative for confusion  The patient is nervous/anxious  Objective:      /62 (BP Location: Left arm, Patient Position: Sitting, Cuff Size: Adult)   Pulse (!) 113   Temp 97 7 °F (36 5 °C) (Tympanic)   Resp 18   Ht 6' (1 829 m)   Wt 88 kg (194 lb)   SpO2 98%   BMI 26 31 kg/m²          Physical Exam   Constitutional: He is oriented to person, place, and time  He appears well-developed and well-nourished  No distress  HENT:   Head: Normocephalic and atraumatic  Mouth/Throat: Oropharynx is clear and moist    Eyes: Pupils are equal, round, and reactive to light  Conjunctivae and EOM are normal    Cardiovascular: Normal rate, regular rhythm and normal heart sounds      No murmur heard   Pulmonary/Chest: Effort normal and breath sounds normal  No respiratory distress  He has no wheezes  He has no rales  Abdominal: Soft  Bowel sounds are normal  He exhibits no distension  There is no tenderness  Neurological: He is alert and oriented to person, place, and time  Psychiatric: He has a normal mood and affect  His behavior is normal  Judgment and thought content normal    Nursing note and vitals reviewed

## 2019-02-05 ENCOUNTER — TELEPHONE (OUTPATIENT)
Dept: INTERNAL MEDICINE CLINIC | Facility: CLINIC | Age: 49
End: 2019-02-05

## 2019-02-05 DIAGNOSIS — F41.1 GAD (GENERALIZED ANXIETY DISORDER): Primary | ICD-10-CM

## 2019-02-05 DIAGNOSIS — E10.9 TYPE 1 DIABETES MELLITUS WITHOUT COMPLICATION (HCC): ICD-10-CM

## 2019-02-05 NOTE — TELEPHONE ENCOUNTER
PT IS APPLYING FOR DISABILITY AND THEY ARE RECOMMENDING A REFERRAL TO ENDOCRINOLOGY AND PSYCHIATRY PER ROBBIE AND HIS SISTER  CAN YOU PLEASE PUT THE REFERRALS IN FOR HIM  HE HAS AN APPT WITH YOU ON 2-19-18

## 2019-02-08 DIAGNOSIS — F41.1 GAD (GENERALIZED ANXIETY DISORDER): Primary | ICD-10-CM

## 2019-02-08 RX ORDER — VENLAFAXINE 75 MG/1
75 TABLET ORAL
Qty: 30 TABLET | Refills: 5 | Status: SHIPPED | OUTPATIENT
Start: 2019-02-08 | End: 2019-02-15

## 2019-02-19 ENCOUNTER — HOSPITAL ENCOUNTER (INPATIENT)
Facility: HOSPITAL | Age: 49
LOS: 6 days | Discharge: HOME | DRG: 751 | End: 2019-02-25
Attending: HOSPITALIST | Admitting: PSYCHIATRY & NEUROLOGY
Payer: COMMERCIAL

## 2019-02-19 ENCOUNTER — OFFICE VISIT (OUTPATIENT)
Dept: INTERNAL MEDICINE CLINIC | Facility: CLINIC | Age: 49
End: 2019-02-19
Payer: COMMERCIAL

## 2019-02-19 ENCOUNTER — HOSPITAL ENCOUNTER (EMERGENCY)
Facility: HOSPITAL | Age: 49
End: 2019-02-19
Attending: EMERGENCY MEDICINE
Payer: COMMERCIAL

## 2019-02-19 ENCOUNTER — APPOINTMENT (EMERGENCY)
Dept: RADIOLOGY | Facility: HOSPITAL | Age: 49
End: 2019-02-19
Payer: COMMERCIAL

## 2019-02-19 VITALS
RESPIRATION RATE: 16 BRPM | TEMPERATURE: 97.6 F | DIASTOLIC BLOOD PRESSURE: 78 MMHG | HEART RATE: 107 BPM | WEIGHT: 192 LBS | BODY MASS INDEX: 26.01 KG/M2 | HEIGHT: 72 IN | OXYGEN SATURATION: 97 % | SYSTOLIC BLOOD PRESSURE: 135 MMHG

## 2019-02-19 VITALS
TEMPERATURE: 98.1 F | HEART RATE: 88 BPM | RESPIRATION RATE: 16 BRPM | SYSTOLIC BLOOD PRESSURE: 110 MMHG | BODY MASS INDEX: 26.01 KG/M2 | WEIGHT: 192 LBS | DIASTOLIC BLOOD PRESSURE: 70 MMHG | HEIGHT: 72 IN

## 2019-02-19 DIAGNOSIS — F41.1 GENERALIZED ANXIETY DISORDER: ICD-10-CM

## 2019-02-19 DIAGNOSIS — F17.200 TOBACCO DEPENDENCE: ICD-10-CM

## 2019-02-19 DIAGNOSIS — Z91.19 NON-COMPLIANCE: ICD-10-CM

## 2019-02-19 DIAGNOSIS — K86.0 ALCOHOL-INDUCED CHRONIC PANCREATITIS (HCC): ICD-10-CM

## 2019-02-19 DIAGNOSIS — R45.851 DEPRESSION WITH SUICIDAL IDEATION: ICD-10-CM

## 2019-02-19 DIAGNOSIS — R45.851 SUICIDAL IDEATIONS: ICD-10-CM

## 2019-02-19 DIAGNOSIS — E11.65 UNCONTROLLED TYPE 2 DIABETES MELLITUS WITH HYPERGLYCEMIA (HCC): Primary | ICD-10-CM

## 2019-02-19 DIAGNOSIS — R45.851 SUICIDAL IDEATION: ICD-10-CM

## 2019-02-19 DIAGNOSIS — F32.A DEPRESSION WITH SUICIDAL IDEATION: ICD-10-CM

## 2019-02-19 DIAGNOSIS — G47.00 INSOMNIA, UNSPECIFIED TYPE: ICD-10-CM

## 2019-02-19 DIAGNOSIS — J44.9 CHRONIC OBSTRUCTIVE PULMONARY DISEASE, UNSPECIFIED COPD TYPE (HCC): ICD-10-CM

## 2019-02-19 DIAGNOSIS — I10 BENIGN ESSENTIAL HYPERTENSION: ICD-10-CM

## 2019-02-19 DIAGNOSIS — E13.9 DIABETES MELLITUS TYPE 1.5, MANAGED AS TYPE 1 (HCC): ICD-10-CM

## 2019-02-19 DIAGNOSIS — F41.9 ANXIETY: ICD-10-CM

## 2019-02-19 DIAGNOSIS — I10 BENIGN ESSENTIAL HYPERTENSION: Primary | ICD-10-CM

## 2019-02-19 DIAGNOSIS — F10.10 ALCOHOL ABUSE: ICD-10-CM

## 2019-02-19 DIAGNOSIS — F32.2 CURRENT SEVERE EPISODE OF MAJOR DEPRESSIVE DISORDER WITHOUT PSYCHOTIC FEATURES WITHOUT PRIOR EPISODE (HCC): Primary | ICD-10-CM

## 2019-02-19 DIAGNOSIS — F10.20 ALCOHOLISM (HCC): ICD-10-CM

## 2019-02-19 LAB
ALBUMIN SERPL BCP-MCNC: 4.1 G/DL (ref 3.5–5)
ALP SERPL-CCNC: 132 U/L (ref 46–116)
ALT SERPL W P-5'-P-CCNC: 27 U/L (ref 12–78)
AMPHETAMINES SERPL QL SCN: NEGATIVE
ANION GAP SERPL CALCULATED.3IONS-SCNC: 11 MMOL/L (ref 4–13)
AST SERPL W P-5'-P-CCNC: 16 U/L (ref 5–45)
ATRIAL RATE: 109 BPM
ATRIAL RATE: 99 BPM
BARBITURATES UR QL: NEGATIVE
BASOPHILS # BLD AUTO: 0.06 THOUSANDS/ΜL (ref 0–0.1)
BASOPHILS NFR BLD AUTO: 1 % (ref 0–1)
BENZODIAZ UR QL: NEGATIVE
BILIRUB SERPL-MCNC: 0.6 MG/DL (ref 0.2–1)
BUN SERPL-MCNC: 16 MG/DL (ref 5–25)
CALCIUM SERPL-MCNC: 9.6 MG/DL (ref 8.3–10.1)
CHLORIDE SERPL-SCNC: 97 MMOL/L (ref 100–108)
CO2 SERPL-SCNC: 27 MMOL/L (ref 21–32)
COCAINE UR QL: NEGATIVE
CREAT SERPL-MCNC: 1.2 MG/DL (ref 0.6–1.3)
EOSINOPHIL # BLD AUTO: 0.12 THOUSAND/ΜL (ref 0–0.61)
EOSINOPHIL NFR BLD AUTO: 2 % (ref 0–6)
ERYTHROCYTE [DISTWIDTH] IN BLOOD BY AUTOMATED COUNT: 12.9 % (ref 11.6–15.1)
ETHANOL SERPL-MCNC: <3 MG/DL (ref 0–3)
GFR SERPL CREATININE-BSD FRML MDRD: 70 ML/MIN/1.73SQ M
GLUCOSE SERPL-MCNC: 208 MG/DL (ref 65–140)
GLUCOSE SERPL-MCNC: 331 MG/DL (ref 65–140)
HCT VFR BLD AUTO: 46.4 % (ref 36.5–49.3)
HGB BLD-MCNC: 16 G/DL (ref 12–17)
IMM GRANULOCYTES # BLD AUTO: 0.03 THOUSAND/UL (ref 0–0.2)
IMM GRANULOCYTES NFR BLD AUTO: 0 % (ref 0–2)
LIPASE SERPL-CCNC: 154 U/L (ref 73–393)
LYMPHOCYTES # BLD AUTO: 1.81 THOUSANDS/ΜL (ref 0.6–4.47)
LYMPHOCYTES NFR BLD AUTO: 26 % (ref 14–44)
MCH RBC QN AUTO: 30.1 PG (ref 26.8–34.3)
MCHC RBC AUTO-ENTMCNC: 34.5 G/DL (ref 31.4–37.4)
MCV RBC AUTO: 87 FL (ref 82–98)
METHADONE UR QL: NEGATIVE
MONOCYTES # BLD AUTO: 0.54 THOUSAND/ΜL (ref 0.17–1.22)
MONOCYTES NFR BLD AUTO: 8 % (ref 4–12)
NEUTROPHILS # BLD AUTO: 4.55 THOUSANDS/ΜL (ref 1.85–7.62)
NEUTS SEG NFR BLD AUTO: 63 % (ref 43–75)
NRBC BLD AUTO-RTO: 0 /100 WBCS
OPIATES UR QL SCN: NEGATIVE
P AXIS: 52 DEGREES
P AXIS: 57 DEGREES
PCP UR QL: NEGATIVE
PLATELET # BLD AUTO: 222 THOUSANDS/UL (ref 149–390)
PMV BLD AUTO: 8.5 FL (ref 8.9–12.7)
POTASSIUM SERPL-SCNC: 4.4 MMOL/L (ref 3.5–5.3)
PR INTERVAL: 124 MS
PR INTERVAL: 128 MS
PROT SERPL-MCNC: 8.4 G/DL (ref 6.4–8.2)
QRS AXIS: 68 DEGREES
QRS AXIS: 73 DEGREES
QRSD INTERVAL: 80 MS
QRSD INTERVAL: 82 MS
QT INTERVAL: 326 MS
QT INTERVAL: 346 MS
QTC INTERVAL: 439 MS
QTC INTERVAL: 444 MS
RBC # BLD AUTO: 5.32 MILLION/UL (ref 3.88–5.62)
SODIUM SERPL-SCNC: 135 MMOL/L (ref 136–145)
T WAVE AXIS: 51 DEGREES
T WAVE AXIS: 60 DEGREES
THC UR QL: NEGATIVE
TROPONIN I SERPL-MCNC: <0.02 NG/ML
TROPONIN I SERPL-MCNC: <0.02 NG/ML
VENTRICULAR RATE: 109 BPM
VENTRICULAR RATE: 99 BPM
WBC # BLD AUTO: 7.11 THOUSAND/UL (ref 4.31–10.16)

## 2019-02-19 PROCEDURE — 80053 COMPREHEN METABOLIC PANEL: CPT | Performed by: EMERGENCY MEDICINE

## 2019-02-19 PROCEDURE — 71046 X-RAY EXAM CHEST 2 VIEWS: CPT

## 2019-02-19 PROCEDURE — 96376 TX/PRO/DX INJ SAME DRUG ADON: CPT

## 2019-02-19 PROCEDURE — 80320 DRUG SCREEN QUANTALCOHOLS: CPT | Performed by: EMERGENCY MEDICINE

## 2019-02-19 PROCEDURE — 82948 REAGENT STRIP/BLOOD GLUCOSE: CPT

## 2019-02-19 PROCEDURE — 96374 THER/PROPH/DIAG INJ IV PUSH: CPT

## 2019-02-19 PROCEDURE — 93005 ELECTROCARDIOGRAM TRACING: CPT

## 2019-02-19 PROCEDURE — 99285 EMERGENCY DEPT VISIT HI MDM: CPT

## 2019-02-19 PROCEDURE — 85025 COMPLETE CBC W/AUTO DIFF WBC: CPT | Performed by: EMERGENCY MEDICINE

## 2019-02-19 PROCEDURE — 93010 ELECTROCARDIOGRAM REPORT: CPT | Performed by: INTERNAL MEDICINE

## 2019-02-19 PROCEDURE — 83690 ASSAY OF LIPASE: CPT | Performed by: EMERGENCY MEDICINE

## 2019-02-19 PROCEDURE — 99215 OFFICE O/P EST HI 40 MIN: CPT | Performed by: INTERNAL MEDICINE

## 2019-02-19 PROCEDURE — 80307 DRUG TEST PRSMV CHEM ANLYZR: CPT | Performed by: EMERGENCY MEDICINE

## 2019-02-19 PROCEDURE — 36415 COLL VENOUS BLD VENIPUNCTURE: CPT | Performed by: EMERGENCY MEDICINE

## 2019-02-19 PROCEDURE — 84484 ASSAY OF TROPONIN QUANT: CPT | Performed by: EMERGENCY MEDICINE

## 2019-02-19 RX ORDER — LORAZEPAM 2 MG/ML
1 INJECTION INTRAMUSCULAR ONCE
Status: COMPLETED | OUTPATIENT
Start: 2019-02-19 | End: 2019-02-19

## 2019-02-19 RX ORDER — ARIPIPRAZOLE 5 MG/1
5 TABLET ORAL DAILY
Status: DISCONTINUED | OUTPATIENT
Start: 2019-02-20 | End: 2019-02-19 | Stop reason: HOSPADM

## 2019-02-19 RX ORDER — DULOXETIN HYDROCHLORIDE 60 MG/1
60 CAPSULE, DELAYED RELEASE ORAL DAILY
Status: DISCONTINUED | OUTPATIENT
Start: 2019-02-20 | End: 2019-02-19 | Stop reason: HOSPADM

## 2019-02-19 RX ORDER — HALOPERIDOL 5 MG/ML
5 INJECTION INTRAMUSCULAR EVERY 6 HOURS PRN
Status: DISCONTINUED | OUTPATIENT
Start: 2019-02-19 | End: 2019-02-25 | Stop reason: HOSPADM

## 2019-02-19 RX ORDER — TRAZODONE HYDROCHLORIDE 50 MG/1
50 TABLET ORAL
Status: DISCONTINUED | OUTPATIENT
Start: 2019-02-19 | End: 2019-02-19 | Stop reason: HOSPADM

## 2019-02-19 RX ORDER — TRAZODONE HYDROCHLORIDE 50 MG/1
50 TABLET ORAL
Status: CANCELLED | OUTPATIENT
Start: 2019-02-19

## 2019-02-19 RX ORDER — IBUPROFEN 800 MG/1
800 TABLET ORAL EVERY 8 HOURS PRN
Status: DISCONTINUED | OUTPATIENT
Start: 2019-02-19 | End: 2019-02-25 | Stop reason: HOSPADM

## 2019-02-19 RX ORDER — IBUPROFEN 400 MG/1
800 TABLET ORAL EVERY 8 HOURS PRN
Status: CANCELLED | OUTPATIENT
Start: 2019-02-19

## 2019-02-19 RX ORDER — HYDROXYZINE HYDROCHLORIDE 25 MG/1
25 TABLET, FILM COATED ORAL EVERY 6 HOURS PRN
Status: CANCELLED | OUTPATIENT
Start: 2019-02-19

## 2019-02-19 RX ORDER — RISPERIDONE 1 MG/1
1 TABLET, ORALLY DISINTEGRATING ORAL EVERY 6 HOURS PRN
Status: CANCELLED | OUTPATIENT
Start: 2019-02-19

## 2019-02-19 RX ORDER — FENOFIBRATE 145 MG/1
145 TABLET, COATED ORAL DAILY
Status: DISCONTINUED | OUTPATIENT
Start: 2019-02-20 | End: 2019-02-19 | Stop reason: HOSPADM

## 2019-02-19 RX ORDER — HYDROXYZINE HYDROCHLORIDE 25 MG/1
25 TABLET, FILM COATED ORAL EVERY 6 HOURS PRN
Status: DISCONTINUED | OUTPATIENT
Start: 2019-02-19 | End: 2019-02-25 | Stop reason: HOSPADM

## 2019-02-19 RX ORDER — PANTOPRAZOLE SODIUM 20 MG/1
20 TABLET, DELAYED RELEASE ORAL
Status: DISCONTINUED | OUTPATIENT
Start: 2019-02-20 | End: 2019-02-19 | Stop reason: HOSPADM

## 2019-02-19 RX ORDER — ACETAMINOPHEN 325 MG/1
650 TABLET ORAL EVERY 4 HOURS PRN
Status: DISCONTINUED | OUTPATIENT
Start: 2019-02-19 | End: 2019-02-25 | Stop reason: HOSPADM

## 2019-02-19 RX ORDER — TRAZODONE HYDROCHLORIDE 50 MG/1
50 TABLET ORAL
Status: DISCONTINUED | OUTPATIENT
Start: 2019-02-19 | End: 2019-02-25 | Stop reason: HOSPADM

## 2019-02-19 RX ORDER — ACETAMINOPHEN 325 MG/1
325 TABLET ORAL EVERY 6 HOURS PRN
Status: CANCELLED | OUTPATIENT
Start: 2019-02-19

## 2019-02-19 RX ORDER — HALOPERIDOL 5 MG
5 TABLET ORAL EVERY 6 HOURS PRN
Status: CANCELLED | OUTPATIENT
Start: 2019-02-19

## 2019-02-19 RX ORDER — LISINOPRIL 10 MG/1
20 TABLET ORAL DAILY
Status: DISCONTINUED | OUTPATIENT
Start: 2019-02-20 | End: 2019-02-19 | Stop reason: HOSPADM

## 2019-02-19 RX ORDER — ACETAMINOPHEN 325 MG/1
650 TABLET ORAL EVERY 6 HOURS PRN
Status: DISCONTINUED | OUTPATIENT
Start: 2019-02-19 | End: 2019-02-25 | Stop reason: HOSPADM

## 2019-02-19 RX ORDER — RISPERIDONE 1 MG/1
1 TABLET, ORALLY DISINTEGRATING ORAL EVERY 6 HOURS PRN
Status: DISCONTINUED | OUTPATIENT
Start: 2019-02-19 | End: 2019-02-25 | Stop reason: HOSPADM

## 2019-02-19 RX ORDER — BENZTROPINE MESYLATE 1 MG/1
2 TABLET ORAL EVERY 6 HOURS PRN
Status: DISCONTINUED | OUTPATIENT
Start: 2019-02-19 | End: 2019-02-25 | Stop reason: HOSPADM

## 2019-02-19 RX ORDER — MAGNESIUM HYDROXIDE/ALUMINUM HYDROXICE/SIMETHICONE 120; 1200; 1200 MG/30ML; MG/30ML; MG/30ML
15 SUSPENSION ORAL EVERY 4 HOURS PRN
Status: CANCELLED | OUTPATIENT
Start: 2019-02-19

## 2019-02-19 RX ORDER — HALOPERIDOL 5 MG
5 TABLET ORAL EVERY 6 HOURS PRN
Status: DISCONTINUED | OUTPATIENT
Start: 2019-02-19 | End: 2019-02-25 | Stop reason: HOSPADM

## 2019-02-19 RX ORDER — BENZTROPINE MESYLATE 1 MG/ML
2 INJECTION INTRAMUSCULAR; INTRAVENOUS EVERY 6 HOURS PRN
Status: CANCELLED | OUTPATIENT
Start: 2019-02-19

## 2019-02-19 RX ORDER — BENZTROPINE MESYLATE 0.5 MG/1
2 TABLET ORAL EVERY 6 HOURS PRN
Status: CANCELLED | OUTPATIENT
Start: 2019-02-19

## 2019-02-19 RX ORDER — ACETAMINOPHEN 325 MG/1
325 TABLET ORAL EVERY 6 HOURS PRN
Status: DISCONTINUED | OUTPATIENT
Start: 2019-02-19 | End: 2019-02-20 | Stop reason: SDUPTHER

## 2019-02-19 RX ORDER — NICOTINE 21 MG/24HR
21 PATCH, TRANSDERMAL 24 HOURS TRANSDERMAL ONCE
Status: DISCONTINUED | OUTPATIENT
Start: 2019-02-19 | End: 2019-02-19 | Stop reason: HOSPADM

## 2019-02-19 RX ORDER — MAGNESIUM HYDROXIDE/ALUMINUM HYDROXICE/SIMETHICONE 120; 1200; 1200 MG/30ML; MG/30ML; MG/30ML
15 SUSPENSION ORAL EVERY 4 HOURS PRN
Status: DISCONTINUED | OUTPATIENT
Start: 2019-02-19 | End: 2019-02-25 | Stop reason: HOSPADM

## 2019-02-19 RX ORDER — ACETAMINOPHEN 325 MG/1
650 TABLET ORAL EVERY 4 HOURS PRN
Status: CANCELLED | OUTPATIENT
Start: 2019-02-19

## 2019-02-19 RX ORDER — HALOPERIDOL 5 MG/ML
5 INJECTION INTRAMUSCULAR EVERY 6 HOURS PRN
Status: CANCELLED | OUTPATIENT
Start: 2019-02-19

## 2019-02-19 RX ORDER — BENZTROPINE MESYLATE 1 MG/ML
2 INJECTION INTRAMUSCULAR; INTRAVENOUS EVERY 6 HOURS PRN
Status: DISCONTINUED | OUTPATIENT
Start: 2019-02-19 | End: 2019-02-25 | Stop reason: HOSPADM

## 2019-02-19 RX ORDER — ACETAMINOPHEN 325 MG/1
650 TABLET ORAL EVERY 6 HOURS PRN
Status: CANCELLED | OUTPATIENT
Start: 2019-02-19

## 2019-02-19 RX ADMIN — LORAZEPAM 1 MG: 2 INJECTION INTRAMUSCULAR; INTRAVENOUS at 12:59

## 2019-02-19 RX ADMIN — LORAZEPAM 1 MG: 2 INJECTION INTRAMUSCULAR; INTRAVENOUS at 16:18

## 2019-02-19 RX ADMIN — NICOTINE 21 MG: 21 PATCH, EXTENDED RELEASE TRANSDERMAL at 19:38

## 2019-02-19 NOTE — ED NOTES
Patient asleep in room, no sign of distress noted  Patient has door opened, tv is on, lights are on  Will continue to monitor       Terra Santos  02/19/19 8815

## 2019-02-19 NOTE — ED NOTES
Per   [facility], patient was moved into room #20/[facility] area  Family member still at patients bedside  No issues noted during transferred from room 18 to room 20  Patient belongings were placed in locker #20         [place]  02/19/19 3136

## 2019-02-19 NOTE — ED NOTES
1:1 started around 1300  Pt gave urine sample and xray is complete  Pt is now resting and his sister at bedside  Will continue to monitor pt        Vannesa Wilkins  02/19/19 8469

## 2019-02-19 NOTE — ED NOTES
Intake / Safety assessment completed with patient  Patient presents to ED from his Dr's office due to suicidal ideation with a plan to drink himself to death, take pills  or shoot himself  He talks about doing something drastic and if he were to do it he would do it away from home  He reports past thoughts of wanting to use a gun  Patient reports increased anxiety and depression  He notes that he self medicates by drinking as he feels it helps to relieve his anxiety  Patient reports that he had his last drink on Saturday  He reports drinking up to a bottle daily  From 2003 to 2014 patient reports he was sober  Patient reports being unemployed as a trigger  He also notes that he gets sick off and on with pancreatitits which triggers his thoughts of wanting to no longer live  Patient denies any homicidal ideation  He also denies any halluncinations  Patient reports having poor sleep and poor appetite  Patient requesting help and states he is willing to sign 201 which he did  201 paper faxed to Hussein Michelle at [phone] for review at [facility]

## 2019-02-19 NOTE — ED NOTES
Patient is resting comfortably with sister and ED tech at bedside   Denies any complaints at this time,      Rosey Ferrera RN  02/19/19 3122

## 2019-02-19 NOTE — ED NOTES
Patient is in room  Sister left patients bed side and per patients request, she has his car keys  Pt is lying in bed, with tv on and lights on  No other issues noted at this time        Jai Veras  02/19/19 1802

## 2019-02-19 NOTE — ED NOTES
Taking over patient observation from previous EDT  Patient is in room, with no complaints or issues  RN collecting repeated blood work and EKG  Will continue to monitor patient       Amos Filomena  02/19/19 9156

## 2019-02-19 NOTE — ED PROVIDER NOTES
History  Chief Complaint   Patient presents with    Psychiatric Evaluation     pt started with SI "some time ago, getting worse the past few days" pt recently had changes to medications, anxiety worsening  pt states he does have a plan "drinking and the anxiety medications "    Alcohol Problem     pt with etoh abuse, drinks one handle of 80 proof liquor a day, last drink saturday  pt has hx of pancreatitis, pt sent from PCP for concerns of acute pancreatitis, pt denies symptoms currently  pt denies ETOH withdrawl symptoms currently other than "severe anxiety and chest pressure "     57-year-old male with a history of alcoholism presents to the emergency department for evaluation of depression with suicidal ideations  Patient states that he has been drinking heavily on a daily basis for many months  His last drink was on Saturday 3 days ago  He admits to feeling bit shaky  He has no history of withdrawal seizures  Patient has been feeling depressed and anxious  His anxiety has been worsening  He attributes his daily alcohol use to his anxiety  Patient has been thinking about suicide and at times thinks about drinking himself to death  He is  and lives with his wife and children  He has noticed that his behavior has having an affect on his relationship with his family  They are unhappy with drinking  Patient has a history of pancreatitis  He did have some chest pain earlier today  It has resolved  He has difficult time describing it but thinks it may be secondary to his anxiety  No vomiting  No fever  No cough        History provided by:  Patient and medical records   used: No    Psychiatric Evaluation   Presenting symptoms: depression and suicidal thoughts    Degree of incapacity (severity):  Severe  Onset quality:  Gradual  Timing:  Constant  Progression:  Worsening  Chronicity:  New  Context: alcohol use and stressful life event    Context: not drug abuse    Treatment compliance:  Most of the time  Relieved by:  Nothing  Worsened by:  Nothing  Ineffective treatments:  Antidepressants  Associated symptoms: abdominal pain, anxiety, appetite change, chest pain and feelings of worthlessness    Risk factors: no hx of suicide attempts and no recent psychiatric admission        Prior to Admission Medications   Prescriptions Last Dose Informant Patient Reported? Taking? ARIPiprazole (ABILIFY) 5 mg tablet   No Yes   Sig: Take 1 tablet (5 mg total) by mouth daily   DULoxetine (CYMBALTA) 60 mg delayed release capsule   No Yes   Sig: Take 1 capsule (60 mg total) by mouth daily   Insulin Pen Needle (B-D ULTRAFINE III SHORT PEN) 31G X 8 MM MISC   Yes No   Sig: by Does not apply route   Insulin Pen Needle (PEN NEEDLES) 29G X 12MM MISC   No No   Sig: by Does not apply route daily at bedtime Substitute what fits [place] pen and what is covered by insurance     fenofibrate (TRICOR) 145 mg tablet   No Yes   Sig: Take 1 tablet (145 mg total) by mouth daily   glucose blood (ONE TOUCH ULTRA TEST) test strip   Yes No   Sig: by In Vitro route 3 (three) times a day   hydrOXYzine pamoate (VISTARIL) 50 mg capsule   No Yes   Sig: TAKE 1 CAPSULE BY MOUTH THREE TIMES DAILY AS NEEDED   insulin glargine (BASAGLAR KWIKPEN) 100 units/mL injection pen   No Yes   Sig: 10 units sq q AM  70 units sq q PM   lisinopril (ZESTRIL) 20 mg tablet   No Yes   Sig: Take 1 tablet (20 mg total) by mouth daily   omeprazole (PriLOSEC) 20 mg delayed release capsule   Yes Yes   Sig: Take by mouth   rosuvastatin (CRESTOR) 10 MG tablet   No Yes   Sig: Take 1 tablet (10 mg total) by mouth daily   traZODone (DESYREL) 50 mg tablet   No Yes   Sig: Take 1 tablet (50 mg total) by mouth daily at bedtime   umeclidinium-vilanterol (ANORO ELLIPTA) 62 5-25 MCG/INH inhaler   No Yes   Sig: Inhale 1 puff daily      Facility-Administered Medications: None       Past Medical History:   Diagnosis Date    Allergic rhinitis     last assessed: 12/5/2012  Anemia     Anxiety and depression     Quiros esophagus     Cholelithiasis     Chronic pain     COPD (chronic obstructive pulmonary disease) (HCC)     Depression     Diabetes mellitus (HCC)     Emphysema lung (HCC)     Generalized anxiety disorder     GERD (gastroesophageal reflux disease)     Hyperlipidemia     Hypertension     Kidney stone     Metatarsalgia     last assessed: 8/11/2014, unspecified laterality, ? cause  PE with changes  To see DPM tomorrow   Pancreatitis     Psychiatric disorder     Renal disorder     Shortness of breath     with activity       Past Surgical History:   Procedure Laterality Date    CHOLECYSTECTOMY LAPAROSCOPIC      FOOT SURGERY      B/L great toe joint replacement    KNEE ARTHROSCOPY      (therapeutic) right knee    VT EDG US EXAM SURGICAL ALTER STOM DUODENUM/JEJUNUM N/A 10/17/2018    Procedure: LINEAR ENDOSCOPIC U/S;  Surgeon: Jeremiah Cintron MD;  Location: [facility]; Service: Gastroenterology    VASECTOMY      vas deferens       Family History   Problem Relation Age of Onset    Cancer Mother     Coronary artery disease Mother     Diabetes Mother     Coronary artery disease Father     Prostate cancer Father     Diabetes Sister     Coronary artery disease Family      I have reviewed and agree with the history as documented  Social History     Tobacco Use    Smoking status: Current Every Day Smoker     Packs/day: 1 00    Smokeless tobacco: Never Used    Tobacco comment: Tobacco use [employer]'s "How to Quit" literature given to pat  Substance Use Topics    Alcohol use: Yes     Comment: Drank a 5th vodka today    Drug use: No     Comment: chronic narcotic use        Review of Systems   Constitutional: Positive for appetite change  Cardiovascular: Positive for chest pain  Gastrointestinal: Positive for abdominal pain  Genitourinary: Negative for dysuria  Neurological: Positive for tremors     Psychiatric/Behavioral: Positive for dysphoric mood and suicidal ideas  The patient is nervous/anxious  All other systems reviewed and are negative  Physical Exam  Physical Exam   Constitutional: He is oriented to person, place, and time  Vital signs are normal  He appears well-developed and well-nourished  HENT:   Head: Normocephalic and atraumatic  Eyes: Pupils are equal, round, and reactive to light  Conjunctivae and EOM are normal    Neck: Normal range of motion  Neck supple  Cardiovascular: Regular rhythm, normal heart sounds and intact distal pulses  Tachycardia present  Pulmonary/Chest: Effort normal and breath sounds normal  No accessory muscle usage  No respiratory distress  He exhibits no tenderness  Abdominal: Soft  Normal appearance and bowel sounds are normal  He exhibits no distension  There is no tenderness  There is no rebound and no guarding  Musculoskeletal: Normal range of motion  He exhibits no edema, tenderness or deformity  Lymphadenopathy:     He has no cervical adenopathy  Neurological: He is alert and oriented to person, place, and time  He has normal strength and normal reflexes  Coordination normal    Skin: Skin is warm, dry and intact  No rash noted  Psychiatric: His behavior is normal  Judgment normal  His mood appears anxious  Cognition and memory are normal  He expresses suicidal ideation  He expresses no homicidal ideation  He expresses no homicidal plans  Nursing note and vitals reviewed        Vital Signs  ED Triage Vitals   Temperature Pulse Respirations Blood Pressure SpO2   02/19/19 1217 02/19/19 1217 02/19/19 1217 02/19/19 1217 02/19/19 1217   97 6 °F (36 4 °C) (!) 111 18 142/69 97 %      Temp src Heart Rate Source Patient Position - Orthostatic VS BP Location FiO2 (%)   -- 02/19/19 1606 02/19/19 1606 02/19/19 1606 --    Monitor Lying Left arm       Pain Score       02/19/19 1606       No Pain           Vitals:    02/19/19 1217 02/19/19 1606   BP: 142/69 135/78   Pulse: (!) 111 (!) 107   Patient Position - Orthostatic VS:  Lying       Visual Acuity      ED Medications  Medications   ARIPiprazole (ABILIFY) tablet 5 mg (has no administration in time range)   DULoxetine (CYMBALTA) delayed release capsule 60 mg (has no administration in time range)   fenofibrate (TRICOR) tablet 145 mg (has no administration in time range)   lisinopril (ZESTRIL) tablet 20 mg (has no administration in time range)   pantoprazole (PROTONIX) EC tablet 20 mg (has no administration in time range)   traZODone (DESYREL) tablet 50 mg (has no administration in time range)   nicotine (NICODERM CQ) 21 mg/24 hr TD 24 hr patch 21 mg (21 mg Transdermal Medication Applied 2/19/19 1938)   LORazepam (ATIVAN) 2 mg/mL injection 1 mg (1 mg Intravenous Given 2/19/19 1259)   LORazepam (ATIVAN) 2 mg/mL injection 1 mg (1 mg Intravenous Given 2/19/19 1618)       Diagnostic Studies  Results Reviewed     Procedure Component Value Units Date/Time    Troponin I [[id number]]  (Normal) Collected:  02/19/19 1509    Lab Status:  Final result Specimen:  Blood from Arm, Right Updated:  02/19/19 1534     Troponin I <0 02 ng/mL     Comprehensive metabolic panel [[id number]]  (Abnormal) Collected:  02/19/19 1259    Lab Status:  Final result Specimen:  Blood from Arm, Right Updated:  02/19/19 1337     Sodium 135 mmol/L      Potassium 4 4 mmol/L      Chloride 97 mmol/L      CO2 27 mmol/L      ANION GAP 11 mmol/L      BUN 16 mg/dL      Creatinine 1 20 mg/dL      Glucose 208 mg/dL      Calcium 9 6 mg/dL      AST 16 U/L      ALT 27 U/L      Alkaline Phosphatase 132 U/L      Total Protein 8 4 g/dL      Albumin 4 1 g/dL      Total Bilirubin 0 60 mg/dL      eGFR 70 ml/min/1 73sq m     Narrative:       National Kidney Disease Education Program recommendations are as follows:  GFR calculation is accurate only with a steady state creatinine  Chronic Kidney disease less than 60 ml/min/1 73 sq  meters  Kidney failure less than 15 ml/min/1 73 sq  meters      Lipase [[id number]] (Normal) Collected:  02/19/19 1259    Lab Status:  Final result Specimen:  Blood from Arm, Right Updated:  02/19/19 1337     Lipase 154 u/L     Rapid drug screen, urine [[id number]]  (Normal) Collected:  02/19/19 1312    Lab Status:  Final result Specimen:  Urine, Clean Catch Updated:  02/19/19 1337     Amph/Meth UR Negative     Barbiturate Ur Negative     Benzodiazepine Urine Negative     Cocaine Urine Negative     Methadone Urine Negative     Opiate Urine Negative     PCP Ur Negative     THC Urine Negative    Narrative:       FOR MEDICAL PURPOSES ONLY  IF CONFIRMATION NEEDED PLEASE CONTACT THE LAB WITHIN 5 DAYS    Drug Screen Cutoff Levels:  AMPHETAMINE/METHAMPHETAMINES  1000 ng/mL  BARBITURATES     200 ng/mL  BENZODIAZEPINES     200 ng/mL  COCAINE      300 ng/mL  METHADONE      300 ng/mL  OPIATES      300 ng/mL  PHENCYCLIDINE     25 ng/mL  THC       50 ng/mL    Ethanol [[id number]]  (Normal) Collected:  02/19/19 1302    Lab Status:  Final result Specimen:  Blood from Arm, Right Updated:  02/19/19 1333     Ethanol Lvl <3 mg/dL     Troponin I [[id number]]  (Normal) Collected:  02/19/19 1259    Lab Status:  Final result Specimen:  Blood from Arm, Right Updated:  02/19/19 1331     Troponin I <0 02 ng/mL     CBC and differential [[id number]]  (Abnormal) Collected:  02/19/19 1300    Lab Status:  Final result Specimen:  Blood from Arm, Right Updated:  02/19/19 1311     WBC 7 11 Thousand/uL      RBC 5 32 Million/uL      Hemoglobin 16 0 g/dL      Hematocrit 46 4 %      MCV 87 fL      MCH 30 1 pg      MCHC 34 5 g/dL      RDW 12 9 %      MPV 8 5 fL      Platelets 688 Thousands/uL      nRBC 0 /100 WBCs      Neutrophils Relative 63 %      Immat GRANS % 0 %      Lymphocytes Relative 26 %      Monocytes Relative 8 %      Eosinophils Relative 2 %      Basophils Relative 1 %      Neutrophils Absolute 4 55 Thousands/µL      Immature Grans Absolute 0 03 Thousand/uL      Lymphocytes Absolute 1 81 Thousands/µL      Monocytes Absolute 0 54 Thousand/µL      Eosinophils Absolute 0 12 Thousand/µL      Basophils Absolute 0 06 Thousands/µL                  XR chest 2 views   Final Result by Boo Macedo MD (02/19 1636)      No acute cardiopulmonary disease  Workstation performed: [id number]                    Procedures  ECG 12 Lead Documentation  Date/Time: 2/19/2019 1:38 PM  Performed by: Keyana Lorenz DO  Authorized by: Keyana Lorenz DO     Indications / Diagnosis:  Chest pain  ECG reviewed by me, the ED Provider: yes    Patient location:  ED  Previous ECG:     Previous ECG:  Compared to current    Comparison ECG info:  October 2018    Similarity:  No change  Interpretation:     Interpretation: normal    Rate:     ECG rate:  99    ECG rate assessment: normal    Rhythm:     Rhythm: sinus tachycardia    Ectopy:     Ectopy: none    QRS:     QRS axis:  Normal  Conduction:     Conduction: normal    ST segments:     ST segments:  Normal  T waves:     T waves: normal      ECG 12 Lead Documentation  Date/Time: 2/19/2019 4:12 PM  Performed by: Keyana Lorenz DO  Authorized by: Keyana Lorenz DO     Indications / Diagnosis:  Repeat  ECG reviewed by me, the ED Provider: yes    Patient location:  ED  Previous ECG:     Previous ECG:  Compared to current    Comparison ECG info:  Earlier today    Similarity:  No change  Interpretation:     Interpretation: non-specific    Quality:     Tracing quality:  Limited by artifact  Rate:     ECG rate:  109    ECG rate assessment: tachycardic    Rhythm:     Rhythm: sinus tachycardia    Ectopy:     Ectopy: none    QRS:     QRS axis:  Normal  ST segments:     ST segments:  Normal           Phone Contacts  ED Phone Contact    ED Course  ED Course as of Feb 19 1948   Tue Feb 19, 2019   1542 Patient is medically cleared for psychiatric evaluation and treatment      1836 Patient evaluated by crisis, he signed a 201 for inpatient psychiatric treatment                                      MDM  Number of Diagnoses or Management Options  Alcoholism ([facility] ): new and requires workup  Anxiety: new and requires workup  Suicidal ideation: new and requires workup     Amount and/or Complexity of Data Reviewed  Clinical lab tests: ordered and reviewed  Tests in the radiology section of CPT®: ordered and reviewed  Tests in the medicine section of CPT®: ordered and reviewed  Decide to obtain previous medical records or to obtain history from someone other than the patient: yes  Discuss the patient with other providers: yes  Independent visualization of images, tracings, or specimens: yes    Patient Progress  Patient progress: stable      Disposition  Final diagnoses:   Suicidal ideation   Alcoholism ([place] [address] )   Anxiety     Time reflects when diagnosis was documented in both MDM as applicable and the Disposition within this note     Time User Action Codes Description Comment    2/19/2019  6:39 PM Stevphen [place] Add [I10] Benign essential hypertension     2/19/2019  6:39 PM Stevphen [place] Modify [I10] Benign essential hypertension     2/19/2019  6:39 PM Stevphen [place] Add [E10 9] Diabetes mellitus type 1 5, managed as type 1 ([place] [address] )     2/19/2019  6:54 PM Anuradha Carreno Add [R45 851] Suicidal ideation     2/19/2019  6:54 PM Anuradha Carreno Add [F10 20] Alcoholism ([place] [address] )     2/19/2019  6:54 PM Mounika Samples Add [F41 9] Anxiety       ED Disposition     ED Disposition Condition Date/Time Comment    Transfer to [address] [place] [address] Feb 19, 2019  6:54 PM Jere Hollis should be transferred out to [facility] and has been medically cleared          MD Documentation      Most Recent Value   Accepting Physician  Minnie Elise [place]     (Name & Tel number)  [employer] [phone]      RN Documentation      Most [facility] Name, Corina Diaz     (Name & Tel number)  [employer] [phone]      Follow-up Information    None Patient's Medications   Discharge Prescriptions    No medications on file     No discharge procedures on file      ED Provider  Electronically Signed by           Jodee Lao DO  02/19/19 5433

## 2019-02-19 NOTE — EMTALA/ACUTE CARE TRANSFER
[address] [address] [address]  Dept: [phone]      EMTALA TRANSFER CONSENT    NAME Za Pierre                                         1970                              MRN [id number]    I have been informed of my rights regarding examination, treatment, and transfer   by Dr Sebastian Hilton DO    Benefits:      Risks:        Consent for Transfer:  I acknowledge that my medical condition has been evaluated and explained to me by the emergency department physician or other qualified medical person and/or my attending physician, who has recommended that I be transferred to the service of  Accepting Physician: Dr Rachel Verdin at [facility] Name, [address] : [place] Narvaez [place]   The above potential benefits of such transfer, the potential risks associated with such transfer, and the probable risks of not being transferred have been explained to me, and I fully understand them  The doctor has explained that, in my case, the benefits of transfer outweigh the risks  I agree to be transferred  I authorize the performance of emergency medical procedures and treatments upon me in both transit and upon arrival at the receiving facility  Additionally, I authorize the release of any and all medical records to the receiving facility and request they be transported with me, if possible  I understand that the safest mode of transportation during a medical emergency is an ambulance and that the Hospital advocates the use of this mode of transport  Risks of traveling to the receiving facility by car, including absence of medical control, life sustaining equipment, such as oxygen, and medical personnel has been explained to me and I fully understand them  (EMMANUELLE CORRECT BOX BELOW)  [  ]  I consent to the stated transfer and to be transported by ambulance/helicopter    [  ]  I consent to the stated transfer, but refuse transportation by ambulance and accept full responsibility for my transportation by car  I understand the risks of non-ambulance transfers and I exonerate the Hospital and its staff from any deterioration in my condition that results from this refusal     X___________________________________________    DATE  19  TIME________  Signature of patient or legally responsible individual signing on patient behalf           RELATIONSHIP TO PATIENT_________________________          Provider Certification    NAME Salud Schneider                                         1970                              MRN [id number]    A medical screening exam was performed on the above named patient  Based on the examination:    Condition Necessitating Transfer The primary encounter diagnosis was Benign essential hypertension  Diagnoses of Diabetes mellitus type 1 5, managed as type 1 ([facility] ), Suicidal ideation, Alcoholism ([facility] ), and Anxiety were also pertinent to this visit  Patient Condition:      Reason for Transfer:      Transfer Requirements: [address]    · Space available and qualified personnel available for treatment as acknowledged by [employer] [phone]  · Agreed to accept transfer and to provide appropriate medical treatment as acknowledged by       Dr Osman Osorio  · Appropriate medical records of the examination and treatment of the patient are provided at the time of transfer   [facility] [address] _______  · Transfer will be performed by qualified personnel from    and appropriate transfer equipment as required, including the use of necessary and appropriate life support measures      Provider Certification: I have examined the patient and explained the following risks and benefits of being transferred/refusing transfer to the patient/family:         Based on these reasonable risks and benefits to the patient and/or the unborn child(shaina), and based upon the information available at the time of the patients examination, I certify that the medical benefits reasonably to be expected from the provision of appropriate medical treatments at another medical facility outweigh the increasing risks, if any, to the individuals medical condition, and in the case of labor to the unborn child, from effecting the transfer      X____________________________________________ DATE 02/19/19        TIME_______      ORIGINAL - SEND TO MEDICAL RECORDS   COPY - SEND WITH PATIENT DURING TRANSFER

## 2019-02-19 NOTE — ED NOTES
Crisis left patients room, family member remains in room with patient  Pt is speaking with family member and is calm and cooperative  No issues noted at this time  Will continue to monitor        Amanuel Geiger  02/19/19 5448

## 2019-02-19 NOTE — PROGRESS NOTES
Assessment/Plan:    No problem-specific Assessment & Plan notes found for this encounter  Diagnoses and all orders for this visit:    Uncontrolled type 2 diabetes mellitus with hyperglycemia ([facility] )    Depression with suicidal ideation    Alcohol abuse    Alcohol-induced chronic pancreatitis ([facility] )    Generalized anxiety disorder    Chronic obstructive pulmonary disease, unspecified COPD type ([place] [address] )  -     umeclidinium-vilanterol (ANORO ELLIPTA) 62 5-25 MCG/INH inhaler; Inhale 1 puff daily    Suicidal ideations    Tobacco dependence    Non-compliance      A/P: I have spent 30 minutes with Patient and family today in which greater than 50% of this time was spent in counseling/coordination of care regarding Risks and benefits of tx options, Intructions for management, Patient and family education, Importance of tx compliance, Risk factor reductions and Impressions  Disscussed that the pt is at high risk of both mental and physical harm to himself and his wife due to starting to drink again and now with suicidal ideation and that he needs help right now given his non compliance with treatment in the past  Pt and wife agreed to go to [facility] ER for eval and possible 203  Spoke with ER MD and triaged pt  Also, pt needs to bring in sugars from any other time but fasting for insulin adjustment  Stop smoking and will start maintenance meds for the COPD  Encouraged to use rescue MDI more  RTC two weeks unless admitted  Subjective:      Patient ID: Ciara Ratliff is a 44 y o  male  WM with h/o MERLYN, depression, ETOH abuse with chronic pancreatitis, RTC with his wife for f/u uncontrolled DM after adding a second AM dose of basal insulin due to uncontrolled sugars  Pt reports AM sugars are less than 120, but has not check any other sugars  Pt's FBS were always good, but HgA1c still high  Wife and pt reports depression and MERLYN are worse and is now back on the ETOH  Has suicidal ideations   Is not in counseling or AA as requested  No attempts, but increase thoughts  Also, h/o COPD and stopped taking his MDI long ago and now c/o increase SOB with wheezing  Still smoking  The following portions of the patient's history were reviewed and updated as appropriate:   He  has a past medical history of Allergic rhinitis, Anemia, Anxiety and depression, Quiros esophagus, Cholelithiasis, Chronic pain, COPD (chronic obstructive pulmonary disease) ([facility] ), Depression, Diabetes mellitus ([facility] ), Emphysema lung ([facility] ), Generalized anxiety disorder, GERD (gastroesophageal reflux disease), Hyperlipidemia, Hypertension, Kidney stone, Metatarsalgia, Pancreatitis, Psychiatric disorder, Renal disorder, and Shortness of breath  He   Patient Active Problem List    Diagnosis Date Noted    Paresthesia of both lower extremities     Chronic pancreatitis ([facility] ) 09/13/2018    Transaminitis 08/13/2018    Tobacco dependence 08/13/2018    GERD (gastroesophageal reflux disease) 08/09/2018    H/O alcohol abuse 08/09/2018    Insomnia 09/05/2017    COPD (chronic obstructive pulmonary disease) ([place] [address] ) 03/29/2017    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    Iron deficiency anemia 04/05/2016    Diabetes mellitus type 1 5, managed as type 1 ([place] [address] ) 04/05/2016    Fibromyalgia 07/29/2013    Fatty liver 12/05/2012    Nephrolithiasis 12/05/2012    Hyperlipidemia 09/11/2012    Benign essential hypertension 07/02/2012    MDD (major depressive disorder), single episode 07/02/2012    Other chronic pain 07/02/2012    Sleep disorder 07/02/2012    Generalized anxiety disorder 06/13/2012     He  has a past surgical history that includes CHOLECYSTECTOMY LAPAROSCOPIC; Vasectomy; Foot surgery; Knee arthroscopy; and pr edg us exam surgical alter stom duodenum/jejunum (N/A, 10/17/2018)  His family history includes Cancer in his mother; Coronary artery disease in his family, father, and mother; Diabetes in his mother and sister; Prostate cancer in his father    He  reports that he has been smoking  He has been smoking about 1 00 pack per day  He has never used smokeless tobacco  He reports that he drinks alcohol  He reports that he does not use drugs  Current Outpatient Medications   Medication Sig Dispense Refill    ARIPiprazole (ABILIFY) 5 mg tablet Take 1 tablet (5 mg total) by mouth daily 30 tablet 1    DULoxetine (CYMBALTA) 60 mg delayed release capsule Take 1 capsule (60 mg total) by mouth daily 30 capsule 1    fenofibrate (TRICOR) 145 mg tablet Take 1 tablet (145 mg total) by mouth daily 30 tablet 5    glucose blood (ONE TOUCH ULTRA TEST) test strip by In Vitro route 3 (three) times a day      hydrOXYzine pamoate (VISTARIL) 50 mg capsule TAKE 1 CAPSULE BY MOUTH THREE TIMES DAILY AS NEEDED 90 capsule 0    insulin glargine (BASAGLAR KWIKPEN) 100 units/mL injection pen 10 units sq q AM  70 units sq q PM 5 pen 1    Insulin Pen Needle (B-D ULTRAFINE III SHORT PEN) 31G X 8 MM MISC by Does not apply route      Insulin Pen Needle (PEN NEEDLES) 29G X 12MM MISC by Does not apply route daily at bedtime Substitute what fits Tou[place]jo pen and what is covered by insurance  45 each 0    lisinopril (ZESTRIL) 20 mg tablet Take 1 tablet (20 mg total) by mouth daily 30 tablet 5    omeprazole (PriLOSEC) 20 mg delayed release capsule Take by mouth      rosuvastatin (CRESTOR) 10 MG tablet Take 1 tablet (10 mg total) by mouth daily 90 tablet 0    traZODone (DESYREL) 50 mg tablet Take 1 tablet (50 mg total) by mouth daily at bedtime 90 tablet 0    umeclidinium-vilanterol (ANORO ELLIPTA) 62 5-25 MCG/INH inhaler Inhale 1 puff daily 3 Inhaler 3     No current facility-administered medications for this visit        Current Outpatient Medications on File Prior to Visit   Medication Sig    ARIPiprazole (ABILIFY) 5 mg tablet Take 1 tablet (5 mg total) by mouth daily    DULoxetine (CYMBALTA) 60 mg delayed release capsule Take 1 capsule (60 mg total) by mouth daily    fenofibrate (TRICOR) 145 mg tablet Take 1 tablet (145 mg total) by mouth daily    glucose blood (ONE TOUCH ULTRA TEST) test strip by In Vitro route 3 (three) times a day    hydrOXYzine pamoate (VISTARIL) 50 mg capsule TAKE 1 CAPSULE BY MOUTH THREE TIMES DAILY AS NEEDED    insulin glargine (BASAGLAR KWIKPEN) 100 units/mL injection pen 10 units sq q AM  70 units sq q PM    Insulin Pen Needle (B-D ULTRAFINE III SHORT PEN) 31G X 8 MM MISC by Does not apply route    Insulin Pen Needle (PEN NEEDLES) 29G X 12MM MISC by Does not apply route daily at bedtime Substitute what fits Tou[place]jo pen and what is covered by insurance   lisinopril (ZESTRIL) 20 mg tablet Take 1 tablet (20 mg total) by mouth daily    omeprazole (PriLOSEC) 20 mg delayed release capsule Take by mouth    rosuvastatin (CRESTOR) 10 MG tablet Take 1 tablet (10 mg total) by mouth daily    traZODone (DESYREL) 50 mg tablet Take 1 tablet (50 mg total) by mouth daily at bedtime     No current facility-administered medications on file prior to visit  He has No Known Allergies       Review of Systems   Constitutional: Positive for activity change  Negative for chills, diaphoresis, fatigue and fever  Respiratory: Positive for shortness of breath and wheezing  Negative for cough and chest tightness  Cardiovascular: Negative for chest pain, palpitations and leg swelling  Gastrointestinal: Negative for abdominal pain, constipation, diarrhea, nausea and vomiting  Genitourinary: Negative for difficulty urinating, dysuria and frequency  Musculoskeletal: Negative for arthralgias, gait problem and myalgias  Neurological: Negative for light-headedness and headaches  Psychiatric/Behavioral: Positive for decreased concentration, dysphoric mood, self-injury, sleep disturbance and suicidal ideas  Negative for confusion and hallucinations  The patient is nervous/anxious            Objective:      /70   Pulse 88   Temp 98 1 °F (36 7 °C) (Tympanic)   Resp 16   Ht 6' (1 829 m)   [place] 87 1 kg (192 lb)   BMI 26 04 kg/m²          Physical Exam   Constitutional: He is oriented to person, place, and time  He appears well-developed and well-nourished  No distress  HENT:   Head: Normocephalic and atraumatic  Mouth/Throat: Oropharynx is clear and moist    Eyes: Pupils are equal, round, and reactive to light  Conjunctivae and EOM are normal    Neck: No JVD present  Cardiovascular: Normal rate, regular rhythm and normal heart sounds  Pulmonary/Chest: Effort normal  No respiratory distress  He has wheezes  He has no rales  Neurological: He is alert and oriented to person, place, and time  Psychiatric: His behavior is normal  Judgment normal    Depressed  Poor eye contact  Discussing suicide  Nursing note and vitals reviewed

## 2019-02-19 NOTE — ED NOTES
Insurance Authorization:   Initial Authorization  Phone call placed to: AMY [place]     Phone number: [phone]     Spoke to Tiffany Bojorquez at [phone]    3 days approved    Level of care: Psychiatric Admit  Voluntary 201  Review on 2/21/19  Authorization # [id number]

## 2019-02-19 NOTE — ED NOTES
Patient is accepted at [facility]  Patient is accepted by [facility] per [address] is arranged with [facility]  Transportation is scheduled for 12:30 pm  Patient may go to the floor at upon arrival        Nurse report is to be called to [phone] prior to patient transfer

## 2019-02-20 PROBLEM — F10.10 ALCOHOL ABUSE: Status: ACTIVE | Noted: 2019-02-20

## 2019-02-20 LAB
GLUCOSE SERPL-MCNC: 128 MG/DL (ref 65–140)
GLUCOSE SERPL-MCNC: 142 MG/DL (ref 65–140)
GLUCOSE SERPL-MCNC: 145 MG/DL (ref 65–140)
GLUCOSE SERPL-MCNC: 164 MG/DL (ref 65–140)

## 2019-02-20 PROCEDURE — 99223 1ST HOSP IP/OBS HIGH 75: CPT | Performed by: HOSPITALIST

## 2019-02-20 PROCEDURE — 82948 REAGENT STRIP/BLOOD GLUCOSE: CPT

## 2019-02-20 RX ORDER — LISINOPRIL 20 MG/1
20 TABLET ORAL DAILY
Status: DISCONTINUED | OUTPATIENT
Start: 2019-02-20 | End: 2019-02-25 | Stop reason: HOSPADM

## 2019-02-20 RX ORDER — TRAZODONE HYDROCHLORIDE 50 MG/1
50 TABLET ORAL
Status: DISCONTINUED | OUTPATIENT
Start: 2019-02-20 | End: 2019-02-25 | Stop reason: HOSPADM

## 2019-02-20 RX ORDER — PANTOPRAZOLE SODIUM 20 MG/1
20 TABLET, DELAYED RELEASE ORAL
Status: DISCONTINUED | OUTPATIENT
Start: 2019-02-20 | End: 2019-02-25 | Stop reason: HOSPADM

## 2019-02-20 RX ORDER — PRAVASTATIN SODIUM 40 MG
80 TABLET ORAL
Status: DISCONTINUED | OUTPATIENT
Start: 2019-02-20 | End: 2019-02-25 | Stop reason: HOSPADM

## 2019-02-20 RX ORDER — HYDROXYZINE HYDROCHLORIDE 25 MG/1
25 TABLET, FILM COATED ORAL 3 TIMES DAILY
Status: DISCONTINUED | OUTPATIENT
Start: 2019-02-20 | End: 2019-02-25 | Stop reason: HOSPADM

## 2019-02-20 RX ORDER — ARIPIPRAZOLE 5 MG/1
5 TABLET ORAL DAILY
Status: DISCONTINUED | OUTPATIENT
Start: 2019-02-20 | End: 2019-02-25 | Stop reason: HOSPADM

## 2019-02-20 RX ADMIN — INSULIN LISPRO 5 UNITS: 100 INJECTION, SOLUTION INTRAVENOUS; SUBCUTANEOUS at 00:21

## 2019-02-20 RX ADMIN — PANTOPRAZOLE SODIUM 20 MG: 20 TABLET, DELAYED RELEASE ORAL at 12:35

## 2019-02-20 RX ADMIN — INSULIN DETEMIR 50 UNITS: 100 INJECTION, SOLUTION SUBCUTANEOUS at 00:15

## 2019-02-20 RX ADMIN — PRAVASTATIN SODIUM 80 MG: 40 TABLET ORAL at 15:59

## 2019-02-20 RX ADMIN — TRAZODONE HYDROCHLORIDE 50 MG: 50 TABLET ORAL at 00:22

## 2019-02-20 RX ADMIN — INSULIN LISPRO 10 UNITS: 100 INJECTION, SOLUTION INTRAVENOUS; SUBCUTANEOUS at 17:17

## 2019-02-20 RX ADMIN — TRAZODONE HYDROCHLORIDE 50 MG: 50 TABLET ORAL at 21:17

## 2019-02-20 RX ADMIN — SERTRALINE HYDROCHLORIDE 50 MG: 50 TABLET ORAL at 15:30

## 2019-02-20 RX ADMIN — INSULIN LISPRO 10 UNITS: 100 INJECTION, SOLUTION INTRAVENOUS; SUBCUTANEOUS at 09:04

## 2019-02-20 RX ADMIN — INSULIN DETEMIR 50 UNITS: 100 INJECTION, SOLUTION SUBCUTANEOUS at 22:24

## 2019-02-20 RX ADMIN — HYDROXYZINE HYDROCHLORIDE 25 MG: 25 TABLET ORAL at 21:17

## 2019-02-20 RX ADMIN — ARIPIPRAZOLE 5 MG: 5 TABLET ORAL at 15:30

## 2019-02-20 RX ADMIN — INSULIN LISPRO 1 UNITS: 100 INJECTION, SOLUTION INTRAVENOUS; SUBCUTANEOUS at 12:35

## 2019-02-20 RX ADMIN — INSULIN LISPRO 10 UNITS: 100 INJECTION, SOLUTION INTRAVENOUS; SUBCUTANEOUS at 12:35

## 2019-02-20 RX ADMIN — RISPERIDONE 1 MG: 1 TABLET, ORALLY DISINTEGRATING ORAL at 19:42

## 2019-02-20 RX ADMIN — HYDROXYZINE HYDROCHLORIDE 25 MG: 25 TABLET ORAL at 00:22

## 2019-02-20 RX ADMIN — HYDROXYZINE HYDROCHLORIDE 25 MG: 25 TABLET ORAL at 15:59

## 2019-02-20 NOTE — ED NOTES
PT stated "Im honestly just going to leave because this is ridiculous," Informed PT I will tell RN  RN and Crisis currently at bedside explaining the process       Heron Sharma  02/19/19 1926

## 2019-02-20 NOTE — H&P
Psychiatric Evaluation - Behavioral Health     Identification Data:Adrian Bowens 44 y o  male MRN: [id number]  Unit/Bed#: [facility] 249-02 Encounter: [id number]    Chief Complaint: " I've been depressed"     History of Present Illness     Ximena Kirby is a 44 y o  male with a history of Major Depressive Disorder and alcohol use who was admitted to the inpatient psychiatric unit on a voluntary 201 commitment basis due to depression, anxiety, alcohol abuse and suicidal ideation  On evaluation after admission in the inpatient psychiatric unit Randy Desai reported he has a longstanding history of alcoholism and depression over the last 5 years  Patient reports he has been feeling increasingly worse with depression though he is on medication  Patient states over the last year he has been since having slowly worsening depression  He is treated by a PA at his PCPs office who he states specializes in mental health and despite being on an antidepressant and second-generation antipsychotic has had continued depression  He reports in the last 2 weeks this has worsened with poor energy and motivation  Sleeping difficulties at night and hypersomnia during the day  He has felt sad, hopeless and helpless and began having thoughts of wanting to hurt himself he states he does not actively want to hurt himself however if he decided to hurt himself he does have access to guns  Patient's wife is contacted over the phone Criss Hamilton at [phone]) who reports and confirms the above she also states that she has recently filed for divorce which is likely contributing factor to patient's increased depression  Patient has had a recent change of medication from Effexor to Cymbalta without significant benefit or improvement in depression      Symptoms prior to admission included worsening depression, suicidal ideation, hopelessness, poor concentration, poor appetite, difficulty sleeping, alcohol abuse, difficulty attending to activities of daily living and isolating  Onset of symptoms was gradual starting several months ago with progressively worsening course since that time  Stressors preceding admission included alcohol use problems, family problems, pending divorce and financial problems  Psychiatric Review Of Systems:    Sleep changes: yes  Appetite changes: yes  Weight changes: no  Energy/anergy: decreased  Interest/pleasure/: decreased  Anhedonia: yes  Anxiety/panic: yes  Charline: no  Guilt:  yes  Hopeless:  yes  Self injurious behavior/risky behavior: no  Suicidal ideation: yes  Homicidal ideation: no  Auditory hallucinations: no  Visual hallucinations: no  Delusional thinking: no  Eating disorder history: no  Obsessive/compulsive symptoms: no    Historical Information     Past Psychiatric History:     Past Inpatient Psychiatric Treatment:   Two past psychiatric hospitalizations at this hospital  Past Outpatient Psychiatric Treatment:    Reports having a PA at his PCPs office who is a specialist in mental health  She provides him with psychiatric medications  Past Suicide Attempts: Patient denied however patient's wife reported the incident where he had taken a gun though it was unloaded with threat of hurting himself  Past Violent Behavior:  None  Past Psychiatric Medication Trials: Effexor, Trazodone and Abilify     Substance Abuse History:    Social History     Tobacco History     Smoking Status  Current Every Day Smoker Smoking Frequency  1 pack/day    Smokeless Tobacco Use  Never Used    Tobacco Comment  Tobacco use [employer] "How to Quit" literature given to pat            Alcohol History     Alcohol Use Status  Yes Comment  Drank a 5th vodka today          Drug Use     Drug Use Status  No Comment  chronic narcotic use          Sexual Activity     Sexually Active  Yes Partners  Female          Activities of Daily Living    Not Asked                 I have assessed this patient for substance use within the past 12 months    Alcohol use: Longstanding history of alcoholism currently uses 3 to 4 times a week drinks a bottle of hard liquor  Recreational drug use:   Cocaine:  denies use  Heroin:  denies use  Marijuana:  denies use  Other drugs: denies use   Longest clean time: none  History of Inpatient/Outpatient rehabilitation program: no  Smoking history: 1 and 1/2 pack per day  Use of caffeine: 2 cup of coffee per day    Family Psychiatric History:     Psychiatric Illness: Mother with depression no history of any substance abuse in family members or suicide attempts  Social History:    Education: college graduate  Learning Disabilities: none  Marital History:   Children: 2 children  Living Arrangement: lives in home with wife and children  Occupational History: unemployed  Functioning Relationships: good support system  Legal History: none   History: None    Traumatic History:     Abuse:  Denied    Past Medical History:    History of Seizures:  None  History of Head injury with loss of consciousness:  None    Past Medical History:   Diagnosis Date    Alcohol abuse 2/20/2019    Allergic rhinitis     last assessed: 12/5/2012    Anemia     Anxiety and depression     Quiros esophagus     Cholelithiasis     Chronic pain     COPD [facility]chronic obstructive [facility] disease) [facility]     Depression     Diabetes mellitus ([facility] )     Emphysema lung ([facility] )     Generalized anxiety disorder     GERD (gastroesophageal reflux disease)     Hyperlipidemia     Hypertension     Kidney stone     Metatarsalgia     last assessed: 8/11/2014, unspecified laterality, ? cause  PE with changes  To see DPM tomorrow      Pancreatitis     Psychiatric disorder     Renal disorder     Shortness of breath     with activity     Past Surgical History:   Procedure Laterality Date    CHOLECYSTECTOMY LAPAROSCOPIC      FOOT SURGERY      B/L great toe joint replacement    KNEE ARTHROSCOPY      (therapeutic) right knee    CA EDG US EXAM SURGICAL ALTER STOM DUODENUM/JEJUNUM N/A 10/17/2018    Procedure: LINEAR ENDOSCOPIC U/S;  Surgeon: Eduard Weber MD;  Location: [facility]; Service: Gastroenterology    VASECTOMY      vas deferens       Medical Review Of Systems:    Pertinent items are noted in HPI  Allergies:    No Known Allergies    Medications: All current active medications have been reviewed  Medications prior to admission:    Prior to Admission Medications   Prescriptions Last Dose Informant Patient Reported? Taking? ARIPiprazole (ABILIFY) 5 mg tablet 2/19/2019 at Unknown time  No Yes   Sig: Take 1 tablet (5 mg total) by mouth daily   DULoxetine (CYMBALTA) 60 mg delayed release capsule 2/19/2019 at Unknown time  No Yes   Sig: Take 1 capsule (60 mg total) by mouth daily   Insulin Pen Needle (B-D ULTRAFINE III SHORT PEN) 31G X 8 MM MISC 2/18/2019 at Unknown time  Yes Yes   Sig: by Does not apply route   Insulin Pen Needle (PEN NEEDLES) 29G X 12MM MISC 2/18/2019 at Unknown time  No Yes   Sig: by Does not apply route daily at bedtime Substitute what fits Touejo pen and what is covered by insurance     fenofibrate (TRICOR) 145 mg tablet 2/19/2019 at Unknown time  No Yes   Sig: Take 1 tablet (145 mg total) by mouth daily   glucose blood (ONE TOUCH ULTRA TEST) test strip 2/19/2019 at Unknown time  Yes Yes   Sig: by In Vitro route 3 (three) times a day   hydrOXYzine pamoate (VISTARIL) 50 mg capsule 2/19/2019 at Unknown time  No Yes   Sig: TAKE 1 CAPSULE BY MOUTH THREE TIMES DAILY AS NEEDED   insulin glargine (BASAGLAR KWIKPEN) 100 units/mL injection pen 2/18/2019 at Unknown time  No Yes   Sig: 10 units sq q AM  70 units sq q PM   lisinopril (ZESTRIL) 20 mg tablet 2/19/2019 at Unknown time  No Yes   Sig: Take 1 tablet (20 mg total) by mouth daily   omeprazole (PriLOSEC) 20 mg delayed release capsule 2/19/2019 at Unknown time  Yes Yes   Sig: Take by mouth   rosuvastatin (CRESTOR) 10 MG tablet 2/18/2019 at Unknown time  No Yes   Sig: Take 1 tablet (10 mg total) by mouth daily   traZODone (DESYREL) 50 mg tablet 2/18/2019 at Unknown time  No Yes   Sig: Take 1 tablet (50 mg total) by mouth daily at bedtime   umeclidinium-vilanterol (ANORO ELLIPTA) 62 5-25 MCG/INH inhaler Past Month at Unknown time  No Yes   Sig: Inhale 1 puff daily      Facility-Administered Medications: None       OBJECTIVE:    Vital signs in last 24 hours:    Temp:  [98 2 °F (36 8 °C)-98 8 °F (37 1 °C)] 98 8 °F (37 1 °C)  HR:  [] 98  Resp:  [14-16] 16  BP: (105-135)/(61-78) 105/67    No intake or output data in the 24 hours ending 02/20/19 1447     Mental Status Evaluation:    Appearance:  age appropriate, Appropriate hygiene well-nourished   Behavior:  cooperative, guarded   Speech:  decreased rate, slow   Mood:  depressed, dysphoric   Affect:  blunted, constricted   Language: naming objects   Thought Process:  organized   Associations: intact associations   Thought Content:  normal   Perceptual Disturbances: none   Risk Potential: Suicidal ideation - None at present  Homicidal ideation - None  Potential for aggression - No   Sensorium:  oriented to person, place and time/date   Memory:  recent and remote memory grossly intact   Consciousness:  alert and awake   Attention: decreased concentration and decreased attention span   Intellect: within normal limits   Fund of Knowledge: awareness of current events: normal   Insight:  impaired   Judgment: impaired   Muscle Strength Muscle Tone: normal  normal   Gait/Station: normal gait/station   Motor Activity: no abnormal movements       Laboratory Results:   I have personally reviewed all pertinent laboratory/tests results    Most Recent Labs:   Lab Results   Component Value Date    WBC 7 11 02/19/2019    RBC 5 32 02/19/2019    HGB 16 0 02/19/2019    HCT 46 4 02/19/2019     02/19/2019    RDW 12 9 02/19/2019    NEUTROABS 4 55 02/19/2019    SODIUM 135 (L) 02/19/2019    K 4 4 02/19/2019    CL 97 (L) 02/19/2019    CO2 27 02/19/2019    BUN 16 02/19/2019 CREATININE 1 20 02/19/2019    GLUC 208 (H) 02/19/2019    GLUF 160 (H) 01/21/2019    CALCIUM 9 6 02/19/2019    AST 16 02/19/2019    ALT 27 02/19/2019    ALKPHOS 132 (H) 02/19/2019    TP 8 4 (H) 02/19/2019    ALB 4 1 02/19/2019    TBILI 0 60 02/19/2019    CHOLESTEROL 196 01/21/2019    HDL 39 (L) 01/21/2019    TRIG 219 (H) 01/21/2019    LDLCALC 113 (H) 01/21/2019    [id number] 1 340 10/23/2018    HGBA1C 10 1 (H) 01/21/2019     01/21/2019       Imaging Studies: Xr Chest 2 Views    Result Date: 2/19/2019  Narrative: CHEST INDICATION:   chest pain  COMPARISON:  None EXAM PERFORMED/VIEWS:  XR CHEST PA & LATERAL Images: 4 FINDINGS: Cardiomediastinal silhouette appears unremarkable  Chronic fibrotic changes at the lung bases, consistent with 10/23/2018 CT  No pneumothorax or pleural effusion  Osseous structures appear within normal limits for patient age  Impression: No acute cardiopulmonary disease  Workstation performed: [id number]       Code Status: Level 1 - Full Code  Advance Directive and Living Will: <no information>    Assessment/Plan   Principal Problem:    Current severe episode of major depressive disorder without psychotic features without prior episode ([facility] )  Active Problems:    Alcohol abuse    Quiros esophagus    Benign essential hypertension    Diabetes mellitus type 1 5, managed as type 1 (HCC)    Ch[facility] pancreatitis ([facility] )      Patient Strengths: cooperative, compliant with medication, motivation for treatment/growth     Patient Barriers: lack of financial means, poor insight, substance abuse    Treatment Plan:     Planned Treatment and Medication Changes:  Initiate Zoloft 50 mg daily for treatment of depression  Continue Abilify 5 mg daily, trazodone 50 mg at bedtime, hydroxyzine 25 mg t i d  Obtain EKG and lipid panel  Patient has access to guns in his home  Confirmed with the wife Maryam [place] that these guns have been removed prior to discharge  Referred to partial program on discharge  All current active medications have been reviewed  Encourage group therapy, milieu therapy and occupational therapy  Behavioral Health checks every 7 minutes      Risks / Benefits of Treatment:    Risks, benefits, and possible side effects of medications explained to patient and patient verbalizes understanding and agreement for treatment  Counseling / Coordination of Care:    Patient's presentation on admission and proposed treatment plan discussed with treatment team   Diagnosis, medication changes and treatment plan reviewed with patient      Inpatient Psychiatric Certification:    Estimated length of stay: 5 midnights      Arvin Walden MD 02/20/19

## 2019-02-20 NOTE — CONSULTS
Consultation - Ten Ureña 44 y o  male MRN: [id number]    Unit/Bed#: [facility] 249-02 Encounter: [id number]      Assessment/Plan     Assessment/Plan:  Suicidal ideations, alcoholism, anxiety  - patient will be treated evaluated by behavior health while inpatient    COPD, Diabetes, Hypertension, Hyperlipidemia, GERD  - patient will be treated by Hospital Medicine while inpatient  - Patient has been started on insulin  He should be started on his home medication as well, which include lisinopril, tricor, prilosec, and crestor  History of Present Illness     HPI: Ten Ureña is a 44y o  year old male who presented to the emergency department due to depression suicidal ideations  Patient has been consuming alcohol on daily basis  Patient denies any history of alcohol withdrawal seizures in the past  Patient with a history of pancreatitis in the past  Last case was 3 months ago, and the time before that was 2 months before that  He denies any current abd pain  He will be followed by his PCP outpatient for further management of his COPD  He is a current everyday smoker as well  Inpatient consult for Medical Clearance for [facility] patient  Consult performed by: Sadie Otoole PA-C  Consult ordered by: Radhika Worthington MD          Review of Systems   Constitutional: Negative for fever  HENT: Negative for ear discharge, facial swelling, sinus pressure and sore throat  Respiratory: Negative for shortness of breath  Cardiovascular: Negative for chest pain  Gastrointestinal: Negative for abdominal pain, diarrhea, nausea and vomiting  Genitourinary: Negative for dysuria, hematuria and urgency  Musculoskeletal: Negative for arthralgias and joint swelling         Historical Information   Past Medical History:   Diagnosis Date    Allergic rhinitis     last assessed: 12/5/2012    Anemia     Anxiety and depression     Quiros esophagus     Cholelithiasis     Chronic pain     COPD [facility]chronic obstructive pulmonary disease) ([facility] )     Depression     Diabetes mellitus ([facility] )     Emphysema lung ([facility] )     Generalized anxiety disorder     GERD (gastroesophageal reflux disease)     Hyperlipidemia     Hypertension     Kidney stone     Metatarsalgia     last assessed: 8/11/2014, unspecified laterality, ? cause  PE with changes  To see DPM tomorrow   Pancreatitis     Psychiatric disorder     Renal disorder     Shortness of breath     with activity     Past Surgical History:   Procedure Laterality Date    CHOLECYSTECTOMY LAPAROSCOPIC      FOOT SURGERY      B/L great toe joint replacement    KNEE ARTHROSCOPY      (therapeutic) right knee    UT EDG US EXAM SURGICAL ALTER STOM DUODENUM/JEJUNUM N/A 10/17/2018    Procedure: LINEAR ENDOSCOPIC U/S;  Surgeon: Mary Sierra MD;  [facility]: [facility]; Service: Gastroenterology    VASECTOMY      vas deferens     Social History   Social History     Substance and Sexual Activity   Alcohol Use Yes    Frequency: 4 or more times a week    Drinks per session: 5 or 6    Binge frequency: Daily or almost daily    Comment: Drank a 5th vodka today     Social History     Substance and Sexual Activity   Drug Use No    Comment: chronic narcotic use     Social History     Tobacco Use   Smoking Status Current Every Day Smoker    Packs/day: 1 00   Smokeless Tobacco Never Used   Tobacco Comment    Tobacco use [employer] "How to Quit" literature given to pat       Family History:   Family History   Problem Relation Age of Onset   Zoe De La Fuentes Cancer Mother     Coronary artery disease Mother     Diabetes Mother     Coronary artery disease Father     Prostate cancer Father     Diabetes Sister     Coronary artery disease Family        Meds/Allergies   current meds:   Current Facility-Administered Medications   Medication Dose Route Frequency    acetaminophen (TYLENOL) tablet 650 mg  650 mg Oral Q6H PRN    acetaminophen (TYLENOL) tablet 650 mg  650 mg Oral Q4H PRN    aluminum-magnesium hydroxide-simethicone (MYLANTA) 200-200-20 mg/5 mL oral suspension 15 mL  15 mL Oral Q4H PRN    benztropine (COGENTIN) injection 2 mg  2 mg Intramuscular Q6H PRN    benztropine (COGENTIN) tablet 2 mg  2 mg Oral Q6H PRN    haloperidol (HALDOL) tablet 5 mg  5 mg Oral Q6H PRN    haloperidol lactate (HALDOL) injection 5 mg  5 mg Intramuscular Q6H PRN    hydrOXYzine HCL (ATARAX) tablet 25 mg  25 mg Oral Q6H PRN    ibuprofen (MOTRIN) tablet 800 mg  800 mg Oral Q8H PRN    insulin detemir (LEVEMIR) subcutaneous injection 50 Units  50 Units Subcutaneous HS    insulin lispro (HumaLOG) 100 units/mL subcutaneous injection 1-6 Units  1-6 Units Subcutaneous HS    insulin lispro (HumaLOG) 100 units/mL subcutaneous injection 1-6 Units  1-6 Units Subcutaneous TID AC    insulin lispro (HumaLOG) 100 units/mL subcutaneous injection 10 Units  10 Units Subcutaneous TID With Meals    magnesium hydroxide (MILK OF MAGNESIA) 400 mg/5 mL oral suspension 20 mL  20 mL Oral Daily PRN    risperiDONE (RisperDAL M-TABS) dispersible tablet 1 mg  1 mg Oral Q6H PRN    traZODone (DESYREL) tablet 50 mg  50 mg Oral HS PRN    and PTA meds:   Prior to Admission Medications   Prescriptions Last Dose Informant Patient Reported? Taking? ARIPiprazole (ABILIFY) 5 mg tablet 2/19/2019 at Unknown time  No Yes   Sig: Take 1 tablet (5 mg total) by mouth daily   DULoxetine (CYMBALTA) 60 mg delayed release capsule 2/19/2019 at Unknown time  No Yes   Sig: Take 1 capsule (60 mg total) by mouth daily   Insulin Pen Needle (B-D ULTRAFINE III SHORT PEN) 31G X 8 MM MISC 2/18/2019 at Unknown time  Yes Yes   Sig: by Does not apply route   Insulin Pen Needle (PEN NEEDLES) 29G X 12MM MISC 2/18/2019 at Unknown time  No Yes   Sig: by Does not apply route daily at bedtime Substitute what fits Touejo pen and what is covered by insurance     fenofibrate (TRICOR) 145 mg tablet 2/19/2019 at Unknown time  No Yes   Sig: Take 1 tablet (145 mg total) by mouth daily   glucose blood (ONE TOUCH ULTRA TEST) test strip 2/19/2019 at Unknown time  Yes Yes   Sig: by In Vitro route 3 (three) times a day   hydrOXYzine pamoate (VISTARIL) 50 mg capsule 2/19/2019 at Unknown time  No Yes   Sig: TAKE 1 CAPSULE BY MOUTH THREE TIMES DAILY AS NEEDED   insulin glargine (BASAGLAR KWIKPEN) 100 units/mL injection pen 2/18/2019 at Unknown time  No Yes   Sig: 10 units sq q AM  70 units sq q PM   lisinopril (ZESTRIL) 20 mg tablet 2/19/2019 at Unknown time  No Yes   Sig: Take 1 tablet (20 mg total) by mouth daily   omeprazole (PriLOSEC) 20 mg delayed release capsule 2/19/2019 at Unknown time  Yes Yes   Sig: Take by mouth   rosuvastatin (CRESTOR) 10 MG tablet 2/18/2019 at Unknown time  No Yes   Sig: Take 1 tablet (10 mg total) by mouth daily   traZODone (DESYREL) 50 mg tablet 2/18/2019 at Unknown time  No Yes   Sig: Take 1 tablet (50 mg total) by mouth daily at bedtime   umeclidinium-vilanterol (ANORO ELLIPTA) 62 5-25 MCG/INH inhaler Past Month at Unknown time  No Yes   Sig: Inhale 1 puff daily      Facility-Administered Medications: None     No Known Allergies    Objective   Vitals: Blood pressure 121/61, pulse 98, temperature 98 8 °F (37 1 °C), temperature source Temporal, resp  rate 16, height 6' (1 829 m), weight 85 7 kg (189 lb), SpO2 96 %  No intake or output data in the 24 hours ending 02/20/19 1015  Invasive Devices          None          Physical Exam   Constitutional: He is oriented to person, place, and time  He appears well-developed and well-nourished  No distress  HENT:   Head: Normocephalic and atraumatic  Mouth/Throat: Oropharynx is clear and moist    Cardiovascular: Normal rate, regular rhythm and normal heart sounds  Pulmonary/Chest: Effort normal and breath sounds normal  No respiratory distress  Neurological: He is alert and oriented to person, place, and time  Skin: Skin is warm and dry  Capillary refill takes less than 2 seconds  He is not diaphoretic     Psychiatric: He has a normal mood and affect  Nursing note and vitals reviewed  Lab Results: I have personally reviewed pertinent reports  Imaging Studies: I have personally reviewed pertinent reports  EKG, Pathology, and Other Studies: I have personally reviewed pertinent reports  VTE Prophylaxis: Reason for no pharmacologic prophylaxis Ambulatory    Code Status: Level 1 - Full Code  Advance Directive and Living Will:      Power of :    POLST:      Counseling / Coordination of Care  Total floor / unit time spent today 30 minutes  Greater than 50% of total time was spent with the patient and / or family counseling and / or coordination of care

## 2019-02-20 NOTE — ED NOTES
Received call from Kyle from [facility]  They are able to transport at 10:30  Yuli at Intake advised of this

## 2019-02-20 NOTE — ED NOTES
Pt transported by [facility] to [facility], no signs of acute distress noted, all belongings sent with Pt, report called to Mone Rivas at [facility]       Dina Estrada RN  02/19/19 9061

## 2019-02-20 NOTE — PLAN OF CARE
PT has been attending all art therapy groups offered thus far and actively engages in directives and has positive social interactions with peers

## 2019-02-20 NOTE — PROGRESS NOTES
Pt was seen on the unit, pt was calm and cooperative during the day, pt appears to be soft spoken ,but is able to verbalize his needs to staff  Pt has been selectively social while out unit   Pt denies any current issues at this time

## 2019-02-20 NOTE — ED NOTES
Pt given nicotine patch, food and beverage as discussed to help Pt feel more calm and be less agitated  Pt sitting up in bed eating, will continue to monitor       Jose Roberto Sibley RN  02/19/19 1948

## 2019-02-20 NOTE — PROGRESS NOTES
Admission Note:  Received 44year old male patient from [address] [place] [address],Unit [address] ER on a 201 with a Dx of depression with increasing anxiety  Upon approach patient's mood and affect is flat and depressed  Patient c/o anxiety and panic attacks  Patient was having SI to OD/shoot himself/drink ETOH to death  Patient denies SI at this time  No c/o pain or hallucinations  Patient is an insulin dependent diabetic  Accucheck was 331  Hospitalist ordered Humalog sliding scale coverage and 50 units of Levemir  Which was given before patient went to bed  Patient has a Hx of HTN, DM, Quiros's esophagus, emphysema, ETOH abuse, and pancreatis  Patient was taken Effexor but was not very affective and insurance would not pay for medications  Patient uses ETOH to self medicate  Patient signed a 72 hour notice on 2/19/19 at [phone] with admission intake  Gave 50 mg Trazodone and Atarax at 0022  Upon reassessment patient is sleeping without difficulty  Will continue to monitor safety and behaviors every 7 minutes

## 2019-02-20 NOTE — ED NOTES
Shift change   PT currently asleep will continue to monitor for safety     Aviva Gonzalez  02/19/19 1918

## 2019-02-20 NOTE — ED NOTES
Crisis at bedside   PT would like to smoke a cigarette and informed hes not allowed will ask provider for a nicotine patch      Dago Christianson  02/19/19 0429

## 2019-02-21 LAB
CHOLEST SERPL-MCNC: 157 MG/DL (ref 0–200)
GLUCOSE SERPL-MCNC: 122 MG/DL (ref 65–140)
GLUCOSE SERPL-MCNC: 177 MG/DL (ref 65–140)
GLUCOSE SERPL-MCNC: 189 MG/DL (ref 65–140)
GLUCOSE SERPL-MCNC: 97 MG/DL (ref 65–140)
HDLC SERPL-MCNC: 44 MG/DL (ref 40–60)
LDLC SERPL CALC-MCNC: 75 MG/DL (ref 75–193)
NONHDLC SERPL-MCNC: 113 MG/DL
TRIGL SERPL-MCNC: 188 MG/DL (ref 44–166)

## 2019-02-21 PROCEDURE — 99232 SBSQ HOSP IP/OBS MODERATE 35: CPT | Performed by: HOSPITALIST

## 2019-02-21 PROCEDURE — 82948 REAGENT STRIP/BLOOD GLUCOSE: CPT

## 2019-02-21 PROCEDURE — 80061 LIPID PANEL: CPT | Performed by: HOSPITALIST

## 2019-02-21 RX ORDER — NICOTINE 21 MG/24HR
1 PATCH, TRANSDERMAL 24 HOURS TRANSDERMAL DAILY
Status: DISCONTINUED | OUTPATIENT
Start: 2019-02-22 | End: 2019-02-25 | Stop reason: HOSPADM

## 2019-02-21 RX ORDER — SERTRALINE HYDROCHLORIDE 100 MG/1
100 TABLET, FILM COATED ORAL DAILY
Status: DISCONTINUED | OUTPATIENT
Start: 2019-02-22 | End: 2019-02-25 | Stop reason: HOSPADM

## 2019-02-21 RX ADMIN — PANTOPRAZOLE SODIUM 20 MG: 20 TABLET, DELAYED RELEASE ORAL at 04:25

## 2019-02-21 RX ADMIN — HYDROXYZINE HYDROCHLORIDE 25 MG: 25 TABLET ORAL at 22:00

## 2019-02-21 RX ADMIN — INSULIN LISPRO 1 UNITS: 100 INJECTION, SOLUTION INTRAVENOUS; SUBCUTANEOUS at 22:01

## 2019-02-21 RX ADMIN — INSULIN LISPRO 1 UNITS: 100 INJECTION, SOLUTION INTRAVENOUS; SUBCUTANEOUS at 16:50

## 2019-02-21 RX ADMIN — PRAVASTATIN SODIUM 80 MG: 40 TABLET ORAL at 17:17

## 2019-02-21 RX ADMIN — HYDROXYZINE HYDROCHLORIDE 25 MG: 25 TABLET ORAL at 07:50

## 2019-02-21 RX ADMIN — INSULIN DETEMIR 50 UNITS: 100 INJECTION, SOLUTION SUBCUTANEOUS at 22:01

## 2019-02-21 RX ADMIN — HYDROXYZINE HYDROCHLORIDE 25 MG: 25 TABLET ORAL at 16:50

## 2019-02-21 RX ADMIN — TRAZODONE HYDROCHLORIDE 50 MG: 50 TABLET ORAL at 22:00

## 2019-02-21 RX ADMIN — INSULIN LISPRO 10 UNITS: 100 INJECTION, SOLUTION INTRAVENOUS; SUBCUTANEOUS at 11:53

## 2019-02-21 RX ADMIN — SERTRALINE HYDROCHLORIDE 50 MG: 50 TABLET ORAL at 07:51

## 2019-02-21 RX ADMIN — INSULIN LISPRO 10 UNITS: 100 INJECTION, SOLUTION INTRAVENOUS; SUBCUTANEOUS at 16:50

## 2019-02-21 RX ADMIN — ARIPIPRAZOLE 5 MG: 5 TABLET ORAL at 07:50

## 2019-02-21 RX ADMIN — INSULIN LISPRO 10 UNITS: 100 INJECTION, SOLUTION INTRAVENOUS; SUBCUTANEOUS at 07:52

## 2019-02-21 NOTE — DISCHARGE INSTR - APPOINTMENTS
The treatment team recommends participation in a partial hospitalization program with continued medication management, group and individual therapy services upon discharge; you agreed to a referral   A referral was made on your behalf to [address] [place] [address] Partial Hospitalization Program located at [address] [place] [address] Phone: [phone]  You will begin the program on Tuesday, February 26th at 8am  Please arrive at 8:00 AM on your first day to complete intake paperwork  Each consecutive day of participation will begin at 8:30 AM   A bagged lunch is included but you are welcome to bring your own lunch if preferred  There is a refrigerator and microwave on site  Please bring your photo ID and insurance card(s) as well as your medications in their prescription bottles  The Treatment Team also recommended Substance Abuse  due to your being admitted positive for ETOH; you declined Out-patient and In-Patient Services at this time  If you wish to seek this service in the future, please contact [place]  Alcohol Commission: [phone] to schedule an appointment  You identified your Primary Care Physician, (PCP), as Dr Chely Toledo [address] Lidia LEE  Ph: [phone]; Fx: [phone], but declined an appointment made on your behalf  Please schedule as needed  Your Summary of Care will be faxed to this provider

## 2019-02-21 NOTE — PROGRESS NOTES
Clinical Pharmacy Note: Medication Education Group     Intervention:  Open Forum    Length of Group: 45 min     Methods/resources used: verbal discussion     Topics Discussed: Medication adherence, side effect management, nonpharmacologic strategies, medication effectiveness and indications      Time spent in group: Entire time      Patient Specific concerns: Renan Barboza presented to group today dressed in street clothing and appeared well-groomed and alert and oriented to person, place and time  Renan Barboza seemed anxious  Patient's affect was mainly pleasant and quiet and he listened attentively in group but did not participate  Patient was respectful of others throughout and interacted appropriately with peers  Renan Barboza did not have specific concerns       Patient understood counseling: yes     Electronically signed by: Parth Zelaya, Pharmacist

## 2019-02-21 NOTE — PROGRESS NOTES
Patient had no signs of withdrawal noted  Patient observed sleeping during most Q 7 minute safety checks (second portion of shift)  No suicidal ideations, acting out or homicidal behaviors  No change in medical condition or complaints voiced  Fluids maintained at bedside to promote hydration

## 2019-02-21 NOTE — PROGRESS NOTES
Pt has been calm and cooperative on the unit, pt appears to be flat and depressed  Pt denies any thoughts of si/hi at this time  Pt has been able to address his needs to staff  Pt has been attending groups that are offered  Pt denies any current issues at this time

## 2019-02-21 NOTE — CASE MANAGEMENT
CM left VM for patient's wife, Rico Quiñones, requesting a return call to clarify an issue  Will check on status of securing firearms access with return call

## 2019-02-21 NOTE — TREATMENT PLAN
TREATMENT PLAN REVIEW - [address] Lee[address] Cantu 44 y o  1970 male MRN: [id number]    [facility] [address] Room / Bed: [place] [address]/[facility] 313-47 Encounter: [id number]          Admit Date/Time:  2/19/2019 11:20 PM    Treatment Team: Attending Provider: Rachael Fowler MD; Consulting Physician: Tu Elkins; Patient Care Assistant: Leatha Hines; Patient Care Assistant: Eliseo Cortes; Patient Care Assistant: Abraham Michael; Patient Care Assistant: Kranthi Rosales;  Registered Nurse: Diya Pennington RN    Diagnosis: Principal Problem:    Current severe episode of major depressive disorder without psychotic features without prior episode [facility])  Active Problems:    Alcohol abuse    Quiros esophagus    Benign essential hypertension    Diabetes mellitus type 1 5, managed as type 1 ([facility] )    Chronic pancreatitis ([facility] )      Patient Strengths/Assets: family ties, patient is on a voluntary commitment, patient is willing to work on problems    Patient Barriers/Limitations: marital/family conflict, poor past treatment response, substance abuse    Short Term Goals: decrease in depressive symptoms, ability to stay safe on the unit, improvement in insight    Long Term Goals: improvement in depression, improvement in anxiety, free of suicidal thoughts, remains sober     Progress Towards Goals: starting psychiatric medications as prescribed    Recommended Treatment: medication management, patient medication education, group therapy, milieu therapy, continued Behavioral Health psychiatric evaluation/assessment process    Treatment Frequency: daily medication monitoring, group and milieu therapy daily, monitoring through interdisciplinary rounds, monitoring through weekly patient care conferences    Expected Discharge Date:  2/25/19    Discharge Plan: attend [place] [address] meetings, discharge to home, referral to partial hospitalization program    Treatment Plan Created/Updated By: Michael May MD

## 2019-02-21 NOTE — PROGRESS NOTES
Progress Note - Behavioral Health     Julio Webber 44 y o  male MRN: [id number]   Unit/Bed#: Elicia Walker 249-02 Encounter: [id number]    Behavior over the last 24 hours: minimal improvement  Lynda Marker seen today, appears depressed and withdrawn  He does however report feeling somewhat better states his anxiety is less  He has been noted to be withdrawn on the unit as well  He does endorse still feeling depressed  Reports his appetite has improved and he did sleep well last night  He is compliant with medication and treatment plan  Sleep: normal  Appetite: improved  Medication side effects: denied  ROS: no complaints    Mental Status Evaluation:    Appearance:  improved grooming, looks stated age   Behavior:  guarded, psychomotor retardation   Speech:  slow, soft   Mood:  depressed   Affect:  blunted, constricted   Thought Process:  organized   Associations: intact associations   Thought Content:  normal   Perceptual Disturbances: none   Risk Potential: Suicidal ideation - None at present  Homicidal ideation - None   Sensorium:  oriented to person, place and time/date   Memory:  recent and remote memory grossly intact   Consciousness:  alert and awake   Attention: attention span and concentration are age appropriate   Insight:  improving   Judgment: improving   Gait/Station: normal gait/station   Motor Activity: no abnormal movements     Vital signs in last 24 hours:    Temp:  [97 6 °F (36 4 °C)-98 2 °F (36 8 °C)] 97 6 °F (36 4 °C)  HR:  [] 89  Resp:  [18] 18  BP: (100-128)/(66-68) 100/68    Laboratory results:   I have personally reviewed all pertinent laboratory/tests results    Most Recent Labs:   Lab Results   Component Value Date    WBC 7 11 02/19/2019    RBC 5 32 02/19/2019    HGB 16 0 02/19/2019    HCT 46 4 02/19/2019     02/19/2019    RDW 12 9 02/19/2019    NEUTROABS 4 55 02/19/2019    SODIUM 135 (L) 02/19/2019    K 4 4 02/19/2019    CL 97 (L) 02/19/2019    CO2 27 02/19/2019    BUN 16 02/19/2019    CREATININE 1 20 02/19/2019    GLUC 208 (H) 02/19/2019    GLUF 160 (H) 01/21/2019    CALCIUM 9 6 02/19/2019    AST 16 02/19/2019    ALT 27 02/19/2019    ALKPHOS 132 (H) 02/19/2019    TP 8 4 (H) 02/19/2019    ALB 4 1 02/19/2019    TBILI 0 60 02/19/2019    CHOLESTEROL 157 02/21/2019    HDL 44 02/21/2019    TRIG 188 (H) 02/21/2019    LDLCALC 75 02/21/2019    NONHDLC 113 02/21/2019    ICN7MATUVHPQ 1 340 10/23/2018    HGBA1C 10 1 (H) 01/21/2019     01/21/2019           Assessment/Plan   Principal Problem:    Current severe episode of major depressive disorder without psychotic features without prior episode ([place] [address] )  Active Problems:    Alcohol abuse    Quiros esophagus    Benign essential hypertension    Diabetes mellitus type 1 5, managed as type 1 ([place] [address] )    Chronic pancreatitis ([place] [address] )    Recommended Treatment:     Planned medication and treatment changes:  Continue Abilify 5 mg daily and Zoloft which will be increased to 100 mg daily starting tomorrow morning  All current active medications have been reviewed    Encourage group therapy, milieu therapy and occupational therapy  Behavioral Health checks every 7 minutes    Current Facility-Administered Medications:  acetaminophen 650 mg Oral Q6H PRN Andrea Alcantar MD   acetaminophen 650 mg Oral Q4H PRN Andrea Alcantar MD   aluminum-magnesium hydroxide-simethicone 15 mL Oral Q4H PRN Andrea Alcantar MD   ARIPiprazole 5 mg Oral Daily Justen Christine MD   benztropine 2 mg Intramuscular Q6H PRN Andrea Alcantar MD   benztropine 2 mg Oral Q6H PRN Andrea Alcantar MD   haloperidol 5 mg Oral Q6H PRN Andrea Alcantar MD   haloperidol lactate 5 mg Intramuscular Q6H PRN Andrea Alcantar MD   hydrOXYzine HCL 25 mg Oral Q6H PRN Andrea Alcantar MD   hydrOXYzine HCL 25 mg Oral TID Justen Christine MD   ibuprofen 800 mg Oral Q8H PRN Andrea Alcantar MD   insulin detemir 50 Units Subcutaneous HS CYN Alba-PRICE   insulin lispro 1-6 Units Subcutaneous HS Ian Alston ZOYA Bnauelos   insulin lispro 1-6 Units Subcutaneous TID KE Banuelos PA-C   insulin lispro 10 Units Subcutaneous TID With Meals Tolu Harding PA-C   lisinopril 20 mg Oral Daily TYE Cerrato   magnesium hydroxide 20 mL Oral Daily PRN [address] MD   [START ON 2/22/2019] nicotine 1 patch Transdermal Daily TYE Cerrato   pantoprazole 20 mg Oral Early Morning TYE Cerrato   pravastatin 80 mg Oral Daily With [employer]TYE   risperiDONE 1 mg Oral Q6H PRN [address] MD   [START ON 2/22/2019] sertraline 100 mg Oral Daily Bryant Alarcon MD   traZODone 50 mg Oral HS PRN MD sriram JiménezZODone 50 mg Oral HS Bryant Alarcon MD       Risks / Benefits of Treatment:    Risks, benefits, and possible side effects of medications explained to patient and patient verbalizes understanding and agreement for treatment  Counseling / Coordination of Care:    Patient's progress discussed with staff in treatment team meeting  Medications, treatment progress and treatment plan reviewed with patient      Bryant Alarcon MD 02/21/19

## 2019-02-21 NOTE — PLAN OF CARE
PT has attended all of the art therapy groups offered where he elects to sit with peers and does actively engage in group directives and in projects of choice  PT mostly displays a depressed mood and affect, however does brighten at times throughout group and has positive social interactions

## 2019-02-21 NOTE — PROGRESS NOTES
Patient appeared very anxious during assessment  Risperidone 1 mg given at 1942 with good result after 45 minutes  No signs of active withdrawal   Eye contact fair  Compliant with meals, medications and some groups  Appears disheveled  Maintained on q 7 minute safety checks

## 2019-02-21 NOTE — CASE MANAGEMENT
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  Reportedly worked briefly afterward, though performance impaired by recurrent need for medical leave due to bouts of alcohol induced pancreatitis, and other physical concerns  During the interview, the patient denied A/V hallucinations/delusions; reported fleeting SI without a current plan  No h/o suicide attempt  Denied HI and does not feel at risk of harm to others; questionable to self  Patient has had 1 previous AIP at [facility] and [facility]; reported "seeing a dx of Bipolar on my paperwork " Patient does report 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; denies intent to use the weapons  Denies current or past Domestic Violence/ Trauma  Reported family h/o Depression/Anxiety on the part of his siblings as well as Alcohol abuse by same  Denied family h/o SI/HI or Dementia  Reports personal h/o alcohol abuse, with a period of sobriety for 11 years until  following a decrease in attendance at [facility]  Patient reported one D&A Rehab treatment at [address] [place] [address] in   Declined referral for D&A treatment post d/c, stating he will resume attendance at [place] [address] as "it worked in the past when I went regularly " Patient smokes 1 1/2 pk of cigarettes/day; declined smoking cessation counseling  Patient has a h/o DUI in [facility]; no other legal issues  He has been  to [place] for 21 years  They have two children, Shirley Boyd: age 12; [place]: age 15 with whom he reports a close relationship  Both parents are   Patient reports fairly frequent contact with 3 brothers, Missy Aguilar and Eddy Councilman   Lists his in-laws and friends as additional supports  Patient is a HS graduate with some college  Lives in a house with his wife and 2 children and is able to return  Patient receives medical services at the office of Dr Carmen Orozco; declined an appt  Made on his behalf  Patient is in agreement with referrl to the [facility] and for Med mgmt and counseling after program completion  He is in agreement with family notification to his wife; declined a family session  Patient listed coping skills as fishing, hunting and watching sports on TV  Following review of the Treatment Plan, the patient signed the document  MIGUELANGEL's obtained for PCP and wife

## 2019-02-22 LAB
GLUCOSE SERPL-MCNC: 103 MG/DL (ref 65–140)
GLUCOSE SERPL-MCNC: 131 MG/DL (ref 65–140)
GLUCOSE SERPL-MCNC: 137 MG/DL (ref 65–140)
GLUCOSE SERPL-MCNC: 171 MG/DL (ref 65–140)
GLUCOSE SERPL-MCNC: 235 MG/DL (ref 65–140)
GLUCOSE SERPL-MCNC: 241 MG/DL (ref 65–140)
GLUCOSE SERPL-MCNC: 52 MG/DL (ref 65–140)

## 2019-02-22 PROCEDURE — 82948 REAGENT STRIP/BLOOD GLUCOSE: CPT

## 2019-02-22 PROCEDURE — 99232 SBSQ HOSP IP/OBS MODERATE 35: CPT | Performed by: NURSE PRACTITIONER

## 2019-02-22 RX ORDER — QUETIAPINE FUMARATE 25 MG/1
50 TABLET, FILM COATED ORAL
Status: DISCONTINUED | OUTPATIENT
Start: 2019-02-22 | End: 2019-02-23

## 2019-02-22 RX ORDER — LORAZEPAM 2 MG/ML
2 INJECTION INTRAMUSCULAR EVERY 4 HOURS PRN
Status: DISCONTINUED | OUTPATIENT
Start: 2019-02-22 | End: 2019-02-22

## 2019-02-22 RX ORDER — GABAPENTIN 100 MG/1
100 CAPSULE ORAL 3 TIMES DAILY
Status: DISCONTINUED | OUTPATIENT
Start: 2019-02-22 | End: 2019-02-25 | Stop reason: HOSPADM

## 2019-02-22 RX ORDER — LORAZEPAM 1 MG/1
1 TABLET ORAL EVERY 4 HOURS PRN
Status: DISCONTINUED | OUTPATIENT
Start: 2019-02-22 | End: 2019-02-25 | Stop reason: HOSPADM

## 2019-02-22 RX ADMIN — QUETIAPINE FUMARATE 50 MG: 25 TABLET ORAL at 21:42

## 2019-02-22 RX ADMIN — INSULIN LISPRO 10 UNITS: 100 INJECTION, SOLUTION INTRAVENOUS; SUBCUTANEOUS at 12:33

## 2019-02-22 RX ADMIN — NICOTINE 1 PATCH: 21 PATCH, EXTENDED RELEASE TRANSDERMAL at 08:41

## 2019-02-22 RX ADMIN — ARIPIPRAZOLE 5 MG: 5 TABLET ORAL at 08:41

## 2019-02-22 RX ADMIN — HYDROXYZINE HYDROCHLORIDE 25 MG: 25 TABLET ORAL at 21:41

## 2019-02-22 RX ADMIN — INSULIN LISPRO 3 UNITS: 100 INJECTION, SOLUTION INTRAVENOUS; SUBCUTANEOUS at 08:40

## 2019-02-22 RX ADMIN — INSULIN DETEMIR 50 UNITS: 100 INJECTION, SOLUTION SUBCUTANEOUS at 21:00

## 2019-02-22 RX ADMIN — GABAPENTIN 100 MG: 100 CAPSULE ORAL at 16:43

## 2019-02-22 RX ADMIN — TRAZODONE HYDROCHLORIDE 50 MG: 50 TABLET ORAL at 21:42

## 2019-02-22 RX ADMIN — HYDROXYZINE HYDROCHLORIDE 25 MG: 25 TABLET ORAL at 08:41

## 2019-02-22 RX ADMIN — PANTOPRAZOLE SODIUM 20 MG: 20 TABLET, DELAYED RELEASE ORAL at 05:35

## 2019-02-22 RX ADMIN — PRAVASTATIN SODIUM 80 MG: 40 TABLET ORAL at 17:28

## 2019-02-22 RX ADMIN — INSULIN LISPRO 10 UNITS: 100 INJECTION, SOLUTION INTRAVENOUS; SUBCUTANEOUS at 08:40

## 2019-02-22 RX ADMIN — INSULIN LISPRO 3 UNITS: 100 INJECTION, SOLUTION INTRAVENOUS; SUBCUTANEOUS at 21:44

## 2019-02-22 RX ADMIN — SERTRALINE HYDROCHLORIDE 100 MG: 100 TABLET ORAL at 08:41

## 2019-02-22 RX ADMIN — HYDROXYZINE HYDROCHLORIDE 25 MG: 25 TABLET ORAL at 16:43

## 2019-02-22 RX ADMIN — GABAPENTIN 100 MG: 100 CAPSULE ORAL at 21:42

## 2019-02-22 RX ADMIN — LISINOPRIL 20 MG: 20 TABLET ORAL at 08:41

## 2019-02-22 NOTE — PLAN OF CARE
PT continues to attend most of the art therapy groups offered where he is an active participant in group directives and in projects of choice  PT easily follows instruction and does display fiar insight into his illness and recovery during group processing

## 2019-02-22 NOTE — PROGRESS NOTES
No signs of active withdrawal   Patient observed sleeping during most Q 7 minute safety checks (second portion of shift)  No suicidal ideations, acting out or homicidal behaviors  No change in medical condition or complaints voiced  Fluids maintained at bedside to promote hydration

## 2019-02-22 NOTE — SOCIAL WORK
SW received call from [facility] at [facility] stating that pt can start on Tuesday 2/16/19 at [address] [address] [address]

## 2019-02-22 NOTE — PROGRESS NOTES
Pt was seen this morning on the unit at the nursing desk  Pt reported that he was  Feeling that his anxiety was a three out of ten  Pt felt the same about his depression  Pt reports feeling better overall ,but appears to be flat and depressed  Pt has been medication, group and meal compliant  Pt denies any additional issues at this time, will continue to monitor

## 2019-02-22 NOTE — PROGRESS NOTES
Patient approached nursing station complaining of "low blood sugar"  At [address] read 52  Given 4 ounces of orange juice and 4 sugars  Accu-check at 0338 was 103  Requested peanut butter and crackers and meat sandwich  Accu-check at 0405 was 171  Matthew Bruner stated he felt better and returned to bed without incident  No further incident of hypo or hyperglycemia  Monitored on q 7 minute safety checks

## 2019-02-22 NOTE — PROGRESS NOTES
No signs of active withdrawal noted  Positive for group  Increase in socialization  Maintained on q 7 minute safety checks  Maintained on Q 7 minute safety checks  No acting out, suicidal ideations, and/or homicidal behaviors  No changes in medical condition or complaints voiced  Fluids maintained at bedside to promote hydration

## 2019-02-22 NOTE — PROGRESS NOTES
Progress Note - Behavioral Health     Olena Iglesias 44 y o  male MRN: [id number]   Unit/Bed#: [facility] 249-02 Encounter: [id number]    Behavior over the last 24 hours: Paul Lora was seen for an inpatient follow-up psychiatric visit this date  Per nursing report, he has been taking his medications as prescribed and is attending groups  At today's visit, he denies any suicidal or homicidal ideation but relates he is extremely anxious  He denies any suicidal or homicidal ideation and relates he is feeling less depressed than when he was admitted  He denies any symptoms of alcohol withdrawal at this time  ROS: no complaints    Mental Status Evaluation:    Appearance:  casually dressed, dressed appropriately   Behavior:  cooperative, calm   Speech:  normal rate and volume   Mood:  depressed, dysphoric, anxious   Affect:  flat   Thought Process:  coherent   Associations: intact associations   Thought Content:  normal, no overt delusions   Perceptual Disturbances: none   Risk Potential: Suicidal ideation - None  Homicidal ideation - None  Potential for aggression - No   Sensorium:  oriented to person, place and time/date   Memory:  recent and remote memory grossly intact   Consciousness:  alert and awake   Attention: attention span and concentration appear shorter than expected for age   Insight:  fair   Judgment: fair   Gait/Station: normal gait/station and normal balance   Motor Activity: no abnormal movements     Vital signs in last 24 hours:    Temp:  [98 7 °F (37 1 °C)] 98 7 °F (37 1 °C)  HR:  [93] 93  Resp:  [18] 18  BP: (135)/(62) 135/62    Laboratory results:  I have personally reviewed all pertinent laboratory/tests results      Progress Toward Goals: progressing    Assessment/Plan   Principal Problem:    Current severe episode of major depressive disorder without psychotic features without prior episode ([facility] )  Active Problems:    Quiros esophagus    Benign essential hypertension    Diabetes mellitus type 1 5, managed as type 1 ([facility] )    Chronic pancreatitis ([facility] )    Alcohol abuse    Recommended Treatment:   Seroquel 50 mg q h s  Added for anxiety  Patient did not wish to take any during the day  Neurontin 100 mg t i d  Initiated  Will continue all other medications as prescribed  Discharge disposition and planning are ongoing  All current active medications have been reviewed    Encourage group therapy, milieu therapy and occupational therapy  Behavioral Health checks every 7 minutes      Current Facility-Administered Medications:  acetaminophen 650 mg Oral Q6H PRN Monse Canseco MD   acetaminophen 650 mg Oral Q4H PRN Monse Canseco MD   aluminum-magnesium hydroxide-simethicone 15 mL Oral Q4H PRN Monse Canseco MD   ARIPiprazole 5 mg Oral Daily Abdoul Ross MD   benztropine 2 mg Intramuscular Q6H PRN Monse Canseco MD   benztropine 2 mg Oral Q6H PRN Monse Canseco MD   haloperidol 5 mg Oral Q6H PRN Monse Canseco MD   haloperidol lactate 5 mg Intramuscular Q6H PRN Monse Canseco MD   hydrOXYzine HCL 25 mg Oral Q6H PRN Monse Canseco MD   hydrOXYzine HCL 25 mg Oral TID Abdoul Ross MD   ibuprofen 800 mg Oral Q8H PRN Monse Canseco MD   insulin detemir 50 Units Subcutaneous HS Mendel Alvarez PA-C   insulin lispro 1-6 Units Subcutaneous HS Mendel Alvarez PA-C   insulin lispro 1-6 Units Subcutaneous TID AC Dorene Banuelos PA-C   insulin lispro 10 Units Subcutaneous TID With Meals Mendel Alvarez PA-C   lisinopril 20 mg Oral Daily TYE Cerrato   magnesium hydroxide 20 mL Oral Daily PRN Monse Canseco MD   nicotine 1 patch Transdermal Daily TYE Cerrato   pantoprazole 20 mg Oral Early Morning TYE Cerrato   pravastatin 80 mg Oral Daily With [employer], TYE   QUEtiapine 50 mg Oral HS TYE Cerrato   risperiDONE 1 mg Oral Q6H PRN Monse Canseco MD   sertraline 100 mg Oral Daily Abdoul Ross MD   traZODone 50 mg Oral HS PRN [place] Agustina Cameron MD   traZODone 50 mg Oral HS Barb Haley MD       Risks / Benefits of Treatment:    Risks, benefits, and possible side effects of medications explained to patient and patient verbalizes understanding and agreement for treatment  Counseling / Coordination of Care:      Patient's progress discussed with staff in treatment team meeting  Medications, treatment progress and treatment plan reviewed with patient

## 2019-02-23 LAB
GLUCOSE SERPL-MCNC: 126 MG/DL (ref 65–140)
GLUCOSE SERPL-MCNC: 128 MG/DL (ref 65–140)
GLUCOSE SERPL-MCNC: 184 MG/DL (ref 65–140)
GLUCOSE SERPL-MCNC: 262 MG/DL (ref 65–140)
GLUCOSE SERPL-MCNC: 311 MG/DL (ref 65–140)

## 2019-02-23 PROCEDURE — 99232 SBSQ HOSP IP/OBS MODERATE 35: CPT | Performed by: NURSE PRACTITIONER

## 2019-02-23 PROCEDURE — 82948 REAGENT STRIP/BLOOD GLUCOSE: CPT

## 2019-02-23 RX ORDER — QUETIAPINE FUMARATE 100 MG/1
100 TABLET, FILM COATED ORAL
Status: DISCONTINUED | OUTPATIENT
Start: 2019-02-23 | End: 2019-02-25 | Stop reason: HOSPADM

## 2019-02-23 RX ADMIN — NICOTINE 1 PATCH: 21 PATCH, EXTENDED RELEASE TRANSDERMAL at 09:14

## 2019-02-23 RX ADMIN — GABAPENTIN 100 MG: 100 CAPSULE ORAL at 09:14

## 2019-02-23 RX ADMIN — ARIPIPRAZOLE 5 MG: 5 TABLET ORAL at 09:13

## 2019-02-23 RX ADMIN — INSULIN LISPRO 5 UNITS: 100 INJECTION, SOLUTION INTRAVENOUS; SUBCUTANEOUS at 12:47

## 2019-02-23 RX ADMIN — INSULIN LISPRO 10 UNITS: 100 INJECTION, SOLUTION INTRAVENOUS; SUBCUTANEOUS at 17:17

## 2019-02-23 RX ADMIN — SERTRALINE HYDROCHLORIDE 100 MG: 100 TABLET ORAL at 09:14

## 2019-02-23 RX ADMIN — HYDROXYZINE HYDROCHLORIDE 25 MG: 25 TABLET ORAL at 09:13

## 2019-02-23 RX ADMIN — LISINOPRIL 20 MG: 20 TABLET ORAL at 09:14

## 2019-02-23 RX ADMIN — INSULIN LISPRO 1 UNITS: 100 INJECTION, SOLUTION INTRAVENOUS; SUBCUTANEOUS at 21:15

## 2019-02-23 RX ADMIN — PANTOPRAZOLE SODIUM 20 MG: 20 TABLET, DELAYED RELEASE ORAL at 06:31

## 2019-02-23 RX ADMIN — INSULIN LISPRO 10 UNITS: 100 INJECTION, SOLUTION INTRAVENOUS; SUBCUTANEOUS at 12:47

## 2019-02-23 RX ADMIN — GABAPENTIN 100 MG: 100 CAPSULE ORAL at 20:57

## 2019-02-23 RX ADMIN — PRAVASTATIN SODIUM 80 MG: 40 TABLET ORAL at 17:17

## 2019-02-23 RX ADMIN — TRAZODONE HYDROCHLORIDE 50 MG: 50 TABLET ORAL at 21:15

## 2019-02-23 RX ADMIN — QUETIAPINE FUMARATE 100 MG: 100 TABLET ORAL at 20:57

## 2019-02-23 RX ADMIN — HYDROXYZINE HYDROCHLORIDE 25 MG: 25 TABLET ORAL at 20:57

## 2019-02-23 RX ADMIN — INSULIN DETEMIR 50 UNITS: 100 INJECTION, SOLUTION SUBCUTANEOUS at 21:15

## 2019-02-23 RX ADMIN — GABAPENTIN 100 MG: 100 CAPSULE ORAL at 17:17

## 2019-02-23 RX ADMIN — HYDROXYZINE HYDROCHLORIDE 25 MG: 25 TABLET ORAL at 17:17

## 2019-02-23 NOTE — PLAN OF CARE
Problem: Alteration in Thoughts and Perception  Goal: Treatment Goal: Gain control of psychotic behaviors/thinking, reduce/eliminate presenting symptoms and demonstrate improved reality functioning upon discharge  Outcome: Progressing  Goal: Refrain from acting on delusional thinking/internal stimuli  Description  Interventions:  - Monitor patient closely, per order   - Utilize least restrictive measures   - Set reasonable limits, give positive feedback for acceptable   - Administer medications as ordered and monitor of potential side effects  Outcome: Progressing  Goal: Agree to be compliant with medication regime, as prescribed and report medication side effects  Description  Interventions:  - Offer appropriate PRN medication and supervise ingestion; conduct aims, as needed   Outcome: Progressing  Goal: Recognize dysfunctional thoughts, communicate reality-based thoughts at the time of discharge  Description  Interventions:  - Provide medication and psycho-education to assist patient in compliance and developing insight into his/her illness   Outcome: Progressing     Problem: Risk for Self Injury/Neglect  Goal: Treatment Goal: Remain safe during length of stay, learn and adopt new coping skills, and be free of self-injurious ideation, impulses and acts at the time of discharge  Outcome: Progressing  Goal: Refrain from harming self  Description  Interventions:  - Monitor patient closely, per order  - Develop a trusting relationship  - Supervise medication ingestion, monitor effects and side effects   Outcome: Progressing  Goal: Recognize maladaptive responses and adopt new coping mechanisms  Outcome: Progressing     Problem: Depression  Goal: Treatment Goal: Demonstrate behavioral control of depressive symptoms, verbalize feelings of improved mood/affect, and adopt new coping skills prior to discharge  Outcome: Progressing  Goal: Verbalize thoughts and feelings  Description  Interventions:  - Assess and re-assess patient's level of risk   - Engage patient in 1:1 interactions, daily, for a minimum of 15 minutes   - Encourage patient to express feelings, fears, frustrations, hopes   Outcome: Progressing  Goal: Refrain from isolation  Description  Interventions:  - Develop a trusting relationship   - Encourage socialization   Outcome: Progressing  Goal: Refrain from self-neglect  Outcome: Progressing  Goal: Complete daily ADLs, including personal hygiene independently, as able  Description  Interventions:  - Observe, teach, and assist patient with ADLS  -  Monitor and promote a balance of rest/activity, with adequate nutrition and elimination   Outcome: Progressing     Problem: Anxiety  Goal: Anxiety is at manageable level  Description  Interventions:  - Assess and monitor patient's anxiety level  - Monitor for signs and symptoms of anxiety both physical and emotional (heart palpitations, chest pain, shortness of breath, headaches, nausea, feeling jumpy, restlessness, irritable, apprehensive)  - Collaborate with interdisciplinary team and initiate plan and interventions as ordered    - [place] patient to unit/surroundings  - Explain treatment plan  - Encourage participation in care  - Encourage verbalization of concerns/fears  - Identify coping mechanisms  - Assist in developing anxiety-reducing skills  - Administer/offer alternative therapies  - Limit or eliminate stimulants  Outcome: Progressing     Problem: DISCHARGE PLANNING  Goal: Discharge to home or other facility with appropriate resources  Description  INTERVENTIONS:  - Identify barriers to discharge w/patient and caregiver  - Arrange for needed discharge resources and transportation as appropriate  - Identify discharge learning needs (meds, wound care, etc )  - Arrange for interpretive services to assist at discharge as needed  - Refer to Case Management Department for coordinating discharge planning if the patient needs post-hospital services based on physician/advanced practitioner order or complex needs related to functional status, cognitive ability, or social support system  Outcome: Progressing     Problem: Ineffective Coping  Goal: Participates in unit activities  Description  Interventions:  - Provide therapeutic environment   - Provide required programming   - Redirect inappropriate behaviors   Outcome: Progressing     Problem: Nutrition/Hydration-ADULT  Goal: Nutrient/Hydration intake appropriate for improving, restoring or maintaining nutritional needs  Description  Monitor and assess patient's nutrition/hydration status for malnutrition (ex- brittle hair, bruises, dry skin, pale skin and conjunctiva, muscle wasting, smooth red tongue, and disorientation)  Collaborate with interdisciplinary team and initiate plan and interventions as ordered  Monitor patient's weight and dietary intake as ordered or per policy  Utilize nutrition screening tool and intervene per policy  Determine patient's food preferences and provide high-protein, high-caloric foods as appropriate       INTERVENTIONS:  - Monitor oral intake, urinary output, labs, and treatment plans  - Assess nutrition and hydration status and recommend course of action  - Evaluate amount of meals eaten  - Assist patient with eating if necessary   - Allow adequate time for meals  - Recommend/ encourage appropriate diets, oral nutritional supplements, and vitamin/mineral supplements  - Order, calculate, and assess calorie counts as needed  - Recommend, monitor, and adjust tube feedings and TPN/PPN based on assessed needs  - Assess need for intravenous fluids  - Provide specific nutrition/hydration education as appropriate  - Include patient/family/caregiver in decisions related to nutrition  Outcome: Progressing

## 2019-02-23 NOTE — PROGRESS NOTES
Pt has been visible on the unit  Attends group, compliant with medication  Pt is non-social, flat, withdrawn with peers  Denies SI/HI/AH/VH  Pt states he just feels anxious  Denies any symptoms of alcohol withdrawal and no symptoms visible  Able to make needs known  Will continue to monitor

## 2019-02-23 NOTE — PROGRESS NOTES
Pt visible on unit, social with select peers  Attending groups  Presents as flat and depressed but brightens with interaction  Pt opted to refuse AM insulin, due to glucose 128 and pt having a recent reading of 42 overnight  Did receive prescribed dose at lunchtime  Complaint with all meals and meds  No S/S of withdrawal  Pleasant and cooperative  Denies current SI, HI, A/T/V  Monitoring continues

## 2019-02-23 NOTE — PROGRESS NOTES
Progress Note - Behavioral Health   Melisa Marr 44 y o  male MRN: [id number]  Unit/Bed#: [facility] 249-02 Encounter: [id number]    The patient was seen for continuing care and reviewed with treatment team     Mental Status Evaluation:  Appearance:  Adequate hygiene and grooming   Behavior:  calm and cooperative   Mood:  improving   Affect: Flat   Speech: Normal rate and Normal volume   Thought Process:  Goal directed and coherent   Thought Content:  Does not verbalize delusional material   Perceptual Disturbances: Denies hallucinations and does not appear to be responding to internal stimuli   Risk Potential: No suicidal or homicidal ideation   Attention/Concentration attention span and concentration were age appropriate   Orientation:   Oriented x 3   Gait/Station: normal gait/station and normal balance   Motor Activity: No abnormal movement noted     Progress Toward Goals: Patient is out of room and in community  Appears flat, does brighten on approach  He reports he notices some improvement in his mood and that his appetite has improved  No longer having suicidal thoughts  Assessment/Plan    Principal Problem:    Current severe episode of major depressive disorder without psychotic features without prior episode ([facility] )  Active Problems:    Quiros esophagus    Benign essential hypertension    Diabetes mellitus type 1 5, managed as type 1 (HCC)    Chronic pancreatitis ([facility] )    Alcohol abuse      Recommended Treatment: Continue with pharmacotherapy, group therapy, milieu therapy and occupational therapy    The patient will be maintained on the following medications:    Current Facility-Administered Medications:  acetaminophen 650 mg Oral Q6H PRN Denzel Steele MD   acetaminophen 650 mg Oral Q4H PRN Denzel Steele MD   aluminum-magnesium hydroxide-simethicone 15 mL Oral Q4H PRN Denzel Steele MD   ARIPiprazole 5 mg Oral Daily Saba Alvarado MD   benztropine 2 mg Intramuscular Q6H PRN Denzel Steele MD benztropine 2 mg Oral Q6H PRN Danya Goodwin MD   gabapentin 100 mg Oral TID TYE Santos   haloperidol 5 mg Oral Q6H PRN Danya Goodwin MD   haloperidol lactate 5 mg Intramuscular Q6H PRN Danya Goodwin MD   hydrOXYzine HCL 25 mg Oral Q6H PRN Danya Goodwin MD   hydrOXYzine HCL 25 mg Oral TID Mateo Gottlieb MD   ibuprofen 800 mg Oral Q8H PRN Danya Goodwin MD   insulin detemir 50 Units Subcutaneous HS Demar Rivero PA-C   insulin lispro 1-6 Units Subcutaneous HS Demar Rivero PA-C   insulin lispro 1-6 Units Subcutaneous TID AC Dorene Banuelos PA-C   insulin lispro 10 Units Subcutaneous TID With Meals Demar Rivero PA-C   lisinopril 20 mg Oral Daily TYE Cerrato   LORazepam 1 mg Oral Q4H PRN TYE Cerrato   magnesium hydroxide 20 mL Oral Daily PRN Danya Goodwin MD   nicotine 1 patch Transdermal Daily TYE Cerrato   pantoprazole 20 mg Oral Early Morning TYE Cerrato   pravastatin 80 mg Oral Daily With [employer], CRMANAV   QUEtiapine 100 mg Oral HS TYE Montero   risperiDONE 1 mg Oral Q6H PRN Danya Goodwin MD   sertraline 100 mg Oral Daily Mateo Gottlieb MD   traZODone 50 mg Oral HS PRN Danya Goodwin MD   traZODone 50 mg Oral HS Mateo Gottlieb MD       Risks, benefits and possible side effects of Medications:   Risks, benefits, and possible side effects of medications explained to patient and patient verbalizes understanding

## 2019-02-24 LAB
GLUCOSE SERPL-MCNC: 116 MG/DL (ref 65–140)
GLUCOSE SERPL-MCNC: 180 MG/DL (ref 65–140)
GLUCOSE SERPL-MCNC: 55 MG/DL (ref 65–140)
GLUCOSE SERPL-MCNC: 77 MG/DL (ref 65–140)

## 2019-02-24 PROCEDURE — 99231 SBSQ HOSP IP/OBS SF/LOW 25: CPT | Performed by: NURSE PRACTITIONER

## 2019-02-24 PROCEDURE — 82948 REAGENT STRIP/BLOOD GLUCOSE: CPT

## 2019-02-24 RX ADMIN — HYDROXYZINE HYDROCHLORIDE 25 MG: 25 TABLET ORAL at 20:57

## 2019-02-24 RX ADMIN — INSULIN DETEMIR 50 UNITS: 100 INJECTION, SOLUTION SUBCUTANEOUS at 21:22

## 2019-02-24 RX ADMIN — GABAPENTIN 100 MG: 100 CAPSULE ORAL at 16:53

## 2019-02-24 RX ADMIN — GABAPENTIN 100 MG: 100 CAPSULE ORAL at 20:57

## 2019-02-24 RX ADMIN — NICOTINE 1 PATCH: 21 PATCH, EXTENDED RELEASE TRANSDERMAL at 09:36

## 2019-02-24 RX ADMIN — INSULIN LISPRO 10 UNITS: 100 INJECTION, SOLUTION INTRAVENOUS; SUBCUTANEOUS at 16:56

## 2019-02-24 RX ADMIN — TRAZODONE HYDROCHLORIDE 50 MG: 50 TABLET ORAL at 21:22

## 2019-02-24 RX ADMIN — ARIPIPRAZOLE 5 MG: 5 TABLET ORAL at 09:36

## 2019-02-24 RX ADMIN — HYDROXYZINE HYDROCHLORIDE 25 MG: 25 TABLET ORAL at 09:36

## 2019-02-24 RX ADMIN — SERTRALINE HYDROCHLORIDE 100 MG: 100 TABLET ORAL at 09:38

## 2019-02-24 RX ADMIN — GABAPENTIN 100 MG: 100 CAPSULE ORAL at 09:38

## 2019-02-24 RX ADMIN — INSULIN LISPRO 10 UNITS: 100 INJECTION, SOLUTION INTRAVENOUS; SUBCUTANEOUS at 07:39

## 2019-02-24 RX ADMIN — HYDROXYZINE HYDROCHLORIDE 25 MG: 25 TABLET ORAL at 16:54

## 2019-02-24 RX ADMIN — PANTOPRAZOLE SODIUM 20 MG: 20 TABLET, DELAYED RELEASE ORAL at 06:30

## 2019-02-24 RX ADMIN — PRAVASTATIN SODIUM 80 MG: 40 TABLET ORAL at 16:54

## 2019-02-24 RX ADMIN — QUETIAPINE FUMARATE 100 MG: 100 TABLET ORAL at 20:57

## 2019-02-24 RX ADMIN — INSULIN LISPRO 10 UNITS: 100 INJECTION, SOLUTION INTRAVENOUS; SUBCUTANEOUS at 12:30

## 2019-02-24 RX ADMIN — LISINOPRIL 20 MG: 20 TABLET ORAL at 09:37

## 2019-02-24 RX ADMIN — INSULIN LISPRO 1 UNITS: 100 INJECTION, SOLUTION INTRAVENOUS; SUBCUTANEOUS at 12:30

## 2019-02-24 NOTE — PROGRESS NOTES
Patient visible on the unit  Patient showered and shaved in the morning  Flat affect but good eye contact  Cooperative with medications  Positive for morning meal  Patient dicussed plan to attend AA and a partial program upon discharge  Patient verbalized no intention to attend rehab upon discharge  Patient verbalized he has a home and family to return to  Denies SI and HI currently, rates depression 2/10, no behavioral problems  Able to make needs known  No behavioral problems, no signs and symptoms of distress  SP 1 and Q 7 minute checks will be maintained for patient safety  Will continue to monitor

## 2019-02-24 NOTE — PROGRESS NOTES
Progress Note - Behavioral Health   Lukasz Mesa 44 y o  male MRN: [id number]  Unit/Bed#: [facility] 249-02 Encounter: [id number]    The patient was seen for continuing care and reviewed with treatment team     Mental Status Evaluation:  Appearance:  Adequate hygiene and grooming   Behavior:  calm and cooperative   Mood:  anxious and depressed   Affect: blunted   Speech: Sparse   Thought Process:  Goal directed and coherent   Thought Content:  Does not verbalize delusional material   Perceptual Disturbances: Denies hallucinations and does not appear to be responding to internal stimuli   Risk Potential: No suicidal or homicidal ideation   Attention/Concentration attention span and concentration were age appropriate   Orientation:   Oriented x 3   Gait/Station: normal gait/station and normal balance   Motor Activity: No abnormal movement noted     Progress Toward Goals: Looks flat and depressed and internally pre-occupied, but reports feeling improved  Reports sleep and appetite Improved, mood improved - still with issues with anxiety  Denies any suicidal thoughts  Isolative and guarded on the unit, brightens minimally on approach  Assessment/Plan    Principal Problem:    Current severe episode of major depressive disorder without psychotic features without prior episode ([facility] )  Active Problems:    Quiros esophagus    Benign essential hypertension    Diabetes mellitus type 1 5, managed as type 1 (HCC)    Chronic pancreatitis ([facility] )    Alcohol abuse      Recommended Treatment: Continue with pharmacotherapy, group therapy, milieu therapy and occupational therapy    The patient will be maintained on the following medications:    Current Facility-Administered Medications:  acetaminophen 650 mg Oral Q6H PRN Aure Hall MD   acetaminophen 650 mg Oral Q4H PRN Aure Hall MD   aluminum-magnesium hydroxide-simethicone 15 mL Oral Q4H PRN Aure Hall MD   ARIPiprazole 5 mg Oral Daily Yvonne Asher MD benztropine 2 mg Intramuscular Q6H PRN Aure Hall MD   benztropine 2 mg Oral Q6H PRN Aure Hall MD   gabapentin 100 mg Oral TID TYE Roy   haloperidol 5 mg Oral Q6H PRN Aure Hall MD   haloperidol lactate 5 mg Intramuscular Q6H PRN Aure Hall MD   hydrOXYzine HCL 25 mg Oral Q6H PRN Aure Hall MD   hydrOXYzine HCL 25 mg Oral TID Yvonne Asher MD   ibuprofen 800 mg Oral Q8H PRN Aure Hall MD   insulin detemir 50 Units Subcutaneous HS Elana Miranda PA-C   insulin lispro 1-6 Units Subcutaneous HS Elana Miranda PA-C   insulin lispro 1-6 Units Subcutaneous TID AC Dorene Banuelos PA-C   insulin lispro 10 Units Subcutaneous TID With Meals Elana Miranda PA-C   lisinopril 20 mg Oral Daily TYE Cerrato   LORazepam 1 mg Oral Q4H PRN TYE Cerrato   magnesium hydroxide 20 mL Oral Daily PRN Aure Hall MD   nicotine 1 patch Transdermal Daily TYE Cerrato   pantoprazole 20 mg Oral Early Morning TYE Cerrato   pravastatin 80 mg Oral Daily With [employer]   QUEtiapine 100 mg Oral HS TYE Perez   risperiDONE 1 mg Oral Q6H PRN Aure Hall MD   sertraline 100 mg Oral Daily Yvonne Asher MD   traZODone 50 mg Oral HS PRN Aure Hall MD   traZODone 50 mg Oral HS Yvonne Asher MD       Risks, benefits and possible side effects of Medications:   Risks, benefits, and possible side effects of medications explained to patient and patient verbalizes understanding

## 2019-02-24 NOTE — PROGRESS NOTES
Patient has been in and out of his room this evening  Attended evening group and snack  When in the community he tends to keep to himself  Appears flat and depressed  Denies any current s/i and reports he is feeling improved  Anxiety controlled    Pleasant during interactions

## 2019-02-25 VITALS
DIASTOLIC BLOOD PRESSURE: 61 MMHG | HEIGHT: 72 IN | HEART RATE: 77 BPM | SYSTOLIC BLOOD PRESSURE: 125 MMHG | WEIGHT: 191 LBS | OXYGEN SATURATION: 98 % | BODY MASS INDEX: 25.87 KG/M2 | RESPIRATION RATE: 18 BRPM | TEMPERATURE: 98.1 F

## 2019-02-25 LAB
GLUCOSE SERPL-MCNC: 142 MG/DL (ref 65–140)
GLUCOSE SERPL-MCNC: 54 MG/DL (ref 65–140)
GLUCOSE SERPL-MCNC: 87 MG/DL (ref 65–140)

## 2019-02-25 PROCEDURE — 82948 REAGENT STRIP/BLOOD GLUCOSE: CPT

## 2019-02-25 PROCEDURE — 99239 HOSP IP/OBS DSCHRG MGMT >30: CPT | Performed by: PSYCHIATRY & NEUROLOGY

## 2019-02-25 RX ORDER — HYDROXYZINE HYDROCHLORIDE 25 MG/1
25 TABLET, FILM COATED ORAL 3 TIMES DAILY
Qty: 90 TABLET | Refills: 0 | Status: SHIPPED | OUTPATIENT
Start: 2019-02-25 | End: 2019-06-07 | Stop reason: ALTCHOICE

## 2019-02-25 RX ORDER — ARIPIPRAZOLE 5 MG/1
5 TABLET ORAL DAILY
Qty: 30 TABLET | Refills: 0 | Status: SHIPPED | OUTPATIENT
Start: 2019-02-25 | End: 2019-06-07 | Stop reason: ALTCHOICE

## 2019-02-25 RX ORDER — TRAZODONE HYDROCHLORIDE 50 MG/1
50 TABLET ORAL
Qty: 30 TABLET | Refills: 0 | Status: SHIPPED | OUTPATIENT
Start: 2019-02-25 | End: 2019-03-04

## 2019-02-25 RX ORDER — GABAPENTIN 100 MG/1
100 CAPSULE ORAL 3 TIMES DAILY
Qty: 90 CAPSULE | Refills: 0 | Status: SHIPPED | OUTPATIENT
Start: 2019-02-25 | End: 2019-03-04

## 2019-02-25 RX ORDER — QUETIAPINE FUMARATE 100 MG/1
100 TABLET, FILM COATED ORAL
Qty: 30 TABLET | Refills: 0 | Status: SHIPPED | OUTPATIENT
Start: 2019-02-25 | End: 2019-06-27 | Stop reason: SDUPTHER

## 2019-02-25 RX ORDER — SERTRALINE HYDROCHLORIDE 100 MG/1
100 TABLET, FILM COATED ORAL DAILY
Qty: 30 TABLET | Refills: 0 | Status: SHIPPED | OUTPATIENT
Start: 2019-02-26 | End: 2019-06-27 | Stop reason: SDUPTHER

## 2019-02-25 RX ADMIN — HYDROXYZINE HYDROCHLORIDE 25 MG: 25 TABLET ORAL at 08:24

## 2019-02-25 RX ADMIN — PANTOPRAZOLE SODIUM 20 MG: 20 TABLET, DELAYED RELEASE ORAL at 06:22

## 2019-02-25 RX ADMIN — LISINOPRIL 20 MG: 20 TABLET ORAL at 08:24

## 2019-02-25 RX ADMIN — GABAPENTIN 100 MG: 100 CAPSULE ORAL at 08:23

## 2019-02-25 RX ADMIN — SERTRALINE HYDROCHLORIDE 100 MG: 100 TABLET ORAL at 08:24

## 2019-02-25 RX ADMIN — ARIPIPRAZOLE 5 MG: 5 TABLET ORAL at 08:24

## 2019-02-25 RX ADMIN — NICOTINE 1 PATCH: 21 PATCH, EXTENDED RELEASE TRANSDERMAL at 09:00

## 2019-02-25 RX ADMIN — INSULIN LISPRO 10 UNITS: 100 INJECTION, SOLUTION INTRAVENOUS; SUBCUTANEOUS at 08:25

## 2019-02-25 NOTE — PROGRESS NOTES
Patient has been visible in the community this evening  Attended evening unit activities  Continues to keep to himself  He still appears flat however he reports he is "feeling really good "  States both his depression and anxiety are down to 2/10  Reports daily improvements

## 2019-02-25 NOTE — DISCHARGE SUMMARY
Discharge Summary - [address] 44 y o  male MRN: [id number]  Unit/Bed#: [facility] 249-02 Encounter: [id number]     Admission Date:   Admission Orders (From admission, onward)    Ordered        02/19/19 2345  DISCHARGE READMIT Admit Patient to [address] [place] [address] (use with Discharge Readmit Navigator in [place] [facility] Discharge Readmit scenario including from any IP unit or different campus ED to [facility])  Nurse to release order when pt  arrives to [facility]  Once     Order ID Start Status   [id number] 02/19/19 2346 Completed                    Discharge Date: 2/25/2019 12:42 PM    Attending Psychiatrist: Sherita Meigs, MD    Reason for Admission/HPI:   History of Present Illness      Pbalo Dorman became more depressed and began drinking alcohol again  He feels that he had lost hope after some psychosocial disappointments  Hospital Course:   Pablo Dorman benefited from the milieu and nursing support  Medications were adjusted  Pablo Dorman and the team developed a plan to attend [address] [place] [address] Program    So, although he did not feel completely remitted, he felt well enough to leave the hospital and begin partial    He was seen by the team to possess the capacity to continue his treatment outside the hospital      Mental Status at time of Discharge:     Appearance:  age appropriate   Behavior:  normal   Speech:  normal pitch and normal volume   Mood:  normal   Affect:  normal   Thought Process:  normal   Thought Content:  normal   Perceptual Disturbances: None   Risk Potential: Suicidal Ideations none   Sensorium:  person, place and situation   Cognition:  grossly intact   Consciousness:  alert and awake    Attention: attention span and concentration were age appropriate   Insight:  age appropriate   Judgment: age appropriate   Gait/Station: normal gait/station   Motor Activity: no abnormal movements       Discharge Diagnosis:   MDD with psychosis       Resolved Problems  Date Reviewed: 2/23/2019    None Discharge Medications:  See after visit summary for reconciled discharge medications provided to patient and family  Discharge instructions/Information to patient and family:   See after visit summary for information provided to patient and family  Provisions for Follow-Up Care:  See after visit summary for information related to follow-up care and any pertinent home health orders  Discharge Statement   I spent 20 minutes discharging the patient  This time was spent on the day of discharge  I had direct contact with the patient on the day of discharge  Additional documentation is required if more than 30 minutes were spent on discharge

## 2019-02-25 NOTE — DISCHARGE INSTRUCTIONS
Aripiprazole (By mouth)   Aripiprazole (ar-i-PIP-ra-zole)  Treats schizophrenia, bipolar disorder, depression, and Tourette syndrome  Also treats irritability associated with autism  Brand Name(s): Abilify   There may be other brand names for this medicine  When This Medicine Should Not Be Used: This medicine is not right for everyone  Do not use it if you had an allergic reaction to aripiprazole  How to Use This Medicine:   Liquid, Tablet, Dissolving Tablet  · Take your medicine as directed  Your dose may need to be changed several times to find what works best for you  · Tablet: Swallow whole  Do not break, crush, or chew it  · Disintegrating tablet: Make sure your hands are dry before you handle the disintegrating tablet  Peel back the foil from the blister pack, then remove the tablet  Do not push the tablet through the foil  Place the tablet in your mouth  After it has melted, swallow or take a drink of water  · This medicine should come with a Medication Guide  Ask your pharmacist for a copy if you do not have one  · Missed dose: Take a dose as soon as you remember  If it is almost time for your next dose, wait until then and take a regular dose  Do not take extra medicine to make up for a missed dose  · Store the medicine in a closed container at room temperature, away from heat, moisture, and direct light  Drugs and Foods to Avoid:   Ask your doctor or pharmacist before using any other medicine, including over-the-counter medicines, vitamins, and herbal products  · Some medicines can affect how aripiprazole works   Tell your doctor if you are using any of the following:   ¨ Carbamazepine, clarithromycin, fluoxetine, itraconazole, ketoconazole, paroxetine, quinidine, or rifampin  ¨ Benzodiazepine or sedative medicine (including lorazepam)  ¨ Blood pressure medicine  Warnings While Using This Medicine:   · Tell your doctor if you are pregnant or breastfeeding, or if you have diabetes, heart failure, heart or blood vessel disease, heart rhythm problems, high or low blood pressure, high cholesterol, or a history of seizures, heart attack or stroke  · For some children, teenagers, and young adults, this medicine may increase mental or emotional problems  This may lead to thoughts of suicide and violence  Talk with your doctor right away if you have any thoughts or behavior changes that concern you  Tell your doctor if you or anyone in your family has a history of bipolar disorder or suicide attempts  · This medicine may cause the following problems:   ¨ Neuroleptic malignant syndrome (NMS), a neurologic disorder than can be life-threatening  ¨ Tardive dyskinesia (muscle movements you cannot control)  ¨ Changes in blood sugar levels  ¨ Unusual changes in behavior, such as gambling urges, binge or compulsive eating, or compulsive shopping, or sexual urges  · This medicine may make you dizzy or drowsy, or may cause trouble with thinking or controlling body movements, which may lead to falls, fractures or other injuries  Do not drive or do anything that could be dangerous until you know how this medicine affects you  Stand or sit up slowly if you feel lightheaded or dizzy  · You may get overheated more easily while you are using this medicine  Be careful when you exercise or you are outside in hot or humid weather  Drink plenty of water to stay hydrated  · Tell your doctor if you have phenylketonuria (PKU)  The disintegrating tablet contains phenylalanine  · Do not stop using this medicine suddenly  Your doctor will need to slowly decrease your dose before you stop it completely  · Your doctor will do lab tests at regular visits to check on the effects of this medicine  Keep all appointments  · Keep all medicine out of the reach of children  Never share your medicine with anyone    Possible Side Effects While Using This Medicine:   Call your doctor right away if you notice any of these side effects:  · Allergic reaction: Itching or hives, swelling in your face or hands, swelling or tingling in your mouth or throat, chest tightness, trouble breathing  · Anxiety, irritability, nervousness, restlessness, or trouble sleeping  · Compulsive behavior or intense urges you cannot control  · Confusion, unusual behavior, depressed mood, or thoughts of hurting yourself or others  · Fever, chills, cough, sore throat, body aches  · Increased hunger or thirst, change in how much or how often you urinate  · Jerky muscle movements you cannot control (often in your face, tongue, or jaw)  · Lightheadedness, dizziness, fainting  · Seizures  · Sweating, uneven heartbeat, or muscle stiffness  · Unusual tiredness or sleepiness  If you notice these less serious side effects, talk with your doctor:   · Headache  · Nausea, vomiting, drooling  · Unusual weight gain  If you notice other side effects that you think are caused by this medicine, tell your doctor  Call your doctor for medical advice about side effects  You may report side effects to FDA at 2-238-FDA-2916  © 2017 [address] Information is for End User's use only and may not be sold, redistributed or otherwise used for commercial purposes  The above information is an  only  It is not intended as medical advice for individual conditions or treatments  Talk to your doctor, nurse or pharmacist before following any medical regimen to see if it is safe and effective for you  Gabapentin (By mouth)   Gabapentin (bret-a-PEN-tin)  Treats seizures and pain caused by shingles  Brand Name(s): ACTIVE-PAC with Gabapentin, Con[employer]ience [employer], Cyclo/Ignacia 10/300 Pack, FusePaq Fanatrex, Ignacia-V, Gralise, [address] Pack, Neurontin, SmartRx Ignacia Kit   There may be other brand names for this medicine  When This Medicine Should Not Be Used: This medicine is not right for everyone   Do not use it if you had an allergic reaction to gabapentin  How to Use This Medicine:   Capsule, Liquid, Tablet  · Take your medicine as directed  Your dose may need to be changed several times to find what works best for you  If you have epilepsy, do not allow more than 12 hours to pass between doses  · Capsule: Swallow the capsule whole with plenty of water  Do not open, crush, or chew it  · Gralise® tablet: Swallow the tablet whole   Do not crush, break, or chew it  · Neurontin® tablet: If you break a tablet into 2 pieces, use the second half as your next dose  If you don't use it within 28 days, throw it away  · Measure the oral liquid medicine with a marked measuring spoon, oral syringe, or medicine cup  · This medicine should come with a Medication Guide  Ask your pharmacist for a copy if you do not have one  · Missed dose: Take a dose as soon as you remember  If it is almost time for your next dose, wait until then and take a regular dose  Do not take extra medicine to make up for a missed dose  · Store the medicine in a closed container at room temperature, away from heat, moisture, and direct light  Store the Neurontin® oral liquid in the refrigerator  Do not freeze  Drugs and Foods to Avoid:   Ask your doctor or pharmacist before using any other medicine, including over-the-counter medicines, vitamins, and herbal products  · Some medicines can affect how gabapentin works  Tell your doctor if you also use any of the following:   ¨ Hydrocodone  ¨ Morphine  · If you take an antacid, wait at least 2 hours before you take gabapentin  · Tell your doctor if you use anything else that makes you sleepy  Some examples are allergy medicine, narcotic pain medicine, and alcohol  Warnings While Using This Medicine:   · Tell your doctor if you are pregnant or breastfeeding, or if you have kidney problems or are receiving dialysis  Tell your doctor if you have a history of depression or mental health problems    · This medicine may increase depression or thoughts of suicide  Tell your doctor right away if you start to feel more depressed or think about hurting yourself  · This medicine may cause a serious allergic reaction called multiorgan hypersensitivity, which can damage organs and be life-threatening  · Do not stop using this medicine suddenly  Your doctor will need to slowly decrease your dose before you stop it completely  If you take this medicine to prevent seizures, your seizures may return or occur more often if you stop this medicine suddenly  · This medicine may make you dizzy or drowsy  Do not drive or do anything else that could be dangerous until you know how this medicine affects you  · Tell any doctor or dentist who treats you that you are using this medicine  This medicine may affect certain medical test results  · Your doctor will check your progress and the effects of this medicine at regular visits  Keep all appointments  · Keep all medicine out of the reach of children  Never share your medicine with anyone    Possible Side Effects While Using This Medicine:   Call your doctor right away if you notice any of these side effects:  · Allergic reaction: Itching or hives, swelling in your face or hands, swelling or tingling in your mouth or throat, chest tightness, trouble breathing  · Behavior problems, aggression, restlessness, trouble concentrating, moodiness (especially in children)  · Blistering, peeling, red skin rash  · Change in how much or how often you urinate, bloody or cloudy urine,  · Chest pain, fast heartbeat, trouble breathing  · Dark urine or pale stools, nausea, vomiting, loss of appetite, stomach pain, yellow skin or eyes  · Fever, rash, swollen or tender glands in the neck, armpit, or groin  · Problems with coordination, shakiness, unsteadiness  · Rapid weight gain, swelling in your hands, ankles, or feet  · Unusual moods or behaviors, thoughts of hurting yourself, feeling depressed  If you notice these less serious side effects, talk with your doctor:   · Dizziness, drowsiness, sleepiness, tiredness  If you notice other side effects that you think are caused by this medicine, tell your doctor  Call your doctor for medical advice about side effects  You may report side effects to FDA at 3-475-FDA-9820  © 2017 [address] [employer] Information is for End User's use only and may not be sold, redistributed or otherwise used for commercial purposes  The above information is an  only  It is not intended as medical advice for individual conditions or treatments  Talk to your doctor, nurse or pharmacist before following any medical regimen to see if it is safe and effective for you  Quetiapine (By mouth)   Quetiapine Fumarate (pjm-TWU-h-peen FUE-ma-rate)  Treats schizophrenia, bipolar disorder, or depression  Brand Name(s): SEROquel, SEROquel XR, Seroquel XR 14-Day Sample Kit   There may be other brand names for this medicine  When This Medicine Should Not Be Used: This medicine is not right for everyone  Do not use if you had an allergic reaction to quetiapine  How to Use This Medicine:   Tablet, Long Acting Tablet  · Take your medicine as directed  Your dose may need to be changed several times to find what works best for you  You need to start with a low dose, even if you have used this medicine before  · Your doctor may tell you to take the medicine at bedtime, because quetiapine can make you sleepy  · Extended-release tablets: Take this medicine either without food or with a light snack (approximately 300 calories)  · Swallow the extended-release tablet whole  Do not crush, break, or chew it  · This medicine should come with a Medication Guide  Ask your pharmacist for a copy if you do not have one  · Missed dose: Take a dose as soon as you remember  If it is almost time for your next dose, wait until then and take a regular dose  Do not take extra medicine to make up for a missed dose    · Store the medicine in a closed container at room temperature, away from heat, moisture, and direct light  Drugs and Foods to Avoid:   Ask your doctor or pharmacist before using any other medicine, including over-the-counter medicines, vitamins, and herbal products  · Some medicines can affect how quetiapine works  Tell your doctor if you are using any of the following:  ¨ Levodopa, methadone, nefazodone, rifampin, Albino's wort  ¨ Blood pressure medicine  ¨ Medicine for heart rhythm problems (including amiodarone, procainamide, quinidine, sotalol)  ¨ Medicine for seizures (including carbamazepine, phenobarbital, phenytoin)  ¨ Medicine to treat an infection (including gatifloxacin, itraconazole, ketoconazole, moxifloxacin, pentamidine)  ¨ Medicine to treat HIV/AIDS (including indinavir, ritonavir)  ¨ Other medicine to treat mental health problems (including chlorpromazine, thioridazine, ziprasidone)  · Tell your doctor if you use anything else that makes you sleepy  Some examples are allergy medicine, narcotic pain medicine, and alcohol  · Do not drink alcohol while you are using this medicine  Warnings While Using This Medicine:   · Tell your doctor if you are pregnant or breastfeeding, or if you have liver disease, breast cancer, diabetes, underactive thyroid, or a history of seizures or neuroleptic malignant syndrome (NMS)  Tell your doctor if you have any kind of blood vessel or heart problems, including low or high blood pressure, heart failure, heart rhythm problems (QT prolongation, slow heartbeat), high cholesterol, or a history of heart attack or stroke    · This medicine may cause the following problems:  ¨ Neuroleptic malignant syndrome (a nerve and muscle problem)  ¨ High blood sugar, cholesterol, or triglyceride levels  ¨ Tardive dyskinesia (a muscle problem that may become permanent)  ¨ Changes in blood pressure, especially in children  ¨ Heart rhythm changes  · This medicine may cause depression or thoughts of suicide  Make sure family members know about this  Always tell your doctor about any behavior changes, depression, intense feelings, or thoughts of hurting yourself or others  · This medicine may make you dizzy or drowsy, or may cause trouble with thinking or controlling body movements, which may lead to falls, fractures or other injuries  Do not drive or do anything that could be dangerous until you know how this medicine affects you  You may also feel lightheaded when getting up suddenly from a lying or sitting position, so stand up slowly  · This medicine may make you bleed, bruise, or get infections more easily  Take precautions to prevent illness and injury  Wash your hands often  · This medicine may make it more difficult for your body to cool down  Be careful to not become overheated during exercise or hot weather, because you could have heat stroke  · Do not stop using this medicine suddenly  Your doctor will need to slowly decrease your dose before you stop it completely  · Your doctor will do lab tests at regular visits to check on the effects of this medicine  Keep all appointments  You may also need to have your eyes tested on a regular basis  · Tell any doctor or dentist who treats you that you are using this medicine  This medicine may affect certain medical test results  · Keep all medicine out of the reach of children  Never share your medicine with anyone    Possible Side Effects While Using This Medicine:   Call your doctor right away if you notice any of these side effects:  · Allergic reaction: Itching or hives, swelling in your face or hands, swelling or tingling in your mouth or throat, chest tightness, trouble breathing  · Changes in mood or behavior, agitation, anxiety, restlessness, or thoughts of hurting yourself or others  · Constant muscle movement that you cannot control (often in your lips, tongue, jaw, arms, or legs)  · Fast, slow, pounding, or uneven heartbeat  · Fever, chills, cough, sore throat, and body aches  · Fever, sweating, confusion, uneven heartbeat, muscle stiffness  · Increase in how much or how often you urinate, increased thirst, increased hunger, or weakness  · Lightheadedness, dizziness, fainting, or clumsiness  · Seizures  · Vision changes  If you notice these less serious side effects, talk with your doctor:   · Constipation, vomiting, nausea, dry mouth  · Headache  · Tiredness, dizziness, or sleepiness  · Trouble swallowing  · Weight gain  If you notice other side effects that you think are caused by this medicine, tell your doctor  Call your doctor for medical advice about side effects  You may report side effects to FDA at 3-187-FDA-9848  © 2017 [address] [employer] Information is for End User's use only and may not be sold, redistributed or otherwise used for commercial purposes  The above information is an  only  It is not intended as medical advice for individual conditions or treatments  Talk to your doctor, nurse or pharmacist before following any medical regimen to see if it is safe and effective for you  Sertraline (By mouth)   Sertraline (SER-tra-jackie)  Treats depression, obsessive-compulsive disorder (OCD), posttraumatic stress disorder (PTSD), premenstrual dysphoric disorder (PMDD), social anxiety disorder, and panic disorder  This medicine is an SSRI  Brand Name(s): Zoloft   There may be other brand names for this medicine  When This Medicine Should Not Be Used: This medicine is not right for everyone  Do not use it if you had an allergic reaction to sertraline  How to Use This Medicine:   Liquid, Tablet  · Take your medicine as directed  Your dose may need to be changed several times to find what works best for you  You may need to take it for a few weeks or months before you feel better    · Oral liquid: Use the dropper provided to remove the medicine and mix it with 1/2 cup (4 ounces) of water, ginger ale, lemon-lime soda, lemonade, or orange juice  Drink the mixture right away  It is normal for it to look a bit hazy  · This medicine should come with a Medication Guide  Ask your pharmacist for a copy if you do not have one  · Missed dose: Take a dose as soon as you remember  If it is almost time for your next dose, wait until then and take a regular dose  Do not take extra medicine to make up for a missed dose  · Store the medicine in a closed container at room temperature, away from heat, moisture, and direct light  Drugs and Foods to Avoid:   Ask your doctor or pharmacist before using any other medicine, including over-the-counter medicines, vitamins, and herbal products  · Do not use this medicine together with pimozide  Do not use this medicine and an MAO inhibitor (MAOI) within 14 days of each other  Do not use the oral liquid form of sertraline if you are also using disulfiram   · Some medicines can affect how sertraline works  Tell your doctor if you are using the following:   ¨ Buspirone, cimetidine, cisapride, diazepam, digitoxin, fentanyl, flecainide, lithium, phenytoin, propafenone, Albino's wort, tramadol, tryptophan supplements, or valproate  ¨ A blood thinner (such as warfarin), a diuretic (water pill), an NSAID pain or arthritis medicine (such as aspirin, diclofenac, ibuprofen), a tricyclic antidepressant, a triptan medicine for migraine headaches  · Do not drink alcohol while you are using this medicine  Warnings While Using This Medicine:   · Tell your doctor if you are pregnant or breastfeeding, or if you have liver disease, bleeding problems, glaucoma, heart disease, or a seizure disorder  · For some children, teenagers, and young adults, this medicine may increase mental or emotional problems  This may lead to thoughts of suicide and violence  Talk with your doctor right away if you have any thoughts or behavior changes that concern you   Tell your doctor if you or anyone in your family has a history of bipolar disorder or suicide attempts  · This medicine may cause the following problems:   ¨ Serotonin syndrome (when taken with certain medicines)  ¨ Low sodium levels (more common in elderly patients and those who take diuretics or become dehydrated)  · Tell your doctor if you are sensitive to latex, because the oral liquid comes with a latex rubber dropper  · This medicine may make you dizzy or drowsy  Do not drive or do anything that could be dangerous until you know how this medicine affects you  · Do not stop using this medicine suddenly  Your doctor will need to slowly decrease your dose before you stop it completely  · Your doctor will check your progress and the effects of this medicine at regular visits  Keep all appointments  · Keep all medicine out of the reach of children  Never share your medicine with anyone  Possible Side Effects While Using This Medicine:   Call your doctor right away if you notice any of these side effects:  · Allergic reaction: Itching or hives, swelling in your face or hands, swelling or tingling in your mouth or throat, chest tightness, trouble breathing  · Anxiety, restlessness, fast heartbeat, fever, sweating, muscle spasms, twitching, nausea, vomiting, diarrhea, seeing or hearing things that are not there  · Blistering, peeling, or red skin rash  · Confusion, weakness, and muscle twitching  · Eye pain, vision changes, seeing halos around lights  · Feeling more excited or energetic than usual  · Thoughts of hurting yourself or others, unusual behavior  · Unusual bleeding or bruising  If you notice these less serious side effects, talk with your doctor:   · Dry mouth  · Loss of appetite, weight loss  · Mild diarrhea, constipation, nausea, vomiting  · Sexual problems  · Sleepiness, or trouble sleeping  If you notice other side effects that you think are caused by this medicine, tell your doctor     Call your doctor for medical advice about side effects  You may report side effects to FDA at 9-581-FDA-4782  © 2017 [address] [employer] Information is for End User's use only and may not be sold, redistributed or otherwise used for commercial purposes  The above information is an  only  It is not intended as medical advice for individual conditions or treatments  Talk to your doctor, nurse or pharmacist before following any medical regimen to see if it is safe and effective for you  Trazodone (By mouth)   Trazodone (TRAZ-oh-done)  Treats depression  Brand Name(s): Oleptro   There may be other brand names for this medicine  When This Medicine Should Not Be Used: This medicine is not right for everyone  Do not use it if you had an allergic reaction to trazodone  How to Use This Medicine:   Tablet, Long Acting Tablet  · Take your medicine as directed  Your dose may need to be changed several times to find what works best for you  · Regular tablet: Take it with or shortly after a meal or light snack  · Extended-release tablet: Take it at the same time each day, preferably at bedtime, without food  · The tablet can be swallowed whole, or you may break the tablet in half along the score line  Do not break the tablet unless your doctor tells you to  Do not crush or chew the tablet  · This medicine should come with a Medication Guide  Ask your pharmacist for a copy if you do not have one  · Missed dose: Take a dose as soon as you remember  If it is almost time for your next dose, wait until then and take a regular dose  Do not take extra medicine to make up for a missed dose  · Store the medicine in a closed container at room temperature, away from heat, moisture, and direct light  Drugs and Foods to Avoid:   Ask your doctor or pharmacist before using any other medicine, including over-the-counter medicines, vitamins, and herbal products    · Do not use trazodone if you currently take an MAO inhibitor (MAOI) or have used an MAOI in the past 14 days  · Tell your doctor if you also use any of the following:  ¨ Carbamazepine, digoxin, phenytoin, indinavir, ritonavir, buspirone, fentanyl, lithium, tryptophan, Albino's wort, tramadol  ¨ Medicine to treat a fungal infection (such as itraconazole, ketoconazole), a diuretic (water pill), blood pressure medicine, an NSAID pain or arthritis medicine (such as aspirin, celecoxib, diclofenac, ibuprofen, naproxen), a blood thinner (such as warfarin), other medicine for depression, or triptan medicine to treat migraine headaches  · Do not drink alcohol while you are using this medicine  · Tell your doctor if you use anything else that makes you sleepy  Some examples are allergy medicine, narcotic pain medicine, and alcohol  Warnings While Using This Medicine:   · Tell your doctor if you are pregnant or breastfeeding, or if you have kidney disease, liver disease, bleeding problems, glaucoma, heart disease, heart rhythm problems, or low blood pressure  Tell your doctor if you recently had a heart attack  · For some children, teenagers, and young adults, this medicine may increase mental or emotional problems  This may lead to thoughts of suicide and violence  Talk with your doctor right away if you have any thoughts or behavior changes that concern you  Tell your doctor if you or anyone in your family has a history of bipolar disorder or suicide attempts  · This medicine may cause the following problems:  ¨ Serotonin syndrome (more likely when used with certain other medicines)  ¨ Heart rhythm problems (QT prolongation)  ¨ Low sodium levels  ¨ Higher risk of bleeding  · Do not stop using this medicine suddenly  Your doctor will need to slowly decrease your dose before you stop it completely  · This medicine may make you dizzy or drowsy  Do not drive or do anything that could be dangerous until you know how this medicine affects you  Stand or sit up slowly if you are dizzy    · Tell any doctor or dentist who treats you that you are using this medicine  You may need to stop using this medicine several days before you have surgery or medical tests  · Your doctor will check your progress and the effects of this medicine at regular visits  Keep all appointments  · Keep all medicine out of the reach of children  Never share your medicine with anyone  Possible Side Effects While Using This Medicine:   Call your doctor right away if you notice any of these side effects:  · Allergic reaction: Itching or hives, swelling in your face or hands, swelling or tingling in your mouth or throat, chest tightness, trouble breathing  · Anxiety, restlessness, fever, sweating, muscle spasms, nausea, vomiting, diarrhea, seeing or hearing things that are not there  · Confusion, weakness, muscle twitching  · Fast, pounding, or uneven heartbeat  · Lightheadedness, dizziness, fainting  · Painful, prolonged erection of your penis  · Sudden increase in energy, feeling irritable, trouble sleeping  · Thoughts of hurting yourself or others, unusual behavior  · Unusual bleeding or bruising  If you notice these less serious side effects, talk with your doctor:   · Constipation, mild nausea  · Dry mouth  · Eye pain, vision changes, seeing halos around lights  · Headache  · Sleepiness or unusual drowsiness  If you notice other side effects that you think are caused by this medicine, tell your doctor  Call your doctor for medical advice about side effects  You may report side effects to FDA at 6-015-FDA-0181  © 2017 [address] [employer] Information is for End User's use only and may not be sold, redistributed or otherwise used for commercial purposes  The above information is an  only  It is not intended as medical advice for individual conditions or treatments  Talk to your doctor, nurse or pharmacist before following any medical regimen to see if it is safe and effective for you

## 2019-02-25 NOTE — PROGRESS NOTES
Patient is visible on the unit, cooperative with medications  Positive for morning meal  Patient denies SI and HI currently, no behavioral problems  Patient states " I feel a lot better " Patient affect flat but good eye contact  Patient looking forward to discharge for this afternoon  Patient has plans to return to [facility] meetings upon discharge  Patient BS 87 this morning, pt request to receive the standing 10 units of insulin as prescribed and verbalized he had eaten a breakfast of high carbs, pt BS did dip and pt verbalized needing a juice, pt BS did come up to 142  Patient verbalized being asymptomatic  No signs or symptoms of distress  SP 1 and Q 7 minute checks will be maintained for patient safety  Will continue to monitor

## 2019-02-27 NOTE — CASE MANAGEMENT
Notified by partial that patient was not answering his phone; but that his wife had contacted the partial stating patient was having difficulty with his prescriptions  I contacted the patient and his pharmacy to find out that the seroquel prescription was not being approved due to being a therapeutic duplication  I contacted [facility] and started the process for prior auth for the seroquel  I was told I would be notified by call or fax in 24 hours regarding the outcome

## 2019-02-28 NOTE — CASE MANAGEMENT
Received fax confirming authorization for patient's seroquel  Called pt's pharmacy and confirmed  Called patient and left message stating that the medication is authorized

## 2019-03-01 ENCOUNTER — OFFICE VISIT (OUTPATIENT)
Dept: PSYCHOLOGY | Facility: CLINIC | Age: 49
End: 2019-03-01
Payer: COMMERCIAL

## 2019-03-01 PROCEDURE — H0035 MH PARTIAL HOSP TX UNDER 24H: HCPCS

## 2019-03-04 ENCOUNTER — APPOINTMENT (OUTPATIENT)
Dept: PSYCHOLOGY | Facility: CLINIC | Age: 49
End: 2019-03-04
Payer: COMMERCIAL

## 2019-03-04 ENCOUNTER — OFFICE VISIT (OUTPATIENT)
Dept: INTERNAL MEDICINE CLINIC | Facility: CLINIC | Age: 49
End: 2019-03-04
Payer: COMMERCIAL

## 2019-03-04 VITALS
BODY MASS INDEX: 26.55 KG/M2 | DIASTOLIC BLOOD PRESSURE: 60 MMHG | SYSTOLIC BLOOD PRESSURE: 118 MMHG | TEMPERATURE: 97.7 F | RESPIRATION RATE: 18 BRPM | HEART RATE: 84 BPM | HEIGHT: 72 IN | WEIGHT: 196 LBS

## 2019-03-04 DIAGNOSIS — E13.9 DIABETES MELLITUS TYPE 1.5, MANAGED AS TYPE 1 (HCC): ICD-10-CM

## 2019-03-04 DIAGNOSIS — F32.2 CURRENT SEVERE EPISODE OF MAJOR DEPRESSIVE DISORDER WITHOUT PSYCHOTIC FEATURES WITHOUT PRIOR EPISODE (HCC): Primary | ICD-10-CM

## 2019-03-04 DIAGNOSIS — F10.10 ALCOHOL ABUSE: ICD-10-CM

## 2019-03-04 PROCEDURE — 3008F BODY MASS INDEX DOCD: CPT | Performed by: INTERNAL MEDICINE

## 2019-03-04 PROCEDURE — 4004F PT TOBACCO SCREEN RCVD TLK: CPT | Performed by: INTERNAL MEDICINE

## 2019-03-04 PROCEDURE — 99214 OFFICE O/P EST MOD 30 MIN: CPT | Performed by: INTERNAL MEDICINE

## 2019-03-05 ENCOUNTER — APPOINTMENT (OUTPATIENT)
Dept: PSYCHOLOGY | Facility: CLINIC | Age: 49
End: 2019-03-05
Payer: COMMERCIAL

## 2019-03-05 PROCEDURE — H0035 MH PARTIAL HOSP TX UNDER 24H: HCPCS

## 2019-03-06 ENCOUNTER — APPOINTMENT (OUTPATIENT)
Dept: PSYCHOLOGY | Facility: CLINIC | Age: 49
End: 2019-03-06
Payer: COMMERCIAL

## 2019-03-06 PROCEDURE — H0035 MH PARTIAL HOSP TX UNDER 24H: HCPCS

## 2019-03-07 ENCOUNTER — APPOINTMENT (OUTPATIENT)
Dept: PSYCHOLOGY | Facility: CLINIC | Age: 49
End: 2019-03-07
Payer: COMMERCIAL

## 2019-03-07 PROCEDURE — H0035 MH PARTIAL HOSP TX UNDER 24H: HCPCS

## 2019-03-08 ENCOUNTER — APPOINTMENT (OUTPATIENT)
Dept: PSYCHOLOGY | Facility: CLINIC | Age: 49
End: 2019-03-08
Payer: COMMERCIAL

## 2019-03-08 PROCEDURE — H0035 MH PARTIAL HOSP TX UNDER 24H: HCPCS

## 2019-03-11 ENCOUNTER — APPOINTMENT (OUTPATIENT)
Dept: PSYCHOLOGY | Facility: CLINIC | Age: 49
End: 2019-03-11
Payer: COMMERCIAL

## 2019-03-11 PROCEDURE — H0035 MH PARTIAL HOSP TX UNDER 24H: HCPCS

## 2019-03-12 ENCOUNTER — APPOINTMENT (OUTPATIENT)
Dept: PSYCHOLOGY | Facility: CLINIC | Age: 49
End: 2019-03-12
Payer: COMMERCIAL

## 2019-03-12 PROCEDURE — H0035 MH PARTIAL HOSP TX UNDER 24H: HCPCS

## 2019-03-13 ENCOUNTER — TELEPHONE (OUTPATIENT)
Dept: INTERNAL MEDICINE CLINIC | Facility: CLINIC | Age: 49
End: 2019-03-13

## 2019-03-13 ENCOUNTER — APPOINTMENT (OUTPATIENT)
Dept: PSYCHOLOGY | Facility: CLINIC | Age: 49
End: 2019-03-13
Payer: COMMERCIAL

## 2019-03-13 PROCEDURE — H0035 MH PARTIAL HOSP TX UNDER 24H: HCPCS

## 2019-03-13 NOTE — TELEPHONE ENCOUNTER
----- Message from Dago Costa DO sent at 3/4/2019  2:20 PM EST -----  Call pt in one week for sugars

## 2019-03-14 ENCOUNTER — APPOINTMENT (OUTPATIENT)
Dept: PSYCHOLOGY | Facility: CLINIC | Age: 49
End: 2019-03-14
Payer: COMMERCIAL

## 2019-03-14 PROCEDURE — H0035 MH PARTIAL HOSP TX UNDER 24H: HCPCS

## 2019-03-15 ENCOUNTER — APPOINTMENT (OUTPATIENT)
Dept: PSYCHOLOGY | Facility: CLINIC | Age: 49
End: 2019-03-15
Payer: COMMERCIAL

## 2019-03-15 PROCEDURE — H0035 MH PARTIAL HOSP TX UNDER 24H: HCPCS

## 2019-03-18 ENCOUNTER — APPOINTMENT (OUTPATIENT)
Dept: PSYCHOLOGY | Facility: CLINIC | Age: 49
End: 2019-03-18
Payer: COMMERCIAL

## 2019-03-19 ENCOUNTER — APPOINTMENT (OUTPATIENT)
Dept: PSYCHOLOGY | Facility: CLINIC | Age: 49
End: 2019-03-19
Payer: COMMERCIAL

## 2019-03-19 PROCEDURE — H0035 MH PARTIAL HOSP TX UNDER 24H: HCPCS

## 2019-03-20 ENCOUNTER — APPOINTMENT (OUTPATIENT)
Dept: PSYCHOLOGY | Facility: CLINIC | Age: 49
End: 2019-03-20
Payer: COMMERCIAL

## 2019-03-20 PROCEDURE — H0035 MH PARTIAL HOSP TX UNDER 24H: HCPCS

## 2019-03-21 ENCOUNTER — APPOINTMENT (OUTPATIENT)
Dept: PSYCHOLOGY | Facility: CLINIC | Age: 49
End: 2019-03-21
Payer: COMMERCIAL

## 2019-03-21 PROCEDURE — H0035 MH PARTIAL HOSP TX UNDER 24H: HCPCS

## 2019-03-22 ENCOUNTER — APPOINTMENT (OUTPATIENT)
Dept: PSYCHOLOGY | Facility: CLINIC | Age: 49
End: 2019-03-22
Payer: COMMERCIAL

## 2019-03-22 PROCEDURE — H0035 MH PARTIAL HOSP TX UNDER 24H: HCPCS

## 2019-03-25 ENCOUNTER — APPOINTMENT (OUTPATIENT)
Dept: PSYCHOLOGY | Facility: CLINIC | Age: 49
End: 2019-03-25
Payer: COMMERCIAL

## 2019-03-25 ENCOUNTER — TELEPHONE (OUTPATIENT)
Dept: INTERNAL MEDICINE CLINIC | Facility: CLINIC | Age: 49
End: 2019-03-25

## 2019-03-25 NOTE — TELEPHONE ENCOUNTER
[address] called asking if the patient is still taking Abilify? This rx has been floating around their system for a little bit and it is requiring a prior auth but nothing was ever done? It was last ordered a month ago? And we haven't heard anything from the patient either, so do you know if he is still taking this or not?

## 2019-03-26 ENCOUNTER — APPOINTMENT (OUTPATIENT)
Dept: PSYCHOLOGY | Facility: CLINIC | Age: 49
End: 2019-03-26
Payer: COMMERCIAL

## 2019-03-27 ENCOUNTER — APPOINTMENT (OUTPATIENT)
Dept: PSYCHOLOGY | Facility: CLINIC | Age: 49
End: 2019-03-27
Payer: COMMERCIAL

## 2019-03-27 PROCEDURE — H0035 MH PARTIAL HOSP TX UNDER 24H: HCPCS

## 2019-03-28 ENCOUNTER — APPOINTMENT (OUTPATIENT)
Dept: PSYCHOLOGY | Facility: CLINIC | Age: 49
End: 2019-03-28
Payer: COMMERCIAL

## 2019-03-29 ENCOUNTER — APPOINTMENT (OUTPATIENT)
Dept: PSYCHOLOGY | Facility: CLINIC | Age: 49
End: 2019-03-29
Payer: COMMERCIAL

## 2019-06-07 ENCOUNTER — OFFICE VISIT (OUTPATIENT)
Dept: PSYCHIATRY | Facility: CLINIC | Age: 49
End: 2019-06-07

## 2019-06-07 DIAGNOSIS — F41.1 GENERALIZED ANXIETY DISORDER: ICD-10-CM

## 2019-06-07 DIAGNOSIS — F33.41 RECURRENT MAJOR DEPRESSIVE DISORDER, IN PARTIAL REMISSION (HCC): Primary | ICD-10-CM

## 2019-06-07 DIAGNOSIS — F10.21 ALCOHOL DEPENDENCE IN EARLY, EARLY PARTIAL, SUSTAINED FULL, OR SUSTAINED PARTIAL REMISSION (HCC): ICD-10-CM

## 2019-06-07 PROCEDURE — 99214 OFFICE O/P EST MOD 30 MIN: CPT | Performed by: HOSPITALIST

## 2019-06-07 RX ORDER — QUETIAPINE FUMARATE 25 MG/1
25 TABLET, FILM COATED ORAL
Start: 2019-06-07 | End: 2019-06-27 | Stop reason: SDUPTHER

## 2019-06-07 RX ORDER — PROPRANOLOL HYDROCHLORIDE 10 MG/1
10 TABLET ORAL 2 TIMES DAILY
Start: 2019-06-07 | End: 2019-06-27 | Stop reason: SDUPTHER

## 2019-06-27 DIAGNOSIS — K21.9 GASTROESOPHAGEAL REFLUX DISEASE WITHOUT ESOPHAGITIS: Primary | ICD-10-CM

## 2019-06-27 DIAGNOSIS — F10.21 ALCOHOL DEPENDENCE IN EARLY, EARLY PARTIAL, SUSTAINED FULL, OR SUSTAINED PARTIAL REMISSION (HCC): ICD-10-CM

## 2019-06-27 DIAGNOSIS — E78.2 MIXED HYPERLIPIDEMIA: ICD-10-CM

## 2019-06-27 DIAGNOSIS — F32.2 CURRENT SEVERE EPISODE OF MAJOR DEPRESSIVE DISORDER WITHOUT PSYCHOTIC FEATURES WITHOUT PRIOR EPISODE (HCC): ICD-10-CM

## 2019-06-27 DIAGNOSIS — I10 ESSENTIAL HYPERTENSION: ICD-10-CM

## 2019-06-27 DIAGNOSIS — E78.5 HYPERLIPIDEMIA, UNSPECIFIED HYPERLIPIDEMIA TYPE: ICD-10-CM

## 2019-06-27 DIAGNOSIS — F33.41 RECURRENT MAJOR DEPRESSIVE DISORDER, IN PARTIAL REMISSION (HCC): ICD-10-CM

## 2019-06-27 DIAGNOSIS — E11.65 UNCONTROLLED TYPE 2 DIABETES MELLITUS WITH HYPERGLYCEMIA (HCC): ICD-10-CM

## 2019-06-27 PROCEDURE — 4010F ACE/ARB THERAPY RXD/TAKEN: CPT | Performed by: INTERNAL MEDICINE

## 2019-06-27 RX ORDER — LISINOPRIL 10 MG/1
10 TABLET ORAL DAILY
Qty: 30 TABLET | Refills: 0 | Status: SHIPPED | OUTPATIENT
Start: 2019-06-27 | End: 2019-07-30 | Stop reason: SDUPTHER

## 2019-06-27 RX ORDER — SERTRALINE HYDROCHLORIDE 100 MG/1
100 TABLET, FILM COATED ORAL DAILY
Qty: 30 TABLET | Refills: 0 | Status: SHIPPED | OUTPATIENT
Start: 2019-06-27 | End: 2019-07-09

## 2019-06-27 RX ORDER — PROPRANOLOL HYDROCHLORIDE 10 MG/1
10 TABLET ORAL 2 TIMES DAILY
Qty: 60 TABLET | Refills: 0 | Status: SHIPPED | OUTPATIENT
Start: 2019-06-27 | End: 2021-05-14 | Stop reason: HOSPADM

## 2019-06-27 RX ORDER — QUETIAPINE FUMARATE 25 MG/1
25 TABLET, FILM COATED ORAL
Qty: 30 TABLET | Refills: 0 | Status: SHIPPED | OUTPATIENT
Start: 2019-06-27 | End: 2019-07-09

## 2019-06-27 RX ORDER — FENOFIBRATE 145 MG/1
145 TABLET, COATED ORAL DAILY
Qty: 30 TABLET | Refills: 5 | Status: SHIPPED | OUTPATIENT
Start: 2019-06-27 | End: 2021-05-14 | Stop reason: HOSPADM

## 2019-06-27 RX ORDER — QUETIAPINE FUMARATE 100 MG/1
100 TABLET, FILM COATED ORAL
Qty: 30 TABLET | Refills: 0 | Status: SHIPPED | OUTPATIENT
Start: 2019-06-27 | End: 2019-07-23 | Stop reason: SDUPTHER

## 2019-06-27 RX ORDER — OMEPRAZOLE 20 MG/1
20 CAPSULE, DELAYED RELEASE ORAL DAILY
Qty: 30 CAPSULE | Refills: 0 | Status: SHIPPED | OUTPATIENT
Start: 2019-06-27 | End: 2021-05-14 | Stop reason: HOSPADM

## 2019-06-27 RX ORDER — ROSUVASTATIN CALCIUM 10 MG/1
10 TABLET, COATED ORAL DAILY
Qty: 90 TABLET | Refills: 0 | Status: SHIPPED | OUTPATIENT
Start: 2019-06-27 | End: 2021-05-14 | Stop reason: HOSPADM

## 2019-07-03 ENCOUNTER — APPOINTMENT (OUTPATIENT)
Dept: RADIOLOGY | Facility: CLINIC | Age: 49
End: 2019-07-03
Payer: COMMERCIAL

## 2019-07-03 ENCOUNTER — APPOINTMENT (OUTPATIENT)
Dept: LAB | Facility: CLINIC | Age: 49
End: 2019-07-03
Payer: COMMERCIAL

## 2019-07-03 ENCOUNTER — OFFICE VISIT (OUTPATIENT)
Dept: INTERNAL MEDICINE CLINIC | Facility: CLINIC | Age: 49
End: 2019-07-03
Payer: COMMERCIAL

## 2019-07-03 VITALS
WEIGHT: 186.6 LBS | HEIGHT: 72 IN | SYSTOLIC BLOOD PRESSURE: 112 MMHG | DIASTOLIC BLOOD PRESSURE: 72 MMHG | BODY MASS INDEX: 25.27 KG/M2 | TEMPERATURE: 98.3 F | RESPIRATION RATE: 18 BRPM | HEART RATE: 103 BPM | OXYGEN SATURATION: 98 %

## 2019-07-03 DIAGNOSIS — E11.65 UNCONTROLLED TYPE 2 DIABETES MELLITUS WITH HYPERGLYCEMIA (HCC): Primary | ICD-10-CM

## 2019-07-03 DIAGNOSIS — R19.5 CHANGE IN STOOL: ICD-10-CM

## 2019-07-03 DIAGNOSIS — E13.9 DIABETES MELLITUS TYPE 1.5, MANAGED AS TYPE 1 (HCC): ICD-10-CM

## 2019-07-03 DIAGNOSIS — F33.41 RECURRENT MAJOR DEPRESSIVE DISORDER, IN PARTIAL REMISSION (HCC): ICD-10-CM

## 2019-07-03 DIAGNOSIS — F10.21 ALCOHOL DEPENDENCE IN EARLY, EARLY PARTIAL, SUSTAINED FULL, OR SUSTAINED PARTIAL REMISSION (HCC): ICD-10-CM

## 2019-07-03 DIAGNOSIS — R53.83 FATIGUE, UNSPECIFIED TYPE: ICD-10-CM

## 2019-07-03 DIAGNOSIS — K62.5 BRBPR (BRIGHT RED BLOOD PER RECTUM): ICD-10-CM

## 2019-07-03 DIAGNOSIS — G47.9 SLEEP DISORDER: ICD-10-CM

## 2019-07-03 DIAGNOSIS — K86.0 ALCOHOL-INDUCED CHRONIC PANCREATITIS (HCC): ICD-10-CM

## 2019-07-03 LAB
ALBUMIN SERPL BCP-MCNC: 3.7 G/DL (ref 3.5–5)
ALP SERPL-CCNC: 79 U/L (ref 46–116)
ALT SERPL W P-5'-P-CCNC: 15 U/L (ref 12–78)
ANION GAP SERPL CALCULATED.3IONS-SCNC: 6 MMOL/L (ref 4–13)
AST SERPL W P-5'-P-CCNC: 12 U/L (ref 5–45)
BASOPHILS # BLD AUTO: 0.07 THOUSANDS/ΜL (ref 0–0.1)
BASOPHILS NFR BLD AUTO: 1 % (ref 0–1)
BILIRUB SERPL-MCNC: 0.35 MG/DL (ref 0.2–1)
BUN SERPL-MCNC: 12 MG/DL (ref 5–25)
CALCIUM SERPL-MCNC: 9.4 MG/DL (ref 8.3–10.1)
CHLORIDE SERPL-SCNC: 105 MMOL/L (ref 100–108)
CO2 SERPL-SCNC: 26 MMOL/L (ref 21–32)
CREAT SERPL-MCNC: 1.14 MG/DL (ref 0.6–1.3)
EOSINOPHIL # BLD AUTO: 0.35 THOUSAND/ΜL (ref 0–0.61)
EOSINOPHIL NFR BLD AUTO: 3 % (ref 0–6)
ERYTHROCYTE [DISTWIDTH] IN BLOOD BY AUTOMATED COUNT: 13.2 % (ref 11.6–15.1)
GFR SERPL CREATININE-BSD FRML MDRD: 74 ML/MIN/1.73SQ M
GLUCOSE SERPL-MCNC: 129 MG/DL (ref 65–140)
HCT VFR BLD AUTO: 42.5 % (ref 36.5–49.3)
HGB BLD-MCNC: 14 G/DL (ref 12–17)
IMM GRANULOCYTES # BLD AUTO: 0.03 THOUSAND/UL (ref 0–0.2)
IMM GRANULOCYTES NFR BLD AUTO: 0 % (ref 0–2)
LYMPHOCYTES # BLD AUTO: 2.27 THOUSANDS/ΜL (ref 0.6–4.47)
LYMPHOCYTES NFR BLD AUTO: 21 % (ref 14–44)
MCH RBC QN AUTO: 29.6 PG (ref 26.8–34.3)
MCHC RBC AUTO-ENTMCNC: 32.9 G/DL (ref 31.4–37.4)
MCV RBC AUTO: 90 FL (ref 82–98)
MONOCYTES # BLD AUTO: 0.73 THOUSAND/ΜL (ref 0.17–1.22)
MONOCYTES NFR BLD AUTO: 7 % (ref 4–12)
NEUTROPHILS # BLD AUTO: 7.25 THOUSANDS/ΜL (ref 1.85–7.62)
NEUTS SEG NFR BLD AUTO: 68 % (ref 43–75)
NRBC BLD AUTO-RTO: 0 /100 WBCS
PLATELET # BLD AUTO: 308 THOUSANDS/UL (ref 149–390)
PMV BLD AUTO: 9.6 FL (ref 8.9–12.7)
POTASSIUM SERPL-SCNC: 3.9 MMOL/L (ref 3.5–5.3)
PROT SERPL-MCNC: 8.2 G/DL (ref 6.4–8.2)
RBC # BLD AUTO: 4.73 MILLION/UL (ref 3.88–5.62)
SL AMB POCT HEMOGLOBIN AIC: 8.7 (ref ?–6.5)
SODIUM SERPL-SCNC: 137 MMOL/L (ref 136–145)
TSH SERPL DL<=0.05 MIU/L-ACNC: 2.22 UIU/ML (ref 0.36–3.74)
WBC # BLD AUTO: 10.7 THOUSAND/UL (ref 4.31–10.16)

## 2019-07-03 PROCEDURE — 36415 COLL VENOUS BLD VENIPUNCTURE: CPT

## 2019-07-03 PROCEDURE — 3045F PR MOST RECENT HEMOGLOBIN A1C LEVEL 7.0-9.0%: CPT | Performed by: INTERNAL MEDICINE

## 2019-07-03 PROCEDURE — 99214 OFFICE O/P EST MOD 30 MIN: CPT | Performed by: INTERNAL MEDICINE

## 2019-07-03 PROCEDURE — 85025 COMPLETE CBC W/AUTO DIFF WBC: CPT

## 2019-07-03 PROCEDURE — 4004F PT TOBACCO SCREEN RCVD TLK: CPT | Performed by: INTERNAL MEDICINE

## 2019-07-03 PROCEDURE — 3008F BODY MASS INDEX DOCD: CPT | Performed by: INTERNAL MEDICINE

## 2019-07-03 PROCEDURE — 80053 COMPREHEN METABOLIC PANEL: CPT

## 2019-07-03 PROCEDURE — 84443 ASSAY THYROID STIM HORMONE: CPT

## 2019-07-03 PROCEDURE — 83036 HEMOGLOBIN GLYCOSYLATED A1C: CPT | Performed by: INTERNAL MEDICINE

## 2019-07-03 PROCEDURE — 74022 RADEX COMPL AQT ABD SERIES: CPT

## 2019-07-03 RX ORDER — AMOXICILLIN 250 MG
1 CAPSULE ORAL DAILY
Qty: 180 TABLET | Refills: 0 | Status: SHIPPED | OUTPATIENT
Start: 2019-07-03 | End: 2019-07-18

## 2019-07-03 RX ORDER — NICOTINE 21 MG/24H
PATCH, EXTENDED RELEASE TRANSDERMAL
Refills: 0 | COMMUNITY
Start: 2019-05-30 | End: 2021-05-14 | Stop reason: HOSPADM

## 2019-07-03 RX ORDER — TRAZODONE HYDROCHLORIDE 50 MG/1
50 TABLET ORAL
Qty: 30 TABLET | Refills: 3 | Status: SHIPPED | OUTPATIENT
Start: 2019-07-03 | End: 2019-07-09 | Stop reason: ALTCHOICE

## 2019-07-03 RX ORDER — NICOTINE POLACRILEX 4 MG/1
LOZENGE ORAL
Refills: 0 | COMMUNITY
Start: 2019-05-31 | End: 2021-05-14 | Stop reason: HOSPADM

## 2019-07-03 RX ORDER — NALOXONE HYDROCHLORIDE 4 MG/.1ML
SPRAY NASAL
Refills: 0 | COMMUNITY
Start: 2019-05-30 | End: 2021-05-14 | Stop reason: HOSPADM

## 2019-07-03 RX ORDER — TRAZODONE HYDROCHLORIDE 50 MG/1
50 TABLET ORAL
Refills: 0 | COMMUNITY
Start: 2019-05-30 | End: 2019-07-03 | Stop reason: SDUPTHER

## 2019-07-03 NOTE — PROGRESS NOTES
Assessment/Plan:  Problem List Items Addressed This Visit        Digestive    Chronic pancreatitis ([facility] [address] )       Endocrine    Diabetes mellitus type 1 5, managed as type 1 (HCC)    Relevant Medications    insulin lispro (HUMALOG KWIKPEN) 100 units/mL injection pen       Other    Recurrent major depressive disorder, in partial remission (HCC)    Relevant Medications    EQ NICOTINE 21 MG/24HR TD 24 hr patch    EQ NICOTINE POLACRILEX 4 MG lozenge    traZODone (DESYREL) 50 mg tablet    Sleep disorder    Relevant Medications    traZODone (DESYREL) 50 mg tablet    Alcohol dependence in early, early partial, sustained full, or sustained partial remission (HCC)    Relevant Medications    EQ NICOTINE 21 MG/24HR TD 24 hr patch    EQ NICOTINE POLACRILEX 4 MG lozenge    traZODone (DESYREL) 50 mg tablet      Other Visit Diagnoses     Uncontrolled type 2 diabetes mellitus with hyperglycemia (formerly [facility])    -  Primary    Relevant Medications    insulin lispro (HUMALOG KWIKPEN) 100 units/mL injection pen    Other Relevant Orders    POCT hemoglobin A1c (Completed)    Ambulatory referral to Ophthalmology    Change in stool        Relevant Medications    senna-docusate sodium (SENOKOT S) 8 6-50 mg per tablet    Other Relevant Orders    CBC and differential    Comprehensive metabolic panel    TSH, 3rd generation    XR abdomen obstruction series    Fatigue, unspecified type        Relevant Orders    CBC and differential    Comprehensive metabolic panel    TSH, 3rd generation    BRBPR (bright red blood per rectum)        Relevant Medications    senna-docusate sodium (SENOKOT S) 8 6-50 mg per tablet    Other Relevant Orders    CBC and differential    Comprehensive metabolic panel    XR abdomen obstruction series           Diagnoses and all orders for this visit:    Uncontrolled type 2 diabetes mellitus with hyperglycemia ([facility] )  -     POCT hemoglobin A1c  -     Ambulatory referral to Ophthalmology;  Future  -     insulin lispro (HUMALOG KWIKPEN) 100 units/mL injection pen; Per sliding scale up to 14 units with each meal     Diabetes mellitus type 1 5, managed as type 1 (HCC)    Alcohol-induced chronic pancreatitis (HCC)    Sleep disorder  -     traZODone (DESYREL) 50 mg tablet; Take 1 tablet (50 mg total) by mouth daily at bedtime    Alcohol dependence in early, early partial, sustained full, or sustained partial remission (HCC)    Recurrent major depressive disorder, in partial remission (HCC)    Change in stool  -     CBC and differential; Future  -     Comprehensive metabolic panel; Future  -     TSH, 3rd generation; Future  -     XR abdomen obstruction series; Future  -     senna-docusate sodium (SENOKOT S) 8 6-50 mg per tablet; Take 1 tablet by mouth daily    Fatigue, unspecified type  -     CBC and differential; Future  -     Comprehensive metabolic panel; Future  -     TSH, 3rd generation; Future    BRBPR (bright red blood per rectum)  -     CBC and differential; Future  -     Comprehensive metabolic panel; Future  -     XR abdomen obstruction series; Future  -     senna-docusate sodium (SENOKOT S) 8 6-50 mg per tablet; Take 1 tablet by mouth daily    Other orders  -     NARCAN 4 MG/0 1ML LIQD; ADMINISTER A SINGLE SPRAY IN ONE NOSTRIL UPON SIGNS OF OPIOID OVERDOSE  CALL 911  REPEAT AFTER 3 MINUTES IF NO RESPONSE   -     EQ NICOTINE 21 MG/24HR TD 24 hr patch; APPLY 1 PATCH TOPICALLY ONCE DAILY  -     EQ NICOTINE POLACRILEX 4 MG lozenge; TAKE 1 LOZENGE EVERY 2 HOURS AS NEEDED FOR SMOKING CRAVING  -     Discontinue: traZODone (DESYREL) 50 mg tablet; Take 50 mg by mouth daily at bedtime        No problem-specific Assessment & Plan notes found for this encounter  A/P: Doing ok and to be congratulated for reaching 100 days  Needs to keep going  Needs to continue with AA and counseling   Pt had basal and rapid acting insulin prescribed by us before, but failed to f/u after he reported the insurance wouldn't cover it and then not picking up the replacement med that was covered  Needs to be more compliant  In house HgA1c was actually better at 8 7, but needs to have better control  Will continue with 20 units of basal insulin bid and will start rapid acting insulin TID with meals based on a sliding scale  PE unimpressive  ??constipation or gastroparesis  Will check an obstructions series and labs  Feel the BRBPR is most likely hemorrhoids or fissure  Will start pericolace and push the fluids  Hold on FIT  Scope three years ago with impressive hemorrhoids  May need a gastric emptying study  Continue current treatment otherwise  RTC two weeks     Subjective:      Patient ID: Vance Moody is a 44 y o  male  WM, known diabetic with recurrent pancreatitis due to chronic ETOH use and depression, presents following two rehab stays recently for ongoing depression and ETOH  Pt released May 25  During his stay, sugars were up and was on both basal and rapid acting insulin  Since d/c sugars remain up  Pt last HgA1c was in the 10 range  Pt has always been uncontrolled and was seen by endo in the past  Pt uncontrolled in the past due depression, pancreatitis, ETOH use, non compliance with meds, diet, and f/u  Now reports being on day #100 sober  Attending intensified counseling and AA  Reports extreme fatigue  Notes infrequent stooling with diarrhea at times and only mucous production  Some BRBPR on the TP and in the bowel  NO black tarry stools, melena, hematochezia, etc  Had colonoscopy in the past  Currently taking 20 units of basal insulin bid   NO abdominal pain, n/v, etc        The following portions of the patient's history were reviewed and updated as appropriate:   He has a past medical history of Alcohol abuse (2/20/2019), Allergic rhinitis, Anemia, Anxiety and depression, Quiros esophagus, Cholelithiasis, Chronic pain, COPD (chronic obstructive pulmonary disease) ([facility] [address] ), Depression, Diabetes mellitus ([facility] [address] ), Emphysema lung ([facility] [address] ), Generalized anxiety disorder, GERD (gastroesophageal reflux disease), Hyperlipidemia, Hypertension, Kidney stone, Metatarsalgia, Pancreatitis, Psychiatric disorder, Renal disorder, and Shortness of breath ,  does not have any pertinent problems on file  ,   has a past surgical history that includes CHOLECYSTECTOMY LAPAROSCOPIC; Vasectomy; Foot surgery; Knee arthroscopy; and pr edg us exam surgical alter stom duodenum/jejunum (N/A, 10/17/2018)  ,  family history includes Cancer in his mother; Coronary artery disease in his family, father, and mother; Diabetes in his mother and sister; Prostate cancer in his father  ,   reports that he has been smoking  He has been smoking about 1 00 pack per day  He has never used smokeless tobacco  He reports that he drank alcohol  He reports that he does not use drugs  ,  has No Known Allergies     Current Outpatient Medications   Medication Sig Dispense Refill    fenofibrate (TRICOR) 145 mg tablet Take 1 tablet (145 mg total) by mouth daily 30 tablet 5    glucose blood (ONE TOUCH ULTRA TEST) test strip by In Vitro route 3 (three) times a day      insulin glargine (BASAGLAR KWIKPEN) 100 units/mL injection pen 20 units sq q AM  20 units sq q PM 5 pen 1    Insulin Pen Needle (B-D ULTRAFINE III SHORT PEN) 31G X 8 MM MISC by Does not apply route      Insulin Pen Needle (PEN NEEDLES) 29G X 12MM MISC by Does not apply route daily at bedtime Substitute what fits [place] pen and what is covered by insurance  45 each 0    lisinopril (ZESTRIL) 10 mg tablet Take 1 tablet (10 mg total) by mouth daily 30 tablet 0    NARCAN 4 MG/0 1ML LIQD ADMINISTER A SINGLE SPRAY IN ONE NOSTRIL UPON SIGNS OF OPIOID OVERDOSE  CALL 911   REPEAT AFTER 3 MINUTES IF NO RESPONSE   0    omeprazole (PriLOSEC) 20 mg delayed release capsule Take 1 capsule (20 mg total) by mouth daily 30 capsule 0    propranolol (INDERAL) 10 mg tablet Take 1 tablet (10 mg total) by mouth 2 (two) times a day 60 tablet 0    QUEtiapine (SEROquel) 100 mg tablet Take 1 tablet (100 mg total) by mouth daily at bedtime for 30 days 30 tablet 0    QUEtiapine (SEROquel) 25 mg tablet Take 1 tablet (25 mg total) by mouth daily at bedtime 30 tablet 0    rosuvastatin (CRESTOR) 10 MG tablet Take 1 tablet (10 mg total) by mouth daily 90 tablet 0    sertraline (ZOLOFT) 100 mg tablet Take 1 tablet (100 mg total) by mouth daily for 30 days 30 tablet 0    traZODone (DESYREL) 50 mg tablet Take 1 tablet (50 mg total) by mouth daily at bedtime 30 tablet 3    umeclidinium-vilanterol (ANORO ELLIPTA) 62 5-25 MCG/INH inhaler Inhale 1 puff daily 3 Inhaler 3    EQ NICOTINE 21 MG/24HR TD 24 hr patch APPLY 1 PATCH TOPICALLY ONCE DAILY  0    EQ NICOTINE POLACRILEX 4 MG lozenge TAKE 1 LOZENGE EVERY 2 HOURS AS NEEDED FOR SMOKING CRAVING  0    insulin lispro (HUMALOG KWIKPEN) 100 units/mL injection pen Per sliding scale up to 14 units with each meal  5 pen 1    senna-docusate sodium (SENOKOT S) 8 6-50 mg per tablet Take 1 tablet by mouth daily 180 tablet 0     No current facility-administered medications for this visit  Review of Systems   Constitutional: Positive for activity change and fatigue  Negative for chills, diaphoresis and fever  HENT: Negative  Eyes: Negative for visual disturbance  Respiratory: Negative for cough, chest tightness, shortness of breath and wheezing  Cardiovascular: Negative for chest pain, palpitations and leg swelling  Gastrointestinal: Positive for anal bleeding, constipation and diarrhea  Negative for abdominal distention, abdominal pain, blood in stool, nausea, rectal pain and vomiting  Genitourinary: Negative for difficulty urinating, dysuria and frequency  Musculoskeletal: Negative for arthralgias, gait problem and myalgias  Neurological: Negative for dizziness, seizures, syncope, weakness, light-headedness and headaches  Psychiatric/Behavioral: Positive for dysphoric mood  Negative for confusion and suicidal ideas   The patient is nervous/anxious  Objective:  Vitals:    07/03/19 1058   BP: 112/72   BP Location: Left arm   Patient Position: Sitting   Cuff Size: Adult   Pulse: 103   Resp: 18   Temp: 98 3 °F (36 8 °C)   TempSrc: Tympanic   SpO2: 98%   Weight: 84 6 kg (186 lb 9 6 oz)   Height: 6' (1 829 m)     Body mass index is 25 31 kg/m²  Physical Exam   Constitutional: He is oriented to person, place, and time  He appears well-developed and well-nourished  No distress  HENT:   Head: Normocephalic and atraumatic  Mouth/Throat: Oropharynx is clear and moist    Eyes: Pupils are equal, round, and reactive to light  Conjunctivae and EOM are normal    Cardiovascular: Normal rate, regular rhythm and normal heart sounds  Pulmonary/Chest: Effort normal and breath sounds normal  No respiratory distress  He has no wheezes  He has no rales  Abdominal: Soft  Bowel sounds are normal  He exhibits no distension and no mass  There is no tenderness  There is no rebound and no guarding  Musculoskeletal: He exhibits no edema  Neurological: He is alert and oriented to person, place, and time  Psychiatric: His behavior is normal  Judgment and thought content normal    Poor eye contact  Little facial expression  Monotone conversation  Nursing note and vitals reviewed

## 2019-07-09 ENCOUNTER — OFFICE VISIT (OUTPATIENT)
Dept: PSYCHIATRY | Facility: CLINIC | Age: 49
End: 2019-07-09

## 2019-07-09 DIAGNOSIS — F32.2 CURRENT SEVERE EPISODE OF MAJOR DEPRESSIVE DISORDER WITHOUT PSYCHOTIC FEATURES WITHOUT PRIOR EPISODE (HCC): ICD-10-CM

## 2019-07-09 DIAGNOSIS — F41.1 GENERALIZED ANXIETY DISORDER: ICD-10-CM

## 2019-07-09 DIAGNOSIS — F10.21 ALCOHOL DEPENDENCE IN EARLY, EARLY PARTIAL, SUSTAINED FULL, OR SUSTAINED PARTIAL REMISSION (HCC): ICD-10-CM

## 2019-07-09 DIAGNOSIS — F33.41 RECURRENT MAJOR DEPRESSIVE DISORDER, IN PARTIAL REMISSION (HCC): Primary | ICD-10-CM

## 2019-07-09 PROCEDURE — 99214 OFFICE O/P EST MOD 30 MIN: CPT | Performed by: HOSPITALIST

## 2019-07-09 RX ORDER — SERTRALINE HYDROCHLORIDE 100 MG/1
200 TABLET, FILM COATED ORAL DAILY
Qty: 60 TABLET | Refills: 0 | Status: SHIPPED | OUTPATIENT
Start: 2019-07-09 | End: 2019-07-23 | Stop reason: SDUPTHER

## 2019-07-09 RX ORDER — QUETIAPINE FUMARATE 25 MG/1
25 TABLET, FILM COATED ORAL 2 TIMES DAILY
Qty: 60 TABLET | Refills: 0 | Status: SHIPPED | OUTPATIENT
Start: 2019-07-09 | End: 2019-07-23 | Stop reason: SDUPTHER

## 2019-07-09 NOTE — PSYCH
MEDICATION MANAGEMENT NOTE        [address]      Name and Date of Birth:  Mago Camacho 44 y o  1970    Date of Visit: July 10, 2019    SUBJECTIVE:  CC: Bebeto Brambila presents today for follow for MDD, ETOH, and anxiety    Bebeto Brambila reports having GI symptoms oover the last month and has seen his medical provider  Diane Arambula denies any side effects from medications unless noted above    Since our last visit, overall symptoms have been feels stressed due to medical issues  [facility] HPI ROS:             ('was' notes: recent => remote)  Medication Side Effects:  none     Depression (10 worst): 2-3 3   Anxiety (10 worst): 6-7 6   Safety concerns (SI, HI, etc): none none   Sleep: Adequate Adequate   Energy: Adequate decreased   Appetite: adequate Poor (due to gi sxs)   Weight Change: none none         Review Of Systems as noted above  In addition:     Constitutional negative   ENT negative   Cardiovascular negative   Respiratory negative   Gastrointestinal abdominal discomfort, diarrhea and bloody stools   Genitourinary negative   Musculoskeletal negative   Integumentary negative   Neurological negative   Endocrine negative   Other Symptoms negative and none     Pain none   Pain Scale 0     History Review: The following portions of the patient's history were reviewed and updated as appropriate: allergies, current medications, past family history, past medical history, past social history and past surgical history       Lab Review: Labs were reviewed and discussed with patient      OBJECTIVE:     MENTAL STATUS EXAM  Appearance:  age appropriate, dressed casually   Behavior:  Pleasant & cooperative   Speech:  soft, paucity of speech   Mood:  "ok"   Affect:  constricted, blunted, appropriates   Language: intact and appropriate for age   Thought Process:  Linear and goal directed   Associations: intact associations   Thought Content:  normal and appropriate   Perceptual Disturbances: no auditory or visual hallcunations   Risk Potential / Abnormal Thoughts: Suicidal ideation - None  Homicidal ideation - None  Potential for aggression - No       Consciousness:  Alert & Awake   Sensorium:  Grossly oriented   Attention: attention span and concentration are age appropriate       Fund of Knowledge:  Memory: awareness of current events: yes  recent and remote memory grossly intact   Insight:  fair   Judgment: fair   Muscle Strength Muscle Tone: normal  normal   Gait/Station: normal gait/station with good balance   Motor Activity: no abnormal movements       Risks, Benefits And Possible Side Effects Of Medications:    AGREE: Risks, benefits, and possible side effects of medications explained to [place] and he (or legal representative) verbalizes understanding and agreement for treatment      Controlled Medication Discussion:     Not applicable    Recent labs:  Appointment on 07/03/2019   Component Date Value    WBC 07/03/2019 10 70*    RBC 07/03/2019 4 73     Hemoglobin 07/03/2019 14 0     Hematocrit 07/03/2019 42 5     MCV 07/03/2019 90     MCH 07/03/2019 29 6     MCHC 07/03/2019 32 9     RDW 07/03/2019 13 2     MPV 07/03/2019 9 6     Platelets 94/48/7814 308     nRBC 07/03/2019 0     Neutrophils Relative 07/03/2019 68     Immat GRANS % 07/03/2019 0     Lymphocytes Relative 07/03/2019 21     Monocytes Relative 07/03/2019 7     Eosinophils Relative 07/03/2019 3     Basophils Relative 07/03/2019 1     Neutrophils Absolute 07/03/2019 7 25     Immature Grans Absolute 07/03/2019 0 03     Lymphocytes Absolute 07/03/2019 2 27     Monocytes Absolute 07/03/2019 0 73     Eosinophils Absolute 07/03/2019 0 35     Basophils Absolute 07/03/2019 0 07     Sodium 07/03/2019 137     Potassium 07/03/2019 3 9     Chloride 07/03/2019 105     CO2 07/03/2019 26     ANION GAP 07/03/2019 6     BUN 07/03/2019 12     Creatinine 07/03/2019 1 14     Glucose 07/03/2019 129     Calcium 07/03/2019 9 4     AST 07/03/2019 12     ALT 07/03/2019 15     Alkaline Phosphatase 07/03/2019 79     Total Protein 07/03/2019 8 2     Albumin 07/03/2019 3 7     Total Bilirubin 07/03/2019 0 35     eGFR 07/03/2019 74     TSH 3RD [employer] 07/03/2019 2 220    Office Visit on 07/03/2019   Component Date Value    Hemoglobin A1C 07/03/2019 8 7*         Psychiatric History  Previous diagnoses include- major depressive disorder, alcohol abuse/dependence, generalized anxiety disorder   Prior outpatient psychiatric treatment:  Prior to inpatient admission in February patient was treated by his PCP psychiatric symptoms  Prior therapy:   None  Prior inpatient psychiatric treatment:   Multiple inpatient psychiatric admissions last 1 being in February 2019  Prior suicide attempts:   None  Prior self harm:   None  Prior violence or aggression:   None      Family Psychiatric History:   Family History   Problem Relation Age of Onset    Cancer Mother     Coronary artery disease Mother     Diabetes Mother     Coronary artery disease Father     Prostate cancer Father     Diabetes Sister     Coronary artery disease Family          Medical / Surgical History:    Past Medical History:   Diagnosis Date    Alcohol abuse 2/20/2019    Allergic rhinitis     last assessed: 12/5/2012    Anemia     Anxiety and depression     Quiros esophagus     Cholelithiasis     Chronic pain     COPD [facility]chronic obstructive pulmonary disease) ([facility] [address] )     Depression     Diabetes mellitus ([facility] [address] )     Emphysema lung ([facility] [address] )     Generalized anxiety disorder     GERD (gastroesophageal reflux disease)     Hyperlipidemia     Hypertension     Kidney stone     Metatarsalgia     last assessed: 8/11/2014, unspecified laterality, ? cause  PE with changes  To see DPM tomorrow      Pancreatitis     Psychiatric disorder     Renal disorder     Shortness of breath     with activity     Past Surgical History:   Procedure Laterality Date    CHOLECYSTECTOMY LAPAROSCOPIC  FOOT SURGERY      B/L great toe joint replacement    KNEE ARTHROSCOPY      (therapeutic) right knee    NH EDG US EXAM SURGICAL ALTER STOM DUODENUM/JEJUNUM N/A 10/17/2018    Procedure: LINEAR ENDOSCOPIC U/S;  Surgeon: Shayy Thurston MD;  Location: [facility]; Service: Gastroenterology    VASECTOMY      vas deferens       Assessment/Plan:       Recurrent major depressive disorder, in partial remission ([facility] [address] )  MERLYN  Alcohol Dependence  Patient will continue on Zoloft 100 mg daily, Seroquel 25 mg twice a day and 100 mg at bedtime, Inderal 10 mg b i d  Continue AA, continue outpatient individual therapy  Advised to see his Gatroenterologist as soon as possible        Patient has been educated about their diagnosis and treatment modalities  They voiced understanding and agreement with the following plan:  Discussed medications and if treatment adjustment was needed/desired  Discussed self monitoring of symptoms, and symptom monitoring tools  Patient has been informed of 24 hours and weekend coverage for urgent situations accessed by calling the main clinic phone number            Psychotherapy in session:  Time spent performing psychotherapy: 30 Minutes

## 2019-07-18 ENCOUNTER — APPOINTMENT (OUTPATIENT)
Dept: LAB | Facility: CLINIC | Age: 49
End: 2019-07-18
Payer: COMMERCIAL

## 2019-07-18 ENCOUNTER — OFFICE VISIT (OUTPATIENT)
Dept: INTERNAL MEDICINE CLINIC | Facility: CLINIC | Age: 49
End: 2019-07-18
Payer: COMMERCIAL

## 2019-07-18 VITALS
OXYGEN SATURATION: 98 % | WEIGHT: 183.8 LBS | HEART RATE: 94 BPM | RESPIRATION RATE: 16 BRPM | TEMPERATURE: 98.7 F | HEIGHT: 72 IN | BODY MASS INDEX: 24.89 KG/M2 | SYSTOLIC BLOOD PRESSURE: 108 MMHG | DIASTOLIC BLOOD PRESSURE: 66 MMHG

## 2019-07-18 DIAGNOSIS — R19.7 DIARRHEA, UNSPECIFIED TYPE: ICD-10-CM

## 2019-07-18 DIAGNOSIS — K62.5 BRBPR (BRIGHT RED BLOOD PER RECTUM): ICD-10-CM

## 2019-07-18 DIAGNOSIS — K86.0 ALCOHOL-INDUCED CHRONIC PANCREATITIS (HCC): ICD-10-CM

## 2019-07-18 DIAGNOSIS — E11.65 UNCONTROLLED TYPE 2 DIABETES MELLITUS WITH HYPERGLYCEMIA (HCC): Primary | ICD-10-CM

## 2019-07-18 DIAGNOSIS — R63.0 LOSS OF APPETITE: ICD-10-CM

## 2019-07-18 DIAGNOSIS — R19.5 CHANGE IN STOOL: ICD-10-CM

## 2019-07-18 DIAGNOSIS — R53.83 FATIGUE, UNSPECIFIED TYPE: ICD-10-CM

## 2019-07-18 DIAGNOSIS — R63.4 WEIGHT LOSS: ICD-10-CM

## 2019-07-18 DIAGNOSIS — F10.21 ALCOHOL DEPENDENCE IN EARLY, EARLY PARTIAL, SUSTAINED FULL, OR SUSTAINED PARTIAL REMISSION (HCC): ICD-10-CM

## 2019-07-18 DIAGNOSIS — R93.89 ABNORMAL X-RAY: ICD-10-CM

## 2019-07-18 LAB
ALBUMIN SERPL BCP-MCNC: 3.3 G/DL (ref 3.5–5)
ALP SERPL-CCNC: 77 U/L (ref 46–116)
ALT SERPL W P-5'-P-CCNC: 12 U/L (ref 12–78)
AMYLASE SERPL-CCNC: 26 IU/L (ref 25–115)
ANION GAP SERPL CALCULATED.3IONS-SCNC: 3 MMOL/L (ref 4–13)
AST SERPL W P-5'-P-CCNC: 11 U/L (ref 5–45)
BASOPHILS # BLD AUTO: 0.06 THOUSANDS/ΜL (ref 0–0.1)
BASOPHILS NFR BLD AUTO: 1 % (ref 0–1)
BILIRUB SERPL-MCNC: 0.33 MG/DL (ref 0.2–1)
BUN SERPL-MCNC: 12 MG/DL (ref 5–25)
CALCIUM SERPL-MCNC: 9 MG/DL (ref 8.3–10.1)
CHLORIDE SERPL-SCNC: 106 MMOL/L (ref 100–108)
CO2 SERPL-SCNC: 26 MMOL/L (ref 21–32)
CREAT SERPL-MCNC: 1.15 MG/DL (ref 0.6–1.3)
EOSINOPHIL # BLD AUTO: 0.36 THOUSAND/ΜL (ref 0–0.61)
EOSINOPHIL NFR BLD AUTO: 4 % (ref 0–6)
ERYTHROCYTE [DISTWIDTH] IN BLOOD BY AUTOMATED COUNT: 13.1 % (ref 11.6–15.1)
GFR SERPL CREATININE-BSD FRML MDRD: 73 ML/MIN/1.73SQ M
GLUCOSE P FAST SERPL-MCNC: 219 MG/DL (ref 65–99)
HCT VFR BLD AUTO: 40.1 % (ref 36.5–49.3)
HGB BLD-MCNC: 13.2 G/DL (ref 12–17)
IMM GRANULOCYTES # BLD AUTO: 0.02 THOUSAND/UL (ref 0–0.2)
IMM GRANULOCYTES NFR BLD AUTO: 0 % (ref 0–2)
LIPASE SERPL-CCNC: 35 U/L (ref 73–393)
LYMPHOCYTES # BLD AUTO: 1.83 THOUSANDS/ΜL (ref 0.6–4.47)
LYMPHOCYTES NFR BLD AUTO: 23 % (ref 14–44)
MCH RBC QN AUTO: 29.8 PG (ref 26.8–34.3)
MCHC RBC AUTO-ENTMCNC: 32.9 G/DL (ref 31.4–37.4)
MCV RBC AUTO: 91 FL (ref 82–98)
MONOCYTES # BLD AUTO: 0.56 THOUSAND/ΜL (ref 0.17–1.22)
MONOCYTES NFR BLD AUTO: 7 % (ref 4–12)
NEUTROPHILS # BLD AUTO: 5.28 THOUSANDS/ΜL (ref 1.85–7.62)
NEUTS SEG NFR BLD AUTO: 65 % (ref 43–75)
NRBC BLD AUTO-RTO: 0 /100 WBCS
PLATELET # BLD AUTO: 267 THOUSANDS/UL (ref 149–390)
PMV BLD AUTO: 9.7 FL (ref 8.9–12.7)
POTASSIUM SERPL-SCNC: 4.6 MMOL/L (ref 3.5–5.3)
PROT SERPL-MCNC: 7.5 G/DL (ref 6.4–8.2)
RBC # BLD AUTO: 4.43 MILLION/UL (ref 3.88–5.62)
SODIUM SERPL-SCNC: 135 MMOL/L (ref 136–145)
WBC # BLD AUTO: 8.11 THOUSAND/UL (ref 4.31–10.16)

## 2019-07-18 PROCEDURE — 85025 COMPLETE CBC W/AUTO DIFF WBC: CPT

## 2019-07-18 PROCEDURE — 36415 COLL VENOUS BLD VENIPUNCTURE: CPT

## 2019-07-18 PROCEDURE — 82150 ASSAY OF AMYLASE: CPT

## 2019-07-18 PROCEDURE — 99214 OFFICE O/P EST MOD 30 MIN: CPT | Performed by: INTERNAL MEDICINE

## 2019-07-18 PROCEDURE — 80053 COMPREHEN METABOLIC PANEL: CPT

## 2019-07-18 PROCEDURE — 83690 ASSAY OF LIPASE: CPT

## 2019-07-18 RX ORDER — CHOLESTYRAMINE 4 G/9G
4 POWDER, FOR SUSPENSION ORAL 2 TIMES DAILY WITH MEALS
Qty: 378 G | Refills: 1 | Status: SHIPPED | OUTPATIENT
Start: 2019-07-18 | End: 2021-05-14 | Stop reason: HOSPADM

## 2019-07-18 RX ORDER — HYDROCORTISONE ACETATE 25 MG/1
25 SUPPOSITORY RECTAL 2 TIMES DAILY
Qty: 28 SUPPOSITORY | Refills: 1 | Status: SHIPPED | OUTPATIENT
Start: 2019-07-18 | End: 2021-05-14 | Stop reason: HOSPADM

## 2019-07-18 NOTE — PROGRESS NOTES
Assessment/Plan:  Problem List Items Addressed This Visit        Digestive    Chronic pancreatitis ([facility] )    Relevant Orders    CBC and differential    Comprehensive metabolic panel    Amylase    Lipase       Other    Alcohol dependence in early, early partial, sustained full, or sustained partial remission ([facility] )      Other Visit Diagnoses     Uncontrolled type 2 diabetes mellitus with hyperglycemia ([facility] )    -  Primary    Change in stool        BRBPR (bright red blood per rectum)        Relevant Medications    cholestyramine (QUESTRAN) 4 GM/DOSE powder    hydrocortisone (ANUSOL-HC) 25 mg suppository    Other Relevant Orders    CBC and differential    Comprehensive metabolic panel    Amylase    Lipase    Clostridium difficile toxin by PCR    Fecal fat, qualitative    Fecal fat, quantitative    Fecal leukocytes    Giardia antigen    Ova and parasite examination    Stool Enteric Bacterial Panel by PCR    White Blood Cells, Stool by Gram Stain    CT abdomen pelvis w wo contrast    Diarrhea, unspecified type        Relevant Medications    cholestyramine (QUESTRAN) 4 GM/DOSE powder    hydrocortisone (ANUSOL-HC) 25 mg suppository    Other Relevant Orders    CBC and differential    Comprehensive metabolic panel    Amylase    Lipase    Clostridium difficile toxin by PCR    Fecal fat, qualitative    Fecal fat, quantitative    Fecal leukocytes    Giardia antigen    Ova and parasite examination    Stool Enteric Bacterial Panel by PCR    White Blood Cells, Stool by Gram Stain    CT abdomen pelvis w wo contrast    Fatigue, unspecified type        Weight loss        Loss of appetite        Abnormal x-ray               Diagnoses and all orders for this visit:    Uncontrolled type 2 diabetes mellitus with hyperglycemia (HCC)    Alcohol-induced chronic pancreatitis (HCC)  -     CBC and differential; Future  -     Comprehensive metabolic panel; Future  -     Amylase; Future  -     Lipase;  Future    Alcohol dependence in early, early partial, sustained full, or sustained partial remission ([facility] )    Change in stool    BRBPR (bright red blood per rectum)  -     CBC and differential; Future  -     Comprehensive metabolic panel; Future  -     Amylase; Future  -     Lipase; Future  -     Clostridium difficile toxin by PCR  -     Fecal fat, qualitative; Future  -     Fecal fat, quantitative; Future  -     Fecal leukocytes; Future  -     Giardia antigen; Future  -     Ova and parasite examination; Future  -     Stool Enteric Bacterial Panel by PCR; Future  -     White Blood Cells, Stool by Gram Stain; Future  -     CT abdomen pelvis w wo contrast; Future  -     cholestyramine (QUESTRAN) 4 GM/DOSE powder; Take 1 packet (4 g total) by mouth 2 (two) times a day with meals  -     hydrocortisone (ANUSOL-HC) 25 mg suppository; Insert 1 suppository (25 mg total) into the rectum 2 (two) times a day    Diarrhea, unspecified type  -     CBC and differential; Future  -     Comprehensive metabolic panel; Future  -     Amylase; Future  -     Lipase; Future  -     Clostridium difficile toxin by PCR  -     Fecal fat, qualitative; Future  -     Fecal fat, quantitative; Future  -     Fecal leukocytes; Future  -     Giardia antigen; Future  -     Ova and parasite examination; Future  -     Stool Enteric Bacterial Panel by PCR; Future  -     White Blood Cells, Stool by Gram Stain; Future  -     CT abdomen pelvis w wo contrast; Future  -     cholestyramine (QUESTRAN) 4 GM/DOSE powder; Take 1 packet (4 g total) by mouth 2 (two) times a day with meals  -     hydrocortisone (ANUSOL-HC) 25 mg suppository; Insert 1 suppository (25 mg total) into the rectum 2 (two) times a day    Fatigue, unspecified type    Weight loss    Loss of appetite    Abnormal x-ray        No problem-specific Assessment & Plan notes found for this encounter  A/P: Sugars still up and awaiting prior auth to go through for the rapid insulin   Appetite is fluctuating and don't want to adjust the basal just yet  Now with diarrhea  Will d/c the stool softeners and add questran  ??infection or colitis or all from pancreatitis  Given PE and obs series finding, will check labs and CT scan  Will check stools samples  Will start anusol for the hemorrhoids  Suspect pt is just feeling normal breast tissue  Will monitor  May still need a gastric emptying study  Will RTC one week for f/u  Continue with therapy  Wants to know about disability  Told we need to get him stable first      Subjective:      Patient ID: Deirdre Garcia is a 44 y o  male  WM RTC for f/u ETOH recurrent pancreatitis, uncontrolled dm, BRBPR, stool changes, etc  Remains off the ETOH and is in intensive thearpy  Sugars still up, but was unable to get the rapid acting insulin since the prior auth is pending  Now reports decrease appetitie and continued BRBPR at times  Now reports he is having diarrhea  No fever or chills  OBS series down last visit showed some continued bowel loop changes ?due to chronic pancreatitis  Now reports some stomach discomfort at times, but no n/v  Was recently scoped by GI and found to have hemohorriods, but reports he can't go back due to owing a lot of money  Also, notes breast masses since he was a child  NO Fh of breast cancer  The following portions of the patient's history were reviewed and updated as appropriate:   He has a past medical history of Alcohol abuse (2/20/2019), Allergic rhinitis, Anemia, Anxiety and depression, Quiros esophagus, Cholelithiasis, Chronic pain, COPD (chronic obstructive pulmonary disease) ([address] ), Depression, Diabetes mellitus ([address] ), Emphysema lung ([address] ), Generalized anxiety disorder, GERD (gastroesophageal reflux disease), Hyperlipidemia, Hypertension, Kidney stone, Metatarsalgia, Pancreatitis, Psychiatric disorder, Renal disorder, and Shortness of breath ,  does not have any pertinent problems on file  ,   has a past surgical history that includes CHOLECYSTECTOMY LAPAROSCOPIC; Vasectomy;  Foot surgery; Knee arthroscopy; and pr edg us exam surgical alter stom duodenum/jejunum (N/A, 10/17/2018)  ,  family history includes Cancer in his mother; Coronary artery disease in his family, father, and mother; Diabetes in his mother and sister; Prostate cancer in his father  ,   reports that he has been smoking  He has been smoking about 1 00 pack per day  He has never used smokeless tobacco  He reports that he drank alcohol  He reports that he does not use drugs  ,  has No Known Allergies     Current Outpatient Medications   Medication Sig Dispense Refill    fenofibrate (TRICOR) 145 mg tablet Take 1 tablet (145 mg total) by mouth daily 30 tablet 5    glucose blood (ONE TOUCH ULTRA TEST) test strip by In Vitro route 3 (three) times a day      insulin glargine (BASAGLAR KWIKPEN) 100 units/mL injection pen 20 units sq q AM  20 units sq q PM 5 pen 1    Insulin Pen Needle (B-D ULTRAFINE III SHORT PEN) 31G X 8 MM MISC by Does not apply route      Insulin Pen Needle (PEN NEEDLES) 29G X 12MM MISC by Does not apply route daily at bedtime Substitute what fits [place] pen and what is covered by insurance   45 each 0    lisinopril (ZESTRIL) 10 mg tablet Take 1 tablet (10 mg total) by mouth daily 30 tablet 0    omeprazole (PriLOSEC) 20 mg delayed release capsule Take 1 capsule (20 mg total) by mouth daily 30 capsule 0    propranolol (INDERAL) 10 mg tablet Take 1 tablet (10 mg total) by mouth 2 (two) times a day 60 tablet 0    QUEtiapine (SEROquel) 100 mg tablet Take 1 tablet (100 mg total) by mouth daily at bedtime for 30 days 30 tablet 0    QUEtiapine (SEROquel) 25 mg tablet Take 1 tablet (25 mg total) by mouth 2 (two) times a day 60 tablet 0    rosuvastatin (CRESTOR) 10 MG tablet Take 1 tablet (10 mg total) by mouth daily 90 tablet 0    sertraline (ZOLOFT) 100 mg tablet Take 2 tablets (200 mg total) by mouth daily for 30 days 60 tablet 0    umeclidinium-vilanterol (ANORO ELLIPTA) 62 5-25 MCG/INH inhaler Inhale 1 puff daily 3 Inhaler 3    cholestyramine (QUESTRAN) 4 GM/DOSE powder Take 1 packet (4 g total) by mouth 2 (two) times a day with meals 378 g 1    EQ NICOTINE 21 MG/24HR TD 24 hr patch APPLY 1 PATCH TOPICALLY ONCE DAILY  0    EQ NICOTINE POLACRILEX 4 MG lozenge TAKE 1 LOZENGE EVERY 2 HOURS AS NEEDED FOR SMOKING CRAVING  0    hydrocortisone (ANUSOL-HC) 25 mg suppository Insert 1 suppository (25 mg total) into the rectum 2 (two) times a day 28 suppository 1    insulin lispro (HUMALOG KWIKPEN) 100 units/mL injection pen Per sliding scale up to 14 units with each meal  (Patient not taking: Reported on 7/18/2019) 5 pen 1    NARCAN 4 MG/0 1ML LIQD ADMINISTER A SINGLE SPRAY IN ONE NOSTRIL UPON SIGNS OF OPIOID OVERDOSE  CALL 911  REPEAT AFTER 3 MINUTES IF NO RESPONSE   0     No current facility-administered medications for this visit  Review of Systems   Constitutional: Positive for activity change, appetite change, fatigue and unexpected weight change  Negative for chills, diaphoresis and fever  Respiratory: Negative for cough, chest tightness, shortness of breath and wheezing  Cardiovascular: Negative for chest pain, palpitations and leg swelling  Gastrointestinal: Positive for abdominal pain, blood in stool and diarrhea  Negative for abdominal distention, anal bleeding, constipation, nausea, rectal pain and vomiting  Genitourinary: Negative for difficulty urinating, dysuria and frequency  Musculoskeletal: Negative for arthralgias, gait problem and myalgias  Neurological: Negative for dizziness, seizures, syncope, weakness, light-headedness and headaches  Psychiatric/Behavioral: Positive for dysphoric mood  Negative for confusion  The patient is nervous/anxious            Objective:  Vitals:    07/18/19 1033   BP: 108/66   BP Location: Left arm   Patient Position: Sitting   Cuff Size: Standard   Pulse: 94   Resp: 16   Temp: 98 7 °F (37 1 °C)   SpO2: 98%   Weight: 83 4 kg (183 lb 12 8 oz)   Height: 6' (1 829 m)     Body mass index is 24 93 kg/m²  Physical Exam   Constitutional: He is oriented to person, place, and time  He appears well-developed and well-nourished  No distress  HENT:   Head: Normocephalic and atraumatic  Mouth/Throat: Oropharynx is clear and moist    Eyes: Pupils are equal, round, and reactive to light  Conjunctivae and EOM are normal    Neck: Neck supple  No JVD present  Cardiovascular: Normal rate, regular rhythm and normal heart sounds  Pulmonary/Chest: Effort normal and breath sounds normal  No respiratory distress  He has no wheezes  He has no rales  Abdominal: Soft  He exhibits no distension and no mass  There is no tenderness  Hyperactive BS w/o any tinkling  Musculoskeletal: He exhibits no edema  Bilat breast symmetrical w/o any dimpling or retractions  Nipples yue w/o d/c  No discrete masses or tenderness  Neurological: He is alert and oriented to person, place, and time  Psychiatric: He has a normal mood and affect  His behavior is normal  Judgment and thought content normal    Nursing note and vitals reviewed

## 2019-07-22 ENCOUNTER — DOCUMENTATION (OUTPATIENT)
Dept: INTERNAL MEDICINE CLINIC | Facility: CLINIC | Age: 49
End: 2019-07-22

## 2019-07-23 ENCOUNTER — APPOINTMENT (OUTPATIENT)
Dept: LAB | Facility: CLINIC | Age: 49
End: 2019-07-23
Payer: COMMERCIAL

## 2019-07-23 ENCOUNTER — OFFICE VISIT (OUTPATIENT)
Dept: PSYCHIATRY | Facility: CLINIC | Age: 49
End: 2019-07-23

## 2019-07-23 DIAGNOSIS — F33.41 RECURRENT MAJOR DEPRESSIVE DISORDER, IN PARTIAL REMISSION (HCC): ICD-10-CM

## 2019-07-23 DIAGNOSIS — R19.7 DIARRHEA, UNSPECIFIED TYPE: ICD-10-CM

## 2019-07-23 DIAGNOSIS — F32.2 CURRENT SEVERE EPISODE OF MAJOR DEPRESSIVE DISORDER WITHOUT PSYCHOTIC FEATURES WITHOUT PRIOR EPISODE (HCC): ICD-10-CM

## 2019-07-23 DIAGNOSIS — K62.5 BRBPR (BRIGHT RED BLOOD PER RECTUM): ICD-10-CM

## 2019-07-23 PROCEDURE — 82705 FATS/LIPIDS FECES QUAL: CPT

## 2019-07-23 PROCEDURE — 99214 OFFICE O/P EST MOD 30 MIN: CPT | Performed by: HOSPITALIST

## 2019-07-23 PROCEDURE — 87177 OVA AND PARASITES SMEARS: CPT

## 2019-07-23 PROCEDURE — 87505 NFCT AGENT DETECTION GI: CPT

## 2019-07-23 PROCEDURE — 87209 SMEAR COMPLEX STAIN: CPT

## 2019-07-23 RX ORDER — QUETIAPINE FUMARATE 100 MG/1
100 TABLET, FILM COATED ORAL
Qty: 30 TABLET | Refills: 2 | Status: SHIPPED | OUTPATIENT
Start: 2019-07-23 | End: 2019-10-30 | Stop reason: SDUPTHER

## 2019-07-23 RX ORDER — SERTRALINE HYDROCHLORIDE 100 MG/1
200 TABLET, FILM COATED ORAL DAILY
Qty: 60 TABLET | Refills: 2 | Status: SHIPPED | OUTPATIENT
Start: 2019-07-23 | End: 2019-11-08 | Stop reason: SDUPTHER

## 2019-07-23 RX ORDER — QUETIAPINE FUMARATE 25 MG/1
25 TABLET, FILM COATED ORAL 2 TIMES DAILY
Qty: 60 TABLET | Refills: 2 | Status: SHIPPED | OUTPATIENT
Start: 2019-07-23 | End: 2019-11-08 | Stop reason: SDUPTHER

## 2019-07-23 NOTE — PSYCH
MEDICATION MANAGEMENT NOTE        [address]      Name and Date of Birth:  Dmitriy Cartwright 44 y o  1970    Date of Visit: July 23, 2019    SUBJECTIVE:  CC: Masoud Napier presents today for follow up  for MDD, ETOH, and anxiety    Masoud Napier reports he continues to have  GI symptoms, he has seen his medical provider  Sx are related to his chronic Pancreatitis  Will be picking up medication to start today    Masoud Napier denies any side effects from medications unless noted above    Since our last visit, still  feels stressed due to medical issues  Reports additionally feeling stressed due to home finances  He is trying to get disability and feel with mental health and medical issues he is unable to work a full time job  He does remain committed to remaining sober and is active with AA as well as his recovery program  Engaged in counseling to motivate him toward positive thinking to remain sober as he says reverts to ETOH when stressors increase  No evidence of psychosis, camilla or agitation  No SI/HI  HPI ROS:             ('was' notes: recent => remote)  Medication Side Effects:  none     Depression (10 worst): 3-4 2-3   Anxiety (10 worst): 7 6-7   Safety concerns (SI, HI, etc): none none   Sleep: Adequate Adequate   Energy: Adequate decreased   Appetite: adequate Poor (due to gi sxs)   Weight Change: none none         Review Of Systems as noted above  In addition:     Constitutional negative   ENT negative   Cardiovascular negative   Respiratory negative   Gastrointestinal abdominal discomfort, diarrhea and bloody stools   Genitourinary negative   Musculoskeletal negative   Integumentary negative   Neurological negative   Endocrine negative   Other Symptoms negative and none     Pain none   Pain Scale 0     History Review:  The following portions of the patient's history were reviewed and updated as appropriate: allergies, current medications, past family history, past medical history, past social history and past surgical history  Lab Review: Labs were reviewed and discussed with patient      OBJECTIVE:     MENTAL STATUS EXAM  Appearance:  age appropriate, dressed casually, well nourished    Behavior:  Pleasant & cooperative, poor eye contact   Speech:  soft, paucity of speech   Mood:  anxious, "ok"   Affect:  constricted, blunted, appropriate to s/c   Language: intact and appropriate for age   Thought Process:  Linear and goal directed   Associations: intact associations   Thought Content:  normal and appropriate   Perceptual Disturbances: no auditory or visual hallcunations   Risk Potential / Abnormal Thoughts: Suicidal ideation - None  Homicidal ideation - None  Potential for aggression - No       Consciousness:  Alert & Awake   Sensorium:  Grossly oriented   Attention: attention span and concentration are age appropriate       Fund of Knowledge:  Memory: awareness of current events: yes  recent and remote memory grossly intact   Insight:  fair   Judgment: fair   Muscle Strength Muscle Tone: normal  normal   Gait/Station: normal gait/station with good balance   Motor Activity: no abnormal movements       Risks, Benefits And Possible Side Effects Of Medications:    AGREE: Risks, benefits, and possible side effects of medications explained to [place] and he (or legal representative) verbalizes understanding and agreement for treatment      Controlled Medication Discussion:     Not applicable    Recent labs:  Appointment on 07/18/2019   Component Date Value    WBC 07/18/2019 8 11     RBC 07/18/2019 4 43     Hemoglobin 07/18/2019 13 2     Hematocrit 07/18/2019 40 1     MCV 07/18/2019 91     MCH 07/18/2019 29 8     MCHC 07/18/2019 32 9     RDW 07/18/2019 13 1     MPV 07/18/2019 9 7     Platelets 74/84/0199 267     nRBC 07/18/2019 0     Neutrophils Relative 07/18/2019 65     Immat GRANS % 07/18/2019 0     Lymphocytes Relative 07/18/2019 23     Monocytes Relative 07/18/2019 7  Eosinophils Relative 07/18/2019 4     Basophils Relative 07/18/2019 1     Neutrophils Absolute 07/18/2019 5 28     Immature Grans Absolute 07/18/2019 0 02     Lymphocytes Absolute 07/18/2019 1 83     Monocytes Absolute 07/18/2019 0 56     Eosinophils Absolute 07/18/2019 0 36     Basophils Absolute 07/18/2019 0 06     Sodium 07/18/2019 135*    Potassium 07/18/2019 4 6     Chloride 07/18/2019 106     CO2 07/18/2019 26     ANION GAP 07/18/2019 3*    BUN 07/18/2019 12     Creatinine 07/18/2019 1 15     Glucose, Fasting 07/18/2019 219*    Calcium 07/18/2019 9 0     AST 07/18/2019 11     ALT 07/18/2019 12     Alkaline Phosphatase 07/18/2019 77     Total Protein 07/18/2019 7 5     Albumin 07/18/2019 3 3*    Total Bilirubin 07/18/2019 0 33     eGFR 07/18/2019 73     Amylase 07/18/2019 26     Lipase 07/18/2019 35*   Appointment on 07/03/2019   Component Date Value    WBC 07/03/2019 10 70*    RBC 07/03/2019 4 73     Hemoglobin 07/03/2019 14 0     Hematocrit 07/03/2019 42 5     MCV 07/03/2019 90     MCH 07/03/2019 29 6     MCHC 07/03/2019 32 9     RDW 07/03/2019 13 2     MPV 07/03/2019 9 6     Platelets 10/45/2492 308     nRBC 07/03/2019 0     Neutrophils Relative 07/03/2019 68     Immat GRANS % 07/03/2019 0     Lymphocytes Relative 07/03/2019 21     Monocytes Relative 07/03/2019 7     Eosinophils Relative 07/03/2019 3     Basophils Relative 07/03/2019 1     Neutrophils Absolute 07/03/2019 7 25     Immature Grans Absolute 07/03/2019 0 03     Lymphocytes Absolute 07/03/2019 2 27     Monocytes Absolute 07/03/2019 0 73     Eosinophils Absolute 07/03/2019 0 35     Basophils Absolute 07/03/2019 0 07     Sodium 07/03/2019 137     Potassium 07/03/2019 3 9     Chloride 07/03/2019 105     CO2 07/03/2019 26     ANION GAP 07/03/2019 6     BUN 07/03/2019 12     Creatinine 07/03/2019 1 14     Glucose 07/03/2019 129     Calcium 07/03/2019 9 4     AST 07/03/2019 12     ALT 07/03/2019 15     Alkaline Phosphatase 07/03/2019 79     Total Protein 07/03/2019 8 2     Albumin 07/03/2019 3 7     Total Bilirubin 07/03/2019 0 35     eGFR 07/03/2019 74     TSH 3RD Renell Asper 07/03/2019 2 220    Office Visit on 07/03/2019   Component Date Value    Hemoglobin A1C 07/03/2019 8 7*         Psychiatric History  Previous diagnoses include- major depressive disorder, alcohol abuse/dependence, generalized anxiety disorder   Prior outpatient psychiatric treatment:  Prior to inpatient admission in February patient was treated by his PCP psychiatric symptoms  Prior therapy:   None  Prior inpatient psychiatric treatment:   Multiple inpatient psychiatric admissions last 1 being in February 2019  Prior suicide attempts:   None  Prior self harm:   None  Prior violence or aggression:   None      Family Psychiatric History:   Family History   Problem Relation Age of Onset    Cancer Mother     Coronary artery disease Mother     Diabetes Mother     Coronary artery disease Father     Prostate cancer Father     Diabetes Sister     Coronary artery disease Family          Medical / Surgical History:    Past Medical History:   Diagnosis Date    Alcohol abuse 2/20/2019    Allergic rhinitis     last assessed: 12/5/2012    Anemia     Anxiety and depression     Quiros esophagus     Cholelithiasis     Chronic pain     COPD [facility]chronic obstructive [facility] disease) [facility]     Depression     Diabetes mellitus ([facility]     Emphysema lung ([facility] )     Generalized anxiety disorder     GERD (gastroesophageal reflux disease)     Hyperlipidemia     Hypertension     Kidney stone     Metatarsalgia     last assessed: 8/11/2014, unspecified laterality, ? cause  PE with changes  To see DPM tomorrow      Pancreatitis     Psychiatric disorder     Renal disorder     Shortness of breath     with activity     Past Surgical History:   Procedure Laterality Date    CHOLECYSTECTOMY LAPAROSCOPIC      FOOT SURGERY B/L great toe joint replacement    KNEE ARTHROSCOPY      (therapeutic) right knee    [facility] EDG US EXAM SURGICAL ALTER STOM DUODENUM/JEJUNUM N/A 10/17/2018    Procedure: LINEAR ENDOSCOPIC U/S;  Surgeon: Brian Watts MD;  Location: [facility]; Service: Gastroenterology    VASECTOMY      vas deferens       Assessment/Plan:       Recurrent major depressive disorder, in partial remission (HCC)  MERLYN  Alcohol Dependence  No change in medication will continue on Zoloft 100 mg daily, Seroquel 25 mg twice a day and 100 mg at bedtime, Inderal 10 mg b i d  Continue AA, continue outpatient individual therapy  Advised to see his Gatroenterologist as soon as possible        Patient has been educated about their diagnosis and treatment modalities  They voiced understanding and agreement with the following plan:  Discussed medications and if treatment adjustment was needed/desired  Discussed self monitoring of symptoms, and symptom monitoring tools  Patient has been informed of 24 hours and weekend coverage for urgent situations accessed by calling the main clinic phone number            Psychotherapy in session:  Time spent performing psychotherapy: 30 Minutes

## 2019-07-24 ENCOUNTER — APPOINTMENT (OUTPATIENT)
Dept: LAB | Facility: CLINIC | Age: 49
End: 2019-07-24
Payer: COMMERCIAL

## 2019-07-24 DIAGNOSIS — R19.7 DIARRHEA, UNSPECIFIED TYPE: ICD-10-CM

## 2019-07-24 DIAGNOSIS — K62.5 BRBPR (BRIGHT RED BLOOD PER RECTUM): ICD-10-CM

## 2019-07-24 LAB
CAMPYLOBACTER DNA SPEC NAA+PROBE: NORMAL
SALMONELLA DNA SPEC QL NAA+PROBE: NORMAL
SHIGA TOXIN STX GENE SPEC NAA+PROBE: NORMAL
SHIGELLA DNA SPEC QL NAA+PROBE: NORMAL

## 2019-07-24 PROCEDURE — 87493 C DIFF AMPLIFIED PROBE: CPT | Performed by: INTERNAL MEDICINE

## 2019-07-24 PROCEDURE — 87329 GIARDIA AG IA: CPT

## 2019-07-24 PROCEDURE — 87205 SMEAR GRAM STAIN: CPT

## 2019-07-24 PROCEDURE — 89055 LEUKOCYTE ASSESSMENT FECAL: CPT

## 2019-07-25 LAB
C DIFF TOX GENS STL QL NAA+PROBE: NORMAL
WBC STL QL MICRO: NORMAL

## 2019-07-26 ENCOUNTER — HOSPITAL ENCOUNTER (OUTPATIENT)
Dept: CT IMAGING | Facility: HOSPITAL | Age: 49
Discharge: HOME | End: 2019-07-26
Payer: COMMERCIAL

## 2019-07-26 DIAGNOSIS — R19.7 DIARRHEA, UNSPECIFIED TYPE: ICD-10-CM

## 2019-07-26 DIAGNOSIS — K62.5 BRBPR (BRIGHT RED BLOOD PER RECTUM): ICD-10-CM

## 2019-07-26 LAB
FAT STL QL: NORMAL
G LAMBLIA AG STL QL IA: NEGATIVE
NEUTRAL FAT STL QL: NORMAL
WBC SPEC QL GRAM STN: NORMAL

## 2019-07-26 PROCEDURE — 74178 CT ABD&PLV WO CNTR FLWD CNTR: CPT

## 2019-07-26 RX ADMIN — IOHEXOL 100 ML: 350 INJECTION, SOLUTION INTRAVENOUS at 11:46

## 2019-07-29 DIAGNOSIS — I10 ESSENTIAL HYPERTENSION: ICD-10-CM

## 2019-07-30 LAB — O+P STL CONC: NORMAL

## 2019-07-30 RX ORDER — LISINOPRIL 10 MG/1
TABLET ORAL
Qty: 30 TABLET | Refills: 0 | Status: SHIPPED | OUTPATIENT
Start: 2019-07-30 | End: 2019-08-12 | Stop reason: SDUPTHER

## 2019-07-31 ENCOUNTER — OFFICE VISIT (OUTPATIENT)
Dept: INTERNAL MEDICINE CLINIC | Facility: CLINIC | Age: 49
End: 2019-07-31
Payer: COMMERCIAL

## 2019-07-31 VITALS
TEMPERATURE: 98.5 F | OXYGEN SATURATION: 93 % | HEIGHT: 72 IN | WEIGHT: 183.6 LBS | RESPIRATION RATE: 18 BRPM | DIASTOLIC BLOOD PRESSURE: 76 MMHG | SYSTOLIC BLOOD PRESSURE: 102 MMHG | HEART RATE: 97 BPM | BODY MASS INDEX: 24.87 KG/M2

## 2019-07-31 DIAGNOSIS — E11.65 UNCONTROLLED TYPE 2 DIABETES MELLITUS WITH HYPERGLYCEMIA (HCC): Primary | ICD-10-CM

## 2019-07-31 DIAGNOSIS — R19.5 CHANGE IN STOOL: ICD-10-CM

## 2019-07-31 DIAGNOSIS — Z91.19 NON COMPLIANCE WITH MEDICAL TREATMENT: ICD-10-CM

## 2019-07-31 DIAGNOSIS — R19.7 DIARRHEA, UNSPECIFIED TYPE: ICD-10-CM

## 2019-07-31 DIAGNOSIS — R63.4 WEIGHT LOSS: ICD-10-CM

## 2019-07-31 DIAGNOSIS — K62.5 BRBPR (BRIGHT RED BLOOD PER RECTUM): ICD-10-CM

## 2019-07-31 PROCEDURE — 4004F PT TOBACCO SCREEN RCVD TLK: CPT | Performed by: INTERNAL MEDICINE

## 2019-07-31 PROCEDURE — 3008F BODY MASS INDEX DOCD: CPT | Performed by: INTERNAL MEDICINE

## 2019-07-31 PROCEDURE — 99213 OFFICE O/P EST LOW 20 MIN: CPT | Performed by: INTERNAL MEDICINE

## 2019-07-31 NOTE — PROGRESS NOTES
Assessment/Plan:  Problem List Items Addressed This Visit     None      Visit Diagnoses     Uncontrolled type 2 diabetes mellitus with hyperglycemia ([facility])    -  Primary    Change in stool        Diarrhea, unspecified type        BRBPR (bright red blood per rectum)        Weight loss        Non compliance with medical treatment               Diagnoses and all orders for this visit:    Uncontrolled type 2 diabetes mellitus with hyperglycemia ([address] )    Change in stool    Diarrhea, unspecified type    BRBPR (bright red blood per rectum)    Weight loss    Non compliance with medical treatment        No problem-specific Assessment & Plan notes found for this encounter  A/P: Discussed labs and awaiting CT  Did not start the insulin again  Starting to question his commitment to treatment  This is the 2 or 3 time he didn't get his insulin  First, he said wasn't approved and now it is on back order when he called two days ago  Pt never calls that there is an issues unless we call him or until he returns for a f/u  Pt's insulin is approved by the insurance and even if that particular pharmacy "has a low supply" their are other pharmacies with the meds, but again, the pt made no effort to obtain his meds  In regards to disability, I again told him that I can not make a decision at this time until his sugars are controlled and that since he doesn't seem to be actively trying to improve his sugars by getting his meds, a decision is further impossible  Recommended he seek disability based on his behavioral issues  Suspect the fatigue due to depression and uncontrolled sugars  Will continue current treatment and highly recommend he get his insulin somewhere  Will hold on a gastric emptying study  Keep f/u with AA  RTC one month for f/u  Subjective:      Patient ID: Brittaney Walker is a 44 y o  male      WM RTC for f/u several issues including uncontrolled dm, hemorrhoids, chronic diarrhea, fatigue, etc  Labs, including stool studies, were acceptable  CT of the abdomen is pending  Was suppose to start, again, meal based rapid insulin, but didn't  Reports he didn't  the insulin due to the pharmacy saying it was back ordered  Diarrhea is a little better  Still fatigued  BRBPR is better  Attends AA and reports being sober yet  Still inquiring about disability  The following portions of the patient's history were reviewed and updated as appropriate:   He has a past medical history of Alcohol abuse (2/20/2019), Allergic rhinitis, Anemia, Anxiety and depression, Quiros esophagus, Cholelithiasis, Chronic pain, COPD (chronic obstructive pulmonary disease) ([facility] ), Depression, Diabetes mellitus ([facility] ), Emphysema lung ([facility] ), Generalized anxiety disorder, GERD (gastroesophageal reflux disease), Hyperlipidemia, Hypertension, Kidney stone, Metatarsalgia, Pancreatitis, Psychiatric disorder, Renal disorder, and Shortness of breath ,  does not have any pertinent problems on file  ,   has a past surgical history that includes CHOLECYSTECTOMY LAPAROSCOPIC; Vasectomy; Foot surgery; Knee arthroscopy; and pr edg us exam surgical alter stom duodenum/jejunum (N/A, 10/17/2018)  ,  family history includes Cancer in his mother; Coronary artery disease in his family, father, and mother; Diabetes in his mother and sister; Prostate cancer in his father  ,   reports that he has been smoking  He has been smoking about 1 00 pack per day  He has never used smokeless tobacco  He reports that he drank alcohol  He reports that he does not use drugs  ,  has No Known Allergies     Current Outpatient Medications   Medication Sig Dispense Refill    cholestyramine (QUESTRAN) 4 GM/DOSE powder Take 1 packet (4 g total) by mouth 2 (two) times a day with meals 378 g 1    fenofibrate (TRICOR) 145 mg tablet Take 1 tablet (145 mg total) by mouth daily 30 tablet 5    glucose blood (ONE TOUCH ULTRA TEST) test strip by In Vitro route 3 (three) times a day      hydrocortisone (ANUSOL-HC) 25 mg suppository Insert 1 suppository (25 mg total) into the rectum 2 (two) times a day 28 suppository 1    insulin glargine (BASAGLAR KWIKPEN) 100 units/mL injection pen 20 units sq q AM  20 units sq q PM 5 pen 1    insulin lispro (HUMALOG KWIKPEN) 100 units/mL injection pen Per sliding scale up to 14 units with each meal  5 pen 1    Insulin Pen Needle (B-D ULTRAFINE III SHORT PEN) 31G X 8 MM MISC by Does not apply route      Insulin Pen Needle (PEN NEEDLES) 29G X 12MM MISC by Does not apply route daily at bedtime Substitute what fits [place] pen and what is covered by insurance  45 each 0    lisinopril (ZESTRIL) 10 mg tablet TAKE 1 TABLET BY MOUTH ONCE DAILY 30 tablet 0    omeprazole (PriLOSEC) 20 mg delayed release capsule Take 1 capsule (20 mg total) by mouth daily 30 capsule 0    propranolol (INDERAL) 10 mg tablet Take 1 tablet (10 mg total) by mouth 2 (two) times a day 60 tablet 0    QUEtiapine (SEROquel) 100 mg tablet Take 1 tablet (100 mg total) by mouth daily at bedtime for 30 days 30 tablet 2    QUEtiapine (SEROquel) 25 mg tablet Take 1 tablet (25 mg total) by mouth 2 (two) times a day 60 tablet 2    rosuvastatin (CRESTOR) 10 MG tablet Take 1 tablet (10 mg total) by mouth daily 90 tablet 0    sertraline (ZOLOFT) 100 mg tablet Take 2 tablets (200 mg total) by mouth daily for 30 days 60 tablet 2    umeclidinium-vilanterol (ANORO ELLIPTA) 62 5-25 MCG/INH inhaler Inhale 1 puff daily 3 Inhaler 3    EQ NICOTINE 21 MG/24HR TD 24 hr patch APPLY 1 PATCH TOPICALLY ONCE DAILY  0    EQ NICOTINE POLACRILEX 4 MG lozenge TAKE 1 LOZENGE EVERY 2 HOURS AS NEEDED FOR SMOKING CRAVING  0    NARCAN 4 MG/0 1ML LIQD ADMINISTER A SINGLE SPRAY IN ONE NOSTRIL UPON SIGNS OF OPIOID OVERDOSE  CALL 911  REPEAT AFTER 3 MINUTES IF NO RESPONSE   0     No current facility-administered medications for this visit  Review of Systems   Constitutional: Positive for fatigue   Negative for activity change, chills, diaphoresis and fever  Respiratory: Negative for cough, chest tightness, shortness of breath and wheezing  Cardiovascular: Negative for chest pain, palpitations and leg swelling  Gastrointestinal: Positive for diarrhea  Negative for abdominal pain, constipation, nausea and vomiting  Genitourinary: Negative for difficulty urinating, dysuria and frequency  Musculoskeletal: Negative for arthralgias, gait problem and myalgias  Neurological: Negative for dizziness, seizures, syncope, light-headedness and headaches  Psychiatric/Behavioral: Positive for dysphoric mood and sleep disturbance  Negative for confusion  The patient is nervous/anxious  Objective:  Vitals:    07/31/19 0739   BP: 102/76   BP Location: Left arm   Patient Position: Sitting   Cuff Size: Large   Pulse: 97   Resp: 18   Temp: 98 5 °F (36 9 °C)   SpO2: 93%   Weight: 83 3 kg (183 lb 9 6 oz)   Height: 6' (1 829 m)     Body mass index is 24 9 kg/m²  Physical Exam   Constitutional: He is oriented to person, place, and time  He appears well-developed and well-nourished  No distress  HENT:   Head: Normocephalic and atraumatic  Mouth/Throat: Oropharynx is clear and moist    Eyes: Pupils are equal, round, and reactive to light  Conjunctivae and EOM are normal    Neck: Neck supple  No JVD present  Cardiovascular: Normal rate, regular rhythm and normal heart sounds  Pulmonary/Chest: Effort normal and breath sounds normal  No respiratory distress  He has no wheezes  He has no rales  Neurological: He is alert and oriented to person, place, and time  Psychiatric: He has a normal mood and affect  His behavior is normal  Judgment and thought content normal    Nursing note and vitals reviewed

## 2019-08-01 ENCOUNTER — TELEPHONE (OUTPATIENT)
Dept: INTERNAL MEDICINE CLINIC | Facility: CLINIC | Age: 49
End: 2019-08-01

## 2019-08-01 NOTE — TELEPHONE ENCOUNTER
Pt stopped in this afternoon  He said that he recd a call from our office that the [facility] was approved but when he went to [facility] said it was not approved      Can you call [facility] and the patient to find out  what is going on with this Rx     thanks

## 2019-08-02 DIAGNOSIS — IMO0002 MASS: Primary | ICD-10-CM

## 2019-08-06 ENCOUNTER — OFFICE VISIT (OUTPATIENT)
Dept: PSYCHIATRY | Facility: CLINIC | Age: 49
End: 2019-08-06
Payer: COMMERCIAL

## 2019-08-06 DIAGNOSIS — F33.41 RECURRENT MAJOR DEPRESSIVE DISORDER, IN PARTIAL REMISSION (HCC): ICD-10-CM

## 2019-08-06 DIAGNOSIS — F10.11 H/O ALCOHOL ABUSE: ICD-10-CM

## 2019-08-06 DIAGNOSIS — F41.1 GENERALIZED ANXIETY DISORDER: Primary | ICD-10-CM

## 2019-08-06 PROCEDURE — 99213 OFFICE O/P EST LOW 20 MIN: CPT | Performed by: HOSPITALIST

## 2019-08-06 PROCEDURE — 99214 OFFICE O/P EST MOD 30 MIN: CPT | Performed by: HOSPITALIST

## 2019-08-06 NOTE — PSYCH
Bárbara Liu  MEDICATION MANAGEMENT NOTE        [facility]      Name and Date of Birth:  Charleen Titus 44 y o  1970    Date of Visit: August 6, 2019    SUBJECTIVE:  CC: Oumou Saeed presents today for follow up  for MDD, ETOH, and anxiety    Oumou Saeed reports " things are busy" States he has been busy with his kids, feels he is reconnecting with them  He reports his GI symptoms have improved and has seen his medical provider  Sx are related to his chronic Pancreatitis  Had CT abdo and found to have a lesion on buttock for which he needs US  Oumou Saeed denies any side effects from medications unless noted above    Since our last visit, still  feels much less stressed due to medical issues  Reports ongoing feeling stressed due to home finances  He is trying to get disability and has a disability hearing coming up on 8/21   Feels very fatigued on some days related to his DM and chronic Pancreatitis  He continues to remain committed to remaining sober and is active with AA as well as his recovery program  Engaged in counseling to motivate him toward positive thinking to remain sober as he says reverts to ETOH when stressors increase  Has become a sponsor for a person  as well  No evidence of psychosis, camilla or agitation  No SI/HI  HPI ROS:             ('was' notes: recent => remote)  Medication Side Effects:  none     Depression (10 worst): 1-2 3-4   Anxiety (10 worst): 5 7   Safety concerns (SI, HI, etc): none none   Sleep: Adequate Adequate   Energy: Adequate decreased   Appetite: adequate Poor (due to gi sxs)   Weight Change: none none         Review Of Systems as noted above   In addition:     Constitutional negative   ENT negative   Cardiovascular negative   Respiratory negative   Gastrointestinal improved   Genitourinary negative   Musculoskeletal negative   Integumentary negative   Neurological negative   Endocrine negative   Other Symptoms negative and none     Pain none Pain Scale 0     History Review: The following portions of the patient's history were reviewed and updated as appropriate: allergies, current medications, past family history, past medical history, past social history and past surgical history  Lab Review: Labs were reviewed and discussed with patient      OBJECTIVE:     MENTAL STATUS EXAM  Appearance:  age appropriate, dressed casually, well nourished    Behavior:  Pleasant & cooperative   Speech:  Regular rate and rhythm, soft   Mood:  anxious, "good"   Affect:  constricted, blunted, appropriate to s/c   Language: intact and appropriate for age   Thought Process:  Linear and goal directed   Associations: intact associations   Thought Content:  normal and appropriate   Perceptual Disturbances: no auditory or visual hallcunations   Risk Potential / Abnormal Thoughts: Suicidal ideation - None  Homicidal ideation - None  Potential for aggression - No       Consciousness:  Alert & Awake   Sensorium:  Grossly oriented   Attention: attention span and concentration are age appropriate       Fund of Knowledge:  Memory: awareness of current events: yes  recent and remote memory grossly intact   Insight:  fair   Judgment: fair   Muscle Strength Muscle Tone: normal  normal   Gait/Station: normal gait/station with good balance   Motor Activity: no abnormal movements       Risks, Benefits And Possible Side Effects Of Medications:    AGREE: Risks, benefits, and possible side effects of medications explained to [place] and he (or legal representative) verbalizes understanding and agreement for treatment  Controlled Medication Discussion:     Not applicable    Recent labs:  Appointment on 07/24/2019   Component Date Value    White Blood Cells, Stool 07/24/2019 No white blood cells seen       Giardia Ag, Stl 07/24/2019 Negative     Fecal Leukocytes 07/24/2019 2+ WBC's (3-10/hpf)    Appointment on 07/23/2019   Component Date Value    Ova + Parasite Exam 07/23/2019 No ova, cysts, or parasites seen     One negative specimen does not rule out the possibility of a  parasitic infection   Salmonella sp PCR 07/23/2019 None Detected     Shigella sp/Enteroinvasi* 07/23/2019 None Detected     Campylobacter sp (jejuni* 07/23/2019 None Detected     Shiga toxin 1/Shiga toxi* 07/23/2019 None Detected     Fat Qual Neutral, Stl 07/23/2019 Normal     Fat,Totall 07/23/2019 Normal    Appointment on 07/18/2019   Component Date Value    WBC 07/18/2019 8 11     RBC 07/18/2019 4 43     Hemoglobin 07/18/2019 13 2     Hematocrit 07/18/2019 40 1     MCV 07/18/2019 91     MCH 07/18/2019 29 8     MCHC 07/18/2019 32 9     RDW 07/18/2019 13 1     MPV 07/18/2019 9 7     Platelets 41/25/2524 267     nRBC 07/18/2019 0     Neutrophils Relative 07/18/2019 65     Immat GRANS % 07/18/2019 0     Lymphocytes Relative 07/18/2019 23     Monocytes Relative 07/18/2019 7     Eosinophils Relative 07/18/2019 4     Basophils Relative 07/18/2019 1     Neutrophils Absolute 07/18/2019 5 28     Immature Grans Absolute 07/18/2019 0 02     Lymphocytes Absolute 07/18/2019 1 83     Monocytes Absolute 07/18/2019 0 56     Eosinophils Absolute 07/18/2019 0 36     Basophils Absolute 07/18/2019 0 06     Sodium 07/18/2019 135*    Potassium 07/18/2019 4 6     Chloride 07/18/2019 106     CO2 07/18/2019 26     ANION GAP 07/18/2019 3*    BUN 07/18/2019 12     Creatinine 07/18/2019 1 15     Glucose, Fasting 07/18/2019 219*    Calcium 07/18/2019 9 0     AST 07/18/2019 11     ALT 07/18/2019 12     Alkaline Phosphatase 07/18/2019 77     Total Protein 07/18/2019 7 5     Albumin 07/18/2019 3 3*    Total Bilirubin 07/18/2019 0 33     eGFR 07/18/2019 73     Amylase 07/18/2019 26     Lipase 07/18/2019 35*   Office Visit on 07/18/2019   Component Date Value    C difficile toxin by PCR 07/24/2019 NEGATIVE for C difficle toxin by PCR  Psychiatric History  Previous diagnoses include- major depressive disorder, alcohol abuse/dependence, generalized anxiety disorder   Prior outpatient psychiatric treatment:  Prior to inpatient admission in February patient was treated by his PCP psychiatric symptoms  Prior therapy:   None  Prior inpatient psychiatric treatment:   Multiple inpatient psychiatric admissions last 1 being in February 2019  Prior suicide attempts:   None  Prior self harm:   None  Prior violence or aggression:   None      Family Psychiatric History:   Family History   Problem Relation Age of Onset    Cancer Mother     Coronary artery disease Mother     Diabetes Mother     Coronary artery disease Father     Prostate cancer Father     Diabetes Sister     Coronary artery disease Family          Medical / Surgical History:    Past Medical History:   Diagnosis Date    Alcohol abuse 2/20/2019    Allergic rhinitis     last assessed: 12/5/2012    Anemia     Anxiety and depression     Quiros esophagus     Cholelithiasis     Chronic pain     COPD [facility]chronic obstructive [facility] disease) ([facility] )     Depression     Diabetes mellitus ([facility] )     Emphysema lung ([facility] )     Generalized anxiety disorder     GERD (gastroesophageal reflux disease)     Hyperlipidemia     Hypertension     Kidney stone     Metatarsalgia     last assessed: 8/11/2014, unspecified laterality, ? cause  PE with changes  To see DPM tomorrow   Pancreatitis     Psychiatric disorder     Renal disorder     Shortness of breath     with activity     Past Surgical History:   Procedure Laterality Date    CHOLECYSTECTOMY LAPAROSCOPIC      FOOT SURGERY      B/L great toe joint replacement    KNEE ARTHROSCOPY      (therapeutic) right knee    NE EDG US EXAM SURGICAL ALTER STOM DUODENUM/JEJUNUM N/A 10/17/2018    Procedure: LINEAR ENDOSCOPIC U/S;  Surgeon: Reema Crespo MD;  Location: [facility];   Service: Gastroenterology    VASECTOMY      vas deferens Assessment/Plan:       Recurrent major depressive disorder, in partial remission (HCC)  MERLYN  Alcohol Dependence  Continue on Zoloft 200 mg daily, Seroquel 25 mg twice a day and 100 mg at bedtime, Inderal 10 mg b i d  Continue AA, continue outpatient individual therapy    Patient has been educated about their diagnosis and treatment modalities  They voiced understanding and agreement with the following plan:  Discussed medications and if treatment adjustment was needed/desired  Discussed self monitoring of symptoms, and symptom monitoring tools  Patient has been informed of 24 hours and weekend coverage for urgent situations accessed by calling the main clinic phone number            Psychotherapy in session:  Time spent performing psychotherapy: 30 Minutes

## 2019-08-07 ENCOUNTER — TRANSCRIBE ORDERS (OUTPATIENT)
Dept: ADMINISTRATIVE | Facility: HOSPITAL | Age: 49
End: 2019-08-07

## 2019-08-07 DIAGNOSIS — IMO0002 MASS: Primary | ICD-10-CM

## 2019-08-09 ENCOUNTER — HOSPITAL ENCOUNTER (OUTPATIENT)
Dept: ULTRASOUND IMAGING | Facility: HOSPITAL | Age: 49
Discharge: HOME | End: 2019-08-09
Payer: COMMERCIAL

## 2019-08-09 DIAGNOSIS — IMO0002 MASS: ICD-10-CM

## 2019-08-09 PROCEDURE — 76705 ECHO EXAM OF ABDOMEN: CPT

## 2019-08-12 DIAGNOSIS — I10 ESSENTIAL HYPERTENSION: ICD-10-CM

## 2019-08-12 PROCEDURE — 4010F ACE/ARB THERAPY RXD/TAKEN: CPT | Performed by: INTERNAL MEDICINE

## 2019-08-12 RX ORDER — LISINOPRIL 10 MG/1
10 TABLET ORAL DAILY
Qty: 90 TABLET | Refills: 1 | Status: SHIPPED | OUTPATIENT
Start: 2019-08-12 | End: 2021-05-14 | Stop reason: HOSPADM

## 2019-08-15 ENCOUNTER — TELEPHONE (OUTPATIENT)
Dept: INTERNAL MEDICINE CLINIC | Facility: CLINIC | Age: 49
End: 2019-08-15

## 2019-08-30 ENCOUNTER — TELEPHONE (OUTPATIENT)
Dept: INTERNAL MEDICINE CLINIC | Facility: CLINIC | Age: 49
End: 2019-08-30

## 2019-09-24 DIAGNOSIS — E11.65 UNCONTROLLED TYPE 2 DIABETES MELLITUS WITH HYPERGLYCEMIA (HCC): ICD-10-CM

## 2019-10-22 DIAGNOSIS — G47.9 SLEEP DISORDER: ICD-10-CM

## 2019-10-23 RX ORDER — TRAZODONE HYDROCHLORIDE 50 MG/1
TABLET ORAL
Qty: 30 TABLET | Refills: 3 | OUTPATIENT
Start: 2019-10-23

## 2019-10-30 DIAGNOSIS — F32.2 CURRENT SEVERE EPISODE OF MAJOR DEPRESSIVE DISORDER WITHOUT PSYCHOTIC FEATURES WITHOUT PRIOR EPISODE (HCC): ICD-10-CM

## 2019-11-03 RX ORDER — QUETIAPINE FUMARATE 100 MG/1
TABLET, FILM COATED ORAL
Qty: 30 TABLET | Refills: 2 | Status: SHIPPED | OUTPATIENT
Start: 2019-11-03 | End: 2019-11-08 | Stop reason: SDUPTHER

## 2019-11-08 ENCOUNTER — OFFICE VISIT (OUTPATIENT)
Dept: PSYCHIATRY | Facility: CLINIC | Age: 49
End: 2019-11-08
Payer: COMMERCIAL

## 2019-11-08 DIAGNOSIS — F41.1 GENERALIZED ANXIETY DISORDER: Primary | ICD-10-CM

## 2019-11-08 DIAGNOSIS — F32.2 CURRENT SEVERE EPISODE OF MAJOR DEPRESSIVE DISORDER WITHOUT PSYCHOTIC FEATURES WITHOUT PRIOR EPISODE (HCC): ICD-10-CM

## 2019-11-08 DIAGNOSIS — F33.41 RECURRENT MAJOR DEPRESSIVE DISORDER, IN PARTIAL REMISSION (HCC): ICD-10-CM

## 2019-11-08 DIAGNOSIS — F10.21 ALCOHOL DEPENDENCE IN EARLY, EARLY PARTIAL, SUSTAINED FULL, OR SUSTAINED PARTIAL REMISSION (HCC): ICD-10-CM

## 2019-11-08 PROCEDURE — 99214 OFFICE O/P EST MOD 30 MIN: CPT | Performed by: HOSPITALIST

## 2019-11-08 RX ORDER — SERTRALINE HYDROCHLORIDE 100 MG/1
200 TABLET, FILM COATED ORAL DAILY
Qty: 60 TABLET | Refills: 2 | Status: SHIPPED | OUTPATIENT
Start: 2019-11-08 | End: 2019-12-31 | Stop reason: SDUPTHER

## 2019-11-08 RX ORDER — QUETIAPINE FUMARATE 100 MG/1
100 TABLET, FILM COATED ORAL
Qty: 30 TABLET | Refills: 2 | Status: SHIPPED | OUTPATIENT
Start: 2019-11-08 | End: 2019-12-31 | Stop reason: SDUPTHER

## 2019-11-08 RX ORDER — QUETIAPINE FUMARATE 25 MG/1
25 TABLET, FILM COATED ORAL 2 TIMES DAILY
Qty: 60 TABLET | Refills: 2 | Status: SHIPPED | OUTPATIENT
Start: 2019-11-08 | End: 2019-12-31 | Stop reason: SDUPTHER

## 2019-11-08 NOTE — PSYCH
Claire Britton  MEDICATION MANAGEMENT NOTE        [facility]      Name and Date of Birth:  Vonn Hodgkin 44 y o  1970    Date of Visit: November 8, 2019    SUBJECTIVE:  CC: Jaren Vale presents today for follow up  for MDD, ETOH, and anxiety  He missed his last appointment 9/20/19  Jaren Vale reports increased stress and anxiety due his financial issues at home and exacerbation of his medical issues  He feels very fatigued most days due to his poorly controlled DM and sequela of this chronic condition  This precludes him from doing many tasks around the house  Denies overt depression but has worry over the above  Low energy and motivation  Poor sleep and appetite  NO SI/HI  He continues to remain committed to remaining sober and is active with AA as well as his recovery program  Engaged in counseling to motivate him toward positive thinking to remain sober as he says reverts to ETOH when stressors increase  No evidence of psychosis, camilla or agitation  No SI/HI  Jaren Vale denies any side effects from medications unless noted above     HPI ROS:        Review Of Systems as noted above  In addition:       History Review: The following portions of the patient's history were reviewed and updated as appropriate: allergies, current medications, past family history, past medical history, past social history and past surgical history       Lab Review: Labs were reviewed and discussed with patient      OBJECTIVE:     MENTAL STATUS EXAM  Appearance:  age appropriate, dressed casually, thin   Behavior:  Pleasant & cooperative   Speech:  Regular rate and rhythm, soft   Mood:  anxious   Affect:  constricted, blunted, appropriate to s/c   Language: intact and appropriate for age   Thought Process:  Linear and goal directed   Associations: intact associations   Thought Content:  normal and appropriate   Perceptual Disturbances: no auditory or visual hallcunations   Risk Potential / Abnormal Thoughts: Suicidal ideation - None  Homicidal ideation - None  Potential for aggression - No       Consciousness:  Alert & Awake   Sensorium:  Grossly oriented   Attention: attention span and concentration are age appropriate       Fund of Knowledge:  Memory: awareness of current events: yes  recent and remote memory grossly intact   Insight:  fair   Judgment: fair   Muscle Strength Muscle Tone: normal  normal   Gait/Station: normal gait/station with good balance   Motor Activity: no abnormal movements       Risks, Benefits And Possible Side Effects Of Medications:    AGREE: Risks, benefits, and possible side effects of medications explained to [place] and he (or legal representative) verbalizes understanding and agreement for treatment  Controlled Medication Discussion:     Not applicable    Recent labs:  No visits with results within 1 Month(s) from this visit  Latest known visit with results is:   Appointment on 07/24/2019   Component Date Value    White Blood Cells, Stool 07/24/2019 No white blood cells seen       Giardia Ag, Stl 07/24/2019 Negative     Fecal Leukocytes 07/24/2019 2+ WBC's (3-10/hpf)          Psychiatric History  Previous diagnoses include- major depressive disorder, alcohol abuse/dependence, generalized anxiety disorder   Prior outpatient psychiatric treatment:  Prior to inpatient admission in February patient was treated by his PCP psychiatric symptoms  Prior therapy:   None  Prior inpatient psychiatric treatment:   Multiple inpatient psychiatric admissions last 1 being in February 2019  Prior suicide attempts:   None  Prior self harm:   None  Prior violence or aggression:   None      Family Psychiatric History:   Family History   Problem Relation Age of Onset    Cancer Mother     Coronary artery disease Mother     Diabetes Mother     Coronary artery disease Father     Prostate cancer Father     Diabetes Sister     Coronary artery disease Family          Medical / Surgical History:    Past Medical History:   Diagnosis Date    Alcohol abuse 2/20/2019    Allergic rhinitis     last assessed: 12/5/2012    Anemia     Anxiety and depression     Quiros esophagus     Cholelithiasis     Chronic pain     COPD (chronic obstructive pulmonary disease) (HCC)     Depression     Diabetes mellitus (HCC)     Emphysema lung (HCC)     Generalized anxiety disorder     GERD (gastroesophageal reflux disease)     Hyperlipidemia     Hypertension     Kidney stone     Metatarsalgia     last assessed: 8/11/2014, unspecified laterality, ? cause  PE with changes  To see DPM tomorrow   Pancreatitis     Psychiatric disorder     Renal disorder     Shortness of breath     with activity     Past Surgical History:   Procedure Laterality Date    CHOLECYSTECTOMY LAPAROSCOPIC      FOOT SURGERY      B/L great toe joint replacement    KNEE ARTHROSCOPY      (therapeutic) right knee    [place] EDG US EXAM SURGICAL ALTER STOM DUODENUM/JEJUNUM N/A 10/17/2018    Procedure: LINEAR ENDOSCOPIC U/S;  Surgeon: Slime Ortiz MD;  Location: [facility]; Service: Gastroenterology    VASECTOMY      vas deferens       Assessment/Plan:       Recurrent major depressive disorder, in partial remission (HCC)  MERLYN  Alcohol Dependence  No Changes of medication today  Continue on Zoloft 200 mg daily, Seroquel 25 mg twice a day and 100 mg at bedtime, Inderal 10 mg b i d  Continue AA, continue outpatient individual therapy    Patient has been educated about their diagnosis and treatment modalities  They voiced understanding and agreement with the following plan:  Discussed medications and if treatment adjustment was needed/desired  Discussed self monitoring of symptoms, and symptom monitoring tools  Patient has been informed of 24 hours and weekend coverage for urgent situations accessed by calling the main clinic phone number            Psychotherapy in session:  Time spent performing psychotherapy: 30 Minutes

## 2019-11-18 ENCOUNTER — OFFICE VISIT (OUTPATIENT)
Dept: URGENT CARE | Facility: CLINIC | Age: 49
End: 2019-11-18
Payer: COMMERCIAL

## 2019-11-18 VITALS
RESPIRATION RATE: 16 BRPM | DIASTOLIC BLOOD PRESSURE: 69 MMHG | OXYGEN SATURATION: 95 % | SYSTOLIC BLOOD PRESSURE: 137 MMHG | TEMPERATURE: 98.6 F | HEART RATE: 108 BPM

## 2019-11-18 DIAGNOSIS — B96.89 ACUTE BACTERIAL BRONCHITIS: Primary | ICD-10-CM

## 2019-11-18 DIAGNOSIS — J20.8 ACUTE BACTERIAL BRONCHITIS: Primary | ICD-10-CM

## 2019-11-18 PROCEDURE — G0382 LEV 3 HOSP TYPE B ED VISIT: HCPCS | Performed by: PHYSICIAN ASSISTANT

## 2019-11-18 PROCEDURE — 99203 OFFICE O/P NEW LOW 30 MIN: CPT | Performed by: PHYSICIAN ASSISTANT

## 2019-11-18 PROCEDURE — 99283 EMERGENCY DEPT VISIT LOW MDM: CPT | Performed by: PHYSICIAN ASSISTANT

## 2019-11-18 RX ORDER — BENZONATATE 100 MG/1
100 CAPSULE ORAL 3 TIMES DAILY PRN
Qty: 20 CAPSULE | Refills: 0 | Status: SHIPPED | OUTPATIENT
Start: 2019-11-18 | End: 2021-05-14 | Stop reason: HOSPADM

## 2019-11-18 RX ORDER — AZITHROMYCIN 250 MG/1
TABLET, FILM COATED ORAL
Qty: 6 TABLET | Refills: 0 | Status: SHIPPED | OUTPATIENT
Start: 2019-11-18 | End: 2019-11-22

## 2019-11-18 NOTE — PROGRESS NOTES
800 11          NAME: Diomedes Frausto is a 44 y o  male  : 1970    MRN: [id number]  DATE: 2019  TIME: 2:58 PM    Assessment and Plan   Acute bacterial bronchitis [J20 8, B96 89]  1  Acute bacterial bronchitis  azithromycin (ZITHROMAX) 250 mg tablet    benzonatate (TESSALON PERLES) 100 mg capsule       Patient Instructions   I have prescribed an antibiotic for the infection  Please take the antibiotic as prescribed and finish the entire prescription  I recommend that the patient takes an over the counter probiotic or eats yogurt with live cultures in it [place]) to keep good bacteria in the gut and help prevent diarrhea  Wash hands frequently to prevent the spread of infection  Can use over the counter cough and cold medications to help with symptoms  Can use inhaler as needed for wheezing  Ibuprofen and/or tylenol as needed for pain or fever  If not improving over the next 3-5 days, follow up with PCP  To present to the ER if symptoms worsen  Chief Complaint     Chief Complaint   Patient presents with    Cough     Pt c/o a cough and congestion for a week  History of Present Illness   Diomedes Frausto presents to the clinic c/o    Cough   This is a new problem  The current episode started in the past 7 days  The problem has been gradually worsening  The problem occurs constantly  The cough is productive of sputum  Associated symptoms include nasal congestion and wheezing  Pertinent negatives include no chest pain, chills, ear congestion, ear pain, eye redness, fever, headaches, heartburn, hemoptysis, myalgias, postnasal drip, rash, rhinorrhea, sore throat, shortness of breath, sweats or weight loss  Nothing aggravates the symptoms  He has tried OTC cough suppressant for the symptoms  The treatment provided no relief  His past medical history is significant for bronchitis         Review of Systems   Review of Systems   Constitutional: Negative for activity change, appetite change, chills, diaphoresis, fatigue, fever and weight loss  HENT: Positive for congestion  Negative for ear discharge, ear pain, facial swelling, postnasal drip, rhinorrhea, sinus pressure, sinus pain, sneezing and sore throat  Eyes: Negative for photophobia, pain, discharge, redness, itching and visual disturbance  Respiratory: Positive for cough and wheezing  Negative for apnea, hemoptysis, chest tightness and shortness of breath  Cardiovascular: Negative for chest pain  Gastrointestinal: Negative for abdominal distention, abdominal pain, constipation, diarrhea, heartburn, nausea and vomiting  Genitourinary: Negative for dysuria, flank pain, frequency, hematuria and urgency  Musculoskeletal: Negative for arthralgias, back pain, gait problem, joint swelling, myalgias, neck pain and neck stiffness  Skin: Negative for color change, rash and wound  Allergic/Immunologic: Negative for immunocompromised state  Neurological: Negative for dizziness and headaches  Hematological: Negative for adenopathy  Psychiatric/Behavioral: Negative for confusion           Current Medications     Long-Term Medications   Medication Sig Dispense Refill    cholestyramine (QUESTRAN) 4 GM/DOSE powder Take 1 packet (4 g total) by mouth 2 (two) times a day with meals 378 g 1    EQ NICOTINE POLACRILEX 4 MG lozenge TAKE 1 LOZENGE EVERY 2 HOURS AS NEEDED FOR SMOKING CRAVING  0    fenofibrate (TRICOR) 145 mg tablet Take 1 tablet (145 mg total) by mouth daily 30 tablet 5    hydrocortisone (ANUSOL-HC) 25 mg suppository Insert 1 suppository (25 mg total) into the rectum 2 (two) times a day 28 suppository 1    insulin glargine (BASAGLAR KWIKPEN) 100 units/mL injection pen 20 units sq q AM  20 units sq q PM 5 pen 1    insulin lispro (HUMALOG KWIKPEN) 100 units/mL injection pen Per sliding scale up to 14 units with each meal  5 pen 1    Insulin Pen Needle (B-D ULTRAFINE III SHORT PEN) 31G X 8 MM MISC by Does not apply route      Insulin Pen Needle (PEN NEEDLES) 29G X 12MM MISC by Does not apply route daily at bedtime Substitute what fits [place] pen and what is covered by insurance  45 each 0    lisinopril (ZESTRIL) 10 mg tablet Take 1 tablet (10 mg total) by mouth daily 90 tablet 1    NARCAN 4 MG/0 1ML LIQD ADMINISTER A SINGLE SPRAY IN ONE NOSTRIL UPON SIGNS OF OPIOID OVERDOSE  CALL 911   REPEAT AFTER 3 MINUTES IF NO RESPONSE   0    omeprazole (PriLOSEC) 20 mg delayed release capsule Take 1 capsule (20 mg total) by mouth daily 30 capsule 0    propranolol (INDERAL) 10 mg tablet Take 1 tablet (10 mg total) by mouth 2 (two) times a day 60 tablet 0    QUEtiapine (SEROquel) 100 mg tablet Take 1 tablet (100 mg total) by mouth daily at bedtime 30 tablet 2    QUEtiapine (SEROquel) 25 mg tablet Take 1 tablet (25 mg total) by mouth 2 (two) times a day 60 tablet 2    rosuvastatin (CRESTOR) 10 MG tablet Take 1 tablet (10 mg total) by mouth daily 90 tablet 0    sertraline (ZOLOFT) 100 mg tablet Take 2 tablets (200 mg total) by mouth daily 60 tablet 2    umeclidinium-vilanterol (ANORO ELLIPTA) 62 5-25 MCG/INH inhaler Inhale 1 puff daily 3 Inhaler 3       Current Allergies     Allergies as of 11/18/2019    (No Known Allergies)            The following portions of the patient's history were reviewed and updated as appropriate: allergies, current medications, past family history, past medical history, past social history, past surgical history and problem list   Past Medical History:   Diagnosis Date    Alcohol abuse 2/20/2019    Allergic rhinitis     last assessed: 12/5/2012    Anemia     Anxiety and depression     Quiros esophagus     Cholelithiasis     Chronic pain     [facility]) [facility]     Depression     Diabetes mellitus [facility]     Emphysema lung ([facility]     Generalized anxiety disorder     GERD (gastroesophageal reflux disease)     Hyperlipidemia     Hypertension     Kidney stone     Metatarsalgia     last assessed: 8/11/2014, unspecified laterality, ? cause  PE with changes  To see DPM tomorrow   Pancreatitis     Psychiatric disorder     Renal disorder     Shortness of breath     with activity     Past Surgical History:   Procedure Laterality Date    CHOLECYSTECTOMY LAPAROSCOPIC      FOOT SURGERY      B/L great toe joint replacement    KNEE ARTHROSCOPY      (therapeutic) right knee    [place] EDG US EXAM SURGICAL ALTER STOM DUODENUM/JEJUNUM N/A 10/17/2018    Procedure: LINEAR ENDOSCOPIC U/S;  Surgeon: Saba Farnsworth MD;  [facility]: [facility]; Service: Gastroenterology    VASECTOMY      vas deferens     Social History     Socioeconomic History    Marital status: /Civil Union     Spouse name: Not on file    Number of children: Not on file    Years of education: Not on file    Highest education level: Not on file   Occupational History    Occupation: Unemployed   Social Needs    Financial resource strain: Not on file    Food insecurity:     Worry: Not on file     Inability: Not on file   Vizimax needs:     Medical: Not on file     Non-medical: Not on file   Tobacco Use    Smoking status: Current Every Day Smoker     Packs/day: 1 00    Smokeless tobacco: Never Used    Tobacco comment: Tobacco use [employer]'s "How to Quit" literature given to pat     Substance and Sexual Activity    Alcohol use: Not Currently     Frequency: 4 or more times a week     Drinks per session: 5 or 6     Binge frequency: Daily or almost daily     Comment: Drank a 5th vodka today    Drug use: No     Comment: chronic narcotic use    Sexual activity: Yes     Partners: Female   Lifestyle    Physical activity:     Days per week: Not on file     Minutes per session: Not on file    Stress: Not on file   Relationships    Social connections:     Talks on phone: Not on file     Gets together: Not on file     Attends [employer] service: Not on file     Active member of club or organization: Not on file     Attends meetings of clubs or organizations: Not on file     Relationship status: Not on file    Intimate partner violence:     Fear of current or ex partner: Not on file     Emotionally abused: Not on file     Physically abused: Not on file     Forced sexual activity: Not on file   Other Topics Concern    Not on file   Social History Narrative    UNEMPLOYED       Objective   /69   Pulse (!) 108   Temp 98 6 °F (37 °C)   Resp 16   SpO2 95%      Physical Exam     Physical Exam   Constitutional: He is oriented to person, place, and time  He appears well-developed and well-nourished  No distress  HENT:   Head: Normocephalic and atraumatic  Right Ear: Tympanic membrane and external ear normal    Left Ear: Tympanic membrane and external ear normal    Nose: Nose normal    Mouth/Throat: Oropharynx is clear and moist  No oropharyngeal exudate  Eyes: Pupils are equal, round, and reactive to light  Conjunctivae and EOM are normal  Right eye exhibits no discharge  Left eye exhibits no discharge  No scleral icterus  Neck: Normal range of motion  Neck supple  No JVD present  No tracheal deviation present  No thyromegaly present  Cardiovascular: Normal rate, regular rhythm and normal heart sounds  Exam reveals no gallop and no friction rub  No murmur heard  Pulmonary/Chest: Effort normal  No stridor  No respiratory distress  He has no decreased breath sounds  He has wheezes in the right upper field, the right lower field and the left upper field  He has rhonchi in the right lower field  He has no rales  He exhibits no tenderness  Musculoskeletal: Normal range of motion  He exhibits no tenderness or deformity  Lymphadenopathy:     He has no cervical adenopathy  Neurological: He is alert and oriented to person, place, and time  Coordination normal    Skin: Skin is warm and dry  No rash noted  He is not diaphoretic  No erythema  No pallor     Psychiatric: He has a normal mood and affect  His behavior is normal  Judgment and thought content normal    Nursing note and vitals reviewed        Arun Richards PA-C

## 2019-12-12 ENCOUNTER — TELEPHONE (OUTPATIENT)
Dept: PSYCHIATRY | Facility: CLINIC | Age: 49
End: 2019-12-12

## 2019-12-16 NOTE — TELEPHONE ENCOUNTER
The date on the letter was incorrect it stated 10/2016 was the date of his last appointment he needs this updated

## 2019-12-31 ENCOUNTER — OFFICE VISIT (OUTPATIENT)
Dept: PSYCHIATRY | Facility: CLINIC | Age: 49
End: 2019-12-31
Payer: COMMERCIAL

## 2019-12-31 DIAGNOSIS — F41.1 GENERALIZED ANXIETY DISORDER: Primary | ICD-10-CM

## 2019-12-31 DIAGNOSIS — F32.2 CURRENT SEVERE EPISODE OF MAJOR DEPRESSIVE DISORDER WITHOUT PSYCHOTIC FEATURES WITHOUT PRIOR EPISODE (HCC): ICD-10-CM

## 2019-12-31 DIAGNOSIS — F33.41 RECURRENT MAJOR DEPRESSIVE DISORDER, IN PARTIAL REMISSION (HCC): ICD-10-CM

## 2019-12-31 DIAGNOSIS — F10.11 H/O ALCOHOL ABUSE: ICD-10-CM

## 2019-12-31 PROCEDURE — 99214 OFFICE O/P EST MOD 30 MIN: CPT | Performed by: HOSPITALIST

## 2019-12-31 PROCEDURE — 90833 PSYTX W PT W E/M 30 MIN: CPT | Performed by: HOSPITALIST

## 2019-12-31 RX ORDER — VENLAFAXINE 37.5 MG/1
37.5 TABLET ORAL 2 TIMES DAILY
Qty: 30 TABLET | Refills: 0 | Status: SHIPPED | OUTPATIENT
Start: 2019-12-31 | End: 2020-03-06

## 2019-12-31 RX ORDER — QUETIAPINE FUMARATE 100 MG/1
150 TABLET, FILM COATED ORAL
Qty: 45 TABLET | Refills: 2 | Status: SHIPPED | OUTPATIENT
Start: 2019-12-31 | End: 2020-03-06 | Stop reason: SDUPTHER

## 2019-12-31 RX ORDER — QUETIAPINE FUMARATE 25 MG/1
25 TABLET, FILM COATED ORAL 2 TIMES DAILY
Qty: 60 TABLET | Refills: 2 | Status: SHIPPED | OUTPATIENT
Start: 2019-12-31 | End: 2020-03-06 | Stop reason: SDUPTHER

## 2019-12-31 RX ORDER — SERTRALINE HYDROCHLORIDE 100 MG/1
200 TABLET, FILM COATED ORAL DAILY
Qty: 60 TABLET | Refills: 2 | Status: SHIPPED | OUTPATIENT
Start: 2019-12-31 | End: 2020-03-06 | Stop reason: SDUPTHER

## 2019-12-31 NOTE — BH TREATMENT PLAN
[address]    Name and Date of Birth:  Bulmaro Donohue 44 y o  1970    Date of Referral: December 31, 2019    Presenting Symptoms and Stressors:      Symptoms:  depression, anxiety and suicidal ideation without plan  Stressors:  family issues, financial problems and recent foreclosure    Access to Weapons:  No    Smoking Status: denies use    Substance Use:  None    Suicidal Ideation: None, Yes, fleeting suicidal thoughts    Homicidal Ideation: None    Depressed Mood: Yes    Charline/Hypomania: None    Psychosis: None    Agitation: No    Appetite Changes: normal appetite    Sleep Disturbance: difficulty sleeping    Diagnoses:  1   Major Depressive Disorder, single, severe without psychotic features    Current Psychiatrist or Therapist:    Psychiatrist: Dr Lul Rick  Therapist: None    Do they Require Ambulatory Assistance: No    Legal Issues: None        Gisel Rios MD

## 2019-12-31 NOTE — PSYCH
Hector Arguello  MEDICATION MANAGEMENT NOTE        [facility]      Name and Date of Birth:  Karin Crisostomo 44 y o  1970    Date of Visit: December 31, 2019    SUBJECTIVE:  CC: Phyllis Solo presents today for follow up  for MDD, ETOH, and anxiety  His last appointment 11/08/19  Phyllis Solo reports continued increased stress and anxiety due his financial issues at home, states his house is now under foreclosure  Reorts increased depression and worry over the above  Low energy and motivation  Poor sleep and appetite  Admits to thoughts of wanting to die, no active SI but is feeling overwhelmed and that he is a disappointment to his family  He continues to remain committed to remaining sober and is active with AA as well as his recovery program  Engaged in counseling to motivate him toward positive thinking to remain sober as he says reverts to ETOH when stressors increase  No evidence of psychosis, camilla or agitation  No SI/HI  Phyllis Solo denies any side effects from medications unless noted above   Due to passive SI and ongoing stressors discussed with Phyllis Solo that he should start the partial program, he is agreeable  HPI ROS:        Review Of Systems as noted above  History Review: The following portions of the patient's history were reviewed and updated as appropriate: allergies, current medications, past family history, past medical history, past social history and past surgical history       Lab Review: Labs were reviewed and discussed with patient      OBJECTIVE:     MENTAL STATUS EXAM  Appearance:  age appropriate, dressed casually, thin   Behavior:  Pleasant & cooperative   Speech:  soft, paucity of speech   Mood:  depressed, anxious   Affect:  constricted, blunted, appropriate to s/c   Language: intact and appropriate for age   Thought Process:  Linear and goal directed   Associations: intact associations   Thought Content:  normal and appropriate   Perceptual Disturbances: no auditory or visual hallcunations   Risk Potential / Abnormal Thoughts: Suicidal ideation - None  Homicidal ideation - None  Potential for aggression - No       Consciousness:  Alert & Awake   Sensorium:  Grossly oriented   Attention: attention span and concentration are age appropriate       Fund of Knowledge:  Memory: awareness of current events: yes  recent and remote memory grossly intact   Insight:  fair   Judgment: fair   Muscle Strength Muscle Tone: normal  normal   Gait/Station: normal gait/station with good balance   Motor Activity: no abnormal movements       Risks, Benefits And Possible Side Effects Of Medications:    AGREE: Risks, benefits, and possible side effects of medications explained to Janessa Omalley and he (or legal representative) verbalizes understanding and agreement for treatment  Controlled Medication Discussion:     Not applicable    Recent labs:  No visits with results within 1 Month(s) from this visit  Latest known visit with results is:   Appointment on 07/24/2019   Component Date Value    White Blood Cells, Stool 07/24/2019 No white blood cells seen       Giardia Ag, Stl 07/24/2019 Negative     Fecal Leukocytes 07/24/2019 2+ WBC's (3-10/hpf)          Psychiatric History  Previous diagnoses include- major depressive disorder, alcohol abuse/dependence, generalized anxiety disorder   Prior outpatient psychiatric treatment:  Prior to inpatient admission in February patient was treated by his PCP psychiatric symptoms  Prior therapy:   None  Prior inpatient psychiatric treatment:   Multiple inpatient psychiatric admissions last 1 being in February 2019  Prior suicide attempts:   None  Prior self harm:   None  Prior violence or aggression:   None      Family Psychiatric History:   Family History   Problem Relation Age of Onset    Cancer Mother     Coronary artery disease Mother     Diabetes Mother     Coronary artery disease Father     Prostate cancer Father     Diabetes Sister    Cynthia Dupree Coronary artery disease Family          Medical / Surgical History:    Past Medical History:   Diagnosis Date    Alcohol abuse 2/20/2019    Allergic rhinitis     last assessed: 12/5/2012    Anemia     Anxiety and depression     Quiros esophagus     Cholelithiasis     Chronic pain     COPD (chronic obstructive pulmonary disease) (HCC)     Depression     Diabetes mellitus (HCC)     Emphysema lung (HCC)     Generalized anxiety disorder     GERD (gastroesophageal reflux disease)     Hyperlipidemia     Hypertension     Kidney stone     Metatarsalgia     last assessed: 8/11/2014, unspecified laterality, ? cause  PE with changes  To see DPM tomorrow   Pancreatitis     Psychiatric disorder     Renal disorder     Shortness of breath     with activity     Past Surgical History:   Procedure Laterality Date    CHOLECYSTECTOMY LAPAROSCOPIC      FOOT SURGERY      B/L great toe joint replacement    KNEE ARTHROSCOPY      (therapeutic) right knee    [place] EDG US EXAM SURGICAL ALTER STOM DUODENUM/JEJUNUM N/A 10/17/2018    Procedure: LINEAR ENDOSCOPIC U/S;  Surgeon: Kofi Wilder MD;  [facility]; Service: Gastroenterology    VASECTOMY      vas deferens       Assessment/Plan:       Recurrent major depressive disorder, in partial remission (HCC)  MERLYN  Alcohol Dependence  No Changes of medication today  Continue on Zoloft 200 mg daily, Seroquel 25 mg twice a day and 100 mg at bedtime, Inderal 10 mg b i d  Continue AA, referred to [facility] today  Patient has been educated about their diagnosis and treatment modalities  They voiced understanding and agreement with the following plan:  Discussed medications and if treatment adjustment was needed/desired  Discussed self monitoring of symptoms, and symptom monitoring tools  Patient has been informed of 24 hours and weekend coverage for urgent situations accessed by calling the main clinic phone number            Psychotherapy in session:  Time spent performing psychotherapy: 30 Minutes

## 2019-12-31 NOTE — BH TREATMENT PLAN
TREATMENT PLAN (Medication Management Only)        [facility]    Name and Date of Birth:  Cortez Vitale 44 y o  1970  Date of Treatment Plan: December 31, 2019  Diagnosis/Diagnoses:    1  Generalized anxiety disorder    2  Recurrent major depressive disorder, in partial remission ([address] )    3  H/O alcohol abuse    4  Current severe episode of major depressive disorder without psychotic features without prior episode [facility])      Strengths/Personal Resources for Self-Care: ability to communicate well, ability to understand psychiatric illness, motivation for treatment  Area/Areas of need (in own words): anxiety, depression  1  Long Term Goal: alleviate depression  Target Date: 2 months - 2/29/2020  Person/Persons responsible for completion of goal: Dr Rose Parks  2  Short Term Objective (s) - How will we reach this goal?:   A  Provider new recommended medication/dosage changes and/or continue medication(s): medication management and monitor/ adjust as needed  B  Avoid alcohol   C  Spend more time with friends and family  Target Date: 6 months - 6/30/2020  Person/Persons Responsible for Completion of Goal: Adonay Hester  Progress Towards Goals: continuing treatment  Treatment Modality: medication management every 1 months  Review due 90 to 120 days from date of this plan: 6 months - 6/30/2020  Expected length of service: maintenance  My Physician/PA/NP and I have developed this plan together and I agree to work on the goals and objectives  I understand the treatment goals that were developed for my treatment

## 2020-01-02 ENCOUNTER — TELEPHONE (OUTPATIENT)
Dept: PSYCHOLOGY | Facility: CLINIC | Age: 50
End: 2020-01-02

## 2020-01-06 ENCOUNTER — TELEPHONE (OUTPATIENT)
Dept: PSYCHOLOGY | Facility: CLINIC | Age: 50
End: 2020-01-06

## 2020-01-06 NOTE — TELEPHONE ENCOUNTER
Sabrina Serna at [phone] to follow up on referral to program  No answer, left message requesting call back

## 2020-02-14 DIAGNOSIS — E78.5 HYPERLIPIDEMIA, UNSPECIFIED HYPERLIPIDEMIA TYPE: ICD-10-CM

## 2020-02-15 RX ORDER — FENOFIBRATE 145 MG/1
TABLET, COATED ORAL
Qty: 30 TABLET | Refills: 0 | OUTPATIENT
Start: 2020-02-15

## 2020-02-23 NOTE — ASSESSMENT & PLAN NOTE
· Likely secondary to volume depletion as patient responded well to IV fluid resuscitation  · This is resolved  stand-by assist

## 2020-03-05 DIAGNOSIS — E78.5 HYPERLIPIDEMIA, UNSPECIFIED HYPERLIPIDEMIA TYPE: ICD-10-CM

## 2020-03-05 RX ORDER — FENOFIBRATE 145 MG/1
TABLET, COATED ORAL
Qty: 30 TABLET | Refills: 0 | OUTPATIENT
Start: 2020-03-05

## 2020-03-06 ENCOUNTER — OFFICE VISIT (OUTPATIENT)
Dept: PSYCHIATRY | Facility: CLINIC | Age: 50
End: 2020-03-06
Payer: COMMERCIAL

## 2020-03-06 DIAGNOSIS — G47.00 INSOMNIA, UNSPECIFIED TYPE: ICD-10-CM

## 2020-03-06 DIAGNOSIS — F41.1 GENERALIZED ANXIETY DISORDER: Primary | ICD-10-CM

## 2020-03-06 DIAGNOSIS — F33.41 RECURRENT MAJOR DEPRESSIVE DISORDER, IN PARTIAL REMISSION (HCC): ICD-10-CM

## 2020-03-06 DIAGNOSIS — F32.2 CURRENT SEVERE EPISODE OF MAJOR DEPRESSIVE DISORDER WITHOUT PSYCHOTIC FEATURES WITHOUT PRIOR EPISODE (HCC): ICD-10-CM

## 2020-03-06 PROCEDURE — 3078F DIAST BP <80 MM HG: CPT | Performed by: HOSPITALIST

## 2020-03-06 PROCEDURE — 99214 OFFICE O/P EST MOD 30 MIN: CPT | Performed by: HOSPITALIST

## 2020-03-06 PROCEDURE — 4004F PT TOBACCO SCREEN RCVD TLK: CPT | Performed by: HOSPITALIST

## 2020-03-06 PROCEDURE — 3075F SYST BP GE 130 - 139MM HG: CPT | Performed by: HOSPITALIST

## 2020-03-06 RX ORDER — SERTRALINE HYDROCHLORIDE 100 MG/1
200 TABLET, FILM COATED ORAL DAILY
Qty: 60 TABLET | Refills: 2 | Status: SHIPPED | OUTPATIENT
Start: 2020-03-06 | End: 2020-08-21

## 2020-03-06 RX ORDER — QUETIAPINE FUMARATE 200 MG/1
200 TABLET, FILM COATED ORAL
Qty: 30 TABLET | Refills: 2 | Status: SHIPPED | OUTPATIENT
Start: 2020-03-06 | End: 2020-06-02

## 2020-03-06 RX ORDER — QUETIAPINE FUMARATE 25 MG/1
25 TABLET, FILM COATED ORAL 2 TIMES DAILY
Qty: 60 TABLET | Refills: 2 | Status: SHIPPED | OUTPATIENT
Start: 2020-03-06 | End: 2020-07-29

## 2020-03-06 NOTE — PSYCH
Jonh People  MEDICATION MANAGEMENT NOTE        [facility]      Name and Date of Birth:  Oscar Shepherd 44 y o  1970    Date of Visit: March 6, 2020    SUBJECTIVE:  CC: Juan Jose West presents today for follow up  for MDD, ETOH, and anxiety  His last appointment 12/31/2019, pt had increased depression and was refferred to PHP at that time  He did not attend     Juan Jose West reports today " somewhat better recently" , was missing d&a meeting  However he has remained sober and will be celebrating his 1 years sobriety in this month  Still has  continued stress and anxiety due his financial issues at home  Home is still in jeopardy of being repossed  Improved in energy and motivation  Sleep remains interrupted  He has a good appetite  He reports feeling physically somewhat better  He denies any suicidal or homicidal ideations at this time  No evidence of psychosis or camilla    Juan Jose West denies any side effects from medications unless noted above   Due to passive SI and ongoing stressors discussed with Juan Jose West that he should start the partial program, he is agreeable  HPI ROS:        Review Of Systems as noted above  History Review: The following portions of the patient's history were reviewed and updated as appropriate: allergies, current medications, past family history, past medical history, past social history and past surgical history       Lab Review: Labs were reviewed and discussed with patient      OBJECTIVE:     MENTAL STATUS EXAM  Appearance:  age appropriate, dressed casually, appears less guant   Behavior:  Pleasant & cooperative   Speech:  Normal volume, regular rate and rhythm   Mood:  anxious   Affect:  constricted, blunted, appropriate to s/c   Language: intact and appropriate for age, education, and intellect   Thought Process:  Linear and goal directed   Associations: intact associations   Thought Content:  normal and appropriate   Perceptual Disturbances: no auditory or visual hallcunations   Risk Potential / Abnormal Thoughts: Suicidal ideation - None  Homicidal ideation - None  Potential for aggression - No       Consciousness:  Alert & Awake   Sensorium:  Fully oriented to person, place, time/date   Attention: attention span and concentration are age appropriate       Fund of Knowledge:  Memory: awareness of current events: yes  recent and remote memory grossly intact   Insight:  fair   Judgment: fair   Muscle Strength Muscle Tone: normal  normal   Gait/Station: normal gait/station with good balance   Motor Activity: no abnormal movements       Risks, Benefits And Possible Side Effects Of Medications:    AGREE: Risks, benefits, and possible side effects of medications explained to Maddie and he (or legal representative) verbalizes understanding and agreement for treatment  Controlled Medication Discussion:     Not applicable    Recent labs:  No visits with results within 1 Month(s) from this visit  Latest known visit with results is:   Appointment on 07/24/2019   Component Date Value    White Blood Cells, Stool 07/24/2019 No white blood cells seen       Giardia Ag, Stl 07/24/2019 Negative     Fecal Leukocytes 07/24/2019 2+ WBC's (3-10/hpf)          Psychiatric History  Previous diagnoses include- major depressive disorder, alcohol abuse/dependence, generalized anxiety disorder   Prior outpatient psychiatric treatment:  Prior to inpatient admission in February patient was treated by his PCP psychiatric symptoms  Prior therapy:   None  Prior inpatient psychiatric treatment:   Multiple inpatient psychiatric admissions last 1 being in February 2019  Prior suicide attempts:   None  Prior self harm:   None  Prior violence or aggression:   None      Family Psychiatric History:   Family History   Problem Relation Age of Onset    Cancer Mother     Coronary artery disease Mother     Diabetes Mother     Coronary artery disease Father     Prostate cancer Father     Diabetes Sister     Coronary artery disease Family          Medical / Surgical History:    Past Medical History:   Diagnosis Date    Alcohol abuse 2/20/2019    Allergic rhinitis     last assessed: 12/5/2012    Anemia     Anxiety and depression     Quiros esophagus     Cholelithiasis     Chronic pain     COPD (chronic obstructive pulmonary disease) (HCC)     Depression     Diabetes mellitus (HCC)     Emphysema lung (HCC)     Generalized anxiety disorder     GERD (gastroesophageal reflux disease)     Hyperlipidemia     Hypertension     Kidney stone     Metatarsalgia     last assessed: 8/11/2014, unspecified laterality, ? cause  PE with changes  To see DPM tomorrow   Pancreatitis     Psychiatric disorder     Renal disorder     Shortness of breath     with activity     Past Surgical History:   Procedure Laterality Date    CHOLECYSTECTOMY LAPAROSCOPIC      FOOT SURGERY      B/L great toe joint replacement    KNEE ARTHROSCOPY      (therapeutic) right knee    MT EDG US EXAM SURGICAL ALTER STOM DUODENUM/JEJUNUM N/A 10/17/2018    Procedure: LINEAR ENDOSCOPIC U/S;  Surgeon: Savanna Ricketts MD;  Location: [facility]; Service: Gastroenterology    VASECTOMY      vas deferens       Assessment/Plan:       Recurrent major depressive disorder, in partial remission (HCC)  MERLYN  Alcohol Dependence  Insomnia  Continue on Zoloft 200 mg daily, Seroquel 25 mg twice a day and increase night dose to 200 mg at bedtime for insomnisa, Inderal 10 mg b i d  Continue AA, referred to [facility] today  Patient has been educated about their diagnosis and treatment modalities  They voiced understanding and agreement with the following plan:  Discussed medications and if treatment adjustment was needed/desired  Discussed self monitoring of symptoms, and symptom monitoring tools  Patient has been informed of 24 hours and weekend coverage for urgent situations accessed by calling the main clinic phone number          TP done 12/31/19 by  Rg Coughlin follow up due 6/13/2020  Psychotherapy in session:  Time spent performing psychotherapy: 30 Minutes

## 2020-05-25 DIAGNOSIS — F32.2 CURRENT SEVERE EPISODE OF MAJOR DEPRESSIVE DISORDER WITHOUT PSYCHOTIC FEATURES WITHOUT PRIOR EPISODE (HCC): ICD-10-CM

## 2020-06-02 RX ORDER — QUETIAPINE FUMARATE 200 MG/1
TABLET, FILM COATED ORAL
Qty: 30 TABLET | Refills: 0 | Status: SHIPPED | OUTPATIENT
Start: 2020-06-02 | End: 2020-06-22 | Stop reason: SDUPTHER

## 2020-06-11 ENCOUNTER — TELEMEDICINE (OUTPATIENT)
Dept: PSYCHIATRY | Facility: CLINIC | Age: 50
End: 2020-06-11
Payer: COMMERCIAL

## 2020-06-11 DIAGNOSIS — F41.1 GENERALIZED ANXIETY DISORDER: ICD-10-CM

## 2020-06-11 DIAGNOSIS — F33.41 RECURRENT MAJOR DEPRESSIVE DISORDER, IN PARTIAL REMISSION (HCC): ICD-10-CM

## 2020-06-11 DIAGNOSIS — F10.21 ALCOHOL DEPENDENCE IN EARLY, EARLY PARTIAL, SUSTAINED FULL, OR SUSTAINED PARTIAL REMISSION (HCC): Primary | ICD-10-CM

## 2020-06-11 PROCEDURE — 99214 OFFICE O/P EST MOD 30 MIN: CPT | Performed by: HOSPITALIST

## 2020-06-22 DIAGNOSIS — F32.2 CURRENT SEVERE EPISODE OF MAJOR DEPRESSIVE DISORDER WITHOUT PSYCHOTIC FEATURES WITHOUT PRIOR EPISODE (HCC): ICD-10-CM

## 2020-06-23 RX ORDER — QUETIAPINE FUMARATE 200 MG/1
200 TABLET, FILM COATED ORAL
Qty: 30 TABLET | Refills: 0 | Status: SHIPPED | OUTPATIENT
Start: 2020-06-23 | End: 2020-07-29

## 2020-07-22 ENCOUNTER — TELEMEDICINE (OUTPATIENT)
Dept: PSYCHIATRY | Facility: CLINIC | Age: 50
End: 2020-07-22
Payer: COMMERCIAL

## 2020-07-22 DIAGNOSIS — F41.1 GENERALIZED ANXIETY DISORDER: Primary | ICD-10-CM

## 2020-07-22 DIAGNOSIS — F33.41 RECURRENT MAJOR DEPRESSIVE DISORDER, IN PARTIAL REMISSION (HCC): ICD-10-CM

## 2020-07-22 PROCEDURE — 3075F SYST BP GE 130 - 139MM HG: CPT | Performed by: HOSPITALIST

## 2020-07-22 PROCEDURE — 4004F PT TOBACCO SCREEN RCVD TLK: CPT | Performed by: HOSPITALIST

## 2020-07-22 PROCEDURE — 99214 OFFICE O/P EST MOD 30 MIN: CPT | Performed by: HOSPITALIST

## 2020-07-22 PROCEDURE — 3078F DIAST BP <80 MM HG: CPT | Performed by: HOSPITALIST

## 2020-07-22 RX ORDER — BUPROPION HYDROCHLORIDE 150 MG/1
150 TABLET ORAL DAILY
Qty: 30 TABLET | Refills: 2 | Status: SHIPPED | OUTPATIENT
Start: 2020-07-22 | End: 2020-10-14

## 2020-07-22 NOTE — PSYCH
Detail Level: Detailed Virtual Regular Visit      Assessment/Plan:    Problem List Items Addressed This Visit        Other    Generalized anxiety disorder - Primary    Recurrent major depressive disorder, in partial remission ([facility] [address] )               Reason for visit is outpt psych follow up     Encounter provider Estrada Blas MD    Provider located at [address] [address] [phone]      Recent Visits  No visits were found meeting these conditions  Showing recent visits within past 7 days and meeting all other requirements     Future Appointments  No visits were found meeting these conditions  Showing future appointments within next 150 days and meeting all other requirements        The patient was identified by name and date of birth  Irene Escalante was informed that this is a telemedicine visit and that the visit is being conducted through [employer]  My office door was closed  No one else was in the room  He acknowledged consent and understanding of privacy and security of the video platform  The patient has agreed to participate and understands they can discontinue the visit at any time  Patient is aware this is a billable service  I spent 25 minutes with patient today in which greater than 50% of the time was spent in counseling/coordination of care regarding Symptoms, symptom management, diagnosis and treatment plan      VIRTUAL VISIT DISCLAIMER    Irene Escalante acknowledges that he has consented to an online visit or consultation  He understands that the online visit is based solely on information provided by him, and that, in the absence of a face-to-face physical evaluation by the physician, the diagnosis he receives is both limited and provisional in terms of accuracy and completeness  This is not intended to replace a full medical face-to-face evaluation by the physician   Irene Escalante understands and accepts these Add 22415 Cpt? (Important Note: In 2017 The Use Of 32972 Is Being Tracked By Cms To Determine Future Global Period Reimbursement For Global Periods): yes terms     MEDICATION MANAGEMENT NOTE        [facility]      Name and Date of Birth:  Everardo Donovan 44 y o  1970    Date of Visit: July 22, 2020    SUBJECTIVE:  CC: Alpa Arita presents today for outpatient psychiatric follow up for  major depressive disorder, alcohol use in remission and anxiety  Patient was last seen by telemedicine visit on June 11th at which time patient had no significant changes  Alpa Arita today reports sound going stressors including health and finances  He continues to have baseline depression due to the stressors with despondent mood, low energy and motivation, difficulty coping with daily tasks  He however denies feeling hopeless, denies any suicidal /homicidal ideations  States he continues to have some abdominal pain intermittently and decreased appetite due to his chronic pancreatitis  He had a bronchoscopy with negative lymph node biopsy however appears to still need further follow-up for lymphadenopathy  Patient also reports that he is attempting to quit smoking and has had some difficulty with that and requests a prescription for Wellbutrin  We have discussed risk benefit and side effect or profile of Wellbutrin with patient and he will have a prescription for 150 mg daily  Alpa Arita denies any side effects from medications unless noted above    Since our last visit, overall symptoms have been unchanged  HPI ROS:     Review Of Systems as noted above      Psychiatric History  Reviewed    History Review:  The following portions of the patient's history were reviewed and updated as appropriate: allergies, current medications, past family history, past medical history, past social history, past surgical history and problem list      Lab Review: Labs were reviewed and discussed with patient      OBJECTIVE:     MENTAL STATUS EXAM  Appearance:  age appropriate, dressed casually   Behavior:  Pleasant & cooperative   Speech:  Normal volume, regular rate and rhythm   Mood:  depressed and anxious   Affect:  constricted, blunted   Language: naming objects   Thought Process:  Linear and goal directed   Associations: intact associations   Thought Content:  normal and appropriate   Perceptual Disturbances: no auditory or visual hallcunations   Risk Potential / Abnormal Thoughts: Suicidal ideation - None  Homicidal ideation - None  Potential for aggression - No       Consciousness:  Alert & Awake   Sensorium:  Grossly oriented   Attention: attention span and concentration are age appropriate       Fund of Knowledge:  Memory: awareness of current events: yes  recent and remote memory grossly intact   Insight:  good   Judgment: good   Muscle Strength Muscle Tone: normal  normal   Gait/Station: normal gait/station with good balance   Motor Activity: no abnormal movements       Risks, Benefits And Possible Side Effects Of Medications:    AGREE: Risks, benefits, and possible side effects of medications explained to Maddie and he (or legal representative) verbalizes understanding and agreement for treatment  Controlled Medication Discussion:     Not applicable    Recent labs:  No visits with results within 1 Month(s) from this visit  Latest known visit with results is:   Appointment on 07/24/2019   Component Date Value    White Blood Cells, Stool 07/24/2019 No white blood cells seen   Giardia Ag, Stl 07/24/2019 Negative     Fecal Leukocytes 07/24/2019 2+ WBC's (3-10/hpf)          Assessment/Plan:      Continue Zoloft 200 mg daily  Continue Seroquel 25 mg twice a day and  Added Wellbutrin 150 mg daily for added benefit of mood stabilization and also to help with decreasing nicotine craving  Patient will follow up in 8 weeks or sooner as needed  Diagnoses and all orders for this visit:    Generalized anxiety disorder    Recurrent major depressive disorder, in partial remission (HCC)  -     buPROPion (WELLBUTRIN XL) 150 mg 24 hr tablet;  Take 1 tablet (150 mg total) by mouth daily            Patient has been educated about their diagnosis and treatment modalities  They voiced understanding and agreement with the following plan:  Discussed medications and if treatment adjustment was needed/desired  Discussed self monitoring of symptoms, and symptom monitoring tools  Patient has been informed of 24 hours and weekend coverage for urgent situations accessed by calling the main clinic phone number  Treatment Plan:   Follow up treatment  plan not done due to time constraints with virtual visit      Psychotherapy in session:  Time spent performing psychotherapy: 15 Minutes  Education and counseling regarding diagnosis, medication and treatment plan  Discussed and support provided regarding patients current stressors

## 2020-07-26 DIAGNOSIS — F32.2 CURRENT SEVERE EPISODE OF MAJOR DEPRESSIVE DISORDER WITHOUT PSYCHOTIC FEATURES WITHOUT PRIOR EPISODE (HCC): ICD-10-CM

## 2020-07-26 DIAGNOSIS — F33.41 RECURRENT MAJOR DEPRESSIVE DISORDER, IN PARTIAL REMISSION (HCC): ICD-10-CM

## 2020-07-29 RX ORDER — QUETIAPINE FUMARATE 200 MG/1
TABLET, FILM COATED ORAL
Qty: 30 TABLET | Refills: 0 | Status: SHIPPED | OUTPATIENT
Start: 2020-07-29 | End: 2020-08-28

## 2020-07-29 RX ORDER — QUETIAPINE FUMARATE 25 MG/1
TABLET, FILM COATED ORAL
Qty: 60 TABLET | Refills: 0 | Status: SHIPPED | OUTPATIENT
Start: 2020-07-29 | End: 2020-08-28

## 2020-08-20 DIAGNOSIS — F32.2 CURRENT SEVERE EPISODE OF MAJOR DEPRESSIVE DISORDER WITHOUT PSYCHOTIC FEATURES WITHOUT PRIOR EPISODE (HCC): ICD-10-CM

## 2020-08-21 RX ORDER — SERTRALINE HYDROCHLORIDE 100 MG/1
TABLET, FILM COATED ORAL
Qty: 60 TABLET | Refills: 2 | Status: SHIPPED | OUTPATIENT
Start: 2020-08-21 | End: 2020-11-16

## 2020-08-26 DIAGNOSIS — F32.2 CURRENT SEVERE EPISODE OF MAJOR DEPRESSIVE DISORDER WITHOUT PSYCHOTIC FEATURES WITHOUT PRIOR EPISODE (HCC): ICD-10-CM

## 2020-08-26 DIAGNOSIS — F33.41 RECURRENT MAJOR DEPRESSIVE DISORDER, IN PARTIAL REMISSION (HCC): ICD-10-CM

## 2020-08-28 RX ORDER — QUETIAPINE FUMARATE 25 MG/1
TABLET, FILM COATED ORAL
Qty: 60 TABLET | Refills: 0 | Status: SHIPPED | OUTPATIENT
Start: 2020-08-28 | End: 2020-09-25

## 2020-08-28 RX ORDER — QUETIAPINE FUMARATE 200 MG/1
TABLET, FILM COATED ORAL
Qty: 30 TABLET | Refills: 0 | Status: SHIPPED | OUTPATIENT
Start: 2020-08-28 | End: 2020-09-25

## 2020-09-24 DIAGNOSIS — F33.41 RECURRENT MAJOR DEPRESSIVE DISORDER, IN PARTIAL REMISSION (HCC): ICD-10-CM

## 2020-09-24 DIAGNOSIS — F32.2 CURRENT SEVERE EPISODE OF MAJOR DEPRESSIVE DISORDER WITHOUT PSYCHOTIC FEATURES WITHOUT PRIOR EPISODE (HCC): ICD-10-CM

## 2020-09-25 RX ORDER — QUETIAPINE FUMARATE 200 MG/1
TABLET, FILM COATED ORAL
Qty: 30 TABLET | Refills: 0 | Status: SHIPPED | OUTPATIENT
Start: 2020-09-25 | End: 2020-10-21

## 2020-09-25 RX ORDER — QUETIAPINE FUMARATE 25 MG/1
TABLET, FILM COATED ORAL
Qty: 60 TABLET | Refills: 0 | Status: SHIPPED | OUTPATIENT
Start: 2020-09-25 | End: 2020-10-21

## 2020-10-09 DIAGNOSIS — E11.65 UNCONTROLLED TYPE 2 DIABETES MELLITUS WITH HYPERGLYCEMIA (HCC): ICD-10-CM

## 2020-10-09 RX ORDER — INSULIN LISPRO 100 [IU]/ML
INJECTION, SOLUTION INTRAVENOUS; SUBCUTANEOUS
Qty: 15 ML | Refills: 5 | Status: SHIPPED | OUTPATIENT
Start: 2020-10-09 | End: 2021-05-14 | Stop reason: HOSPADM

## 2020-10-14 DIAGNOSIS — F33.41 RECURRENT MAJOR DEPRESSIVE DISORDER, IN PARTIAL REMISSION (HCC): ICD-10-CM

## 2020-10-14 RX ORDER — BUPROPION HYDROCHLORIDE 150 MG/1
TABLET ORAL
Qty: 30 TABLET | Refills: 0 | Status: SHIPPED | OUTPATIENT
Start: 2020-10-14 | End: 2020-11-16

## 2020-10-20 DIAGNOSIS — F33.41 RECURRENT MAJOR DEPRESSIVE DISORDER, IN PARTIAL REMISSION (HCC): ICD-10-CM

## 2020-10-20 DIAGNOSIS — F32.2 CURRENT SEVERE EPISODE OF MAJOR DEPRESSIVE DISORDER WITHOUT PSYCHOTIC FEATURES WITHOUT PRIOR EPISODE (HCC): ICD-10-CM

## 2020-10-21 RX ORDER — QUETIAPINE FUMARATE 200 MG/1
TABLET, FILM COATED ORAL
Qty: 30 TABLET | Refills: 0 | Status: SHIPPED | OUTPATIENT
Start: 2020-10-21 | End: 2020-11-18

## 2020-10-21 RX ORDER — QUETIAPINE FUMARATE 25 MG/1
TABLET, FILM COATED ORAL
Qty: 60 TABLET | Refills: 0 | Status: SHIPPED | OUTPATIENT
Start: 2020-10-21 | End: 2020-11-18

## 2020-11-14 DIAGNOSIS — F32.2 CURRENT SEVERE EPISODE OF MAJOR DEPRESSIVE DISORDER WITHOUT PSYCHOTIC FEATURES WITHOUT PRIOR EPISODE (HCC): ICD-10-CM

## 2020-11-14 DIAGNOSIS — F33.41 RECURRENT MAJOR DEPRESSIVE DISORDER, IN PARTIAL REMISSION (HCC): ICD-10-CM

## 2020-11-16 RX ORDER — BUPROPION HYDROCHLORIDE 150 MG/1
TABLET ORAL
Qty: 30 TABLET | Refills: 0 | Status: SHIPPED | OUTPATIENT
Start: 2020-11-16 | End: 2020-12-14

## 2020-11-16 RX ORDER — SERTRALINE HYDROCHLORIDE 100 MG/1
TABLET, FILM COATED ORAL
Qty: 60 TABLET | Refills: 0 | Status: SHIPPED | OUTPATIENT
Start: 2020-11-16 | End: 2020-12-14

## 2020-11-17 DIAGNOSIS — F33.41 RECURRENT MAJOR DEPRESSIVE DISORDER, IN PARTIAL REMISSION (HCC): ICD-10-CM

## 2020-11-17 DIAGNOSIS — F32.2 CURRENT SEVERE EPISODE OF MAJOR DEPRESSIVE DISORDER WITHOUT PSYCHOTIC FEATURES WITHOUT PRIOR EPISODE (HCC): ICD-10-CM

## 2020-11-18 DIAGNOSIS — F33.41 RECURRENT MAJOR DEPRESSIVE DISORDER, IN PARTIAL REMISSION (HCC): ICD-10-CM

## 2020-11-18 DIAGNOSIS — F32.2 CURRENT SEVERE EPISODE OF MAJOR DEPRESSIVE DISORDER WITHOUT PSYCHOTIC FEATURES WITHOUT PRIOR EPISODE (HCC): ICD-10-CM

## 2020-11-18 RX ORDER — QUETIAPINE FUMARATE 25 MG/1
TABLET, FILM COATED ORAL
Qty: 60 TABLET | Refills: 0 | Status: SHIPPED | OUTPATIENT
Start: 2020-11-18 | End: 2020-11-19

## 2020-11-18 RX ORDER — QUETIAPINE FUMARATE 200 MG/1
TABLET, FILM COATED ORAL
Qty: 30 TABLET | Refills: 0 | Status: SHIPPED | OUTPATIENT
Start: 2020-11-18 | End: 2020-11-19

## 2020-11-19 RX ORDER — QUETIAPINE FUMARATE 200 MG/1
TABLET, FILM COATED ORAL
Qty: 30 TABLET | Refills: 0 | Status: SHIPPED | OUTPATIENT
Start: 2020-11-19 | End: 2021-01-11

## 2020-11-19 RX ORDER — QUETIAPINE FUMARATE 25 MG/1
TABLET, FILM COATED ORAL
Qty: 60 TABLET | Refills: 0 | Status: SHIPPED | OUTPATIENT
Start: 2020-11-19 | End: 2021-01-11

## 2020-12-14 DIAGNOSIS — F33.41 RECURRENT MAJOR DEPRESSIVE DISORDER, IN PARTIAL REMISSION (HCC): ICD-10-CM

## 2020-12-14 DIAGNOSIS — F32.2 CURRENT SEVERE EPISODE OF MAJOR DEPRESSIVE DISORDER WITHOUT PSYCHOTIC FEATURES WITHOUT PRIOR EPISODE (HCC): ICD-10-CM

## 2020-12-14 RX ORDER — BUPROPION HYDROCHLORIDE 150 MG/1
TABLET ORAL
Qty: 30 TABLET | Refills: 0 | Status: SHIPPED | OUTPATIENT
Start: 2020-12-14 | End: 2021-01-18

## 2020-12-14 RX ORDER — SERTRALINE HYDROCHLORIDE 100 MG/1
TABLET, FILM COATED ORAL
Qty: 60 TABLET | Refills: 0 | Status: SHIPPED | OUTPATIENT
Start: 2020-12-14 | End: 2021-01-18

## 2021-01-10 DIAGNOSIS — F32.2 CURRENT SEVERE EPISODE OF MAJOR DEPRESSIVE DISORDER WITHOUT PSYCHOTIC FEATURES WITHOUT PRIOR EPISODE (HCC): ICD-10-CM

## 2021-01-10 DIAGNOSIS — F33.41 RECURRENT MAJOR DEPRESSIVE DISORDER, IN PARTIAL REMISSION (HCC): ICD-10-CM

## 2021-01-11 RX ORDER — QUETIAPINE FUMARATE 25 MG/1
TABLET, FILM COATED ORAL
Qty: 60 TABLET | Refills: 0 | Status: SHIPPED | OUTPATIENT
Start: 2021-01-11 | End: 2021-02-08

## 2021-01-11 RX ORDER — QUETIAPINE FUMARATE 200 MG/1
TABLET, FILM COATED ORAL
Qty: 30 TABLET | Refills: 0 | Status: SHIPPED | OUTPATIENT
Start: 2021-01-11 | End: 2021-02-08

## 2021-01-15 DIAGNOSIS — F33.41 RECURRENT MAJOR DEPRESSIVE DISORDER, IN PARTIAL REMISSION (HCC): ICD-10-CM

## 2021-01-15 DIAGNOSIS — F32.2 CURRENT SEVERE EPISODE OF MAJOR DEPRESSIVE DISORDER WITHOUT PSYCHOTIC FEATURES WITHOUT PRIOR EPISODE (HCC): ICD-10-CM

## 2021-01-18 RX ORDER — SERTRALINE HYDROCHLORIDE 100 MG/1
TABLET, FILM COATED ORAL
Qty: 60 TABLET | Refills: 0 | Status: SHIPPED | OUTPATIENT
Start: 2021-01-18 | End: 2021-01-21

## 2021-01-18 RX ORDER — BUPROPION HYDROCHLORIDE 150 MG/1
TABLET ORAL
Qty: 30 TABLET | Refills: 0 | Status: SHIPPED | OUTPATIENT
Start: 2021-01-18 | End: 2021-01-21

## 2021-01-20 DIAGNOSIS — F32.2 CURRENT SEVERE EPISODE OF MAJOR DEPRESSIVE DISORDER WITHOUT PSYCHOTIC FEATURES WITHOUT PRIOR EPISODE (HCC): ICD-10-CM

## 2021-01-20 DIAGNOSIS — F33.41 RECURRENT MAJOR DEPRESSIVE DISORDER, IN PARTIAL REMISSION (HCC): ICD-10-CM

## 2021-01-21 RX ORDER — SERTRALINE HYDROCHLORIDE 100 MG/1
TABLET, FILM COATED ORAL
Qty: 60 TABLET | Refills: 0 | Status: SHIPPED | OUTPATIENT
Start: 2021-01-21 | End: 2021-02-19

## 2021-01-21 RX ORDER — BUPROPION HYDROCHLORIDE 150 MG/1
TABLET ORAL
Qty: 30 TABLET | Refills: 0 | Status: SHIPPED | OUTPATIENT
Start: 2021-01-21 | End: 2021-02-19

## 2021-01-22 ENCOUNTER — TELEPHONE (OUTPATIENT)
Dept: PSYCHIATRY | Facility: CLINIC | Age: 51
End: 2021-01-22

## 2021-01-22 NOTE — TELEPHONE ENCOUNTER
Left message for patient to call the office, dr Cornell Nissen would like to the see the patient on 01/27/2021

## 2021-02-06 DIAGNOSIS — F33.41 RECURRENT MAJOR DEPRESSIVE DISORDER, IN PARTIAL REMISSION (HCC): ICD-10-CM

## 2021-02-06 DIAGNOSIS — F32.2 CURRENT SEVERE EPISODE OF MAJOR DEPRESSIVE DISORDER WITHOUT PSYCHOTIC FEATURES WITHOUT PRIOR EPISODE (HCC): ICD-10-CM

## 2021-02-08 RX ORDER — QUETIAPINE FUMARATE 200 MG/1
TABLET, FILM COATED ORAL
Qty: 30 TABLET | Refills: 0 | Status: SHIPPED | OUTPATIENT
Start: 2021-02-08 | End: 2021-03-08

## 2021-02-08 RX ORDER — QUETIAPINE FUMARATE 25 MG/1
TABLET, FILM COATED ORAL
Qty: 60 TABLET | Refills: 0 | Status: SHIPPED | OUTPATIENT
Start: 2021-02-08 | End: 2021-03-08

## 2021-02-16 DIAGNOSIS — F32.2 CURRENT SEVERE EPISODE OF MAJOR DEPRESSIVE DISORDER WITHOUT PSYCHOTIC FEATURES WITHOUT PRIOR EPISODE (HCC): ICD-10-CM

## 2021-02-16 DIAGNOSIS — F33.41 RECURRENT MAJOR DEPRESSIVE DISORDER, IN PARTIAL REMISSION (HCC): ICD-10-CM

## 2021-02-19 RX ORDER — BUPROPION HYDROCHLORIDE 150 MG/1
TABLET ORAL
Qty: 30 TABLET | Refills: 0 | Status: SHIPPED | OUTPATIENT
Start: 2021-02-19 | End: 2021-03-17

## 2021-02-19 RX ORDER — SERTRALINE HYDROCHLORIDE 100 MG/1
TABLET, FILM COATED ORAL
Qty: 60 TABLET | Refills: 0 | Status: SHIPPED | OUTPATIENT
Start: 2021-02-19 | End: 2021-03-17

## 2021-03-03 NOTE — PROGRESS NOTES
[facility] [address]  Internal Medicine     Patient: Shivani Cruz  YOB: 1951  Mrn: [id number]       3/3/2021  HPI Patient: Shivani Cruz is a 69 year old who presents for annual exam, eval of several chronic concerns, medicare wellness exam and notes:  1, completed colonoscopy w Dr Shi 12.5.2020, abnormal bx but no polyps, completed virtual visit w Dr Shi and completed angiogram a few days ago and needs results. I reviewed results w her: normal caliber vessles. She was given IV contrast and I reviewed need for liberal hydration.  1, diabetes- remains on meformin 850 BID, FBG high at 157- 158 for no clear reason, drinking more water, exercise is none  - last dilated eye exam w Dr Li 4 weeks ago , no diabetic eye changes  2, allergies- year round, remains on flonase, states not allergic to daughter's dylon lamar . No longer wheezing/ sneezing but had post nasal drip and occasional cough but overall improved. allegra  3, HTN assoc w diabetes- home BP is good 124/63, eating from home w veggies, no exercise  4, obesity- no sign weight loss, no food log to date. Eats lots of fruit. Weight goal prefers 170  5, Cad prevention- no regular exercise, remains on statin  6, completted covid seriees  7,hx endometrial cancer 2018, chemo induced neuropathy- YUMIKO, saw Dr Cordoba in the past , seeing Dr Friend, neuropathy pain resolved in 2020/ 2021. Was told to be seen every 6 months for 5 yrs  8,  medicare wellness visit- she remains independent in all ADLs, no falls in the past 3 months, no significant change in urination/ incontinence, denies unusual pain, vision is not changed and hearing remains adequate. Vaccines are current, referrals not needed, and  Depression screen with PHQ-2 score is 0  - Carie daughter is POA for health and is aware; prefers all reasonable care and has living will on file at home    ROS Denies JANE, SSCP, coughing, fever chills sweats, diarrhea/ change in  Assessment/Plan:    No problem-specific Assessment & Plan notes found for this encounter  Diagnoses and all orders for this visit:    Diabetes mellitus type 1 5, managed as type 1 (HCC)  -     insulin glargine (BASAGLAR KWIKPEN) 100 units/mL injection pen; Inject 60 units sq q HS  Alcohol-induced chronic pancreatitis ([facility] [address] )    Anxiety and depression    Paresthesia of both lower extremities    Sleep disorder    Encounter for diabetic foot exam ([facility] [address] )    Encounter for vaccination  -     influenza vaccine, [id number], quadrivalent, recombinant, PF, 0 5 mL, for patients 18 yr+ (FLUBLOK)      A/P: Overall, appears to be a little better  Pt was suppose to increase his basal insulin to 60 units q  HS, but only went to 50  Really needs mealtiime insulin  Will try and figure out why the opposition and try and speed up the PA process  Pt to increase to 60 units q HS and considering adding a AM dose as well  ? ?EMG being negative and pt's s/s  Most likely small fiber disease, but foot exam was good  Little better with increase in SNRI and will continue  Getting his sugars controlled may help as well  MERLYN/depression and sleep a little better with the SNRI and will continue  Up date the flu vaccine  RTC four weeks for f/u  Subjective:      Patient ID: Thompson Zhao is a 46 y o  male  WM RTC for f/u chronic pancratitis, uncontrolled dm, bilat leg pain/tingling, sleep issues, etc  Doing about the same  No worse  Reports sugars are still around 200's and that he is still having trouble getting the insurance to cover the mealtime insulin  Taking 50 units of basal insulin q HS  EMG done and was negative  Leg s/s, sleep issues, and MERLYN a little better with the higher dose of SNRI  No new issues  Due for foot exam and vaccines  Continues to refrain from ETOH  The following portions of the patient's history were reviewed and updated as appropriate:   He  has a past medical history of Allergic rhinitis;  Anemia; Anxiety and depression; Quiros esophagus; Bronchitis, asthmatic; Cholelithiasis; Chronic pain; COPD (chronic obstructive pulmonary disease) ([place] [address] ); Depression; Diabetes mellitus ([place] [address] ); Generalized anxiety disorder; GERD (gastroesophageal reflux disease); Hypertension; Kidney stone; Metatarsalgia; Pancreatitis; Psychiatric disorder; and Renal disorder  He   Patient Active Problem List    Diagnosis Date Noted    Paresthesia of both lower extremities     Chronic pancreatitis ([place] [address] ) 09/13/2018    Transaminitis 08/13/2018    Tobacco dependence 08/13/2018    Acute on chronic pancreatitis ([place] [address] ) 08/12/2018    GERD (gastroesophageal reflux disease) 08/09/2018    Anxiety and depression 08/09/2018    H/O alcohol abuse 08/09/2018    Leukemoid reaction 08/09/2018    Insomnia 09/05/2017    Recurrent pancreatitis ([place] [address] ) 08/25/2017    COPD (chronic obstructive pulmonary disease) ([place] [address] ) 03/29/2017    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    Iron deficiency anemia 04/05/2016    Diabetes mellitus type 1 5, managed as type 1 ([place] [address] ) 04/05/2016    Fibromyalgia 07/29/2013    Fatty liver 12/05/2012    Nephrolithiasis 12/05/2012    Hyperlipidemia 09/11/2012    Benign essential hypertension 07/02/2012    MDD (major depressive disorder), single episode 07/02/2012    Other chronic pain 07/02/2012    Sleep disorder 07/02/2012    Generalized anxiety disorder 06/13/2012     He  has a past surgical history that includes CHOLECYSTECTOMY LAPAROSCOPIC; Foot surgery; Knee arthroscopy; and Vasectomy  His family history includes Cancer in his mother; Coronary artery disease in his family, father, and mother; Diabetes in his mother and sister; Prostate cancer in his father  He  reports that he has been smoking  He has been smoking about 1 00 pack per day  He has never used smokeless tobacco  He reports that he drinks alcohol  He reports that he does not use drugs    Current Outpatient Prescriptions   Medication Sig Dispense Refill    bowels    6/24/2020  HPI Patient: Shivani Cruz is a 68 year old who requests, consents and presents for Tele-health visit on epic w zoom due to Covid-19 pandemic emergency by phones/devices (both parties) capable of video and audio. Pt acknowledged risk of unsecure transmission and that copayments or coinsurnace may apply to these services. Shivani Cruz reports   1, diabetes- last AIC Feb 6.1%,   2, CAD prevention- remains on statin  3, HTN assoc diabetes- remains on meds  4, main concern: cough is driving her crazy since march 25 which was dry until the past 2 weeks. Denies fever chills sweats SOB/ stuffy nose but blows nose when coughing as congested and some sneezing.cough triggered by tickle in throat. Not on flonase, no sinus rinse . Cough is day,  night and after meals    2/26/2020  HPI Patient: Shivani Cruz is a 69 year old who presents for CPX, eval of several  concerns and notes:  1, rash L lower posterior legs since 5 months, used nizoral cream daily for 4 weeks w no relief though itchy tinea pedis resolved.   2, B feet pain w pain at B bunions for 4 months. wearring good shoes, no injury and pain is right at the bunions only.    3, B thigh pain- saw Dr Estrada R hip/ lower back, completed PT w improvement of other pains but not this area. Still w pain R >L thigh w walking , lower back pain as well juanjo some morning very stiff and painful when wakes  4,  Diabetes- remains on metformin BID, ; exercise group in [place] 2 days per week, her own exercises  - last dilated diabetes eye exam 2 weeks ago w Dr Li, NEG  5, CAD prevent, chol- non fasting for labs, sausage eggs an onion, took atorvastatin lats night  6, HTN assoc diabetes- losartan and coreg per usual, denies lightheartedness  7, occasional headaches around the eye off and on for the past week lasting 1-2 hrs then resolved w naproxen  8, glaucoma, mild-  pressure is excellent w lantanoprost. Mild cataracts  9, mammo pending April  and reports does not need order  10, uterine cancer - completed all treatment including FIFI/ BSO 4.18, chemo , and 5 doses XRT summer 2018;  Neuropathy nearly resolved, remains off gabapentin and rarely notes any pin pricks    Outpatient Medications Marked as Taking for the 3/3/21 encounter (Office Visit) with Alice Kasper MD   Medication Sig Dispense Refill   • metFORMIN (GLUCOPHAGE) 850 MG tablet TAKE 1 TABLET BY MOUTH TWICE A DAY WITH MEALS 180 tablet 3   • fexofenadine (ALLEGRA) 180 MG tablet TAKE 1 TABLET BY MOUTH EVERY DAY 30 tablet 5   • cholecalciferol (VITAMIN D) 25 mcg (1,000 units) tablet Take 150 mcg by mouth daily.     • carvedilol (COREG) 6.25 MG tablet TAKE 1 TABLET BY MOUTH TWICE A DAY WITH MEALS 180 tablet 3   • atorvastatin (LIPITOR) 10 MG tablet TAKE 1 TABLET BY MOUTH EVERY DAY 90 tablet 3   • losartan (COZAAR) 100 MG tablet TAKE 1 TABLET BY MOUTH EVERY DAY 90 tablet 3   • B Complex Vitamins (B COMPLEX 100 PO)      • amLODIPine (NORVASC) 10 MG tablet Take 1 tablet by mouth daily. 90 tablet 3   • fluticasone (FLONASE) 50 MCG/ACT nasal spray Spray 2 sprays in each nostril daily. 16 g 12   • latanoprost (XALATAN) 0.005 % ophthalmic solution        ALLERGIES:  Sulfa antibiotics     Patient Active Problem List   Diagnosis   • Adenocarcinoma of endometrium s/p [facility] BSO  Dr Gilbert, s/p chemo, XRT 2018   • Type 2 diabetes mellitus with renal complication (CMS/HCC)   • Dyslipidemia associated with type 2 diabetes mellitus (CMS/HCC)   • Hypertension associated with diabetes (CMS/HCC)   • Chemotherapy-induced peripheral neuropathy (CMS/HCC) 2018, improved 2019   • Obesity (BMI 30.0-34.9)   • Non-seasonal allergic rhinitis due to pollen     Past Surgical History:   Procedure Laterality Date   • Dilation and curettage of uterus     • Hysterectomy  04/2018    [facility] BSO Dr Gilbert Uterine cancer   • Tubal ligation       Family History  Mother breast cancer dx age 62  Father heart failure   Family History   Denied:  fenofibrate (TRICOR) 145 mg tablet Take 1 tablet (145 mg total) by mouth daily 30 tablet 0    glucose blood (ONE TOUCH ULTRA TEST) test strip by In Vitro route 3 (three) times a day      glucose blood (ONETOUCH VERIO) test strip by In Vitro route 3 (three) times a day      hydrOXYzine pamoate (VISTARIL) 50 mg capsule TAKE 1 CAPSULE BY MOUTH THREE TIMES DAILY AS NEEDED 90 capsule 0    insulin glargine (BASAGLAR KWIKPEN) 100 units/mL injection pen Inject 60 units sq q HS  5 pen 0    insulin lispro (HumaLOG) 100 units/mL injection pen Per sliding scale with meals  (Patient taking differently: Inject 42 Units under the skin Per sliding scale with meals  ) 5 pen 0    Insulin Pen Needle (B-D ULTRAFINE III SHORT PEN) 31G X 8 MM MISC by Does not apply route      Insulin Pen Needle (PEN NEEDLES) 29G X 12MM MISC by Does not apply route daily at bedtime Substitute what fits Tou[place]jo pen and what is covered by insurance  45 each 0    lisinopril (ZESTRIL) 20 mg tablet Take 1 tablet (20 mg total) by mouth daily 30 tablet 5    omeprazole (PriLOSEC) 20 mg delayed release capsule Take by mouth      risperiDONE (RisperDAL) 0 5 mg tablet TAKE 1 TABLET BY MOUTH TWICE DAILY 60 tablet 3    traZODone (DESYREL) 50 mg tablet Take 1 tablet (50 mg total) by mouth daily at bedtime 30 tablet 0    venlafaxine (EFFEXOR XR) 150 mg 24 hr capsule Take 1 capsule (150 mg total) by mouth daily 90 capsule 0     No current facility-administered medications for this visit        Current Outpatient Prescriptions on File Prior to Visit   Medication Sig    fenofibrate (TRICOR) 145 mg tablet Take 1 tablet (145 mg total) by mouth daily    glucose blood (ONE TOUCH ULTRA TEST) test strip by In Vitro route 3 (three) times a day    glucose blood (ONETOUCH VERIO) test strip by In Vitro route 3 (three) times a day    hydrOXYzine pamoate (VISTARIL) 50 mg capsule TAKE 1 CAPSULE BY MOUTH THREE TIMES DAILY AS NEEDED    insulin lispro (HumaLOG) 100 units/mL FH: colon cancer  Denied: Family history of Memory loss  Denied: Family history of Stroke     Social History     Tobacco Use   • Smoking status: Former Smoker     Types: Cigarettes   • Smokeless tobacco: Never Used   Substance Use Topics   • Alcohol use: Yes     Alcohol/week: 1.0 standard drinks     Types: 1 Glasses of wine per week     Frequency: Monthly or less     Drinks per session: 1 or 2     Binge frequency: Never     Comment: OCC   • Drug use: No   Diet canola and olive oil, veggies fruist ckn mostly, fish> beef  Exercises imrpoving  Former smoker QUIT  1 PPD x 45 yrs  living w son in law and daugther; very active at [employer]      , menopause age 46  Retired      /69   Pulse (!) 54   Temp 98 °F (36.7 °C)   Ht 5' 3\" (1.6 m)   Wt 86.3 kg (190 lb 4.1 oz)   BMI 33.70 kg/m²   BSA 1.89 m²   Physical exam   Constitution: alert, in no acute distress, speech clear, hearing intact to whisper, minicog 3/3  Head and Face: atraumatic, normal cephalic  Eyes: anicteric, EOMi, conjugate gaze 20/ 25 w glasses  ENT:  Mask on   Neck: supple, no mass, thyroid not enlarged, no thyroid nodules  Pulse: carotids no bruits  Lymph: no lymphadenopathy of ACT, supraclavicular, axilla areas  Pulmonary: normal respiratory rate/ effort, lungs clear to auscultation  no rales  Cardiovascular: PMI nonpalpable, regular rate and rhythm, no murmur/ rub no s3/S4  Ext no edema; monofilament testing intact B soles slight calluses lateral feet at fifth toe on feet, pulses 2+ DP   Abdomen: soft, nontender, normoactive bowel sounds   Neuro: no coordination defects, moves all extremities normally  Psych: alert lucid articulate, pleasant engaged in visit, mood and affect appropriate  Skin, hair, nails: normal skin turgor, no clubbing cyanosis         GFR Estimate,  (no units)   Date Value   2020 63     Hemoglobin A1C (%)   Date Value   10/08/2020 6.2 (H)     A: Cecum; biopsies:   -Fragments of  injection pen Per sliding scale with meals  (Patient taking differently: Inject 42 Units under the skin Per sliding scale with meals  )    Insulin Pen Needle (B-D ULTRAFINE III SHORT PEN) 31G X 8 MM MISC by Does not apply route    Insulin Pen Needle (PEN NEEDLES) 29G X 12MM MISC by Does not apply route daily at bedtime Substitute what fits Tou[place]jo pen and what is covered by insurance   lisinopril (ZESTRIL) 20 mg tablet Take 1 tablet (20 mg total) by mouth daily    omeprazole (PriLOSEC) 20 mg delayed release capsule Take by mouth    risperiDONE (RisperDAL) 0 5 mg tablet TAKE 1 TABLET BY MOUTH TWICE DAILY    traZODone (DESYREL) 50 mg tablet Take 1 tablet (50 mg total) by mouth daily at bedtime    venlafaxine (EFFEXOR XR) 150 mg 24 hr capsule Take 1 capsule (150 mg total) by mouth daily    [DISCONTINUED] insulin glargine (BASAGLAR KWIKPEN) 100 units/mL injection pen Inject 60 units sq q HS  No current facility-administered medications on file prior to visit  He has No Known Allergies       Review of Systems   Constitutional: Negative for activity change, chills, diaphoresis, fatigue and fever  Respiratory: Negative for cough, chest tightness, shortness of breath and wheezing  Cardiovascular: Negative for chest pain, palpitations and leg swelling  Gastrointestinal: Negative for abdominal pain, constipation, diarrhea, nausea and vomiting  Genitourinary: Negative for difficulty urinating, dysuria and frequency  Musculoskeletal: Negative for arthralgias, gait problem and myalgias  Neurological: Positive for numbness  Negative for dizziness, syncope, facial asymmetry, weakness, light-headedness and headaches  Psychiatric/Behavioral: Positive for dysphoric mood and sleep disturbance  Negative for confusion  The patient is nervous/anxious            Objective:      /60 (BP Location: Left arm, Patient Position: Sitting, Cuff Size: Adult)   Pulse (!) 114   Temp 97 6 °F (36 4 °C) (Tympanic) Resp 18   Ht 6' 1" (1 854 m)   Wt 84 8 kg (187 lb)   SpO2 94%   BMI 24 67 kg/m²          Physical Exam   Constitutional: He is oriented to person, place, and time  He appears well-developed and well-nourished  No distress  HENT:   Head: Normocephalic and atraumatic  Mouth/Throat: Oropharynx is clear and moist    Eyes: Pupils are equal, round, and reactive to light  Conjunctivae and EOM are normal    Cardiovascular: Normal rate, regular rhythm and normal heart sounds  Pulses are no weak pulses  No murmur heard  Pulses:       Dorsalis pedis pulses are 1+ on the right side, and 1+ on the left side  Posterior tibial pulses are 1+ on the right side, and 1+ on the left side  Pulmonary/Chest: Effort normal and breath sounds normal  No respiratory distress  He has no wheezes  Musculoskeletal: He exhibits no edema  Feet:   Right Foot:   Skin Integrity: Negative for ulcer, skin breakdown, erythema, warmth, callus or dry skin  Left Foot:   Skin Integrity: Negative for ulcer, skin breakdown, erythema, warmth, callus or dry skin  Neurological: He is alert and oriented to person, place, and time  Psychiatric: He has a normal mood and affect  His behavior is normal  Judgment and thought content normal    Nursing note and vitals reviewed  Patient's shoes and socks removed  Right Foot/Ankle   Right Foot Inspection  Skin Exam: skin normal and skin intact no dry skin, no warmth, no callus, no erythema, no maceration, no abnormal color, no pre-ulcer, no ulcer and no callus                          Toe Exam: ROM and strength within normal limitsno swelling, no tenderness, erythema and  no right toe deformity  Sensory       Monofilament testing: intact  Vascular  Capillary refills: < 3 seconds  The right DP pulse is 1+  The right PT pulse is 1+       Left Foot/Ankle  Left Foot Inspection  Skin Exam: skin normal and skin intactno dry skin, no warmth, no erythema, no maceration, normal color, no colonic mucosa with ulceration, ulcer exudate, focal cryptitis and reactive epithelial change  -CMV immunostain is negative for viral inclusions     Assessment/Plan:    MEDICARE WELLNESS VISIT  - End-of-life issues addressed including 'Do Not Resuscitate' and advance directives  - no reported issues w pain, falls, speech, ADLs, hearing, vision, and minicog is  3/3  - depression screen NEG  - HMR Completed: Yes   - Follow up with PCP: Yes, 4 months  - vaccines shingrix/ TDAP due  - cancer screening tests current    Health prevention  - vitamin D 1000 IU daily, 100% RDA calcium from foods  - nutrition: 50% of nutrition should come from vegetables, fruits  - exercise 30\" daily 5 days per week ( or more!)  - hep C screen once 2020  - dexascan normal 2.2.21  - mammogram due annually,  Jan 2021  - colon cancer screening: NEG for polyps 12.5.2021  - vaccines: due for TDAP/ shingrix next visit;   pneumovax-23/prevnar-13 2014, 2015    Endometrial Cancer s/p FIFI BSO, XRT / chemo 2018  - YUMIKO  - recvd tx at [facility]  - seeing Dr DONALD Friend, trying to set appt w Dr ODALYS Gilbert    Colonoscopy, abnormal 12.5.2020  - seeing Dr Shi  - recent CTA abd/ pelvis normal caliber vessels  - significance of focal cryptitis/ ulcer per GI    Hypertension associated with Diabetes: well controlled  - BP goal 130/ 80 or less  - continue with current medication   - keep both diabetes and blood pressure controlled to protect the kidneys  - proceed with low salt diet, fresh veggies/ fruits, lean weight, regular exercise  - check Cr/ K+/ urine annually     Diabetes: well controlled   - check AIC   - complications: none known   - proceed with best nutrition, exercise, lowest weight, metformin  - fasting morning glucose goal is 70- 130  to achieve AIC 7% to lower the risk of nerve, eye, kidney, heart disease   - AIC is a measure of diabetes control and reflects 3 months of effort  - prevention of heart disease: statin, exercise  - protect the  kidneys: keep /80, AIC 7% or less  - annual Cr/urine exam: due annually  - annual dilated eye exam w ophtho: completed exam  Brittany Li ,[phone]    Fatty liver noted on CT 2021, Obesity BMI Body mass index is 33.7 kg/m².   - no change in weight to date  - weight goal 165  - sustained weight loss is by sustained change in nutrition  - intermittent fasting: eat your food in 8 hrs then no food / calories 16 hrs  - most women need 1000- 1200 lavonne per day to lose weight , 30 grams carbs per meal  - begin  myfitnesspal ap; put in sedentary, lose 1#/ week     High cholesterol, heart disease prevention  - remains on atorvastatin  - proceed with mostly plant based high fiber diet  - do have healthy fats which can be found in nuts, avocados, olive oil and canola oil  - exercise regularly (goal is 2.5- 3 hours per week or more)    Chemo induced neuropathic pain  - resolved  - remains off gabapentin    RV 4 months AIC    Alice Kasper MD   pre-ulcer, no ulcer and no callus                         Toe Exam: no swelling, no tenderness, no erythema and no left toe deformity                   Sensory       Monofilament: intact  Vascular  Capillary refills: < 3 seconds  The left DP pulse is 1+  The left PT pulse is 1+  Assign Risk Category:  No deformity present; No loss of protective sensation;  No weak pulses       Risk: 0

## 2021-03-04 DIAGNOSIS — F32.2 CURRENT SEVERE EPISODE OF MAJOR DEPRESSIVE DISORDER WITHOUT PSYCHOTIC FEATURES WITHOUT PRIOR EPISODE (HCC): ICD-10-CM

## 2021-03-04 DIAGNOSIS — F33.41 RECURRENT MAJOR DEPRESSIVE DISORDER, IN PARTIAL REMISSION (HCC): ICD-10-CM

## 2021-03-08 RX ORDER — QUETIAPINE FUMARATE 200 MG/1
TABLET, FILM COATED ORAL
Qty: 30 TABLET | Refills: 0 | Status: SHIPPED | OUTPATIENT
Start: 2021-03-08 | End: 2021-03-09

## 2021-03-08 RX ORDER — QUETIAPINE FUMARATE 25 MG/1
TABLET, FILM COATED ORAL
Qty: 60 TABLET | Refills: 0 | Status: SHIPPED | OUTPATIENT
Start: 2021-03-08 | End: 2021-03-09

## 2021-03-09 DIAGNOSIS — F32.2 CURRENT SEVERE EPISODE OF MAJOR DEPRESSIVE DISORDER WITHOUT PSYCHOTIC FEATURES WITHOUT PRIOR EPISODE (HCC): ICD-10-CM

## 2021-03-09 DIAGNOSIS — F33.41 RECURRENT MAJOR DEPRESSIVE DISORDER, IN PARTIAL REMISSION (HCC): ICD-10-CM

## 2021-03-09 RX ORDER — QUETIAPINE FUMARATE 25 MG/1
TABLET, FILM COATED ORAL
Qty: 60 TABLET | Refills: 0 | Status: SHIPPED | OUTPATIENT
Start: 2021-03-09 | End: 2021-03-23 | Stop reason: SDUPTHER

## 2021-03-09 RX ORDER — QUETIAPINE FUMARATE 200 MG/1
TABLET, FILM COATED ORAL
Qty: 30 TABLET | Refills: 0 | Status: SHIPPED | OUTPATIENT
Start: 2021-03-09 | End: 2021-03-23 | Stop reason: SDUPTHER

## 2021-03-17 DIAGNOSIS — F33.41 RECURRENT MAJOR DEPRESSIVE DISORDER, IN PARTIAL REMISSION (HCC): ICD-10-CM

## 2021-03-17 DIAGNOSIS — F32.2 CURRENT SEVERE EPISODE OF MAJOR DEPRESSIVE DISORDER WITHOUT PSYCHOTIC FEATURES WITHOUT PRIOR EPISODE (HCC): ICD-10-CM

## 2021-03-17 RX ORDER — SERTRALINE HYDROCHLORIDE 100 MG/1
TABLET, FILM COATED ORAL
Qty: 60 TABLET | Refills: 0 | Status: SHIPPED | OUTPATIENT
Start: 2021-03-17 | End: 2021-03-23 | Stop reason: SDUPTHER

## 2021-03-17 RX ORDER — BUPROPION HYDROCHLORIDE 150 MG/1
TABLET ORAL
Qty: 30 TABLET | Refills: 0 | Status: SHIPPED | OUTPATIENT
Start: 2021-03-17 | End: 2021-03-23 | Stop reason: SDUPTHER

## 2021-03-23 ENCOUNTER — OFFICE VISIT (OUTPATIENT)
Dept: PSYCHIATRY | Facility: CLINIC | Age: 51
End: 2021-03-23
Payer: COMMERCIAL

## 2021-03-23 DIAGNOSIS — F32.2 CURRENT SEVERE EPISODE OF MAJOR DEPRESSIVE DISORDER WITHOUT PSYCHOTIC FEATURES WITHOUT PRIOR EPISODE (HCC): ICD-10-CM

## 2021-03-23 DIAGNOSIS — F41.1 GENERALIZED ANXIETY DISORDER: ICD-10-CM

## 2021-03-23 DIAGNOSIS — F10.21 ALCOHOL DEPENDENCE IN EARLY, EARLY PARTIAL, SUSTAINED FULL, OR SUSTAINED PARTIAL REMISSION (HCC): ICD-10-CM

## 2021-03-23 DIAGNOSIS — Z79.899 ENCOUNTER FOR LONG-TERM (CURRENT) USE OF OTHER MEDICATIONS: ICD-10-CM

## 2021-03-23 DIAGNOSIS — F33.41 RECURRENT MAJOR DEPRESSIVE DISORDER, IN PARTIAL REMISSION (HCC): Primary | ICD-10-CM

## 2021-03-23 DIAGNOSIS — E83.30: ICD-10-CM

## 2021-03-23 PROCEDURE — 99214 OFFICE O/P EST MOD 30 MIN: CPT | Performed by: REGISTERED NURSE

## 2021-03-23 RX ORDER — QUETIAPINE FUMARATE 200 MG/1
200 TABLET, FILM COATED ORAL
Qty: 30 TABLET | Refills: 0 | Status: SHIPPED | OUTPATIENT
Start: 2021-03-23 | End: 2021-04-20 | Stop reason: SDUPTHER

## 2021-03-23 RX ORDER — SERTRALINE HYDROCHLORIDE 100 MG/1
200 TABLET, FILM COATED ORAL DAILY
Qty: 60 TABLET | Refills: 0 | Status: SHIPPED | OUTPATIENT
Start: 2021-03-23 | End: 2021-04-20 | Stop reason: SDUPTHER

## 2021-03-23 RX ORDER — QUETIAPINE FUMARATE 50 MG/1
50 TABLET, FILM COATED ORAL
Qty: 30 TABLET | Refills: 0 | Status: SHIPPED | OUTPATIENT
Start: 2021-03-23 | End: 2021-04-20 | Stop reason: SDUPTHER

## 2021-03-23 RX ORDER — QUETIAPINE FUMARATE 25 MG/1
25 TABLET, FILM COATED ORAL 2 TIMES DAILY
Qty: 60 TABLET | Refills: 0 | Status: SHIPPED | OUTPATIENT
Start: 2021-03-23 | End: 2021-04-20 | Stop reason: SDUPTHER

## 2021-03-23 RX ORDER — BUPROPION HYDROCHLORIDE 150 MG/1
150 TABLET ORAL DAILY
Qty: 30 TABLET | Refills: 0 | Status: SHIPPED | OUTPATIENT
Start: 2021-03-23 | End: 2021-04-20 | Stop reason: SDUPTHER

## 2021-03-23 NOTE — PSYCH
MEDICATION MANAGEMENT NOTE        [facility]    Name and Date of Birth:  Beverly Bobby 46 y o  1970 MRN: [id number]    Date of Visit: March 23, 2021    No Known Allergies  SUBJECTIVE:    Rubi Braun is seen today for a follow up for depression and anxiety  He reports that he continues to have anxiety and depressive symptoms  He reports low energy levels, poor appetite and sleep difficulties since the last visit 7/22/2021 with Dr Chey Escamilla  Rubi Braun reports that he has been denied disability 2-3 times in the past and is once again in the process for applying for disability  He stated that he needs to be reengaged in treatment and keep his appointments  He denies current suicidal or homicidal ideation  He denies psychosis  He reports that the office has been refilling his medications and he is compliant with taking his medications  He denies drug use and reports that he is approaching 2 years sober from alcohol  He reports multiple stressors including financial, he stated that he and his family had to move out of current home for renovations  He shared that his sister took over his [de-identified] and he hopes to be able to pay her rent once they return to house after renovations  He did share that he has occasional cravings for alcohol  He denies current suicidal ideation but did admit that he at times has fleeting SI but has no plan or intent  He reports his wife and children as protective factors  He reported that the depressive symptoms appear to be triggered by things like finances and makes him feel inadequate  Rubi Braun shared that his medical conditions are also stressful  add to his depression  He also shared that his wife is blind and has some mental health issues  Rubi Braun requested an increase in his HS dose of Seroquel to assist with insomnia  The risks and benefits of antipsychotic use were reviewed  It was agreed that the HS dose could be increased to 250 mg  Rubi Braun did not want to change the 25 mg po BID as he stated it is helpful with his anxiety  Rubi Braun requested Gene site testing  CRNP will reach out to Kristy at [facility] office to set up this testing  He denies any side effects from medications      PLAN:  Follow up in one month or sooner if needed  Continue Seroquel 25 mg po BID and will increase HS dose to 250 mg  Continue Zoloft 200 mg po am  Continue Wellbutrin  mg  TYE will inquire about gene site testing with [facility] office  Utilize crisis hotline as needed  Lipid panel and Hemoglobin A1c ordered  Aware of 24 hour and weekend coverage for urgent situations accessed by calling [facility] main practice number    Diagnoses and all orders for this visit:    Recurrent major depressive disorder, in partial remission ([facility] [address] )    Generalized anxiety disorder    Alcohol dependence in early, early partial, sustained full, or sustained partial remission ([facility] )    Encounter for long-term (current) use of other medications        Current Outpatient Medications on File Prior to Visit   Medication Sig Dispense Refill    buPROPion (WELLBUTRIN XL) 150 mg 24 hr tablet Take 1 tablet by mouth once daily 30 tablet 0    cholestyramine (QUESTRAN) 4 GM/DOSE powder Take 1 packet (4 g total) by mouth 2 (two) times a day with meals 378 g 1    EQ NICOTINE 21 MG/24HR TD 24 hr patch APPLY 1 PATCH TOPICALLY ONCE DAILY  0    EQ NICOTINE POLACRILEX 4 MG lozenge TAKE 1 LOZENGE EVERY 2 HOURS AS NEEDED FOR SMOKING CRAVING  0    insulin lispro (HumaLOG) 100 units/mL injection pen PER SLIDING SCALE UP TO 14 UNITS WITH EACH MEAL 15 mL 5    omeprazole (PriLOSEC) 20 mg delayed release capsule Take 1 capsule (20 mg total) by mouth daily 30 capsule 0    propranolol (INDERAL) 10 mg tablet Take 1 tablet (10 mg total) by mouth 2 (two) times a day 60 tablet 0    QUEtiapine (SEROquel) 200 mg tablet TAKE 1 TABLET BY MOUTH ONCE DAILY AT BEDTIME 30 tablet 0    QUEtiapine (SEROquel) 25 mg tablet Take 1 tablet by mouth twice daily 60 tablet 0    sertraline (ZOLOFT) 100 mg tablet Take 2 tablets by mouth once daily 60 tablet 0    umeclidinium-vilanterol (ANORO ELLIPTA) 62 5-25 MCG/INH inhaler Inhale 1 puff daily 3 Inhaler 3    benzonatate (TESSALON PERLES) 100 mg capsule Take 1 capsule (100 mg total) by mouth 3 (three) times a day as needed for cough (Patient not taking: Reported on 3/23/2021) 20 capsule 0    fenofibrate (TRICOR) 145 mg tablet Take 1 tablet (145 mg total) by mouth daily (Patient not taking: Reported on 3/23/2021) 30 tablet 5    glucose blood (ONE TOUCH ULTRA TEST) test strip by In Vitro route 3 (three) times a day      hydrocortisone (ANUSOL-HC) 25 mg suppository Insert 1 suppository (25 mg total) into the rectum 2 (two) times a day (Patient not taking: Reported on 3/23/2021) 28 suppository 1    insulin glargine (BASAGLAR KWIKPEN) 100 units/mL injection pen 20 units sq q AM  20 units sq q PM 5 pen 1    Insulin Pen Needle (B-D ULTRAFINE III SHORT PEN) 31G X 8 MM MISC by Does not apply route      Insulin Pen Needle (PEN NEEDLES) 29G X 12MM MISC by Does not apply route daily at bedtime Substitute what fits [place] pen and what is covered by insurance  45 each 0    lisinopril (ZESTRIL) 10 mg tablet Take 1 tablet (10 mg total) by mouth daily (Patient not taking: Reported on 3/23/2021) 90 tablet 1    NARCAN 4 MG/0 1ML LIQD ADMINISTER A SINGLE SPRAY IN ONE NOSTRIL UPON SIGNS OF OPIOID OVERDOSE  CALL 911  REPEAT AFTER 3 MINUTES IF NO RESPONSE   0    rosuvastatin (CRESTOR) 10 MG tablet Take 1 tablet (10 mg total) by mouth daily (Patient not taking: Reported on 3/23/2021) 90 tablet 0     No current facility-administered medications on file prior to visit          Psychotherapy Provided:     Individual psychotherapy provided:  Supportive counseling provided    HPI ROS Appetite Changes and Sleep:     He reports difficulty falling asleep, decreased appetite, decreased energy   Patient denies suicidal or homicidal ideation    Review Of Systems:      HPI ROS:               Medication Side Effects:  denies     Depression (10 worst): 6/10    Anxiety (10 worst): 6/10    Safety concerns (SI, HI, etc): Denies     Sleep: poor    Energy: low    Appetite: decreased    Weight Change: None reported      General depression, anxiety and sleep difficulties   Personality no change in personality   Constitutional as noted in HPI   ENT negative   Cardiovascular as noted in HPI   Respiratory as noted in HPI, continues on 02 at home   Gastrointestinal as noted in HPI   Genitourinary negative   Musculoskeletal as noted in HPI   Integumentary negative   Neurological negative   Endocrine as noted in HPI, following PCP for diabetes managment    Other Symptoms none     Mental Status Evaluation:    Appearance Adequate hygiene and grooming   Behavior cooperative   Mood anxious  Depression Scale - 6 of 10 (0 = No depression)  Anxiety Scale - 6 of 10 (0 = No anxiety)   Speech Normal rate and volume   Affect mood-congruent   Thought Processes Goal directed and coherent   Thought Content Does not verbalize delusional material   Associations Tightly connected   Perceptual Disturbances Denies hallucinations and does not appear to be responding to internal stimuli   Risk Potential Suicidal/Homicidal Ideation - No evidence of suicidal or homicidal ideation and Patient does not verbalize suicidal or homicidal ideation  Risk of Violence - No evidence of risk for violence found on assessment  Risk of Self Mutilation - No evidence of risk for self mutilation found on assessment   Orientation oriented to person, place, time/date and situation   Memory recent and remote memory grossly intact   Consciousness alert and awake   Attention/Concentration attention span and concentration are age appropriate   Insight fair   Judgement fair   Muscle Strength and Gait normal muscle strength and normal muscle tone, normal gait/station and normal balance   Motor Activity no abnormal movements   Language no difficulty naming common objects, no difficulty repeating a phrase, no difficulty writing a sentence   Fund of Knowledge adequate knowledge of current events  adequate fund of knowledge regarding past history  adequate fund of knowledge regarding vocabulary      Past Psychiatric History Update:     Inpatient Psychiatric Admission Since Last Encounter:   no  Changes to Outpatient Psychiatric Treatment Team:    no  Suicide Attempt Or Self Mutilation Since Last Encounter:   no  Incidence of Violent Behavior Since Last Encounter:   no    Traumatic History Update:     New Onset of Abuse Since Last Encounter:   no  Traumatic Events Since Last Encounter:   no    Past Medical History:    Past Medical History:   Diagnosis Date    Alcohol abuse 2/20/2019    Allergic rhinitis     last assessed: 12/5/2012    Anemia     Anxiety and depression     Quiros esophagus     Cholelithiasis     Chronic pain     COPD (chronic obstructive pulmonary disease) ([facility] )     Depression     Diabetes mellitus ([facility] )     Emphysema lung ([facility] )     Generalized anxiety disorder     GERD (gastroesophageal reflux disease)     Hyperlipidemia     Hypertension     Kidney stone     Metatarsalgia     last assessed: 8/11/2014, unspecified laterality, ? cause  PE with changes  To see DPM tomorrow      Pancreatitis     Psychiatric disorder     Renal disorder     Shortness of breath     with activity     Past Medical History Pertinent Negatives:   Diagnosis Date Noted    Allergic 11/09/2018    Arthritis 11/09/2018    Asthma 11/09/2018    Cancer ([facility] ) 11/09/2018    Clotting disorder ([facility] ) 11/09/2018    Coronary artery disease 11/09/2018    Dementia ([facility] ) 11/09/2018    Disease of thyroid gland 11/09/2018    Diverticulitis of colon 11/09/2018    Eating disorder 11/09/2018    Heart murmur 11/09/2018    HL (hearing loss) 11/09/2018    Infectious viral hepatitis 11/09/2018    Inflammatory bowel disease 11/09/2018    Memory loss 11/09/2018    Myocardial infarction ([facility] ) 11/09/2018    Obesity 11/09/2018    Osteoporosis 11/09/2018    Otitis media 11/09/2018    Pneumonia 11/09/2018    Scoliosis 11/09/2018    Seizures ([facility] ) 11/09/2018    Shingles 11/09/2018    Stroke ([place] [address] ) 11/09/2018    Urinary tract infection 11/09/2018    Visual impairment 08/20/2018     Past Surgical History:   Procedure Laterality Date    CHOLECYSTECTOMY LAPAROSCOPIC      FOOT SURGERY      B/L great toe joint replacement    KNEE ARTHROSCOPY      (therapeutic) right knee    HI EDG US EXAM SURGICAL ALTER STOM DUODENUM/JEJUNUM N/A 10/17/2018    Procedure: LINEAR ENDOSCOPIC U/S;  Surgeon: Sonya Ruelas MD;  Location: [facility]; Service: Gastroenterology    VASECTOMY      vas deferens     No Known Allergies  Substance Abuse History:    Social History     Substance and Sexual Activity   Alcohol Use Not Currently    Frequency: 4 or more times a week    Drinks per session: 5 or 6    Binge frequency: Daily or almost daily    Comment: Drank a 5th vodka today     Social History     Substance and Sexual Activity   Drug Use No    Comment: chronic narcotic use     Social History:    Social History     Socioeconomic History    Marital status: /Civil Union     Spouse name: Not on file    Number of children: Not on file    Years of education: Not on file    Highest education level: Not on file   Occupational History    Occupation: Unemployed   Social Needs    Financial resource strain: Not on file    Food insecurity     Worry: Not on file     Inability: Not on file   [employer] needs     Medical: Not on file     Non-medical: Not on file   Tobacco Use    Smoking status: Current Every Day Smoker     Packs/day: 1 00    Smokeless tobacco: Never Used    Tobacco comment: Tobacco use [employer]'s "How to Quit" literature given to pat     Substance and Sexual Activity    Alcohol use: Not Currently     Frequency: 4 or more times a week     Drinks per session: 5 or 6     Binge frequency: Daily or almost daily     Comment: Drank a 5th vodka today    Drug use: No     Comment: chronic narcotic use    Sexual activity: Yes     Partners: Female   Lifestyle    Physical activity     Days per week: Not on file     Minutes per session: Not on file    Stress: Not on file   Relationships    Social connections     Talks on phone: Not on file     Gets together: Not on file     Attends Denominational service: Not on file     Active member of club or organization: Not on file     Attends meetings of clubs or organizations: Not on file     Relationship status: Not on file    Intimate partner violence     Fear of current or ex partner: Not on file     Emotionally abused: Not on file     Physically abused: Not on file     Forced sexual activity: Not on file   Other Topics Concern    Not on file   Social History Narrative    UNEMPLOYED     Family Psychiatric History:     Family History   Problem Relation Age of Onset    Cancer Mother     Coronary artery disease Mother     Diabetes Mother     Coronary artery disease Father     Prostate cancer Father     Diabetes Sister     Coronary artery disease Family      History Review: The following portions of the patient's history were reviewed and updated as appropriate: allergies, current medications, past family history, past medical history, past social history, past surgical history and problem list     OBJECTIVE:     Vital signs in last 24 hours: There were no vitals filed for this visit  Laboratory Results: I have personally reviewed all pertinent laboratory/tests results      Suicide/Homicide Risk Assessment:    Risk of Harm to Self:  Based on today's assessment, Willie Model presents the following risk of harm to self: low    Risk of Harm to Others:  Based on today's assessment, Willie Model presents the following risk of harm to others: low    The following interventions are recommended: no intervention changes needed    Medications Risks/Benefits:      Risks, Benefits And Possible Side Effects Of Medications:    Discussed risks and benefits of treatment with patient including risk of suicidality, serotonin syndrome and SIADH related to treatment with antidepressants; Risk of induction of manic symptoms in certain patient populations  Also reviewed risks and benefits of antipsychotic use  Controlled Medication Discussion:     Not applicable - controlled prescriptions are not prescribed by this practice    Treatment Plan:    Due for update/Updated:   Yes, was unable to complete treatment plan today due to complexity of visit  Will complete at next visit in one month,      TYE Mejía 03/23/21    This note was shared with patient

## 2021-04-01 ENCOUNTER — TELEPHONE (OUTPATIENT)
Dept: PSYCHIATRY | Facility: CLINIC | Age: 51
End: 2021-04-01

## 2021-04-01 NOTE — TELEPHONE ENCOUNTER
Order with request for [place] testing processed and kit will be mailed to [place] by Lab  Called Maddie and AMOS on his VM that I have submitted his information for genesight testing and a kit will be mailed to him shortly  Provided nursing number for him to call if he has any questions  Will refer to Dario Ortiz and Dr Noa Veronica for their information

## 2021-04-12 ENCOUNTER — TELEPHONE (OUTPATIENT)
Dept: PSYCHIATRY | Facility: CLINIC | Age: 51
End: 2021-04-12

## 2021-04-12 NOTE — TELEPHONE ENCOUNTER
Patient states that he gave you some forms to fill out for disabilty and was wondering if there was any update he has not heard from you

## 2021-04-13 NOTE — ASSESSMENT & PLAN NOTE
Talk to your doctor about referral to gynecologist for fibroids. Return for worsening or concerning symptoms. · Patient has a history of chronic pancreatitis though his lipase is normal  · Suspected this to be secondary to alcohol use vs viral gastritis  · CT abdomen and pelvis shows no definite acute intra-abdominal pathology  There is complete fatty atrophy of the pancreas with stable presumed calcified pseudocyst inthe expected location of the pancreatic tail  · Abdominal pain is much improved  Will advance diet to clear  · Pending GI evaluation

## 2021-04-20 ENCOUNTER — OFFICE VISIT (OUTPATIENT)
Dept: PSYCHIATRY | Facility: CLINIC | Age: 51
End: 2021-04-20
Payer: COMMERCIAL

## 2021-04-20 VITALS
DIASTOLIC BLOOD PRESSURE: 80 MMHG | SYSTOLIC BLOOD PRESSURE: 120 MMHG | BODY MASS INDEX: 27.12 KG/M2 | HEART RATE: 96 BPM | WEIGHT: 200 LBS

## 2021-04-20 DIAGNOSIS — F10.21 ALCOHOL DEPENDENCE IN EARLY, EARLY PARTIAL, SUSTAINED FULL, OR SUSTAINED PARTIAL REMISSION (HCC): ICD-10-CM

## 2021-04-20 DIAGNOSIS — F41.1 GENERALIZED ANXIETY DISORDER: ICD-10-CM

## 2021-04-20 DIAGNOSIS — F33.41 RECURRENT MAJOR DEPRESSIVE DISORDER, IN PARTIAL REMISSION (HCC): Primary | ICD-10-CM

## 2021-04-20 DIAGNOSIS — F32.2 CURRENT SEVERE EPISODE OF MAJOR DEPRESSIVE DISORDER WITHOUT PSYCHOTIC FEATURES WITHOUT PRIOR EPISODE (HCC): ICD-10-CM

## 2021-04-20 PROCEDURE — 99214 OFFICE O/P EST MOD 30 MIN: CPT | Performed by: REGISTERED NURSE

## 2021-04-20 RX ORDER — QUETIAPINE FUMARATE 50 MG/1
50 TABLET, FILM COATED ORAL
Qty: 30 TABLET | Refills: 1 | Status: SHIPPED | OUTPATIENT
Start: 2021-04-20 | End: 2021-05-14 | Stop reason: HOSPADM

## 2021-04-20 RX ORDER — BUPROPION HYDROCHLORIDE 150 MG/1
150 TABLET ORAL DAILY
Qty: 30 TABLET | Refills: 1 | Status: SHIPPED | OUTPATIENT
Start: 2021-04-20 | End: 2021-05-14 | Stop reason: HOSPADM

## 2021-04-20 RX ORDER — QUETIAPINE FUMARATE 25 MG/1
25 TABLET, FILM COATED ORAL 2 TIMES DAILY
Qty: 60 TABLET | Refills: 1 | Status: SHIPPED | OUTPATIENT
Start: 2021-04-20 | End: 2021-05-14 | Stop reason: HOSPADM

## 2021-04-20 RX ORDER — QUETIAPINE FUMARATE 200 MG/1
200 TABLET, FILM COATED ORAL
Qty: 30 TABLET | Refills: 1 | Status: SHIPPED | OUTPATIENT
Start: 2021-04-20 | End: 2021-05-14 | Stop reason: HOSPADM

## 2021-04-20 RX ORDER — SERTRALINE HYDROCHLORIDE 100 MG/1
200 TABLET, FILM COATED ORAL DAILY
Qty: 60 TABLET | Refills: 1 | Status: SHIPPED | OUTPATIENT
Start: 2021-04-20 | End: 2021-05-14 | Stop reason: HOSPADM

## 2021-04-20 NOTE — BH TREATMENT PLAN
TREATMENT PLAN (Medication Management Only)        Demario Sosa    Name and Date of Birth:  Mable Garrett 46 y o  1970  Date of Treatment Plan: April 20, 2021  Diagnosis/Diagnoses:    1  Recurrent major depressive disorder, in partial remission ([facility] [address] )    2  Alcohol dependence in early, early partial, sustained full, or sustained partial remission [facility])      Strengths/Personal Resources for Self-Care: supportive family, supportive friends, taking medications as prescribed, ability to understand psychiatric illness, average or above intelligence, motivation for treatment, willingness to work on problems  Area/Areas of need (in own words): anxiety symptoms and depressive symtpoms  1  Long Term Goal: improve acceptable anxiety level and depression  Target Date:6 months - 10/20/2021  Person/Persons responsible for completion of goal: Tony Estrada and [id number] [place]  2  Short Term Objective (s) - How will we reach this goal?:   A  Provider new recommended medication/dosage changes and/or continue medication(s): continue current medications as prescribed  B  Take as prescibed  Target Date:6 months - 10/20/2021  Person/Persons Responsible for Completion of Goal: Tony Estrada and Josh GAMBLE  Progress Towards Goals: continuing treatment  Treatment Modality: medication management every 4 weeks  Review due 180 days from date of this plan: 6 months - 10/20/2021  Expected length of service: ongoing treatment  My Physician/PA/NP and I have developed this plan together and I agree to work on the goals and objectives  I understand the treatment goals that were developed for my treatment

## 2021-04-20 NOTE — PSYCH
MEDICATION MANAGEMENT NOTE        [facility]    Name and Date of Birth:  Juana Newman 46 y o  1970 MRN: [id number]    Date of Visit: April 20, 2021    No Known Allergies  SUBJECTIVE:    Renan Barboza is seen today for a follow up for Major Depressive Disorder and anxiety  He reports that he continues to experience ongoing symptoms since the last visit with provider 3/23/2021  He continues to experience depression and anxiety  He presented as anxious during the visit  He reports that had refrained from alcohol use for 2 years and recently the day before easter he drank  He reports that he was angry with his wife prior to relapsing on alcohol  He reports that this was the only time he drank alcohol  He and his wife are still in temporary housing as renovations are being completed at his previous address  He continues to express guilt that he is not able to contribute financially at this time  He also reports his sister is helping them but he also feels stress about this  Renan Barboza did verbalize that he knows he needs to get reconnected with AA and stated that he has not spoken to his sponsor in quite awhile  He expressed that the zoom AA is not the same as in person  In person is difficult for him because with his COPD he has difficulty wearing a mask for an extended period of time  We discussed the option of partial hospitalization program or getting reconnected with a therapist who specializes in substance abuse  He stated that he would consider both options and call back to office if he wanted to participate in partial program  He does report passive suicidal ideation at times, but reports no plan or intent  Denies homicidal ideation  Denies psychosis  Renan Barboza reports sleep is improved with the increase of Seroquel to 250 mg po HS at last visit  Roselia Chandra Appetite remains poor  Focus, concentration and energy levels reported as low   Renan Barboza stated that he is smoking one pack of cigarettes daily  He was provided the [place] quit line information regarding smoking cessation  At today's visit disability paperwork was completed  He denies any side effects from medications  PLAN:  Follow up in one month or sooner as needed  Continue Seroquel, Wellbutrin and Zoloft at current dosages  Follow up with medical providers as needed  Suggested partial program or therapy, Hector Jimenez will let know provider know if he wants to engage in partial  Obtain Lipid profile (script in electronic record)  Most recent heloglobin A1c 3/2021 performed at [phone]  Hector Jimenez stated that he will reingage with AA meetings  Aware of 24 hour and weekend coverage for urgent situations accessed by calling [facility] main practice number    Diagnoses and all orders for this visit:    Recurrent major depressive disorder, in partial remission ([address] )  -     buPROPion (WELLBUTRIN XL) 150 mg 24 hr tablet; Take 1 tablet (150 mg total) by mouth daily  -     QUEtiapine (SEROquel) 25 mg tablet; Take 1 tablet (25 mg total) by mouth 2 (two) times a day    Alcohol dependence in early, early partial, sustained full, or sustained partial remission (HCC)    Current severe episode of major depressive disorder without psychotic features without prior episode (HCC)  -     sertraline (ZOLOFT) 100 mg tablet; Take 2 tablets (200 mg total) by mouth daily  -     QUEtiapine (SEROquel) 50 mg tablet; Take 1 tablet (50 mg total) by mouth daily at bedtime  -     QUEtiapine (SEROquel) 25 mg tablet; Take 1 tablet (25 mg total) by mouth 2 (two) times a day  -     QUEtiapine (SEROquel) 200 mg tablet; Take 1 tablet (200 mg total) by mouth daily at bedtime    Generalized anxiety disorder  -     QUEtiapine (SEROquel) 25 mg tablet;  Take 1 tablet (25 mg total) by mouth 2 (two) times a day        Current Outpatient Medications on File Prior to Visit   Medication Sig Dispense Refill    benzonatate (TESSALON PERLES) 100 mg capsule Take 1 capsule (100 mg total) by mouth 3 (three) times a day as needed for cough (Patient not taking: Reported on 3/23/2021) 20 capsule 0    cholestyramine (QUESTRAN) 4 GM/DOSE powder Take 1 packet (4 g total) by mouth 2 (two) times a day with meals 378 g 1    EQ NICOTINE 21 MG/24HR TD 24 hr patch APPLY 1 PATCH TOPICALLY ONCE DAILY  0    EQ NICOTINE POLACRILEX 4 MG lozenge TAKE 1 LOZENGE EVERY 2 HOURS AS NEEDED FOR SMOKING CRAVING  0    fenofibrate (TRICOR) 145 mg tablet Take 1 tablet (145 mg total) by mouth daily (Patient not taking: Reported on 3/23/2021) 30 tablet 5    glucose blood (ONE TOUCH ULTRA TEST) test strip by In Vitro route 3 (three) times a day      hydrocortisone (ANUSOL-HC) 25 mg suppository Insert 1 suppository (25 mg total) into the rectum 2 (two) times a day (Patient not taking: Reported on 3/23/2021) 28 suppository 1    insulin glargine (BASAGLAR KWIKPEN) 100 units/mL injection pen 20 units sq q AM  20 units sq q PM 5 pen 1    insulin lispro (HumaLOG) 100 units/mL injection pen PER SLIDING SCALE UP TO 14 UNITS WITH EACH MEAL 15 mL 5    Insulin Pen Needle (B-D ULTRAFINE III SHORT PEN) 31G X 8 MM MISC by Does not apply route      Insulin Pen Needle (PEN NEEDLES) 29G X 12MM MISC by Does not apply route daily at bedtime Substitute what fits [place] pen and what is covered by insurance  45 each 0    lisinopril (ZESTRIL) 10 mg tablet Take 1 tablet (10 mg total) by mouth daily (Patient not taking: Reported on 3/23/2021) 90 tablet 1    NARCAN 4 MG/0 1ML LIQD ADMINISTER A SINGLE SPRAY IN ONE NOSTRIL UPON SIGNS OF OPIOID OVERDOSE  CALL 911   REPEAT AFTER 3 MINUTES IF NO RESPONSE   0    omeprazole (PriLOSEC) 20 mg delayed release capsule Take 1 capsule (20 mg total) by mouth daily 30 capsule 0    propranolol (INDERAL) 10 mg tablet Take 1 tablet (10 mg total) by mouth 2 (two) times a day 60 tablet 0    rosuvastatin (CRESTOR) 10 MG tablet Take 1 tablet (10 mg total) by mouth daily (Patient not taking: Reported on 3/23/2021) 90 tablet 0    umeclidinium-vilanterol (ANORO ELLIPTA) 62 5-25 MCG/INH inhaler Inhale 1 puff daily 3 Inhaler 3    [DISCONTINUED] buPROPion (WELLBUTRIN XL) 150 mg 24 hr tablet Take 1 tablet (150 mg total) by mouth daily 30 tablet 0    [DISCONTINUED] QUEtiapine (SEROquel) 200 mg tablet Take 1 tablet (200 mg total) by mouth daily at bedtime 30 tablet 0    [DISCONTINUED] QUEtiapine (SEROquel) 25 mg tablet Take 1 tablet (25 mg total) by mouth 2 (two) times a day 60 tablet 0    [DISCONTINUED] QUEtiapine (SEROquel) 50 mg tablet Take 1 tablet (50 mg total) by mouth daily at bedtime 30 tablet 0    [DISCONTINUED] sertraline (ZOLOFT) 100 mg tablet Take 2 tablets (200 mg total) by mouth daily 60 tablet 0     No current facility-administered medications on file prior to visit  Psychotherapy Provided:     Individual psychotherapy provided: Yes  Counseling was provided during the session today for 16 minutes  Supportive counseling provided  yes    HPI ROS Appetite Changes and Sleep:     He reports adequate number of sleep hours (8  hours), decreased appetite, decreased energy   Denies, current SI but admits to passive SI at times with no plan or intent    Review Of Systems:      HPI ROS:               Medication Side Effects:  denies     Depression (10 worst): 7/10 6/10   Anxiety (10 worst): 5/10 6/10   Safety concerns (SI, HI, etc): Denies current SI/HI    Sleep: improved    Energy: low    Appetite: decreased    Weight Change: None reported      General marital problems   Personality no change in personality   Constitutional fever   ENT negative   Cardiovascular as noted in HPI   Respiratory as noted in HPI, reports on O2 at home   Gastrointestinal as noted in HPI   Genitourinary as noted in HPI   Musculoskeletal as noted in HPI   Integumentary negative   Neurological negative   Endocrine as noted in HPI   Other Symptoms none     Mental Status Evaluation:    Appearance Adequate hygiene and grooming Behavior restless and fidgety   Mood anxious and depressed  Depression Scale - 7 of 10 (0 = No depression)  Anxiety Scale - 5 out of 10 (0 = No anxiety)   Speech Normal rate and volume   Affect anxious   Thought Processes Goal directed and coherent   Thought Content Does not verbalize delusional material   Associations Tightly connected   Perceptual Disturbances Denies hallucinations and does not appear to be responding to internal stimuli   Risk Potential Suicidal/Homicidal Ideation - denies current SI but admits to occasional passive SI, no plan or intent verbalized, No evidence of suicidal or homicidal ideation and Patient does not verbalize suicidal or homicidal ideation  Risk of Violence - No evidence of risk for violence found on assessment  Risk of Self Mutilation - No evidence of risk for self mutilation found on assessment   Orientation oriented to person, place, time/date and situation   Memory recent and remote memory grossly intact   Consciousness alert and awake   Attention/Concentration attention span and concentration are age appropriate   Insight fair   Judgement fair   Muscle Strength and Gait not tested   Motor Activity no abnormal movements   Language no difficulty naming common objects, no difficulty repeating a phrase, no difficulty writing a sentence   Fund of Knowledge adequate knowledge of current events  adequate fund of knowledge regarding past history  adequate fund of knowledge regarding vocabulary      Past Psychiatric History Update:     Inpatient Psychiatric Admission Since Last Encounter:   no  Changes to Outpatient Psychiatric Treatment Team:    no  Suicide Attempt Or Self Mutilation Since Last Encounter:   no  Incidence of Violent Behavior Since Last Encounter:   no    Traumatic History Update:     New Onset of Abuse Since Last Encounter:   no  Traumatic Events Since Last Encounter:   no    Past Medical History:    Past Medical History:   Diagnosis Date    Alcohol abuse 2/20/2019  Allergic rhinitis     last assessed: 12/5/2012    Anemia     Anxiety and depression     Quiros esophagus     Cholelithiasis     Chronic pain     COPD (chronic obstructive pulmonary disease) (HCC)     Depression     Diabetes mellitus (HCC)     Emphysema lung (HCC)     Generalized anxiety disorder     GERD (gastroesophageal reflux disease)     Hyperlipidemia     Hypertension     Kidney stone     Metatarsalgia     last assessed: 8/11/2014, unspecified laterality, ? cause  PE with changes  To see DPM tomorrow   Pancreatitis     Psychiatric disorder     Renal disorder     Shortness of breath     with activity     Past Medical History Pertinent Negatives:   Diagnosis Date Noted    Allergic 11/09/2018    Arthritis 11/09/2018    Asthma 11/09/2018    Cancer ([facility] [address] ) 11/09/2018    Clotting disorder ([facility] ) 11/09/2018    Coronary artery disease 11/09/2018    Dementia ([facility] ) 11/09/2018    Disease of thyroid gland 11/09/2018    Diverticulitis of colon 11/09/2018    Eating disorder 11/09/2018    Heart murmur 11/09/2018    HL (hearing loss) 11/09/2018    Infectious viral hepatitis 11/09/2018    Inflammatory bowel disease 11/09/2018    Memory loss 11/09/2018    Myocardial infarction ([facility] ) 11/09/2018    Obesity 11/09/2018    Osteoporosis 11/09/2018    Otitis media 11/09/2018    Pneumonia 11/09/2018    Scoliosis 11/09/2018    Seizures ([facility] ) 11/09/2018    Shingles 11/09/2018    Stroke ([facility] ) 11/09/2018    Urinary tract infection 11/09/2018    Visual impairment 08/20/2018     Past Surgical History:   Procedure Laterality Date    CHOLECYSTECTOMY LAPAROSCOPIC      FOOT SURGERY      B/L great toe joint replacement    KNEE ARTHROSCOPY      (therapeutic) right knee    [place] EDG US EXAM SURGICAL ALTER STOM DUODENUM/JEJUNUM N/A 10/17/2018    Procedure: LINEAR ENDOSCOPIC U/S;  Surgeon: Chloe Ledezma MD;  Location: [facility];   Service: Gastroenterology    VASECTOMY      vas deferens     No Known Allergies  Substance Abuse History:    Social History     Substance and Sexual Activity   Alcohol Use Not Currently    Frequency: 4 or more times a week    Drinks per session: 5 or 6    Binge frequency: Daily or almost daily    Comment: Drank a 5th vodka today     Social History     Substance and Sexual Activity   Drug Use No    Comment: chronic narcotic use     Social History:    Social History     Socioeconomic History    Marital status: /Civil Union     Spouse name: Not on file    Number of children: Not on file    Years of education: Not on file    Highest education level: Not on file   Occupational History    Occupation: Unemployed   Social Needs    Financial resource strain: Not on file    Food insecurity     Worry: Not on file     Inability: Not on file   [employer] needs     Medical: Not on file     Non-medical: Not on file   Tobacco Use    Smoking status: Current Every Day Smoker     Packs/day: 1 00    Smokeless tobacco: Never Used    Tobacco comment: Tobacco use [employer]'s "How to Quit" literature given to pat     Substance and Sexual Activity    Alcohol use: Not Currently     Frequency: 4 or more times a week     Drinks per session: 5 or 6     Binge frequency: Daily or almost daily     Comment: Drank a 5th vodka today    Drug use: No     Comment: chronic narcotic use    Sexual activity: Yes     Partners: Female   Lifestyle    Physical activity     Days per week: Not on file     Minutes per session: Not on file    Stress: Not on file   Relationships    Social connections     Talks on phone: Not on file     Gets together: Not on file     Attends Anabaptism service: Not on file     Active member of club or organization: Not on file     Attends meetings of clubs or organizations: Not on file     Relationship status: Not on file    Intimate partner violence     Fear of current or ex partner: Not on file     Emotionally abused: Not on file     Physically abused: Not on file     Forced sexual activity: Not on file   Other Topics Concern    Not on file   Social History Narrative    UNEMPLOYED     Family Psychiatric History:     Family History   Problem Relation Age of Onset    Cancer Mother     Coronary artery disease Mother     Diabetes Mother     Coronary artery disease Father     Prostate cancer Father     Diabetes Sister     Coronary artery disease Family      History Review: The following portions of the patient's history were reviewed and updated as appropriate: allergies, current medications, past family history, past medical history, past social history, past surgical history and problem list     OBJECTIVE:     Vital signs in last 24 hours: There were no vitals filed for this visit  Laboratory Results: I have personally reviewed all pertinent laboratory/tests results  Suicide/Homicide Risk Assessment:    Risk of Harm to Self:  Based on today's assessment, Cristiane Spencer presents the following risk of harm to self: low    Risk of Harm to Others:  Based on today's assessment, Cristiane Spencer presents the following risk of harm to others: low    The following interventions are recommended: no intervention changes needed    Medications Risks/Benefits:      Risks, Benefits And Possible Side Effects Of Medications:    Discussed risks and benefits of treatment with patient including risk of suicidality, serotonin syndrome and SIADH related to treatment with antidepressants; Risk of induction of manic symptoms in certain patient populations and risk of parkinsonian symptoms, metabolic syndrome, tardive dyskinesia and neuroleptic malignant syndrome related to treatment with antipsychotic medications     Controlled Medication Discussion:     Not applicable - controlled prescriptions are not prescribed by this practice    Treatment Plan:    Due for update/Updated:   yes  Last treatment plan done 4/20/2021 by Fletcher Koyanagi  Treatment Plan due on 10/8/2021      Augustina Acosta 04/20/21    This note was shared with patient

## 2021-05-11 ENCOUNTER — HOSPITAL ENCOUNTER (INPATIENT)
Facility: HOSPITAL | Age: 51
LOS: 3 days | DRG: 817 | End: 2021-05-14
Attending: INTERNAL MEDICINE | Admitting: INTERNAL MEDICINE
Payer: COMMERCIAL

## 2021-05-11 ENCOUNTER — HOSPITAL ENCOUNTER (EMERGENCY)
Facility: HOSPITAL | Age: 51
End: 2021-05-11
Attending: EMERGENCY MEDICINE | Admitting: EMERGENCY MEDICINE
Payer: COMMERCIAL

## 2021-05-11 ENCOUNTER — APPOINTMENT (EMERGENCY)
Dept: RADIOLOGY | Facility: HOSPITAL | Age: 51
End: 2021-05-11
Payer: COMMERCIAL

## 2021-05-11 ENCOUNTER — APPOINTMENT (INPATIENT)
Dept: RADIOLOGY | Facility: HOSPITAL | Age: 51
DRG: 817 | End: 2021-05-11
Payer: COMMERCIAL

## 2021-05-11 VITALS
RESPIRATION RATE: 14 BRPM | TEMPERATURE: 95.7 F | WEIGHT: 216 LBS | DIASTOLIC BLOOD PRESSURE: 53 MMHG | OXYGEN SATURATION: 98 % | HEART RATE: 79 BPM | BODY MASS INDEX: 29.29 KG/M2 | SYSTOLIC BLOOD PRESSURE: 88 MMHG

## 2021-05-11 DIAGNOSIS — T14.91XA SUICIDE ATTEMPT (HCC): ICD-10-CM

## 2021-05-11 DIAGNOSIS — I95.0 IDIOPATHIC HYPOTENSION: Primary | ICD-10-CM

## 2021-05-11 DIAGNOSIS — J44.9 CHRONIC OBSTRUCTIVE PULMONARY DISEASE, UNSPECIFIED COPD TYPE (HCC): ICD-10-CM

## 2021-05-11 DIAGNOSIS — F32.A DEPRESSION: ICD-10-CM

## 2021-05-11 DIAGNOSIS — T50.902A INTENTIONAL OVERDOSE OF DRUG IN TABLET FORM (HCC): ICD-10-CM

## 2021-05-11 DIAGNOSIS — T50.902A INTENTIONAL DRUG OVERDOSE, INITIAL ENCOUNTER (HCC): Primary | ICD-10-CM

## 2021-05-11 DIAGNOSIS — F10.11 HISTORY OF ETOH ABUSE: ICD-10-CM

## 2021-05-11 DIAGNOSIS — F41.1 GENERALIZED ANXIETY DISORDER: ICD-10-CM

## 2021-05-11 PROBLEM — F41.9 ANXIETY AND DEPRESSION: Status: ACTIVE | Noted: 2018-08-09

## 2021-05-11 PROBLEM — J96.00 ACUTE RESPIRATORY FAILURE (HCC): Status: ACTIVE | Noted: 2021-05-11

## 2021-05-11 PROBLEM — E11.9 DM (DIABETES MELLITUS) (HCC): Status: ACTIVE | Noted: 2021-05-11

## 2021-05-11 LAB
ALBUMIN SERPL BCP-MCNC: 3.6 G/DL (ref 3.5–5.7)
ALP SERPL-CCNC: 88 U/L (ref 40–150)
ALT SERPL W P-5'-P-CCNC: 8 U/L (ref 7–52)
AMMONIA PLAS-SCNC: 50 UMOL/L (ref 25–90)
AMPHETAMINES SERPL QL SCN: NEGATIVE
ANION GAP SERPL CALCULATED.3IONS-SCNC: 9 MMOL/L (ref 4–13)
ANION GAP SERPL CALCULATED.3IONS-SCNC: 9 MMOL/L (ref 4–13)
APAP SERPL-MCNC: <10 UG/ML (ref 10–20)
ARTERIAL PATENCY WRIST A: YES
AST SERPL W P-5'-P-CCNC: 14 U/L (ref 13–39)
ATRIAL RATE: 65 BPM
BACTERIA UR QL AUTO: ABNORMAL /HPF
BARBITURATES UR QL: NEGATIVE
BASE EXCESS BLDA CALC-SCNC: -4.5 MMOL/L
BASE EXCESS BLDA CALC-SCNC: -8.4 MMOL/L (ref -2–3)
BASOPHILS # BLD AUTO: 0.1 THOUSANDS/ΜL (ref 0–0.1)
BASOPHILS NFR BLD AUTO: 1 % (ref 0–2)
BENZODIAZ UR QL: POSITIVE
BILIRUB SERPL-MCNC: 0.4 MG/DL (ref 0.2–1)
BILIRUB UR QL STRIP: NEGATIVE
BUN SERPL-MCNC: 9 MG/DL (ref 5–25)
BUN SERPL-MCNC: 9 MG/DL (ref 7–25)
CA-I BLD-SCNC: 1.04 MMOL/L (ref 1.12–1.32)
CALCIUM SERPL-MCNC: 7.5 MG/DL (ref 8.3–10.1)
CALCIUM SERPL-MCNC: 7.8 MG/DL (ref 8.6–10.5)
CHLORIDE SERPL-SCNC: 100 MMOL/L (ref 100–108)
CHLORIDE SERPL-SCNC: 98 MMOL/L (ref 98–107)
CLARITY UR: CLEAR
CO2 SERPL-SCNC: 22 MMOL/L (ref 21–31)
CO2 SERPL-SCNC: 23 MMOL/L (ref 21–32)
COCAINE UR QL: NEGATIVE
COLOR UR: YELLOW
CREAT SERPL-MCNC: 0.69 MG/DL (ref 0.6–1.3)
CREAT SERPL-MCNC: 0.73 MG/DL (ref 0.7–1.3)
EOSINOPHIL # BLD AUTO: 0.1 THOUSAND/ΜL (ref 0–0.61)
EOSINOPHIL NFR BLD AUTO: 2 % (ref 0–5)
ERYTHROCYTE [DISTWIDTH] IN BLOOD BY AUTOMATED COUNT: 15 % (ref 11.5–14.5)
ETHANOL SERPL-MCNC: 90.1 MG/DL
GFR SERPL CREATININE-BSD FRML MDRD: 106 ML/MIN/1.73SQ M
GFR SERPL CREATININE-BSD FRML MDRD: 108 ML/MIN/1.73SQ M
GLUCOSE SERPL-MCNC: 163 MG/DL (ref 65–140)
GLUCOSE SERPL-MCNC: 170 MG/DL (ref 65–140)
GLUCOSE SERPL-MCNC: 171 MG/DL (ref 65–140)
GLUCOSE SERPL-MCNC: 175 MG/DL (ref 65–140)
GLUCOSE SERPL-MCNC: 198 MG/DL (ref 65–99)
GLUCOSE UR STRIP-MCNC: ABNORMAL MG/DL
HCO3 BLDA-SCNC: 21.2 MMOL/L (ref 22–28)
HCO3 BLDA-SCNC: 22.3 MMOL/L (ref 22–28)
HCT VFR BLD AUTO: 43.8 % (ref 42–47)
HGB BLD-MCNC: 14.7 G/DL (ref 14–18)
HGB UR QL STRIP.AUTO: ABNORMAL
HOROWITZ INDEX BLDA+IHG-RTO: 100 MM[HG]
HOROWITZ INDEX BLDA+IHG-RTO: 80 MM[HG]
KETONES UR STRIP-MCNC: NEGATIVE MG/DL
LEUKOCYTE ESTERASE UR QL STRIP: NEGATIVE
LIPASE SERPL-CCNC: 15 U/L (ref 73–393)
LYMPHOCYTES # BLD AUTO: 2.1 THOUSANDS/ΜL (ref 0.6–4.47)
LYMPHOCYTES NFR BLD AUTO: 31 % (ref 21–51)
MCH RBC QN AUTO: 30 PG (ref 26–34)
MCHC RBC AUTO-ENTMCNC: 33.6 G/DL (ref 31–37)
MCV RBC AUTO: 89 FL (ref 81–99)
METHADONE UR QL: NEGATIVE
MONOCYTES # BLD AUTO: 0.4 THOUSAND/ΜL (ref 0.17–1.22)
MONOCYTES NFR BLD AUTO: 6 % (ref 2–12)
NEUTROPHILS # BLD AUTO: 4.1 THOUSANDS/ΜL (ref 1.4–6.5)
NEUTS SEG NFR BLD AUTO: 60 % (ref 42–75)
NITRITE UR QL STRIP: NEGATIVE
NON-SQ EPI CELLS URNS QL MICRO: ABNORMAL /HPF
O2 CT BLDA-SCNC: 18.2 ML/DL
O2 CT BLDA-SCNC: 20.1 ML/DL (ref 16–23)
OPIATES UR QL SCN: NEGATIVE
OXYCODONE+OXYMORPHONE UR QL SCN: NEGATIVE
OXYHGB MFR BLDA: 89 % (ref 94–100)
OXYHGB MFR BLDA: 95.8 % (ref 94–97)
P AXIS: 61 DEGREES
PCO2 BLDA: 47.6 MM HG (ref 36–44)
PCO2 BLDA: 61.5 MM HG (ref 35–45)
PCP UR QL: NEGATIVE
PEEP RESPIRATORY: 6 CM[H2O]
PEEP RESPIRATORY: 6 CM[H2O]
PH BLDA: 7.16 [PH] (ref 7.35–7.45)
PH BLDA: 7.29 [PH] (ref 7.35–7.45)
PH UR STRIP.AUTO: 6 [PH]
PHOSPHATE SERPL-MCNC: 3.4 MG/DL (ref 2.7–4.5)
PLATELET # BLD AUTO: 192 THOUSANDS/UL (ref 149–390)
PMV BLD AUTO: 7.2 FL (ref 8.6–11.7)
PO2 BLDA: 295.9 MM HG (ref 75–129)
PO2 BLDA: 90 MM HG (ref 80–100)
POTASSIUM SERPL-SCNC: 3.6 MMOL/L (ref 3.5–5.5)
POTASSIUM SERPL-SCNC: 4 MMOL/L (ref 3.5–5.3)
PR INTERVAL: 158 MS
PROT SERPL-MCNC: 6.8 G/DL (ref 6.4–8.9)
PROT UR STRIP-MCNC: ABNORMAL MG/DL
QRS AXIS: 115 DEGREES
QRSD INTERVAL: 96 MS
QT INTERVAL: 463 MS
QTC INTERVAL: 482 MS
RBC # BLD AUTO: 4.89 MILLION/UL (ref 4.3–5.9)
RBC #/AREA URNS AUTO: ABNORMAL /HPF
SALICYLATES SERPL-MCNC: <5 MG/DL (ref 10–30)
SARS-COV-2 RNA RESP QL NAA+PROBE: NEGATIVE
SODIUM SERPL-SCNC: 129 MMOL/L (ref 134–143)
SODIUM SERPL-SCNC: 132 MMOL/L (ref 136–145)
SP GR UR STRIP.AUTO: >=1.03 (ref 1–1.03)
SPECIMEN SOURCE: ABNORMAL
SPECIMEN SOURCE: ABNORMAL
T WAVE AXIS: 65 DEGREES
THC UR QL: NEGATIVE
TROPONIN I SERPL-MCNC: <0.03 NG/ML
TSH SERPL DL<=0.05 MIU/L-ACNC: 1.68 UIU/ML (ref 0.45–5.33)
UROBILINOGEN UR QL STRIP.AUTO: 0.2 E.U./DL
VENT AC: 14
VENT AC: 18
VENT- AC: AC
VENTRICULAR RATE: 65 BPM
VT SETTING VENT: 400 ML
VT SETTING VENT: 450 ML
WBC # BLD AUTO: 6.8 THOUSAND/UL (ref 4.8–10.8)
WBC #/AREA URNS AUTO: ABNORMAL /HPF

## 2021-05-11 PROCEDURE — 76937 US GUIDE VASCULAR ACCESS: CPT | Performed by: PHYSICIAN ASSISTANT

## 2021-05-11 PROCEDURE — 82948 REAGENT STRIP/BLOOD GLUCOSE: CPT

## 2021-05-11 PROCEDURE — 80179 DRUG ASSAY SALICYLATE: CPT | Performed by: EMERGENCY MEDICINE

## 2021-05-11 PROCEDURE — 80307 DRUG TEST PRSMV CHEM ANLYZR: CPT | Performed by: EMERGENCY MEDICINE

## 2021-05-11 PROCEDURE — 85025 COMPLETE CBC W/AUTO DIFF WBC: CPT | Performed by: EMERGENCY MEDICINE

## 2021-05-11 PROCEDURE — U0003 INFECTIOUS AGENT DETECTION BY NUCLEIC ACID (DNA OR RNA); SEVERE ACUTE RESPIRATORY SYNDROME CORONAVIRUS 2 (SARS-COV-2) (CORONAVIRUS DISEASE [COVID-19]), AMPLIFIED PROBE TECHNIQUE, MAKING USE OF HIGH THROUGHPUT TECHNOLOGIES AS DESCRIBED BY CMS-2020-01-R: HCPCS | Performed by: EMERGENCY MEDICINE

## 2021-05-11 PROCEDURE — 83690 ASSAY OF LIPASE: CPT | Performed by: PHYSICIAN ASSISTANT

## 2021-05-11 PROCEDURE — 36620 INSERTION CATHETER ARTERY: CPT | Performed by: PHYSICIAN ASSISTANT

## 2021-05-11 PROCEDURE — 99449 NTRPROF PH1/NTRNET/EHR 31/>: CPT | Performed by: EMERGENCY MEDICINE

## 2021-05-11 PROCEDURE — 93005 ELECTROCARDIOGRAM TRACING: CPT

## 2021-05-11 PROCEDURE — 96361 HYDRATE IV INFUSION ADD-ON: CPT

## 2021-05-11 PROCEDURE — 84484 ASSAY OF TROPONIN QUANT: CPT | Performed by: EMERGENCY MEDICINE

## 2021-05-11 PROCEDURE — 80053 COMPREHEN METABOLIC PANEL: CPT | Performed by: EMERGENCY MEDICINE

## 2021-05-11 PROCEDURE — 84145 PROCALCITONIN (PCT): CPT | Performed by: PHYSICIAN ASSISTANT

## 2021-05-11 PROCEDURE — 82805 BLOOD GASES W/O2 SATURATION: CPT | Performed by: PHYSICIAN ASSISTANT

## 2021-05-11 PROCEDURE — 71045 X-RAY EXAM CHEST 1 VIEW: CPT

## 2021-05-11 PROCEDURE — 99285 EMERGENCY DEPT VISIT HI MDM: CPT

## 2021-05-11 PROCEDURE — 94640 AIRWAY INHALATION TREATMENT: CPT

## 2021-05-11 PROCEDURE — 99292 CRITICAL CARE ADDL 30 MIN: CPT | Performed by: EMERGENCY MEDICINE

## 2021-05-11 PROCEDURE — 96365 THER/PROPH/DIAG IV INF INIT: CPT

## 2021-05-11 PROCEDURE — 81001 URINALYSIS AUTO W/SCOPE: CPT | Performed by: EMERGENCY MEDICINE

## 2021-05-11 PROCEDURE — 82330 ASSAY OF CALCIUM: CPT | Performed by: PHYSICIAN ASSISTANT

## 2021-05-11 PROCEDURE — U0005 INFEC AGEN DETEC AMPLI PROBE: HCPCS | Performed by: EMERGENCY MEDICINE

## 2021-05-11 PROCEDURE — 96366 THER/PROPH/DIAG IV INF ADDON: CPT

## 2021-05-11 PROCEDURE — 94760 N-INVAS EAR/PLS OXIMETRY 1: CPT

## 2021-05-11 PROCEDURE — 82140 ASSAY OF AMMONIA: CPT | Performed by: EMERGENCY MEDICINE

## 2021-05-11 PROCEDURE — 87040 BLOOD CULTURE FOR BACTERIA: CPT | Performed by: PHYSICIAN ASSISTANT

## 2021-05-11 PROCEDURE — 82805 BLOOD GASES W/O2 SATURATION: CPT | Performed by: EMERGENCY MEDICINE

## 2021-05-11 PROCEDURE — 83036 HEMOGLOBIN GLYCOSYLATED A1C: CPT | Performed by: PHYSICIAN ASSISTANT

## 2021-05-11 PROCEDURE — 0BH17EZ INSERTION OF ENDOTRACHEAL AIRWAY INTO TRACHEA, VIA NATURAL OR ARTIFICIAL OPENING: ICD-10-PCS | Performed by: INTERNAL MEDICINE

## 2021-05-11 PROCEDURE — 94002 VENT MGMT INPAT INIT DAY: CPT

## 2021-05-11 PROCEDURE — 93010 ELECTROCARDIOGRAM REPORT: CPT | Performed by: INTERNAL MEDICINE

## 2021-05-11 PROCEDURE — 99291 CRITICAL CARE FIRST HOUR: CPT | Performed by: EMERGENCY MEDICINE

## 2021-05-11 PROCEDURE — 82077 ASSAY SPEC XCP UR&BREATH IA: CPT | Performed by: EMERGENCY MEDICINE

## 2021-05-11 PROCEDURE — 31500 INSERT EMERGENCY AIRWAY: CPT | Performed by: EMERGENCY MEDICINE

## 2021-05-11 PROCEDURE — 4A133J1 MONITORING OF ARTERIAL PULSE, PERIPHERAL, PERCUTANEOUS APPROACH: ICD-10-PCS | Performed by: INTERNAL MEDICINE

## 2021-05-11 PROCEDURE — 4A133B1 MONITORING OF ARTERIAL PRESSURE, PERIPHERAL, PERCUTANEOUS APPROACH: ICD-10-PCS | Performed by: INTERNAL MEDICINE

## 2021-05-11 PROCEDURE — 36556 INSERT NON-TUNNEL CV CATH: CPT | Performed by: EMERGENCY MEDICINE

## 2021-05-11 PROCEDURE — 36415 COLL VENOUS BLD VENIPUNCTURE: CPT | Performed by: EMERGENCY MEDICINE

## 2021-05-11 PROCEDURE — 80048 BASIC METABOLIC PNL TOTAL CA: CPT | Performed by: PHYSICIAN ASSISTANT

## 2021-05-11 PROCEDURE — 5A1945Z RESPIRATORY VENTILATION, 24-96 CONSECUTIVE HOURS: ICD-10-PCS | Performed by: INTERNAL MEDICINE

## 2021-05-11 PROCEDURE — 96368 THER/DIAG CONCURRENT INF: CPT

## 2021-05-11 PROCEDURE — 80143 DRUG ASSAY ACETAMINOPHEN: CPT | Performed by: EMERGENCY MEDICINE

## 2021-05-11 PROCEDURE — 84100 ASSAY OF PHOSPHORUS: CPT | Performed by: PHYSICIAN ASSISTANT

## 2021-05-11 PROCEDURE — 96375 TX/PRO/DX INJ NEW DRUG ADDON: CPT

## 2021-05-11 PROCEDURE — 84443 ASSAY THYROID STIM HORMONE: CPT | Performed by: EMERGENCY MEDICINE

## 2021-05-11 RX ORDER — LEVALBUTEROL 1.25 MG/.5ML
SOLUTION, CONCENTRATE RESPIRATORY (INHALATION)
Status: DISPENSED
Start: 2021-05-11 | End: 2021-05-12

## 2021-05-11 RX ORDER — HEPARIN SODIUM 5000 [USP'U]/ML
5000 INJECTION, SOLUTION INTRAVENOUS; SUBCUTANEOUS EVERY 8 HOURS SCHEDULED
Status: DISCONTINUED | OUTPATIENT
Start: 2021-05-11 | End: 2021-05-11

## 2021-05-11 RX ORDER — LEVALBUTEROL 1.25 MG/.5ML
1.25 SOLUTION, CONCENTRATE RESPIRATORY (INHALATION)
Status: DISCONTINUED | OUTPATIENT
Start: 2021-05-11 | End: 2021-05-12

## 2021-05-11 RX ORDER — MAGNESIUM SULFATE HEPTAHYDRATE 40 MG/ML
2 INJECTION, SOLUTION INTRAVENOUS ONCE
Status: DISCONTINUED | OUTPATIENT
Start: 2021-05-11 | End: 2021-05-11 | Stop reason: HOSPADM

## 2021-05-11 RX ORDER — VECURONIUM BROMIDE 1 MG/ML
10 INJECTION, POWDER, LYOPHILIZED, FOR SOLUTION INTRAVENOUS ONCE
Status: COMPLETED | OUTPATIENT
Start: 2021-05-11 | End: 2021-05-11

## 2021-05-11 RX ORDER — MAGNESIUM SULFATE HEPTAHYDRATE 40 MG/ML
2 INJECTION, SOLUTION INTRAVENOUS ONCE
Status: COMPLETED | OUTPATIENT
Start: 2021-05-11 | End: 2021-05-11

## 2021-05-11 RX ORDER — CHLORHEXIDINE GLUCONATE 0.12 MG/ML
15 RINSE ORAL EVERY 12 HOURS SCHEDULED
Status: DISCONTINUED | OUTPATIENT
Start: 2021-05-11 | End: 2021-05-12

## 2021-05-11 RX ORDER — SODIUM CHLORIDE 9 MG/ML
125 INJECTION, SOLUTION INTRAVENOUS CONTINUOUS
Status: DISCONTINUED | OUTPATIENT
Start: 2021-05-11 | End: 2021-05-12

## 2021-05-11 RX ORDER — NALOXONE HYDROCHLORIDE 0.4 MG/ML
2 INJECTION, SOLUTION INTRAMUSCULAR; INTRAVENOUS; SUBCUTANEOUS ONCE
Status: COMPLETED | OUTPATIENT
Start: 2021-05-11 | End: 2021-05-11

## 2021-05-11 RX ADMIN — NOREPINEPHRINE BITARTRATE 5 MCG/MIN: 1 INJECTION, SOLUTION, CONCENTRATE INTRAVENOUS at 19:21

## 2021-05-11 RX ADMIN — EPINEPHRINE 5 MCG/MIN: 1 INJECTION, SOLUTION, CONCENTRATE INTRAVENOUS at 20:33

## 2021-05-11 RX ADMIN — MIDAZOLAM HYDROCHLORIDE 1 MG/HR: 5 INJECTION, SOLUTION INTRAMUSCULAR; INTRAVENOUS at 19:29

## 2021-05-11 RX ADMIN — MAGNESIUM SULFATE HEPTAHYDRATE 2 G: 40 INJECTION, SOLUTION INTRAVENOUS at 18:49

## 2021-05-11 RX ADMIN — NALOXONE HYDROCHLORIDE 2 MG: 0.4 INJECTION, SOLUTION INTRAMUSCULAR; INTRAVENOUS; SUBCUTANEOUS at 18:22

## 2021-05-11 RX ADMIN — METRONIDAZOLE 500 MG: 500 INJECTION, SOLUTION INTRAVENOUS at 23:20

## 2021-05-11 RX ADMIN — LEVALBUTEROL HYDROCHLORIDE 1.25 MG: 1.25 SOLUTION, CONCENTRATE RESPIRATORY (INHALATION) at 22:35

## 2021-05-11 RX ADMIN — VECURONIUM BROMIDE 10 MG: 1 INJECTION, POWDER, LYOPHILIZED, FOR SOLUTION INTRAVENOUS at 18:25

## 2021-05-11 RX ADMIN — NOREPINEPHRINE BITARTRATE 15 MCG/MIN: 1 INJECTION, SOLUTION, CONCENTRATE INTRAVENOUS at 22:20

## 2021-05-11 RX ADMIN — CHLORHEXIDINE GLUCONATE 0.12% ORAL RINSE 15 ML: 1.2 LIQUID ORAL at 23:32

## 2021-05-11 RX ADMIN — IPRATROPIUM BROMIDE 0.5 MG: 0.5 SOLUTION RESPIRATORY (INHALATION) at 22:35

## 2021-05-11 RX ADMIN — SODIUM CHLORIDE 1000 ML: 0.9 INJECTION, SOLUTION INTRAVENOUS at 19:35

## 2021-05-11 RX ADMIN — CEFEPIME HYDROCHLORIDE 2000 MG: 2 INJECTION, POWDER, FOR SOLUTION INTRAVENOUS at 23:41

## 2021-05-11 RX ADMIN — MAGNESIUM SULFATE HEPTAHYDRATE 2 G: 40 INJECTION, SOLUTION INTRAVENOUS at 19:47

## 2021-05-11 RX ADMIN — SODIUM CHLORIDE 125 ML/HR: 0.9 INJECTION, SOLUTION INTRAVENOUS at 23:19

## 2021-05-11 RX ADMIN — SODIUM CHLORIDE 1000 ML: 0.9 INJECTION, SOLUTION INTRAVENOUS at 18:46

## 2021-05-11 NOTE — RESPIRATORY THERAPY NOTE
05/11/21 1840   Vent Information   Vent ID [id number]   Vent type     Vent Mode AC/VC+   $ Vent Charge-INITIAL Yes   Ventilator Start Yes   $ Pulse Oximetry Spot Check Charge Completed   SpO2 100 %   AC/VC+ Settings   Resp Rate (BPM) 14 BPM   VT (mL) 400 mL   Insp Time (S) 1 08 S   FIO2 (%) 80 %   PEEP (cmH2O) 6 cmH2O   Rise Time (%) 50 %   Trigger Sensitivity Flow (LPM) 2 LPM   Humidification Heater   Heater Temp 98 6 °F (37 °C)   AC/VC+ Actuals   Resp Rate (BPM) 14 BPM   VT (mL) 394 mL   MV (Obs) 5 34   MAP (cmH2O) 9 2 cmH2O   Peak Pressure (cmH2O) 19 cmH2O   I:E Ratio (Obs) 1:3   Heater Temperature (Obs) 98 4 °F (36 9 °C)   AC/VC+ ALARMS   High Peak Pressure (cmH2O) 45 cmH2O   Low Peak Pressure (cmH2O) 11 5 cm H2O   High Resp Rate (BPM) 40 BPM   High MV (L/min) 15 L/min   Low MV (L/min) 3 L/min   High VT (mL) 900 mL   Low VT (mL) 200 mL   High Luis VTE (mL) 900 mL   Low Luis VTE (mL) 200 mL   High Spont VTE (mL) 900 mL   AC/VC+ Apnea Settings   Resp Rate (BPM) 14 BPM   VT (mL) 400 mL   FIO2 (%) 100 %   Apnea Time (s) 20 S   Apnea Flow (L/min) 45 L/min   Maintenance   Resuscitation bag with peep valve at bedside Yes   Water bag changed No   Circuit changed No   Daily Screen   Patient safety screen outcome: Failed   Not Ready for Weaning due to: Underline problem not resolved;Going on Transport intubated;FiO2 >60%   ETT  Oral;Inflated 7 5 mm   Placement Date/Time: 05/11/21 1831   Previously placed by?: Attending  Mask Ventilation: Ventilated by mask (1)  Preoxygenated: Yes  Technique: Video laryngoscopy  Type: Oral;Inflated  Tube Size: 7 5 mm  Laryngoscope: GlideScope  Blade Size: 4  Locati    Secured at (cm) 25   Measured from Teeth   Secured Location Right   Secured by Commercial tube diaz  (skin and strap intact w/ two finger pass to back of head )   Site Condition Dry; Cool   Cuff Pressure (cm H2O) 25 cm H2O   HI-LO Suction  Continuous low suction   HI-LO Secretions   (none)   HI-LO Intervention Patent   RT Ventilator Management Note  Bethany Hubbard 46 y o  male MRN: [id number]  Unit/Bed#: TR 05 Encounter: [id number]      Daily Screen       5/11/2021  7890             Patient safety screen outcome[de-identified]  Failed    Not Ready for Weaning due to[de-identified]  Underline problem not resolved;Going on Transport intubated;FiO2 >60%            Physical Exam:   Assessment Type: Assess only  General Appearance: Unresponsive, Sedated  Respiratory Pattern: Assisted  Chest Assessment: Chest expansion symmetrical  Bilateral Breath Sounds: Clear  O2 Device: vent   Subjective Data: will get ABg and tranport to ct scan       Resp Comments: pt arrived to Ed called stat overhead, pt then intubated for unresponsiveness, pt taken unknown amount of pills, intubated by Er dr 7 5 mm @ 25 @ teeth, = angela BS, + ett co2 detector, pt Ett tube secured via commercial tube diaz , BS CTA , pt palced on Vent ACVC 14 400 80% +6, will cont to monitor

## 2021-05-11 NOTE — ED NOTES
Per EMS patient was found unresponsive after he voiced Suicidal ideations to a family member  A empty bottle of seroquel was found near the patient        ZOFIA Muller  05/11/21 1918

## 2021-05-11 NOTE — RESPIRATORY THERAPY NOTE
Called to Er stat over heard, arrived to room 1825, pt unresponsive, pt on NRb 100% 15 l/m, pt intubated By Er Dr Rodríguez 5 mm @ 25 @ lip, @ 1831, =angela BS, + etco2 detector, pt secured with commercial tube diaz, placed on vent @ 1840 see futher vent note and assessment

## 2021-05-11 NOTE — EMTALA/ACUTE CARE TRANSFER
[facility]  [address] [address]  [phone]  Dept: [phone]      EMTALA TRANSFER CONSENT    NAME Thompson Zhao                                         1970                              MRN [id number]    I have been informed of my rights regarding examination, treatment, and transfer   by Dr April Peterson MD    Benefits:      Risks:        Consent for Transfer:  I acknowledge that my medical condition has been evaluated and explained to me by the emergency department physician or other qualified medical person and/or my attending physician, who has recommended that I be transferred to the service of    at    The above potential benefits of such transfer, the potential risks associated with such transfer, and the probable risks of not being transferred have been explained to me, and I fully understand them  The doctor has explained that, in my case, the benefits of transfer outweigh the risks  I agree to be transferred  I authorize the performance of emergency medical procedures and treatments upon me in both transit and upon arrival at the receiving facility  Additionally, I authorize the release of any and all medical records to the receiving facility and request they be transported with me, if possible  I understand that the safest mode of transportation during a medical emergency is an ambulance and that the Hospital advocates the use of this mode of transport  Risks of traveling to the receiving facility by car, including absence of medical control, life sustaining equipment, such as oxygen, and medical personnel has been explained to me and I fully understand them  (EMMANUELLE CORRECT BOX BELOW)  [  ]  I consent to the stated transfer and to be transported by ambulance/helicopter  [  ]  I consent to the stated transfer, but refuse transportation by ambulance and accept full responsibility for my transportation by car    I understand the risks of non-ambulance transfers and I exonerate the Hospital and its staff from any deterioration in my condition that results from this refusal     X___________________________________________    DATE  21  TIME________  Signature of patient or legally responsible individual signing on patient behalf           RELATIONSHIP TO PATIENT_________________________          Provider Certification    NAME Everardo Donovan                                         1970                              MRN [id number]    A medical screening exam was performed on the above named patient  Based on the examination:    Condition Necessitating Transfer The primary encounter diagnosis was Intentional drug overdose, initial encounter ([facility] [address] )  A diagnosis of Suicide attempt [facility]) was also pertinent to this visit  Patient Condition:      Reason for Transfer:      Transfer Requirements: Facility     · Space available and qualified personnel available for treatment as acknowledged by    · Agreed to accept transfer and to provide appropriate medical treatment as acknowledged by          · Appropriate medical records of the examination and treatment of the patient are provided at the time of transfer   [address], [address] _______  · Transfer will be performed by qualified personnel from    and appropriate transfer equipment as required, including the use of necessary and appropriate life support measures      Provider Certification: I have examined the patient and explained the following risks and benefits of being transferred/refusing transfer to the patient/family:         Based on these reasonable risks and benefits to the patient and/or the unborn child(shaina), and based upon the information available at the time of the patients examination, I certify that the medical benefits reasonably to be expected from the provision of appropriate medical treatments at another medical facility outweigh the increasing risks, if any, to the individuals medical condition, and in the case of labor to the unborn child, from effecting the transfer      X____________________________________________ DATE 05/11/21        TIME_______      ORIGINAL - SEND TO MEDICAL RECORDS   COPY - SEND WITH PATIENT DURING TRANSFER

## 2021-05-12 LAB
ANION GAP SERPL CALCULATED.3IONS-SCNC: 7 MMOL/L (ref 4–13)
ATRIAL RATE: 80 BPM
ATRIAL RATE: 80 BPM
ATRIAL RATE: 85 BPM
ATRIAL RATE: 85 BPM
BASE EXCESS BLDA CALC-SCNC: -1.3 MMOL/L
BUN SERPL-MCNC: 7 MG/DL (ref 5–25)
CALCIUM SERPL-MCNC: 7.1 MG/DL (ref 8.3–10.1)
CHLORIDE SERPL-SCNC: 105 MMOL/L (ref 100–108)
CO2 SERPL-SCNC: 26 MMOL/L (ref 21–32)
CREAT SERPL-MCNC: 0.8 MG/DL (ref 0.6–1.3)
EST. AVERAGE GLUCOSE BLD GHB EST-MCNC: 166 MG/DL
GFR SERPL CREATININE-BSD FRML MDRD: 102 ML/MIN/1.73SQ M
GLUCOSE SERPL-MCNC: 102 MG/DL (ref 65–140)
GLUCOSE SERPL-MCNC: 123 MG/DL (ref 65–140)
GLUCOSE SERPL-MCNC: 130 MG/DL (ref 65–140)
GLUCOSE SERPL-MCNC: 183 MG/DL (ref 65–140)
GLUCOSE SERPL-MCNC: 200 MG/DL (ref 65–140)
GLUCOSE SERPL-MCNC: 258 MG/DL (ref 65–140)
HBA1C MFR BLD: 7.4 %
HCO3 BLDA-SCNC: 23.6 MMOL/L (ref 22–28)
HOROWITZ INDEX BLDA+IHG-RTO: 60 MM[HG]
MAGNESIUM SERPL-MCNC: 1.7 MG/DL (ref 1.6–2.6)
O2 CT BLDA-SCNC: 18.3 ML/DL (ref 16–23)
OXYHGB MFR BLDA: 96.2 % (ref 94–97)
P AXIS: 52 DEGREES
P AXIS: 56 DEGREES
P AXIS: 61 DEGREES
P AXIS: 62 DEGREES
PCO2 BLDA: 40.5 MM HG (ref 36–44)
PEEP RESPIRATORY: 6 CM[H2O]
PH BLDA: 7.38 [PH] (ref 7.35–7.45)
PHOSPHATE SERPL-MCNC: 2.2 MG/DL (ref 2.7–4.5)
PO2 BLDA: 124.7 MM HG (ref 75–129)
POTASSIUM SERPL-SCNC: 3.8 MMOL/L (ref 3.5–5.3)
PR INTERVAL: 146 MS
PR INTERVAL: 154 MS
PR INTERVAL: 164 MS
PR INTERVAL: 164 MS
PROCALCITONIN SERPL-MCNC: <0.05 NG/ML
QRS AXIS: 100 DEGREES
QRS AXIS: 103 DEGREES
QRS AXIS: 89 DEGREES
QRS AXIS: 93 DEGREES
QRSD INTERVAL: 100 MS
QRSD INTERVAL: 102 MS
QRSD INTERVAL: 83 MS
QRSD INTERVAL: 92 MS
QT INTERVAL: 392 MS
QT INTERVAL: 404 MS
QT INTERVAL: 450 MS
QT INTERVAL: 470 MS
QTC INTERVAL: 466 MS
QTC INTERVAL: 467 MS
QTC INTERVAL: 535 MS
QTC INTERVAL: 542 MS
SODIUM SERPL-SCNC: 138 MMOL/L (ref 136–145)
T WAVE AXIS: 35 DEGREES
T WAVE AXIS: 40 DEGREES
T WAVE AXIS: 43 DEGREES
T WAVE AXIS: 52 DEGREES
VENT AC: 16
VENT- AC: AC
VENTRICULAR RATE: 80 BPM
VENTRICULAR RATE: 80 BPM
VENTRICULAR RATE: 85 BPM
VENTRICULAR RATE: 85 BPM
VT SETTING VENT: 470 ML

## 2021-05-12 PROCEDURE — 3051F HG A1C>EQUAL 7.0%<8.0%: CPT | Performed by: REGISTERED NURSE

## 2021-05-12 PROCEDURE — 82948 REAGENT STRIP/BLOOD GLUCOSE: CPT

## 2021-05-12 PROCEDURE — 84100 ASSAY OF PHOSPHORUS: CPT | Performed by: PHYSICIAN ASSISTANT

## 2021-05-12 PROCEDURE — 93005 ELECTROCARDIOGRAM TRACING: CPT

## 2021-05-12 PROCEDURE — 87070 CULTURE OTHR SPECIMN AEROBIC: CPT | Performed by: PHYSICIAN ASSISTANT

## 2021-05-12 PROCEDURE — 99291 CRITICAL CARE FIRST HOUR: CPT | Performed by: PHYSICIAN ASSISTANT

## 2021-05-12 PROCEDURE — 93010 ELECTROCARDIOGRAM REPORT: CPT | Performed by: INTERNAL MEDICINE

## 2021-05-12 PROCEDURE — 87205 SMEAR GRAM STAIN: CPT | Performed by: PHYSICIAN ASSISTANT

## 2021-05-12 PROCEDURE — 99221 1ST HOSP IP/OBS SF/LOW 40: CPT | Performed by: PSYCHIATRY & NEUROLOGY

## 2021-05-12 PROCEDURE — 80048 BASIC METABOLIC PNL TOTAL CA: CPT | Performed by: PHYSICIAN ASSISTANT

## 2021-05-12 PROCEDURE — 94640 AIRWAY INHALATION TREATMENT: CPT

## 2021-05-12 PROCEDURE — 82805 BLOOD GASES W/O2 SATURATION: CPT | Performed by: PHYSICIAN ASSISTANT

## 2021-05-12 PROCEDURE — 83735 ASSAY OF MAGNESIUM: CPT | Performed by: PHYSICIAN ASSISTANT

## 2021-05-12 PROCEDURE — 94003 VENT MGMT INPAT SUBQ DAY: CPT

## 2021-05-12 RX ORDER — FOLIC ACID 1 MG/1
1 TABLET ORAL DAILY
Status: DISCONTINUED | OUTPATIENT
Start: 2021-05-12 | End: 2021-05-14 | Stop reason: HOSPADM

## 2021-05-12 RX ORDER — CHOLESTYRAMINE LIGHT 4 G/5.7G
4 POWDER, FOR SUSPENSION ORAL 2 TIMES DAILY
Status: DISCONTINUED | OUTPATIENT
Start: 2021-05-12 | End: 2021-05-14 | Stop reason: HOSPADM

## 2021-05-12 RX ORDER — FENTANYL CITRATE 50 UG/ML
INJECTION, SOLUTION INTRAMUSCULAR; INTRAVENOUS
Status: COMPLETED
Start: 2021-05-12 | End: 2021-05-12

## 2021-05-12 RX ORDER — MIDAZOLAM HYDROCHLORIDE 2 MG/2ML
1 INJECTION, SOLUTION INTRAMUSCULAR; INTRAVENOUS EVERY 4 HOURS PRN
Status: DISCONTINUED | OUTPATIENT
Start: 2021-05-12 | End: 2021-05-12

## 2021-05-12 RX ORDER — PROPOFOL 10 MG/ML
5-50 INJECTION, EMULSION INTRAVENOUS
Status: DISCONTINUED | OUTPATIENT
Start: 2021-05-12 | End: 2021-05-12

## 2021-05-12 RX ORDER — FUROSEMIDE 10 MG/ML
20 INJECTION INTRAMUSCULAR; INTRAVENOUS ONCE
Status: COMPLETED | OUTPATIENT
Start: 2021-05-12 | End: 2021-05-12

## 2021-05-12 RX ORDER — PANTOPRAZOLE SODIUM 40 MG/1
40 TABLET, DELAYED RELEASE ORAL
Status: DISCONTINUED | OUTPATIENT
Start: 2021-05-12 | End: 2021-05-14 | Stop reason: HOSPADM

## 2021-05-12 RX ORDER — LANOLIN ALCOHOL/MO/W.PET/CERES
100 CREAM (GRAM) TOPICAL DAILY
Status: DISCONTINUED | OUTPATIENT
Start: 2021-05-12 | End: 2021-05-14 | Stop reason: HOSPADM

## 2021-05-12 RX ORDER — FENTANYL CITRATE-0.9 % NACL/PF 10 MCG/ML
50 PLASTIC BAG, INJECTION (ML) INTRAVENOUS CONTINUOUS
Status: DISCONTINUED | OUTPATIENT
Start: 2021-05-12 | End: 2021-05-12

## 2021-05-12 RX ORDER — FENTANYL CITRATE 50 UG/ML
50 INJECTION, SOLUTION INTRAMUSCULAR; INTRAVENOUS
Status: DISCONTINUED | OUTPATIENT
Start: 2021-05-12 | End: 2021-05-12

## 2021-05-12 RX ADMIN — FENTANYL CITRATE 50 MCG: 50 INJECTION, SOLUTION INTRAMUSCULAR; INTRAVENOUS at 03:58

## 2021-05-12 RX ADMIN — LEVALBUTEROL HYDROCHLORIDE 1.25 MG: 1.25 SOLUTION, CONCENTRATE RESPIRATORY (INHALATION) at 07:24

## 2021-05-12 RX ADMIN — PANTOPRAZOLE SODIUM 40 MG: 40 TABLET, DELAYED RELEASE ORAL at 13:12

## 2021-05-12 RX ADMIN — FOLIC ACID 1 MG: 1 TABLET ORAL at 13:11

## 2021-05-12 RX ADMIN — Medication 50 MCG/HR: at 05:05

## 2021-05-12 RX ADMIN — NOREPINEPHRINE BITARTRATE 2 MCG/MIN: 1 INJECTION, SOLUTION, CONCENTRATE INTRAVENOUS at 05:42

## 2021-05-12 RX ADMIN — ENOXAPARIN SODIUM 40 MG: 40 INJECTION SUBCUTANEOUS at 09:01

## 2021-05-12 RX ADMIN — FUROSEMIDE 20 MG: 10 INJECTION, SOLUTION INTRAVENOUS at 09:01

## 2021-05-12 RX ADMIN — INSULIN LISPRO 2 UNITS: 100 INJECTION, SOLUTION INTRAVENOUS; SUBCUTANEOUS at 16:23

## 2021-05-12 RX ADMIN — SODIUM CHLORIDE 125 ML/HR: 0.9 INJECTION, SOLUTION INTRAVENOUS at 06:39

## 2021-05-12 RX ADMIN — CHOLESTYRAMINE 4 G: 4 POWDER, FOR SUSPENSION ORAL at 13:11

## 2021-05-12 RX ADMIN — METRONIDAZOLE 500 MG: 500 INJECTION, SOLUTION INTRAVENOUS at 06:33

## 2021-05-12 RX ADMIN — TIOTROPIUM BROMIDE 18 MCG: 18 CAPSULE ORAL; RESPIRATORY (INHALATION) at 10:08

## 2021-05-12 RX ADMIN — IPRATROPIUM BROMIDE 0.5 MG: 0.5 SOLUTION RESPIRATORY (INHALATION) at 07:24

## 2021-05-12 RX ADMIN — THIAMINE HCL TAB 100 MG 100 MG: 100 TAB at 13:12

## 2021-05-12 RX ADMIN — PROPOFOL 10 MCG/KG/MIN: 10 INJECTION, EMULSION INTRAVENOUS at 06:29

## 2021-05-12 RX ADMIN — VANCOMYCIN HYDROCHLORIDE 1750 MG: 1 INJECTION, POWDER, LYOPHILIZED, FOR SOLUTION INTRAVENOUS at 00:45

## 2021-05-12 NOTE — ASSESSMENT & PLAN NOTE
· Hold all home medications  · Unknown previous suicide attempt  He has followed with Psychiatry as an outpatient  Previous history of EtOH abuse  Per chart a suicide note was found and he was surrounded by multiple empty pill bottles  · Once patient is extubated and mental status improves he will need 1:1 observation and psych consult

## 2021-05-12 NOTE — ED NOTES
Flight crew transferring patient over to their monitoring equipment      [facility] [address] [place] [address]  05/11/21 1054

## 2021-05-12 NOTE — ASSESSMENT & PLAN NOTE
· Obtain a lipase level  Alcohol level on admission 90  · No indication for a CT of the abdomen at this time  Closely monitor for signs of pancreatitis

## 2021-05-12 NOTE — ASSESSMENT & PLAN NOTE
Lab Results   Component Value Date    HGBA1C 8 7 (A) 07/03/2019       Recent Labs     05/11/21  1842 05/11/21 1956   POCGLU 170* 171*       Blood Sugar Average: Last 72 hrs:     · Will obtain a hemoglobin A1c level  Start Accu-Cheks q 6 hours with insulin sliding scale coverage

## 2021-05-12 NOTE — CONSULTS
TELEConsultation - [address] 46 y o  male MRN: [id number]  Unit/Bed#: ICU 01 Encounter: [id number]  REQUIRED DOCUMENTATION:     1  This service was provided via Telemedicine  2  Provider located at [address] [place] [address] (Holter) and [address]  3  TeleMed provider: Gambia C Holter, DO  4  Identify all parties in room with patient during tele consult:  Patient   5  After connecting through televideo, patient was identified by name and date of birth  Parent/patient was then informed that this was being conducted confidentially over secure lines  My office door was closed  No one else was in the room  Patient acknowledged consent and understanding of privacy and security of the Telemedicine visit  I informed the patient that I have reviewed their record in Epic and presented the opportunity for them to ask any questions regarding the visit today  The patient agreed to participate  Assessment   Principal Problem:    Intentional overdose of drug in tablet form ([facility] [address] )  Active Problems:    Quiros esophagus    COPD (chronic obstructive pulmonary disease) (HCC)    Hyperlipidemia    Acute hyponatremia    Anxiety and depression    History of ETOH abuse    Chronic pancreatitis (HCC)    Hypotension    Acute respiratory failure (HCC)    DM (diabetes mellitus) ([facility] [address] [facility] )  Patient presentation likely consequential of Major Depressive Disorder, severe, recurrent, without psychotic features versus substance induced mood disorder in presence of alcohol relapse  Previous diagnosis of Generalized Anxiety disorder  Alcohol use disorder  Plan     Admission labs evaluated; see below   Continue medical management per primary team including CIWA protocol; continue folic acid and thiamine   Collaborate with collaterals (Pedro GreggJonh ) for baseline assessment and case management for discharge disposition     Recommend inpatient psychiatric admission once medically stable subsequent to worsening depressive signs/symptoms including suicidal attempt by intentional overdose in presence of relapse on alcohol    o Patient amenable to signing 61 51 81; if patient refuses or attempts to leave AMA, please initiate 302   Defer additional medication recommendations to inpatient psychiatry; continue to hold previously prescribed regimen  [facility]  Consultation appreciated  Please do not hesitate to call/contact our department with any additional comments/concerns  Thank you! Risks, benefits and possible side effects of Medications:   Risks, benefits, and possible side effects of medications explained to patient and patient verbalizes understanding  Chief Complaint: worsening mood dysphoria in presence of alcohol relapse including suicide attempt; "I'm sick and tired of being sick and tired "    History of Present Illness     Ximena Kirby is a 46 y o  male, possessing pertinent psychiatric history of major depressive disorder, generalized anxiety disorder and alcohol use disorder, medically complicated by chronic pancreatitis, martinez esophagus and diabetes mellitus, initially presenting to [address] [place] [address] Emergency Department, subsequent to a suicide attempt by intentional overdose on Seroquel and Propranolol after relapse on alcohol around early April  Consequentially, patient was relocated to [facility], because of the necessity for ICU admission  Patient stated that he had relapsed on alcohol around early April and cited worsening psychosocial stressors including volatile relationship with his spouse, financial instability consequential of unemployment, the presence of alcohol in their household and minimal outpatient resources regarding alcohol abstinence secondary to COVID-19 as causes for his relapse   Additionally, patient admitted to compliance regarding outpatient psychiatric management including his medication regimen, although stated that he was unsure if his present regimen was beneficial  Patient stated that he was unable to accurately recall the incident resulting in his present admission, therefore, collateral was acquired through his spouse  Per patient's spouse Tie Siding Scale), patient has struggled in regard to his alcohol abuse since late adolescence, in addition to instances of benzodiazepine abuse  She stated that the patient's clinical condition has deteriorated since the death of his father approximately 6 years ago including depressive signs/symptoms  Presently, the patient relapsed in early April as a result of worsening psychosocial stressors including increased instances of augments between her and her   Prior to admission, she learned that her  drank a 12-pack of beer and demanded that he leave their residence because she was "fed up " She attended an appointment and stated that she received a text message referring to suicide in addition to a suicide note that included statements like "you and the kids don't deserve this    I'm sorry    tell my family good-bye"  Also, she noted empty pill bottles  Presently, patient denied suicidal/homicidal ideation; contracted for safety  He acknowledged that his intentional overdose on Seroquel and Propranolol was an overt suicide attempt, because he has "nothing to live for ", although was initially evasive when this writer inquired about his previous actions  He admitted to pervasive thoughts of suicide for "several years", although never acted on said thoughts, aside from previous suicidal gestures like holding a shotgun to his head  Patent was unable to cite any protective factors or future endeavors  He admitted to depressed/sad mood including hopelessness/helplessness  He denied anxiety  Patient denied auditory visual hallucinations or paranoia  He denied previous symptomatology of camilla/hypomania       Stressors:chronic medical conditions, chronic psychiatric conditions, financial instability, volatile relationship regarding spouse, spouse's legal blindness      Medical Review Of Systems:  Pertinent items are noted in HPI  Psychiatric Review Of Systems:  sleep: yes, decreased   appetite changes: yes, decreased  weight changes: no  energy/anergy: yes, decreased   interest/pleasure/anhedonia: yes, anhedonia  somatic symptoms: yes, chronic musculoskeletal pain   anxiety/panic: yes  camilla: no  guilty/hopeless: yes, hopeless/helpless  self injurious behavior/risky behavior: yes, see above    Historical Information     Past Psychiatric History:   Past Psychiatric management: admitted to previous inpatient psychiatric admission  Admitted to present outpatient psychiatric management through [facility] (Kristen Rubi)  Admitted to previous inpatient/outpatient alcohol/drug rehab  Past Suicide attempts: yes, see above  Past Violent behavior: denied  Past Psychiatric medications: numerous  Substance Abuse History:  I spent time with patient in counseling and education on risk of substance abuse  Assessed him motivation and encouraged patient for treatment  Brief intervention done  Social History     Tobacco History     Smoking Status  Current Every Day Smoker Smoking Frequency  1 pack/day    Smokeless Tobacco Use  Never Used    Tobacco Comment  Tobacco use [employer]'s "How to Quit" literature given to pat            Alcohol History     Alcohol Use Status  Not Currently Comment  Drank a 5th vodka today          Drug Use     Drug Use Status  No Comment  chronic narcotic use          Sexual Activity     Sexually Active  Yes Partners  Female          Activities of Daily Living    Not Asked               I have assessed this patient for substance use within the past 12 months    Family Psychiatric History:   Psychiatric Illness Mother  Illness: depressive disorder    Social History:  Education: college graduate  Learning Disabilities: denied  Marital history:   Living arrangement, social support: The patient resides with spouse and 2 children   Occupational History: unemployed  Functioning Relationships: good support system  Other Pertinent History: None; previous firearms absent per spouse  Traumatic History:   Abuse: denied   Other Traumatic Events: denied    Past Medical History:   Diagnosis Date    Alcohol abuse 2/20/2019    Allergic rhinitis     last assessed: 12/5/2012    Anemia     Anxiety and depression     Quiros esophagus     Cholelithiasis     Chronic pain     COPD (chronic obstructive pulmonary disease) (HCC)     Depression     Diabetes mellitus (HCC)     Emphysema lung (HCC)     Generalized anxiety disorder     GERD (gastroesophageal reflux disease)     Hyperlipidemia     Hypertension     Kidney stone     Metatarsalgia     last assessed: 8/11/2014, unspecified laterality, ? cause  PE with changes  To see DPM tomorrow   Pancreatitis     Psychiatric disorder     Renal disorder     Shortness of breath     with activity       Meds/Allergies   all current active meds have been reviewed, current meds:   Current Facility-Administered Medications   Medication Dose Route Frequency    cholestyramine sugar free (QUESTRAN LIGHT) packet 4 g  4 g Oral BID    enoxaparin (LOVENOX) subcutaneous injection 40 mg  40 mg Subcutaneous X37N SELINA    folic acid (FOLVITE) tablet 1 mg  1 mg Oral Daily    insulin lispro (HumaLOG) 100 units/mL subcutaneous injection 1-6 Units  1-6 Units Subcutaneous TID AC    pantoprazole (PROTONIX) EC tablet 40 mg  40 mg Oral Early Morning    thiamine tablet 100 mg  100 mg Oral Daily    tiotropium (SPIRIVA) capsule for inhaler 18 mcg  18 mcg Inhalation Daily    and PTA meds:   Prior to Admission Medications   Prescriptions Last Dose Informant Patient Reported? Taking?    EQ NICOTINE 21 MG/24HR TD 24 hr patch   Yes No   Sig: APPLY 1 PATCH TOPICALLY ONCE DAILY   EQ NICOTINE POLACRILEX 4 MG lozenge   Yes No   Sig: TAKE 1 LOZENGE EVERY 2 HOURS AS NEEDED FOR SMOKING CRAVING   Insulin Pen Needle (B-D ULTRAFINE III SHORT PEN) 31G X 8 MM MISC   Yes No   Sig: by Does not apply route   Insulin Pen Needle (PEN NEEDLES) 29G X 12MM MISC   No No   Sig: by Does not apply route daily at bedtime Substitute what fits Touejo pen and what is covered by insurance  NARCAN 4 MG/0 1ML LIQD   Yes No   Sig: ADMINISTER A SINGLE SPRAY IN ONE NOSTRIL UPON SIGNS OF OPIOID OVERDOSE  CALL 911  REPEAT AFTER 3 MINUTES IF NO RESPONSE     QUEtiapine (SEROquel) 200 mg tablet   No No   Sig: Take 1 tablet (200 mg total) by mouth daily at bedtime   QUEtiapine (SEROquel) 25 mg tablet   No No   Sig: Take 1 tablet (25 mg total) by mouth 2 (two) times a day   QUEtiapine (SEROquel) 50 mg tablet   No No   Sig: Take 1 tablet (50 mg total) by mouth daily at bedtime   benzonatate (TESSALON PERLES) 100 mg capsule   No No   Sig: Take 1 capsule (100 mg total) by mouth 3 (three) times a day as needed for cough   Patient not taking: Reported on 3/23/2021   buPROPion (WELLBUTRIN XL) 150 mg 24 hr tablet   No No   Sig: Take 1 tablet (150 mg total) by mouth daily   cholestyramine (QUESTRAN) 4 GM/DOSE powder   No No   Sig: Take 1 packet (4 g total) by mouth 2 (two) times a day with meals   fenofibrate (TRICOR) 145 mg tablet   No No   Sig: Take 1 tablet (145 mg total) by mouth daily   Patient not taking: Reported on 3/23/2021   glucose blood (ONE TOUCH ULTRA TEST) test strip   Yes No   Sig: by In Vitro route 3 (three) times a day   hydrocortisone (ANUSOL-HC) 25 mg suppository   No No   Sig: Insert 1 suppository (25 mg total) into the rectum 2 (two) times a day   Patient not taking: Reported on 3/23/2021   insulin glargine (BASAGLAR KWIKPEN) 100 units/mL injection pen   No No   Si units sq q AM  20 units sq q PM   insulin lispro (HumaLOG) 100 units/mL injection pen   No No   Sig: PER SLIDING SCALE UP TO 14 UNITS WITH EACH MEAL lisinopril (ZESTRIL) 10 mg tablet   No No   Sig: Take 1 tablet (10 mg total) by mouth daily   Patient not taking: Reported on 3/23/2021   omeprazole (PriLOSEC) 20 mg delayed release capsule   No No   Sig: Take 1 capsule (20 mg total) by mouth daily   propranolol (INDERAL) 10 mg tablet   No No   Sig: Take 1 tablet (10 mg total) by mouth 2 (two) times a day   rosuvastatin (CRESTOR) 10 MG tablet   No No   Sig: Take 1 tablet (10 mg total) by mouth daily   Patient not taking: Reported on 3/23/2021   sertraline (ZOLOFT) 100 mg tablet   No No   Sig: Take 2 tablets (200 mg total) by mouth daily   umeclidinium-vilanterol (ANORO ELLIPTA) 62 5-25 MCG/INH inhaler   No No   Sig: Inhale 1 puff daily      Facility-Administered Medications: None     No Known Allergies    Objective   Vital signs in last 24 hours:  Temp:  [93 9 °F (34 4 °C)-98 2 °F (36 8 °C)] 97 9 °F (36 6 °C)  HR:  [66-90] 78  Resp:  [7-25] 14  BP: ()/(53-95) 121/73  Arterial Line BP: (106-148)/(50-76) 108/52      Intake/Output Summary (Last 24 hours) at 5/12/2021 1129  Last data filed at 5/12/2021 1073  Gross per 24 hour   Intake 1273 55 ml   Output 2175 ml   Net -901 45 ml       Mental Status Evaluation:  Appearance:  age appropriate, casually dressed, disheveled, overweight and wearing a nasal cannula    Behavior:  calm and cooperative possessing intermittent eye contact    Speech:  soft   Mood:  depressed, sad and dysphoric   Affect:  constricted   Language: naming objects and repeating phrases   Thought Process:  goal directed and logical   Thought Content:  no overt obsessions or delusions    Perceptual Disturbances: no auditory visual hallucinations or paranoia    Risk Potential: Suicidal Ideations with plan to intentionally overdose on medication , Homicidal Ideations none and Potential for Aggression No   Sensorium:  person, place and situation   Cognition:  recent and remote memory grossly intact   Consciousness:  alert and awake    Attention: attention span appeared shorter than expected for age   Intellect: within normal limits   Fund of Knowledge: awareness of current events: COVID-19    Insight:  limited   Judgment: limited   Muscle Strength and Tone: not assessed    Gait/Station: sitting upright in bed; not assessed    Motor Activity: no abnormal movements     Memory: Short and long term memory  fair     Laboratory results:    I have personally reviewed all pertinent laboratory/tests results  Most Recent Labs:   Lab Results   Component Value Date    WBC 6 80 05/11/2021    RBC 4 89 05/11/2021    HGB 14 7 05/11/2021    HCT 43 8 05/11/2021     05/11/2021    RDW 15 0 (H) 05/11/2021    NEUTROABS 4 10 05/11/2021    SODIUM 138 05/12/2021    K 3 8 05/12/2021     05/12/2021    CO2 26 05/12/2021    BUN 7 05/12/2021    CREATININE 0 80 05/12/2021    GLUC 123 05/12/2021    GLUF 219 (H) 07/18/2019    CALCIUM 7 1 (L) 05/12/2021    AST 14 05/11/2021    ALT 8 05/11/2021    ALKPHOS 88 05/11/2021    TP 6 8 05/11/2021    ALB 3 6 05/11/2021    TBILI 0 40 05/11/2021    CHOLESTEROL 157 02/21/2019    HDL 44 02/21/2019    TRIG 188 (H) 02/21/2019    LDLCALC 75 02/21/2019    NONHDLC 113 02/21/2019    AMMONIA 50 05/11/2021    [id number] 1 680 05/11/2021    HGBA1C 7 4 (H) 05/11/2021     05/11/2021     Labs in last 72 hours:   Recent Labs     05/11/21  1842  05/12/21  0525   WBC 6 80  --   --    RBC 4 89  --   --    HGB 14 7  --   --    HCT 43 8  --   --      --   --    RDW 15 0*  --   --    NEUTROABS 4 10  --   --    SODIUM 129*   < > 138   K 3 6   < > 3 8   CL 98   < > 105   CO2 22   < > 26   BUN 9   < > 7   CREATININE 0 73   < > 0 80   GLUC 198*   < > 123   CALCIUM 7 8*   < > 7 1*   AST 14  --   --    ALT 8  --   --    ALKPHOS 88  --   --    TP 6 8  --   --    ALB 3 6  --   --    TBILI 0 40  --   --    AMMONIA 50  --   --    [id number] 1 680  --   --     < > = values in this interval not displayed  Imaging Studies: see detailed reports  EKG, Pathology, and Other Studies: EKG sinus rhythm rate  65   This note has been constructed using a voice recognition system  There may be translation, syntax,  or grammatical errors  If you have any questions, please contact the dictating provider  Gambia C Holter, D O    Psychiatry PGY-2

## 2021-05-12 NOTE — ED NOTES
[facility]  This patient to be sent to [facility] via [facility]  30 minute ETA  Dr Reji Arambula is the accepting physician   Nurse to nurse report to be called to  Yuriy Snyder, RN  05/11/21 2001

## 2021-05-12 NOTE — UTILIZATION REVIEW
Initial Clinical Review    Admission: Date/Time/Statement:   Admission Orders (From admission, onward)     Ordered        05/11/21 2134  Inpatient Admission  Once                   Orders Placed This Encounter   Procedures    Inpatient Admission     Standing Status:   Standing     Number of Occurrences:   1     Order Specific Question:   Level of Care     Answer:   Critical Care [15]     Order Specific Question:   Estimated length of stay     Answer:   More than 2 Midnights     Order Specific Question:   Certification     Answer:   I certify that inpatient services are medically necessary for this patient for a duration of greater than two midnights  See H&P and MD Progress Notes for additional information about the patient's course of treatment  Initial Presentation: 45 yo m w/hx depression, anxiety, etoh abuse, recurrent pancreatitis, copd, dm, htn, martinez's esophagus transferred from [address] [place] [address] ED to [facility] ICU by EMS, admitted as inpatient to critical care due to intentional polypharmacy overdose  Presented initially to [facility] ED after wife found him unresponsive with a suicide note and empty seroquel/propanolol/ropinirole bottles  He was intubated due to encephalopathy, found hypotensive requiring pressors, etoh level 90, central line placed, toxicology consulted, and decision made to transfer to [facility] for higher level of care  On arrival to [facility], intubated and unresponsive, GCS 7 as he withdraws and localized to painful stimuli  EKG nsr  Remains vented, a line/joseph in place, on pressors, with IVF and nebs in progress  Sedation being held  Date: 5/12   Day 2: Dx is acute hypoxic resp failure from intentional drug overdose, suicide attempt, hypotension from drug overdose, hyponatremia  extubated this am, awake, oriented, weak voice, feels tired; afebrile; lines and joseph dc  Lungs clear, abd soft    CXR shows diffuse bilateral infiltrates-suspect due to negative pressure pulm edema, not infection  Giving IV lasix x 1  Psych consulted, 1:1 watch in progress  Starting diet  Plan to transfer to 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 of MDD, severe, recurrent, without psychotic features sv  Substance induced mood disorder in presence of alcoholic relapse  Monitor GIANNAWA, should have IP psych admit once medically stable  He will sign 201, but if he becomes unstable or attempts to leave, then initiate 302       [email or web address] [email or web address] due to tremor and anxiety    ED Triage Vitals   Temperature Pulse Respirations Blood Pressure SpO2   05/11/21 2200 05/11/21 2200 05/11/21 2200 05/11/21 2200 05/11/21 2200   (!) 94 3 °F (34 6 °C) 68 (!) 25 97/65 100 %      Temp Source Heart Rate Source Patient Position - Orthostatic VS BP Location FiO2 (%)   05/11/21 2200 05/11/21 2200 05/11/21 2200 05/11/21 2200 --   Bladder Monitor Lying Left arm       Pain Score       05/12/21 0358       Med Not Given for Pain - for MAR use only          Wt Readings from Last 1 Encounters:   05/12/21 95 2 kg (209 lb 14 1 oz)     Additional Vital Signs:   Date/Time  Temp  Pulse  Resp BP  MAP (mmHg)  Arterial Line BP  MAP  SpO2   Nasal Cannula O2 Flow Rate (L/min)  O2 Device    05/12/21 1000  97 9 °F (36 6 °C)  78  14 --  --  108/52  70 mmHg  93 %   --  --    05/12/21 0900  98 2 °F (36 8 °C)  78  14 --  --  106/50  68 mmHg  92 %   --  --    05/12/21 0844  98 2 °F (36 8 °C)  80  16 121/73  95  118/56  76 mmHg  92 %   4 L/min  Nasal cannula    05/12/21 0800  97 9 °F (36 6 °C)  74  14 --  --  112/52  72 mmHg  99 %   --  --    05/12/21 0745  --  --  -- --  --  --  --  94 %   --  --    05/12/21 0725  --  --  -- --  --  --  --  99 %   --  --    05/12/21 0600  98 2 °F (36 8 °C)  76  7Abnormal  --  --  106/50  68 mmHg  99 %   --  --    05/12/21 0500  98 2 °F (36 8 °C)  78  12 --  --  130/58  80 mmHg  99 %   --  --    05/12/21 0402  --  --  -- --  --  --  --  100 %   --  --    05/12/21 0400  97 5 °F (36 4 °C)  90 11Abnormal  --  --  148/64  88 mmHg  --   --  --    05/12/21 0300  97 2 °F (36 2 °C)Abnormal   74  16 --  --  114/58  76 mmHg  100 %   --  --    05/12/21 0200  96 4 °F (35 8 °C)Abnormal   72  16 --  --  122/56  78 mmHg  100 %   --  --    05/12/21 0100  95 4 °F (35 2 °C)Abnormal   70  15 --  --  132/62  84 mmHg  100 %   --  --    05/12/21 0000  94 3 °F (34 6 °C)Abnormal   66  15 --  --  140/68  92 mmHg  100 %   --  --    05/11/21 2300  93 9 °F (34 4 °C)Abnormal   66  12 123/78  91  148/76  108 mmHg  100 %   --  --    05/11/21 2235  --  --  -- --  --  --  --  100 %   --  --    05/11/21 2200  94 3 °F (34 6 °C)Abnormal   68  25Abnormal  97/65  79  --  --  100 %   --  Ventilator        Pertinent Labs/Diagnostic Test Results:   5/11 [email or web address] tube 6 cm above the michelle    Severe bilateral ground glass opacity which may be due to edema or pneumonia      5/11 PCXR @[facility] Moderate congestive changes with associated infiltrates predominantly on the right unchanged  Prominent hilar densities  Lymphadenopathy cannot be excluded  5/11 EKG#1 Normal sinus rhythm  Poor R wave progression  Prolonged QT    5/11 EKG#2 Normal sinus rhythm  Rightward axis  Prolonged QT    5/11 EKG#3 Normal sinus rhythm  Right axis deviation  T wave abnormality, consider anterior ischemia  Prolonged QT  Abnormal ECG      XR chest portable ICU done at [facility]   Final Result by Nghia Ferguson MD (05/12 0243)   1  Stable tubes and lines without pneumothorax  2   Persistent extensive bilateral airspace and interstitial opacities        Workstation performed: [id number]             Results from last 7 days   Lab Units 05/11/21  1901   SARS-COV-2  Negative     Results from last 7 days   Lab Units 05/11/21  1842   WBC Thousand/uL 6 80   HEMOGLOBIN g/dL 14 7   HEMATOCRIT % 43 8   PLATELETS Thousands/uL 192   NEUTROS ABS Thousands/µL 4 10         Results from last 7 days   Lab Units 05/12/21  0525 05/12/21  0433 05/11/21  2221 05/11/21  1842   SODIUM mmol/L 138  -- 132* 129*   POTASSIUM mmol/L 3 8  --  4 0 3 6   CHLORIDE mmol/L 105  --  100 98   CO2 mmol/L 26  --  23 22   ANION GAP mmol/L 7  --  9 9   BUN mg/dL 7  --  9 9   CREATININE mg/dL 0 80  --  0 69 0 73   EGFR ml/min/1 73sq m 102  --  108 106   CALCIUM mg/dL 7 1*  --  7 5* 7 8*   CALCIUM, IONIZED mmol/L  --   --  1 04*  --    MAGNESIUM mg/dL  --  1 7  --   --    PHOSPHORUS mg/dL 2 2*  --  3 4  --      Results from last 7 days   Lab Units 05/11/21  1842   AST U/L 14   ALT U/L 8   ALK PHOS U/L 88   TOTAL PROTEIN g/dL 6 8   ALBUMIN g/dL 3 6   TOTAL BILIRUBIN mg/dL 0 40   AMMONIA umol/L 50     Results from last 7 days   Lab Units 05/12/21  0558 05/12/21  0523 05/11/21  2326 05/11/21  1956 05/11/21  1842   POC GLUCOSE mg/dl 102 130 163* 171* 170*     Results from last 7 days   Lab Units 05/12/21  0525 05/11/21  2221 05/11/21  1842   GLUCOSE RANDOM mg/dL 123 175* 198*         Results from last 7 days   Lab Units 05/11/21  2325   HEMOGLOBIN A1C % 7 4*   EAG mg/dl 166        Results from last 7 days   Lab Units 05/12/21  0525 05/11/21  2326 05/11/21  1909   PH ART  7 384 7 289* 7 160*   PCO2 ART mm Hg 40 5 47 6* 61 5*   PO2 ART mm Hg 124 7 295 9* 90 0   HCO3 ART mmol/L 23 6 22 3 21 2*   BASE EXC ART mmol/L -1 3 -4 5 -8 4*   O2 CONTENT ART mL/dL 18 3 20 1 18 2   O2 HGB, ARTERIAL % 96 2 95 8 89 0*   ABG SOURCE   --  Line, Arterial Radial, Left     Results from last 7 days   Lab Units 05/11/21  1842   TROPONIN I ng/mL <0 03             Results from last 7 days   Lab Units 05/11/21  1842   TSH 3RD GENERATON uIU/mL 1 680     Results from last 7 days   Lab Units 05/11/21  2325   PROCALCITONIN ng/ml <0 05     Results from last 7 days   Lab Units 05/11/21  2221   LIPASE u/L 15*             Results from last 7 days   Lab Units 05/11/21  1843   CLARITY UA  Clear   COLOR UA  Yellow   SPEC GRAV UA  >=1 030*   PH UA  6 0   GLUCOSE UA mg/dl 1+*   KETONES UA mg/dl Negative   BLOOD UA  2+*   PROTEIN UA mg/dl Trace*   NITRITE UA  Negative BILIRUBIN UA  Negative   UROBILINOGEN UA E U /dl 0 2   LEUKOCYTES UA  Negative   WBC UA /hpf 1-2   RBC UA /hpf 10-20*   BACTERIA UA /hpf None Seen   EPITHELIAL CELLS WET PREP /hpf Occasional             Results from last 7 days   Lab Units 05/11/21  1843   AMPH/METH  Negative   BARBITURATE UR  Negative   BENZODIAZEPINE UR  Positive*   COCAINE UR  Negative   METHADONE URINE  Negative   OPIATE UR  Negative   PCP UR  Negative   THC UR  Negative     Results from last 7 days   Lab Units 05/11/21  1842   ETHANOL LVL mg/dL 90 1*   ACETAMINOPHEN LVL ug/mL <03*   SALICYLATE LVL mg/dL <5*                 Results from last 7 days   Lab Units 05/11/21  2222 05/11/21  2221   BLOOD CULTURE  Received in Microbiology Lab  Culture in Progress  Received in Microbiology Lab  Culture in Progress  Past Medical History:   Diagnosis Date    Alcohol abuse 2/20/2019    Allergic rhinitis     last assessed: 12/5/2012    Anemia     Anxiety and depression     Quiros esophagus     Cholelithiasis     Chronic pain     COPD (chronic obstructive pulmonary disease) (HCC)     Depression     Diabetes mellitus (HCC)     Emphysema lung (HCC)     Generalized anxiety disorder     GERD (gastroesophageal reflux disease)     Hyperlipidemia     Hypertension     Kidney stone     Metatarsalgia     last assessed: 8/11/2014, unspecified laterality, ? cause  PE with changes  To see DPM tomorrow      Pancreatitis     Psychiatric disorder     Renal disorder     Shortness of breath     with activity     Present on Admission:   Intentional overdose of drug in tablet form ([address] )   Acute respiratory failure ([address] )   Quiros esophagus   COPD (chronic obstructive pulmonary disease) (HCC)   Chronic pancreatitis (HCC)   Hyperlipidemia      Admitting Diagnosis: Polysubstance overdose [T50 901A]  Age/Sex: 46 y o  male  Admission Orders:  Scheduled Medications:  cholestyramine sugar free, 4 g, Oral, BID  enoxaparin, 40 mg, Subcutaneous, Q24H [facility]  insulin lispro, 1-6 Units, Subcutaneous, TID AC  pantoprazole, 40 mg, Oral, Early Morning  tiotropium, 18 mcg, Inhalation, Daily  xopenex + atrovent nebs tid    cefepime (MAXIPIME) 2,000 mg in dextrose 5 % 50 mL IVPB   Dose: 2,000 mg  Freq: Every 12 hours Route: IV  Last Dose: Stopped (05/12/21 0030)  Start: 05/11/21 2300 End: 05/12/21 0857  furosemide (LASIX) injection 20 mg   Dose: 20 mg  Freq:  Once Route: IV  Start: 05/12/21 0900 End: 05/12/21 0901  metroNIDAZOLE (FLAGYL) IVPB (premix) 500 mg 100 mL   Dose: 500 mg  Freq: Every 8 hours Route: IV  Last Dose: 500 mg (05/12/21 1757)  Start: 05/11/21 2300 End: 05/12/21 0857  vancomycin (VANCOCIN) 1,750 mg in sodium chloride 0 9 % 500 mL IVPB   Dose: 20 mg/kg  Weight Dosing Info: 93 5 kg  Freq: Every 12 hours Route: IV  Last Dose: 1,750 mg (05/12/21 0045)  Start: 05/12/21 0000 End: 05/12/21 0857    Continuous IV Infusions:fentaNYL 1000 mcg in sodium chloride 0 9% 100mL infusion   Rate: 5 mL/hr Dose: 50 mcg/hr  Freq: Continuous Route: IV  Last Dose: Stopped (05/12/21 0637)  Start: 05/12/21 0415 End: 05/12/21 0637  NOREPINEPHRINE 4 MG  ML NSS (CMPD ORDER) infusion   Rate: 3 8-112 5 mL/hr Dose: 1-30 mcg/min  Freq: Titrated Route: IV  Last Dose: Stopped (05/12/21 0820)  Start: 05/11/21 2115 End: 05/12/21 0857  propofol (DIPRIVAN) 1000 mg in 100 mL infusion (premix)   Rate: 2 9-28 6 mL/hr Dose: 5-50 mcg/kg/min  Weight Dosing Info: 95 2 kg  Freq: Titrated Route: IV  Last Dose: Stopped (05/12/21 0836)  Start: 05/12/21 0615 End: 05/12/21 0857  sodium chloride 0 9 % infusion   Rate: 125 mL/hr Dose: 125 mL/hr  Freq: Continuous Route: IV  Indications of Use: IV Hydration  Last Dose: Stopped (05/12/21 0930)  Start: 05/11/21 2230 End: 05/12/21 0857     PRN Meds: IV fentanyl x 1 [email or web address]     Restraints  Tele  SCD  Diabetic diet    IP CONSULT TO CASE MANAGEMENT  IP CONSULT TO PHARMACY  IP CONSULT TO PSYCHIATRY    Network Utilization Review Department  ATTENTION: Please call with any questions or concerns to [phone] and carefully listen to the prompts so that you are directed to the right person  All voicemails are confidential   Alli Dumas all requests for admission clinical reviews, approved or denied determinations and any other requests to dedicated fax number below belonging to the campus where the patient is receiving treatment   List of dedicated fax numbers for the Facilities:  [address] DENIALS (Administrative/Medical Necessity) [phone]   [address] (Maternity/NICU/Pediatrics) [phone]   [address] [place] [address] [address] [place] [address] [address] [place] [address] [phone]

## 2021-05-12 NOTE — ASSESSMENT & PLAN NOTE
· History of COPD  Patient normally takes Anoro Ellipta inhaler daily  Will start nebulized Xopenex and Atrovent t i d    · No wheezing on

## 2021-05-12 NOTE — UTILIZATION REVIEW
Inpatient Admission Authorization Request   NOTIFICATION OF INPATIENT ADMISSION/INPATIENT AUTHORIZATION REQUEST   SERVICING FACILITY:   [address] [place] [address], [facility], [facility]  Tax ID: [id number]  NPI: [id number]  Place of Service: Inpatient [facility]  [address]  Place of Service Code: 24     ATTENDING PROVIDER:  Attending Name and NPI#: Neeta Sequeira [[id number]]  Address: [address] [place] [address], [facility], [facility]  Phone: [phone]     UTILIZATION REVIEW CONTACT:  Taylor Worthington Utilization   Network Utilization Review Department  Phone: [phone]  Fax: [phone]  Email: [email or web address]     PHYSICIAN ADVISORY SERVICES:  FOR [id number] REVIEW - MEDICAL NECESSITY DENIAL  Phone: [phone]  Fax: [phone]  Email: [email or web address]     TYPE OF REQUEST:  Inpatient Status     ADMISSION INFORMATION:  ADMISSION DATE/TIME: 5/11/21 2114  PATIENT DIAGNOSIS CODE/DESCRIPTION:  Polysubstance overdose [T50 901A]  DISCHARGE DATE/TIME: No discharge date for patient encounter  DISCHARGE DISPOSITION (IF DISCHARGED): [facility]     IMPORTANT INFORMATION:  Please contact the Taylor Worthington directly with any questions or concerns regarding this request  Department voicemails are confidential     Send requests for admission clinical reviews, concurrent reviews, approvals, and administrative denials due to lack of clinical to fax [phone]

## 2021-05-12 NOTE — CONSULTS
INTERPROFESSIONAL (PHONE) [facility]  Juana Newman 46 y o  male MRN: [id number]  Unit/Bed#: ICU 01 Encounter: [id number]      Reason for Consult / Principal Problem: overdose  Inpatient consult to Toxicology  Consult performed by: Volodymyr Smith MD  Consult ordered by: Parrish Feliciano MD        05/11/21      ASSESSMENT:  1  Quetiapine Overdose  2  Propranolol Overdose  3  ?Insulin Overdose  4  Encephalopathy  5  Prolonged QTc  6  Hyponatremia  7  Hyperglycemia  8  ETOH intoxication      RECOMMENDATIONS:    Quetiapine has multiple mechanisms of action, which in the setting of an overdose leads to a constellation of effects: Antihistamine --> CNS sedation    Antimuscarinic --> Anticholinergic Toxicity: hyperthermia, hypertension, tachycardia, AMS, agitation, mydriasis, dry mucous membranes, flushed skin, decreased GI motility, urinary retention, seizures    Alpha 1 antagonism --> hypotension, miosis (which often prevails over the antimuscarinic mydriasis)    D2 antagonism -->  extrapyramidal side effects such as torticollis, jaw muscle spasm, oculogyric crisis, rigidity, bradykinesia and pill-rolling tremor    It can also cause QTc prolongation, but this is rarely clinically relevant because the antimuscarinic tachycardia is protective against TdP  Mild intoxication usually causes sedation, tachycardia, and small pupils  More severe intoxication can cause coma, seizures, and respiratory arrest  There is not a specific antidote and treatment is mainly supportive care  Propranolol is a non selective beta-blocker  It has actions on both beta-1 and beta-2 receptors  It is used in cirrhosis to reduce splanchnic blood flow primarily throw its affect on the beta-2 receptor  Propranolol is lipid soluble and has a large volume of distribution  It is absorbed rapidly in the GI tract with a time to peak action of 1-4 hours for the immediate release formulations   This is delayed in Patient Education     Finding Your Ideal Weight  Most of the people in magazines and on TV are far slimmer than average, yet this is the “ideal” that many people aim for. Before you decide that you won’t be happy until you get down to a certain number of pounds, consider:   · Your age. You probably wish you could get back to your college weight. But normal changes in metabolism and hormone levels that occur with aging can make this more difficult.  · Your gender. In general, men have more muscle and heavier bones than women, which means that healthy men usually weigh more than healthy women of the same height.  · Your current weight. If you are very heavy, focus on losing a smaller amount (such as 10 percent of your body weight). Losing just 5 to 10 pounds can improve your health.   Your body fat percentage  A pound of muscle weighs the same as a pound of fat, but it takes up less space. Think of a trained athlete and a “couch potato.” Even though they may be the same height and weight, the athlete looks fitter, is healthier, and probably wears a smaller size of clothing. If you are muscular, a body fat test may be a more accurate measure of your ideal weight than the bathroom scale. Talk to your healthcare provider, who can help you set appropriate goals for yourself.   Body mass index (BMI)  Your body mass index is a number calculated from your weight and height. It is a fairly reliable indicator of body fatness for most people. To calculate your BMI, see this BMI calculator from the CDC: www.cdc.gov/healthyweight/assessing/bmi/adult_bmi/english_bmi_calculator/bmi_calculator.html  Anais last reviewed this educational content on 5/1/2020 © 2000-2020 [employer] [address] [place] [address], [place] [address]. All rights reserved. This information is not intended as a substitute for professional medical care. Always follow your healthcare professional's instructions.            extended release formulations  It is primarily metabolized by the liver and excreted in the urine  It has an eliminations half-life of 3-6 hours which can be prolonged in overdose and in extended release formulations  In overdose, propranolol can have membrane depressant effects resulting in decreased cardiac contractility, conduction abnormalities, and cardiac dysrhythmias  It can also cause seizures due to its ability to cross the blood brain barrier  The initial treatment in overdose is glucagon  Glucagon binds to a glucagon receptor and stimulates a G-protein which then activated adenylate cyclase to generate cAMP needed to cause myocyte contraction  The dose is much larger than typical doses for hypoglycemia or food impaction  We recommend starting with a bolus of 5-10 mg and if there is a response, starting an infusion of 2-5 mg/hr  High dose insulin and glucose therapy has shown some efficacy,but recommend this be started in conjunction with the  on call (or the poison center) after the patient has already been started on two pressors without improvement  Bupropion (Wellbutrin) is a unicyclic anti-depressant  It works primarily by inhibiting the re-uptake of dopamine, with lesser inhibition on the re-uptake of serotonin and norepinephrine  It comes primarily in extended release formulations  Bupropion is rapidly absorbed by the GI tract and is metabolized to hydroxybupropion in the liver  This metabolite is thought to be responsible for the medication's neurotoxicity, however the mechanism is unclear  Other metabolites have similar activity as bupropion but decreased activity  The metabolites of Bupropion are excreted primarily in the urine  Bupropion reaches peak serum levels within 2-5 hours depending on the formulation and hydroxybupropion levels peak between 3 and 7 hours after ingestion  In massive overdose, absorption may be delayed    Acute toxicity may result in tachycardia, hypertension, GI symptoms, agitation, seizures, and QRS widening  These symptoms can last up to 48 hours after an acute ingestion  There are case reports of delayed cardiotoxicity, manifest as sodium channel blockade and QRS widening, in ingestions involving sustained release formulations  These cases  showed EKG abnormalities within 8-12 hours of the time of ingestion  Patient should have serial EKGs  - if QRS > 120, give 1-2 mEq per kg sodium bicarbonate  - goal: QRS < 120, titrate to pH 7 55, K > 4 0  - if QRS widening is refractory to sodium bicarbonate, please administer IV lidocaine or hypertonic saline    - if QTc > 500, give 2 g IV mg and optimize electrolytes  - goal QTc < 500  - repeat administration of Mg until goal has been met  Also recommend aggressive fluid resusciation and rechecking pH, VBG  If acidosis is worsening despite adequate fluid administration, toxic alcohols should be considered on the differential, though it is usually unlikely that a patient who has drank ETOH will also drink a toxic alcohol in the same period  For further questions, please contact the medical  on call via [place] [facility] or throughl the [facility]  Service or Patient Locately  Please see additional teaching note below:    Atypical Antipsychotics    Medical Toxicology   [address]   Last revised February 2011    Uses:  Atypical antipsychotics are used for a wide range of psychiatric disorders including psychosis, major depression, and bipolar disorders  The agents are occasionally used off label as sleep aids  Currently approved atypical antipsychotics include: clozapine, olanzapine, quetiepine, risperidone, ziprasidone, paliperidone, iloperidone, and asenapine    The primary pharmacologic target is to block D2 receptors in the CNS however, these agents are dirty affecting many other receptors including histamine, RAYMUNDO, muscarine, norepinephrine, serotonin, and alpha  Metabolism: These agents are generally hepatically metabolized by various CYP systems  Genetic polymorphisms may contribute to the variable effects seen in overdose  Toxicity:  Toxicities are largely extensions of the therapeutic effects  CNS depression is the most common symptom  Tachycardia, anticholinergic effects, and mild hypotension are commonly observed signs  Serotonin toxicity is not expected with overdose of these agents since most have serotonin antagonistic properties  While extrapyramidal symptoms may rarely occur in therapeutic use, they are not a significant component of acute overdose toxicity  The neuroleptic malignant syndrome is not associated with acute overdose  See table below for the agents receptor effects and specific toxicities associated  Monitoring: All overdose patients should have acetaminophen and aspirin serum concentrations obtained to screen for occult ingestion  An electrocardiogram  should be obtained to ensure there is no significant ventricular dysrhthmia  QTc prolongation is common, is not associated with ventricular dysrhythmias, and resolves as toxicity resolves  A CBC may be recommended in clozapine toxicity due to its association with agranulocytosis though this adverse effect is seen in therapeutic use not in overdose  Treatment:  Treatment is largely supportive  Patients simply require time to metabolize the offending agent  Airway management may be indicated for airway protection though these agents do not cause central respiratory failure  IV hydration for patients with hypotension should be provided until the patient is felt to be euvolemic  If the patient remains hypotensive then a direct acting pressor, such as norepinephrine, should be added for blood pressure support    No active intervention is indicated for QTc prolongation though electrolytes should be repleted PRN to avoid increased risk of ventricular dysrhythmias  Agent D2 Antagonism H1/H2 Agonism Alpha-1 Antagonism Elimin T1/2 Comments   Clozapine + +++ - 6-7 hrs Agranulocytosis in therapeutic use   Risperidone ++ + +++ 3-20 hrs Has an active metabolite   Quetiepine + +++ ++ 5-8 hrs Anticholinergic effects last longer than CNS depression   Ziprasidone +++ + +++ 4-10 hrs    Olanzipine + +++ + 21-54 hrs Has muscarinic effects     Medical Toxicology   [facility]  Discussion: â-Adrenergic Antagonist Toxicity    Uses â-Adrenergic antagonists are used to treat hypertension, coronary artery disease, congestive heart failure, and tachydysrhythmias  Other indications include hyperthyroidism, migraine headache, tremor, and panic attacks  B-Adrenergic Receptors There are 4 â-Adrenergic receptor subunits: â1, â2, â3, and â4  The â1-adrenergic subtype is mainly found on cardiac myocytes  Their activation increases chronotropy, automaticity, and inotropy  â1-adrenergic receptors are also in the kidney, where their stimulation leads to increased renin  In the eye, they increase production of aqueous humor  â2-adrenergic receptors are found in various organs  They are present in the heart but at decreased density compared to â1-adrenergic receptors  They are abundant in arteries and veins, where they mediate dilation  In the airways, they cause bronchodilation and decreased respiratory secretions  Glucose regulation is also affected  â2-adrenergic receptors on the pancreas, when stimulated, increase insulin secretion  In the liver they increase gluconeogenesis, while in skeletal muscle they stimulate glycogenolysis and take up potassium  â3-adrenergic receptors are found on adipocytes where they stimulate lipolysis  The function of â4-adrenergic receptors is unknown      Pathophysiology of B-Adrenergic Receptors When stimulated by catecholamines, a second messenger cascade is activated which ultimately causes calcium to enter the cell  (see figure below, from [facility], 7th edition, p  793 ) The receptor is coupled with a Gs protein that activates adenyl cylase (AC), converting ATP to cAMP  Protein Kinase A is then activated which phosphorylates the slow calcium channels on the cell, causing them to open, allowing calcium to flow into the cell  This influx of calcium binds to troponin and also stimulates the sarcoplasmic reticulum to release more calcium  The calcium binds to troponin, changing its confirmation, thereby allowing the actin and myosin fibers to slide across each other  The end result is a myocyte contraction and increased inotropy  B-Adrenergic Receptor Antagonists These drugs work by competitively blocking catecholamines at â-adrenergic receptors  In general, they cause decreased heart rate, blood pressure, and bronchoconstriction  Several characteristics help us understand their effects in therapeutic and overdose ingestions  Cardioselectivity- some â-blockers are intended to affect both â1 and â2 receptors (eg  labetalol)  Others favor the â1 receptor (eg  metoprolol, atenolol) or the â2 receptor (eg  albuterol)  In overdose these drugs may lose their receptor specificity  Alpha activity- Labetalol is one of the â-blocking agents that also blocks alpha- adrenergic receptors  Lipid solubility- These drugs easily cross into the brain  Propranolol is an example  Overdose may lead to altered mental status, seizures, and coma in addition to their cardiovascular effects  Membrane stabilizing effect- Certain â-blockers inhibit the fast sodium channels on cardiac myocytes, similar to Class IA antidysrhythmics  This may cause slowed action potential, wide QRS, and prolonged QTc  Overdose may lead to dysrhythmias   Intrinsic sympathomimetic activity (DAISY) or partial agonists- Some â-blockers are also partial agonists, causing sympathomimetic effects in therapeutic or overdose exposures  Examples include acebutolol and pindolol  Some B-Adrenergic Antagonists In-Depth  Atenolol (Tenormin) It is a long-acting selective â1-antagonist  It comes in 25-, 50-, and 100mg regular release tablets  The adult therapeutic dose is 25 to 200mg orally once a day  It is highly water soluble and is excreted unchanged in the urine, accumulating in renal failure  It is the least lipophilic, therefore mental status is not affected unless hypotension is affecting cerebral blood flow, and it has no sodium channel blocking effects  Its elimination half-life is 5-8 hours in therapeutic doses  It appears to be minimally toxic as a single substance overdose  Labetalol (Normodyne) is a non-selective â-antagonist and alpha-adrenergic antagonist  It is 3x more potent at the â-receptors (vs  alpha) when taken orally, and 7x more so when given parenterally  Vasodilation may occur, compounding hypotension  It is moderately lipid soluble, and has little sodium channel blockade effects  It is available in 100-, 200-, and 300 mg tablets  The adult therapeutic dose is 200-2400mg a day in two divided doses  Metoprolol (Toprol) is a selective â1-antagonist  It is moderately lipophilic and seizures have been reported in overdose  It has little sodium channel blockade  It comes in 50-mg and 100-mg tablets and 50-mg, 100-mg, and 200-mg modified-release tablets  The adult therapeutic dose is 50 to 400mg/day orally once or divided into two doses per day  Propranolol (Inderal) Several characteristics make this drug the most serious of the class in overdose  It is lipophilic, crossing the blood-brain barrier  Delirium, coma, and seizures may result  It also inhibits fast sodium channels on the myocardium, similar to Class IA antidysrhythmics  This may lead to conduction abnormalities, widened QRS, and ventricular tachydysrhythmias   It comes in regular-release form with doses of 10-, 20-, 40-, 60-, and 80-mg tablets  The sustained release preparations are 120- and 160-mg capsules  The adult therapeutic dose is  mg/day in two to four divided doses for the regular release, and 80 to 160 mg/day in one dose for the extended release  Sotalol (Betapace) It is a nonselective â-adrenergic antagonist not commonly used, but is noteworthy for its toxicity  It has low lipophilicity, and no sodium channel blockade, but it does block the delayed rectifier potassium current on the myocyte, slowing repolarization  In therapeutic and overdose, the action potential and QTc are prolonged, and there is an increased risk for ventricular dysrhythmias such as torsade de pointes  Toxicity may be delayed in overdose  There are reports of tachydysrhythmias developing 9 hours after ingestion and persisting for 48 hours  Extended Release Preparation The toxicity in overdose is increased due to delayed onset of effects and prolonged duration of toxicity  Clinical Manifestations of Overdose In overdose the therapeutic effects are exaggerated, often causing bradycardia and hypotension  While these agents have the potential for serious sequelae, most overdoses are benign  Those at higher risk for serious consequences include those with cardiovascular disease who depend on increased catecholamines at baseline, those who have ingested other cardiosuppressive drugs, large ingestions, and overdoses with sustained release preparations  Cardioselective drugs in overdose may lose their receptor specificity, affecting â2 receptors  Sinoatrial node function is decreased resulting in bradycardia to sinus arrest  Hypotension is caused by decreased contractility  Congestive heart failure may occur, especially in those with underlying cardiovascular disease  Altered mental status and seizures may be the first symptoms of overdose with the lipophilic agents, such as propranolol    Hypoglycemia is a well-known complication in children after â-adrenergic overdose but is unusual in adults  Bronchospasm and hyperkalemia are also rare  Most persons become symptomatic after overdose within 6 hours  The exception is sotalol, which has delayed onset of action  Management of Overdose  Patients who have overdosed may require a range of supportive measures depending on the â-adrenergic antagonist ingested, the formulation, the amount ingested, and the patients co-morbidities  The structural integrity of the heart is not affected in overdose, unless hypoperfusion leads to ischemia and permanent damage  In general, the toxicity of â-blockers in overdose is low  Good supportive care is essential to bridge the patient while the drug effect wears off   Address the A, B, Cs     Treat seizures in the customary manner with benzodiazepines   Gastrointestinal decontamination  o Charcoal is indicated if the patient can protect their airway and presents within 1 hour of ingestion, or later if sustained release preparations have been ingested  o Orogastric lavage should be considered if the patient presents early after a large exposure especially sustained release products, ingests more toxic drugs such as propranolol or sotalol, or co-ingests other cardiosuppressant medications like calcium channel blockers  o Oropharyngeal manipulation may cause bradycardia, so have atropine at the bedside  o Whole bowel irrigation may have a role when treating a large overdose of sustained release preparations   Specific Treatments  o Atropine  By antagonizing the muscarinic receptors of the heart, the heart rate is increased  Atropine should be given if the patient is bradycardic, but may not be sufficient to counteract the drug effect of the â-adrenergic antagonist taken  The dose is 0 5 mg to 3 0 mg IV for total dose of 3 mg in adults  o Intravenous fluids  Start with judicious use of   9NS to support blood pressure   In general, avoid giving more than 2L because these patients are at risk for congestive heart failure  If their blood pressure is not adequately supported with fluids, move to additional therapeutic options  o Positive inotropes  1  Catecholamines  Patients who are not stabilized after atropine and intravenous fluids should be started on a pressor  The most effective pressor is one that is readily available and which providers feel comfortable using  Dopamine or epinephrine is usually reasonable choices  Isoproterenol, a pure â-agonist, seems like a logical choice, but in the higher doses needed to counteract â-antagonism, it may cause dysrhythmias and vasodilation   Dopamine 1-20 mcg/kg/min   Epinephrine 1-20 mcg/min  Has combined alpha and beta-agonist properties  Large doses have been successful at treating â-blocker overdoses   Phosphodiesterase inhibitors (PDIs), such as amrinone and milrinone inhibit the action of phosphodiesterase, thereby reducing the breakdown of cAMP  This leads to more activated protein kinase A which causes more calcium channels to open  Their use is limited by their vasodilatory effect  2  Glucagon  There are glucagon receptors on the heart  Like the â-adrenergic receptors, they are coupled to Gs proteins  When stimulated, they trigger a cascade of second messengers which result in slow calcium channels opening on the myocyte, causing calcium to enter the cell  Glucagon also inhibits phosphodiesterase, thereby slowing the breakdown of cAMP  This leads to myocyte contraction and increased inotropy   The dose of glucagon in this setting is 5-10mg IV over 1-2 minutes  If the patients blood pressure responds, start a drip at the effective dose   Glucagon also relaxes the lower esophageal sphincter, predisposing patients to vomit  Pretreat with an antiemetic and make sure the patients airway is protected   o Bicarbonate   It should be considered when treating overdoses of â-adrenergic drugs showing sodium channel blockade effect, such as propranolol  The dose is 1-2 amps IV bolus, repeat as needed  o Calcium  1  Increased extracellular calcium may help stimulate more calcium channels to open  2  The suggested dose of calcium chloride is 1g of 10% solution IV through a central line  Calcium gluconate may be given peripherally but has 1/3 the amount of calcium  o High dose insulin and glucose  1  The heart prefers free fatty acids for energy  When â-blockers are used therapeutically or in overdose, the heart shifts to carbohydrates for nutrition  However, the heart appears to become insulin resistant, more so in overdose with â-blockers  2  The use of high dose insulin with glucose to maintain euglycemia has been shown to overcome this insulin resistance, allowing the heart to function better  This therapy was first used for calcium channel blocker overdoses, but has been effective in an animal model of â-blocker overdose and in several human case reports  The positive response to high dose insulin is usually delayed 15-60 minutes  3  The recommended dose for insulin is 0 5-1 0 U/kg/hr with glucose infusion of 1g/kg/hr, titrated to maintain euglycemia  o Ventricular pacing  1  Pacer pads should be placed on the patient at the beginning of the resuscitation  2  Transvenous pacing may be tried if standard supportive measures are unsuccessful   o Mechanical Life Support  1  Intra-aortic balloon pump may be considered if cardiogenic shock is unresponsive to above measures  2  Extracorporeal circulation has been used with success in severe, refractory cases  o Hemodialysis  1  Some â-adrenergic antagonists are theoretically dialyzable because they are water soluble, have low volumes of distribution, and low protein binding  Examples include atenolol and acebutolol  2  Hemodialysis is usually not practical because of hemodynamic instability  References    Jostin Bell   â-Receptor Antagonists in Critical Care Toxicology: Diagnosis and Management of the Critically Poisoned Patient, 1st edition ,pp  550-542  Jolly Claire and [place], [place]  Type II Antidysrhythmics Agents (â-Receptor Blocking Agents) in Medical Toxicology, 3rd edition, -380  Flavia Leary â-Adrenergic Antagonists in [facility] Toxicologic Emergencies, 7th edition, -567  Kit Maker, KIMBERLY and Ashley Graves Cardiovascular Medications Become Toxins: Managing â-Blocker, CCB, and Digoxin Overdoses in Emergency Medicine Practice, vol  7, #5, September 2005  Hx and PE limited by the dynamics of a phone consultation  I have not personally interviewed or evaluated the patient, but only advised based on the information provided to me  Primary provider is responsible for all clinical decisions  Pertinent history, physical exam and clinical findings and course discussed: Kendall Cuevas is a 46y o  year old male who presents with AMS after being found with empty medications, including 2 bottles of seroquel and a partially empty bottle of propranolol  PMH significant for DM, HLD, HTN, depression, anxiety  Patient arrived to the ED with initially with normal VS, but obtunded with small pupils  Intubated by Dr Kenzie Davis  Became hypotensive while being evaluated;  Review of systems and physical exam not performed by me  Historical Information   Past Medical History:   Diagnosis Date    Alcohol abuse 2/20/2019    Allergic rhinitis     last assessed: 12/5/2012    Anemia     Anxiety and depression     Quiros esophagus     Cholelithiasis     Chronic pain     COPD (chronic obstructive pulmonary disease) (HCC)     Depression     Diabetes mellitus (HCC)     Emphysema lung (HCC)     Generalized anxiety disorder     GERD (gastroesophageal reflux disease)     Hyperlipidemia     Hypertension     Kidney stone     Metatarsalgia     last assessed: 8/11/2014, unspecified laterality, ? cause  PE with changes  To see DPM tomorrow      Pancreatitis  Psychiatric disorder     Renal disorder     Shortness of breath     with activity     Past Surgical History:   Procedure Laterality Date    CHOLECYSTECTOMY LAPAROSCOPIC      FOOT SURGERY      B/L great toe joint replacement    KNEE ARTHROSCOPY      (therapeutic) right knee    GA EDG US EXAM SURGICAL ALTER STOM DUODENUM/JEJUNUM N/A 10/17/2018    Procedure: LINEAR ENDOSCOPIC U/S;  Surgeon: Jeancarlos Vines MD;  Location:  GI LAB; Service: Gastroenterology    VASECTOMY      vas deferens     Social History   Social History     Substance and Sexual Activity   Alcohol Use Not Currently    Frequency: 4 or more times a week    Drinks per session: 5 or 6    Binge frequency: Daily or almost daily    Comment: Drank a 5th vodka today     Social History     Substance and Sexual Activity   Drug Use No    Comment: chronic narcotic use     Social History     Tobacco Use   Smoking Status Current Every Day Smoker    Packs/day: 1 00   Smokeless Tobacco Never Used   Tobacco Comment    Tobacco use [employer]'s "How to Quit" literature given to pat  Family History   Problem Relation Age of Onset   Dwayne Daubs Cancer Mother     Coronary artery disease Mother     Diabetes Mother     Coronary artery disease Father     Prostate cancer Father     Diabetes Sister     Coronary artery disease Family         Prior to Admission medications    Medication Sig Start Date End Date Taking?  Authorizing Provider   benzonatate (TESSALON PERLES) 100 mg capsule Take 1 capsule (100 mg total) by mouth 3 (three) times a day as needed for cough  Patient not taking: Reported on 3/23/2021 11/18/19   Avril Bermeo PA-C   buPROPion (WELLBUTRIN XL) 150 mg 24 hr tablet Take 1 tablet (150 mg total) by mouth daily 4/20/21   NatachaTYE Grigsby   cholestyramine Jil Bakes) 4 GM/DOSE powder Take 1 packet (4 g total) by mouth 2 (two) times a day with meals 7/18/19   Chely Toledo DO   EQ NICOTINE 21 MG/24HR TD 24 hr patch APPLY 1 PATCH TOPICALLY ONCE DAILY 5/30/19   Historical Provider, MD BOOTH NICOTINE POLACRILEX 4 MG lozenge TAKE 1 LOZENGE EVERY 2 HOURS AS NEEDED FOR SMOKING CRAVING 5/31/19   Historical Provider, MD   fenofibrate (TRICOR) 145 mg tablet Take 1 tablet (145 mg total) by mouth daily  Patient not taking: Reported on 3/23/2021 6/27/19   Guerline Body, DO   glucose blood (ONE TOUCH ULTRA TEST) test strip by In Vitro route 3 (three) times a day 12/29/14   Historical Provider, MD   hydrocortisone (ANUSOL-HC) 25 mg suppository Insert 1 suppository (25 mg total) into the rectum 2 (two) times a day  Patient not taking: Reported on 3/23/2021 7/18/19   Guerline Body, DO   insulin glargine [facility]) 100 units/mL injection pen 20 units sq q AM  20 units sq q PM 6/27/19   Guerline Body, DO   insulin lispro (HumaLOG) 100 units/mL injection pen PER SLIDING SCALE UP TO 14 UNITS WITH EACH MEAL 10/9/20   Guerline Body, DO   Insulin Pen Needle (B-D ULTRAFINE III SHORT PEN) 31G X 8 MM MISC by Does not apply route 2/16/12   Historical Provider, MD   Insulin Pen Needle (PEN NEEDLES) 29G X 12MM MISC by Does not apply route daily at bedtime Substitute what fits [place] pen and what is covered by insurance  8/14/18   TYE Elizabeth   lisinopril (ZESTRIL) 10 mg tablet Take 1 tablet (10 mg total) by mouth daily  Patient not taking: Reported on 3/23/2021 8/12/19   Guerline Body, DO   NARCAN 4 MG/0 1ML LIQD ADMINISTER A SINGLE SPRAY IN ONE NOSTRIL UPON SIGNS OF OPIOID OVERDOSE  CALL 911   REPEAT AFTER 3 MINUTES IF NO RESPONSE  5/30/19   Historical Provider, MD   omeprazole (PriLOSEC) 20 mg delayed release capsule Take 1 capsule (20 mg total) by mouth daily 6/27/19   Guerline Body, DO   propranolol (INDERAL) 10 mg tablet Take 1 tablet (10 mg total) by mouth 2 (two) times a day 6/27/19   Guerline Body, DO   QUEtiapine (SEROquel) 200 mg tablet Take 1 tablet (200 mg total) by mouth daily at bedtime 4/20/21   TYE Valderrama   QUEtiapine (SEROquel) 25 mg tablet Take 1 tablet (25 mg total) by mouth 2 (two) times a day 4/20/21   TYE Olson   QUEtiapine (SEROquel) 50 mg tablet Take 1 tablet (50 mg total) by mouth daily at bedtime 4/20/21   TYE Olson   rosuvastatin (CRESTOR) 10 MG tablet Take 1 tablet (10 mg total) by mouth daily  Patient not taking: Reported on 3/23/2021 6/27/19   Belgica Nuñez DO   sertraline (ZOLOFT) 100 mg tablet Take 2 tablets (200 mg total) by mouth daily 4/20/21   TYE Olson   umeclidinium-vilanterol [facility] ELLIP) 62 5-25 MCG/INH inhaler Inhale 1 puff daily 2/19/19   Belgica Nuñez DO       Current Facility-Administered Medications   Medication Dose Route Frequency    NOREPINEPHRINE 4 MG  ML NSS (CMPD ORDER) infusion  1-30 mcg/min Intravenous Titrated       No Known Allergies    Objective     No intake or output data in the 24 hours ending 05/11/21 2121    Invasive Devices:   CVC Central Lines 05/11/21 Triple Right Internal jugular (Active)   Site Assessment WDL; Clean;Dry; Intact 05/11/21 2026       Peripheral IV 05/11/21 Right Wrist (Active)   Site Assessment WDL;Dry; Intact; Clean 05/11/21 1818   Dressing Type Transparent 05/11/21 1818   Line Status Blood return noted 05/11/21 1818   Dressing Status Clean;Dry; Intact 05/11/21 1818       Peripheral IV 05/11/21 Right Forearm (Active)   Site Assessment WDL; Clean;Dry; Intact 05/11/21 1829   Dressing Type Transparent 05/11/21 1829   Line Status Blood return noted 05/11/21 1829   Dressing Status Clean;Dry; Intact 05/11/21 1829       NG/OG/Enteral Tube Orogastric 16 Fr Right mouth (Active)   Placement Reverification X-ray 05/11/21 1850   Site Assessment Clean;Dry 05/11/21 1850   Status Suction-low continuous 05/11/21 1850       Urethral Catheter Temperature probe 16 Fr   (Active)   Amt returned on insertion(mL) 50 mL 05/11/21 1907   Site Assessment Clean 05/11/21 1907   Collection Container Standard drainage bag 05/11/21 1907   Securement Method Securing device (Describe) 05/11/21 1907       ETT  Oral;Inflated 7 5 mm (Active)   Secured at (cm) 25 05/11/21 1840   Measured from Teeth 05/11/21 1840   Secured Location Right 05/11/21 1840   Secured by [employer] tube diaz 05/11/21 1840   Site Condition Dry; Cool 05/11/21 1840   Cuff Pressure (cm H2O) 25 cm H2O 05/11/21 1840   HI-LO Suction  Continuous low suction 05/11/21 1840   HI-LO Intervention Patent 05/11/21 1840       Vitals   There were no vitals filed for this visit  EKG, Pathology, and/or Other Studies: I have personally reviewed pertinent reports  Vent  rate 80 BPM MA interval 164 ms QRS duration 102 ms QT/QTc 470/542 ms P-R-T axes 52 93 43  No terminal r, no shonda or std    Lab Results: I have personally reviewed pertinent reports  Labs:    Results from last 7 days   Lab Units 05/11/21  1842   WBC Thousand/uL 6 80   HEMOGLOBIN g/dL 14 7   HEMATOCRIT % 43 8   PLATELETS Thousands/uL 192   NEUTROS PCT % 60   LYMPHS PCT % 31   MONOS PCT % 6      Results from last 7 days   Lab Units 05/11/21  1842   SODIUM mmol/L 129*   POTASSIUM mmol/L 3 6   CHLORIDE mmol/L 98   CO2 mmol/L 22   BUN mg/dL 9   CREATININE mg/dL 0 73   CALCIUM mg/dL 7 8*   ALK PHOS U/L 88   ALT U/L 8   AST U/L 14              0   Lab Value Date/Time    TROPONINI <0 03 05/11/2021 1842    TROPONINI <0 02 02/19/2019 1509    TROPONINI <0 02 02/19/2019 1259    TROPONINI < 0 03 05/30/2018 1302         Results from last 7 days   Lab Units 05/11/21  1842   ACETAMINOPHEN LVL ug/mL <10*   ETHANOL LVL mg/dL 29 9*   SALICYLATE LVL mg/dL <5*     Invalid input(s): EXTPREGUR      Imaging Studies: I have personally reviewed pertinent reports  Counseling / Coordination of Care  Total time spent today 75 minutes  This was a phone consultation

## 2021-05-12 NOTE — CONSULTS
Vancomycin therapy has been discontinued  Pharmacy will sign off  Thank you for this consult  Please do not hesitate to call us with questions or re-consult us if the need arises       Carmen Fagan, PharmD, [address] [facility] Medicine Clinical Pharmacist  [phone] or via Enrique

## 2021-05-12 NOTE — ED PROCEDURE NOTE
Procedure  Central Line    Date/Time: 5/11/2021 8:34 PM  Performed by: Celso Morgan PA-C  Authorized by: Celso Morgan PA-C     Patient location:  ED  Consent:     Consent obtained:  Verbal    Consent given by:  Spouse  Universal protocol:     Patient identity confirmed:  Arm band  Pre-procedure details:     Hand hygiene: Hand hygiene performed prior to insertion      Sterile barrier technique: All elements of maximal sterile technique followed      Skin preparation:  2% chlorhexidine    Skin preparation agent: Skin preparation agent completely dried prior to procedure    Indications:     Central line indications: medications requiring central line    Anesthesia (see MAR for exact dosages):      Anesthesia method:  None  Procedure details:     Location:  Right internal jugular    Vessel type: vein      Laterality:  Right    Approach: percutaneous technique used      Catheter type:  Triple lumen 20cm    Ultrasound guidance: yes      Ultrasound image availability:  Not saved and not obtained due to urgency    Number of attempts:  1    Successful placement: yes    Post-procedure details:     Post-procedure:  Dressing applied and line sutured    Assessment:  Blood return through all ports and placement verified by x-ray                     Celso Morgan PA-C  05/12/21 1225

## 2021-05-12 NOTE — ASSESSMENT & PLAN NOTE
· Patient transferred from [facility] for admission to our critical care unit  · Encephalopathy secondary to intentional polypharmacy drug overdose  Per the report the patient took an unknown amount of Seroquel and propanolol  It is unknown if any other drugs were ingested  · Hold all home medications  · The patient was intubated in the emergency department secondary to encephalopathy secondary to above  · Continue Levophed GTT for hypotension  Closely monitor for bradycardia and arrhythmias  Continuous cardiopulmonary monitoring  · Start maintenance fluids NS at 125 mL/hour  · Stat EKG with daily EKG  · Toxicology service consulted  Appreciate their recommendations  · Patient is currently responsive to painful stimuli  Hold all long-acting sedation  Discontinue Versed GTT (started at [facility])  · The patient will require 1:1 observation and psych consult once extubated and awake

## 2021-05-12 NOTE — PROCEDURES
Arterial Line Insertion    Date/Time: 5/11/2021 11:03 PM  Performed by: Gisella Britton PA-C  Authorized by: Gisella Britton PA-C     Patient location:  ICU  Consent:     Consent obtained:  Emergent situation  Indications:     Indications: hemodynamic monitoring, multiple ABGs, continuous blood pressure monitoring and frequent labs / infusion    Pre-procedure details:     Skin preparation:  Chlorhexidine    Preparation: Patient was prepped and draped in sterile fashion    Anesthesia (see MAR for exact dosages): Anesthesia method:  None  Procedure details:     Location / Tip of Catheter:  Radial    Laterality:  Left    Needle gauge:  18 G    Placement technique:  Seldinger    Number of attempts:  1    Successful placement: yes      Transducer: waveform confirmed    Post-procedure details:     Post-procedure:  Sterile dressing applied and sutured    CMS:  Unable to assess    Patient tolerance of procedure:   Tolerated well, no immediate complications

## 2021-05-12 NOTE — ASSESSMENT & PLAN NOTE
· Hypotension most likely secondary to propanolol overdose  · Continue Levophed drip  Wean as tolerated for goal map greater than 65  Will place arterial line for continuous monitoring  · Closely monitor for further hypotension-unknown amount of ingestion of propanolol

## 2021-05-12 NOTE — ASSESSMENT & PLAN NOTE
· Acute respiratory failure secondary to encephalopathy  · Chest x-ray obtained post transfer  Will advance ETT 2 cm  Diffuse bilateral pulmonary congestion versus infiltrates  Will start cefepime, vancomycin and Flagyl given unknown down time and possible aspiration pneumonia  Will obtain stat procalcitonin level  Monitor WBC and temperature  Obtain blood and sputum cultures  Patient is currently hypothermic  · Stat ABG  Adjust ventilator settings as needed  · Scheduled Xopenex and Atrovent for history of COPD  · Aspiration precautions  · Prophylactic omeprazole  Chlorhexidine oral rinse  · Ventilator wean as tolerated once encephalopathy improved

## 2021-05-12 NOTE — PROGRESS NOTES
Vancomycin Assessment    Olena Iglesias is a 46 y o  male who is currently ordered vancomycin 1500mg IV once for Pneumonia  Relevant clinical data and objective history reviewed:  Creatinine   Date Value Ref Range Status   05/11/2021 0 73 0 70 - 1 30 mg/dL Final     Comment:     Standardized to IDMS reference method   07/18/2019 1 15 0 60 - 1 30 mg/dL Final     Comment:     Standardized to IDMS reference method   07/03/2019 1 14 0 60 - 1 30 mg/dL Final     Comment:     Standardized to IDMS reference method   06/06/2018 0 77 0 7 - 1 3 MG/DL Final     Comment:     IDMS method performed  The drugs Metamizole, Sulfasalazine, and Sulfapyridine may interfere and  result in false low results  06/05/2018 0 75 0 7 - 1 3 MG/DL Final     Comment:     IDMS method performed  The drugs Metamizole, Sulfasalazine, and Sulfapyridine may interfere and  result in false low results  06/04/2018 0 73 0 7 - 1 3 MG/DL Final     Comment:     IDMS method performed  The drugs Metamizole, Sulfasalazine, and Sulfapyridine may interfere and  result in false low results  BP 97/65 (BP Location: Left arm)   Pulse 68   Temp (!) 94 3 °F (34 6 °C) (Bladder)   Resp (!) 25   Ht 6' (1 829 m)   Wt 93 5 kg (206 lb 2 1 oz)   SpO2 100%   BMI 27 96 kg/m²   No intake/output data recorded    Lab Results   Component Value Date/Time    BUN 9 05/11/2021 06:42 PM    BUN 6 (L) 06/06/2018 04:20 AM    WBC 6 80 05/11/2021 06:42 PM    WBC 6 3 06/06/2018 04:20 AM    HGB 14 7 05/11/2021 06:42 PM    HGB 10 6 (L) 06/06/2018 04:20 AM    HCT 43 8 05/11/2021 06:42 PM    HCT 30 2 (L) 06/06/2018 04:20 AM    MCV 89 05/11/2021 06:42 PM    MCV 87 0 06/06/2018 04:20 AM     05/11/2021 06:42 PM     06/06/2018 04:20 AM     Temp Readings from Last 3 Encounters:   05/11/21 (!) 94 3 °F (34 6 °C) (Bladder)   05/11/21 (!) 95 7 °F (35 4 °C)   11/18/19 98 6 °F (37 °C)     Vancomycin Days of Therapy: 1    Assessment/Plan  The patient is currently ordered vancomycin utilizing scheduled dosing based on actual body weight  Baseline risks associated with therapy include: concomitant nephrotoxic medications  The patient is currently ordered 1500mg IV once and after clinical evaluation will be changed to 1750mg IV q12h  Pharmacy will also follow closely for s/sx of nephrotoxicity, infusion reactions, and appropriateness of therapy  BMP and CBC will be ordered per protocol  Plan for trough as patient approaches steady state, prior to the 4th  dose at approximately 1130 on 5/13/21  Due to infection severity, will target a trough of 15-20 (appropriate for most indications)   Pharmacy will continue to follow the patients culture results and clinical progress daily      Wendy Chanel, Pharmacist

## 2021-05-12 NOTE — H&P
[address] [facility]  Progress Note - Ladene Drain 1970, 46 y o  male MRN: [id number]  Unit/Bed#: ICU 01 Encounter: [id number]  Primary Care Provider: Ganesh Trevizo DO   Date and time admitted to hospital: 5/11/2021  9:14 PM    * Intentional overdose of drug in tablet form [facility])  Assessment & Plan  · Patient transferred from [facility] for admission to our critical care unit  · Encephalopathy secondary to intentional polypharmacy drug overdose  Per the report the patient took an unknown amount of Seroquel and propanolol  It is unknown if any other drugs were ingested  · Hold all home medications  · The patient was intubated in the emergency department secondary to encephalopathy secondary to above  · Continue Levophed GTT for hypotension  Closely monitor for bradycardia and arrhythmias  Continuous cardiopulmonary monitoring  · Start maintenance fluids NS at 125 mL/hour  · Stat EKG with daily EKG  · Toxicology service consulted  Appreciate their recommendations  · Patient is currently responsive to painful stimuli  Hold all long-acting sedation  Discontinue Versed GTT (started at [facility])  · The patient will require 1:1 observation and psych consult once extubated and awake  Hypotension  Assessment & Plan  · Hypotension most likely secondary to propanolol overdose  · Continue Levophed drip  Wean as tolerated for goal map greater than 65  Will place arterial line for continuous monitoring  · Closely monitor for further hypotension-unknown amount of ingestion of propanolol  Acute respiratory failure (HCC)  Assessment & Plan  · Acute respiratory failure secondary to encephalopathy  · Chest x-ray obtained post transfer  Will advance ETT 2 cm  Diffuse bilateral pulmonary congestion versus infiltrates  Will start cefepime, vancomycin and Flagyl given unknown down time and possible aspiration pneumonia  Will obtain stat procalcitonin level    Monitor WBC and temperature  Obtain blood and sputum cultures  Patient is currently hypothermic  · Stat ABG  Adjust ventilator settings as needed  · Scheduled Xopenex and Atrovent for history of COPD  · Aspiration precautions  · Prophylactic omeprazole  Chlorhexidine oral rinse  · Ventilator wean as tolerated once encephalopathy improved  Acute hyponatremia  Assessment & Plan  · Monitor BMP  Anxiety and depression  Assessment & Plan  · Hold all home medications  · Unknown previous suicide attempt  He has followed with Psychiatry as an outpatient  Previous history of EtOH abuse  Per chart a suicide note was found and he was surrounded by multiple empty pill bottles  · Once patient is extubated and mental status improves he will need 1:1 observation and psych consult  DM (diabetes mellitus) [facility])  Assessment & Plan  Lab Results   Component Value Date    HGBA1C 8 7 (A) 07/03/2019       Recent Labs     05/11/21  1842 05/11/21  1956   POCGLU 170* 171*       Blood Sugar Average: Last 72 hrs:     · Will obtain a hemoglobin A1c level  Start Accu-Cheks q 6 hours with insulin sliding scale coverage  Chronic pancreatitis ([facility] [address] )  Assessment & Plan  · Obtain a lipase level  Alcohol level on admission 90  · No indication for a CT of the abdomen at this time  Closely monitor for signs of pancreatitis  History of ETOH abuse  Assessment & Plan  · Per chart records the patient has a history of alcohol abuse and recurrent pancreatitis  He appeared to be in remission, however, alcohol level on admission was 90  I will obtain a lipase level  · Monitor for signs of DTs  Hyperlipidemia  Assessment & Plan  · Restart home medications once extubated  COPD (chronic obstructive pulmonary disease) ([facility])  Assessment & Plan  · History of COPD  Patient normally takes Anoro Ellipta inhaler daily  Will start nebulized Xopenex and Atrovent t i d    · No wheezing on    Quiros esophagus  Assessment & Plan  · Continue omeprazole     -------------------------------------------------------------------------------------------------------------  Chief Complaint:  Encephalopathy secondary to intentional polypharmacy drug overdose    History of Present Illness   HX and PE limited by:  Patient is currently intubated and unresponsive  Lita Bran is a 46 y o  male who presents with encephalopathy secondary to intentional polypharmacy drug overdose  He has a past medical history of depression, anxiety, previous alcohol abuse with recurrent pancreatitis, insulin-dependent diabetes mellitus 2, hypertension, Quiros's esophagus, hyperlipidemia  According to the chart records, the patient sent a suicide note to his wife  He was found at home unresponsive with empty bottles of Seroquel, propanolol and ropinirole  I was unable to find when these medications were last refilled  The patient does have a history of severe alcohol abuse and was sober for several years until a recent relapse  Alcohol level on admission was 90  The patient was initially brought to [facility] hospital where he was intubated secondary to encephalopathy  He was found to be hypotensive and placed on a Levophed drip  A central venous catheter was placed by the ED physician  Toxicology was consulted  The patient was transferred to [address] [place] [address] for a higher level of care  History obtained from the chart   -------------------------------------------------------------------------------------------------------------  Dispo: Admit to Critical Care     Code Status: Level 1 - Full Code  --------------------------------------------------------------------------------------------------------------  Review of Systems   Reason unable to perform ROS: Patient is intubated and unresponsive  Physical Exam  Vitals signs and nursing note reviewed  Constitutional:       General: He is not in acute distress  Appearance: He is obese   He is ill-appearing  He is not toxic-appearing or diaphoretic  HENT:      Head: Normocephalic and atraumatic  Nose: Nose normal  No congestion or rhinorrhea  Mouth/Throat:      Mouth: Mucous membranes are moist       Comments: ETT in place  Eyes:      General: No scleral icterus  Right eye: No discharge  Left eye: No discharge  Extraocular Movements: Extraocular movements intact  Conjunctiva/sclera: Conjunctivae normal       Pupils: Pupils are equal, round, and reactive to light  Cardiovascular:      Rate and Rhythm: Normal rate and regular rhythm  Pulses: Normal pulses  Heart sounds: Normal heart sounds  No murmur  No friction rub  No gallop  Pulmonary:      Effort: Pulmonary effort is normal  No respiratory distress  Breath sounds: Normal breath sounds  No stridor  No wheezing, rhonchi or rales  Comments: On mechanical ventilator  Chest:      Chest wall: No tenderness  Abdominal:      General: Abdomen is flat  Bowel sounds are normal  There is no distension  Palpations: Abdomen is soft  Tenderness: There is no abdominal tenderness  There is no guarding  Musculoskeletal: Normal range of motion  General: No swelling, tenderness, deformity or signs of injury  Right lower leg: No edema  Left lower leg: No edema  Skin:     General: Skin is warm and dry  Capillary Refill: Capillary refill takes less than 2 seconds  Coloration: Skin is not jaundiced or pale  Findings: No bruising, erythema, lesion or rash  Neurological:      Mental Status: He is unresponsive  GCS: GCS eye subscore is 1  GCS verbal subscore is 1  GCS motor subscore is 5        Comments: Patient withdraws and localizes to painful stimuli        --------------------------------------------------------------------------------------------------------------  Vitals:   Vitals:    05/11/21 2200 05/11/21 2235 05/11/21 2300   BP: 97/65  123/78   BP Location: Left arm     Pulse: 68  66   Resp: (!) 25  12   Temp: (!) 94 3 °F (34 6 °C)  (!) 93 9 °F (34 4 °C)   TempSrc: Bladder     SpO2: 100% 100% 100%   Weight: 93 5 kg (206 lb 2 1 oz)     Height: 6' (1 829 m)       Temp  Min: 93 9 °F (34 4 °C)  Max: 96 3 °F (35 7 °C)  IBW (Ideal Body Weight): 77 6 kg  Height: 6' (182 9 cm)  Body mass index is 27 96 kg/m²  Laboratory and Diagnostics:  Results from last 7 days   Lab Units 05/11/21  1842   WBC Thousand/uL 6 80   HEMOGLOBIN g/dL 14 7   HEMATOCRIT % 43 8   PLATELETS Thousands/uL 192   NEUTROS PCT % 60   MONOS PCT % 6     Results from last 7 days   Lab Units 05/11/21  2221 05/11/21  1842   SODIUM mmol/L 132* 129*   POTASSIUM mmol/L 4 0 3 6   CHLORIDE mmol/L 100 98   CO2 mmol/L 23 22   ANION GAP mmol/L 9 9   BUN mg/dL 9 9   CREATININE mg/dL 0 69 0 73   CALCIUM mg/dL 7 5* 7 8*   GLUCOSE RANDOM mg/dL 175* 198*   ALT U/L  --  8   AST U/L  --  14   ALK PHOS U/L  --  88   ALBUMIN g/dL  --  3 6   TOTAL BILIRUBIN mg/dL  --  0 40     Results from last 7 days   Lab Units 05/11/21  2221   PHOSPHORUS mg/dL 3 4           Results from last 7 days   Lab Units 05/11/21  1842   TROPONIN I ng/mL <0 03         ABG:  Results from last 7 days   Lab Units 05/11/21  2326   PH ART  7 289*   PCO2 ART mm Hg 47 6*   PO2 ART mm Hg 295 9*   HCO3 ART mmol/L 22 3   BASE EXC ART mmol/L -4 5   ABG SOURCE  Line, Arterial     VBG:  Results from last 7 days   Lab Units 05/11/21  2326   ABG SOURCE  Line, Arterial           Micro:        EKG:  NSR rate 94  No changes when compared to EKG 2/2019  Imaging: I have personally reviewed pertinent reports     and I have personally reviewed pertinent films in PACS      Historical Information   Past Medical History:   Diagnosis Date    Alcohol abuse 2/20/2019    Allergic rhinitis     last assessed: 12/5/2012    Anemia     Anxiety and depression     Quiros esophagus     Cholelithiasis     Chronic pain     COPD (chronic obstructive pulmonary disease) (HCC)  Depression     Diabetes mellitus ([facility] [address]     Emphysema lung (HCC)     Generalized anxiety disorder     GERD (gastroesophageal reflux disease)     Hyperlipidemia     Hypertension     Kidney stone     Metatarsalgia     last assessed: 8/11/2014, unspecified laterality, ? cause  PE with changes  To see DPM tomorrow   Pancreatitis     Psychiatric disorder     Renal disorder     Shortness of breath     with activity     Past Surgical History:   Procedure Laterality Date    CHOLECYSTECTOMY LAPAROSCOPIC      FOOT SURGERY      B/L great toe joint replacement    KNEE ARTHROSCOPY      (therapeutic) right knee    WI EDG US EXAM SURGICAL ALTER STOM DUODENUM/JEJUNUM N/A 10/17/2018    Procedure: LINEAR ENDOSCOPIC U/S;  Surgeon: Mary Sierra MD;  Location: [facility]; Service: Gastroenterology    VASECTOMY      vas deferens     Social History   Social History     Substance and Sexual Activity   Alcohol Use Not Currently    Frequency: 4 or more times a week    Drinks per session: 5 or 6    Binge frequency: Daily or almost daily    Comment: Drank a 5th vodka today     Social History     Substance and Sexual Activity   Drug Use No    Comment: chronic narcotic use     Social History     Tobacco Use   Smoking Status Current Every Day Smoker    Packs/day: 1 00   Smokeless Tobacco Never Used   Tobacco Comment    Tobacco use [employer]'s "How to Quit" literature given to pat       Family History:   Family History   Problem Relation Age of Onset   Zoe Dawson Cancer Mother     Coronary artery disease Mother     Diabetes Mother     Coronary artery disease Father     Prostate cancer Father     Diabetes Sister     Coronary artery disease Family          Medications:  Current Facility-Administered Medications   Medication Dose Route Frequency    cefepime (MAXIPIME) 2,000 mg in dextrose 5 % 50 mL IVPB  2,000 mg Intravenous Q12H    chlorhexidine (PERIDEX) 0 12 % oral rinse 15 mL  15 mL Swish & Spit Q12H [address]    enoxaparin (LOVENOX) subcutaneous injection 40 mg  40 mg Subcutaneous Q24H [facility]    insulin lispro (HumaLOG) 100 units/mL subcutaneous injection 1-6 Units  1-6 Units Subcutaneous Q6H [facility]    ipratropium (ATROVENT) 0 02 % inhalation solution **ADS Override Pull**        ipratropium (ATROVENT) 0 02 % inhalation solution 0 5 mg  0 5 mg Nebulization TID    levalbuterol (XOPENEX) 1 25 mg/0 5 mL inhalation solution **ADS Override Pull**        levalbuterol (XOPENEX) inhalation solution 1 25 mg  1 25 mg Nebulization TID    metroNIDAZOLE (FLAGYL) IVPB (premix) 500 mg 100 mL  500 mg Intravenous Q8H    NOREPINEPHRINE 4 MG  ML NSS (CMPD ORDER) infusion  1-30 mcg/min Intravenous Titrated    omeprazole (PRILOSEC) suspension 2 mg/mL  20 mg Oral Daily    sodium chloride 0 9 % infusion  125 mL/hr Intravenous Continuous    vancomycin (VANCOCIN) 1,750 mg in sodium chloride 0 9 % 500 mL IVPB  20 mg/kg Intravenous Q12H     Home medications:  Prior to Admission Medications   Prescriptions Last Dose Informant Patient Reported? Taking? EQ NICOTINE 21 MG/24HR TD 24 hr patch   Yes No   Sig: APPLY 1 PATCH TOPICALLY ONCE DAILY   EQ NICOTINE POLACRILEX 4 MG lozenge   Yes No   Sig: TAKE 1 LOZENGE EVERY 2 HOURS AS NEEDED FOR SMOKING CRAVING   Insulin Pen Needle (B-D ULTRAFINE III SHORT PEN) 31G X 8 MM MISC   Yes No   Sig: by Does not apply route   Insulin Pen Needle (PEN NEEDLES) 29G X 12MM MISC   No No   Sig: by Does not apply route daily at bedtime Substitute what fits [place] pen and what is covered by insurance  NARCAN 4 MG/0 1ML LIQD   Yes No   Sig: ADMINISTER A SINGLE SPRAY IN ONE NOSTRIL UPON SIGNS OF OPIOID OVERDOSE  CALL 911  REPEAT AFTER 3 MINUTES IF NO RESPONSE     QUEtiapine (SEROquel) 200 mg tablet   No No   Sig: Take 1 tablet (200 mg total) by mouth daily at bedtime   QUEtiapine (SEROquel) 25 mg tablet   No No   Sig: Take 1 tablet (25 mg total) by mouth 2 (two) times a day   QUEtiapine (SEROquel) 50 mg tablet   No No   Sig: Take 1 tablet (50 mg total) by mouth daily at bedtime   benzonatate (TESSALON PERLES) 100 mg capsule   No No   Sig: Take 1 capsule (100 mg total) by mouth 3 (three) times a day as needed for cough   Patient not taking: Reported on 3/23/2021   buPROPion (WELLBUTRIN XL) 150 mg 24 hr tablet   No No   Sig: Take 1 tablet (150 mg total) by mouth daily   cholestyramine (QUESTRAN) 4 GM/DOSE powder   No No   Sig: Take 1 packet (4 g total) by mouth 2 (two) times a day with meals   fenofibrate (TRICOR) 145 mg tablet   No No   Sig: Take 1 tablet (145 mg total) by mouth daily   Patient not taking: Reported on 3/23/2021   glucose blood (ONE TOUCH ULTRA TEST) test strip   Yes No   Sig: by In Vitro route 3 (three) times a day   hydrocortisone (ANUSOL-HC) 25 mg suppository   No No   Sig: Insert 1 suppository (25 mg total) into the rectum 2 (two) times a day   Patient not taking: Reported on 3/23/2021   insulin glargine (BASAGLAR KWIKPEN) 100 units/mL injection pen   No No   Si units sq q AM  20 units sq q PM   insulin lispro (HumaLOG) 100 units/mL injection pen   No No   Sig: PER SLIDING SCALE UP TO 14 UNITS WITH EACH MEAL   lisinopril (ZESTRIL) 10 mg tablet   No No   Sig: Take 1 tablet (10 mg total) by mouth daily   Patient not taking: Reported on 3/23/2021   omeprazole (PriLOSEC) 20 mg delayed release capsule   No No   Sig: Take 1 capsule (20 mg total) by mouth daily   propranolol (INDERAL) 10 mg tablet   No No   Sig: Take 1 tablet (10 mg total) by mouth 2 (two) times a day   rosuvastatin (CRESTOR) 10 MG tablet   No No   Sig: Take 1 tablet (10 mg total) by mouth daily   Patient not taking: Reported on 3/23/2021   sertraline (ZOLOFT) 100 mg tablet   No No   Sig: Take 2 tablets (200 mg total) by mouth daily   umeclidinium-vilanterol (ANORO ELLIPTA) 62 5-25 MCG/INH inhaler   No No   Sig: Inhale 1 puff daily      Facility-Administered Medications: None     Allergies:  No Known Allergies    ------------------------------------------------------------------------------------------------------------  Advance Directive and Living Will:      Power of :    POLST:    ------------------------------------------------------------------------------------------------------------  Anticipated Length of Stay is > 2 midnights    Care Time Delivered:   Upon my evaluation, this patient had a high probability of imminent or life-threatening deterioration due to Encephalopathy, respiratory failure, polysubstance overdose, which required my direct attention, intervention, and personal management  I have personally provided 60 minutes of critical care time, exclusive of procedures, teaching, family meetings, and any prior time recorded by providers other than myself  Tana Higginbotham PA-C        Portions of the record may have been created with voice recognition software  Occasional wrong word or "sound a like" substitutions may have occurred due to the inherent limitations of voice recognition software    Read the chart carefully and recognize, using context, where substitutions have occurred

## 2021-05-12 NOTE — ED PROVIDER NOTES
History  Chief Complaint   Patient presents with    Overdose - Intentional     This is a 49-year-old man who comes in for polysubstance overdose  Patient sent suicide note to his wife  Patient unable to give history due to being severely altered  Family found deer near where he was  Also found empty bottles of Seroquel, as well as some nearby bottles of propranolol and ropinirole  History provided by EMS and patient's daughter  Patient has known severe alcoholism had been sober for many years until a recent relapse  Patient noted to be a insulin-dependent diabetic  Prior to Admission Medications   Prescriptions Last Dose Informant Patient Reported? Taking? EQ NICOTINE 21 MG/24HR TD 24 hr patch   Yes No   Sig: APPLY 1 PATCH TOPICALLY ONCE DAILY   EQ NICOTINE POLACRILEX 4 MG lozenge   Yes No   Sig: TAKE 1 LOZENGE EVERY 2 HOURS AS NEEDED FOR SMOKING CRAVING   Insulin Pen Needle (B-D ULTRAFINE III SHORT PEN) 31G X 8 MM MISC   Yes No   Sig: by Does not apply route   Insulin Pen Needle (PEN NEEDLES) 29G X 12MM MISC   No No   Sig: by Does not apply route daily at bedtime Substitute what fits Touejo pen and what is covered by insurance  NARCAN 4 MG/0 1ML LIQD   Yes No   Sig: ADMINISTER A SINGLE SPRAY IN ONE NOSTRIL UPON SIGNS OF OPIOID OVERDOSE  CALL 911  REPEAT AFTER 3 MINUTES IF NO RESPONSE     QUEtiapine (SEROquel) 200 mg tablet   No No   Sig: Take 1 tablet (200 mg total) by mouth daily at bedtime   QUEtiapine (SEROquel) 25 mg tablet   No No   Sig: Take 1 tablet (25 mg total) by mouth 2 (two) times a day   QUEtiapine (SEROquel) 50 mg tablet   No No   Sig: Take 1 tablet (50 mg total) by mouth daily at bedtime   benzonatate (TESSALON PERLES) 100 mg capsule   No No   Sig: Take 1 capsule (100 mg total) by mouth 3 (three) times a day as needed for cough   Patient not taking: Reported on 3/23/2021   buPROPion (WELLBUTRIN XL) 150 mg 24 hr tablet   No No   Sig: Take 1 tablet (150 mg total) by mouth daily cholestyramine (QUESTRAN) 4 GM/DOSE powder   No No   Sig: Take 1 packet (4 g total) by mouth 2 (two) times a day with meals   fenofibrate (TRICOR) 145 mg tablet   No No   Sig: Take 1 tablet (145 mg total) by mouth daily   Patient not taking: Reported on 3/23/2021   glucose blood (ONE TOUCH ULTRA TEST) test strip   Yes No   Sig: by In Vitro route 3 (three) times a day   hydrocortisone (ANUSOL-HC) 25 mg suppository   No No   Sig: Insert 1 suppository (25 mg total) into the rectum 2 (two) times a day   Patient not taking: Reported on 3/23/2021   insulin glargine (BASAGLAR KWIKPEN) 100 units/mL injection pen   No No   Si units sq q AM  20 units sq q PM   insulin lispro (HumaLOG) 100 units/mL injection pen   No No   Sig: PER SLIDING SCALE UP TO 14 UNITS WITH EACH MEAL   lisinopril (ZESTRIL) 10 mg tablet   No No   Sig: Take 1 tablet (10 mg total) by mouth daily   Patient not taking: Reported on 3/23/2021   omeprazole (PriLOSEC) 20 mg delayed release capsule   No No   Sig: Take 1 capsule (20 mg total) by mouth daily   propranolol (INDERAL) 10 mg tablet   No No   Sig: Take 1 tablet (10 mg total) by mouth 2 (two) times a day   rosuvastatin (CRESTOR) 10 MG tablet   No No   Sig: Take 1 tablet (10 mg total) by mouth daily   Patient not taking: Reported on 3/23/2021   sertraline (ZOLOFT) 100 mg tablet   No No   Sig: Take 2 tablets (200 mg total) by mouth daily   umeclidinium-vilanterol (ANORO ELLIPTA) 62 5-25 MCG/INH inhaler   No No   Sig: Inhale 1 puff daily      Facility-Administered Medications: None       Past Medical History:   Diagnosis Date    Alcohol abuse 2019    Allergic rhinitis     last assessed: 2012    Anemia     Anxiety and depression     Quiros esophagus     Cholelithiasis     Chronic pain     COPD (chronic obstructive pulmonary disease) (HCC)     Depression     Diabetes mellitus (HCC)     Emphysema lung (HCC)     Generalized anxiety disorder     GERD (gastroesophageal reflux disease)     Hyperlipidemia     Hypertension     Kidney stone     Metatarsalgia     last assessed: 8/11/2014, unspecified laterality, ? cause  PE with changes  To see DPM tomorrow   Pancreatitis     Psychiatric disorder     Renal disorder     Shortness of breath     with activity       Past Surgical History:   Procedure Laterality Date    CHOLECYSTECTOMY LAPAROSCOPIC      FOOT SURGERY      B/L great toe joint replacement    KNEE ARTHROSCOPY      (therapeutic) right knee    DE EDG US EXAM SURGICAL ALTER STOM DUODENUM/JEJUNUM N/A 10/17/2018    Procedure: LINEAR ENDOSCOPIC U/S;  Surgeon: Anaya Swift MD;  Location: [facility]; Service: Gastroenterology    VASECTOMY      vas deferens       Family History   Problem Relation Age of Onset    Cancer Mother     Coronary artery disease Mother     Diabetes Mother     Coronary artery disease Father     Prostate cancer Father     Diabetes Sister     Coronary artery disease Family      I have reviewed and agree with the history as documented  E-Cigarette/Vaping     E-Cigarette/Vaping Substances     Social History     Tobacco Use    Smoking status: Current Every Day Smoker     Packs/day: 1 00    Smokeless tobacco: Never Used    Tobacco comment: Tobacco use [employer]'s "How to Quit" literature given to pat  Substance Use Topics    Alcohol use: Not Currently     Frequency: 4 or more times a week     Drinks per session: 5 or 6     Binge frequency: Daily or almost daily     Comment: Drank a 5th vodka today    Drug use: No     Comment: chronic narcotic use       Review of Systems   Unable to perform ROS: Acuity of condition       Physical Exam  Physical Exam  Constitutional:       General: He is in acute distress  Appearance: Normal appearance  He is ill-appearing and toxic-appearing  HENT:      Head: Normocephalic and atraumatic        Right Ear: External ear normal       Left Ear: External ear normal       Nose: Nose normal       Mouth/Throat: Mouth: Mucous membranes are moist       Comments: Blood in oropharynx  Eyes:      Conjunctiva/sclera: Conjunctivae normal       Pupils: Pupils are equal, round, and reactive to light  Neck:      Musculoskeletal: Normal range of motion and neck supple  Cardiovascular:      Rate and Rhythm: Normal rate  Comments: Good peripheral perfusion, good coloration  Pulmonary:      Breath sounds: Wheezing present  Abdominal:      General: There is no distension  Tenderness: There is no guarding  Musculoskeletal: Normal range of motion  General: No swelling, deformity or signs of injury  Skin:     General: Skin is warm  Findings: No bruising, lesion or rash  Neurological:      Comments: Moves all extremities intermittently without provocation extremely rarely  Not responding to painful stimulation  Not following commands  Pupils 3 mm and minimally reactive  Psychiatric:         Mood and Affect: Mood normal          Behavior: Behavior normal          Thought Content:  Thought content normal          Judgment: Judgment normal          Vital Signs  ED Triage Vitals   Temperature Pulse Respirations Blood Pressure SpO2   05/11/21 1900 05/11/21 1830 05/11/21 1830 05/11/21 1830 05/11/21 1830   (!) 95 7 °F (35 4 °C) 84 17 135/95 100 %      Temp src Heart Rate Source Patient Position - Orthostatic VS BP Location FiO2 (%)   -- 05/11/21 1830 -- 05/11/21 1830 --    Monitor  Right arm       Pain Score       --                  Vitals:    05/11/21 1945 05/11/21 1959 05/11/21 2015 05/11/21 2030   BP: (!) 89/62 (!) 88/58 (!) 83/54 (!) 88/53   Pulse: 79  79 79         Visual Acuity      ED Medications  Medications   sodium chloride 0 9 % bolus 1,000 mL (0 mL Intravenous Stopped 5/11/21 1934)   naloxone (NARCAN) injection 2 mg (2 mg Intravenous Given 5/11/21 1822)   magnesium sulfate 2 g/50 mL IVPB (premix) 2 g (0 g Intravenous Stopped 5/11/21 2034)   vecuronium (NORCURON) injection 10 mg (10 mg Intravenous Given 5/11/21 1825)   sodium chloride 0 9 % bolus 1,000 mL (0 mL Intravenous Stopped 5/11/21 2038)       Diagnostic Studies  Results Reviewed     Procedure Component Value Units Date/Time    [facility] [[id number]]  (Normal) Collected: 05/11/21 1901    Lab Status: Final result Specimen: Nares from Nose Updated: 05/11/21 2005     SARS-CoV-2 Negative    Narrative: The specimen collection materials, transport medium, and/or testing methodology utilized in the production of these test results have been proven to be reliable in a limited validation with an abbreviated program under the Emergency Utilization Authorization provided by the FDA  Testing reported as "Presumptive positive" will be confirmed with secondary testing to ensure result accuracy  Clinical caution and judgement should be used with the interpretation of these results with consideration of the clinical impression and other laboratory testing  Testing reported as "Positive" or "Negative" has been proven to be accurate according to standard laboratory validation requirements  All testing is performed with control materials showing appropriate reactivity at standard intervals  Fingerstick Glucose (POCT) [[id number]]  (Abnormal) Collected: 05/11/21 1956    Lab Status: Final result Updated: 05/11/21 1957     POC Glucose 171 mg/dl     TSH, 3rd generation [[id number]]  (Normal) Collected: 05/11/21 1842    Lab Status: Final result Specimen: Blood from Arm, Right Updated: 05/11/21 1925     TSH 3RD GENERATON 1 680 uIU/mL     Narrative:      Patients undergoing fluorescein dye angiography may retain small amounts of fluorescein in the body for 48-72 hours post procedure  Samples containing fluorescein can produce falsely depressed TSH values  If the patient had this procedure,a specimen should be resubmitted post fluorescein clearance        Blood gas, arterial [[id number]]  (Abnormal) Collected: 05/11/21 1909    Lab Status: Final result Specimen: Blood from Radial, Left Updated: 05/11/21 1923     pH, Arterial 7 160     pCO2, Arterial 61 5 mm Hg      pO2, Arterial 90 0 mm Hg      HCO3, Arterial 21 2 mmol/L      Base Excess, Arterial -8 4 mmol/L      O2 Content, Arterial 18 2 mL/dL      O2 HGB,Arterial  89 0 %      SOURCE Radial, Left     PATRICIA TEST Yes     AC Rate 14     Tidal Volume 400 ml      Inspired Air (FIO2) 80     PEEP 6    Comprehensive metabolic panel [[id number]]  (Abnormal) Collected: 05/11/21 1842    Lab Status: Final result Specimen: Blood from Arm, Right Updated: 05/11/21 1914     Sodium 129 mmol/L      Potassium 3 6 mmol/L      Chloride 98 mmol/L      CO2 22 mmol/L      ANION GAP 9 mmol/L      BUN 9 mg/dL      Creatinine 0 73 mg/dL      Glucose 198 mg/dL      Calcium 7 8 mg/dL      AST 14 U/L      ALT 8 U/L      Alkaline Phosphatase 88 U/L      Total Protein 6 8 g/dL      Albumin 3 6 g/dL      Total Bilirubin 0 40 mg/dL      eGFR 106 ml/min/1 73sq m     Narrative:      [place] guidelines for Chronic Kidney Disease (CKD):     Stage 1 with normal or high GFR (GFR > 90 mL/min/1 73 square meters)    Stage 2 Mild CKD (GFR = 60-89 mL/min/1 73 square meters)    Stage 3A Moderate CKD (GFR = 45-59 mL/min/1 73 square meters)    Stage 3B Moderate CKD (GFR = 30-44 mL/min/1 73 square meters)    Stage 4 Severe CKD (GFR = 15-29 mL/min/1 73 square meters)    Stage 5 End Stage CKD (GFR <15 mL/min/1 73 square meters)  Note: GFR calculation is accurate only with a steady state creatinine    Salicylate level [[id number]]  (Abnormal) Collected: 05/11/21 1842    Lab Status: Final result Specimen: Blood from Arm, Right Updated: 05/33/75 3171     Salicylate Lvl <5 mg/dL     Acetaminophen level-"If concentration is detectable, please discuss with medical  on call " [[id number]]  (Abnormal) Collected: 05/11/21 1842    Lab Status: Final result Specimen: Blood from Arm, Right Updated: 05/11/21 1913 Acetaminophen Level <10 ug/mL     Troponin I [[id number]]  (Normal) Collected: 05/11/21 1842    Lab Status: Final result Specimen: Blood from Arm, Right Updated: 05/11/21 1913     Troponin I <0 03 ng/mL     Ethanol [[id number]]  (Abnormal) Collected: 05/11/21 1842    Lab Status: Final result Specimen: Blood from Arm, Right Updated: 05/11/21 1910     Ethanol Lvl 90 1 mg/dL     Ammonia [[id number]]  (Normal) Collected: 05/11/21 1842    Lab Status: Final result Specimen: Blood from Arm, Right Updated: 05/11/21 1909     Ammonia 50 umol/L     Rapid drug screen, urine [[id number]]  (Abnormal) Collected: 05/11/21 1843    Lab Status: Final result Specimen: Urine, Catheter Updated: 05/11/21 1904     Amph/Meth UR Negative     Barbiturate Ur Negative     Benzodiazepine Urine Positive     Cocaine Urine Negative     Methadone Urine Negative     Opiate Urine Negative     PCP Ur Negative     THC Urine Negative     Oxycodone Urine Negative    Narrative:      Presumptive report  If requested, specimen will be sent to reference lab for confirmation  FOR MEDICAL PURPOSES ONLY  IF CONFIRMATION NEEDED PLEASE CONTACT THE LAB WITHIN 5 DAYS      Drug Screen Cutoff Levels:  AMPHETAMINE/METHAMPHETAMINES  1000 ng/mL  BARBITURATES     200 ng/mL  BENZODIAZEPINES     200 ng/mL  COCAINE      300 ng/mL  METHADONE      300 ng/mL  OPIATES      300 ng/mL  PHENCYCLIDINE     25 ng/mL  THC       50 ng/mL  OXYCODONE      100 ng/mL    Urine Microscopic [[id number]]  (Abnormal) Collected: 05/11/21 1843    Lab Status: Final result Specimen: Urine, Indwelling Crane Catheter Updated: 05/11/21 1857     RBC, UA 10-20 /hpf      WBC, UA 1-2 /hpf      Epithelial Cells Occasional /hpf      Bacteria, UA None Seen /hpf     UA w Reflex to Microscopic w Reflex to Culture [[id number]]  (Abnormal) Collected: 05/11/21 1843    Lab Status: Final result Specimen: Urine, Indwelling Crane Catheter Updated: 05/11/21 1850     Color, UA Yellow     Clarity, UA Clear     Specific Gravity, UA >=1 030     pH, UA 6 0     Leukocytes, UA Negative     Nitrite, UA Negative     Protein, UA Trace mg/dl      Glucose, UA 1+ mg/dl      Ketones, UA Negative mg/dl      Urobilinogen, UA 0 2 E U /dl      Bilirubin, UA Negative     Blood, UA 2+    CBC and differential [[id number]]  (Abnormal) Collected: 05/11/21 1842    Lab Status: Final result Specimen: Blood from Arm, Right Updated: 05/11/21 1850     WBC 6 80 Thousand/uL      RBC 4 89 Million/uL      Hemoglobin 14 7 g/dL      Hematocrit 43 8 %      MCV 89 fL      MCH 30 0 pg      MCHC 33 6 g/dL      RDW 15 0 %      MPV 7 2 fL      Platelets 480 Thousands/uL      Neutrophils Relative 60 %      Lymphocytes Relative 31 %      Monocytes Relative 6 %      Eosinophils Relative 2 %      Basophils Relative 1 %      Neutrophils Absolute 4 10 Thousands/µL      Lymphocytes Absolute 2 10 Thousands/µL      Monocytes Absolute 0 40 Thousand/µL      Eosinophils Absolute 0 10 Thousand/µL      Basophils Absolute 0 10 Thousands/µL     Fingerstick Glucose (POCT) [[id number]]  (Abnormal) Collected: 05/11/21 1842    Lab Status: Final result Updated: 05/11/21 1848     POC Glucose 170 mg/dl                  XR chest 1 view portable    (Results Pending)   XR chest 1 view portable    (Results Pending)              Procedures  CriticalCare Time  Performed by: Ashvin Olson MD  Authorized by: Ashvin Olson MD     Critical care provider statement:     Critical care time (minutes):  90    Critical care time was exclusive of:  Separately billable procedures and treating other patients and teaching time    Critical care was necessary to treat or prevent imminent or life-threatening deterioration of the following conditions:  Cardiac failure, CNS failure or compromise and toxidrome    Critical care was time spent personally by me on the following activities:  Blood draw for specimens, obtaining history from patient or surrogate, development of treatment plan with patient or surrogate, discussions with consultants, evaluation of patient's response to treatment, examination of patient, interpretation of cardiac output measurements, ordering and performing treatments and interventions, ordering and review of laboratory studies, ordering and review of radiographic studies, re-evaluation of patient's condition, review of old charts and ventilator management    I assumed direction of critical care for this patient from another provider in my specialty: no      Intubation    Date/Time: 5/11/2021 10:01 PM  Performed by: Yadira Medeiros MD  Authorized by: Yadira Medeiros MD     Patient location:  ED  Consent:     Consent obtained:  Emergent situation  Universal protocol:     Patient identity confirmed:  Arm band and provided demographic data  Pre-procedure details:     Patient status:  Unresponsive    Mallampati score:  3    Paralytics:  Vecuronium  Indications:     Indications for intubation: airway protection, hypoxemia and pulmonary toilet    Procedure details:     Preoxygenation:  Nonrebreather mask    CPR in progress: no      Intubation method:  Oral    Oral intubation technique:  Glidescope    Nasal intubation technique:  Fiber optic    Tube size (mm):  7 5    Tube type:  Cuffed    Number of attempts:  2    Ventilation between attempts: yes      Cricoid pressure: yes      Tube visualized through cords: yes    Placement assessment:     ETT to teeth:  25    Tube secured with:  ETT diaz    Breath sounds:  Equal    Placement verification: chest rise, condensation, CXR verification, equal breath sounds, ETCO2 detector, fiberoptic scope and tube exhalation      CXR findings:  ETT in proper place  Post-procedure details:     Patient tolerance of procedure:   Tolerated well, no immediate complications             ED Course                             SBIRT 20yo+      Most Recent Value   SBIRT (22 yo +)   In order to provide better care to our patients, we are screening all of our patients for alcohol and drug use  Would it be okay to ask you these screening questions? Unable to answer at this time Filed at: 05/11/2021 1914                    [facility]  Number of Diagnoses or Management Options  Intentional drug overdose, initial encounter [facility]): new and requires workup  Suicide attempt [facility]): new and requires workup  Diagnosis management comments: This is a 68-year-old man with intentional drug overdose and a suicide attempt  Patient thought to be primarily suffering from sertraline  Patient has elevated QT, given magnesium 2 different x2 mg doses  QRS remained less than 120 so no bicarb was given  Patient given large amounts of saline  Patient started on norepinephrine  When that maxed out we started epinephrine as he was leaving  Patient given a central line  Patient intubated shortly after arrival   Case discussed with Dr Reji Garcia of Toxicology  Patient transferred due to need for higher care  Case discussed with our hospitalist   Toxicology not available at our facility to see the patient and round  Patient is full code per his family who is present including his wife         Amount and/or Complexity of Data Reviewed  Clinical lab tests: reviewed and ordered  Tests in the radiology section of CPT®: ordered and reviewed  Obtain history from someone other than the patient: yes  Review and summarize past medical records: yes  Discuss the patient with other providers: yes  Independent visualization of images, tracings, or specimens: yes        Disposition  Final diagnoses:   Intentional drug overdose, initial encounter ([facility] [address] )   Suicide attempt [facility])     Time reflects when diagnosis was documented in both MDM as applicable and the Disposition within this note     Time User Action Codes Description Comment    5/11/2021  7:49 PM Sunday Finger Add [T50 902A] Intentional drug overdose, initial encounter [facility])     5/11/2021  7:49 PM Sunday Finger Add [T14 91XA] Suicide attempt [facility])       ED Disposition     ED Disposition Condition Date/Time Comment    Transfer to Another Facility-In Network  Tue May 11, 2021  7:49 PM Milo Mar should be transferred out to [facility]lemente [facility] Documentation      Most Recent Value   Accepting Physician  Dr Osiel Silva Name, Mary Ann Willis      RN Documentation      [facility], [address] Assignment  3   Report Given to  Agus Blackwell RN      Follow-up Information    None         Discharge Medication List as of 5/11/2021  9:12 PM      CONTINUE these medications which have NOT CHANGED    Details   benzonatate (TESSALON PERLES) 100 mg capsule Take 1 capsule (100 mg total) by mouth 3 (three) times a day as needed for cough, Starting Mon 11/18/2019, Normal      buPROPion (WELLBUTRIN XL) 150 mg 24 hr tablet Take 1 tablet (150 mg total) by mouth daily, Starting Tue 4/20/2021, Normal      cholestyramine (QUESTRAN) 4 GM/DOSE powder Take 1 packet (4 g total) by mouth 2 (two) times a day with meals, Starting Thu 7/18/2019, Normal      EQ NICOTINE 21 MG/24HR TD 24 hr patch APPLY 1 PATCH TOPICALLY ONCE DAILY, Historical Med      EQ NICOTINE POLACRILEX 4 MG lozenge TAKE 1 LOZENGE EVERY 2 HOURS AS NEEDED FOR SMOKING CRAVING, Historical Med      fenofibrate (TRICOR) 145 mg tablet Take 1 tablet (145 mg total) by mouth daily, Starting Thu 6/27/2019, Normal      glucose blood (ONE TOUCH ULTRA TEST) test strip by In Vitro route 3 (three) times a day, Starting Mon 12/29/2014, Historical Med      hydrocortisone (ANUSOL-HC) 25 mg suppository Insert 1 suppository (25 mg total) into the rectum 2 (two) times a day, Starting Thu 7/18/2019, Normal      insulin glargine (BASAGLAR KWIKPEN) 100 units/mL injection pen 20 units sq q AM  20 units sq q PM, Print      insulin lispro (HumaLOG) 100 units/mL injection pen PER SLIDING SCALE UP TO 14 UNITS WITH EACH MEAL, Normal      !!  Insulin Pen Needle (B-D ULTRAFINE III SHORT PEN) 31G X 8 MM MISC by Does not apply route, Starting Thu 2/16/2012, Historical Med      !! Insulin Pen Needle (PEN NEEDLES) 29G X 12MM MISC by Does not apply route daily at bedtime Substitute what fits Touejo pen and what is covered by insurance , Starting Tue 8/14/2018, Print      lisinopril (ZESTRIL) 10 mg tablet Take 1 tablet (10 mg total) by mouth daily, Starting Mon 8/12/2019, Normal      NARCAN 4 MG/0 1ML LIQD ADMINISTER A SINGLE SPRAY IN ONE NOSTRIL UPON SIGNS OF OPIOID OVERDOSE  CALL 911  REPEAT AFTER 3 MINUTES IF NO RESPONSE , Historical Med      omeprazole (PriLOSEC) 20 mg delayed release capsule Take 1 capsule (20 mg total) by mouth daily, Starting Thu 6/27/2019, Normal      propranolol (INDERAL) 10 mg tablet Take 1 tablet (10 mg total) by mouth 2 (two) times a day, Starting Thu 6/27/2019, Normal      !! QUEtiapine (SEROquel) 200 mg tablet Take 1 tablet (200 mg total) by mouth daily at bedtime, Starting Tue 4/20/2021, Normal      !! QUEtiapine (SEROquel) 25 mg tablet Take 1 tablet (25 mg total) by mouth 2 (two) times a day, Starting Tue 4/20/2021, Normal      !! QUEtiapine (SEROquel) 50 mg tablet Take 1 tablet (50 mg total) by mouth daily at bedtime, Starting Tue 4/20/2021, Normal      rosuvastatin (CRESTOR) 10 MG tablet Take 1 tablet (10 mg total) by mouth daily, Starting Thu 6/27/2019, Normal      sertraline (ZOLOFT) 100 mg tablet Take 2 tablets (200 mg total) by mouth daily, Starting Tue 4/20/2021, Normal      umeclidinium-vilanterol (ANORO ELLIPTA) 62 5-25 MCG/INH inhaler Inhale 1 puff daily, Starting Tue 2/19/2019, Normal       !! - Potential duplicate medications found  Please discuss with provider  No discharge procedures on file      PDMP Review     None          ED Provider  Electronically Signed by           Mook Lombardo MD  05/11/21 5874

## 2021-05-12 NOTE — ASSESSMENT & PLAN NOTE
· Per chart records the patient has a history of alcohol abuse and recurrent pancreatitis  He appeared to be in remission, however, alcohol level on admission was 90  I will obtain a lipase level  · Monitor for signs of DTs

## 2021-05-12 NOTE — PLAN OF CARE
Problem: Prexisting or High Potential for Compromised Skin Integrity  Goal: Skin integrity is maintained or improved  Description: INTERVENTIONS:  - Identify patients at risk for skin breakdown  - Assess and monitor skin integrity  - Assess and monitor nutrition and hydration status  - Monitor labs   - Assess for incontinence   - Turn and reposition patient  - Assist with mobility/ambulation  - Relieve pressure over bony prominences  - Avoid friction and shearing  - Provide appropriate hygiene as needed including keeping skin clean and dry  - Evaluate need for skin moisturizer/barrier cream  - Collaborate with interdisciplinary team   - Patient/family teaching  - Consider wound care consult   Outcome: Progressing     Problem: PAIN - ADULT  Goal: Verbalizes/displays adequate comfort level or baseline comfort level  Description: Interventions:  - Encourage patient to monitor pain and request assistance  - Assess pain using appropriate pain scale  - Administer analgesics based on type and severity of pain and evaluate response  - Implement non-pharmacological measures as appropriate and evaluate response  - Consider cultural and social influences on pain and pain management  - Notify physician/advanced practitioner if interventions unsuccessful or patient reports new pain  Outcome: Progressing     Problem: INFECTION - ADULT  Goal: Absence or prevention of progression during hospitalization  Description: INTERVENTIONS:  - Assess and monitor for signs and symptoms of infection  - Monitor lab/diagnostic results  - Monitor all insertion sites, i e  indwelling lines, tubes, and drains  - Monitor endotracheal if appropriate and nasal secretions for changes in amount and color  - [place] appropriate cooling/warming therapies per order  - Administer medications as ordered  - Instruct and encourage patient and family to use good hand hygiene technique  - Identify and instruct in appropriate isolation precautions for identified infection/condition  Outcome: Progressing  Goal: Absence of fever/infection during neutropenic period  Description: INTERVENTIONS:  - Monitor WBC    Outcome: Progressing     Problem: SAFETY ADULT  Goal: Patient will remain free of falls  Description: INTERVENTIONS:  - Assess patient frequently for physical needs  -  Identify cognitive and physical deficits and behaviors that affect risk of falls    -  [place] fall precautions as indicated by assessment   - Educate patient/family on patient safety including physical limitations  - Instruct patient to call for assistance with activity based on assessment  - Modify environment to reduce risk of injury  - Consider OT/PT consult to assist with strengthening/mobility  Outcome: Progressing  Goal: Maintain or return to baseline ADL function  Description: INTERVENTIONS:  -  Assess patient's ability to carry out ADLs; assess patient's baseline for ADL function and identify physical deficits which impact ability to perform ADLs (bathing, care of mouth/teeth, toileting, grooming, dressing, etc )  - Assess/evaluate cause of self-care deficits   - Assess range of motion  - Assess patient's mobility; develop plan if impaired  - Assess patient's need for assistive devices and provide as appropriate  - Encourage maximum independence but intervene and supervise when necessary  - Involve family in performance of ADLs  - Assess for home care needs following discharge   - Consider OT consult to assist with ADL evaluation and planning for discharge  - Provide patient education as appropriate  Outcome: Progressing  Goal: Maintain or return mobility status to optimal level  Description: INTERVENTIONS:  - Assess patient's baseline mobility status (ambulation, transfers, stairs, etc )    - Identify cognitive and physical deficits and behaviors that affect mobility  - Identify mobility aids required to assist with transfers and/or ambulation (gait belt, sit-to-stand, lift, walker, cane, etc )  - [place] fall precautions as indicated by assessment  - Record patient progress and toleration of activity level on Mobility SBAR; progress patient to next Phase/Stage  - Instruct patient to call for assistance with activity based on assessment  - Consider rehabilitation consult to assist with strengthening/weightbearing, etc   Outcome: Progressing     Problem: DISCHARGE PLANNING  Goal: Discharge to home or other facility with appropriate resources  Description: INTERVENTIONS:  - Identify barriers to discharge w/patient and caregiver  - Arrange for needed discharge resources and transportation as appropriate  - Identify discharge learning needs (meds, wound care, etc )  - Arrange for interpretive services to assist at discharge as needed  - Refer to Case Management Department for coordinating discharge planning if the patient needs post-hospital services based on physician/advanced practitioner order or complex needs related to functional status, cognitive ability, or social support system  Outcome: Progressing     Problem: Knowledge Deficit  Goal: Patient/family/caregiver demonstrates understanding of disease process, treatment plan, medications, and discharge instructions  Description: Complete learning assessment and assess knowledge base    Interventions:  - Provide teaching at level of understanding  - Provide teaching via preferred learning methods  Outcome: Progressing     Problem: SAFETY,RESTRAINT: NV/NON-SELF DESTRUCTIVE BEHAVIOR  Goal: Remains free of harm/injury (restraint for non violent/non self-detsructive behavior)  Description: INTERVENTIONS:  - Instruct patient/family regarding restraint use   - Assess and monitor physiologic and psychological status   - Provide interventions and comfort measures to meet assessed patient needs   - Identify and implement measures to help patient regain control  - Assess readiness for release of restraint   Outcome: Progressing  Goal: Returns to optimal restraint-free functioning  Description: INTERVENTIONS:  - Assess the patient's behavior and symptoms that indicate continued need for restraint  - Identify and implement measures to help patient regain control  - Assess readiness for release of restraint   Outcome: Progressing

## 2021-05-13 LAB
ANION GAP SERPL CALCULATED.3IONS-SCNC: 2 MMOL/L (ref 4–13)
BUN SERPL-MCNC: 11 MG/DL (ref 5–25)
CALCIUM SERPL-MCNC: 8.2 MG/DL (ref 8.3–10.1)
CHLORIDE SERPL-SCNC: 105 MMOL/L (ref 100–108)
CO2 SERPL-SCNC: 30 MMOL/L (ref 21–32)
CREAT SERPL-MCNC: 0.9 MG/DL (ref 0.6–1.3)
GFR SERPL CREATININE-BSD FRML MDRD: 97 ML/MIN/1.73SQ M
GLUCOSE SERPL-MCNC: 205 MG/DL (ref 65–140)
GLUCOSE SERPL-MCNC: 213 MG/DL (ref 65–140)
GLUCOSE SERPL-MCNC: 225 MG/DL (ref 65–140)
GLUCOSE SERPL-MCNC: 230 MG/DL (ref 65–140)
GLUCOSE SERPL-MCNC: 323 MG/DL (ref 65–140)
POTASSIUM SERPL-SCNC: 4.4 MMOL/L (ref 3.5–5.3)
PROCALCITONIN SERPL-MCNC: <0.05 NG/ML
SODIUM SERPL-SCNC: 137 MMOL/L (ref 136–145)

## 2021-05-13 PROCEDURE — 84145 PROCALCITONIN (PCT): CPT | Performed by: NURSE PRACTITIONER

## 2021-05-13 PROCEDURE — 99232 SBSQ HOSP IP/OBS MODERATE 35: CPT | Performed by: INTERNAL MEDICINE

## 2021-05-13 PROCEDURE — 82948 REAGENT STRIP/BLOOD GLUCOSE: CPT

## 2021-05-13 PROCEDURE — 80048 BASIC METABOLIC PNL TOTAL CA: CPT | Performed by: NURSE PRACTITIONER

## 2021-05-13 RX ORDER — OXYCODONE HYDROCHLORIDE 5 MG/1
2.5 TABLET ORAL EVERY 4 HOURS PRN
Status: DISCONTINUED | OUTPATIENT
Start: 2021-05-13 | End: 2021-05-14 | Stop reason: HOSPADM

## 2021-05-13 RX ORDER — ONDANSETRON 2 MG/ML
4 INJECTION INTRAMUSCULAR; INTRAVENOUS EVERY 4 HOURS PRN
Status: DISCONTINUED | OUTPATIENT
Start: 2021-05-13 | End: 2021-05-14 | Stop reason: HOSPADM

## 2021-05-13 RX ORDER — OXYCODONE HYDROCHLORIDE 5 MG/1
5 TABLET ORAL EVERY 4 HOURS PRN
Status: DISCONTINUED | OUTPATIENT
Start: 2021-05-13 | End: 2021-05-14 | Stop reason: HOSPADM

## 2021-05-13 RX ORDER — AMOXICILLIN 250 MG
1 CAPSULE ORAL 2 TIMES DAILY
Status: DISCONTINUED | OUTPATIENT
Start: 2021-05-13 | End: 2021-05-14 | Stop reason: HOSPADM

## 2021-05-13 RX ORDER — HYDROMORPHONE HCL IN WATER/PF 6 MG/30 ML
0.2 PATIENT CONTROLLED ANALGESIA SYRINGE INTRAVENOUS EVERY 4 HOURS PRN
Status: DISCONTINUED | OUTPATIENT
Start: 2021-05-13 | End: 2021-05-14

## 2021-05-13 RX ADMIN — INSULIN LISPRO 2 UNITS: 100 INJECTION, SOLUTION INTRAVENOUS; SUBCUTANEOUS at 12:07

## 2021-05-13 RX ADMIN — OXYCODONE HYDROCHLORIDE 5 MG: 5 TABLET ORAL at 16:45

## 2021-05-13 RX ADMIN — INSULIN LISPRO 2 UNITS: 100 INJECTION, SOLUTION INTRAVENOUS; SUBCUTANEOUS at 16:46

## 2021-05-13 RX ADMIN — INSULIN LISPRO 2 UNITS: 100 INJECTION, SOLUTION INTRAVENOUS; SUBCUTANEOUS at 08:00

## 2021-05-13 RX ADMIN — FOLIC ACID 1 MG: 1 TABLET ORAL at 09:07

## 2021-05-13 RX ADMIN — TIOTROPIUM BROMIDE 18 MCG: 18 CAPSULE ORAL; RESPIRATORY (INHALATION) at 09:07

## 2021-05-13 RX ADMIN — DOCUSATE SODIUM 50 MG AND SENNOSIDES 8.6 MG 1 TABLET: 8.6; 5 TABLET, FILM COATED ORAL at 13:26

## 2021-05-13 RX ADMIN — DOCUSATE SODIUM 50 MG AND SENNOSIDES 8.6 MG 1 TABLET: 8.6; 5 TABLET, FILM COATED ORAL at 18:11

## 2021-05-13 RX ADMIN — OXYCODONE HYDROCHLORIDE 5 MG: 5 TABLET ORAL at 22:08

## 2021-05-13 RX ADMIN — PANTOPRAZOLE SODIUM 40 MG: 40 TABLET, DELAYED RELEASE ORAL at 05:52

## 2021-05-13 RX ADMIN — OXYCODONE HYDROCHLORIDE 2.5 MG: 5 TABLET ORAL at 13:26

## 2021-05-13 RX ADMIN — ENOXAPARIN SODIUM 40 MG: 40 INJECTION SUBCUTANEOUS at 09:07

## 2021-05-13 RX ADMIN — THIAMINE HCL TAB 100 MG 100 MG: 100 TAB at 09:07

## 2021-05-13 NOTE — ASSESSMENT & PLAN NOTE
Lab Results   Component Value Date    HGBA1C 7 4 (H) 05/11/2021       Recent Labs     05/12/21  1314 05/12/21  1557 05/12/21  2152 05/13/21  0725   POCGLU 183* 200* 258* 213*       Blood Sugar Average: Last 72 hrs:  (P) 575 [id number]   Well controlled continue sliding scale and basal bolus protocol

## 2021-05-13 NOTE — ASSESSMENT & PLAN NOTE
Recent Labs     05/11/21  2221 05/12/21  0525 05/13/21  0502   SODIUM 132* 138 137   Improved  No further follow-up needed, likely hyponatremia in the setting of overdose and drug ingestion

## 2021-05-13 NOTE — PROGRESS NOTES
[facility]  Progress Note - Jennifer Escalante 1970, 46 y o  male MRN: [id number]  Unit/Bed#: [facility] 68 2 [facility] 87 [id number] Encounter: [id number]  Primary Care Provider: Pauline Stout DO   Date and time admitted to hospital: 5/11/2021  9:14 PM      Addendum:  Patient with chronic hypoxemic respiratory failure he is on his baseline oxygen requirement of 2-3 L    DM (diabetes mellitus) [facility])  Assessment & Plan  Lab Results   Component Value Date    HGBA1C 7 4 (H) 05/11/2021       Recent Labs     05/12/21  1314 05/12/21  1557 05/12/21  2152 05/13/21  0725   POCGLU 183* 200* 258* 213*       Blood Sugar Average: Last 72 hrs:  (P) 896 [id number]   Well controlled continue sliding scale and basal bolus protocol    Acute respiratory failure ([facility] )  Assessment & Plan  Patient is extubated and weaned to room air  Monitored for 24 hours on the general medical floor with no significant respiratory compromise  Did have evidence of pulmonary edema initially which responded about intravenous Lasix      Hypotension  Assessment & Plan  Resolved, likely low in the setting of drug over    Chronic pancreatitis (HCC)  Assessment & Plan  Resume low-fat diet    History of ETOH abuse  Assessment & Plan  No evidence of acute withdrawal  Medically stable    Anxiety and depression  Assessment & Plan  Patient will need inpatient psychiatric evaluation given suicide attempt    Acute hyponatremia  Assessment & Plan  Recent Labs     05/11/21  2221 05/12/21  0525 05/13/21  0502   SODIUM 132* 138 137   Improved  No further follow-up needed, likely hyponatremia in the setting of overdose and drug ingestion      Hyperlipidemia  Assessment & Plan  Patient not on any medications prior to admission  Was previously on Tricor and Crestor - should discuss outpatient resumption    COPD (chronic obstructive pulmonary disease) ([facility] )  Assessment & Plan  No evidence of bronchospasm, nebulizer treatments as needed    Quiros esophagus  Assessment & Plan  Resume Protonix    * Intentional overdose of drug in tablet form [facility])  Assessment & Plan  Patient will need inpatient psychiatric evaluation  One-to-one for safety      [facility] Internal Medicine Progress Note  Patient: Eduard Cantu 46 y o  male   MRN: [id number]  PCP: Royal Dietrich DO  Unit/Bed#: [facility] 68 2 -01 Encounter: [id number]  Date Of Visit: 05/13/21    Assessment:    Principal Problem:    Intentional overdose of drug in tablet form ([facility] )  Active Problems:    Quiros esophagus    COPD (chronic obstructive pulmonary disease) ([place] [address] )    Hyperlipidemia    Acute hyponatremia    Anxiety and depression    History of ETOH abuse    Chronic pancreatitis ([place] [address] )    Hypotension    Acute respiratory failure ([place] [address] )    DM (diabetes mellitus) ([place] [address] )      Plan:    · Medically cleared for discharge to psychiatric facility       VTE Pharmacologic Prophylaxis:   Pharmacologic: Enoxaparin (Lovenox)  Mechanical VTE Prophylaxis in Place: Yes    Patient Centered Rounds: I have performed bedside rounds with nursing staff today  Discussions with Specialists or Other Care Team Provider:  Case management    Education and Discussions with Family / Patient:  Wife contacted, reached voicemail  No message left    Time Spent for Care: 30 minutes  More than 50% of total time spent on counseling and coordination of care as described above  Current Length of Stay: 2 day(s)    Current Patient Status: Inpatient   Certification Statement: The patient will continue to require additional inpatient hospital stay due to Discharge planning    Discharge Plan / Estimated Discharge Date: To be determined    Code Status: Level 1 - Full Code      Subjective:   Seen examined no acute complaint, case discussed with psychiatry on 05/12/2021    They recommend inpatient psychiatric evaluate    A complete and comprehensive 14 point organ system review has been performed and all other systems are negative other than stated above     Objective:     Vitals:   Temp (24hrs), Av 2 °F (36 8 °C), Min:97 9 °F (36 6 °C), Max:99 °F (37 2 °C)    Temp:  [97 9 °F (36 6 °C)-99 °F (37 2 °C)] 98 °F (36 7 °C)  HR:  [78-95] 83  Resp:  [12-34] 16  BP: (106-112)/(62-73) 110/72  SpO2:  [89 %-94 %] 92 %  Body mass index is 27 45 kg/m²  Input and Output Summary (last 24 hours):        Intake/Output Summary (Last 24 hours) at 2021 0948  Last data filed at 2021 1330  Gross per 24 hour   Intake --   Output 2400 ml   Net -2400 ml       Physical Exam:     General: well appearing, no acute distress  HEENT: atraumatic, PERRLA, moist mucosa, normal pharynx, normal tonsils and adenoids, normal tongue, no fluid in sinuses  Neck: Trachea midline, no carotid bruit, no masses  Respiratory: normal chest wall expansion, CTA B, no r/r/w, no rubs  Cardiovascular: RRR, no m/r/g, Normal S1 and S2  Abdomen: Soft, non-tender, non-distended, normal bowel sounds in all quadrants, no hepatosplenomegaly, no tympany  Rectal: deferred  Musculoskeletal: normal ROM in upper and lower extremities  Integumentary: warm, dry, and pink, with no rash, purpura, or petechia  Heme/Lymph: no lymphadenopathy, no bruises  Neurological: Cranial Nerves II-XII grossly intact, no tics, normal sensation to pressure and light touch  Psychiatric: cooperative with normal mood, affect, and cognition      Additional Data:     Labs:    Results from last 7 days   Lab Units 21  1842   WBC Thousand/uL 6 80   HEMOGLOBIN g/dL 14 7   HEMATOCRIT % 43 8   PLATELETS Thousands/uL 192   NEUTROS PCT % 60   LYMPHS PCT % 31   MONOS PCT % 6   EOS PCT % 2     Results from last 7 days   Lab Units 21  0502  21  1842   POTASSIUM mmol/L 4 4   < > 3 6   CHLORIDE mmol/L 105   < > 98   CO2 mmol/L 30   < > 22   BUN mg/dL 11   < > 9   CREATININE mg/dL 0 90   < > 0 73   CALCIUM mg/dL 8 2*   < > 7 8*   ALK PHOS U/L  --   --  88   ALT U/L  --   --  8   AST U/L  --   --  14    < > = values in this interval not displayed  * I Have Reviewed All Lab Data Listed Above  * Additional Pertinent Lab Tests Reviewed: [facility] [address] Admission Reviewed    Imaging:    Imaging Reports Reviewed Today Include:  No new imaging  Imaging Personally Reviewed by Myself Includes:  No new imaging    Recent Cultures (last 7 days):     Results from last 7 days   Lab Units 05/12/21  0833 05/11/21  2222 05/11/21  2221   BLOOD CULTURE   --  No Growth at 24 hrs  No Growth at 24 hrs  GRAM STAIN RESULT  2+ Polys*  1+ Gram positive cocci in pairs*  --   --        Last 24 Hours Medication List:   Current Facility-Administered Medications   Medication Dose Route Frequency Provider Last Rate    cholestyramine sugar free  4 g Oral BID TYE Klein      enoxaparin  40 mg Subcutaneous Q24H [address] TYE Olivera      folic acid  1 mg Oral Daily Justin De Leon, TYE      insulin lispro  1-6 Units Subcutaneous TID AC TYE Domingo      pantoprazole  40 mg Oral Early Morning Justin De Leon, TYE      thiamine  100 mg Oral Daily Justin De Leon, TYE      tiotropium  18 mcg Inhalation Daily TYE Klein          Today, Patient Was Seen By: Ivette Webber DO    ** Please Note: This note was completed in part utilizing [employer] Direct Software  Grammatical errors, random word insertions, spelling mistakes, and incomplete sentences may be an occasional consequence of this system secondary to software limitations, ambient noise, and hardware issues  If you have any questions or concerns about the content, text, or information contained within the body of this dictation, please contact the provider for clarification   **

## 2021-05-13 NOTE — ASSESSMENT & PLAN NOTE
Patient not on any medications prior to admission  Was previously on Tricor and Crestor - should discuss outpatient resumption

## 2021-05-13 NOTE — CASE MANAGEMENT
Current LOS 2 days; no GMLOS  Pt is not a readmission and risk of unplanned readmission is low  CM reviewed chart  It is recommended patient go to psych unit after OD, suicide attempt  Pt reports increasing depression/shame/guilt since relapsing on alcohol around Easter  He reports he and his wife have been fighting about it a lot  Pt reports he was sober from ETOH for 11 years, at one point  He has been to rehab at [facility], [address] in the past   Most recently, he was sober about about 2 years  He reports AA was helpful for him  He reports since relapsing recently, he has been drinking 6-12 cans of beer frequently  Patient denied auditory visual hallucinations   Pt has been to Psych care at [facility] in the past (about 7 years ago)  Pt was getting OP drug and alcohol counseling through [facility] but that stopped because of covid  Pt sees psychiatrist at [facility] about once per month  Pt does not have a POA but asked that we keep his wife Berta Guadalupe [phone] informed  Pt was independent PTA  He does not need an assistive device to walk  He does wear home o2 at 3L through [facility]  He reports there are sometimes when he does not use it  CM checked with nursing about weaning off o2 because patient states he does not always need it, but it was reported when o2 was removed, pt's o2 saturations dropped to 83%  Pt signed a 201  CM offered him a dual program and is open to the idea but not sure if he wants to go to back to rehab at this point in time  CM explained to patient that because he needs o2, this may be a barrier to finding him a psychiatric bed quickly  There are no beds within the [address] [place] [address] system  Spoke with [facility], they will not accept pt's on o2    [facility] does not have beds   [facility] does not have beds   [facility] does not have beds   [facility]  [facility] take pt on o2  [facility] cannot accept on o2  No beds at [facility] or [facility]  Will continue searching  CM following

## 2021-05-13 NOTE — ASSESSMENT & PLAN NOTE
No evidence of acute withdrawal  Medically stable The patient is a 56y Female complaining of fatigue and weakness.

## 2021-05-13 NOTE — ASSESSMENT & PLAN NOTE
Patient is extubated and weaned to room air  Monitored for 24 hours on the general medical floor with no significant respiratory compromise  Did have evidence of pulmonary edema initially which responded about intravenous Lasix

## 2021-05-14 ENCOUNTER — HOSPITAL ENCOUNTER (INPATIENT)
Facility: HOSPITAL | Age: 51
LOS: 18 days | Discharge: PSYCHIATRIC OR SUBSTANCE USE FACILITY | DRG: 751 | End: 2021-06-01
Attending: PSYCHIATRY & NEUROLOGY | Admitting: PSYCHIATRY & NEUROLOGY
Payer: COMMERCIAL

## 2021-05-14 VITALS
SYSTOLIC BLOOD PRESSURE: 145 MMHG | HEIGHT: 72 IN | HEART RATE: 90 BPM | RESPIRATION RATE: 17 BRPM | BODY MASS INDEX: 27.41 KG/M2 | OXYGEN SATURATION: 94 % | DIASTOLIC BLOOD PRESSURE: 91 MMHG | TEMPERATURE: 98.5 F | WEIGHT: 202.38 LBS

## 2021-05-14 DIAGNOSIS — L60.2 OVERGROWN TOENAILS: Primary | ICD-10-CM

## 2021-05-14 DIAGNOSIS — F32.A DEPRESSION: ICD-10-CM

## 2021-05-14 LAB
BACTERIA SPT RESP CULT: ABNORMAL
GLUCOSE SERPL-MCNC: 217 MG/DL (ref 65–140)
GLUCOSE SERPL-MCNC: 283 MG/DL (ref 65–140)
GLUCOSE SERPL-MCNC: 309 MG/DL (ref 65–140)
GLUCOSE SERPL-MCNC: 316 MG/DL (ref 65–140)
GRAM STN SPEC: ABNORMAL
GRAM STN SPEC: ABNORMAL

## 2021-05-14 PROCEDURE — 82948 REAGENT STRIP/BLOOD GLUCOSE: CPT

## 2021-05-14 PROCEDURE — 99239 HOSP IP/OBS DSCHRG MGMT >30: CPT | Performed by: INTERNAL MEDICINE

## 2021-05-14 RX ORDER — LANOLIN ALCOHOL/MO/W.PET/CERES
100 CREAM (GRAM) TOPICAL DAILY
Status: DISCONTINUED | OUTPATIENT
Start: 2021-05-15 | End: 2021-06-01 | Stop reason: HOSPADM

## 2021-05-14 RX ORDER — CHOLESTYRAMINE LIGHT 4 G/5.7G
4 POWDER, FOR SUSPENSION ORAL 2 TIMES DAILY
Status: CANCELLED | OUTPATIENT
Start: 2021-05-14

## 2021-05-14 RX ORDER — PROPRANOLOL HYDROCHLORIDE 10 MG/1
10 TABLET ORAL 2 TIMES DAILY
COMMUNITY

## 2021-05-14 RX ORDER — LORAZEPAM 1 MG/1
1 TABLET ORAL
Status: DISCONTINUED | OUTPATIENT
Start: 2021-05-14 | End: 2021-05-24

## 2021-05-14 RX ORDER — TRAZODONE HYDROCHLORIDE 50 MG/1
50 TABLET ORAL
Status: CANCELLED | OUTPATIENT
Start: 2021-05-14

## 2021-05-14 RX ORDER — SERTRALINE HYDROCHLORIDE 100 MG/1
200 TABLET, FILM COATED ORAL DAILY
COMMUNITY

## 2021-05-14 RX ORDER — QUETIAPINE FUMARATE 200 MG/1
250 TABLET, FILM COATED ORAL
COMMUNITY

## 2021-05-14 RX ORDER — AMOXICILLIN 250 MG
1 CAPSULE ORAL 2 TIMES DAILY
Status: CANCELLED | OUTPATIENT
Start: 2021-05-14

## 2021-05-14 RX ORDER — ACETAMINOPHEN 325 MG/1
650 TABLET ORAL EVERY 4 HOURS PRN
Status: DISCONTINUED | OUTPATIENT
Start: 2021-05-14 | End: 2021-06-01 | Stop reason: HOSPADM

## 2021-05-14 RX ORDER — ACETAMINOPHEN 325 MG/1
975 TABLET ORAL EVERY 6 HOURS PRN
Status: DISCONTINUED | OUTPATIENT
Start: 2021-05-14 | End: 2021-05-14

## 2021-05-14 RX ORDER — OXYCODONE HYDROCHLORIDE 5 MG/1
5 TABLET ORAL EVERY 4 HOURS PRN
Status: DISCONTINUED | OUTPATIENT
Start: 2021-05-14 | End: 2021-06-01 | Stop reason: HOSPADM

## 2021-05-14 RX ORDER — RISPERIDONE 0.25 MG/1
0.25 TABLET, ORALLY DISINTEGRATING ORAL
Status: CANCELLED | OUTPATIENT
Start: 2021-05-14

## 2021-05-14 RX ORDER — BENZTROPINE MESYLATE 1 MG/ML
1 INJECTION INTRAMUSCULAR; INTRAVENOUS
Status: CANCELLED | OUTPATIENT
Start: 2021-05-14

## 2021-05-14 RX ORDER — BENZTROPINE MESYLATE 1 MG/1
0.5 TABLET ORAL
Status: CANCELLED | OUTPATIENT
Start: 2021-05-14

## 2021-05-14 RX ORDER — FOLIC ACID 1 MG/1
1 TABLET ORAL DAILY
Qty: 30 TABLET | Refills: 0 | Status: SHIPPED | OUTPATIENT
Start: 2021-05-15

## 2021-05-14 RX ORDER — LORAZEPAM 2 MG/ML
1 INJECTION INTRAMUSCULAR
Status: CANCELLED | OUTPATIENT
Start: 2021-05-14

## 2021-05-14 RX ORDER — HALOPERIDOL 5 MG/ML
5 INJECTION INTRAMUSCULAR
Status: DISCONTINUED | OUTPATIENT
Start: 2021-05-14 | End: 2021-06-01 | Stop reason: HOSPADM

## 2021-05-14 RX ORDER — RISPERIDONE 1 MG/1
1 TABLET, ORALLY DISINTEGRATING ORAL
Status: CANCELLED | OUTPATIENT
Start: 2021-05-14

## 2021-05-14 RX ORDER — CHOLESTYRAMINE LIGHT 4 G/5.7G
4 POWDER, FOR SUSPENSION ORAL 2 TIMES DAILY
Qty: 60 PACKET | Refills: 0 | Status: SHIPPED | OUTPATIENT
Start: 2021-05-14 | End: 2021-06-13

## 2021-05-14 RX ORDER — OXYCODONE HYDROCHLORIDE 5 MG/1
2.5 TABLET ORAL EVERY 4 HOURS PRN
Status: DISCONTINUED | OUTPATIENT
Start: 2021-05-14 | End: 2021-06-01 | Stop reason: HOSPADM

## 2021-05-14 RX ORDER — CHOLESTYRAMINE LIGHT 4 G/5.7G
4 POWDER, FOR SUSPENSION ORAL 2 TIMES DAILY
Status: DISCONTINUED | OUTPATIENT
Start: 2021-05-15 | End: 2021-06-01 | Stop reason: HOSPADM

## 2021-05-14 RX ORDER — BENZTROPINE MESYLATE 1 MG/ML
1 INJECTION INTRAMUSCULAR; INTRAVENOUS
Status: DISCONTINUED | OUTPATIENT
Start: 2021-05-14 | End: 2021-06-01 | Stop reason: HOSPADM

## 2021-05-14 RX ORDER — LANOLIN ALCOHOL/MO/W.PET/CERES
100 CREAM (GRAM) TOPICAL DAILY
Status: CANCELLED | OUTPATIENT
Start: 2021-05-15

## 2021-05-14 RX ORDER — ONDANSETRON 2 MG/ML
4 INJECTION INTRAMUSCULAR; INTRAVENOUS EVERY 4 HOURS PRN
Status: DISCONTINUED | OUTPATIENT
Start: 2021-05-14 | End: 2021-05-15

## 2021-05-14 RX ORDER — THIAMINE MONONITRATE (VIT B1) 100 MG
100 TABLET ORAL DAILY
Qty: 30 TABLET | Refills: 0 | Status: SHIPPED | OUTPATIENT
Start: 2021-05-15 | End: 2021-06-14

## 2021-05-14 RX ORDER — RISPERIDONE 0.25 MG/1
0.25 TABLET, ORALLY DISINTEGRATING ORAL
Status: DISCONTINUED | OUTPATIENT
Start: 2021-05-14 | End: 2021-06-01 | Stop reason: HOSPADM

## 2021-05-14 RX ORDER — PIOGLITAZONEHYDROCHLORIDE 30 MG/1
30 TABLET ORAL DAILY
COMMUNITY

## 2021-05-14 RX ORDER — BENZTROPINE MESYLATE 0.5 MG/1
0.5 TABLET ORAL
Status: DISCONTINUED | OUTPATIENT
Start: 2021-05-14 | End: 2021-06-01 | Stop reason: HOSPADM

## 2021-05-14 RX ORDER — LISINOPRIL 10 MG/1
10 TABLET ORAL 2 TIMES DAILY
COMMUNITY

## 2021-05-14 RX ORDER — OXYCODONE HYDROCHLORIDE 5 MG/1
5 TABLET ORAL EVERY 4 HOURS PRN
Status: CANCELLED | OUTPATIENT
Start: 2021-05-14

## 2021-05-14 RX ORDER — AMOXICILLIN 250 MG
1 CAPSULE ORAL 2 TIMES DAILY
Status: DISCONTINUED | OUTPATIENT
Start: 2021-05-15 | End: 2021-06-01 | Stop reason: HOSPADM

## 2021-05-14 RX ORDER — LOSARTAN POTASSIUM 25 MG/1
25 TABLET ORAL DAILY
COMMUNITY

## 2021-05-14 RX ORDER — HYDROXYZINE HYDROCHLORIDE 25 MG/1
25 TABLET, FILM COATED ORAL
Status: CANCELLED | OUTPATIENT
Start: 2021-05-14

## 2021-05-14 RX ORDER — RISPERIDONE 1 MG/1
1 TABLET, ORALLY DISINTEGRATING ORAL
Status: DISCONTINUED | OUTPATIENT
Start: 2021-05-14 | End: 2021-06-01 | Stop reason: HOSPADM

## 2021-05-14 RX ORDER — HYDROXYZINE HYDROCHLORIDE 25 MG/1
50 TABLET, FILM COATED ORAL
Status: CANCELLED | OUTPATIENT
Start: 2021-05-14

## 2021-05-14 RX ORDER — RISPERIDONE 0.5 MG/1
0.5 TABLET, ORALLY DISINTEGRATING ORAL
Status: CANCELLED | OUTPATIENT
Start: 2021-05-14

## 2021-05-14 RX ORDER — TRAZODONE HYDROCHLORIDE 50 MG/1
50 TABLET ORAL
Status: DISCONTINUED | OUTPATIENT
Start: 2021-05-14 | End: 2021-06-01 | Stop reason: HOSPADM

## 2021-05-14 RX ORDER — GABAPENTIN 300 MG/1
300 CAPSULE ORAL 3 TIMES DAILY
COMMUNITY

## 2021-05-14 RX ORDER — PANTOPRAZOLE SODIUM 40 MG/1
40 TABLET, DELAYED RELEASE ORAL
Status: DISCONTINUED | OUTPATIENT
Start: 2021-05-15 | End: 2021-06-01 | Stop reason: HOSPADM

## 2021-05-14 RX ORDER — ALBUTEROL SULFATE 90 UG/1
2 AEROSOL, METERED RESPIRATORY (INHALATION) EVERY 6 HOURS PRN
COMMUNITY

## 2021-05-14 RX ORDER — HYDROXYZINE HYDROCHLORIDE 25 MG/1
50 TABLET, FILM COATED ORAL
Status: DISCONTINUED | OUTPATIENT
Start: 2021-05-14 | End: 2021-06-01 | Stop reason: HOSPADM

## 2021-05-14 RX ORDER — OXYCODONE HYDROCHLORIDE 5 MG/1
2.5 TABLET ORAL EVERY 4 HOURS PRN
Status: CANCELLED | OUTPATIENT
Start: 2021-05-14

## 2021-05-14 RX ORDER — FOLIC ACID 1 MG/1
1 TABLET ORAL DAILY
Status: DISCONTINUED | OUTPATIENT
Start: 2021-05-15 | End: 2021-06-01 | Stop reason: HOSPADM

## 2021-05-14 RX ORDER — FOLIC ACID 1 MG/1
1 TABLET ORAL DAILY
Status: CANCELLED | OUTPATIENT
Start: 2021-05-15

## 2021-05-14 RX ORDER — HALOPERIDOL 5 MG/ML
5 INJECTION INTRAMUSCULAR
Status: CANCELLED | OUTPATIENT
Start: 2021-05-14

## 2021-05-14 RX ORDER — ACETAMINOPHEN 325 MG/1
650 TABLET ORAL EVERY 4 HOURS PRN
Status: CANCELLED | OUTPATIENT
Start: 2021-05-14

## 2021-05-14 RX ORDER — ACETAMINOPHEN 325 MG/1
650 TABLET ORAL EVERY 4 HOURS PRN
Status: DISCONTINUED | OUTPATIENT
Start: 2021-05-14 | End: 2021-05-14

## 2021-05-14 RX ORDER — RISPERIDONE 0.5 MG/1
0.5 TABLET, ORALLY DISINTEGRATING ORAL
Status: DISCONTINUED | OUTPATIENT
Start: 2021-05-14 | End: 2021-06-01 | Stop reason: HOSPADM

## 2021-05-14 RX ORDER — ACETAMINOPHEN 325 MG/1
975 TABLET ORAL EVERY 6 HOURS PRN
Status: CANCELLED | OUTPATIENT
Start: 2021-05-14

## 2021-05-14 RX ORDER — HYDROXYZINE HYDROCHLORIDE 25 MG/1
25 TABLET, FILM COATED ORAL
Status: DISCONTINUED | OUTPATIENT
Start: 2021-05-14 | End: 2021-06-01 | Stop reason: HOSPADM

## 2021-05-14 RX ORDER — NICOTINE 21 MG/24HR
1 PATCH, TRANSDERMAL 24 HOURS TRANSDERMAL DAILY
Status: DISCONTINUED | OUTPATIENT
Start: 2021-05-15 | End: 2021-06-01 | Stop reason: HOSPADM

## 2021-05-14 RX ORDER — ROPINIROLE 1 MG/1
1 TABLET, FILM COATED ORAL 3 TIMES DAILY
COMMUNITY

## 2021-05-14 RX ORDER — PANTOPRAZOLE SODIUM 40 MG/1
40 TABLET, DELAYED RELEASE ORAL
Status: CANCELLED | OUTPATIENT
Start: 2021-05-15

## 2021-05-14 RX ORDER — NICOTINE 21 MG/24HR
1 PATCH, TRANSDERMAL 24 HOURS TRANSDERMAL DAILY
Status: CANCELLED | OUTPATIENT
Start: 2021-05-14

## 2021-05-14 RX ORDER — LORAZEPAM 2 MG/ML
1 INJECTION INTRAMUSCULAR
Status: DISCONTINUED | OUTPATIENT
Start: 2021-05-14 | End: 2021-05-24

## 2021-05-14 RX ORDER — LORAZEPAM 1 MG/1
1 TABLET ORAL
Status: CANCELLED | OUTPATIENT
Start: 2021-05-14

## 2021-05-14 RX ORDER — OMEPRAZOLE 20 MG/1
20 CAPSULE, DELAYED RELEASE ORAL DAILY
COMMUNITY

## 2021-05-14 RX ORDER — ONDANSETRON 2 MG/ML
4 INJECTION INTRAMUSCULAR; INTRAVENOUS EVERY 4 HOURS PRN
Status: CANCELLED | OUTPATIENT
Start: 2021-05-14

## 2021-05-14 RX ADMIN — INSULIN LISPRO 4 UNITS: 100 INJECTION, SOLUTION INTRAVENOUS; SUBCUTANEOUS at 17:00

## 2021-05-14 RX ADMIN — ENOXAPARIN SODIUM 40 MG: 40 INJECTION SUBCUTANEOUS at 08:18

## 2021-05-14 RX ADMIN — INSULIN LISPRO 2 UNITS: 100 INJECTION, SOLUTION INTRAVENOUS; SUBCUTANEOUS at 08:00

## 2021-05-14 RX ADMIN — DOCUSATE SODIUM 50 MG AND SENNOSIDES 8.6 MG 1 TABLET: 8.6; 5 TABLET, FILM COATED ORAL at 08:19

## 2021-05-14 RX ADMIN — TIOTROPIUM BROMIDE 18 MCG: 18 CAPSULE ORAL; RESPIRATORY (INHALATION) at 08:19

## 2021-05-14 RX ADMIN — OXYCODONE HYDROCHLORIDE 5 MG: 5 TABLET ORAL at 08:28

## 2021-05-14 RX ADMIN — INSULIN LISPRO 4 UNITS: 100 INJECTION, SOLUTION INTRAVENOUS; SUBCUTANEOUS at 12:13

## 2021-05-14 RX ADMIN — PANTOPRAZOLE SODIUM 40 MG: 40 TABLET, DELAYED RELEASE ORAL at 05:33

## 2021-05-14 RX ADMIN — FOLIC ACID 1 MG: 1 TABLET ORAL at 08:19

## 2021-05-14 RX ADMIN — DOCUSATE SODIUM 50 MG AND SENNOSIDES 8.6 MG 1 TABLET: 8.6; 5 TABLET, FILM COATED ORAL at 17:04

## 2021-05-14 RX ADMIN — OXYCODONE HYDROCHLORIDE 5 MG: 5 TABLET ORAL at 23:46

## 2021-05-14 RX ADMIN — THIAMINE HCL TAB 100 MG 100 MG: 100 TAB at 08:19

## 2021-05-14 RX ADMIN — OXYCODONE HYDROCHLORIDE 5 MG: 5 TABLET ORAL at 17:11

## 2021-05-14 NOTE — PLAN OF CARE
Problem: Prexisting or High Potential for Compromised Skin Integrity  Goal: Skin integrity is maintained or improved  Description: INTERVENTIONS:  - Identify patients at risk for skin breakdown  - Assess and monitor skin integrity  - Assess and monitor nutrition and hydration status  - Monitor labs   - Assess for incontinence   - Turn and reposition patient  - Assist with mobility/ambulation  - Relieve pressure over bony prominences  - Avoid friction and shearing  - Provide appropriate hygiene as needed including keeping skin clean and dry  - Evaluate need for skin moisturizer/barrier cream  - Collaborate with interdisciplinary team   - Patient/family teaching  - Consider wound care consult   Outcome: Completed     Problem: PAIN - ADULT  Goal: Verbalizes/displays adequate comfort level or baseline comfort level  Description: Interventions:  - Encourage patient to monitor pain and request assistance  - Assess pain using appropriate pain scale  - Administer analgesics based on type and severity of pain and evaluate response  - Implement non-pharmacological measures as appropriate and evaluate response  - Consider cultural and social influences on pain and pain management  - Notify physician/advanced practitioner if interventions unsuccessful or patient reports new pain  Outcome: Completed     Problem: INFECTION - ADULT  Goal: Absence or prevention of progression during hospitalization  Description: INTERVENTIONS:  - Assess and monitor for signs and symptoms of infection  - Monitor lab/diagnostic results  - Monitor all insertion sites, i e  indwelling lines, tubes, and drains  - Monitor endotracheal if appropriate and nasal secretions for changes in amount and color  - [place] appropriate cooling/warming therapies per order  - Administer medications as ordered  - Instruct and encourage patient and family to use good hand hygiene technique  - Identify and instruct in appropriate isolation precautions for identified infection/condition  Outcome: Completed  Goal: Absence of fever/infection during neutropenic period  Description: INTERVENTIONS:  - Monitor WBC    Outcome: Completed     Problem: SAFETY ADULT  Goal: Patient will remain free of falls  Description: INTERVENTIONS:  - Assess patient frequently for physical needs  -  Identify cognitive and physical deficits and behaviors that affect risk of falls    -  [place] fall precautions as indicated by assessment   - Educate patient/family on patient safety including physical limitations  - Instruct patient to call for assistance with activity based on assessment  - Modify environment to reduce risk of injury  - Consider OT/PT consult to assist with strengthening/mobility  Outcome: Completed  Goal: Maintain or return to baseline ADL function  Description: INTERVENTIONS:  -  Assess patient's ability to carry out ADLs; assess patient's baseline for ADL function and identify physical deficits which impact ability to perform ADLs (bathing, care of mouth/teeth, toileting, grooming, dressing, etc )  - Assess/evaluate cause of self-care deficits   - Assess range of motion  - Assess patient's mobility; develop plan if impaired  - Assess patient's need for assistive devices and provide as appropriate  - Encourage maximum independence but intervene and supervise when necessary  - Involve family in performance of ADLs  - Assess for home care needs following discharge   - Consider OT consult to assist with ADL evaluation and planning for discharge  - Provide patient education as appropriate  Outcome: Completed  Goal: Maintain or return mobility status to optimal level  Description: INTERVENTIONS:  - Assess patient's baseline mobility status (ambulation, transfers, stairs, etc )    - Identify cognitive and physical deficits and behaviors that affect mobility  - Identify mobility aids required to assist with transfers and/or ambulation (gait belt, sit-to-stand, lift, walker, cane, etc )  - [place] fall precautions as indicated by assessment  - Record patient progress and toleration of activity level on Mobility SBAR; progress patient to next Phase/Stage  - Instruct patient to call for assistance with activity based on assessment  - Consider rehabilitation consult to assist with strengthening/weightbearing, etc   Outcome: Completed     Problem: DISCHARGE PLANNING  Goal: Discharge to home or other facility with appropriate resources  Description: INTERVENTIONS:  - Identify barriers to discharge w/patient and caregiver  - Arrange for needed discharge resources and transportation as appropriate  - Identify discharge learning needs (meds, wound care, etc )  - Arrange for interpretive services to assist at discharge as needed  - Refer to Case Management Department for coordinating discharge planning if the patient needs post-hospital services based on physician/advanced practitioner order or complex needs related to functional status, cognitive ability, or social support system  Outcome: Completed     Problem: Knowledge Deficit  Goal: Patient/family/caregiver demonstrates understanding of disease process, treatment plan, medications, and discharge instructions  Description: Complete learning assessment and assess knowledge base    Interventions:  - Provide teaching at level of understanding  - Provide teaching via preferred learning methods  Outcome: Completed     Problem: SAFETY,RESTRAINT: NV/NON-SELF DESTRUCTIVE BEHAVIOR  Goal: Remains free of harm/injury (restraint for non violent/non self-detsructive behavior)  Description: INTERVENTIONS:  - Instruct patient/family regarding restraint use   - Assess and monitor physiologic and psychological status   - Provide interventions and comfort measures to meet assessed patient needs   - Identify and implement measures to help patient regain control  - Assess readiness for release of restraint   Outcome: Completed  Goal: Returns to optimal restraint-free functioning  Description: INTERVENTIONS:  - Assess the patient's behavior and symptoms that indicate continued need for restraint  - Identify and implement measures to help patient regain control  - Assess readiness for release of restraint   Outcome: Completed     Problem: Potential for Falls  Goal: Patient will remain free of falls  Description: INTERVENTIONS:  - Assess patient frequently for physical needs  -  Identify cognitive and physical deficits and behaviors that affect risk of falls    -  [place] fall precautions as indicated by assessment   - Educate patient/family on patient safety including physical limitations  - Instruct patient to call for assistance with activity based on assessment  - Modify environment to reduce risk of injury  - Consider OT/PT consult to assist with strengthening/mobility  Outcome: Completed

## 2021-05-14 NOTE — SOCIAL WORK
No time yet from [facility] - instructed them to call the hospital sup, number provided, when they have a pu time      CM notified the hospital sup, asked they notify [address] when a time is obtained from [facility]>

## 2021-05-14 NOTE — TRANSPORTATION MEDICAL NECESSITY
Section I - General Information    Name of Patient: Jevon Guidry                 : 1970    Medicare #: [id number]  Transport Date: 21 (PCS is valid for round trips on this date and for all repetitive trips in the 60-day range as noted below )  Origin: [address]                                                         Destination: [address] [place] [address] Road  Is the pt's stay covered under Medicare Part A (PPS/DRG)   []     Closest appropriate facility? If no, why is transport to more distant facility required? Yes  If hospice pt, is this transport related to pt's terminal illness? No       Section II - Medical Necessity Questionnaire  Ambulance transportation is medically necessary only if other means of transport are contraindicated or would be potentially harmful to the patient  To meet this requirement, the patient must either be "bed confined" or suffer from a condition such that transport by means other than ambulance is contraindicated by the patient's condition  The following questions must be answered by the medical professional signing below for this form to be valid:    1)  Describe the MEDICAL CONDITION (physical and/or mental) of this patient AT [address] [place] [address] that requires the patient to be transported in an ambulance and why transport by other means is contraindicated by the patient's condition: Oxygen, suicidal ideations, BHU patient    2) Is the patient "bed confined" as defined below? No  To be "be confined" the patient must satisfy all three of the following conditions: (1) unable to get up from bed without Assistance; AND (2) unable to ambulate; AND (3) unable to sit in a chair or wheelchair  3) Can this patient safely be transported by car or wheelchair van (i e , seated during transport without a medical attendant or monitoring)?    No    4) In addition to completing questions 1-3 above, please check any of the following conditions that apply*: *Note: supporting documentation for any boxes checked must be maintained in the patient's medical records  If hosp-hosp transfer, describe services needed at 2nd facility not available at 1st facility? Need or possible need for restraints   Medical attendant required   Requires oxygen-unable to self administer      Section III - Signature of Physician or Healthcare Professional  I certify that the above information is true and correct based on my evaluation of this patient, and represent that the patient requires transport by ambulance and that other forms of transport are contraindicated  I understand that this information will be used by the Centers for Medicare and Medicaid Services (CMS) to support the determination of medical necessity for ambulance services, and I represent that I have personal knowledge of the patient's condition at time of transport  []  If this box is checked, I also certify that the patient is physically or mentally incapable of signing the ambulance service's claim and that the institution with which I am affiliated has furnished care, services, or assistance to the patient  My signature below is made on behalf of the patient pursuant to 42 CFR §424 36(b)(4)  In accordance with 42 CFR §424 37, the specific reason(s) that the patient is physically or mentally incapable of signing the claim form is as follows: Jayson Walden of Physician* or Healthcare Professional__Elis Brandt MS LSW      Signature Date 05/14/21 (For scheduled repetitive transports, this form is not valid for transports performed more than 60 days after this date)    Printed Name & Credentials of Physician or Healthcare Professional (MD, DO, RN, etc )_Elis Joyce MS LSW    *Form must be signed by patient's attending physician for scheduled, repetitive transports   For non-repetitive, unscheduled ambulance transports, if unable to obtain the signature of the attending physician, any of the following may sign (choose appropriate option below)  [] Physician Assistant []  Clinical Nurse Specialist []  Registered Nurse  []  Nurse Practitioner  [x] Discharge Planner

## 2021-05-14 NOTE — ASSESSMENT & PLAN NOTE
Patient is extubated and weaned to 2- 3 L which is his home oxygen requirement  Has chronic hypoxemic respiratory failure  Monitored for 24 hours on the general medical floor with no significant respiratory compromise  Did have evidence of pulmonary edema initially which responded about intravenous Lasix

## 2021-05-14 NOTE — SOCIAL WORK
Pt accepted at [facility] for [facility], CM called [facility], spoke with Kade Edward, authorization number is #[id number], good for 14 days, review due on May 27th  [facility] pt be transported after 7pm, CM called [facility] requested a BLS for after that time - time of transport pending

## 2021-05-14 NOTE — DISCHARGE SUMMARY
[facility]  Discharge- Yakelin Madison 1970, 46 y o  male MRN: [id number]  [facility] [address] [address] [id number] Encounter: [id number]  Primary Care Provider: Karissa Ayala DO   Date and time admitted to hospital: 5/11/2021  9:14 PM  Admitting Provider:  Alfredo Vazquez MD  Discharge Provider:  Garry Law DO  Admission Date: 5/11/2021       Discharge Date: 05/14/21   LOS: 3  Primary Care Physician at Discharge: Karissa Ayala [address] [place] [address] COURSE:  Yakelin Madison is a 46 y o  male who presented with a suicide attempt with multiple drug overdoses including propanolol and Seroquel  He has a history of alcohol abuse and was sober recently until he restarted drinking  The patient was subsequently intubated he required norepinephrine  He remained in the intensive care unit  His alcohol level on admission was 910 in UDS was positive for benzos  The patient was admitted for acute hypoxemic respiratory failure in the setting of intentional drug overdose and suicide attempt  The patient was subsequently discharged to the general medical floor on 05/12/2021  He continued to improve, he was weaned back to his baseline oxygen requirement of 2-3 L  He was evaluated in consultation by the psychiatry service who recommended that he be placed for intensive inpatient psychiatric care  He did agree to sign a 201 to assist in medication management as well as behavioral health  At the time of discharge the patient was saturating well on 2 L nasal cannula oxygen, he was without acute complaint and was medically cleared for discharge  The care plan was discussed with the patient's spouse was in agreement with discharge plan        DISCHARGE DIAGNOSES  DM (diabetes mellitus) [facility])  Assessment & Plan  Lab Results   Component Value Date    HGBA1C 7 4 (H) 05/11/2021       Recent Labs     05/13/21  1642 05/13/21  2116 05/14/21  0743 05/14/21  1114   POCGLU 225* 323* 217* 309*       Blood Sugar Average: Last 72 hrs:  (P) 889 [id number]   Well controlled continue sliding scale and basal bolus protocol    Acute respiratory failure ([facility] )  Assessment & Plan  Patient is extubated and weaned to 2- 3 L which is his home oxygen requirement  Has chronic hypoxemic respiratory failure  Monitored for 24 hours on the general medical floor with no significant respiratory compromise  Did have evidence of pulmonary edema initially which responded about intravenous Lasix      Hypotension  Assessment & Plan  Resolved, likely low in the setting of drug over    Chronic pancreatitis [facility])  Assessment & Plan  Resume low-fat diet    History of ETOH abuse  Assessment & Plan  No evidence of acute withdrawal  Medically stable    Anxiety and depression  Assessment & Plan  Patient will need inpatient psychiatric evaluation given suicide attempt    Acute hyponatremia  Assessment & Plan  Recent Labs     05/11/21  2221 05/12/21  0525 05/13/21  0502   SODIUM 132* 138 137   Improved  No further follow-up needed, likely hyponatremia in the setting of overdose and drug ingestion      Hyperlipidemia  Assessment & Plan  Patient not on any medications prior to admission  Was previously on Tricor and Crestor - should discuss outpatient resumption    COPD (chronic obstructive pulmonary disease) ([facility] )  Assessment & Plan  No evidence of bronchospasm, nebulizer treatments as needed    Quiros esophagus  Assessment & Plan  Resume Protonix    * Intentional overdose of drug in tablet form [facility])  Assessment & Plan  Patient will need inpatient psychiatric evaluation  One-to-one for safety      CONSULTING PROVIDERS   IP CONSULT TO CASE MANAGEMENT  IP CONSULT TO PHARMACY  IP CONSULT TO PSYCHIATRY    PROCEDURES PERFORMED  * No surgery found *    RADIOLOGY RESULTS  Xr Chest 1 View Portable    Result Date: 5/12/2021  Narrative: CHEST INDICATION:   sp central line   COMPARISON:  Compared with 5/11/2021 EXAM PERFORMED/VIEWS:  XR CHEST PORTABLE FINDINGS:  Endotracheal tube at clavicles  Feeding tube below the diaphragm  Right neck catheter in the distal SVC  Cardiac size is enlarged  Prominent hilar densities with diffuse bilateral prominent interstitial markings unchanged  Patchy parenchymal opacities in the right perihilar region  Osseous structures appear within normal limits for patient age  Impression: Moderate congestive changes with associated infiltrates predominantly on the right unchanged  Prominent hilar densities  Lymphadenopathy cannot be excluded  Workstation performed: [id number]     Xr Chest 1 View Portable    Result Date: 5/12/2021  Narrative: CHEST INDICATION:   sp et tube insertion  COMPARISON:  Chest radiograph from 2/19/2019  EXAM PERFORMED/VIEWS:  XR CHEST 1 VIEW  FINDINGS:  ET tube 6 cm above the michelle  NG tube in stomach  Cardiomediastinal silhouette normal  Severe bilateral ground glass opacity  No effusion or pneumothorax  Osseous structures normal for age  Impression: ET tube 6 cm above the michelle  Severe bilateral ground glass opacity which may be due to edema or pneumonia  Workstation performed: [id number]     Xr Chest Portable Icu    Result Date: 5/12/2021  Narrative: CHEST INDICATION:   ETT-transfer from [facility]  COMPARISON:  5/11/2021 EXAM PERFORMED/VIEWS:  XR CHEST PORTABLE ICU FINDINGS:  Endotracheal, nasogastric and right central lines are all stable  Mild cardiomegaly is stable  Persistent extensive bilateral interstitial and airspace opacities  No pleural effusion or pneumothorax  Osseous structures appear within normal limits for patient age  Impression: 1  Stable tubes and lines without pneumothorax  2   Persistent extensive bilateral airspace and interstitial opacities   Workstation performed: [id number]       LABS  Results from last 7 days   Lab Units 05/11/21  1842   WBC Thousand/uL 6 80   HEMOGLOBIN g/dL 14 7   HEMATOCRIT % 43 8   MCV fL 89   PLATELETS Thousands/uL 192     Results from last 7 days Lab Units 05/13/21  0502 05/12/21  0525 05/11/21  2221 05/11/21  1842   SODIUM mmol/L 137 138 132* 129*   POTASSIUM mmol/L 4 4 3 8 4 0 3 6   CHLORIDE mmol/L 105 105 100 98   CO2 mmol/L 30 26 23 22   BUN mg/dL 11 7 9 9   CREATININE mg/dL 0 90 0 80 0 69 0 73   CALCIUM mg/dL 8 2* 7 1* 7 5* 7 8*   ALBUMIN g/dL  --   --   --  3 6   TOTAL BILIRUBIN mg/dL  --   --   --  0 40   ALK PHOS U/L  --   --   --  88   ALT U/L  --   --   --  8   AST U/L  --   --   --  14   EGFR ml/min/1 73sq m 97 102 108 106   GLUCOSE RANDOM mg/dL 230* 123 175* 198*     Results from last 7 days   Lab Units 05/11/21  1842   TROPONIN I ng/mL <0 03              Results from last 7 days   Lab Units 05/14/21  1114 05/14/21  0743 05/13/21  2116 05/13/21  1642 05/13/21  1132 05/13/21  0725 05/12/21  2152 05/12/21  1557 05/12/21  1314 05/12/21  0558   POC GLUCOSE mg/dl 309* 217* 323* 225* 205* 213* 258* 200* 183* 102     Results from last 7 days   Lab Units 05/11/21  2325   HEMOGLOBIN A1C % 7 4*     Results from last 7 days   Lab Units 05/11/21  1842   TSH 3RD GENERATON uIU/mL 1 680     Results from last 7 days   Lab Units 05/13/21  0502   PROCALCITONIN ng/ml <0 05           Cultures:   Results from last 7 days   Lab Units 05/11/21  1843   COLOR UA  Yellow   CLARITY UA  Clear   SPEC GRAV UA  >=1 030*   PH UA  6 0   LEUKOCYTES UA  Negative   NITRITE UA  Negative   GLUCOSE UA mg/dl 1+*   KETONES UA mg/dl Negative   BILIRUBIN UA  Negative   BLOOD UA  2+*      Results from last 7 days   Lab Units 05/11/21  1843   RBC UA /hpf 10-20*   WBC UA /hpf 1-2   EPITHELIAL CELLS WET PREP /hpf Occasional   BACTERIA UA /hpf None Seen      Results from last 7 days   Lab Units 05/12/21  0833 05/11/21 2222 05/11/21 2221   BLOOD CULTURE   --  No Growth at 48 hrs  No Growth at 48 hrs     SPUTUM CULTURE  3+ Growth of   --   --    GRAM STAIN RESULT  2+ Polys*  1+ Gram positive cocci in pairs*  --   --        PHYSICAL EXAM:  Vitals:   Blood Pressure: 145/91 (05/14/21 1500)  Pulse: 90 (05/14/21 1500)  Temperature: 98 5 °F (36 9 °C) (05/14/21 1500)  Temp Source: Oral (05/14/21 1500)  Respirations: 17 (05/14/21 1500)  Height: 6' (182 9 cm) (05/11/21 2200)  Weight - Scale: 91 8 kg (202 lb 6 1 oz) (05/13/21 0600)  SpO2: 94 % (05/14/21 1500)      General: well appearing, no acute distress  HEENT: atraumatic, PERRLA, moist mucosa, normal pharynx, normal tonsils and adenoids, normal tongue, no fluid in sinuses  Neck: Trachea midline, no carotid bruit, no masses  Respiratory: normal chest wall expansion, CTA B, no r/r/w, no rubs  Cardiovascular: RRR, no m/r/g, Normal S1 and S2  Abdomen: Soft, non-tender, non-distended, normal bowel sounds in all quadrants, no hepatosplenomegaly, no tympany  Rectal: deferred  Musculoskeletal: normal ROM in upper and lower extremities  Integumentary: warm, dry, and pink, with no rash, purpura, or petechia  Heme/Lymph: no lymphadenopathy, no bruises  Neurological: Cranial Nerves II-XII grossly intact, no tics, normal sensation to pressure and light touch  Psychiatric: cooperative with normal mood, affect, and cognition      Discharge Disposition: [facility]      Test Results Pending at Discharge:   Pending Labs     Order Current Status    Blood culture Preliminary result    Blood culture Preliminary result              Medications   · Summary of Medication Adjustments made as a result of this hospitalization:  See medication discharge last  · Medication Dosing Tapers - Please refer to Discharge Medication List for details on any medication dosing tapers (if applicable to patient)  · Discharge Medication List: See after visit summary for reconciled discharge medications       Diet restrictions:         Diet Orders   (From admission, onward)             Start     Ordered    05/12/21 0858  Diet Regular; Finger Foods; Consistent Carbohydrate Diet Level 2 (5 carb servings/75 grams CHO/meal)  Diet effective now     Question Answer Comment   Diet Type Regular    Regular Finger Foods    Other Restriction(s): Consistent Carbohydrate Diet Level 2 (5 carb servings/75 grams CHO/meal)    RD to adjust diet per protocol? Yes        05/12/21 0859              Activity restrictions: No strenuous activity  Discharge Condition: good    Outpatient Follow-Up and Discharge Instructions  See after visit summary section titled Discharge Instructions for information provided to patient and family  Code Status: Level 1 - Full Code  Discharge Statement   I spent 35 minutes discharging the patient  This time was spent on the day of discharge  Greater than 50% of total time was spent with the patient and / or family counseling and / or coordination of care  ** Please Note: This note was completed in part utilizing [employer] Direct Software  Grammatical errors, random word insertions, spelling mistakes, and incomplete sentences may be an occasional consequence of this system secondary to software limitations, ambient noise, and hardware issues  If you have any questions or concerns about the content, text, or information contained within the body of this dictation, please contact the provider for clarification  **

## 2021-05-14 NOTE — DISCHARGE INSTRUCTIONS
Anxiety   WHAT YOU SHOULD KNOW:   Anxiety is a condition that causes you to feel excessive worry, uneasiness, or fear  Family or work stress, smoking, caffeine, and alcohol can increase your risk for anxiety  Certain medicines or health conditions can also increase your risk  Anxiety may begin gradually, and can become a long-term condition if it is not managed or treated  AFTER YOU LEAVE:   Medicines:   · Medicines  can help you feel more calm and relaxed, and decrease your symptoms  · Take your medicine as directed  Contact your healthcare provider if you think your medicine is not helping or if you have side effects  Tell him if you are allergic to any medicine  Keep a list of the medicines, vitamins, and herbs you take  Include the amounts, and when and why you take them  Bring the list or the pill bottles to follow-up visits  Carry your medicine list with you in case of an emergency  Follow up with your healthcare provider within 2 weeks or as directed:  Write down your questions so you remember to ask them during your visits  Manage anxiety:   · Go to counseling as directed  Cognitive behavioral therapy can help you understand and change how you react to events that trigger your symptoms  · Find ways to manage your symptoms  Activities such as exercise, meditation, or listening to music can help you relax  · Practice deep breathing  Breathing can change how your body reacts to stress  Focus on taking slow, deep breaths several times a day, or during an anxiety attack  Breathe in through your nose, and out through your mouth  · Avoid caffeine  Caffeine can make your symptoms worse  Avoid foods or drinks that are meant to increase your energy level  · Limit or avoid alcohol  Ask your healthcare provider if alcohol is safe for you  You may not be able to drink alcohol if you take certain anxiety or depression medicines  Limit alcohol to 1 drink per day if you are a woman   Limit alcohol to 2 drinks per day if you are a man  A drink of alcohol is 12 ounces of beer, 5 ounces of wine, or 1½ ounces of liquor  Contact your healthcare provider if:   · Your symptoms get worse or do not get better with treatment  · You think your medicine may be causing side effects  · Your anxiety keeps you from doing your regular daily activities  · You have new symptoms since your last visit  · You have questions or concerns about your condition or care  Seek care immediately or call 911 if:   · You have chest pain, tightness, or heaviness that may spread to your shoulders, arms, jaw, neck, or back  · You feel like hurting yourself or someone else  · You feel dizzy, lightheaded, or faint  © 2014 [employer] is for End User's use only and may not be sold, redistributed or otherwise used for commercial purposes  All illustrations and images included in CareNotes® are the copyrighted property of [employer] M [employer]  or [employer]  The above information is an  only  It is not intended as medical advice for individual conditions or treatments  Talk to your doctor, nurse or pharmacist before following any medical regimen to see if it is safe and effective for you

## 2021-05-14 NOTE — ASSESSMENT & PLAN NOTE
Lab Results   Component Value Date    HGBA1C 7 4 (H) 05/11/2021       Recent Labs     05/13/21  1642 05/13/21  2116 05/14/21  0743 05/14/21  1114   POCGLU 225* 323* 217* 309*       Blood Sugar Average: Last 72 hrs:  (P) 126 [id number]   Well controlled continue sliding scale and basal bolus protocol

## 2021-05-15 LAB
GLUCOSE SERPL-MCNC: 215 MG/DL (ref 65–140)
GLUCOSE SERPL-MCNC: 238 MG/DL (ref 65–140)
GLUCOSE SERPL-MCNC: 240 MG/DL (ref 65–140)
GLUCOSE SERPL-MCNC: 266 MG/DL (ref 65–140)

## 2021-05-15 PROCEDURE — 82948 REAGENT STRIP/BLOOD GLUCOSE: CPT

## 2021-05-15 PROCEDURE — 99222 1ST HOSP IP/OBS MODERATE 55: CPT | Performed by: PSYCHIATRY & NEUROLOGY

## 2021-05-15 RX ORDER — GUAIFENESIN/DEXTROMETHORPHAN 100-10MG/5
10 SYRUP ORAL EVERY 4 HOURS PRN
Status: DISCONTINUED | OUTPATIENT
Start: 2021-05-15 | End: 2021-06-01 | Stop reason: HOSPADM

## 2021-05-15 RX ORDER — SERTRALINE HYDROCHLORIDE 100 MG/1
100 TABLET, FILM COATED ORAL DAILY
Status: DISCONTINUED | OUTPATIENT
Start: 2021-05-15 | End: 2021-05-25

## 2021-05-15 RX ORDER — ALBUTEROL SULFATE 90 UG/1
2 AEROSOL, METERED RESPIRATORY (INHALATION) EVERY 4 HOURS PRN
Status: DISCONTINUED | OUTPATIENT
Start: 2021-05-15 | End: 2021-06-01 | Stop reason: HOSPADM

## 2021-05-15 RX ORDER — ONDANSETRON 4 MG/1
4 TABLET, ORALLY DISINTEGRATING ORAL EVERY 6 HOURS PRN
Status: DISCONTINUED | OUTPATIENT
Start: 2021-05-15 | End: 2021-06-01 | Stop reason: HOSPADM

## 2021-05-15 RX ADMIN — ENOXAPARIN SODIUM 40 MG: 40 INJECTION SUBCUTANEOUS at 08:29

## 2021-05-15 RX ADMIN — QUETIAPINE FUMARATE 250 MG: 200 TABLET ORAL at 21:46

## 2021-05-15 RX ADMIN — CHOLESTYRAMINE 4 G: 4 POWDER, FOR SUSPENSION ORAL at 08:30

## 2021-05-15 RX ADMIN — INSULIN LISPRO 3 UNITS: 100 INJECTION, SOLUTION INTRAVENOUS; SUBCUTANEOUS at 21:47

## 2021-05-15 RX ADMIN — HYDROXYZINE HYDROCHLORIDE 50 MG: 25 TABLET, FILM COATED ORAL at 11:45

## 2021-05-15 RX ADMIN — INSULIN LISPRO 3 UNITS: 100 INJECTION, SOLUTION INTRAVENOUS; SUBCUTANEOUS at 11:45

## 2021-05-15 RX ADMIN — SENNOSIDES AND DOCUSATE SODIUM 1 TABLET: 8.6; 5 TABLET ORAL at 17:03

## 2021-05-15 RX ADMIN — Medication 1 PATCH: at 08:27

## 2021-05-15 RX ADMIN — THIAMINE HCL TAB 100 MG 100 MG: 100 TAB at 08:29

## 2021-05-15 RX ADMIN — CHOLESTYRAMINE 4 G: 4 POWDER, FOR SUSPENSION ORAL at 17:03

## 2021-05-15 RX ADMIN — INSULIN LISPRO 3 UNITS: 100 INJECTION, SOLUTION INTRAVENOUS; SUBCUTANEOUS at 08:29

## 2021-05-15 RX ADMIN — SENNOSIDES AND DOCUSATE SODIUM 1 TABLET: 8.6; 5 TABLET ORAL at 08:29

## 2021-05-15 RX ADMIN — OXYCODONE HYDROCHLORIDE 5 MG: 5 TABLET ORAL at 21:47

## 2021-05-15 RX ADMIN — OXYCODONE HYDROCHLORIDE 5 MG: 5 TABLET ORAL at 11:44

## 2021-05-15 RX ADMIN — PANTOPRAZOLE SODIUM 40 MG: 40 TABLET, DELAYED RELEASE ORAL at 04:58

## 2021-05-15 RX ADMIN — OXYCODONE HYDROCHLORIDE 5 MG: 5 TABLET ORAL at 17:03

## 2021-05-15 RX ADMIN — INSULIN LISPRO 2 UNITS: 100 INJECTION, SOLUTION INTRAVENOUS; SUBCUTANEOUS at 17:02

## 2021-05-15 RX ADMIN — SERTRALINE HYDROCHLORIDE 100 MG: 100 TABLET ORAL at 11:50

## 2021-05-15 RX ADMIN — FOLIC ACID 1 MG: 1 TABLET ORAL at 08:29

## 2021-05-15 RX ADMIN — MULTIPLE VITAMINS W/ MINERALS TAB 1 TABLET: TAB ORAL at 11:44

## 2021-05-15 RX ADMIN — TIOTROPIUM BROMIDE 18 MCG: 18 CAPSULE ORAL; RESPIRATORY (INHALATION) at 08:30

## 2021-05-15 NOTE — PROGRESS NOTES
Patient noted to be sleeping without difficulty at time of pain reassessment at 0046 and upon continued q 7 minute safety checks throughout the shift  Patient up once to the bathroom and returned to bed without difficulty  2 L/min o2 via n/c maintained, no complaints of shortness of breath or respiratory distress noted, intermittent dry cough noted earlier in the shift  Q 7 minute safety checks maintained to monitor for behaviors and safety

## 2021-05-15 NOTE — PROGRESS NOTES
Patient awake and out of bed to go to the bathroom at 0450  Patient noted to have audible expiratory wheeze with moderate exertion and intermittent harsh, dry cough  The patient denied the need for as needed medication at this time for wheezing or cough, stating "I'll sit her for a few minutes and I'll be ok " The patient states that he wears O2 as needed at home, but has required continuous oxygen since admission to the hospital  Vital signs obtained, SPO2 on 2 L/min via nc 92%, CIWA scale score 2 at this time  No audible wheeze, complaints of shortness of breath or respiratory distress noted at this time  Q 7 minute safety checks maintained to monitor for behaviors and safety

## 2021-05-15 NOTE — PROGRESS NOTES
At time of admission skin assessment patient noted to have varying stages of ecchymosis to bilateral upper extremities and area of ecchymosis to right flank, areas of redness to bilateral arms and left antecubital area that patient states is irritation from tape used throughout hospitalization, generalized scarring and raised bumps to back  0 4 cm x 0 8 cm x 0 1 cm open area noted to left inner wrist  The patient states open area occurred with removal of tape from area during hospitalization [facility]  Area cleansed with nss, patted dry, protective dressing applied, no redness, warmth or drainage noted  Nail clubbing noted to fingers  Toenails long, will notify medical provider via written communication

## 2021-05-15 NOTE — PROGRESS NOTES
Pt picked up by JIN  IV was previously taken out by day shift nurse  Jeni Yap was d/sherry and pt was advised to take all belongings with him

## 2021-05-15 NOTE — PROGRESS NOTES
Pt up ad ziggy & gait is steady  Pt c/o depression 6/10 & anxiety 4/10  Q 7 min checks maintained to monitor pt's behavior & safety  Pt denies any hallucinations, suicidal or homicidal ideations  Pt medicated for BLE & lower back pain @ 1144 with Oxy-IR po as ordered by MD  Pt is on continuous Nasal O2 @ 2L/min via cannula  Scattered expiratory wheezes noted upon auscultation  Occasional dry cough noted  Pt is dyspneic on exertion  Pt medicated for increased anxiety 7/10 @ 1144 with Atarax 50 mg po as ordered by MD Roman Parham -19 @ that time  Pt is flat & withdrawn to room  Pt is cooperative & compliant with medications

## 2021-05-15 NOTE — H&P
Adrian Miller#  :1970 Thierno Navarro  VAW:[id number]    DCN:[id number]  Adm Date: 2021  9:05 PM   ATT PHY: Angel Markham, [address]         Chief Complaint:  Intentional overdose    History of Presenting Illness: Za Pierre is a(n) 46 y o  male presenting under 12 voluntary commitment status after intentional overdose  Patient reports history of depression and alcohol dependence  He recently started drinking alcohol again around [facility]  Prior to that, he had been sober for 2 years  Longest time he has been sober is 11 years  Patient has history of Quiros's esophagitis and chronic pancreatitis due to alcoholism  Patient examined at bedside  Patient admits to smoking a pack of cigarettes per day and would like a nicotine patch while inpatient  No Known Allergies  denied any active suicidal homicidal ideations    Current Facility-Administered Medications on File Prior to Encounter   Medication Dose Route Frequency Provider Last Rate Last Admin    [DISCONTINUED] cholestyramine sugar free (QUESTRAN LIGHT) packet 4 g  4 g Oral BID Bryon Multani DO   4 g at 21 1311    [DISCONTINUED] enoxaparin (LOVENOX) subcutaneous injection 40 mg  40 mg Subcutaneous Q24H [address] Bryon Multani DO   40 mg at 21 0818    [DISCONTINUED] folic acid (FOLVITE) tablet 1 mg  1 mg Oral Daily Bryon Multani DO   1 mg at 21 2827    [DISCONTINUED] HYDROmorphone HCl (DILAUDID) injection 0 2 mg  0 2 mg Intravenous Q4H PRN Britney Kan DO        [DISCONTINUED] insulin lispro (HumaLOG) 100 units/mL subcutaneous injection 1-6 Units  1-6 Units Subcutaneous TID AC Bryon Multani DO   4 Units at 21 1700    [DISCONTINUED] naloxone (NARCAN) 0 04 mg/mL syringe 0 04 mg  0 04 mg Intravenous Q1MIN PRN Britney Kan DO        [DISCONTINUED] ondansetron (ZOFRAN) injection 4 mg  4 mg Intravenous Q4H PRN Britney Kan DO        [DISCONTINUED] oxyCODONE (ROXICODONE) IR tablet 2 5 mg  2 5 mg Oral Q4H PRN Ila Callas, DO   2 5 mg at 05/13/21 1326    [DISCONTINUED] oxyCODONE (ROXICODONE) IR tablet 5 mg  5 mg Oral Q4H PRN Ila Callas, DO   5 mg at 05/14/21 1711    [DISCONTINUED] pantoprazole (PROTONIX) EC tablet 40 mg  40 mg Oral Early Morning Ila Callas, DO   40 mg at 05/14/21 0533    [DISCONTINUED] senna-docusate sodium (SENOKOT S) 8 6-50 mg per tablet 1 tablet  1 tablet Oral BID Ila Callas, DO   1 tablet at 05/14/21 1704    [DISCONTINUED] thiamine tablet 100 mg  100 mg Oral Daily Ila Callas, DO   100 mg at 05/14/21 8893    [DISCONTINUED] tiotropium (SPIRIVA) capsule for inhaler 18 mcg  18 mcg Inhalation Daily Ila Callas, DO   18 mcg at 05/14/21 9976     Current Outpatient Medications on File Prior to Encounter   Medication Sig Dispense Refill    albuterol (PROVENTIL HFA,VENTOLIN HFA) 90 mcg/act inhaler Inhale 2 puffs every 6 (six) hours as needed for wheezing or shortness of breath      folic acid (FOLVITE) 1 mg tablet Take 1 tablet (1 mg total) by mouth daily 30 tablet 0    gabapentin (NEURONTIN) 300 mg capsule Take 300 mg by mouth 3 (three) times a day      Insulin Pen Needle (B-D ULTRAFINE III SHORT PEN) 31G X 8 MM MISC by Does not apply route      Insulin Pen Needle (PEN NEEDLES) 29G X 12MM MISC by Does not apply route daily at bedtime Substitute what fits [employer] pen and what is covered by insurance   45 each 0    lisinopril (ZESTRIL) 10 mg tablet Take 10 mg by mouth 2 (two) times a day      losartan (COZAAR) 25 mg tablet Take 25 mg by mouth daily      metFORMIN (GLUCOPHAGE) 500 mg tablet Take 500 mg by mouth 2 (two) times a day with meals      omeprazole (PriLOSEC) 20 mg delayed release capsule Take 20 mg by mouth daily      pioglitazone (ACTOS) 30 mg tablet Take 30 mg by mouth daily      propranolol (INDERAL) 10 mg tablet Take 10 mg by mouth 2 (two) times a day      QUEtiapine (SEROquel) 200 mg tablet Take 250 mg by mouth daily at bedtime      rOPINIRole (REQUIP) 1 mg tablet Take 1 mg by mouth 3 (three) times a day      sertraline (ZOLOFT) 100 mg tablet Take 200 mg by mouth daily      thiamine (VITAMIN B1) 100 mg tablet Take 1 tablet (100 mg total) by mouth daily 30 tablet 0    tiotropium (SPIRIVA) 18 mcg inhalation capsule Place 1 capsule (18 mcg total) into inhaler and inhale daily 6 each 0    cholestyramine sugar free (QUESTRAN LIGHT) 4 g packet Take 1 packet (4 g total) by mouth 2 (two) times a day 60 packet 0       Active Ambulatory Problems     Diagnosis Date Noted    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    Benign essential hypertension 07/02/2012    COPD (chronic obstructive pulmonary disease) ([facility] ) 03/29/2017    Fatty liver 12/05/2012    Fibromyalgia 07/29/2013    Generalized anxiety disorder 06/13/2012    Hyperlipidemia 09/11/2012    Insomnia 09/05/2017    Iron deficiency anemia 04/05/2016    Recurrent major depressive disorder, in partial remission ([facility] ) 07/02/2012    Nephrolithiasis 12/05/2012    Other chronic pain 07/02/2012    Sleep disorder 07/02/2012    Diabetes mellitus type 1 5, managed as type 1 ([facility] ) 04/05/2016    Acute hyponatremia 08/09/2018    GERD (gastroesophageal reflux disease) 08/09/2018    Anxiety and depression 08/09/2018    History of ETOH abuse 08/09/2018    Transaminitis 08/13/2018    Tobacco dependence 08/13/2018    Chronic pancreatitis ([facility] ) 09/13/2018    Paresthesia of both lower extremities     Hypotension 10/23/2018    Alcohol dependence in early, early partial, sustained full, or sustained partial remission ([facility] ) 02/20/2019    Intentional overdose of drug in tablet form ([facility] ) 05/11/2021    Acute respiratory failure ([facility] ) 05/11/2021    DM (diabetes mellitus) ([facility] ) 05/11/2021     Resolved Ambulatory Problems     Diagnosis Date Noted    Recurrent pancreatitis 08/25/2017    Abdominal pain 08/09/2018    Acute hyperkalemia 08/09/2018    IGNACIA (acute kidney injury) ([facility] ) 08/09/2018    Tobacco abuse 08/09/2018    Leukemoid reaction 08/09/2018    Acute on chronic pancreatitis ([facility] ) 08/12/2018    Leukocytosis 10/23/2018    Diarrhea of presumed infectious origin 10/25/2018     Past Medical History:   Diagnosis Date    Addiction to drug ([facility] )     Alcohol abuse 2/20/2019    Allergic rhinitis     Anemia     Cholelithiasis     Chronic kidney disease     Chronic pain     Chronic pain disorder     Depression     Diabetes mellitus (HCC)     Emphysema lung (HCC)     Hypertension     Kidney stone     Metatarsalgia     Pancreatitis     Peripheral neuropathy     Psychiatric disorder     Renal disorder     Shortness of breath     Sleep difficulties     Substance abuse ([place] [address] )     Suicide attempt ([place] [address] )     Withdrawal symptoms, alcohol ([place] [address] )        Past Surgical History:   Procedure Laterality Date    CHOLECYSTECTOMY LAPAROSCOPIC      FOOT SURGERY      B/L great toe joint replacement    KNEE ARTHROSCOPY      (therapeutic) right knee    WI EDG US EXAM SURGICAL ALTER STOM DUODENUM/JEJUNUM N/A 10/17/2018    Procedure: LINEAR ENDOSCOPIC U/S;  Surgeon: Brandi Hitchcock MD;  Location: [facility]; Service: Gastroenterology    VASECTOMY      vas deferens       Social History:   Social History     Socioeconomic History    Marital status: /Civil Union     Spouse name: None    Number of children: None    Years of education: None    Highest education level: None   Occupational History    Occupation: Unemployed   Social Needs    Financial resource strain: None    Food insecurity     Worry: None     Inability: None    Transportation needs     Medical: None     Non-medical: None   Tobacco Use    Smoking status: Current Every Day Smoker     Packs/day: 1 00     Types: Cigarettes     Start date: 4/14/1986    Smokeless tobacco: Never Used    Tobacco comment: Tobacco use [employer]'s "How to Quit" literature given to pat     Substance and Sexual Activity    Alcohol use: Not Currently     Frequency: 4 or more times a week     Drinks per session: 5 or 6     Binge frequency: Daily or almost daily     Comment: Drank a 5th vodka today    Drug use: No     Comment: chronic narcotic use    Sexual activity: Yes     Partners: Female   Lifestyle    Physical activity     Days per week: None     Minutes per session: None    Stress: None   Relationships    Social connections     Talks on phone: None     Gets together: None     Attends Mosque service: None     Active member of club or organization: None     Attends meetings of clubs or organizations: None     Relationship status: None    Intimate partner violence     Fear of current or ex partner: None     Emotionally abused: None     Physically abused: None     Forced sexual activity: None   Other Topics Concern    None   Social History Narrative    UNEMPLOYED       Family History:   Family History   Problem Relation Age of Onset    Cancer Mother     Coronary artery disease Mother     Diabetes Mother     Coronary artery disease Father     Prostate cancer Father     Diabetes Sister     Coronary artery disease Family        Review of Systems   Respiratory: Positive for cough and shortness of breath  Musculoskeletal: Positive for arthralgias, back pain and gait problem  All other systems reviewed and are negative  Physical Exam   Constitutional: Awake and Alert  Well-developed and well-nourished  No distress  HENT: PERR,EOMI, conjunctiva normal  Head: Normocephalic and atraumatic  Mouth/Throat: Oropharynx is clear and moist     Eyes: Conjunctivae and EOM are normal  Pupils are equal, round, and reactive to light  Right and left eye exhibits no discharge  Neck: Neck supple  No tracheal deviation present  No thyromegaly present  Cardiovascular: Normal rate, regular rhythm and normal heart sounds  Exam reveals no friction rub  No murmur heard  Pulmonary/Chest:  Decreased breath sounds throughout  No respiratory distress  No wheezes  Abdominal: Soft  Bowel sounds are normal  No distension  No tenderness  No rebound tenderness and no guarding  Neurological: Cranial Nerves grossly intact  No sensory deficit  Coordination normal    Musculoskeletal:   Nontender spine  Skin: Skin is warm and dry  No rash noted  No diaphoresis  No erythema  No edema  No cyanosis  Assessment     Dot Mick is a(n) 46 y o  male with MDD  1  Hypercholesteremia  Continue cholestyramine sugar free 2 times daily  2  Diabetes mellitus  Patient's hemoglobin A1c was 7 4 on 05/11/2021  Continue carb controlled diet, fingerstick glucose 4 times daily before meals and at bedtime, and Humalog sliding scale  3  Tobacco use  Patient has nicotine transdermal patch  4  Alcohol abuse  No S/S of withdrawal   Continue folic acid, thiamine, multivitamin  5  GERD  Continue pantoprazole 40 mg daily  6  COPD  Continue Spiriva daily, albuterol as needed, and 2 L nasal cannula  7  Constipation  Continue Senokot S twice daily  8  Chronic pain syndrome  Continue Tylenol and oxycodone as needed  9  Dry cough  Patient may receive Robitussin DM as needed  10  MDD  Patient is being managed by psych  Prognosis: Fair  Discharge Plan: In progress  Advanced Directives: I have discussed in detail with the patient the advanced directives  The patient does not have a POA or living will  Emergency contact is his wife, Chay Pinzon ([phone])  When discussing cardiac and pulmonary resuscitation efforts with the patient, the patient wishes to be full code  I have spent more than 50 minutes gathering data, doing physical examination, and discussing the advanced directives, which was witnessed by caring staff  The patient was discussed with Dr Vilma Guzman and he is in agreement with the above note

## 2021-05-15 NOTE — H&P
Psychiatric Evaluation - Behavioral Health     Chief Complaint:'I was stressed had argument with my wife and then took a bunch of medication to overdose it was impulsive act'    History of Present Illness this is a 28-year-old white gentleman with a history of depression alcohol dependence unemployed for some time now father of 2 children, reported he has been feeling sad depressed stressed guilty that he is not working and not finding shortly contributing to the family, had a argument with his wife as he has started drinking alcohol again and that argument led him to get angry upset and impulsively took bunch of his medications and overdosed  Looking at the record he has taken Propanol Seroquel and Xanax which was prescribed to him 5 years ago, in an impulsive act to commit suicide  This was quite sees because he has a COPD and ended up staying for 5 days at the [facility] and then he was stable and sign 201 to come over here at the [facility] psych unit for treatment of depression  Patient stated he has been seeing as a psychiatric provider seen the Jerold Dandy, and he has been taking Zoloft 100 mg a day and Seroquel 100 mg at bedtime  Reports some difficulty with the sleep but feeling sad depressed hopeless helpless guilty as unable to contribute to the family financially  He also indicated wife's get upset when he starts drinking he has a long history of problem with the alcohol  He has a history of 1 dui and multiple rehabs for alcohol problem  The longest time he has been sober is 11 years, currently has been so drinking alcohol as I stated earlier  He has a history of chronic pancreatitis which he dwelled due to alcohol problem as well as 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  He indicated he has no issues at present or any plans to hurt himself  He indicated in the past he has taken affects are sad Axe prescribed    He has been hospitalized at the psychiatric [facility] in the past at least twice  History of depression for the past 20+ years, no previous suicide attempt just thought about our feeling but did not carry out any suicidal act  Patient stated that he was given Vivitrol injection, during his last rehab as he was leaving the rehab, but did not get any further injections of Vivitrol  Psychiatric Review Of Systems:  Major depression financial family stressors and alcohol problem        Past Psychiatric History:  As stated in the history of present illness    Substance Abuse History:  As stated in the history of present illness history of chronic alcohol problem      Family Psychiatric History:  Stated his brother and sister have a history of depression      Social History:  Education: He went to school up to high school graduated no learning disabilities  Learning Disabilities:  Denied  Marital history:   and has 2 children  Living arrangement, social support:  Family  Occupational History:  He has work as a  for [employer] for about 27 years and due to [address] he was let go 2016  Functioning Relationships:  Family  Other Pertinent History:  None    Traumatic History:   Abuse:  Denies  Other Traumatic Events:  None    No past medical history on file    Refer to the medical history    Medical Review Of Systems:  Chronic pancreatitis diabetes hypertension COPD      Meds/Allergies no known allergies    current meds:   Current Facility-Administered Medications   Medication Dose Route Frequency    acetaminophen (TYLENOL) tablet 650 mg  650 mg Oral Q4H PRN    benztropine (COGENTIN) injection 1 mg  1 mg Intramuscular Q4H PRN Max 6/day    benztropine (COGENTIN) tablet 0 5 mg  0 5 mg Oral Q4H PRN Max 6/day    cholestyramine sugar free (QUESTRAN LIGHT) packet 4 g  4 g Oral BID    enoxaparin (LOVENOX) subcutaneous injection 40 mg  40 mg Subcutaneous P32W [facility]    folic acid (FOLVITE) tablet 1 mg  1 mg Oral Daily    haloperidol lactate (HALDOL) injection 5 mg  5 mg Intramuscular Q4H PRN Max 4/day    hydrOXYzine HCL (ATARAX) tablet 25 mg  25 mg Oral Q6H PRN Max 4/day    hydrOXYzine HCL (ATARAX) tablet 50 mg  50 mg Oral Q6H PRN Max 4/day    insulin lispro (HumaLOG) 100 units/mL subcutaneous injection 1-6 Units  1-6 Units Subcutaneous TID AC    LORazepam (ATIVAN) injection 1 mg  1 mg Intramuscular Q6H PRN Max 3/day    LORazepam (ATIVAN) tablet 1 mg  1 mg Oral Q6H PRN Max 3/day    naloxone (NARCAN) 0 04 mg/mL syringe 0 04 mg  0 04 mg Intravenous Q1MIN PRN    nicotine (NICODERM CQ) 14 mg/24hr TD 24 hr patch 1 patch  1 patch Transdermal Daily    ondansetron (ZOFRAN) injection 4 mg  4 mg Intravenous Q4H PRN    oxyCODONE (ROXICODONE) IR tablet 2 5 mg  2 5 mg Oral Q4H PRN    oxyCODONE (ROXICODONE) IR tablet 5 mg  5 mg Oral Q4H PRN    pantoprazole (PROTONIX) EC tablet 40 mg  40 mg Oral Early Morning    QUEtiapine (SEROquel) tablet 250 mg  250 mg Oral HS    risperiDONE (RisperDAL M-TAB) disintegrating tablet 0 25 mg  0 25 mg Oral Q4H PRN Max 6/day    risperiDONE (RisperDAL M-TAB) disintegrating tablet 0 5 mg  0 5 mg Oral Q4H PRN Max 3/day    risperiDONE (RisperDAL M-TAB) disintegrating tablet 1 mg  1 mg Oral Q4H PRN Max 3/day    senna-docusate sodium (SENOKOT S) 8 6-50 mg per tablet 1 tablet  1 tablet Oral BID    sertraline (ZOLOFT) tablet 100 mg  100 mg Oral Daily    thiamine tablet 100 mg  100 mg Oral Daily    tiotropium (SPIRIVA) capsule for inhaler 18 mcg  18 mcg Inhalation Daily    traZODone (DESYREL) tablet 50 mg  50 mg Oral HS PRN        Allergies   Allergen Reactions    Latex Swelling     facial    Onion Hives    Penicillins Rash    Sulfa Antibiotics Rash and Other (See Comments)     Abdominal pain       Objective  Vital signs in last 24 hours:  Pulse:  [108] 108  Respirations:  [16] 16  Blood Pressure: (115)/(65) 115/65    No intake or output data in the 24 hours ending 01/09/16 145    Mental Status Evaluation:  Appearance:  Tall white man medium build has oxygen attached to his nose laying comfortably does not appear in any acute physical or emotional distress at present   Behavior:  Cooperative   Speech:  Normal   Mood:  Sad   Affect:  Appropriate   Language: English   Thought Process:  Goal directed   Thought Content:  Normal   Perceptual Disturbances: Denies   Risk Potential: Denied   Sensorium:  Intact   Cognition:  Average   Consciousness:  Alert awake oriented to time place and person   Attention: Fair   Intellect: Average   Fund of Knowledge: Average   Insight:  Limited   Judgment: Fair   Muscle Strength and Tone: Within normal range   Gait/Station: Within normal range   Motor Activity: No abnormal movement     Lab Results:  UDS was positive for benzodiazepine  Imaging Studies:  Reviewed  EKG, Pathology, and Other Studies:  Reviewed    Assessment and plan; major depression recurrent-severe, alcohol dependence continuous,11-def    111-patient had a history of COPD chronic pancreatitis hypertension,    Counseling / Coordination of Care  Total floor / unit time spent today 65 minutes minutes  Greater than 50% of total time was spent with the patient and / or family counseling and / or coordination of care   A description of the counseling / coordination of care: , started back on Medication       Uriah Pope MD

## 2021-05-15 NOTE — NURSING NOTE
Pt arrived via EMS stretcher from [address] in stable condition  Belongings and body searched for contraband, none noted  Upon arrival pt O2 was 90% for the brief period of being off the oxygen to switch over to concentrator  Pt reports reason for admission being due to suicide attempt via OD with multiple medications and alcohol on Monday 05/10/2021  Reports reason for attempt was due to feeling guilty about not being able to provide more financially and physically for his family due to his current physical health state  Also reports stressors of his wife being legally blind and still having to work despite difficulties seeing  Also reports struggling to get on disability benefits  Reports history of ETOH abuse with most recent relapse being Monday 05/10/2021 about 1/5 gabbiebailee  States he had not drank since Easter and prior to Easter he had been sober 2 years  Longest period of sobriety being 11 years  Pt affect on arrival is depressed, flat  Mood sad  Thought process is appropriate for situation but does have a lot of self blaming  Hygiene is appropriate to situation  Denies current SI/HI or hallucinations but does report moderate depression and mild anxiety  Report given to 11-7 shift nurse

## 2021-05-15 NOTE — PROGRESS NOTES
Patient belongings as follows:    Room:  Eyeglasses with case  Danuta Perry t shirt "[employer]"  Black t shirt "[employer]"  [employer] t shirt  Dark [place] t shirt "Janelle Genevieve"  [employer] sweat pants x2  Dark Blue sweat shirt "[employer]"  Socks x2  Underwear x4    Room cabinet:   Green [employer] Bag

## 2021-05-15 NOTE — PROGRESS NOTES
Notified Bryce Connell on call, Dr Renita Weber of patient denies suicidal ideations, thoughts, and intent at time of admission and continued assessment and verbalizes would come to clinical staff if develops suicidal ideations or thoughts  New order noted to discontinue continual observation, maintain behavioral health 7 minute safety checks and suicide precautions  Oxycodone 5 mg administered at 2346 for complaints of 8/10 lower back and bilateral leg pain/aching  Patient noted to be sleeping quietly and without difficulty at this time  Q 7 minute safety checks maintained

## 2021-05-16 LAB
ALBUMIN SERPL BCP-MCNC: 3.7 G/DL (ref 3.5–5.7)
ALP SERPL-CCNC: 85 U/L (ref 40–150)
ALT SERPL W P-5'-P-CCNC: 19 U/L (ref 7–52)
ANION GAP SERPL CALCULATED.3IONS-SCNC: 10 MMOL/L (ref 4–13)
APTT PPP: 30 SECONDS (ref 23–37)
AST SERPL W P-5'-P-CCNC: 21 U/L (ref 13–39)
BILIRUB SERPL-MCNC: 0.5 MG/DL (ref 0.2–1)
BUN SERPL-MCNC: 17 MG/DL (ref 7–25)
CALCIUM SERPL-MCNC: 9.4 MG/DL (ref 8.6–10.5)
CHLORIDE SERPL-SCNC: 96 MMOL/L (ref 98–107)
CO2 SERPL-SCNC: 23 MMOL/L (ref 21–31)
CREAT SERPL-MCNC: 0.86 MG/DL (ref 0.7–1.3)
GFR SERPL CREATININE-BSD FRML MDRD: 99 ML/MIN/1.73SQ M
GLUCOSE SERPL-MCNC: 148 MG/DL (ref 65–140)
GLUCOSE SERPL-MCNC: 197 MG/DL (ref 65–140)
GLUCOSE SERPL-MCNC: 222 MG/DL (ref 65–140)
GLUCOSE SERPL-MCNC: 239 MG/DL (ref 65–99)
GLUCOSE SERPL-MCNC: 255 MG/DL (ref 65–140)
INR PPP: 1.07 (ref 0.84–1.19)
MAGNESIUM SERPL-MCNC: 1.3 MG/DL (ref 1.9–2.7)
PLATELET # BLD AUTO: 193 THOUSANDS/UL (ref 149–390)
POTASSIUM SERPL-SCNC: 4.3 MMOL/L (ref 3.5–5.5)
PROT SERPL-MCNC: 7.3 G/DL (ref 6.4–8.9)
PROTHROMBIN TIME: 13.8 SECONDS (ref 11.6–14.5)
SODIUM SERPL-SCNC: 129 MMOL/L (ref 134–143)

## 2021-05-16 PROCEDURE — 85730 THROMBOPLASTIN TIME PARTIAL: CPT | Performed by: PSYCHIATRY & NEUROLOGY

## 2021-05-16 PROCEDURE — 97166 OT EVAL MOD COMPLEX 45 MIN: CPT

## 2021-05-16 PROCEDURE — 82306 VITAMIN D 25 HYDROXY: CPT | Performed by: PHYSICIAN ASSISTANT

## 2021-05-16 PROCEDURE — 85670 THROMBIN TIME PLASMA: CPT | Performed by: PSYCHIATRY & NEUROLOGY

## 2021-05-16 PROCEDURE — 85610 PROTHROMBIN TIME: CPT | Performed by: PSYCHIATRY & NEUROLOGY

## 2021-05-16 PROCEDURE — 99232 SBSQ HOSP IP/OBS MODERATE 35: CPT | Performed by: PSYCHIATRY & NEUROLOGY

## 2021-05-16 PROCEDURE — 82607 VITAMIN B-12: CPT | Performed by: PHYSICIAN ASSISTANT

## 2021-05-16 PROCEDURE — 85049 AUTOMATED PLATELET COUNT: CPT | Performed by: PSYCHIATRY & NEUROLOGY

## 2021-05-16 PROCEDURE — 82948 REAGENT STRIP/BLOOD GLUCOSE: CPT

## 2021-05-16 PROCEDURE — 85384 FIBRINOGEN ACTIVITY: CPT | Performed by: PSYCHIATRY & NEUROLOGY

## 2021-05-16 PROCEDURE — 97161 PT EVAL LOW COMPLEX 20 MIN: CPT

## 2021-05-16 PROCEDURE — 83735 ASSAY OF MAGNESIUM: CPT | Performed by: PSYCHIATRY & NEUROLOGY

## 2021-05-16 PROCEDURE — 80053 COMPREHEN METABOLIC PANEL: CPT | Performed by: PSYCHIATRY & NEUROLOGY

## 2021-05-16 RX ORDER — PROPRANOLOL HYDROCHLORIDE 10 MG/1
10 TABLET ORAL 2 TIMES DAILY
Status: DISCONTINUED | OUTPATIENT
Start: 2021-05-16 | End: 2021-06-01 | Stop reason: HOSPADM

## 2021-05-16 RX ORDER — LOSARTAN POTASSIUM 25 MG/1
25 TABLET ORAL DAILY
Status: DISCONTINUED | OUTPATIENT
Start: 2021-05-16 | End: 2021-06-01 | Stop reason: HOSPADM

## 2021-05-16 RX ORDER — GABAPENTIN 300 MG/1
300 CAPSULE ORAL 3 TIMES DAILY
Status: DISCONTINUED | OUTPATIENT
Start: 2021-05-16 | End: 2021-05-29

## 2021-05-16 RX ORDER — INSULIN GLARGINE 100 [IU]/ML
36 INJECTION, SOLUTION SUBCUTANEOUS EVERY 12 HOURS SCHEDULED
Status: DISCONTINUED | OUTPATIENT
Start: 2021-05-16 | End: 2021-05-20

## 2021-05-16 RX ORDER — LISINOPRIL 10 MG/1
10 TABLET ORAL 2 TIMES DAILY
Status: DISCONTINUED | OUTPATIENT
Start: 2021-05-16 | End: 2021-05-17

## 2021-05-16 RX ORDER — PIOGLITAZONEHYDROCHLORIDE 15 MG/1
30 TABLET ORAL DAILY
Status: DISCONTINUED | OUTPATIENT
Start: 2021-05-16 | End: 2021-06-01 | Stop reason: HOSPADM

## 2021-05-16 RX ORDER — ROPINIROLE 1 MG/1
1 TABLET, FILM COATED ORAL 3 TIMES DAILY
Status: DISCONTINUED | OUTPATIENT
Start: 2021-05-16 | End: 2021-05-17

## 2021-05-16 RX ADMIN — OXYCODONE HYDROCHLORIDE 5 MG: 5 TABLET ORAL at 22:15

## 2021-05-16 RX ADMIN — FOLIC ACID 1 MG: 1 TABLET ORAL at 08:19

## 2021-05-16 RX ADMIN — METFORMIN HYDROCHLORIDE 500 MG: 500 TABLET, FILM COATED ORAL at 16:03

## 2021-05-16 RX ADMIN — GUAIFENESIN AND DEXTROMETHORPHAN 10 ML: 100; 10 SYRUP ORAL at 08:19

## 2021-05-16 RX ADMIN — QUETIAPINE FUMARATE 250 MG: 200 TABLET ORAL at 22:03

## 2021-05-16 RX ADMIN — HYDROXYZINE HYDROCHLORIDE 50 MG: 25 TABLET, FILM COATED ORAL at 12:30

## 2021-05-16 RX ADMIN — GABAPENTIN 300 MG: 300 CAPSULE ORAL at 16:03

## 2021-05-16 RX ADMIN — LISINOPRIL 10 MG: 10 TABLET ORAL at 13:40

## 2021-05-16 RX ADMIN — SENNOSIDES AND DOCUSATE SODIUM 1 TABLET: 8.6; 5 TABLET ORAL at 17:14

## 2021-05-16 RX ADMIN — TIOTROPIUM BROMIDE 18 MCG: 18 CAPSULE ORAL; RESPIRATORY (INHALATION) at 08:17

## 2021-05-16 RX ADMIN — ROPINIROLE HYDROCHLORIDE 1 MG: 1 TABLET, FILM COATED ORAL at 16:50

## 2021-05-16 RX ADMIN — INSULIN LISPRO 2 UNITS: 100 INJECTION, SOLUTION INTRAVENOUS; SUBCUTANEOUS at 22:04

## 2021-05-16 RX ADMIN — ROPINIROLE HYDROCHLORIDE 1 MG: 1 TABLET, FILM COATED ORAL at 22:03

## 2021-05-16 RX ADMIN — OXYCODONE HYDROCHLORIDE 5 MG: 5 TABLET ORAL at 12:30

## 2021-05-16 RX ADMIN — INSULIN LISPRO 3 UNITS: 100 INJECTION, SOLUTION INTRAVENOUS; SUBCUTANEOUS at 16:27

## 2021-05-16 RX ADMIN — PROPRANOLOL HYDROCHLORIDE 10 MG: 10 TABLET ORAL at 13:41

## 2021-05-16 RX ADMIN — Medication 1 PATCH: at 08:19

## 2021-05-16 RX ADMIN — OXYCODONE HYDROCHLORIDE 5 MG: 5 TABLET ORAL at 08:18

## 2021-05-16 RX ADMIN — GABAPENTIN 300 MG: 300 CAPSULE ORAL at 22:04

## 2021-05-16 RX ADMIN — MULTIPLE VITAMINS W/ MINERALS TAB 1 TABLET: TAB ORAL at 08:19

## 2021-05-16 RX ADMIN — PANTOPRAZOLE SODIUM 40 MG: 40 TABLET, DELAYED RELEASE ORAL at 05:55

## 2021-05-16 RX ADMIN — SENNOSIDES AND DOCUSATE SODIUM 1 TABLET: 8.6; 5 TABLET ORAL at 08:19

## 2021-05-16 RX ADMIN — OXYCODONE HYDROCHLORIDE 5 MG: 5 TABLET ORAL at 17:18

## 2021-05-16 RX ADMIN — THIAMINE HCL TAB 100 MG 100 MG: 100 TAB at 08:19

## 2021-05-16 RX ADMIN — CHOLESTYRAMINE 4 G: 4 POWDER, FOR SUSPENSION ORAL at 08:19

## 2021-05-16 RX ADMIN — SERTRALINE HYDROCHLORIDE 100 MG: 100 TABLET ORAL at 08:19

## 2021-05-16 RX ADMIN — INSULIN GLARGINE 36 UNITS: 100 INJECTION, SOLUTION SUBCUTANEOUS at 22:04

## 2021-05-16 RX ADMIN — CHOLESTYRAMINE 4 G: 4 POWDER, FOR SUSPENSION ORAL at 17:14

## 2021-05-16 RX ADMIN — ROPINIROLE HYDROCHLORIDE 1 MG: 1 TABLET, FILM COATED ORAL at 12:22

## 2021-05-16 RX ADMIN — INSULIN LISPRO 2 UNITS: 100 INJECTION, SOLUTION INTRAVENOUS; SUBCUTANEOUS at 08:17

## 2021-05-16 RX ADMIN — PIOGLITAZONE 30 MG: 15 TABLET ORAL at 13:40

## 2021-05-16 RX ADMIN — LOSARTAN POTASSIUM 25 MG: 25 TABLET, FILM COATED ORAL at 13:41

## 2021-05-16 NOTE — NURSING NOTE
Patient was visible in the community this evening, attending group and snack time  No acute behaviors observed  He rates anxiety and depression both 7/10, denies SI, HI and hallucinations  Patient was medication compliant at HS; BG checked with a result of 240  He received sliding scale insulin coverage as per order  He requested and received PRN Oxycodone at 2147 for c/o chronic, generalized pain  He obtained relief with this PRN  CIWA scoring performed with a result of 1  Oxygen in place via 2L NC; patient encouraged to keep NC in place as he was observed to remove it intermittently; patient verbalizes acceptance  Will CTM via q7 minute safety checks

## 2021-05-16 NOTE — PROGRESS NOTES
Pt's pain was relieved with Oxy-IR & moderate relief from anxiety obtained with Atarax  Pt napped in his room this PM  Q 7 min checks maintained to monitor pt's behavior & safety

## 2021-05-16 NOTE — PHYSICAL THERAPY NOTE
Physical Therapy Evaluation     Patient's Name: Kendall Cuevas    Admitting Diagnosis  Major depressive disorder, single episode, unspecified [F32 9]  Depression [F32 9]    Problem List  Patient Active Problem List   Diagnosis    Quiros esophagus    Benign essential hypertension    COPD [facility]chronic obstructive pulmonary disease) [facility]    Fatty liver    Fibromyalgia    Generalized anxiety disorder    Hyperlipidemia    Insomnia    Iron deficiency anemia    Recurrent major depressive disorder, in partial remission ([facility] )    Nephrolithiasis    Other chronic pain    Sleep disorder    Diabetes mellitus type 1 5, managed as type 1 ([facility] )    Acute hyponatremia    GERD (gastroesophageal reflux disease)    Anxiety and depression    History of ETOH abuse    Transaminitis    Tobacco dependence    Chronic pancreatitis (HCC)    Paresthesia of both lower extremities    Hypotension    Alcohol dependence in early, early partial, sustained full, or sustained partial remission ([facility] )    Intentional overdose of drug in tablet form ([facility] )    Acute respiratory failure ([facility] )    DM (diabetes mellitus) ([facility] )       Past Medical History  Past Medical History:   Diagnosis Date    Addiction to drug ([facility] )     Alcohol abuse 2/20/2019    Allergic rhinitis     last assessed: 12/5/2012    Anemia     Anxiety and depression     Quiros esophagus     Cholelithiasis     Chronic kidney disease     Chronic pain     Chronic pain disorder     COPD (chronic obstructive pulmonary disease) ([facility]     Depression     Diabetes mellitus (HCC)     Emphysema lung (HCC)     Generalized anxiety disorder     GERD (gastroesophageal reflux disease)     Hyperlipidemia     Hypertension     Kidney stone     Metatarsalgia     last assessed: 8/11/2014, unspecified laterality, ? cause  PE with changes  To see DPM tomorrow      Pancreatitis     Peripheral neuropathy     Psychiatric disorder     Renal disorder     Shortness of breath with activity    Sleep difficulties     Substance abuse ([facility]     Suicide attempt ([facility] )     Withdrawal symptoms, alcohol ([facility] )        Past Surgical History  Past Surgical History:   Procedure Laterality Date    CHOLECYSTECTOMY LAPAROSCOPIC      FOOT SURGERY      B/L great toe joint replacement    KNEE ARTHROSCOPY      (therapeutic) right knee    [place] EDG US EXAM SURGICAL ALTER STOM DUODENUM/JEJUNUM N/A 10/17/2018    Procedure: LINEAR ENDOSCOPIC U/S;  Surgeon: Sheyla Leary MD;  Location: [facility]; Service: Gastroenterology    VASECTOMY      vas deferens            05/16/21 0905   PT Last Visit   PT Visit Date 05/16/21   Note Type   Note type Evaluation   Pain Assessment   Pain Assessment Tool 0-10   Pain Score 6   Pain Location/Orientation Orientation: Lower; Location: Back   Home Living   Type of [address] [place] [address] Two level  (3 PRASANNA)   Bathroom Shower/Tub Walk-in shower   Bathroom Toilet Standard   Bathroom Equipment Grab bars in shower; Shower chair   [address]   (none reported)   Prior Function   Level of [place] Independent with ADLs and functional mobility   Lives With Spouse  (2 kids)   ADL Assistance Independent   IADLs Independent  (shares tasks with spouse)   Falls in the last 6 months 0   Vocational   (doesn't work)   Restrictions/Precautions   Wells Zulma Bearing Precautions Per Order No   Other Precautions O2;Fall Risk;Pain  (uses O2 at home prn)   General   Family/Caregiver Present Yes   Cognition   Overall Cognitive Status WFL   Arousal/Participation Arousable   Orientation Level Oriented X4   Memory Within functional limits   Following Commands Follows one step commands without difficulty   Comments pt agreeable to PT session   RLE Assessment   RLE Assessment WFL   LLE Assessment   LLE Assessment WFL   Coordination   Sensation   (BLE numbess/tingeling)   Bed Mobility   Supine to Sit 7  Independent   Sit to Supine 7  Independent   Transfers   Sit to Stand 7  Independent   Stand to Sit 7  Independent   Ambulation/Elevation   Gait pattern   (pt was very tired and only agreed to 2 side steps)   Gait Assistance 7  Independent  (pt reported no difficulty with mobility/ NSG agreed)   Assistive Device None   Balance   Static Sitting Normal   Dynamic Sitting Normal   Static Standing Normal   Dynamic Standing Good   Activity Tolerance   Nurse Made Aware RN verbelized pt appropriate for session   Assessment   Prognosis Good   Problem List Pain   Assessment Pt is 46 y o  male seen for PT evaluation s/p admit to [id number] on 5/14/2021 w/ intentional overdose  PT consulted to assess pt's functional mobility and d/c needs  Order placed for PT eval and tx, w/ up and OOB as tolerated order  Comorbidities affecting pt's physical performance at time of assessment include: COPD, fibromyalgia and depression  PTA, pt was ambulates community distances and elevations, has 3 PRASANNA and lives w/ family in 2 level home  Personal factors affecting pt at time of IE include: decreased initiation and engagement and depression  Please find objective findings from PT assessment regarding body systems outlined above with impairments and limitations including pain  Pt's clinical presentation is currently evolving seen in pt's presentation of pain  Pt to benefit from continued PT tx to address deficits as defined above and maximize level of functional independent mobility and consistency  From PT/mobility standpoint, recommendation at time of d/c would be no rehabilitation needs pending progress in order to facilitate return to PLOF  Barriers to Discharge None   Goals   Patient Goals to sleep   PT Treatment Day 0   Recommendation   PT Discharge Recommendation No rehabilitation needs   Additional Comments 2 The patient's AM-PAC Basic Mobility Inpatient Short Form Raw Score is 24, Standardized Score is 57 68   A standardized score greater than 42 9 suggests the patient may benefit from discharge to home  Please also refer to the recommendation of the Physical Therapist for safe discharge planning     AM-PAC Basic Mobility Inpatient   Turning in Bed Without Bedrails 4   Lying on Back to Sitting on Edge of Flat Bed 4   Moving Bed to Chair 4   Standing Up From Chair 4   Walk in Room 4   Climb 3-5 Stairs 4   Basic Mobility Inpatient Raw Score 24   Basic Mobility Standardized Score 57 68       Valarie Fournier, PT

## 2021-05-16 NOTE — PROGRESS NOTES
Progress Note - Behavioral Health     Lukasz Mesa 46 y o  male MRN: [id number]   Unit/Bed#: [facility] 209-01 Encounter: [id number]    Behavior over the last 24 hours: unchanged  Melburn Ezekiel seen in office  Had difficulty getting up this morning and missed breakfast   Appetite is down  Is quite anxious and apprehensive- just received prn atarax  Is depressed and denies SI  States overdose was a suicide attempt  Doesn't remember much from events leading up to admission but states he texted his wife right after he took the overdose  Feels alcohol was a primary contributor to his "acting out" on suicidal thoughts  States he has been "letting my recovery slide" and hasn't spoken to his AA sponsor in almost a year  Sleep: hypersomnia  Appetite: decreased  Medication side effects: none reported  ROS: as above    Mental Status Evaluation:    Appearance:  disheveled   Behavior:  cooperative   Speech:  soft   Mood:  depressed, anxious   Affect:  constricted   Thought Process:  goal directed   Associations: intact associations   Thought Content:  no overt delusions   Perceptual Disturbances: denies auditory hallucinations when asked   Risk Potential: Suicidal ideation - None  Homicidal ideation - None   Sensorium:  oriented to person, place, situation, day of week and year   Memory:  recent and remote memory grossly intact   Consciousness:  alert and awake   Attention: attention span and concentration are age appropriate   Insight:  fair   Judgment: fair   Gait/Station: slow gait   Motor Activity: no abnormal movements     Vital signs in last 24 hours:    Temp:  [97 7 °F (36 5 °C)-99 5 °F (37 5 °C)] 98 6 °F (37 °C)  HR:  [67-90] 79  Resp:  [16-19] 18  BP: (133-149)/(59-72) 141/67    Laboratory results: I have personally reviewed all pertinent laboratory/tests results  Assessment/Plan   Active Problems:    * No active hospital problems   *    Major Depression recurrent severe  MERLYN      Recommended Treatment:   Cont current treatment    Planned medication and treatment changes:  No med changes    All current active medications have been reviewed  Encourage group therapy, milieu therapy and occupational therapy  Behavioral Health checks every 7 minutes  Current Facility-Administered Medications   Medication Dose Route Frequency Provider Last Rate    acetaminophen  650 mg Oral Q4H PRN Eryn Pitcher, CRNP      albuterol  2 puff Inhalation Q4H PRN Miranda Whitman PA-C      benztropine  1 mg Intramuscular Q4H PRN Max 6/day Eryn Pitcher, CRNP      benztropine  0 5 mg Oral Q4H PRN Max 6/day Eryn Pitcher, CRNP      cholestyramine sugar free  4 g Oral BID Bryon Multani, DO      dextromethorphan-guaiFENesin  10 mL Oral Q4H PRN Miranda Whitman PA-C      folic acid  1 mg Oral Daily Bryon Multani, DO      gabapentin  300 mg Oral TID Miranda Whitman PA-C      haloperidol lactate  5 mg Intramuscular Q4H PRN Max 4/day Eryn Pitcher, CRNP      hydrOXYzine HCL  25 mg Oral Q6H PRN Max 4/day Eryn Pitcher, CRNP      hydrOXYzine HCL  50 mg Oral Q6H PRN Max 4/day Eryn Pitcher, CRNP      insulin glargine  36 Units Subcutaneous Q12H [address] Brittney Griffin      insulin lispro  1-6 Units Subcutaneous 4x Daily (AC & HS) Miranda Whitman PA-C      lisinopril  10 mg Oral BID Miranda Whitman PA-C      LORazepam  1 mg Intramuscular Q6H PRN Max 3/day Eryn Pitcher, TYE      LORazepam  1 mg Oral Q6H PRN Max 3/day Eryn Pitcher, CRNP      losartan  25 mg Oral Daily Brittney Griffin      metFORMIN  500 mg Oral BID With Meals Miranda Whitman PA-C      multivitamin-minerals  1 tablet Oral Daily Brittney Griffin      naloxone  0 04 mg Intravenous Q1MIN PRN Rita Abernathy, DO      nicotine  1 patch Transdermal Daily Eryn Pitcher, CRNP      ondansetron  4 mg Oral Q6H PRN Miranda Whitman PA-C      oxyCODONE  2 5 mg Oral Q4H PRN Rita Abernathy, DO      oxyCODONE 5 mg Oral Q4H PRN Janis Flaherty, DO      pantoprazole  40 mg Oral Early Morning Bryon Multani, DO      pioglitazone  30 mg Oral Daily Brittney Wren      propranolol  10 mg Oral BID Keena Weldon PA-C      QUEtiapine  250 mg Oral HS Murray Lux MD      risperiDONE  0 25 mg Oral Q4H PRN Max 6/day Brena Imam, CRNP      risperiDONE  0 5 mg Oral Q4H PRN Max 3/day Brena Imam, CRNP      risperiDONE  1 mg Oral Q4H PRN Max 3/day Brena Imam, CRNP      rOPINIRole  1 mg Oral TID Keena Weldon PA-C      senna-docusate sodium  1 tablet Oral BID Bryon Multani,       sertraline  100 mg Oral Daily Murray Lux MD      thiamine  100 mg Oral Daily Bryon Schneider, DO      tiotropium  18 mcg Inhalation Daily Bryon Multani,       traZODone  50 mg Oral HS PRN Brena Imam, CRNP         Risks / Benefits of Treatment:    Risks, benefits, and possible side effects of medications explained to patient and patient verbalizes understanding and agreement for treatment  Counseling / Coordination of Care: Total floor / unit time spent today 15 minutes  Greater than 50% of total time was spent with the patient and / or family counseling and / or coordination of care  A description of counseling / coordination of care:  Patient's progress discussed with staff in treatment team meeting  Medications, treatment progress and treatment plan reviewed with patient      Danielle Donahue PA-C 05/16/21 Consent was obtained and risks were reviewed including but not limited to scarring, infection, bleeding, scabbing, incomplete removal, and allergy to anesthesia. Price (Use Numbers Only, No Special Characters Or $): 0 Prep Text (Optional): Prior to removal the treatment areas were prepped in the usual fashion. Acne Type: Milial cysts Detail Level: Zone Render Post-Care Instructions In Note?: yes Extraction Method: 11 blade and comedo extractor Post-Care Instructions: I reviewed with the patient in detail post-care instructions. Patient is to keep the treatment areas dry overnight, and then apply bacitracin twice daily until healed. Patient may apply hydrogen peroxide soaks to remove any crusting.

## 2021-05-16 NOTE — NURSING NOTE
Patient appears to have slept through the overnight period without issue  No complaints voiced  No acute behaviors observed  He remains in bed sleeping at this time  Oxygen in place at 2L NC; breathing appears easy and non-labored at rest  Will CTM via q7 minute safety checks

## 2021-05-16 NOTE — PROGRESS NOTES
Progress Note - Yakelin Madison 46 y o  male MRN: [id number]    Unit/Bed#: [facility] Rice 209-01 Encounter: [id number]        Subjective:   Patient seen and examined at bedside after reviewing the chart and discussing the case with the caring staff  Patient examined at bedside  Patient reports most of his home medications have not been reordered since his admission to the ICU  He reports taking metformin 500 mg twice daily and Lantus 36 units twice daily  He has no other acute concerns  Physical Exam   Vitals: Blood pressure 141/67, pulse 79, temperature 98 6 °F (37 °C), temperature source Temporal, resp  rate 18, height 6' (1 829 m), weight 85 3 kg (188 lb), SpO2 93 %  ,Body mass index is 25 5 kg/m²  Constitutional: Patient appears well-developed  HEENT: PERR, EOMI, MMM  Cardiovascular: Normal rate and regular rhythm  Pulmonary/Chest: Effort normal and breath sounds normal    Abdomen: Soft, + BS, NT    Assessment/Plan:  Yakelin Madison is a(n) 46 y o  male with MDD      1  Hypertension  Continue lisinopril and losartan  2  Hypercholesteremia  Continue cholestyramine sugar free 2 times daily  3  Diabetes mellitus  Patient's hemoglobin A1c was 7 4 on 05/11/2021  Continue carb controlled diet, fingerstick glucose 4 times daily before meals and at bedtime, Humalog sliding scale, metformin, Actos, Lantus 36 units twice daily  4  Tobacco use  Patient has nicotine transdermal patch  5  Alcohol abuse  No S/S of withdrawal   Continue folic acid, thiamine, multivitamin  6  GERD  Continue pantoprazole 40 mg daily  7  COPD  Continue Spiriva daily, albuterol as needed, and 2 L nasal cannula  8  Constipation  Continue Senokot S twice daily  9  Chronic pain syndrome  Continue Tylenol and oxycodone as needed  10  Cough  Patient may receive Robitussin DM as needed  The patient was discussed with Dr Fan Quinones and he is in agreement with the above note

## 2021-05-16 NOTE — PROGRESS NOTES
Pt medicated for c/o increased anxiety 8/10 @ 1230 with Atarax 50 mg po as ordered by MD Diego - 18 @ this time  Q 7 min checks maintained to monitor pt's behavior & safety

## 2021-05-16 NOTE — OCCUPATIONAL THERAPY NOTE
Occupational Therapy Evaluation      Ruddy Malik    5/16/2021    Patient Active Problem List   Diagnosis    Quiros esophagus    Benign essential hypertension    COPD [facility]chronic [facility] disease) [facility]    Fatty liver    Fibromyalgia    Generalized anxiety disorder    Hyperlipidemia    Insomnia    Iron deficiency anemia    Recurrent major depressive disorder, in partial remission ([facility]    Nephrolithiasis    Other chronic pain    Sleep disorder    Diabetes mellitus type 1 5, managed as type 1 ([facility]    Acute hyponatremia    GERD (gastroesophageal reflux disease)    Anxiety and depression    History of ETOH abuse    Transaminitis    Tobacco dependence    Chronic pancreatitis (HCC)    Paresthesia of both lower extremities    Hypotension    Alcohol dependence in early, early partial, sustained full, or sustained partial remission ([facility]    Intentional overdose of drug in tablet form ([facility]    Acute respiratory failure ([facility]    DM (diabetes mellitus) ([facility] )       Past Medical History:   Diagnosis Date    Addiction to drug ([facility]     Alcohol abuse 2/20/2019    Allergic rhinitis     last assessed: 12/5/2012    Anemia     Anxiety and depression     Quiros esophagus     Cholelithiasis     Chronic kidney disease     Chronic pain     Chronic pain disorder     COPD (chronic obstructive pulmonary disease) ([facility]     Depression     Diabetes mellitus (HCC)     Emphysema lung (HCC)     Generalized anxiety disorder     GERD (gastroesophageal reflux disease)     Hyperlipidemia     Hypertension     Kidney stone     Metatarsalgia     last assessed: 8/11/2014, unspecified laterality, ? cause  PE with changes  To see DPM tomorrow      Pancreatitis     Peripheral neuropathy     Psychiatric disorder     Renal disorder     Shortness of breath     with activity    Sleep difficulties     Substance abuse ([facility]     Suicide attempt ([facility]     Withdrawal symptoms, alcohol ([facility] ) Past Surgical History:   Procedure Laterality Date    CHOLECYSTECTOMY LAPAROSCOPIC      FOOT SURGERY      B/L great toe joint replacement    KNEE ARTHROSCOPY      (therapeutic) right knee    KS EDG US EXAM SURGICAL ALTER STOM DUODENUM/JEJUNUM N/A 10/17/2018    Procedure: LINEAR ENDOSCOPIC U/S;  Surgeon: Mary Anne Duran MD;  Location: [facility]; Service: Gastroenterology    VASECTOMY      vas deferens        05/16/21 0907   OT Last Visit   OT Visit Date 05/16/21   Note Type   Note type Evaluation   Restrictions/Precautions   Weight Bearing Precautions Per Order No   Other Precautions O2;Fall Risk;Pain   Pain Assessment   Pain Assessment Tool Pain Assessment not indicated - pt denies pain   Pain Score No Pain   Home Living   Type of Home House   Home Layout Two level;Bed/bath upstairs;Stairs to enter with rails  (3 PRASANNA)   Bathroom Shower/Tub Walk-in shower   Bathroom Toilet Standard   Bathroom Equipment Grab bars in shower; Shower chair   [address] [facility]   (no AD used at baseline )   Prior Function   Level of [place] Independent with ADLs and functional mobility   Lives With Spouse  (2 kids)   Receives Help From Family   ADL Assistance Independent   IADLs Independent  (shares tasks with spouse)   Falls in the last 6 months 0   Vocational   (doesn't work)   Comments (+) driving    Lifestyle   Autonomy Patient reporting being independent with ADLs/IADLs, ambulatory with no AD and lives with family in a two story house   Reciprocal Relationships supportive wife and kids    Service to Others does not work    ADL   Eating Assistance 7  Independent   Grooming Assistance 7  [address] [facility] 6  Modified Independent   [address] 5  [address] 6  Modified independent   [address] [facility] 5  [phone]  5  Supervision/Setup   Bed Mobility   Supine to Sit 7  Independent   Sit to Supine 7 Independent   Transfers   Sit to Stand 7  Independent   Stand to Sit 7  Independent   Functional Mobility   Functional Mobility 7  Independent   Additional Comments Pt ambulated short distance towards [facility] with no LOB or SOB    Additional items   (Pt utilized no AD )   Balance   Static Sitting Normal   Dynamic Sitting Normal   Static Standing Normal   Dynamic Standing Good   Activity Tolerance   Activity Tolerance Patient limited by fatigue   Medical Staff Made Aware PT Madison present during eval 2/2 medical and emotional complexity of pt    Nurse Made Aware RN verbalized pt appropriate for therapy and made aware of outcomes    RUE Assessment   RUE Assessment WFL   LUE Assessment   LUE Assessment WFL   Hand Function   Gross Motor Coordination Functional   Fine Motor Coordination Functional   Sensation   Light Touch Partial deficits in the RLE;Partial deficits in the LLE  (numbness in B feet reported )   Vision-Basic Assessment   Current Vision Wears glasses only for reading   Cognition   Overall Cognitive Status [facility]   Arousal/Participation Alert; Responsive; Cooperative   Attention Within functional limits   Orientation Level Oriented X4   Memory Within functional limits   Following Commands Follows one step commands without difficulty   Comments Pt agreeable to OT eval    Assessment   Prognosis Good   Assessment Pt is a 46 y o  male who was admitted to [address] [place] [address] on 5/14/2021 with intentional overdose  At this time, patient is also affected by the comorbidities of: DM, GERD, alcohol abuse, COPD, chronic pain syndrome, MDD, and dry cough  Additionally, patient is affected by the following personal factors:steps to enter environment, behavioral pattern and decreased initiation and engagement   Orders placed for OT evaluation/treatment with up and OOB as tolerated orders   Prior to admission, Patient reporting being independent with ADLs/IADLs, ambulatory with no AD and lives with family in a two story house  Upon OT evaluation, patient requires modified independent  for UB ADLs and supervision/set-up for LB ADLs  Patient presents with functional use of BUEs, with intact prehension and fine motor coordination, and symmetrical muscle strength  Patient appears to be functioning at baseline, no further Acute OT needs identified at this time to warrant OT services  D/C OT and from OT standpoint, recommendation at time of d/c would be No rehabilitation needs     Goals   Patient Goals to go to sleep   Plan   OT Frequency Eval only   Recommendation   OT Discharge Recommendation No rehabilitation needs   OT - OK to Discharge Yes  (Once medically cleared )   AM-PAC Daily Activity Inpatient   Lower Body Dressing 4   Bathing 4   Toileting 4   Upper Body Dressing 4   Grooming 4   Eating 4   Daily Activity Raw Score 24   Daily Activity Standardized Score (Calc for Raw Score >=11) 57 54   AM-PAC Applied Cognition Inpatient   Following a Speech/Presentation 4   Understanding Ordinary Conversation 4   Taking Medications 4   Remembering Where Things Are Placed or Put Away 4   Remembering List of 4-5 Errands 4   Taking Care of Complicated Tasks 4   Applied Cognition Raw Score 24   Applied Cognition Standardized Score 62 21   Sunshine Wong, OT

## 2021-05-16 NOTE — PROGRESS NOTES
Pt up ad ziggy & gait is steady  Pt is on continuous Nasal O2 @ 2L/min via cannula  Scattered inspiratory & expiratory wheezes noted upon auscultation  Pt is dyspneic on exertion  Occasional dry cough noted & medicated for cough @ 0820 with Robitussin DM as ordered by MD  Pt c/o depression 7/10 & anxiety 7/10  Pt denies any hallucinations, suicidal or homicidal ideations  Q 7 min checks maintained to monitor pt's behavior & safety  Pt is flat & withdrawn to room & comes out for meals only  Pt medicated for lower back & bilateral leg pain @ 0818 with Oxy-IR po as ordered by MD with relief obtained  Pt is cooperative & compliant with medications

## 2021-05-17 PROBLEM — E83.42 HYPOMAGNESEMIA: Status: ACTIVE | Noted: 2021-05-17

## 2021-05-17 PROBLEM — F33.2 MDD (MAJOR DEPRESSIVE DISORDER), RECURRENT EPISODE, SEVERE (HCC): Status: ACTIVE | Noted: 2021-05-17

## 2021-05-17 LAB
25(OH)D3 SERPL-MCNC: 24.2 NG/ML (ref 30–100)
BACTERIA BLD CULT: NORMAL
BACTERIA BLD CULT: NORMAL
FIBRINOGEN PPP-MCNC: 423 MG/DL (ref 227–495)
GLUCOSE SERPL-MCNC: 141 MG/DL (ref 65–140)
GLUCOSE SERPL-MCNC: 143 MG/DL (ref 65–140)
GLUCOSE SERPL-MCNC: 266 MG/DL (ref 65–140)
GLUCOSE SERPL-MCNC: 277 MG/DL (ref 65–140)
THROMBIN TIME: 16.5 SECONDS (ref 14.7–18.4)
VIT B12 SERPL-MCNC: 617 PG/ML (ref 100–900)

## 2021-05-17 PROCEDURE — 82948 REAGENT STRIP/BLOOD GLUCOSE: CPT

## 2021-05-17 PROCEDURE — 99232 SBSQ HOSP IP/OBS MODERATE 35: CPT | Performed by: PSYCHIATRY & NEUROLOGY

## 2021-05-17 RX ORDER — QUETIAPINE FUMARATE 200 MG/1
200 TABLET, FILM COATED ORAL
Status: DISCONTINUED | OUTPATIENT
Start: 2021-05-17 | End: 2021-06-01 | Stop reason: HOSPADM

## 2021-05-17 RX ORDER — QUETIAPINE FUMARATE 200 MG/1
200 TABLET, FILM COATED ORAL
Status: DISCONTINUED | OUTPATIENT
Start: 2021-05-17 | End: 2021-05-17

## 2021-05-17 RX ORDER — MELATONIN
1000 DAILY
Status: DISCONTINUED | OUTPATIENT
Start: 2021-07-05 | End: 2021-06-01 | Stop reason: HOSPADM

## 2021-05-17 RX ORDER — ERGOCALCIFEROL 1.25 MG/1
50000 CAPSULE ORAL WEEKLY
Status: DISCONTINUED | OUTPATIENT
Start: 2021-05-17 | End: 2021-06-01 | Stop reason: HOSPADM

## 2021-05-17 RX ORDER — ROPINIROLE 1 MG/1
1 TABLET, FILM COATED ORAL 2 TIMES DAILY
Status: DISCONTINUED | OUTPATIENT
Start: 2021-05-17 | End: 2021-05-27

## 2021-05-17 RX ADMIN — ONDANSETRON 4 MG: 4 TABLET, ORALLY DISINTEGRATING ORAL at 21:35

## 2021-05-17 RX ADMIN — PIOGLITAZONE 30 MG: 15 TABLET ORAL at 09:22

## 2021-05-17 RX ADMIN — GABAPENTIN 300 MG: 300 CAPSULE ORAL at 17:06

## 2021-05-17 RX ADMIN — CHOLESTYRAMINE 4 G: 4 POWDER, FOR SUSPENSION ORAL at 09:19

## 2021-05-17 RX ADMIN — LOSARTAN POTASSIUM 25 MG: 25 TABLET, FILM COATED ORAL at 09:21

## 2021-05-17 RX ADMIN — FOLIC ACID 1 MG: 1 TABLET ORAL at 09:20

## 2021-05-17 RX ADMIN — PANTOPRAZOLE SODIUM 40 MG: 40 TABLET, DELAYED RELEASE ORAL at 06:05

## 2021-05-17 RX ADMIN — GABAPENTIN 300 MG: 300 CAPSULE ORAL at 21:28

## 2021-05-17 RX ADMIN — SENNOSIDES AND DOCUSATE SODIUM 1 TABLET: 8.6; 5 TABLET ORAL at 09:23

## 2021-05-17 RX ADMIN — INSULIN GLARGINE 36 UNITS: 100 INJECTION, SOLUTION SUBCUTANEOUS at 09:20

## 2021-05-17 RX ADMIN — GABAPENTIN 300 MG: 300 CAPSULE ORAL at 09:20

## 2021-05-17 RX ADMIN — Medication 1 PATCH: at 09:22

## 2021-05-17 RX ADMIN — SENNOSIDES AND DOCUSATE SODIUM 1 TABLET: 8.6; 5 TABLET ORAL at 17:07

## 2021-05-17 RX ADMIN — PROPRANOLOL HYDROCHLORIDE 10 MG: 10 TABLET ORAL at 09:23

## 2021-05-17 RX ADMIN — CHOLESTYRAMINE 4 G: 4 POWDER, FOR SUSPENSION ORAL at 17:10

## 2021-05-17 RX ADMIN — ROPINIROLE HYDROCHLORIDE 1 MG: 1 TABLET, FILM COATED ORAL at 09:19

## 2021-05-17 RX ADMIN — ROPINIROLE HYDROCHLORIDE 1 MG: 1 TABLET, FILM COATED ORAL at 21:28

## 2021-05-17 RX ADMIN — PROPRANOLOL HYDROCHLORIDE 10 MG: 10 TABLET ORAL at 17:07

## 2021-05-17 RX ADMIN — OXYCODONE HYDROCHLORIDE 5 MG: 5 TABLET ORAL at 21:35

## 2021-05-17 RX ADMIN — THIAMINE HCL TAB 100 MG 100 MG: 100 TAB at 09:23

## 2021-05-17 RX ADMIN — OXYCODONE HYDROCHLORIDE 5 MG: 5 TABLET ORAL at 17:12

## 2021-05-17 RX ADMIN — LISINOPRIL 10 MG: 10 TABLET ORAL at 09:21

## 2021-05-17 RX ADMIN — OXYCODONE HYDROCHLORIDE 5 MG: 5 TABLET ORAL at 10:00

## 2021-05-17 RX ADMIN — METFORMIN HYDROCHLORIDE 500 MG: 500 TABLET, FILM COATED ORAL at 17:06

## 2021-05-17 RX ADMIN — INSULIN LISPRO 3 UNITS: 100 INJECTION, SOLUTION INTRAVENOUS; SUBCUTANEOUS at 17:08

## 2021-05-17 RX ADMIN — SERTRALINE HYDROCHLORIDE 100 MG: 100 TABLET ORAL at 09:23

## 2021-05-17 RX ADMIN — TIOTROPIUM BROMIDE 18 MCG: 18 CAPSULE ORAL; RESPIRATORY (INHALATION) at 09:23

## 2021-05-17 RX ADMIN — ERGOCALCIFEROL 50000 UNITS: 1.25 CAPSULE ORAL at 14:19

## 2021-05-17 RX ADMIN — INSULIN GLARGINE 36 UNITS: 100 INJECTION, SOLUTION SUBCUTANEOUS at 21:28

## 2021-05-17 RX ADMIN — MULTIPLE VITAMINS W/ MINERALS TAB 1 TABLET: TAB ORAL at 09:22

## 2021-05-17 RX ADMIN — METFORMIN HYDROCHLORIDE 500 MG: 500 TABLET, FILM COATED ORAL at 09:22

## 2021-05-17 RX ADMIN — QUETIAPINE FUMARATE 200 MG: 200 TABLET ORAL at 21:28

## 2021-05-17 RX ADMIN — INSULIN LISPRO 3 UNITS: 100 INJECTION, SOLUTION INTRAVENOUS; SUBCUTANEOUS at 11:48

## 2021-05-17 NOTE — PLAN OF CARE
Problem: Risk for Self Injury/Neglect  Goal: Verbalize thoughts and feelings  Description: Interventions:  - Assess and re-assess patient's lethality and potential for self-injury  - Engage patient in 1:1 interactions, daily, for a minimum of 15 minutes  - Encourage patient to express feelings, fears, frustrations, hopes  - Establish rapport/trust with patient   Outcome: Progressing     Problem: Risk for Self Injury/Neglect  Goal: Refrain from harming self  Description: Interventions:  - Monitor patient closely, per order  - Develop a trusting relationship  - Supervise medication ingestion, monitor effects and side effects   Outcome: Progressing

## 2021-05-17 NOTE — PLAN OF CARE
Problem: DISCHARGE PLANNING  Goal: Discharge to home or other facility with appropriate resources  Description: INTERVENTIONS:  - Identify barriers to discharge w/patient and caregiver  - Arrange for needed discharge resources and transportation as appropriate  - Identify discharge learning needs (meds, wound care, etc )  - Arrange for interpretive services to assist at discharge as needed  - Refer to Case Management Department for coordinating discharge planning if the patient needs post-hospital services based on physician/advanced practitioner order or complex needs related to functional status, cognitive ability, or social support system  Outcome: Progressing     Pt new to unit; initial goal added

## 2021-05-17 NOTE — SOCIAL WORK
SW attempted to meet with patient; pt currently asleep in bed with O2 via nasal cannula present; pt did not easily awaken at SW attempt to wake; SW will try again later

## 2021-05-17 NOTE — PROGRESS NOTES
PRN oxy administered for c/o back and leg pain #7 was effective in decreasing pain  Continue to monitor

## 2021-05-17 NOTE — NURSING NOTE
Patient out of his room intermittently throughout AM  Mood depressed/ affect congruent  Self reports levels of depression/ anxiety at a 7 out of 10  Denies psychotic sx or thoughts of self harm  Reports a number of past psych and substance abuse inpatient treatments  Reports longest period of sobriety as 11 years; most recently sober for 2 years prior to relapse prior to Easter  Able to identify a number of additional stressors including, relationship, finance, progressive feelings of inadequacy/failure, rejection of disability claim, multiple medical issues  Has been cooperative with med administration and most  unit protocol  Did not attend AM group  Slept in room intermittently  Will continue to monitor/assess/ and attempt to meet needs presented

## 2021-05-17 NOTE — NURSING NOTE
Patient appears to be sleeping without difficulty throughout the night during 7 minute safety checks  No behavioral issues  No complaints or concerns  Will continue to monitor safety and behaviors

## 2021-05-17 NOTE — PROGRESS NOTES
05/17/21 1442   Team Meeting   Meeting Type Tx Team Meeting   Initial Conference Date 05/17/21   Next Conference Date 06/16/21   Team Members Present   Team Members Present Physician;Nurse;   Physician Team Member Dr Mari Fowler MD   Nursing Team Member Val Staton RN   Social Work Team Member ZACHARY Bond   Patient/Family Present   Patient Present Yes   Patient's Family Present No     Initial Plan    Patient and treatment team met and reviewed pt strengths, limitations, coping skills, treatment plan and goals; pt agreeable and signed; copy on chart

## 2021-05-17 NOTE — PROGRESS NOTES
05/17/21 [address] of Res[address] [place]   Patient Information   Mental Status Alert   Primary Caregiver Self   Support System Immediate family;Community   Confucianism/Cultural Requests Hindu   Legal Information   Tx Plan Signed Yes   Current Status: 201   Advance Directives   (none)   Advance Directives Status Discussed - Information Provided   Activities of Daily Living Prior to Admission   Functional Status Independent   Assistive Device No device needed   Living Arrangement House;Lives with someone   Ambulation Independent   Access to Firearms   Access to Firearms Yes   Describe Access to Weapons hunting rifles   Is there someone that can secure the firearms?  Patient Refused  (pt states "I am not in danger of using them to kill myself")   Income Information   Income Source   (no income)   rubberit [facility] of Transport to [facility]: Yaritza Woo

## 2021-05-17 NOTE — TREATMENT PLAN
TREATMENT PLAN REVIEW - Tere Miller 46 y o  1970 male MRN: [id number]    [address] [facility]  Room / Bed: Germain Flaherty [facility] Encounter: [id number]          Admit Date/Time:  5/14/2021  9:05 PM    Treatment Team: Attending Provider: Eusebia Galaviz MD; Patient Care Technician: Phan Mcgregor; Consulting Physician: Giselle Chacon MD; Consulting Physician: Jatin Earl DPM; Patient Care Assistant: Celia Felipe; Patient Care Technician: Seth Hassan; Certified Nursing Assistant: Aubrey Rodríguez; Certified Nursing Assistant: Graciela Myles; Certified Nursing Assistant: Jere Viveros; Certified Nursing Assistant: Gabriella Cid;  Patient Care Assistant: Hudson Bowen; Care Manager: Cristóbal Schneider; Care Manager: Spencer Parham RN; Registered Nurse: Heriberto Dale RN    Diagnosis: Principal Problem:    MDD (major depressive disorder), recurrent episode, severe ([facility] )  Active Problems:    Quiros esophagus    Benign essential hypertension    COPD (chronic obstructive pulmonary disease) ([facility] )    Fatty liver    Generalized anxiety disorder    Insomnia    Hyponatremia    GERD (gastroesophageal reflux disease)    History of ETOH abuse    Chronic pancreatitis ([facility] )    Intentional overdose of drug in tablet form ([facility] )    DM (diabetes mellitus) ([place] [address] )    Hypomagnesemia      Patient Strengths/Assets: negotiates basic needs, patient is on a voluntary commitment, ambulatory    Patient Barriers/Limitations: difficulty adapting, poor physical health, poor self-care, substance abuse    Short Term Goals: decrease in depressive symptoms, decrease in anxiety symptoms, decrease in suicidal thoughts, ability to stay safe on the unit, improvement in insight, improvement in reasoning ability, improvement in self care, sleep improvement, improvement in appetite, mood stabilization, acceptance of need for psychiatric treatment, acceptance of psychiatric medications    Long Term Goals: improvement in depression, improvement in anxiety, resolution of depressive symptoms, stabilization of mood, free of suicidal thoughts, improvement in reasoning ability, acceptance of need for psychiatric medications, acceptance of need for psychiatric treatment, adequate self care, adequate sleep, adequate appetite, adequate oral intake, appropriate interaction with peers    Progress Towards Goals: starting psychiatric medications as prescribed    Recommended Treatment: medication management, patient medication education, group therapy, milieu therapy, continued Behavioral Health psychiatric evaluation/assessment process, medical follow up with medical team    Treatment Frequency: daily medication monitoring, group and milieu therapy daily, monitoring through interdisciplinary rounds, monitoring through weekly patient care conferences    Expected Discharge Date: 10 midnights    Discharge Plan: will be determined    Treatment Plan Created/Updated By: Ronal Ramirez MD

## 2021-05-17 NOTE — NURSING NOTE
Patient out in the milieu withdrawn to self  Ate HS snack and attended group  Upon approach patient's mood and affect is flat and depressed  Patient endorses high anxiety and depression  Patient is guarded with conversation  Denies SI/HI/AHVH  CIWA is 2  Complained of 7/10 B/L leg pain  Gave 5 mg PRN Oxycodone as ordered  Refused lisinopril patient stated, "I stopped taking that medication because Dr started me on losartan " Accucheck was 197  Gave 2 units of Humalog  Took  HS medications without difficulty  Will continue to monitor safety and behaviors every 7 minutes

## 2021-05-17 NOTE — PROGRESS NOTES
Progress Note - Ladene Drain 46 y o  male MRN: [id number]    Unit/Bed#: Rollen Hodgkin 209-01 Encounter: [id number]        Subjective:   Patient seen and examined at bedside after reviewing the chart and discussing the case with the caring staff  Patient examined at bedside  Patient reports that his lisinopril was discontinued with history of pancreatitis and he was supposed to just take Cozaar  On review of patient's labs it was found it that patient's vitamin-D levels were low 24 2 but B12 levels were within normal limits 617  Physical Exam   Vitals: Blood pressure 123/62, pulse 82, temperature 98 7 °F (37 1 °C), temperature source Temporal, resp  rate 16, height 6' (1 829 m), weight 85 3 kg (188 lb), SpO2 92 %  ,Body mass index is 25 5 kg/m²  Constitutional: Patient appears well-developed  HEENT: PERR, EOMI, MMM  Cardiovascular: Normal rate and regular rhythm  Pulmonary/Chest: Effort normal and breath sounds normal    Abdomen: Soft, + BS, NT    Assessment/Plan:  Ladene Drain is a(n) 46 y o  male with MDD      1  Cardiac with history of Hypertension and dyslipidemia  Patient will be continued on Cozaar 25 mg daily  I will discontinue patient's lisinopril  Continue cholestyramine sugar free 2 times daily  2  Diabetes mellitus  Patient's hemoglobin A1c was 7 4 on 05/11/2021  Continue carb controlled diet, fingerstick glucose 4 times daily before meals and at bedtime, Humalog sliding scale, metformin, Actos, Lantus 36 units twice daily  3  Tobacco use  Patient has nicotine transdermal patch  4  Alcohol abuse with history of multiple acute pancreatitis  No S/S of withdrawal   Continue folic acid, thiamine, multivitamin  Patient follows up with specialists for his pancreatitis  5  GERD  Continue pantoprazole 40 mg daily  6  COPD  Continue Spiriva daily, albuterol as needed, and 2 L nasal cannula  7  Constipation  Continue Senokot S twice daily  8  Chronic pain syndrome    Continue Tylenol and oxycodone as needed  9  Cough  Patient may receive Robitussin DM as needed  10  Dry skin over the face  Patient will be offered moisturizing cream for the face  11  New vitamin-D deficiency  I will put the patient on vitamin-D bolus doses for 8 weeks followed by vitamin D3 1000 units daily

## 2021-05-17 NOTE — PROGRESS NOTES
05/17/21   Team Meeting   Meeting Type Daily Rounds   Team Members Present   Team Members Present Nurse;Physician;Occupational Therapist;   Physician Team Member Dr Lucius Caldwell MD; TYE [place]   Nursing Team Member Jamee Dutta RN   [facility]   OT Team Member Kassie Liuhmann [place]   Patient/Family Present   Patient Present No   Patient's Family Present No     New; 10; SA via OD - suicide note left; on unit: withdrawn, flat, quiet; endorses dep/anx; stressors: relapsed on ETOH, financial stressors; slept; med compliant

## 2021-05-17 NOTE — PROGRESS NOTES
Progress Note - Behavioral Health   Kendall Cuevas 46 y o  male MRN: [id number]  Unit/Bed#: Carlos Ham 209-01 Encounter: [id number]    Assessment/Plan   Principal Problem:    MDD (major depressive disorder), recurrent episode, severe ([facility] )  Active Problems:    Quiros esophagus    Benign essential hypertension    COPD (chronic obstructive pulmonary disease) (HCC)    Fatty liver    Generalized anxiety disorder    Insomnia    Hyponatremia    GERD (gastroesophageal reflux disease)    History of ETOH abuse    Chronic pancreatitis (HCC)    Intentional overdose of drug in tablet form ([facility] )    DM (diabetes mellitus) ([facility] )    Hypomagnesemia      Behavior over the last 24 hours:  unchanged  Sleep: slept better  Appetite: normal  Medication side effects: No  ROS: no complaints, all other systems are negative for acute changes    Mental Status Evaluation:  Appearance:  casually dressed and glasses   Behavior:  calm and cooperative   Speech:  Normal rate, rhythm, volume   Mood:  anxious and mild restless   Affect:  constricted   Thought Process:  Linear   Thought Content:  focus on getting better    Perceptual Disturbances: None   Risk Potential: Suicidal Ideations passive SI  Homicidal Ideations none  Potential for Aggression No   Sensorium:  person and place   Memory:  recent and remote memory grossly intact   Consciousness:  alert and awake    Attention: attention span and concentration were age appropriate   Insight:  limited   Judgment: limited   Gait/Station: normal gait/station   Motor Activity: no abnormal movements     Progress Toward Goals: Patient reports ongoing depressed mood, low energy and guilty feeling following OD in a SA  He admits he had about 8 drinks during when he took the pills  He states that his wife was upset that he relapsed on alcohol  Patient remains with limited insight with his current physical decline related to chronic alcohol misuse   He has been compliant medication and denies any current side effects  Recommended Treatment: Continue with group therapy, milieu therapy and occupational therapy  Risks, benefits and possible side effects of Medications:   Risks, benefits, and possible side effects of medications explained to patient and patient verbalizes understanding  Medications: all current active meds have been reviewed  Labs: I have personally reviewed all pertinent laboratory/tests results  Most Recent Labs:   Lab Results   Component Value Date    WBC 6 80 05/11/2021    RBC 4 89 05/11/2021    HGB 14 7 05/11/2021    HCT 43 8 05/11/2021     05/16/2021    RDW 15 0 (H) 05/11/2021    NEUTROABS 4 10 05/11/2021    SODIUM 129 (L) 05/16/2021    K 4 3 05/16/2021    CL 96 (L) 05/16/2021    CO2 23 05/16/2021    BUN 17 05/16/2021    CREATININE 0 86 05/16/2021    GLUC 239 (H) 05/16/2021    GLUF 219 (H) 07/18/2019    CALCIUM 9 4 05/16/2021    AST 21 05/16/2021    ALT 19 05/16/2021    ALKPHOS 85 05/16/2021    TP 7 3 05/16/2021    ALB 3 7 05/16/2021    TBILI 0 50 05/16/2021    CHOLESTEROL 157 02/21/2019    HDL 44 02/21/2019    TRIG 188 (H) 02/21/2019    LDLCALC 75 02/21/2019    NONHDLC 113 02/21/2019    AMMONIA 50 05/11/2021    [id number] 1 680 05/11/2021    HGBA1C 7 4 (H) 05/11/2021     05/11/2021       Counseling / Coordination of Care  Total floor / unit time spent today 20 minutes  Greater than 50% of total time was spent with the patient and / or family counseling and / or coordination of care

## 2021-05-18 LAB
GLUCOSE SERPL-MCNC: 119 MG/DL (ref 65–140)
GLUCOSE SERPL-MCNC: 183 MG/DL (ref 65–140)
GLUCOSE SERPL-MCNC: 197 MG/DL (ref 65–140)
GLUCOSE SERPL-MCNC: 75 MG/DL (ref 65–140)

## 2021-05-18 PROCEDURE — 99232 SBSQ HOSP IP/OBS MODERATE 35: CPT | Performed by: PSYCHIATRY & NEUROLOGY

## 2021-05-18 PROCEDURE — 82948 REAGENT STRIP/BLOOD GLUCOSE: CPT

## 2021-05-18 RX ADMIN — ROPINIROLE HYDROCHLORIDE 1 MG: 1 TABLET, FILM COATED ORAL at 21:41

## 2021-05-18 RX ADMIN — PIOGLITAZONE 30 MG: 15 TABLET ORAL at 09:22

## 2021-05-18 RX ADMIN — PANTOPRAZOLE SODIUM 40 MG: 40 TABLET, DELAYED RELEASE ORAL at 06:02

## 2021-05-18 RX ADMIN — CHOLESTYRAMINE 4 G: 4 POWDER, FOR SUSPENSION ORAL at 09:19

## 2021-05-18 RX ADMIN — METFORMIN HYDROCHLORIDE 500 MG: 500 TABLET, FILM COATED ORAL at 17:11

## 2021-05-18 RX ADMIN — CHOLESTYRAMINE 4 G: 4 POWDER, FOR SUSPENSION ORAL at 17:09

## 2021-05-18 RX ADMIN — OXYCODONE HYDROCHLORIDE 5 MG: 5 TABLET ORAL at 17:18

## 2021-05-18 RX ADMIN — OXYCODONE HYDROCHLORIDE 5 MG: 5 TABLET ORAL at 21:52

## 2021-05-18 RX ADMIN — GABAPENTIN 300 MG: 300 CAPSULE ORAL at 17:11

## 2021-05-18 RX ADMIN — QUETIAPINE FUMARATE 200 MG: 200 TABLET ORAL at 21:41

## 2021-05-18 RX ADMIN — METFORMIN HYDROCHLORIDE 500 MG: 500 TABLET, FILM COATED ORAL at 09:23

## 2021-05-18 RX ADMIN — GABAPENTIN 300 MG: 300 CAPSULE ORAL at 21:41

## 2021-05-18 RX ADMIN — MULTIPLE VITAMINS W/ MINERALS TAB 1 TABLET: TAB ORAL at 09:21

## 2021-05-18 RX ADMIN — INSULIN GLARGINE 36 UNITS: 100 INJECTION, SOLUTION SUBCUTANEOUS at 09:32

## 2021-05-18 RX ADMIN — THIAMINE HCL TAB 100 MG 100 MG: 100 TAB at 09:21

## 2021-05-18 RX ADMIN — ROPINIROLE HYDROCHLORIDE 1 MG: 1 TABLET, FILM COATED ORAL at 09:20

## 2021-05-18 RX ADMIN — TIOTROPIUM BROMIDE 18 MCG: 18 CAPSULE ORAL; RESPIRATORY (INHALATION) at 09:27

## 2021-05-18 RX ADMIN — GABAPENTIN 300 MG: 300 CAPSULE ORAL at 09:22

## 2021-05-18 RX ADMIN — SERTRALINE HYDROCHLORIDE 100 MG: 100 TABLET ORAL at 09:21

## 2021-05-18 RX ADMIN — MAGNESIUM OXIDE TAB 400 MG (241.3 MG ELEMENTAL MG) 400 MG: 400 (241.3 MG) TAB at 17:10

## 2021-05-18 RX ADMIN — INSULIN LISPRO 2 UNITS: 100 INJECTION, SOLUTION INTRAVENOUS; SUBCUTANEOUS at 21:41

## 2021-05-18 RX ADMIN — INSULIN LISPRO 1 UNITS: 100 INJECTION, SOLUTION INTRAVENOUS; SUBCUTANEOUS at 17:16

## 2021-05-18 RX ADMIN — FOLIC ACID 1 MG: 1 TABLET ORAL at 09:22

## 2021-05-18 RX ADMIN — Medication 1 PATCH: at 09:24

## 2021-05-18 RX ADMIN — SENNOSIDES AND DOCUSATE SODIUM 1 TABLET: 8.6; 5 TABLET ORAL at 17:10

## 2021-05-18 RX ADMIN — SENNOSIDES AND DOCUSATE SODIUM 1 TABLET: 8.6; 5 TABLET ORAL at 09:21

## 2021-05-18 RX ADMIN — INSULIN GLARGINE 36 UNITS: 100 INJECTION, SOLUTION SUBCUTANEOUS at 21:40

## 2021-05-18 NOTE — PROGRESS NOTES
Progress Note - Behavioral Health   Jere Hollis 46 y o  male MRN: [id number]  Unit/Bed#: Maday Escalante 209-01 Encounter: [id number]    Assessment/Plan   Principal Problem:    MDD (major depressive disorder), recurrent episode, severe ([facility] )  Active Problems:    Quiros esophagus    Benign essential hypertension    COPD (chronic obstructive pulmonary disease) (HCC)    Fatty liver    Generalized anxiety disorder    Insomnia    Hyponatremia    GERD (gastroesophageal reflux disease)    History of ETOH abuse    Chronic pancreatitis (HCC)    Intentional overdose of drug in tablet form ([facility] )    DM (diabetes mellitus) ([facility] )    Hypomagnesemia      Behavior over the last 24 hours:  Limited improvement  Sleep: sleep on and off  Appetite: normal  Medication side effects: No  ROS: no complaints, all other system are negative for acute changes    Mental Status Evaluation:  Appearance:  age appropriate   Behavior:  cooperative   Speech:  normal volume   Mood:  depressed   Affect:  mood-congruent   Thought Process:  goal directed   Thought Content:  no overt delusions   Perceptual Disturbances: None   Risk Potential: Suicidal Ideations without plan  Homicidal Ideations none  Potential for Aggression No   Sensorium:  person, place and time/date   Memory:  recent and remote memory grossly intact   Consciousness:  alert and awake    Attention: attention span and concentration were age appropriate   Insight:  limited   Judgment: fair   Gait/Station: normal gait/station   Motor Activity: no abnormal movements     Progress Toward Goals: Patient reports ongoing depressed mood, anxiety, hopelessness and difficulty coping with life stressors  Still verbalizes suicidal ideation but denies any active plan or intent to hurt himself  Patient has been compliant with medication and denies any current side effects  Recommended Treatment: Continue with group therapy, milieu therapy and occupational therapy        Risks, benefits and possible side effects of Medications:   Risks, benefits, and possible side effects of medications explained to patient and patient verbalizes understanding  Medications: all current active meds have been reviewed  Labs: I have personally reviewed all pertinent laboratory/tests results  Most Recent Labs:   Lab Results   Component Value Date    WBC 6 80 05/11/2021    RBC 4 89 05/11/2021    HGB 14 7 05/11/2021    HCT 43 8 05/11/2021     05/16/2021    RDW 15 0 (H) 05/11/2021    NEUTROABS 4 10 05/11/2021    SODIUM 129 (L) 05/16/2021    K 4 3 05/16/2021    CL 96 (L) 05/16/2021    CO2 23 05/16/2021    BUN 17 05/16/2021    CREATININE 0 86 05/16/2021    GLUC 239 (H) 05/16/2021    GLUF 219 (H) 07/18/2019    CALCIUM 9 4 05/16/2021    AST 21 05/16/2021    ALT 19 05/16/2021    ALKPHOS 85 05/16/2021    TP 7 3 05/16/2021    ALB 3 7 05/16/2021    TBILI 0 50 05/16/2021    CHOLESTEROL 157 02/21/2019    HDL 44 02/21/2019    TRIG 188 (H) 02/21/2019    LDLCALC 75 02/21/2019    NONHDLC 113 02/21/2019    AMMONIA 50 05/11/2021    [id number] 1 680 05/11/2021    HGBA1C 7 4 (H) 05/11/2021     05/11/2021       Counseling / Coordination of Care  Total floor / unit time spent today 20 minutes  Greater than 50% of total time was spent with the patient and / or family counseling and / or coordination of care

## 2021-05-18 NOTE — PLAN OF CARE
Problem: DISCHARGE PLANNING  Goal: Discharge to home or other facility with appropriate resources  Description: INTERVENTIONS:  - Identify barriers to discharge w/patient and caregiver  - Arrange for needed discharge resources and transportation as appropriate  - Identify discharge learning needs (meds, wound care, etc )  - Arrange for interpretive services to assist at discharge as needed  - Refer to Case Management Department for coordinating discharge planning if the patient needs post-hospital services based on physician/advanced practitioner order or complex needs related to functional status, cognitive ability, or social support system  Outcome: Progressing     Pt progressing; no DC date - will DC home upon stabilization with outpt services

## 2021-05-18 NOTE — PLAN OF CARE
Problem: Ineffective Coping  Goal: Cooperates with admission process  Description: Interventions:   - Complete admission process  Outcome: Progressing  Goal: Identifies healthy coping skills  Outcome: Progressing  Goal: Participates in unit activities  Description: Interventions:  - Provide therapeutic environment   - Provide required programming   - Redirect inappropriate behaviors   Outcome: Progressing  Goal: Patient/Family participate in treatment and DC plans  Description: Interventions:  - Provide therapeutic environment  Outcome: Progressing  Goal: Patient/Family verbalizes awareness of resources  Outcome: Progressing     Problem: Depression  Goal: Treatment Goal: Demonstrate behavioral control of depressive symptoms, verbalize feelings of improved mood/affect, and adopt new coping skills prior to discharge  Outcome: Progressing  Goal: Refrain from isolation  Description: Interventions:  - Develop a trusting relationship   - Encourage socialization   Outcome: Progressing  Goal: Refrain from self-neglect  Outcome: Progressing     Problem: Anxiety  Goal: Anxiety is at manageable level  Description: Interventions:  - Assess and monitor patient's anxiety level  - Monitor for signs and symptoms (heart palpitations, chest pain, shortness of breath, headaches, nausea, feeling jumpy, restlessness, irritable, apprehensive)  - Collaborate with interdisciplinary team and initiate plan and interventions as ordered    - [place] patient to unit/surroundings  - Explain treatment plan  - Encourage participation in care  - Encourage verbalization of concerns/fears  - Identify coping mechanisms  - Assist in developing anxiety-reducing skills  - Administer/offer alternative therapies  - Limit or eliminate stimulants  Outcome: Progressing     Problem: SLEEP DISTURBANCE  Goal: Will exhibit normal sleeping pattern  Description: Interventions:  -  Assess the patients sleep pattern, noting recent changes  - Administer medication as ordered  - Decrease environmental stimuli, including noise, as appropriate during the night  - Encourage the patient to actively participate in unit groups and or exercise during the day to enhance ability to achieve adequate sleep at night  - Assess the patient, in the morning, encouraging a description of sleep experience  Outcome: Progressing     Problem: Risk for Self Injury/Neglect  Goal: Treatment Goal: Remain safe during length of stay, learn and adopt new coping skills, and be free of self-injurious ideation, impulses and acts at the time of discharge  Outcome: Progressing  Goal: Verbalize thoughts and feelings  Description: Interventions:  - Assess and re-assess patient's lethality and potential for self-injury  - Engage patient in 1:1 interactions, daily, for a minimum of 15 minutes  - Encourage patient to express feelings, fears, frustrations, hopes  - Establish rapport/trust with patient   Outcome: Progressing  Goal: Refrain from harming self  Description: Interventions:  - Monitor patient closely, per order  - Develop a trusting relationship  - Supervise medication ingestion, monitor effects and side effects   Outcome: Progressing     Problem: Ineffective Coping  Goal: Participates in unit activities  Description: Interventions:  - Provide therapeutic environment   - Provide required programming   - Redirect inappropriate behaviors   Outcome: Progressing

## 2021-05-18 NOTE — UTILIZATION REVIEW
Inpatient Admission Authorization Request   NOTIFICATION OF INPATIENT ADMISSION/INPATIENT AUTHORIZATION REQUEST   SERVICING FACILITY:   [address] [place] [address], [place] [address] [address] [facility]  Tax ID: [id number]  NPI: [id number]  Place of Service: Inpatient [address]  [address]  Place of Service Code: 24     ATTENDING PROVIDER:  Attending Name and NPI#: Speer Damaris [[id number]]  Address: [address] [place] [address] [address] [facility]  Phone: [phone]     UTILIZATION REVIEW CONTACT:  Mary Sheridan Utilization   Network Utilization Review Department  Phone: [phone]  Fax: [phone]  Email: [email or web address]     PHYSICIAN ADVISORY SERVICES:  FOR VNWO-VB-VBBY REVIEW - MEDICAL NECESSITY DENIAL  Phone: [phone]  Fax: [phone]  Email: [email or web address]     TYPE OF REQUEST:  Inpatient Status     ADMISSION INFORMATION:  ADMISSION DATE/TIME: 5/11/21 2114  PATIENT DIAGNOSIS CODE/DESCRIPTION:  Polysubstance overdose [T50 901A]  DISCHARGE DATE/TIME: 5/14/2021  8:44 PM  DISCHARGE DISPOSITION (IF DISCHARGED): [facility]     IMPORTANT INFORMATION:  Please contact the Mary Sheridan directly with any questions or concerns regarding this request  Department voicemails are confidential     Send requests for admission clinical reviews, concurrent reviews, approvals, and administrative denials due to lack of clinical to fax [phone]

## 2021-05-18 NOTE — SOCIAL WORK
[facility]; TRISTAN provided Ena Victoria contact information with patient - pt took information and states he will call CM later today

## 2021-05-18 NOTE — PROGRESS NOTES
Progress Note - Jennifer Escalante 46 y o  male MRN: [id number]    Unit/Bed#: Jonathan Jackson 209-01 Encounter: [id number]        Subjective:   Patient seen and examined at bedside after reviewing the chart and discussing the case with the caring staff  Patient examined at bedside  Patient does not report any acute issues  Physical Exam   Vitals: Blood pressure 91/56, pulse 78, temperature 97 8 °F (36 6 °C), temperature source Temporal, resp  rate 18, height 6' (1 829 m), weight 85 3 kg (188 lb), SpO2 95 %  ,Body mass index is 25 5 kg/m²  Constitutional: Patient appears well-developed  HEENT: PERR, EOMI, MMM  Cardiovascular: Normal rate and regular rhythm  Pulmonary/Chest: Effort normal and breath sounds normal    Abdomen: Soft, + BS, NT    Assessment/Plan:  Jennifer Escalante is a(n) 46 y o  male with MDD      1  Cardiac with history of Hypertension and dyslipidemia  Patient will be continued on Cozaar 25 mg daily  I will discontinue patient's lisinopril  Continue cholestyramine sugar free 2 times daily  2  Diabetes mellitus  Patient's hemoglobin A1c was 7 4 on 05/11/2021  Continue carb controlled diet, fingerstick glucose 4 times daily before meals and at bedtime, Humalog sliding scale, metformin, Actos, Lantus 36 units twice daily  3  Tobacco use  Patient has nicotine transdermal patch  4  Alcohol abuse with history of multiple acute pancreatitis  No S/S of withdrawal   Continue folic acid, thiamine, multivitamin  Patient follows up with specialists for his pancreatitis  5  GERD  Continue pantoprazole 40 mg daily  6  COPD  Continue Spiriva daily, albuterol as needed, and 2 L nasal cannula  7  Constipation  Continue Senokot S twice daily  8  Chronic pain syndrome  Continue Tylenol and oxycodone as needed  9  Cough  Patient may receive Robitussin DM as needed  10  Dry skin over the face  Patient will be offered moisturizing cream for the face  11  New vitamin-D deficiency    I will put the patient on vitamin-D bolus doses for 8 weeks followed by vitamin D3 1000 units daily

## 2021-05-18 NOTE — PROGRESS NOTES
05/18/21    Team Meeting   Meeting Type Daily Rounds   Team Members Present   Team Members Present Physician;Nurse;;Occupational Therapist   Physician Team Member Dr May Hollis MD   Nursing Team Member Raj Ibarra RN   Care Management Team Member MS Nati, [facility]   Patient/Family Present   Patient Present No   Patient's Family Present No   Rates anxiety and depression 4/10, pleasant and cooperative  Withdrawn to room  Slept  Medical complexities

## 2021-05-18 NOTE — NURSING NOTE
Pt received on the unit awake and alert in the [facility] , he denies any depression, anxiety or SI, able to make needs known, pleasant and cooperative, pt c/o of pain in his legs , back and feet , prn oxy administered, pt also had c/o of nausea, prn zofran administered as well  compliant with medications and positive for pm snack  Q7 minute checks continued , will continue to monitor

## 2021-05-18 NOTE — PROGRESS NOTES
Patient refusing to use oxygen despite encouragement  SPO2 90% room air  No acute respiratory distress  Skin pink, warm, dry

## 2021-05-18 NOTE — PLAN OF CARE
Problem: Ineffective Coping  Goal: Participates in unit activities  Description: Interventions:  - Provide therapeutic environment   - Provide required programming   - Redirect inappropriate behaviors   Outcome: Progressing   Has been cooperative/ receptive to approach  Mood remains depressed/ flattened affect  Has attended both AM RT groups  Attentive, empathetic, actively participated  Offered appropriate comments/feedback to members  Shared experience of recent attempt at self harm in order to support a peer struggling with similar feelings

## 2021-05-18 NOTE — UTILIZATION REVIEW
Initial Clinical Review    Admission: Date/Time/Statement:   Admission Orders (From admission, onward)     Ordered        05/11/21 2134  Inpatient Admission  Once         Signed and Held  ED TO DIFFERENT CAMPUS [facility] UNIT or INPATIENT MEDICAL UNIT to [facility] UNIT (using Discharge Readmit Navigator) - Admit Patient to [address] [place] [address]  Once,   Status:  Canceled                   Orders Placed This Encounter   Procedures    Inpatient Admission     Standing Status:   Standing     Number of Occurrences:   1     Order Specific Question:   Level of Care     Answer:   Critical Care [15]     Order Specific Question:   Estimated length of stay     Answer:   More than 2 Midnights     Order Specific Question:   Certification     Answer:   I certify that inpatient services are medically necessary for this patient for a duration of greater than two midnights  See H&P and MD Progress Notes for additional information about the patient's course of treatment  Initial Presentation: 47 yo m w/hx depression, anxiety, etoh abuse, recurrent pancreatitis, copd, dm, htn, martinez's esophagus transferred from [address] [place] [address] ED [place] [facility], admitted as inpatient to critical care due to intentional polypharmacy overdose  Presented initially to [facility] ED after wife found him unresponsive with a suicide note and empty seroquel/propanolol/ropinirole bottles  He was intubated due to encephalopathy, found hypotensive requiring pressors, etoh level 90, central line placed, toxicology consulted, and decision made to transfer to [facility] for higher level of care  On arrival to [facility], intubated and unresponsive, GCS 7 as he withdraws and localized to painful stimuli  EKG nsr  Remains vented, a line/joseph in place, on pressors, with IVF and nebs in progress  Sedation being held       Date: 5/12   Day 2: Dx is acute hypoxic resp failure from intentional drug overdose, suicide attempt, hypotension from drug overdose, hyponatremia  extubated this am, awake, oriented, weak voice, feels tired; afebrile; lines and joseph dc  Lungs clear, abd soft  CXR shows diffuse bilateral infiltrates-suspect due to negative pressure pulm edema, not infection  Giving IV lasix x 1  Psych consulted, 1:1 watch in progress  Starting diet  Plan to transfer to med surg tele later today  Per psych: Dx: likely consequential of MDD, severe, recurrent, without psychotic features sv  Substance induced mood disorder in presence of alcoholic relapse  Monitor CIWA, should have IP psych admit once medically stable  He will sign 201, but if he becomes unstable or attempts to leave, then initiate 302       CIWA [email or web address] due to tremor and anxiety    ED Triage Vitals   Temperature Pulse Respirations Blood Pressure SpO2   05/11/21 2200 05/11/21 2200 05/11/21 2200 05/11/21 2200 05/11/21 2200   (!) 94 3 °F (34 6 °C) 68 (!) 25 97/65 100 %      Temp Source Heart Rate Source Patient Position - Orthostatic VS BP Location FiO2 (%)   05/11/21 2200 05/11/21 2200 05/11/21 2200 05/11/21 2200 --   Bladder Monitor Lying Left arm       Pain Score       05/12/21 0358       Med Not Given for Pain - for MAR use only          Wt Readings from Last 1 Encounters:   05/13/21 91 8 kg (202 lb 6 1 oz)     Additional Vital Signs:   Date/Time  Temp  Pulse  Resp BP  MAP (mmHg)  Arterial Line BP  MAP  SpO2   Nasal Cannula O2 Flow Rate (L/min)  O2 Device    05/12/21 1000  97 9 °F (36 6 °C)  78  14 --  --  108/52  70 mmHg  93 %   --  --    05/12/21 0900  98 2 °F (36 8 °C)  78  14 --  --  106/50  68 mmHg  92 %   --  --    05/12/21 0844  98 2 °F (36 8 °C)  80  16 121/73  95  118/56  76 mmHg  92 %   4 L/min  Nasal cannula    05/12/21 0800  97 9 °F (36 6 °C)  74  14 --  --  112/52  72 mmHg  99 %   --  --    05/12/21 0745  --  --  -- --  --  --  --  94 %   --  --    05/12/21 0725  --  --  -- --  --  --  --  99 %   --  --    05/12/21 0600  98 2 °F (36 8 °C)  76  7Abnormal  --  --  106/50  68 mmHg 99 %   --  --    05/12/21 0500  98 2 °F (36 8 °C)  78  12 --  --  130/58  80 mmHg  99 %   --  --    05/12/21 0402  --  --  -- --  --  --  --  100 %   --  --    05/12/21 0400  97 5 °F (36 4 °C)  90  11Abnormal  --  --  148/64  88 mmHg  --   --  --    05/12/21 0300  97 2 °F (36 2 °C)Abnormal   74  16 --  --  114/58  76 mmHg  100 %   --  --    05/12/21 0200  96 4 °F (35 8 °C)Abnormal   72  16 --  --  122/56  78 mmHg  100 %   --  --    05/12/21 0100  95 4 °F (35 2 °C)Abnormal   70  15 --  --  132/62  84 mmHg  100 %   --  --    05/12/21 0000  94 3 °F (34 6 °C)Abnormal   66  15 --  --  140/68  92 mmHg  100 %   --  --    05/11/21 2300  93 9 °F (34 4 °C)Abnormal   66  12 123/78  91  148/76  108 mmHg  100 %   --  --    05/11/21 2235  --  --  -- --  --  --  --  100 %   --  --    05/11/21 2200  94 3 °F (34 6 °C)Abnormal   68  25Abnormal  97/65  79  --  --  100 %   --  Ventilator        Pertinent Labs/Diagnostic Test Results:   5/11 [email or web address] tube 6 cm above the michelle    Severe bilateral ground glass opacity which may be due to edema or pneumonia      5/11 PCXR @[facility] Moderate congestive changes with associated infiltrates predominantly on the right unchanged  Prominent hilar densities  Lymphadenopathy cannot be excluded  5/11 EKG#1 Normal sinus rhythm  Poor R wave progression  Prolonged QT    5/11 EKG#2 Normal sinus rhythm  Rightward axis  Prolonged QT    5/11 EKG#3 Normal sinus rhythm  Right axis deviation  T wave abnormality, consider anterior ischemia  Prolonged QT  Abnormal ECG      XR chest portable ICU done at [address]   Final Result by Abdifatah Gonzalez MD (05/12 6448)   1  Stable tubes and lines without pneumothorax  2   Persistent extensive bilateral airspace and interstitial opacities        Workstation performed: [id number]             Results from last 7 days   Lab Units 05/11/21  1901   SARS-COV-2  Negative     Results from last 7 days   Lab Units 05/16/21 2202 05/11/21  1842   WBC Thousand/uL  --  6 80   HEMOGLOBIN g/dL  --  14 7   HEMATOCRIT %  --  43 8   PLATELETS Thousands/uL 193 192   NEUTROS ABS Thousands/µL  --  4 10         Results from last 7 days   Lab Units 05/16/21 2202 05/13/21 0502 05/12/21  0525 05/12/21  0433 05/11/21 2221 05/11/21  1842   SODIUM mmol/L 129* 137 138  --  132* 129*   POTASSIUM mmol/L 4 3 4 4 3 8  --  4 0 3 6   CHLORIDE mmol/L 96* 105 105  --  100 98   CO2 mmol/L 23 30 26  --  23 22   ANION GAP mmol/L 10 2* 7  --  9 9   BUN mg/dL 17 11 7  --  9 9   CREATININE mg/dL 0 86 0 90 0 80  --  0 69 0 73   EGFR ml/min/1 73sq m 99 97 102  --  108 106   CALCIUM mg/dL 9 4 8 2* 7 1*  --  7 5* 7 8*   CALCIUM, IONIZED mmol/L  --   --   --   --  1 04*  --    MAGNESIUM mg/dL 1 3*  --   --  1 7  --   --    PHOSPHORUS mg/dL  --   --  2 2*  --  3 4  --      Results from last 7 days   Lab Units 05/16/21 2202 05/11/21  1842   AST U/L 21 14   ALT U/L 19 8   ALK PHOS U/L 85 88   TOTAL PROTEIN g/dL 7 3 6 8   ALBUMIN g/dL 3 7 3 6   TOTAL BILIRUBIN mg/dL 0 50 0 40   AMMONIA umol/L  --  50     Results from last 7 days   Lab Units 05/18/21  1128 05/18/21  0744 05/17/21 1957 05/17/21  1626 05/17/21  1124 05/17/21  0729 05/16/21 2000 05/16/21  1622 05/16/21  1218 05/16/21  0728 05/15/21  1928 05/15/21  1601   POC GLUCOSE mg/dl 119 75 141* 277* 266* 143* 197* 255* 148* 222* 240* 215*     Results from last 7 days   Lab Units 05/16/21 2202 05/13/21 0502 05/12/21  0525 05/11/21 2221 05/11/21  1842   GLUCOSE RANDOM mg/dL 239* 230* 123 175* 198*         Results from last 7 days   Lab Units 05/11/21  2325   HEMOGLOBIN A1C % 7 4*   EAG mg/dl 166        Results from last 7 days   Lab Units 05/12/21  0525 05/11/21  2326 05/11/21  1909   PH ART  7 384 7 289* 7 160*   PCO2 ART mm Hg 40 5 47 6* 61 5*   PO2 ART mm Hg 124 7 295 9* 90 0   HCO3 ART mmol/L 23 6 22 3 21 2*   BASE EXC ART mmol/L -1 3 -4 5 -8 4*   O2 CONTENT ART mL/dL 18 3 20 1 18 2   O2 HGB, ARTERIAL % 96 2 95 8 89 0*   ABG SOURCE   --  Line, Arterial Radial, Left Results from last 7 days   Lab Units 05/11/21  1842   TROPONIN I ng/mL <0 03         Results from last 7 days   Lab Units 05/16/21  2202   PROTIME seconds 13 8   INR  1 07   PTT seconds 30     Results from last 7 days   Lab Units 05/11/21  1842   TSH 3RD GENERATON uIU/mL 1 680     Results from last 7 days   Lab Units 05/13/21  0502 05/11/21  2325   PROCALCITONIN ng/ml <0 05 <0 05     Results from last 7 days   Lab Units 05/11/21  2221   LIPASE u/L 15*             Results from last 7 days   Lab Units 05/11/21  1843   CLARITY UA  Clear   COLOR UA  Yellow   SPEC GRAV UA  >=1 030*   PH UA  6 0   GLUCOSE UA mg/dl 1+*   KETONES UA mg/dl Negative   BLOOD UA  2+*   PROTEIN UA mg/dl Trace*   NITRITE UA  Negative   BILIRUBIN UA  Negative   UROBILINOGEN UA E U /dl 0 2   LEUKOCYTES UA  Negative   WBC UA /hpf 1-2   RBC UA /hpf 10-20*   BACTERIA UA /hpf None Seen   EPITHELIAL CELLS WET PREP /hpf Occasional             Results from last 7 days   Lab Units 05/11/21  1843   AMPH/METH  Negative   BARBITURATE UR  Negative   BENZODIAZEPINE UR  Positive*   COCAINE UR  Negative   METHADONE URINE  Negative   OPIATE UR  Negative   PCP UR  Negative   THC UR  Negative     Results from last 7 days   Lab Units 05/11/21  1842   ETHANOL LVL mg/dL 90 1*   ACETAMINOPHEN LVL ug/mL <14*   SALICYLATE LVL mg/dL <5*                 Results from last 7 days   Lab Units 05/12/21  0833 05/11/21  2222 05/11/21  2221   BLOOD CULTURE   --  No Growth After 5 Days  No Growth After 5 Days     SPUTUM CULTURE  3+ Growth of   --   --    GRAM STAIN RESULT  2+ Polys*  1+ Gram positive cocci in pairs*  --   --        Past Medical History:   Diagnosis Date    Addiction to drug ([facility] [address] )     Alcohol abuse 2/20/2019    Allergic rhinitis     last assessed: 12/5/2012    Anemia     Anxiety and depression     Quiros esophagus     Cholelithiasis     Chronic kidney disease     Chronic pain     Chronic pain disorder     COPD (chronic obstructive pulmonary disease) ([place] [address] )     Depression     Diabetes mellitus ([place] [address] )     Emphysema lung ([place] [address] )     Generalized anxiety disorder     GERD (gastroesophageal reflux disease)     Hyperlipidemia     Hypertension     Kidney stone     Metatarsalgia     last assessed: 8/11/2014, unspecified laterality, ? cause  PE with changes  To see DPM tomorrow   Pancreatitis     Peripheral neuropathy     Psychiatric disorder     Renal disorder     Shortness of breath     with activity    Sleep difficulties     Substance abuse ([place] [address] )     Suicide attempt ([place] [address] )     Withdrawal symptoms, alcohol ([place] [address] )      Present on Admission:   Intentional overdose of drug in tablet form ([place] [address] )   Acute respiratory failure ([place] [address] )   Quiros esophagus   COPD (chronic obstructive pulmonary disease) (HCC)   Chronic pancreatitis (HCC)   Hyperlipidemia      Admitting Diagnosis: Polysubstance overdose [T50 901A]  Age/Sex: 46 y o  male  Admission Orders:  Scheduled Medications:  cholestyramine sugar free, 4 g, Oral, BID  enoxaparin, 40 mg, Subcutaneous, Q24H SELINA  insulin lispro, 1-6 Units, Subcutaneous, TID AC  pantoprazole, 40 mg, Oral, Early Morning  tiotropium, 18 mcg, Inhalation, Daily  xopenex + atrovent nebs tid    cefepime (MAXIPIME) 2,000 mg in dextrose 5 % 50 mL IVPB   Dose: 2,000 mg  Freq: Every 12 hours Route: IV  Last Dose: Stopped (05/12/21 0030)  Start: 05/11/21 2300 End: 05/12/21 0857  furosemide (LASIX) injection 20 mg   Dose: 20 mg  Freq:  Once Route: IV  Start: 05/12/21 0900 End: 05/12/21 0901  metroNIDAZOLE (FLAGYL) IVPB (premix) 500 mg 100 mL   Dose: 500 mg  Freq: Every 8 hours Route: IV  Last Dose: 500 mg (05/12/21 6432)  Start: 05/11/21 2300 End: 05/12/21 0857  vancomycin (VANCOCIN) 1,750 mg in sodium chloride 0 9 % 500 mL IVPB   Dose: 20 mg/kg  Weight Dosing Info: 93 5 kg  Freq: Every 12 hours Route: IV  Last Dose: 1,750 mg (05/12/21 0045)  Start: 05/12/21 0000 End: 05/12/21 0857    Continuous IV Infusions:fentaNYL 1000 mcg in sodium chloride 0 9% 100mL infusion   Rate: 5 mL/hr Dose: 50 mcg/hr  Freq: Continuous Route: IV  Last Dose: Stopped (05/12/21 0637)  Start: 05/12/21 0415 End: 05/12/21 0637  NOREPINEPHRINE 4 MG  ML NSS (CMPD ORDER) infusion   Rate: 3 8-112 5 mL/hr Dose: 1-30 mcg/min  Freq: Titrated Route: IV  Last Dose: Stopped (05/12/21 0820)  Start: 05/11/21 2115 End: 05/12/21 0857  propofol (DIPRIVAN) 1000 mg in 100 mL infusion (premix)   Rate: 2 9-28 6 mL/hr Dose: 5-50 mcg/kg/min  Weight Dosing Info: 95 2 kg  Freq: Titrated Route: IV  Last Dose: Stopped (05/12/21 0836)  Start: 05/12/21 0615 End: 05/12/21 0857  sodium chloride 0 9 % infusion   Rate: 125 mL/hr Dose: 125 mL/hr  Freq: Continuous Route: IV  Indications of Use: IV Hydration  Last Dose: Stopped (05/12/21 0930)  Start: 05/11/21 2230 End: 05/12/21 0857  No current facility-administered medications for this encounter  PRN Meds: IV fentanyl x 1 [email or web address]  No current facility-administered medications for this encounter  Restraints  Tele  SCD  Diabetic diet    IP CONSULT TO CASE MANAGEMENT  IP CONSULT TO PHARMACY  IP CONSULT TO PSYCHIATRY    Network Utilization Review Department  ATTENTION: Please call with any questions or concerns to [phone] and carefully listen to the prompts so that you are directed to the right person  All voicemails are confidential   Dannielle Giraldo all requests for admission clinical reviews, approved or denied determinations and any other requests to dedicated fax number below belonging to the campus where the patient is receiving treatment   List of dedicated fax numbers for the Facilities:  [address] DENIALS (Administrative/Medical Necessity) [phone]   [address] (Maternity/NICU/Pediatrics) 261 [facility] [facility] [place] [facility] [address] [id number] [place] [address] [place] [phone] [place] [address] [phone]

## 2021-05-18 NOTE — NURSING NOTE
Patient observed in his room at the time of assessment  He is pleasant, polite, cooperative  Withdrawn to room, self  Appetite is good- 100% of meals  Compliant with medications  He rates his anxiety and depression a 4/10  Denies SI/HI and hallucinations  Pt states he is always in pain- back, legs, feet- denies interventions at this time  He is able to express his needs  Will CTM  Q7 minute safety checks in progress

## 2021-05-19 LAB
GLUCOSE SERPL-MCNC: 101 MG/DL (ref 65–140)
GLUCOSE SERPL-MCNC: 103 MG/DL (ref 65–140)
GLUCOSE SERPL-MCNC: 171 MG/DL (ref 65–140)
GLUCOSE SERPL-MCNC: 193 MG/DL (ref 65–140)
GLUCOSE SERPL-MCNC: 47 MG/DL (ref 65–140)

## 2021-05-19 PROCEDURE — 99232 SBSQ HOSP IP/OBS MODERATE 35: CPT | Performed by: PSYCHIATRY & NEUROLOGY

## 2021-05-19 PROCEDURE — 82948 REAGENT STRIP/BLOOD GLUCOSE: CPT

## 2021-05-19 RX ADMIN — MAGNESIUM OXIDE TAB 400 MG (241.3 MG ELEMENTAL MG) 400 MG: 400 (241.3 MG) TAB at 08:02

## 2021-05-19 RX ADMIN — OXYCODONE HYDROCHLORIDE 5 MG: 5 TABLET ORAL at 07:59

## 2021-05-19 RX ADMIN — GABAPENTIN 300 MG: 300 CAPSULE ORAL at 16:17

## 2021-05-19 RX ADMIN — QUETIAPINE FUMARATE 200 MG: 200 TABLET ORAL at 21:55

## 2021-05-19 RX ADMIN — TIOTROPIUM BROMIDE 18 MCG: 18 CAPSULE ORAL; RESPIRATORY (INHALATION) at 08:06

## 2021-05-19 RX ADMIN — SERTRALINE HYDROCHLORIDE 100 MG: 100 TABLET ORAL at 08:01

## 2021-05-19 RX ADMIN — METFORMIN HYDROCHLORIDE 500 MG: 500 TABLET, FILM COATED ORAL at 08:03

## 2021-05-19 RX ADMIN — PIOGLITAZONE 30 MG: 15 TABLET ORAL at 08:01

## 2021-05-19 RX ADMIN — PROPRANOLOL HYDROCHLORIDE 10 MG: 10 TABLET ORAL at 17:32

## 2021-05-19 RX ADMIN — THIAMINE HCL TAB 100 MG 100 MG: 100 TAB at 08:06

## 2021-05-19 RX ADMIN — METFORMIN HYDROCHLORIDE 500 MG: 500 TABLET, FILM COATED ORAL at 16:17

## 2021-05-19 RX ADMIN — SENNOSIDES AND DOCUSATE SODIUM 1 TABLET: 8.6; 5 TABLET ORAL at 17:33

## 2021-05-19 RX ADMIN — LOSARTAN POTASSIUM 25 MG: 25 TABLET, FILM COATED ORAL at 08:02

## 2021-05-19 RX ADMIN — OXYCODONE HYDROCHLORIDE 5 MG: 5 TABLET ORAL at 12:01

## 2021-05-19 RX ADMIN — Medication 1 PATCH: at 08:03

## 2021-05-19 RX ADMIN — PROPRANOLOL HYDROCHLORIDE 10 MG: 10 TABLET ORAL at 08:02

## 2021-05-19 RX ADMIN — INSULIN GLARGINE 36 UNITS: 100 INJECTION, SOLUTION SUBCUTANEOUS at 21:55

## 2021-05-19 RX ADMIN — GABAPENTIN 300 MG: 300 CAPSULE ORAL at 08:03

## 2021-05-19 RX ADMIN — CHOLESTYRAMINE 4 G: 4 POWDER, FOR SUSPENSION ORAL at 08:03

## 2021-05-19 RX ADMIN — INSULIN LISPRO 1 UNITS: 100 INJECTION, SOLUTION INTRAVENOUS; SUBCUTANEOUS at 21:57

## 2021-05-19 RX ADMIN — FOLIC ACID 1 MG: 1 TABLET ORAL at 08:02

## 2021-05-19 RX ADMIN — INSULIN LISPRO 2 UNITS: 100 INJECTION, SOLUTION INTRAVENOUS; SUBCUTANEOUS at 11:37

## 2021-05-19 RX ADMIN — MAGNESIUM OXIDE TAB 400 MG (241.3 MG ELEMENTAL MG) 400 MG: 400 (241.3 MG) TAB at 17:32

## 2021-05-19 RX ADMIN — PANTOPRAZOLE SODIUM 40 MG: 40 TABLET, DELAYED RELEASE ORAL at 05:55

## 2021-05-19 RX ADMIN — MULTIPLE VITAMINS W/ MINERALS TAB 1 TABLET: TAB ORAL at 08:01

## 2021-05-19 RX ADMIN — OXYCODONE HYDROCHLORIDE 5 MG: 5 TABLET ORAL at 17:33

## 2021-05-19 RX ADMIN — ROPINIROLE HYDROCHLORIDE 1 MG: 1 TABLET, FILM COATED ORAL at 21:55

## 2021-05-19 RX ADMIN — SENNOSIDES AND DOCUSATE SODIUM 1 TABLET: 8.6; 5 TABLET ORAL at 08:02

## 2021-05-19 RX ADMIN — HYDROXYZINE HYDROCHLORIDE 50 MG: 25 TABLET, FILM COATED ORAL at 11:51

## 2021-05-19 RX ADMIN — GABAPENTIN 300 MG: 300 CAPSULE ORAL at 21:55

## 2021-05-19 RX ADMIN — ROPINIROLE HYDROCHLORIDE 1 MG: 1 TABLET, FILM COATED ORAL at 08:06

## 2021-05-19 RX ADMIN — CHOLESTYRAMINE 4 G: 4 POWDER, FOR SUSPENSION ORAL at 17:33

## 2021-05-19 NOTE — PROGRESS NOTES
Pt medicated for increased anxiety 7/10 @ 1150 with Atarax 50 mg po as ordered by MD with moderate relief obtained  [facility] - 18 @ that time  Q 7 min checks maintained to monitor pt's behavior & safety
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
## 2021-05-19 NOTE — NURSING NOTE
Patient was observed to be in the community this evening  Attended group and snack time  He was social with peers  Appropriate during staff interactions  No acute behaviors noted  He rates anxiety and depression as improved with a rating of 3/10, denies SI, HI and hallucinations  Patient was medication compliant at [facility]; also requested and received PRN Oxycodone at 2152 for 7/10 b/l leg and feet plus lower back pain  CIWA scoring performed with a result of 2  Patient was on room air this evening with an oxygen saturation of 95%  No obvious signs of respiratory distress observed  Patient verbalizes agreement with wearing oxygen via NC at HS  Will CTM via q7 minute safety checks

## 2021-05-19 NOTE — PROGRESS NOTES
Progress Note - Behavioral Health   Elina Rodriguez 46 y o  male MRN: [id number]  Unit/Bed#: Juan Zaragoza 209-01 Encounter: [id number]    Assessment/Plan   Principal Problem:    MDD (major depressive disorder), recurrent episode, severe ([facility] [address] )  Active Problems:    Quiros esophagus    Benign essential hypertension    COPD (chronic obstructive pulmonary disease) (HCC)    Fatty liver    Generalized anxiety disorder    Insomnia    Hyponatremia    GERD (gastroesophageal reflux disease)    History of ETOH abuse    Chronic pancreatitis (HCC)    Intentional overdose of drug in tablet form ([facility] [address] )    DM (diabetes mellitus) ([facility] [address] )    Hypomagnesemia    Behavior over the last 24 hours:  Unchanged  Sleep: increased  Appetite: normal  Medication side effects: No  ROS:  All other systems are negative for acute changes    Mental Status Evaluation:  Appearance:  casually dressed   Behavior:  cooperative   Speech:  soft, delayed    Mood:  Depressed, apathetic   Affect:  blunted   Thought Process:  goal directed and logical   Thought Content:  No overt delusions   Perceptual Disturbances: None   Risk Potential: Suicidal Ideations passive wish to die  Homicidal Ideations none  Potential for Aggression No   Sensorium:  person, place, time/date, situation, day of week, month of year and year   Memory:  recent and remote memory grossly intact   Consciousness:  alert, awake and drowsy    Attention: attention span and concentration were age appropriate   Insight:  limited   Judgment: fair   Gait/Station: normal gait/station   Motor Activity: no abnormal movements     Progress Toward Goals: Patient reports feeling depressed with ongoing passive wish to die but active suicidal plan  Admits that alcohol abuse exacerbates his depression  and he was amenable to discussed alcohol as a depressant and alcohol induced depression   Discussed recent decrease in effectiveness with Zoloft, but may be partially d/t alcohol abuse and is willing to try a different antidepressant  Insight into his alcohol use remain very limited  Calorie intake and medication compliant has been stable  Recommended Treatment: Continue with group therapy, milieu therapy and occupational therapy  Risks, benefits and possible side effects of Medications:   Risks, benefits, and possible side effects of medications explained to patient and patient verbalizes understanding  Medications: all current active meds have been reviewed  Labs: I have personally reviewed all pertinent laboratory/tests results  Most Recent Labs:   Lab Results   Component Value Date    WBC 6 80 05/11/2021    RBC 4 89 05/11/2021    HGB 14 7 05/11/2021    HCT 43 8 05/11/2021     05/16/2021    RDW 15 0 (H) 05/11/2021    NEUTROABS 4 10 05/11/2021    SODIUM 129 (L) 05/16/2021    K 4 3 05/16/2021    CL 96 (L) 05/16/2021    CO2 23 05/16/2021    BUN 17 05/16/2021    CREATININE 0 86 05/16/2021    GLUC 239 (H) 05/16/2021    GLUF 219 (H) 07/18/2019    CALCIUM 9 4 05/16/2021    AST 21 05/16/2021    ALT 19 05/16/2021    ALKPHOS 85 05/16/2021    TP 7 3 05/16/2021    ALB 3 7 05/16/2021    TBILI 0 50 05/16/2021    CHOLESTEROL 157 02/21/2019    HDL 44 02/21/2019    TRIG 188 (H) 02/21/2019    LDLCALC 75 02/21/2019    NONHDLC 113 02/21/2019    AMMONIA 50 05/11/2021    [id number] 1 680 05/11/2021    HGBA1C 7 4 (H) 05/11/2021     05/11/2021       Counseling / Coordination of Care  Total floor / unit time spent today 20 minutes  Greater than 50% of total time was spent with the patient and / or family counseling and / or coordination of care

## 2021-05-19 NOTE — PROGRESS NOTES
05/19/21 1300   Activity/Group Checklist   Group   (Communication Styles and Art Therapy Processing)   Attendance Attended   Attendance Duration (min) 46-60   Interactions Interacted appropriately   Affect/Mood Appropriate;Calm   Goals Achieved Identified feelings; Discussed coping strategies; Able to listen to others; Able to engage in interactions

## 2021-05-19 NOTE — PROGRESS NOTES
Progress Note - Lawanda Gallegos 46 y o  male MRN: [id number]    Unit/Bed#: Shaan Raza 209-01 Encounter: [id number]        Subjective:   Patient seen and examined at bedside after reviewing the chart and discussing the case with the caring staff  Patient examined at bedside  Patient does not report any acute issues  Physical Exam   Vitals: Blood pressure 136/80, pulse 90, temperature 97 5 °F (36 4 °C), temperature source Temporal, resp  rate 18, height 6' (1 829 m), weight 85 3 kg (188 lb), SpO2 94 %  ,Body mass index is 25 5 kg/m²  Constitutional: Patient appears well-developed  HEENT: PERR, EOMI, MMM  Cardiovascular: Normal rate and regular rhythm  Pulmonary/Chest: Effort normal and breath sounds normal    Abdomen: Soft, + BS, NT    Assessment/Plan:  Lawanda Gallegos is a(n) 46 y o  male with MDD      1  Cardiac with history of Hypertension and dyslipidemia  Patient will be continued on Cozaar 25 mg daily  I will discontinue patient's lisinopril  Continue cholestyramine sugar free 2 times daily  2  Diabetes mellitus  Patient's hemoglobin A1c was 7 4 on 05/11/2021  Continue carb controlled diet, fingerstick glucose 4 times daily before meals and at bedtime, Humalog sliding scale, metformin, Actos, Lantus 36 units twice daily  3  Tobacco use  Patient has nicotine transdermal patch  4  Alcohol abuse with history of multiple acute pancreatitis  No S/S of withdrawal   Continue folic acid, thiamine, multivitamin  Patient follows up with specialists for his pancreatitis  5  GERD  Continue pantoprazole 40 mg daily  6  COPD  Continue Spiriva daily, albuterol as needed, and 2 L nasal cannula  7  Constipation  Continue Senokot S twice daily  8  Chronic pain syndrome  Continue Tylenol and oxycodone as needed  9  Cough  Patient may receive Robitussin DM as needed  10  Dry skin over the face  Patient will be offered moisturizing cream for the face  11  New vitamin-D deficiency    I will put the patient on vitamin-D bolus doses for 8 weeks followed by vitamin D3 1000 units daily  The patient was discussed with Dr Andrea Velasco and he is in agreement with the above note

## 2021-05-19 NOTE — PROGRESS NOTES
Pt's Accucheck - 47 @ 0637 & pt received 240 cc orange juice  Repeat Accucheck - 103 @ 0825  Pt asymptomatic & skin warm & dry to touch  Pt c/o depression 3/10 & anxiety 3/10  Pt denies any hallucinations, suicidal or homicidal ideations  Q 7 min checks maintained to monitor pt's behavior & safety  Pt on Nasal O2 via cannula @ 2L/min  Lungs clear upon auscultation  Pt is dyspneic on exertion  Occasional dry cough noted  Pt up ad ziggy & gait is steady  Pt is flat & withdrawn  Pt is cooperative & compliant with medications

## 2021-05-19 NOTE — PLAN OF CARE
Problem: DISCHARGE PLANNING  Goal: Discharge to home or other facility with appropriate resources  Description: INTERVENTIONS:  - Identify barriers to discharge w/patient and caregiver  - Arrange for needed discharge resources and transportation as appropriate  - Identify discharge learning needs (meds, wound care, etc )  - Arrange for interpretive services to assist at discharge as needed  - Refer to Case Management Department for coordinating discharge planning if the patient needs post-hospital services based on physician/advanced practitioner order or complex needs related to functional status, cognitive ability, or social support system  Outcome: Progressing     Pt progressing;  No DC date

## 2021-05-19 NOTE — PROGRESS NOTES
05/19/21   Team Meeting   Meeting Type Daily Rounds   Team Members Present   Team Members Present Physician;Nurse;    Physician Team Member Dr Karen Sharma MD   Nursing Team Member Alexey Canseco RN   Social Work Team Member [place], [place]   Patient/Family Present   Patient Present No   Patient's Family Present No     No DC date - will return home upon stabilization; dep/anx 3/10; sugar 52 today; wife expressed concerns re: suicidality and necessary rehab needs

## 2021-05-19 NOTE — NURSING NOTE
Patient appears to have slept through the overnight period without issue  No acute behaviors noted  No complaints voiced  He remains in bed sleeping at this time  Oxygen maintained on 2L NC; breathing has been easy and non-labored  Spot-checked oxygen saturation at 0230 with a result of 93% on 2L  Respirations 17  Will CTM via q7 minute safety checks

## 2021-05-20 LAB
GLUCOSE SERPL-MCNC: 112 MG/DL (ref 65–140)
GLUCOSE SERPL-MCNC: 124 MG/DL (ref 65–140)
GLUCOSE SERPL-MCNC: 131 MG/DL (ref 65–140)
GLUCOSE SERPL-MCNC: 161 MG/DL (ref 65–140)
GLUCOSE SERPL-MCNC: 167 MG/DL (ref 65–140)
GLUCOSE SERPL-MCNC: 51 MG/DL (ref 65–140)

## 2021-05-20 PROCEDURE — 99232 SBSQ HOSP IP/OBS MODERATE 35: CPT | Performed by: PSYCHIATRY & NEUROLOGY

## 2021-05-20 PROCEDURE — 82948 REAGENT STRIP/BLOOD GLUCOSE: CPT

## 2021-05-20 RX ORDER — INSULIN GLARGINE 100 [IU]/ML
34 INJECTION, SOLUTION SUBCUTANEOUS
Status: DISCONTINUED | OUTPATIENT
Start: 2021-05-20 | End: 2021-05-21

## 2021-05-20 RX ADMIN — INSULIN LISPRO 1 UNITS: 100 INJECTION, SOLUTION INTRAVENOUS; SUBCUTANEOUS at 11:26

## 2021-05-20 RX ADMIN — TIOTROPIUM BROMIDE 18 MCG: 18 CAPSULE ORAL; RESPIRATORY (INHALATION) at 08:09

## 2021-05-20 RX ADMIN — CHOLESTYRAMINE 4 G: 4 POWDER, FOR SUSPENSION ORAL at 08:04

## 2021-05-20 RX ADMIN — QUETIAPINE FUMARATE 200 MG: 200 TABLET ORAL at 21:57

## 2021-05-20 RX ADMIN — MAGNESIUM OXIDE TAB 400 MG (241.3 MG ELEMENTAL MG) 400 MG: 400 (241.3 MG) TAB at 08:03

## 2021-05-20 RX ADMIN — LOSARTAN POTASSIUM 25 MG: 25 TABLET, FILM COATED ORAL at 08:03

## 2021-05-20 RX ADMIN — PROPRANOLOL HYDROCHLORIDE 10 MG: 10 TABLET ORAL at 08:03

## 2021-05-20 RX ADMIN — OXYCODONE HYDROCHLORIDE 5 MG: 5 TABLET ORAL at 12:05

## 2021-05-20 RX ADMIN — OXYCODONE HYDROCHLORIDE 5 MG: 5 TABLET ORAL at 21:56

## 2021-05-20 RX ADMIN — ROPINIROLE HYDROCHLORIDE 1 MG: 1 TABLET, FILM COATED ORAL at 21:57

## 2021-05-20 RX ADMIN — THIAMINE HCL TAB 100 MG 100 MG: 100 TAB at 08:03

## 2021-05-20 RX ADMIN — GABAPENTIN 300 MG: 300 CAPSULE ORAL at 15:38

## 2021-05-20 RX ADMIN — OXYCODONE HYDROCHLORIDE 5 MG: 5 TABLET ORAL at 17:04

## 2021-05-20 RX ADMIN — MULTIPLE VITAMINS W/ MINERALS TAB 1 TABLET: TAB ORAL at 08:03

## 2021-05-20 RX ADMIN — HYDROXYZINE HYDROCHLORIDE 50 MG: 25 TABLET, FILM COATED ORAL at 11:26

## 2021-05-20 RX ADMIN — SENNOSIDES AND DOCUSATE SODIUM 1 TABLET: 8.6; 5 TABLET ORAL at 08:02

## 2021-05-20 RX ADMIN — MAGNESIUM OXIDE TAB 400 MG (241.3 MG ELEMENTAL MG) 400 MG: 400 (241.3 MG) TAB at 17:04

## 2021-05-20 RX ADMIN — SERTRALINE HYDROCHLORIDE 100 MG: 100 TABLET ORAL at 08:03

## 2021-05-20 RX ADMIN — METFORMIN HYDROCHLORIDE 500 MG: 500 TABLET, FILM COATED ORAL at 15:38

## 2021-05-20 RX ADMIN — LORAZEPAM 1 MG: 1 TABLET ORAL at 22:02

## 2021-05-20 RX ADMIN — ROPINIROLE HYDROCHLORIDE 1 MG: 1 TABLET, FILM COATED ORAL at 08:03

## 2021-05-20 RX ADMIN — FOLIC ACID 1 MG: 1 TABLET ORAL at 08:03

## 2021-05-20 RX ADMIN — PANTOPRAZOLE SODIUM 40 MG: 40 TABLET, DELAYED RELEASE ORAL at 06:11

## 2021-05-20 RX ADMIN — CHOLESTYRAMINE 4 G: 4 POWDER, FOR SUSPENSION ORAL at 17:04

## 2021-05-20 RX ADMIN — PIOGLITAZONE 30 MG: 15 TABLET ORAL at 08:03

## 2021-05-20 RX ADMIN — SENNOSIDES AND DOCUSATE SODIUM 1 TABLET: 8.6; 5 TABLET ORAL at 17:04

## 2021-05-20 RX ADMIN — GABAPENTIN 300 MG: 300 CAPSULE ORAL at 21:57

## 2021-05-20 RX ADMIN — METFORMIN HYDROCHLORIDE 500 MG: 500 TABLET, FILM COATED ORAL at 08:02

## 2021-05-20 RX ADMIN — Medication 1 PATCH: at 08:04

## 2021-05-20 RX ADMIN — GABAPENTIN 300 MG: 300 CAPSULE ORAL at 08:02

## 2021-05-20 RX ADMIN — OXYCODONE HYDROCHLORIDE 5 MG: 5 TABLET ORAL at 07:48

## 2021-05-20 RX ADMIN — PROPRANOLOL HYDROCHLORIDE 10 MG: 10 TABLET ORAL at 17:04

## 2021-05-20 NOTE — SOCIAL WORK
SW attempted to meet with patient in pt room; pt sleeping soundly and did not awaken with SW attempt to wake; SW will try to meet with patient at another time

## 2021-05-20 NOTE — PROGRESS NOTES
Progress Note - Behavioral Health   Lita Bran 46 y o  male MRN: [id number]  Unit/Bed#: [facility] 209-01 Encounter: [id number]    Assessment/Plan   Principal Problem:    MDD (major depressive disorder), recurrent episode, severe ([facility] )  Active Problems:    Quiros esophagus    Benign essential hypertension    COPD (chronic obstructive pulmonary disease) (HCC)    Fatty liver    Generalized anxiety disorder    Insomnia    Hyponatremia    GERD (gastroesophageal reflux disease)    History of ETOH abuse    Chronic pancreatitis (HCC)    Intentional overdose of drug in tablet form ([place] [address] )    DM (diabetes mellitus) ([place] [address] )    Hypomagnesemia      Behavior over the last 24 hours: Limiied improvement  Sleep: Is able to sleep well beginning of night, but BGTs have dropped low last 2 nights which affect sleep  Reports daytime fatigue  Appetite: Reports fair appetite, but eats d/t diabetes  Medication side effects: No  ROS:  Low energy, all other systems are negative for acute changes    Mental Status Evaluation:  Appearance:  age appropriate, casually dressed and Dressed in t-shirt and jeans  Neat and clean  Normal eye contact  Behavior:  normal and pleasant and cooperative  Calm  engaged appropriately in conversation  Forthcoming with information  Speech:  normal pitch, normal volume and normal rate and rhythm  Appropriate conversation and interactions  Mood:  anxious, depressed and Reports ongoing depression and anxiety      Affect:  blunted and mood-congruent   Thought Process:  goal directed   Thought Content:  normal and thought content mainly focused on possiblity of going to D&A rehab vs going home and going to [facility]  In discussion with wife and CM r/t options      Perceptual Disturbances: None   Risk Potential: Suicidal Ideations none  Homicidal Ideations none  Potential for Aggression No   Sensorium:  person, place, time/date, situation, day of week, month of year and year   Memory:  recent and remote memory grossly intact   Consciousness:  alert and awake    Attention: attention span and concentration were age appropriate   Insight:  limited   Judgment: fair   Gait/Station: normal gait/station   Motor Activity: no abnormal movements     Progress Toward Goals: Patients states he discussed option of D&A rehab prior to returning home per discussion with wife  Patient is considering, but feels he knows what he needs to do after rehab including AA meetings and meeting with an RCS at [facility]  He is unsure what benefit going to rehab will have and he may be doing it just for his wife and children vs himself  Also, he would like a RCS vs a therapist who does not have a history of addiction  He plans to continue to discuss with his wife and CM  Patient is more interactive, less isolation in room, attending groups intermittently  He has improved insight into what he needs to do regarding his alcohol misuse  He is considering D&A rehab  Recommended Treatment: Continue with group therapy, milieu therapy and occupational therapy  Risks, benefits and possible side effects of Medications:   Risks, benefits, and possible side effects of medications explained to patient and patient verbalizes understanding  Medications: all current active meds have been reviewed  Labs: I have personally reviewed all pertinent laboratory/tests results     Most Recent Labs:   Lab Results   Component Value Date    WBC 6 80 05/11/2021    RBC 4 89 05/11/2021    HGB 14 7 05/11/2021    HCT 43 8 05/11/2021     05/16/2021    RDW 15 0 (H) 05/11/2021    NEUTROABS 4 10 05/11/2021    SODIUM 129 (L) 05/16/2021    K 4 3 05/16/2021    CL 96 (L) 05/16/2021    CO2 23 05/16/2021    BUN 17 05/16/2021    CREATININE 0 86 05/16/2021    GLUC 239 (H) 05/16/2021    GLUF 219 (H) 07/18/2019    CALCIUM 9 4 05/16/2021    AST 21 05/16/2021    ALT 19 05/16/2021    ALKPHOS 85 05/16/2021    TP 7 3 05/16/2021    ALB 3 7 05/16/2021    TBILI 0 50 05/16/2021    CHOLESTEROL 157 02/21/2019    HDL 44 02/21/2019    TRIG 188 (H) 02/21/2019    LDLCALC 75 02/21/2019    NONHDLC 113 02/21/2019    AMMONIA 50 05/11/2021    [id number] 1 680 05/11/2021    HGBA1C 7 4 (H) 05/11/2021     05/11/2021       Counseling / Coordination of Care  Total floor / unit time spent today 20 minutes  Greater than 50% of total time was spent with the patient and / or family counseling and / or coordination of care

## 2021-05-20 NOTE — PROGRESS NOTES
Progress Note - Jennifer Escalante 46 y o  male MRN: [id number]    Unit/Bed#: [facility] 209-01 Encounter: [id number]        Subjective:   Patient seen and examined at bedside after reviewing the chart and discussing the case with the caring staff  Patient examined at bedside  Patient and nursing staff report low blood sugars in the past few mornings  On the morning of 05/19/2021 patient's blood sugar was 47 and this morning, 05/20/2021, blood sugar was 51  Patient was given juice and blood sugar increased each morning  Patient has no symptoms of hypoglycemia  Will change Lantus from twice daily to at bedtime and decrease dose to 34 units and continue to monitor  Physical Exam   Vitals: Blood pressure 127/62, pulse 79, temperature (!) 97 2 °F (36 2 °C), temperature source Temporal, resp  rate 18, height 6' (1 829 m), weight 85 3 kg (188 lb), SpO2 93 %  ,Body mass index is 25 5 kg/m²  Constitutional: Patient appears well-developed  HEENT: PERR, EOMI, MMM  Cardiovascular: Normal rate and regular rhythm  Pulmonary/Chest: Effort normal and breath sounds normal    Abdomen: Soft, + BS, NT    Assessment/Plan:  Jennifer Escalante is a(n) 46 y o  male with MDD      1  Cardiac with history of Hypertension and dyslipidemia  Patient will be continued on Cozaar 25 mg daily  I will discontinue patient's lisinopril  Continue cholestyramine sugar free 2 times daily  2  Diabetes mellitus  Patient's hemoglobin A1c was 7 4 on 05/11/2021  Continue carb controlled diet, fingerstick glucose 4 times daily before meals and at bedtime, Humalog sliding scale, metformin, Actos  Patient and nursing staff report low blood sugars in the past few mornings  On the morning of 05/19/2021 patient's blood sugar was 47 and this morning, 05/20/2021, blood sugar was 51  Patient was given juice and blood sugar increased each morning  Patient has no symptoms of hypoglycemia    Will change Lantus from twice daily to at bedtime and decrease dose to 34 units and continue to monitor  3  Tobacco use  Patient has nicotine transdermal patch  4  Alcohol abuse with history of multiple acute pancreatitis  No S/S of withdrawal   Continue folic acid, thiamine, multivitamin  Patient follows up with specialists for his pancreatitis  5  GERD  Continue pantoprazole 40 mg daily  6  COPD  Continue Spiriva daily, albuterol as needed, and 2 L nasal cannula  7  Constipation  Continue Senokot S twice daily  8  Chronic pain syndrome  Continue Tylenol and oxycodone as needed  9  Cough  Patient may receive Robitussin DM as needed  10  Dry skin over the face  Patient will be offered moisturizing cream for the face  11  Vitamin-D deficiency  Continue vitamin-D bolus doses for 8 weeks followed by vitamin D3 1000 units daily  The patient was discussed with Dr Vannesa Lam and he is in agreement with the above note

## 2021-05-20 NOTE — PLAN OF CARE
Problem: Ineffective Coping  Goal: Participates in unit activities  Description: Interventions:  - Provide therapeutic environment   - Provide required programming   - Redirect inappropriate behaviors   Outcome: Not Progressing   Remains depressed, withdrawn to room much of AM  Did not attend either AM RT group  No interest in alternate individually based activities

## 2021-05-20 NOTE — PROGRESS NOTES
Pt medicated for increased anxiety 7/10 @ 1126 with Atarax 50 mg po as ordered by MD with moderate relief obtained  Crystal Multani - 19 @ that time  Pt medicated for pain in bilateral legs @ 1201 with Oxy-IR po as ordered by MD with moderate relief obtained  Q 7 min checks maintained to monitor pt's behavior & safety

## 2021-05-20 NOTE — PROGRESS NOTES
Accucheck at [address] - 124  No further complaints  Sleeping well most of shift except for hypoglycemic issue at 0345  No signs of ETOH withdrawal overnight  Maintained on q 7 minute safety checks  O2 at 2 lpm NC  No respiratory issues  Will to continue to monitor

## 2021-05-20 NOTE — PROGRESS NOTES
Pt up ad ziggy & gait is steady  Pt c/o depression 3/10 & anxiety 3/10  Pt denies any hallucinations, suicidal or homicidal ideations  Q 7 min checks maintained to monitor pt's behavior & safety  Pt on Nasal O2 @ 2L/min via cannula PRN & is dyspneic on exertion  Occasional dry cough noted  Scattered expiratory wheeze noted on Lt , otherwise clear upon auscultation  Pt is flat, cooperative & withdrawn to room  Pt is compliant with medications

## 2021-05-20 NOTE — PROGRESS NOTES
Faisal Gonzalez stated he felt like his "sugar" was low and came to desk  Accucheck 51 @ 0349   240 cc of OJ with 2 sugar packets, and  6 luis fernando with 3 peanut butters  No overt hypoglycemic symptoms noted  Will continue to monitor

## 2021-05-20 NOTE — PROGRESS NOTES
No signs or symptoms of hypo/hyperglycemia  No symptoms post OD  No signs of ETOH withdrawal noted  CIWA 0  Pleasant and social with select peers  Out in community  O2 maintained a 2 lpm via N/C  No suicidal ideations  Walks ad ziggy on unit  Gait remains steady  Maintained on q 7 minute checks

## 2021-05-20 NOTE — PLAN OF CARE
Problem: DISCHARGE PLANNING  Goal: Discharge to home or other facility with appropriate resources  Description: INTERVENTIONS:  - Identify barriers to discharge w/patient and caregiver  - Arrange for needed discharge resources and transportation as appropriate  - Identify discharge learning needs (meds, wound care, etc )  - Arrange for interpretive services to assist at discharge as needed  - Refer to Case Management Department for coordinating discharge planning if the patient needs post-hospital services based on physician/advanced practitioner order or complex needs related to functional status, cognitive ability, or social support system  Outcome: Progressing     Pt progressing; DC to D&A rehab vs home with family

## 2021-05-20 NOTE — PROGRESS NOTES
05/20/21    Team Meeting   Meeting Type Daily Rounds   Team Members Present   Team Members Present Physician;Nurse;   Physician Team Member Dr Mandi Cannon MD   Nursing Team Member Suanne Riedel, RN   Care Management Team Member Nathaniel Bhat MS, [place], [address]    Patient/Family Present   Patient Present No   Patient's Family Present No     PT sugar low this am-medical following  Depression and anxiety are 3/10, increased anxiety at lunch time, prn  Reports chronic pain in back and legs, medical following  Oxygen is 2L, PT uses periodically o2 93%, lungs clear  Active in group  Will be for rehab if PT agrees  Can not return home

## 2021-05-21 LAB
GLUCOSE SERPL-MCNC: 106 MG/DL (ref 65–140)
GLUCOSE SERPL-MCNC: 126 MG/DL (ref 65–140)
GLUCOSE SERPL-MCNC: 163 MG/DL (ref 65–140)
GLUCOSE SERPL-MCNC: 175 MG/DL (ref 65–140)
GLUCOSE SERPL-MCNC: 181 MG/DL (ref 65–140)

## 2021-05-21 PROCEDURE — 82948 REAGENT STRIP/BLOOD GLUCOSE: CPT

## 2021-05-21 PROCEDURE — 99232 SBSQ HOSP IP/OBS MODERATE 35: CPT | Performed by: PSYCHIATRY & NEUROLOGY

## 2021-05-21 RX ORDER — INSULIN GLARGINE 100 [IU]/ML
34 INJECTION, SOLUTION SUBCUTANEOUS EVERY MORNING
Status: DISCONTINUED | OUTPATIENT
Start: 2021-05-22 | End: 2021-06-01 | Stop reason: HOSPADM

## 2021-05-21 RX ADMIN — METFORMIN HYDROCHLORIDE 500 MG: 500 TABLET, FILM COATED ORAL at 15:57

## 2021-05-21 RX ADMIN — THIAMINE HCL TAB 100 MG 100 MG: 100 TAB at 08:16

## 2021-05-21 RX ADMIN — ROPINIROLE HYDROCHLORIDE 1 MG: 1 TABLET, FILM COATED ORAL at 08:14

## 2021-05-21 RX ADMIN — HYDROXYZINE HYDROCHLORIDE 50 MG: 25 TABLET, FILM COATED ORAL at 08:39

## 2021-05-21 RX ADMIN — PANTOPRAZOLE SODIUM 40 MG: 40 TABLET, DELAYED RELEASE ORAL at 05:37

## 2021-05-21 RX ADMIN — LORAZEPAM 1 MG: 1 TABLET ORAL at 21:08

## 2021-05-21 RX ADMIN — SENNOSIDES AND DOCUSATE SODIUM 1 TABLET: 8.6; 5 TABLET ORAL at 08:16

## 2021-05-21 RX ADMIN — SENNOSIDES AND DOCUSATE SODIUM 1 TABLET: 8.6; 5 TABLET ORAL at 17:07

## 2021-05-21 RX ADMIN — INSULIN LISPRO 1 UNITS: 100 INJECTION, SOLUTION INTRAVENOUS; SUBCUTANEOUS at 08:13

## 2021-05-21 RX ADMIN — LOSARTAN POTASSIUM 25 MG: 25 TABLET, FILM COATED ORAL at 08:14

## 2021-05-21 RX ADMIN — QUETIAPINE FUMARATE 200 MG: 200 TABLET ORAL at 21:09

## 2021-05-21 RX ADMIN — MAGNESIUM OXIDE TAB 400 MG (241.3 MG ELEMENTAL MG) 400 MG: 400 (241.3 MG) TAB at 17:07

## 2021-05-21 RX ADMIN — METFORMIN HYDROCHLORIDE 500 MG: 500 TABLET, FILM COATED ORAL at 07:31

## 2021-05-21 RX ADMIN — CHOLESTYRAMINE 4 G: 4 POWDER, FOR SUSPENSION ORAL at 17:07

## 2021-05-21 RX ADMIN — PROPRANOLOL HYDROCHLORIDE 10 MG: 10 TABLET ORAL at 08:14

## 2021-05-21 RX ADMIN — PIOGLITAZONE 30 MG: 15 TABLET ORAL at 08:14

## 2021-05-21 RX ADMIN — SERTRALINE HYDROCHLORIDE 100 MG: 100 TABLET ORAL at 08:14

## 2021-05-21 RX ADMIN — OXYCODONE HYDROCHLORIDE 5 MG: 5 TABLET ORAL at 16:45

## 2021-05-21 RX ADMIN — PROPRANOLOL HYDROCHLORIDE 10 MG: 10 TABLET ORAL at 17:07

## 2021-05-21 RX ADMIN — CHOLESTYRAMINE 4 G: 4 POWDER, FOR SUSPENSION ORAL at 08:15

## 2021-05-21 RX ADMIN — OXYCODONE HYDROCHLORIDE 5 MG: 5 TABLET ORAL at 07:31

## 2021-05-21 RX ADMIN — MULTIPLE VITAMINS W/ MINERALS TAB 1 TABLET: TAB ORAL at 08:14

## 2021-05-21 RX ADMIN — FOLIC ACID 1 MG: 1 TABLET ORAL at 08:15

## 2021-05-21 RX ADMIN — TIOTROPIUM BROMIDE 18 MCG: 18 CAPSULE ORAL; RESPIRATORY (INHALATION) at 08:13

## 2021-05-21 RX ADMIN — GABAPENTIN 300 MG: 300 CAPSULE ORAL at 08:14

## 2021-05-21 RX ADMIN — INSULIN LISPRO 1 UNITS: 100 INJECTION, SOLUTION INTRAVENOUS; SUBCUTANEOUS at 11:31

## 2021-05-21 RX ADMIN — INSULIN LISPRO 1 UNITS: 100 INJECTION, SOLUTION INTRAVENOUS; SUBCUTANEOUS at 16:31

## 2021-05-21 RX ADMIN — ROPINIROLE HYDROCHLORIDE 1 MG: 1 TABLET, FILM COATED ORAL at 21:09

## 2021-05-21 RX ADMIN — GABAPENTIN 300 MG: 300 CAPSULE ORAL at 15:02

## 2021-05-21 RX ADMIN — Medication 1 PATCH: at 08:16

## 2021-05-21 RX ADMIN — GABAPENTIN 300 MG: 300 CAPSULE ORAL at 21:08

## 2021-05-21 RX ADMIN — OXYCODONE HYDROCHLORIDE 5 MG: 5 TABLET ORAL at 12:08

## 2021-05-21 RX ADMIN — MAGNESIUM OXIDE TAB 400 MG (241.3 MG ELEMENTAL MG) 400 MG: 400 (241.3 MG) TAB at 08:14

## 2021-05-21 RX ADMIN — LORAZEPAM 1 MG: 1 TABLET ORAL at 15:02

## 2021-05-21 NOTE — PROGRESS NOTES
Progress Note - Mable Garrett 46 y o  male MRN: [id number]    Unit/Bed#: [facility] 209-01 Encounter: [id number]        Subjective:   Patient seen and examined at bedside after reviewing the chart and discussing the case with the caring staff  Patient examined at bedside  Patient requesting his Lantus be in the morning instead of at bedtime  He has no other acute concerns  Physical Exam   Vitals: Blood pressure 133/63, pulse 71, temperature 98 3 °F (36 8 °C), temperature source Temporal, resp  rate 18, height 6' (1 829 m), weight 88 5 kg (195 lb 1 7 oz), SpO2 90 %  ,Body mass index is 26 46 kg/m²  Constitutional: Patient appears well-developed  HEENT: PERR, EOMI, MMM  Cardiovascular: Normal rate and regular rhythm  Pulmonary/Chest: Effort normal and breath sounds normal    Abdomen: Soft, + BS, NT    Assessment/Plan:  Mable Garrett is a(n) 46 y o  male with MDD      1  Cardiac with history of Hypertension and dyslipidemia  Patient will be continued on Cozaar 25 mg daily  I will discontinue patient's lisinopril  Continue cholestyramine sugar free 2 times daily  2  Diabetes mellitus  Patient's hemoglobin A1c was 7 4 on 05/11/2021  Continue carb controlled diet, fingerstick glucose 4 times daily before meals and at bedtime, Humalog sliding scale, metformin, Actos  Patient and nursing staff report low blood sugars in the past few mornings  On the morning of 05/19/2021 patient's blood sugar was 47 and this morning, 05/20/2021, blood sugar was 51  Patient was given juice and blood sugar increased each morning  Patient has no symptoms of hypoglycemia  Will change Lantus from twice daily to at daily in the morning and decrease dose to 34 units and continue to monitor  3  Tobacco use  Patient has nicotine transdermal patch  4  Alcohol abuse with history of multiple acute pancreatitis  No S/S of withdrawal   Continue folic acid, thiamine, multivitamin    Patient follows up with specialists for his pancreatitis  5  GERD  Continue pantoprazole 40 mg daily  6  COPD  Continue Spiriva daily, albuterol as needed, and 2 L nasal cannula  7  Constipation  Continue Senokot S twice daily  8  Chronic pain syndrome  Continue Tylenol and oxycodone as needed  9  Cough  Patient may receive Robitussin DM as needed  10  Dry skin over the face  Patient will be offered moisturizing cream for the face  11  Vitamin-D deficiency  Continue vitamin-D bolus doses for 8 weeks followed by vitamin D3 1000 units daily  The patient was discussed with Dr Joana Bentley and he is in agreement with the above note

## 2021-05-21 NOTE — PROGRESS NOTES
Accucheck taken PRN at 0339: 106  Patient requested a snack to keep glucose level up till AM   Given three luis fernando crackers with peanut butter  Returned to bed without incident  Ativan 1 mg given earlier was effective to decrease anxiety  Will continue to monitor

## 2021-05-21 NOTE — PROGRESS NOTES
Progress Note - Behavioral Health   Shon Bach 46 y o  male MRN: [id number]  Unit/Bed#: [facility] 209-01 Encounter: [id number]    Assessment/Plan   Principal Problem:    MDD (major depressive disorder), recurrent episode, severe ([facility] )  Active Problems:    Quiros esophagus    Benign essential hypertension    COPD (chronic obstructive pulmonary disease) (HCC)    Fatty liver    Generalized anxiety disorder    Insomnia    Hyponatremia    GERD (gastroesophageal reflux disease)    History of ETOH abuse    Chronic pancreatitis (HCC)    Intentional overdose of drug in tablet form ([facility] )    DM (diabetes mellitus) ([facility] )    Hypomagnesemia      Behavior over the last 24 hours: Limited improvement  Sleep: improved  Appetite: normal  Medication side effects: No  ROS: no complaints, all other systems are negative acute changes    Mental Status Evaluation:  Appearance:  age appropriate   Behavior:  cooperative   Speech:  normal volume   Mood:  decreased range   Affect:  constricted   Thought Process:  goal directed   Thought Content:  no overt delusions   Perceptual Disturbances: None   Risk Potential: Suicidal Ideations none  Homicidal Ideations none  Potential for Aggression No   Sensorium:  person, place and time/date   Memory:  recent memory grossly intact   Consciousness:  alert and awake    Attention: attention span appeared shorter than expected for age   Insight:  limited   Judgment: fair   Gait/Station: normal gait/station   Motor Activity: no abnormal movements     Progress Toward Goals:  Patient continues depressed, isolated with limited participation in group and milieu therapy  He is somewhat more engaged in a conversation and has been using Atarax p r n  for increased anxiety  Remains with limited insight into his chronic alcohol misuse, poor physical health and continues declining rehab  He has been compliant with his medication and denies any current side effects      Recommended Treatment: Continue with group therapy, milieu therapy and occupational therapy  Risks, benefits and possible side effects of Medications:   Risks, benefits, and possible side effects of medications explained to patient and patient verbalizes understanding  Medications: all current active meds have been reviewed  Labs: I have personally reviewed all pertinent laboratory/tests results  Most Recent Labs:   Lab Results   Component Value Date    WBC 6 80 05/11/2021    RBC 4 89 05/11/2021    HGB 14 7 05/11/2021    HCT 43 8 05/11/2021     05/16/2021    RDW 15 0 (H) 05/11/2021    NEUTROABS 4 10 05/11/2021    SODIUM 129 (L) 05/16/2021    K 4 3 05/16/2021    CL 96 (L) 05/16/2021    CO2 23 05/16/2021    BUN 17 05/16/2021    CREATININE 0 86 05/16/2021    GLUC 239 (H) 05/16/2021    GLUF 219 (H) 07/18/2019    CALCIUM 9 4 05/16/2021    AST 21 05/16/2021    ALT 19 05/16/2021    ALKPHOS 85 05/16/2021    TP 7 3 05/16/2021    ALB 3 7 05/16/2021    TBILI 0 50 05/16/2021    CHOLESTEROL 157 02/21/2019    HDL 44 02/21/2019    TRIG 188 (H) 02/21/2019    LDLCALC 75 02/21/2019    NONHDLC 113 02/21/2019    AMMONIA 50 05/11/2021    [id number] 1 680 05/11/2021    HGBA1C 7 4 (H) 05/11/2021     05/11/2021       Counseling / Coordination of Care  Total floor / unit time spent today 20 minutes  Greater than 50% of total time was spent with the patient and / or family counseling and / or coordination of care

## 2021-05-21 NOTE — PROGRESS NOTES
No acute behaviors noted overnight  Anxiety remains controlled  No signs or symptoms of hypo/hyperglycemia  No signs of active withdrawal noted  Maintained on q 7 minute safety checks  Will continue to monitor

## 2021-05-21 NOTE — PROGRESS NOTES
05/21/21   Team Meeting   Meeting Type Daily Rounds   Team Members Present   Team Members Present Physician;Nurse;   Physician Team Member Dr Thania Dominguez MD; Rosezetta Rubinstein[address]   Nursing Team Member Krishna José RN   Social Work Team Member Gigi Ruby   Patient/Family Present   Patient Present No   Patient's Family Present No     No DC date - home upon discharge; declines inpt rehab at family request; flat, depressed; more open in conversation; prn atarax utilized for anx 7

## 2021-05-21 NOTE — PROGRESS NOTES
Pt medicated for c/o increased anxiety 9/10 @ 1502 with Ativan po as requested by pt & ordered by MD with moderate relief obtained  Heron Linea - 20 @ that time  Pt continues to c/o severe bilateral leg pain @ times & ZOYA Ashby aware  Pt medicated with Oxy-IR po with relief obtained  Q 7 min checks maintained to monitor pt's behavior & safety

## 2021-05-21 NOTE — PLAN OF CARE
Problem: Ineffective Coping  Goal: Participates in unit activities  Description: Interventions:  - Provide therapeutic environment   - Provide required programming   - Redirect inappropriate behaviors   Outcome: Not Progressing   Asleep upon initial contact  Remained in room during first AM RT group  Encouraged to join community for increased interaction and attendance in upcoming  RT group

## 2021-05-21 NOTE — PLAN OF CARE
Problem: DISCHARGE PLANNING  Goal: Discharge to home or other facility with appropriate resources  Description: INTERVENTIONS:  - Identify barriers to discharge w/patient and caregiver  - Arrange for needed discharge resources and transportation as appropriate  - Identify discharge learning needs (meds, wound care, etc )  - Arrange for interpretive services to assist at discharge as needed  - Refer to Case Management Department for coordinating discharge planning if the patient needs post-hospital services based on physician/advanced practitioner order or complex needs related to functional status, cognitive ability, or social support system  Outcome: Progressing     Pt progressing;  No DC date - declined inpt rehab

## 2021-05-21 NOTE — PROGRESS NOTES
Pt medicated for c/o increased anxiety @ 1112 with Atarax po as ordered by MD with moderate relief obtained  Mitzi Grim - 18 @ that time  Q 7 min checks maintained to monitor pt's behavior & safety  Pt c/o depression 7/10 & anxiety 7/10  Pt denies any hallucinations, suicidal or homicidal ideations  Pt is dyspneic on exertion  Pt on continuous Nasal O2 @ 2L/min via cannula  Lungs clear upon auscultation  Pt is flat & withdrawn  Pt is cooperative & compliant with medications  Occasional dry cough noted

## 2021-05-21 NOTE — PROGRESS NOTES
Patient remains anxious about overnight glucose levels dropping  Assured that RN will take level anytime he feel like his levels are dropping  Romero Scale 25  Requested and received Ativan 1 mg at 2202 PO  This appeared to ease upset  Currently resting in bed without further episodes  Will continue to monitor

## 2021-05-21 NOTE — PROGRESS NOTES
Patient very social with roommate  Out in community till 2230  Currently resting in bed with O2 at 2lpm via NC  No respiratory distress noted tonight  Accucheck tonight was 167  No overt signs of hypo/hyperglycemia  Refused HS Lantus and Humalog coverage  Very upset over his glucose level dropping very low overnight the last two days  RN will take PRN glucose level at 0300 per patient request   Stated he wants his Lantus in the AM (like the doctor agreed upon) not at HS  Will pass on in report

## 2021-05-22 LAB
GLUCOSE SERPL-MCNC: 135 MG/DL (ref 65–140)
GLUCOSE SERPL-MCNC: 159 MG/DL (ref 65–140)
GLUCOSE SERPL-MCNC: 171 MG/DL (ref 65–140)
GLUCOSE SERPL-MCNC: 172 MG/DL (ref 65–140)
GLUCOSE SERPL-MCNC: 177 MG/DL (ref 65–140)

## 2021-05-22 PROCEDURE — 82948 REAGENT STRIP/BLOOD GLUCOSE: CPT

## 2021-05-22 PROCEDURE — 99232 SBSQ HOSP IP/OBS MODERATE 35: CPT | Performed by: PSYCHIATRY & NEUROLOGY

## 2021-05-22 RX ADMIN — SERTRALINE HYDROCHLORIDE 100 MG: 100 TABLET ORAL at 08:56

## 2021-05-22 RX ADMIN — INSULIN LISPRO 1 UNITS: 100 INJECTION, SOLUTION INTRAVENOUS; SUBCUTANEOUS at 21:47

## 2021-05-22 RX ADMIN — GABAPENTIN 300 MG: 300 CAPSULE ORAL at 15:27

## 2021-05-22 RX ADMIN — LOSARTAN POTASSIUM 25 MG: 25 TABLET, FILM COATED ORAL at 08:56

## 2021-05-22 RX ADMIN — Medication 1 PATCH: at 08:57

## 2021-05-22 RX ADMIN — PROPRANOLOL HYDROCHLORIDE 10 MG: 10 TABLET ORAL at 17:18

## 2021-05-22 RX ADMIN — MAGNESIUM OXIDE TAB 400 MG (241.3 MG ELEMENTAL MG) 400 MG: 400 (241.3 MG) TAB at 17:18

## 2021-05-22 RX ADMIN — INSULIN LISPRO 1 UNITS: 100 INJECTION, SOLUTION INTRAVENOUS; SUBCUTANEOUS at 12:29

## 2021-05-22 RX ADMIN — LORAZEPAM 1 MG: 1 TABLET ORAL at 15:27

## 2021-05-22 RX ADMIN — ROPINIROLE HYDROCHLORIDE 1 MG: 1 TABLET, FILM COATED ORAL at 21:49

## 2021-05-22 RX ADMIN — GABAPENTIN 300 MG: 300 CAPSULE ORAL at 08:56

## 2021-05-22 RX ADMIN — ROPINIROLE HYDROCHLORIDE 1 MG: 1 TABLET, FILM COATED ORAL at 08:56

## 2021-05-22 RX ADMIN — FOLIC ACID 1 MG: 1 TABLET ORAL at 08:56

## 2021-05-22 RX ADMIN — SENNOSIDES AND DOCUSATE SODIUM 1 TABLET: 8.6; 5 TABLET ORAL at 08:55

## 2021-05-22 RX ADMIN — SENNOSIDES AND DOCUSATE SODIUM 1 TABLET: 8.6; 5 TABLET ORAL at 17:19

## 2021-05-22 RX ADMIN — CHOLESTYRAMINE 4 G: 4 POWDER, FOR SUSPENSION ORAL at 17:19

## 2021-05-22 RX ADMIN — METFORMIN HYDROCHLORIDE 500 MG: 500 TABLET, FILM COATED ORAL at 08:56

## 2021-05-22 RX ADMIN — METFORMIN HYDROCHLORIDE 500 MG: 500 TABLET, FILM COATED ORAL at 15:30

## 2021-05-22 RX ADMIN — GABAPENTIN 300 MG: 300 CAPSULE ORAL at 21:49

## 2021-05-22 RX ADMIN — QUETIAPINE FUMARATE 200 MG: 200 TABLET ORAL at 21:49

## 2021-05-22 RX ADMIN — MAGNESIUM OXIDE TAB 400 MG (241.3 MG ELEMENTAL MG) 400 MG: 400 (241.3 MG) TAB at 08:56

## 2021-05-22 RX ADMIN — THIAMINE HCL TAB 100 MG 100 MG: 100 TAB at 08:55

## 2021-05-22 RX ADMIN — TIOTROPIUM BROMIDE 18 MCG: 18 CAPSULE ORAL; RESPIRATORY (INHALATION) at 08:57

## 2021-05-22 RX ADMIN — INSULIN LISPRO 1 UNITS: 100 INJECTION, SOLUTION INTRAVENOUS; SUBCUTANEOUS at 17:18

## 2021-05-22 RX ADMIN — INSULIN GLARGINE 34 UNITS: 100 INJECTION, SOLUTION SUBCUTANEOUS at 09:04

## 2021-05-22 RX ADMIN — MULTIPLE VITAMINS W/ MINERALS TAB 1 TABLET: TAB ORAL at 08:56

## 2021-05-22 RX ADMIN — PANTOPRAZOLE SODIUM 40 MG: 40 TABLET, DELAYED RELEASE ORAL at 06:21

## 2021-05-22 RX ADMIN — PIOGLITAZONE 30 MG: 15 TABLET ORAL at 08:56

## 2021-05-22 RX ADMIN — CHOLESTYRAMINE 4 G: 4 POWDER, FOR SUSPENSION ORAL at 08:57

## 2021-05-22 RX ADMIN — PROPRANOLOL HYDROCHLORIDE 10 MG: 10 TABLET ORAL at 08:55

## 2021-05-22 RX ADMIN — OXYCODONE HYDROCHLORIDE 5 MG: 5 TABLET ORAL at 17:18

## 2021-05-22 NOTE — NURSING NOTE
[phone]    Pt found to be resting quietly on most of authors rounds  No acute behavioral issues noted  Q 7 min checks maintained  Fall protocol in place

## 2021-05-22 NOTE — PROGRESS NOTES
Progress Note - Behavioral Health   Lawanda Gallegos 46 y o  male MRN: [id number]  Unit/Bed#: [facility] 209-01 Encounter: [id number]    Assessment/Plan   Principal Problem:    MDD (major depressive disorder), recurrent episode, severe ([facility] )  Active Problems:    Quiros esophagus    Benign essential hypertension    COPD (chronic obstructive pulmonary disease) (HCC)    Fatty liver    Generalized anxiety disorder    Insomnia    Hyponatremia    GERD (gastroesophageal reflux disease)    History of ETOH abuse    Chronic pancreatitis (HCC)    Intentional overdose of drug in tablet form ([facility] )    DM (diabetes mellitus) ([facility] )    Hypomagnesemia      Behavior over the last 24 hours:  unchanged  Sleep: normal  Appetite: normal  Medication side effects: No  ROS: all other system are negative    Mental Status Evaluation:  Appearance:  age appropriate   Behavior:  psychomotor retardation   Speech:  delayed   Mood:  decreased range   Affect:  blunted   Thought Process:  goal directed   Thought Content:  no overt delusions   Perceptual Disturbances: None   Risk Potential: Suicidal Ideations none  Homicidal Ideations none  Potential for Aggression No   Sensorium:  person, place and time/date   Memory:  recent and remote memory grossly intact   Consciousness:  alert and awake    Attention: attention span and concentration were age appropriate   Insight:  poor   Judgment: limited   Gait/Station: normal gait/station   Motor Activity: no abnormal movements     Progress Toward Goals:  Minimal improvement with low energy, increased isolation and limited participation in group and milieu therapy  Reports increased anxiety and frustration with his wife regarding treatment options  Still shows limited insight  Medication compliance has been stable  Recommended Treatment: Continue with group therapy, milieu therapy and occupational therapy        Risks, benefits and possible side effects of Medications:   Risks, benefits, and possible side effects of medications explained to patient and patient verbalizes understanding  Medications: all current active meds have been reviewed  Labs: I have personally reviewed all pertinent laboratory/tests results  Most Recent Labs:   Lab Results   Component Value Date    WBC 6 80 05/11/2021    RBC 4 89 05/11/2021    HGB 14 7 05/11/2021    HCT 43 8 05/11/2021     05/16/2021    RDW 15 0 (H) 05/11/2021    NEUTROABS 4 10 05/11/2021    SODIUM 129 (L) 05/16/2021    K 4 3 05/16/2021    CL 96 (L) 05/16/2021    CO2 23 05/16/2021    BUN 17 05/16/2021    CREATININE 0 86 05/16/2021    GLUC 239 (H) 05/16/2021    GLUF 219 (H) 07/18/2019    CALCIUM 9 4 05/16/2021    AST 21 05/16/2021    ALT 19 05/16/2021    ALKPHOS 85 05/16/2021    TP 7 3 05/16/2021    ALB 3 7 05/16/2021    TBILI 0 50 05/16/2021    CHOLESTEROL 157 02/21/2019    HDL 44 02/21/2019    TRIG 188 (H) 02/21/2019    LDLCALC 75 02/21/2019    NONHDLC 113 02/21/2019    AMMONIA 50 05/11/2021    [id number] 1 680 05/11/2021    HGBA1C 7 4 (H) 05/11/2021     05/11/2021       Counseling / Coordination of Care  Total floor / unit time spent today 20 minutes  Greater than 50% of total time was spent with the patient and / or family counseling and / or coordination of care

## 2021-05-22 NOTE — PROGRESS NOTES
Patient compliant with medications and meals  Out for meals and back to his room  Patient c/o of feeling tired and not having energy to be out of the room  Reported increased anxiety  Denies any SI/HI  No other concerns or problems reported  Q 7 mins checks in place for safety  Will continue to monitor for behaviors and changes

## 2021-05-22 NOTE — PROGRESS NOTES
Patient requested PRN ativan for increased anxiety  Patient rated his anxiety at 5 out of 10  Romero score was 24  Will continue to monitor for effectiveness

## 2021-05-22 NOTE — PLAN OF CARE
Problem: Ineffective Coping  Goal: Identifies healthy coping skills  Outcome: Progressing  Goal: Participates in unit activities  Description: Interventions:  - Provide therapeutic environment   - Provide required programming   - Redirect inappropriate behaviors   Outcome: Progressing  Goal: Patient/Family participate in treatment and DC plans  Description: Interventions:  - Provide therapeutic environment  Outcome: Progressing  Goal: Patient/Family verbalizes awareness of resources  Outcome: Progressing     Problem: Depression  Goal: Treatment Goal: Demonstrate behavioral control of depressive symptoms, verbalize feelings of improved mood/affect, and adopt new coping skills prior to discharge  Outcome: Progressing  Goal: Refrain from isolation  Description: Interventions:  - Develop a trusting relationship   - Encourage socialization   Outcome: Progressing  Goal: Refrain from self-neglect  Outcome: Progressing     Problem: Anxiety  Goal: Anxiety is at manageable level  Description: Interventions:  - Assess and monitor patient's anxiety level  - Monitor for signs and symptoms (heart palpitations, chest pain, shortness of breath, headaches, nausea, feeling jumpy, restlessness, irritable, apprehensive)  - Collaborate with interdisciplinary team and initiate plan and interventions as ordered    - [place] patient to unit/surroundings  - Explain treatment plan  - Encourage participation in care  - Encourage verbalization of concerns/fears  - Identify coping mechanisms  - Assist in developing anxiety-reducing skills  - Administer/offer alternative therapies  - Limit or eliminate stimulants  Outcome: Progressing     Problem: SLEEP DISTURBANCE  Goal: Will exhibit normal sleeping pattern  Description: Interventions:  -  Assess the patients sleep pattern, noting recent changes  - Administer medication as ordered  - Decrease environmental stimuli, including noise, as appropriate during the night  - Encourage the patient to actively participate in unit groups and or exercise during the day to enhance ability to achieve adequate sleep at night  - Assess the patient, in the morning, encouraging a description of sleep experience  Outcome: Progressing     Problem: Risk for Self Injury/Neglect  Goal: Treatment Goal: Remain safe during length of stay, learn and adopt new coping skills, and be free of self-injurious ideation, impulses and acts at the time of discharge  Outcome: Progressing  Goal: Verbalize thoughts and feelings  Description: Interventions:  - Assess and re-assess patient's lethality and potential for self-injury  - Engage patient in 1:1 interactions, daily, for a minimum of 15 minutes  - Encourage patient to express feelings, fears, frustrations, hopes  - Establish rapport/trust with patient   Outcome: Progressing  Goal: Refrain from harming self  Description: Interventions:  - Monitor patient closely, per order  - Develop a trusting relationship  - Supervise medication ingestion, monitor effects and side effects   Outcome: Progressing     Problem: Ineffective Coping  Goal: Participates in unit activities  Description: Interventions:  - Provide therapeutic environment   - Provide required programming   - Redirect inappropriate behaviors   Outcome: Progressing

## 2021-05-22 NOTE — PROGRESS NOTES
Progress Note - Salud Schneider 46 y o  male MRN: [id number]    Unit/Bed#: Clare Castro 209-01 Encounter: [id number]        Subjective:   Patient seen and examined at bedside after reviewing the chart and discussing the case with the caring staff  Patient examined at bedside  Patient has no acute concerns  Physical Exam   Vitals: Blood pressure 121/58, pulse 77, temperature (!) 97 °F (36 1 °C), temperature source Temporal, resp  rate 18, height 6' (1 829 m), weight 88 5 kg (195 lb 1 7 oz), SpO2 90 %  ,Body mass index is 26 46 kg/m²  Constitutional: Patient appears well-developed  HEENT: PERR, EOMI, MMM  Cardiovascular: Normal rate and regular rhythm  Pulmonary/Chest: Effort normal and breath sounds normal    Abdomen: Soft, + BS, NT    Assessment/Plan:  Salud Schneider is a(n) 46 y o  male with MDD      1  Cardiac with history of Hypertension and dyslipidemia  Patient will be continued on Cozaar 25 mg daily  I will discontinue patient's lisinopril  Continue cholestyramine sugar free 2 times daily  2  Diabetes mellitus  Patient's hemoglobin A1c was 7 4 on 05/11/2021  Continue carb controlled diet, fingerstick glucose 4 times daily before meals and at bedtime, Humalog sliding scale, metformin, Actos  Patient and nursing staff report low blood sugars in the past few mornings  On the morning of 05/19/2021 patient's blood sugar was 47 and this morning, 05/20/2021, blood sugar was 51  Patient was given juice and blood sugar increased each morning  Patient has no symptoms of hypoglycemia  Will change Lantus from twice daily to at daily in the morning and decrease dose to 34 units and continue to monitor  3  Tobacco use  Patient has nicotine transdermal patch  4  Alcohol abuse with history of multiple acute pancreatitis  No S/S of withdrawal   Continue folic acid, thiamine, multivitamin  Patient follows up with specialists for his pancreatitis  5  GERD  Continue pantoprazole 40 mg daily  6  COPD  Continue Spiriva daily, albuterol as needed, and 2 L nasal cannula  7  Constipation  Continue Senokot S twice daily  8  Chronic pain syndrome  Continue Tylenol and oxycodone as needed  9  Cough  Patient may receive Robitussin DM as needed  10  Dry skin over the face  Patient will be offered moisturizing cream for the face  11  Vitamin-D deficiency  Continue vitamin-D bolus doses for 8 weeks followed by vitamin D3 1000 units daily  The patient was discussed with Dr Andrea Velasco and he is in agreement with the above note

## 2021-05-22 NOTE — NURSING NOTE
Pt withdrawn to room  Affect restricted mood is anxious  Thoughts linear, requested/ received prn anxiolytic for severe anxiety; denies any withdrawal symptoms; reports anxiety is related to phone call he had earlier in [place]  Q 7 min checks ongoing

## 2021-05-23 LAB
GLUCOSE SERPL-MCNC: 116 MG/DL (ref 65–140)
GLUCOSE SERPL-MCNC: 175 MG/DL (ref 65–140)
GLUCOSE SERPL-MCNC: 189 MG/DL (ref 65–140)

## 2021-05-23 PROCEDURE — 82948 REAGENT STRIP/BLOOD GLUCOSE: CPT

## 2021-05-23 PROCEDURE — 99232 SBSQ HOSP IP/OBS MODERATE 35: CPT | Performed by: PSYCHIATRY & NEUROLOGY

## 2021-05-23 RX ADMIN — INSULIN LISPRO 1 UNITS: 100 INJECTION, SOLUTION INTRAVENOUS; SUBCUTANEOUS at 21:36

## 2021-05-23 RX ADMIN — CHOLESTYRAMINE 4 G: 4 POWDER, FOR SUSPENSION ORAL at 16:48

## 2021-05-23 RX ADMIN — GABAPENTIN 300 MG: 300 CAPSULE ORAL at 08:48

## 2021-05-23 RX ADMIN — INSULIN LISPRO 1 UNITS: 100 INJECTION, SOLUTION INTRAVENOUS; SUBCUTANEOUS at 12:48

## 2021-05-23 RX ADMIN — LORAZEPAM 1 MG: 1 TABLET ORAL at 21:38

## 2021-05-23 RX ADMIN — MAGNESIUM OXIDE TAB 400 MG (241.3 MG ELEMENTAL MG) 400 MG: 400 (241.3 MG) TAB at 16:48

## 2021-05-23 RX ADMIN — Medication 1 PATCH: at 08:50

## 2021-05-23 RX ADMIN — METFORMIN HYDROCHLORIDE 500 MG: 500 TABLET, FILM COATED ORAL at 16:47

## 2021-05-23 RX ADMIN — TIOTROPIUM BROMIDE 18 MCG: 18 CAPSULE ORAL; RESPIRATORY (INHALATION) at 08:50

## 2021-05-23 RX ADMIN — SERTRALINE HYDROCHLORIDE 100 MG: 100 TABLET ORAL at 08:49

## 2021-05-23 RX ADMIN — THIAMINE HCL TAB 100 MG 100 MG: 100 TAB at 08:48

## 2021-05-23 RX ADMIN — ROPINIROLE HYDROCHLORIDE 1 MG: 1 TABLET, FILM COATED ORAL at 08:49

## 2021-05-23 RX ADMIN — GABAPENTIN 300 MG: 300 CAPSULE ORAL at 21:33

## 2021-05-23 RX ADMIN — PROPRANOLOL HYDROCHLORIDE 10 MG: 10 TABLET ORAL at 16:48

## 2021-05-23 RX ADMIN — METFORMIN HYDROCHLORIDE 500 MG: 500 TABLET, FILM COATED ORAL at 08:49

## 2021-05-23 RX ADMIN — QUETIAPINE FUMARATE 200 MG: 200 TABLET ORAL at 21:33

## 2021-05-23 RX ADMIN — LORAZEPAM 1 MG: 1 TABLET ORAL at 12:55

## 2021-05-23 RX ADMIN — SENNOSIDES AND DOCUSATE SODIUM 1 TABLET: 8.6; 5 TABLET ORAL at 08:49

## 2021-05-23 RX ADMIN — FOLIC ACID 1 MG: 1 TABLET ORAL at 08:49

## 2021-05-23 RX ADMIN — PROPRANOLOL HYDROCHLORIDE 10 MG: 10 TABLET ORAL at 08:49

## 2021-05-23 RX ADMIN — GABAPENTIN 300 MG: 300 CAPSULE ORAL at 16:47

## 2021-05-23 RX ADMIN — OXYCODONE HYDROCHLORIDE 5 MG: 5 TABLET ORAL at 16:55

## 2021-05-23 RX ADMIN — PANTOPRAZOLE SODIUM 40 MG: 40 TABLET, DELAYED RELEASE ORAL at 06:30

## 2021-05-23 RX ADMIN — MULTIPLE VITAMINS W/ MINERALS TAB 1 TABLET: TAB ORAL at 08:49

## 2021-05-23 RX ADMIN — PIOGLITAZONE 30 MG: 15 TABLET ORAL at 08:48

## 2021-05-23 RX ADMIN — SENNOSIDES AND DOCUSATE SODIUM 1 TABLET: 8.6; 5 TABLET ORAL at 16:48

## 2021-05-23 RX ADMIN — LOSARTAN POTASSIUM 25 MG: 25 TABLET, FILM COATED ORAL at 08:49

## 2021-05-23 RX ADMIN — INSULIN GLARGINE 34 UNITS: 100 INJECTION, SOLUTION SUBCUTANEOUS at 08:50

## 2021-05-23 RX ADMIN — ROPINIROLE HYDROCHLORIDE 1 MG: 1 TABLET, FILM COATED ORAL at 21:33

## 2021-05-23 RX ADMIN — CHOLESTYRAMINE 4 G: 4 POWDER, FOR SUSPENSION ORAL at 08:47

## 2021-05-23 RX ADMIN — INSULIN LISPRO 1 UNITS: 100 INJECTION, SOLUTION INTRAVENOUS; SUBCUTANEOUS at 16:47

## 2021-05-23 RX ADMIN — MAGNESIUM OXIDE TAB 400 MG (241.3 MG ELEMENTAL MG) 400 MG: 400 (241.3 MG) TAB at 08:50

## 2021-05-23 RX ADMIN — OXYCODONE HYDROCHLORIDE 5 MG: 5 TABLET ORAL at 08:56

## 2021-05-23 NOTE — PROGRESS NOTES
Progress Note - Behavioral Health   Cecily Galan 46 y o  male MRN: [id number]  Unit/Bed#: Earline Wilson 209-01 Encounter: [id number]    Assessment/Plan   Principal Problem:    MDD (major depressive disorder), recurrent episode, severe ([facility] [facility] )  Active Problems:    Quiros esophagus    Benign essential hypertension    COPD (chronic obstructive pulmonary disease) (HCC)    Fatty liver    Generalized anxiety disorder    Insomnia    Hyponatremia    GERD (gastroesophageal reflux disease)    History of ETOH abuse    Chronic pancreatitis (HCC)    Intentional overdose of drug in tablet form ([facility] )    DM (diabetes mellitus) ([facility] )    Hypomagnesemia      Behavior over the last 24 hours:  unchanged  Sleep: hypersomnia  Appetite: normal  Medication side effects: No  ROS: no complaints, all other systems are negative for acute changes    Mental Status Evaluation:  Appearance:  age appropriate   Behavior:  superficailly cooperative   Speech:  delayed   Mood:  decreased range and depressed   Affect:  mood-congruent   Thought Process:  goal directed   Thought Content:  no overt delusions   Perceptual Disturbances: None   Risk Potential: Suicidal Ideations none  Homicidal Ideations none  Potential for Aggression No   Sensorium:  person, place and time/date   Memory:  recent and remote memory grossly intact   Consciousness:  alert and awake    Attention: attention span and concentration were age appropriate   Insight:  limited   Judgment: limited   Gait/Station: slow   Motor Activity: no abnormal movements     Progress Toward Goals: Patient continues isolated, flat affect with minimal participation in group and milieu therapy  Insight into his physical decline and alcohol misuse remain limited  Calorie intake and medication compliant has been stable  Recommended Treatment: Continue with group therapy, milieu therapy and occupational therapy        Risks, benefits and possible side effects of Medications:   Risks, benefits, and possible side effects of medications explained to patient and patient verbalizes understanding  Medications: all current active meds have been reviewed  Labs: I have personally reviewed all pertinent laboratory/tests results  Most Recent Labs:   Lab Results   Component Value Date    WBC 6 80 05/11/2021    RBC 4 89 05/11/2021    HGB 14 7 05/11/2021    HCT 43 8 05/11/2021     05/16/2021    RDW 15 0 (H) 05/11/2021    NEUTROABS 4 10 05/11/2021    SODIUM 129 (L) 05/16/2021    K 4 3 05/16/2021    CL 96 (L) 05/16/2021    CO2 23 05/16/2021    BUN 17 05/16/2021    CREATININE 0 86 05/16/2021    GLUC 239 (H) 05/16/2021    GLUF 219 (H) 07/18/2019    CALCIUM 9 4 05/16/2021    AST 21 05/16/2021    ALT 19 05/16/2021    ALKPHOS 85 05/16/2021    TP 7 3 05/16/2021    ALB 3 7 05/16/2021    TBILI 0 50 05/16/2021    CHOLESTEROL 157 02/21/2019    HDL 44 02/21/2019    TRIG 188 (H) 02/21/2019    LDLCALC 75 02/21/2019    NONHDLC 113 02/21/2019    AMMONIA 50 05/11/2021    [id number] 1 680 05/11/2021    HGBA1C 7 4 (H) 05/11/2021     05/11/2021       Counseling / Coordination of Care  Total floor / unit time spent today 20 minutes  Greater than 50% of total time was spent with the patient and / or family counseling and / or coordination of care

## 2021-05-23 NOTE — PLAN OF CARE
Problem: Ineffective Coping  Goal: Cooperates with admission process  Description: Interventions:   - Complete admission process  Outcome: Progressing  Goal: Identifies healthy coping skills  Outcome: Progressing  Goal: Participates in unit activities  Description: Interventions:  - Provide therapeutic environment   - Provide required programming   - Redirect inappropriate behaviors   Outcome: Progressing  Goal: Patient/Family participate in treatment and DC plans  Description: Interventions:  - Provide therapeutic environment  Outcome: Progressing  Goal: Patient/Family verbalizes awareness of resources  Outcome: Progressing     Problem: Depression  Goal: Treatment Goal: Demonstrate behavioral control of depressive symptoms, verbalize feelings of improved mood/affect, and adopt new coping skills prior to discharge  Outcome: Progressing  Goal: Refrain from isolation  Description: Interventions:  - Develop a trusting relationship   - Encourage socialization   Outcome: Progressing  Goal: Refrain from self-neglect  Outcome: Progressing     Problem: Anxiety  Goal: Anxiety is at manageable level  Description: Interventions:  - Assess and monitor patient's anxiety level  - Monitor for signs and symptoms (heart palpitations, chest pain, shortness of breath, headaches, nausea, feeling jumpy, restlessness, irritable, apprehensive)  - Collaborate with interdisciplinary team and initiate plan and interventions as ordered    - [place] patient to unit/surroundings  - Explain treatment plan  - Encourage participation in care  - Encourage verbalization of concerns/fears  - Identify coping mechanisms  - Assist in developing anxiety-reducing skills  - Administer/offer alternative therapies  - Limit or eliminate stimulants  Outcome: Progressing     Problem: SLEEP DISTURBANCE  Goal: Will exhibit normal sleeping pattern  Description: Interventions:  -  Assess the patients sleep pattern, noting recent changes  - Administer medication as ordered  - Decrease environmental stimuli, including noise, as appropriate during the night  - Encourage the patient to actively participate in unit groups and or exercise during the day to enhance ability to achieve adequate sleep at night  - Assess the patient, in the morning, encouraging a description of sleep experience  Outcome: Progressing     Problem: DISCHARGE PLANNING  Goal: Discharge to home or other facility with appropriate resources  Description: INTERVENTIONS:  - Identify barriers to discharge w/patient and caregiver  - Arrange for needed discharge resources and transportation as appropriate  - Identify discharge learning needs (meds, wound care, etc )  - Arrange for interpretive services to assist at discharge as needed  - Refer to Case Management Department for coordinating discharge planning if the patient needs post-hospital services based on physician/advanced practitioner order or complex needs related to functional status, cognitive ability, or social support system  Outcome: Progressing     Problem: Risk for Self Injury/Neglect  Goal: Treatment Goal: Remain safe during length of stay, learn and adopt new coping skills, and be free of self-injurious ideation, impulses and acts at the time of discharge  Outcome: Progressing  Goal: Verbalize thoughts and feelings  Description: Interventions:  - Assess and re-assess patient's lethality and potential for self-injury  - Engage patient in 1:1 interactions, daily, for a minimum of 15 minutes  - Encourage patient to express feelings, fears, frustrations, hopes  - Establish rapport/trust with patient   Outcome: Progressing  Goal: Refrain from harming self  Description: Interventions:  - Monitor patient closely, per order  - Develop a trusting relationship  - Supervise medication ingestion, monitor effects and side effects   Outcome: Progressing     Problem: Ineffective Coping  Goal: Participates in unit activities  Description: Interventions:  - Provide therapeutic environment   - Provide required programming   - Redirect inappropriate behaviors   Outcome: Progressing

## 2021-05-23 NOTE — NURSING NOTE
[phone]    Pt found to be resting quietly on most of authors rounds  No acute behavioral issues noted  Q 7 min checks maintained  Fall protocol in place

## 2021-05-23 NOTE — PROGRESS NOTES
Progress Note - Ciara Ratliff 46 y o  male MRN: [id number]    Unit/Bed#: [facility] Patient 209-01 Encounter: [id number]        Subjective:   Patient seen and examined at bedside after reviewing the chart and discussing the case with the caring staff  Patient examined at bedside  Patient has no acute concerns  Physical Exam   Vitals: Blood pressure 102/55, pulse 77, temperature 97 5 °F (36 4 °C), temperature source Temporal, resp  rate 18, height 6' (1 829 m), weight 88 5 kg (195 lb 1 7 oz), SpO2 92 %  ,Body mass index is 26 46 kg/m²  Constitutional: Patient appears well-developed  HEENT: PERR, EOMI, MMM  Cardiovascular: Normal rate and regular rhythm  Pulmonary/Chest: Effort normal and breath sounds normal    Abdomen: Soft, + BS, NT    Assessment/Plan:  Ciara Ratliff is a(n) 46 y o  male with MDD      1  Cardiac with history of Hypertension and dyslipidemia  Patient will be continued on Cozaar 25 mg daily  Continue cholestyramine sugar free 2 times daily  2  Diabetes mellitus  Patient's hemoglobin A1c was 7 4 on 05/11/2021  Continue carb controlled diet, fingerstick glucose 4 times daily before meals and at bedtime, Humalog sliding scale, metformin, Actos and Lantus 34 units qAM   3  Tobacco use  Patient has nicotine transdermal patch  4  Alcohol abuse with history of multiple acute pancreatitis  No S/S of withdrawal  Continue folic acid, thiamine, multivitamin  Patient follows up with specialists for his pancreatitis  5  GERD  Continue pantoprazole 40 mg daily  6  COPD  Continue Spiriva daily, albuterol as needed, and 2 L nasal cannula  7  Constipation  Continue Senokot S twice daily  8  Chronic pain syndrome  Continue Tylenol and oxycodone as needed  9  Cough  Patient may receive Robitussin DM as needed  10  Vitamin-D deficiency  Continue vitamin-D bolus doses for 8 weeks followed by vitamin D3 1000 units daily      The patient was discussed with Dr Mortimer Pluck and he is in agreement with the above note

## 2021-05-23 NOTE — PROGRESS NOTES
Patient compliant with medications and meals  Pleasant and cooperative  During morning assessment patient rated his depression and anxiety 3 out of 10  Denied any SI/HI  Patient mood is flat and depressed  Requested PRN for pain with his morning medications  Later on the day patient requested PRN ativan for increased anxity, rated his anxiety 7 out of 10  Romero score 20  No other concerns or problems reported this shift  Q 7 mins checks in place for safety  Will continue to monitor for behaviors and changes

## 2021-05-23 NOTE — NURSING NOTE
[id number] [phone]     Pt present in the milieu; Affect  Restricted  Mood depressed Denies SI or HI  Hypoverbal  No acute behavioral issues noted  Q 7 min checks ongoing

## 2021-05-24 LAB
GLUCOSE SERPL-MCNC: 104 MG/DL (ref 65–140)
GLUCOSE SERPL-MCNC: 116 MG/DL (ref 65–140)
GLUCOSE SERPL-MCNC: 129 MG/DL (ref 65–140)
GLUCOSE SERPL-MCNC: 137 MG/DL (ref 65–140)
GLUCOSE SERPL-MCNC: 163 MG/DL (ref 65–140)

## 2021-05-24 PROCEDURE — 82948 REAGENT STRIP/BLOOD GLUCOSE: CPT

## 2021-05-24 PROCEDURE — 99232 SBSQ HOSP IP/OBS MODERATE 35: CPT | Performed by: NURSE PRACTITIONER

## 2021-05-24 RX ORDER — LORAZEPAM 2 MG/ML
0.5 INJECTION INTRAMUSCULAR EVERY 8 HOURS PRN
Status: DISCONTINUED | OUTPATIENT
Start: 2021-05-24 | End: 2021-06-01 | Stop reason: HOSPADM

## 2021-05-24 RX ORDER — LORAZEPAM 0.5 MG/1
0.5 TABLET ORAL EVERY 8 HOURS PRN
Status: DISCONTINUED | OUTPATIENT
Start: 2021-05-24 | End: 2021-06-01 | Stop reason: HOSPADM

## 2021-05-24 RX ADMIN — GABAPENTIN 300 MG: 300 CAPSULE ORAL at 21:17

## 2021-05-24 RX ADMIN — PROPRANOLOL HYDROCHLORIDE 10 MG: 10 TABLET ORAL at 17:01

## 2021-05-24 RX ADMIN — TIOTROPIUM BROMIDE 18 MCG: 18 CAPSULE ORAL; RESPIRATORY (INHALATION) at 08:46

## 2021-05-24 RX ADMIN — FOLIC ACID 1 MG: 1 TABLET ORAL at 08:47

## 2021-05-24 RX ADMIN — METFORMIN HYDROCHLORIDE 500 MG: 500 TABLET, FILM COATED ORAL at 08:47

## 2021-05-24 RX ADMIN — OXYCODONE HYDROCHLORIDE 5 MG: 5 TABLET ORAL at 16:59

## 2021-05-24 RX ADMIN — ROPINIROLE HYDROCHLORIDE 1 MG: 1 TABLET, FILM COATED ORAL at 08:47

## 2021-05-24 RX ADMIN — OXYCODONE HYDROCHLORIDE 5 MG: 5 TABLET ORAL at 08:48

## 2021-05-24 RX ADMIN — GABAPENTIN 300 MG: 300 CAPSULE ORAL at 16:56

## 2021-05-24 RX ADMIN — LOSARTAN POTASSIUM 25 MG: 25 TABLET, FILM COATED ORAL at 08:47

## 2021-05-24 RX ADMIN — CHOLESTYRAMINE 4 G: 4 POWDER, FOR SUSPENSION ORAL at 08:47

## 2021-05-24 RX ADMIN — SENNOSIDES AND DOCUSATE SODIUM 1 TABLET: 8.6; 5 TABLET ORAL at 17:01

## 2021-05-24 RX ADMIN — PIOGLITAZONE 30 MG: 15 TABLET ORAL at 08:46

## 2021-05-24 RX ADMIN — MAGNESIUM OXIDE TAB 400 MG (241.3 MG ELEMENTAL MG) 400 MG: 400 (241.3 MG) TAB at 17:01

## 2021-05-24 RX ADMIN — MULTIPLE VITAMINS W/ MINERALS TAB 1 TABLET: TAB ORAL at 08:47

## 2021-05-24 RX ADMIN — SENNOSIDES AND DOCUSATE SODIUM 1 TABLET: 8.6; 5 TABLET ORAL at 08:47

## 2021-05-24 RX ADMIN — MAGNESIUM OXIDE TAB 400 MG (241.3 MG ELEMENTAL MG) 400 MG: 400 (241.3 MG) TAB at 08:47

## 2021-05-24 RX ADMIN — Medication 1 PATCH: at 08:48

## 2021-05-24 RX ADMIN — SERTRALINE HYDROCHLORIDE 100 MG: 100 TABLET ORAL at 08:47

## 2021-05-24 RX ADMIN — CHOLESTYRAMINE 4 G: 4 POWDER, FOR SUSPENSION ORAL at 17:01

## 2021-05-24 RX ADMIN — GABAPENTIN 300 MG: 300 CAPSULE ORAL at 08:47

## 2021-05-24 RX ADMIN — ROPINIROLE HYDROCHLORIDE 1 MG: 1 TABLET, FILM COATED ORAL at 21:17

## 2021-05-24 RX ADMIN — INSULIN GLARGINE 34 UNITS: 100 INJECTION, SOLUTION SUBCUTANEOUS at 08:45

## 2021-05-24 RX ADMIN — THIAMINE HCL TAB 100 MG 100 MG: 100 TAB at 08:47

## 2021-05-24 RX ADMIN — QUETIAPINE FUMARATE 200 MG: 200 TABLET ORAL at 21:17

## 2021-05-24 RX ADMIN — METFORMIN HYDROCHLORIDE 500 MG: 500 TABLET, FILM COATED ORAL at 16:56

## 2021-05-24 RX ADMIN — INSULIN LISPRO 1 UNITS: 100 INJECTION, SOLUTION INTRAVENOUS; SUBCUTANEOUS at 12:48

## 2021-05-24 RX ADMIN — ERGOCALCIFEROL 50000 UNITS: 1.25 CAPSULE ORAL at 08:47

## 2021-05-24 NOTE — PROGRESS NOTES
Progress Note - Ciara Ratliff 46 y o  male MRN: [id number]    Unit/Bed#: [facility] Patient 209-01 Encounter: [id number]        Subjective:   Patient seen and examined at bedside after reviewing the chart and discussing the case with the caring staff  Patient examined at bedside  Patient has no acute concerns  Physical Exam   Vitals: Blood pressure 103/56, pulse 71, temperature (!) 96 9 °F (36 1 °C), temperature source Temporal, resp  rate 18, height 6' (1 829 m), weight 88 5 kg (195 lb 1 7 oz), SpO2 97 %  ,Body mass index is 26 46 kg/m²  Constitutional: Patient appears well-developed  HEENT: PERR, EOMI, MMM  Cardiovascular: Normal rate and regular rhythm  Pulmonary/Chest: Effort normal and breath sounds normal    Abdomen: Soft, + BS, NT    Assessment/Plan:  Ciara Ratliff is a(n) 46 y o  male with MDD      1  Cardiac with history of Hypertension and dyslipidemia  Patient will be continued on Cozaar 25 mg daily  Continue cholestyramine sugar free 2 times daily  2  Diabetes mellitus  Patient's hemoglobin A1c was 7 4 on 05/11/2021  Continue carb controlled diet, fingerstick glucose 4 times daily before meals and at bedtime, Humalog sliding scale, metformin, Actos and Lantus 34 units qAM   3  Tobacco use  Patient has nicotine transdermal patch  4  Alcohol abuse with history of multiple acute pancreatitis  No S/S of withdrawal  Continue folic acid, thiamine, multivitamin  Patient follows up with specialists for his pancreatitis  5  GERD  Continue pantoprazole 40 mg daily  6  COPD  Continue Spiriva daily, albuterol as needed, and 2 L nasal cannula  7  Constipation  Continue Senokot S twice daily  8  Chronic pain syndrome  Continue Tylenol and oxycodone as needed  9  Cough  Patient may receive Robitussin DM as needed  10  Vitamin-D deficiency  Continue vitamin-D bolus doses for 8 weeks followed by vitamin D3 1000 units daily

## 2021-05-24 NOTE — SOCIAL WORK
SW attempted to meet with patient; pt sleeping soundly in his bed with O2 via nasal cannula; pt snoring lightly and not easily awoken at this time; SW will attempt contact at another time

## 2021-05-24 NOTE — PLAN OF CARE
Problem: DISCHARGE PLANNING  Goal: Discharge to home or other facility with appropriate resources  Description: INTERVENTIONS:  - Identify barriers to discharge w/patient and caregiver  - Arrange for needed discharge resources and transportation as appropriate  - Identify discharge learning needs (meds, wound care, etc )  - Arrange for interpretive services to assist at discharge as needed  - Refer to Case Management Department for coordinating discharge planning if the patient needs post-hospital services based on physician/advanced practitioner order or complex needs related to functional status, cognitive ability, or social support system  Outcome: Progressing     Pt progressing slowly - remains profoundly depressed;  No DC date - will return home to wife (declining D&A rehab)

## 2021-05-24 NOTE — NURSING NOTE
[id number]     Pt retired to room  Early this shift  Affect bland  Mood depressed  Denies SI or HI  Requested/recieved prn anxiolytic At [facility]  NO acute behavioral issues noted  Q 7 min checks ongoing  N [phone]     Pt resting quietly in bed utill approx 0300 came to desk c/o of felling hypoglycemic; accu check performed found to be 104 at this time provided light snack; returned to bed shortly after  No acute behavioral issues  Q 7 min checks ongoing

## 2021-05-24 NOTE — PLAN OF CARE
Problem: Ineffective Coping  Goal: Cooperates with admission process  Description: Interventions:   - Complete admission process  Outcome: Progressing  Goal: Identifies healthy coping skills  Outcome: Progressing  Goal: Participates in unit activities  Description: Interventions:  - Provide therapeutic environment   - Provide required programming   - Redirect inappropriate behaviors   Outcome: Progressing  Goal: Patient/Family participate in treatment and DC plans  Description: Interventions:  - Provide therapeutic environment  Outcome: Progressing  Goal: Patient/Family verbalizes awareness of resources  Outcome: Progressing     Problem: Depression  Goal: Treatment Goal: Demonstrate behavioral control of depressive symptoms, verbalize feelings of improved mood/affect, and adopt new coping skills prior to discharge  Outcome: Progressing  Goal: Refrain from isolation  Description: Interventions:  - Develop a trusting relationship   - Encourage socialization   Outcome: Progressing  Goal: Refrain from self-neglect  Outcome: Progressing     Problem: Anxiety  Goal: Anxiety is at manageable level  Description: Interventions:  - Assess and monitor patient's anxiety level  - Monitor for signs and symptoms (heart palpitations, chest pain, shortness of breath, headaches, nausea, feeling jumpy, restlessness, irritable, apprehensive)  - Collaborate with interdisciplinary team and initiate plan and interventions as ordered    - [place] patient to unit/surroundings  - Explain treatment plan  - Encourage participation in care  - Encourage verbalization of concerns/fears  - Identify coping mechanisms  - Assist in developing anxiety-reducing skills  - Administer/offer alternative therapies  - Limit or eliminate stimulants  Outcome: Progressing

## 2021-05-24 NOTE — PROGRESS NOTES
Patient compliant with medications and meals  Patient rated his anxiety and depression as high  Requested Oxycodone twice this shift for back pain  Denies any SI/HI  Patient out for meals and back to his room  No other concerns or problems reported this shift  Q 7 mins checks in place for safety  Will continue to monitor for behaviors and changes

## 2021-05-24 NOTE — PROGRESS NOTES
05/24/21   Team Meeting   Meeting Type Daily Rounds   Team Members Present   Team Members Present Physician;Nurse;;Occupational Therapist   Physician Team Member Dr Eve Rehman MD   Nursing Team Member Kellie Flood, [facility]   Care Management Team Member Fiona Mireles , [facility]   OT Team Member Aria Gould, [place]   Patient/Family Present   Patient Present No   Patient's Family Present No   PT declined rehab  No D/C date at this time  Slept  Quiet keeps to self  Remains on oxygen  Will begin on ativan taper from prn use

## 2021-05-24 NOTE — PROGRESS NOTES
Progress Note - Behavioral Health   Everardo Donovan 46 y o  male MRN: [id number]  Unit/Bed#: Marco Grey 209-01 Encounter: [id number]    Assessment/Plan   Principal Problem:    MDD (major depressive disorder), recurrent episode, severe ([place] [address] )  Active Problems:    Quiros esophagus    Benign essential hypertension    COPD (chronic obstructive pulmonary disease) ([place] [address] )    Fatty liver    Generalized anxiety disorder    Insomnia    Hyponatremia    GERD (gastroesophageal reflux disease)    History of ETOH abuse    Chronic pancreatitis ([place] [address] )    Intentional overdose of drug in tablet form ([place] [address] )    DM (diabetes mellitus) ([place] [address] )    Hypomagnesemia      Behavior over the last 24 hours:  unchanged  Sleep: intermittent  Appetite: normal  Medication side effects: No  ROS: no complaints and All other systems negative for acute change     Alpa Arita was seen today for psychiatric follow-up  Patient reports that he has no energy or motivation and states he awakens early in the morning due to feeling my sugar start to drop    He rates his anxiety and depression as 7-8    Denies any AVH/SI/HI  Patient spoke about how he feels he is "inadequate" and unable to provide for his family  He went on to state that his alcoholism and inability to obtain disability contributes to his worsening depression  Spoke with patient about the risks versus benefits of benzodiazepines to which he was in agreement to start PRN lorazepam taper  He voiced he is contemplating whether or not to attend alcohol rehab post discharge  He then asked if there was any way that he may be a candidate for medical marijuana  He remains compliant with medication regimen and denies any side effects  His appetite also remains adequate      Mental Status Evaluation:  Appearance:  age appropriate and casually dressed   Behavior:  Calm and cooperative   Speech:  soft and Slow, tangential   Mood:  depressed   Affect:  mood-congruent   Thought Process:  circumstantial   Thought Content: No overt delusions   Perceptual Disturbances: None   Risk Potential: Suicidal Ideations none  Homicidal Ideations none  Potential for Aggression No   Sensorium:  person, place, time/date and situation   Memory:  recent and remote memory grossly intact   Consciousness:  alert and awake    Attention: attention span and concentration were age appropriate   Insight:  limited   Judgment: limited   Gait/Station: Laying in bed   Motor Activity: no abnormal movements     Progress Toward Goals:  No change  Patient continues to exhibit depressive symptoms and remains withdrawn to room  Encouraged patient to utilize PRN Atarax as opposed to lorazepam   Risks vs benefits of benzodiazepines reviewed with patient  Will begin PRN Lorazepam taper  Otherwise, continue current psychotropic medications as ordered  Spoke with case management about obtaining gene site testing results for psychotropic medications  No discharge date at this time  Recommended Treatment: Continue with group therapy, milieu therapy and occupational therapy  Risks, benefits and possible side effects of Medications:   Risks, benefits, and possible side effects of medications explained to patient and patient verbalizes understanding  Medications: all current active meds have been reviewed and planned medication changes: Decrease Lorazepam 0 5mg PO/IM Q8 hours PRN severe anxiety   Labs: I have personally reviewed all pertinent laboratory/tests results  Counseling / Coordination of Care  Total floor / unit time spent today 30 minutes  Greater than 50% of total time was spent with the patient and / or family counseling and / or coordination of care   A description of the counseling / coordination of care:  Medication education, treatment plan, supportive therapy

## 2021-05-25 LAB
GLUCOSE SERPL-MCNC: 115 MG/DL (ref 65–140)
GLUCOSE SERPL-MCNC: 144 MG/DL (ref 65–140)
GLUCOSE SERPL-MCNC: 171 MG/DL (ref 65–140)
GLUCOSE SERPL-MCNC: 79 MG/DL (ref 65–140)

## 2021-05-25 PROCEDURE — 99232 SBSQ HOSP IP/OBS MODERATE 35: CPT | Performed by: PSYCHIATRY & NEUROLOGY

## 2021-05-25 PROCEDURE — 82948 REAGENT STRIP/BLOOD GLUCOSE: CPT

## 2021-05-25 RX ADMIN — OXYCODONE HYDROCHLORIDE 5 MG: 5 TABLET ORAL at 12:29

## 2021-05-25 RX ADMIN — LORAZEPAM 0.5 MG: 0.5 TABLET ORAL at 13:32

## 2021-05-25 RX ADMIN — CHOLESTYRAMINE 4 G: 4 POWDER, FOR SUSPENSION ORAL at 08:51

## 2021-05-25 RX ADMIN — PROPRANOLOL HYDROCHLORIDE 10 MG: 10 TABLET ORAL at 17:17

## 2021-05-25 RX ADMIN — METFORMIN HYDROCHLORIDE 500 MG: 500 TABLET, FILM COATED ORAL at 08:49

## 2021-05-25 RX ADMIN — MAGNESIUM OXIDE TAB 400 MG (241.3 MG ELEMENTAL MG) 400 MG: 400 (241.3 MG) TAB at 17:17

## 2021-05-25 RX ADMIN — GABAPENTIN 300 MG: 300 CAPSULE ORAL at 21:06

## 2021-05-25 RX ADMIN — QUETIAPINE FUMARATE 200 MG: 200 TABLET ORAL at 21:06

## 2021-05-25 RX ADMIN — GABAPENTIN 300 MG: 300 CAPSULE ORAL at 16:19

## 2021-05-25 RX ADMIN — OXYCODONE HYDROCHLORIDE 5 MG: 5 TABLET ORAL at 16:49

## 2021-05-25 RX ADMIN — ROPINIROLE HYDROCHLORIDE 1 MG: 1 TABLET, FILM COATED ORAL at 08:49

## 2021-05-25 RX ADMIN — ROPINIROLE HYDROCHLORIDE 1 MG: 1 TABLET, FILM COATED ORAL at 21:06

## 2021-05-25 RX ADMIN — TIOTROPIUM BROMIDE 18 MCG: 18 CAPSULE ORAL; RESPIRATORY (INHALATION) at 09:08

## 2021-05-25 RX ADMIN — GABAPENTIN 300 MG: 300 CAPSULE ORAL at 08:49

## 2021-05-25 RX ADMIN — SENNOSIDES AND DOCUSATE SODIUM 1 TABLET: 8.6; 5 TABLET ORAL at 08:48

## 2021-05-25 RX ADMIN — CHOLESTYRAMINE 4 G: 4 POWDER, FOR SUSPENSION ORAL at 17:16

## 2021-05-25 RX ADMIN — INSULIN LISPRO 1 UNITS: 100 INJECTION, SOLUTION INTRAVENOUS; SUBCUTANEOUS at 21:06

## 2021-05-25 RX ADMIN — LORAZEPAM 0.5 MG: 0.5 TABLET ORAL at 21:06

## 2021-05-25 RX ADMIN — Medication 1 PATCH: at 08:51

## 2021-05-25 RX ADMIN — THIAMINE HCL TAB 100 MG 100 MG: 100 TAB at 08:48

## 2021-05-25 RX ADMIN — MAGNESIUM OXIDE TAB 400 MG (241.3 MG ELEMENTAL MG) 400 MG: 400 (241.3 MG) TAB at 08:48

## 2021-05-25 RX ADMIN — METFORMIN HYDROCHLORIDE 500 MG: 500 TABLET, FILM COATED ORAL at 16:19

## 2021-05-25 RX ADMIN — PIOGLITAZONE 30 MG: 15 TABLET ORAL at 08:48

## 2021-05-25 RX ADMIN — PROPRANOLOL HYDROCHLORIDE 10 MG: 10 TABLET ORAL at 08:49

## 2021-05-25 RX ADMIN — PANTOPRAZOLE SODIUM 40 MG: 40 TABLET, DELAYED RELEASE ORAL at 06:17

## 2021-05-25 RX ADMIN — LOSARTAN POTASSIUM 25 MG: 25 TABLET, FILM COATED ORAL at 08:49

## 2021-05-25 RX ADMIN — FOLIC ACID 1 MG: 1 TABLET ORAL at 08:49

## 2021-05-25 RX ADMIN — INSULIN GLARGINE 34 UNITS: 100 INJECTION, SOLUTION SUBCUTANEOUS at 08:55

## 2021-05-25 RX ADMIN — SENNOSIDES AND DOCUSATE SODIUM 1 TABLET: 8.6; 5 TABLET ORAL at 17:17

## 2021-05-25 RX ADMIN — MULTIPLE VITAMINS W/ MINERALS TAB 1 TABLET: TAB ORAL at 08:49

## 2021-05-25 RX ADMIN — SERTRALINE HYDROCHLORIDE 100 MG: 100 TABLET ORAL at 08:48

## 2021-05-25 NOTE — PROGRESS NOTES
Patient has been withdrawn to his room for most of the shift  He did come out for breakfast but has not been out for lunch yet  At morning medication administration patient stated his anxiety was not bad at #3, Depression rated at 4-5  Denied SI,HI, or hallucinations  At lunchtime he complained of #9 back pain and bilateral leg pain and requested and given PRN oxycodone IR  Patient also stated anxiety is high and requested PRN ativan which he is aware he needs to wait an hour in between  Will monitor for effectiveness  Q 7 minute safety checks maintained

## 2021-05-25 NOTE — PROGRESS NOTES
05/25/21   Team Meeting   Meeting Type Daily Rounds   Team Members Present   Team Members Present Physician;Nurse;; Occupational Therapist   Physician Team Member Dr Karen Sharma MD; [place] [address]   Nursing Team Member Shirin Nair RN   Social Work Team Member [facility]   OT Team Member [place], [place]   Patient/Family Present   Patient Present No   Patient's Family Present No     No DC date - declined rehab, will return home upon stabilization; denies SI/HI/AH/VH, dep 4-5, anx 3; slept overnight; no prns

## 2021-05-25 NOTE — PLAN OF CARE
Problem: DISCHARGE PLANNING  Goal: Discharge to home or other facility with appropriate resources  Description: INTERVENTIONS:  - Identify barriers to discharge w/patient and caregiver  - Arrange for needed discharge resources and transportation as appropriate  - Identify discharge learning needs (meds, wound care, etc )  - Arrange for interpretive services to assist at discharge as needed  - Refer to Case Management Department for coordinating discharge planning if the patient needs post-hospital services based on physician/advanced practitioner order or complex needs related to functional status, cognitive ability, or social support system  Outcome: Progressing     Pt progressing; DC Friday to home vs d&a rehab

## 2021-05-25 NOTE — NURSING NOTE
Pt received on the unit awake and alert in the day room , he denies any depression, anxiety or SI, able to make needs known, pleasant and cooperative, compliant with medications and positive for pm snack  Q7 minute checks continued , will continue to monitor

## 2021-05-25 NOTE — DISCHARGE INSTR - OTHER ORDERS
You are being discharged to your home located at [address] [address]; Phone: [phone]    Triggers you have identified during your hospitalization that led to your admission with suicidal attempt and distressed mood include marital stressors, chronic pain and substance abuse  Coping skills you have identified during your hospitalization include socializing with select peers, walking and resting in bed  If you are unable to deal with your distressed mood alone please talk to your wife or your psychiatric provider  If that is not effective and you continue to have suicidal ideation and/or distressed mood, please contact [address] at [phone], dial [phone] or go to the nearest emergency center  [facility] Crisis Hotline: Kayli Horne Suicide Prevention Lifeline:  4-361.367.1703  *Alcohol Anonymous: 768.669.1155  *[place] Drug & Alcohol Commission: (342) 781-3155  [address] [facility]  on [address] [place] [address] ([facility]) HELPLINE: 705.520.2571/Website: www margaret org  *Substance Abuse and [address] [facility]) [employer], which is a confidential, free, 24-hour-a-day, 365-day-a-year, information service for individuals and family members facing mental health and/or substance use disorders  This service provides referrals to local treatment facilities, support groups, and community-based organizations  Callers can also order free publications and other information  Call 2-845.310.9152/Website: www [facility] gov  *[facility] 2-1-1: This is a toll free, confidential, 24-hour-a-day service which connects you to a community  in your area who can help you find services and resources that are available to you locally and provide critical services that can improve and save lives    Call: 211  /Website: https://irma aguayo/

## 2021-05-25 NOTE — PLAN OF CARE
Problem: Risk for Self Injury/Neglect  Goal: Treatment Goal: Remain safe during length of stay, learn and adopt new coping skills, and be free of self-injurious ideation, impulses and acts at the time of discharge  Outcome: Progressing  Goal: Verbalize thoughts and feelings  Description: Interventions:  - Assess and re-assess patient's lethality and potential for self-injury  - Engage patient in 1:1 interactions, daily, for a minimum of 15 minutes  - Encourage patient to express feelings, fears, frustrations, hopes  - Establish rapport/trust with patient   Outcome: Progressing  Goal: Refrain from harming self  Description: Interventions:  - Monitor patient closely, per order  - Develop a trusting relationship  - Supervise medication ingestion, monitor effects and side effects   Outcome: Progressing

## 2021-05-25 NOTE — PROGRESS NOTES
Progress Note - Behavioral Health   Mable Garrett 46 y o  male MRN: [id number]  Unit/Bed#: Ricardo Bonilla 209-01 Encounter: [id number]    Assessment/Plan   Principal Problem:    MDD (major depressive disorder), recurrent episode, severe ([facility] )  Active Problems:    Quiros esophagus    Benign essential hypertension    COPD (chronic obstructive pulmonary disease) (H[facility])    Fatty liver    Generalized anxiety disorder    Insomnia    Hyponatremia    GERD (gastroesophageal reflux disease)    History of ETOH abuse    Chronic pancreatitis (HCC)    Intentional overdose of drug in tablet form ([place] [address] )    DM (diabetes mellitus) ([place] [address] )    Hypomagnesemia      Behavior over the last 24 hours:  regressed, has spent entire morning in room sleeping  No participation in groups or social interaction  Sleep: increased   Appetite: reports no appetite today d/t pain and related nausea  Declined lunch  Medication side effects: No  ROS: no complaints and all other system are negative     Mental Status Evaluation:  Appearance:  casually dressed and minimal hygiene today d/t sleeping in bed all morning  Behavior:  psychomotor retardation and limited engagement    Speech:  delayed, soft and Limited interactions  does not volunteer information, but answers questions      Mood:  constricted, depressed and reports increased depression due to pain    Affect:  constricted and mood-congruent   Thought Process:  goal directed   Thought Content:  thought content is vague, no delusions elicited    Perceptual Disturbances: None   Risk Potential: Suicidal Ideations none  Homicidal Ideations none  Potential for Aggression No   Sensorium:  person, place, time/date and situation   Memory:  recent and remote memory grossly intact   Consciousness:  appears fatigued    Attention: attention span and concentration were age appropriate   Insight:  limited   Judgment: limited   Gait/Station: normal gait/station   Motor Activity: no abnormal movements     Progress Toward Goals: Patient reports increase in somatic, mental distress and pain primarily in legs causing nausea  Reports he has no energy or motivation to get out of bed  Did not attend group or interact this morning and states he feels like this most days, but not everyday and when he feels like this at home, nobody understands  "They still expect me to do things  They don't understand that I just can't " Reports he took a higher dose of Zoloft when he was at home and is agreeable to an increase  Discussed risk benefit, indication and possible interactions with medical medications  Discussed possibility of D&A rehab and ongoing therapy  Recommended Treatment: Continue with group therapy, milieu therapy and occupational therapy  Risks, benefits and possible side effects of Medications:   Risks, benefits, and possible side effects of medications explained to patient and patient verbalizes understanding  Medications: all current active meds have been reviewed  Increase Zoloft to 150 mg po daily  Labs: I have personally reviewed all pertinent laboratory/tests results     Most Recent Labs:   Lab Results   Component Value Date    WBC 6 80 05/11/2021    RBC 4 89 05/11/2021    HGB 14 7 05/11/2021    HCT 43 8 05/11/2021     05/16/2021    RDW 15 0 (H) 05/11/2021    NEUTROABS 4 10 05/11/2021    SODIUM 129 (L) 05/16/2021    K 4 3 05/16/2021    CL 96 (L) 05/16/2021    CO2 23 05/16/2021    BUN 17 05/16/2021    CREATININE 0 86 05/16/2021    GLUC 239 (H) 05/16/2021    GLUF 219 (H) 07/18/2019    CALCIUM 9 4 05/16/2021    AST 21 05/16/2021    ALT 19 05/16/2021    ALKPHOS 85 05/16/2021    TP 7 3 05/16/2021    ALB 3 7 05/16/2021    TBILI 0 50 05/16/2021    CHOLESTEROL 157 02/21/2019    HDL 44 02/21/2019    TRIG 188 (H) 02/21/2019    LDLCALC 75 02/21/2019    NONHDLC 113 02/21/2019    AMMONIA 50 05/11/2021    YGL3JLGUDBOK 1 680 05/11/2021    HGBA1C 7 4 (H) 05/11/2021     05/11/2021       Counseling / Coordination of Care  Total floor / unit time spent today 30 minutes  Greater than 50% of total time was spent with the patient and / or family counseling and / or coordination of care

## 2021-05-25 NOTE — PROGRESS NOTES
Patient stated PRN oxycodone was effective in decreasing pain to #6 an hour later  Patient given PRN ativan at 12:30 for increased anxiety and was effective  Continue to monitor

## 2021-05-25 NOTE — PROGRESS NOTES
Progress Note - Olena Iglesias 46 y o  male MRN: [id number]    Unit/Bed#: Catalino Graves 209-01 Encounter: [id number]        Subjective:   Patient seen and examined at bedside after reviewing the chart and discussing the case with the caring staff  Patient examined at bedside  Patient has no acute concerns except feeling tired this morning  Physical Exam   Vitals: Blood pressure 110/57, pulse 69, temperature (!) 97 1 °F (36 2 °C), temperature source Temporal, resp  rate 18, height 6' (1 829 m), weight 88 5 kg (195 lb 1 7 oz), SpO2 98 %  ,Body mass index is 26 46 kg/m²  Constitutional: Patient appears well-developed  HEENT: PERR, EOMI, MMM  Cardiovascular: Normal rate and regular rhythm  Pulmonary/Chest: Effort normal and breath sounds normal    Abdomen: Soft, + BS, NT    Assessment/Plan:  Olena Iglesias is a(n) 46 y o  male with MDD      1  Cardiac with history of Hypertension and dyslipidemia  Patient will be continued on Cozaar 25 mg daily  Continue cholestyramine sugar free 2 times daily  2  Diabetes mellitus  Patient's hemoglobin A1c was 7 4 on 05/11/2021  Continue carb controlled diet, fingerstick glucose 4 times daily before meals and at bedtime, Humalog sliding scale, metformin, Actos and Lantus 34 units qAM   3  Tobacco use  Patient has nicotine transdermal patch  4  Alcohol abuse with history of multiple acute pancreatitis  No S/S of withdrawal  Continue folic acid, thiamine, multivitamin  Patient follows up with specialists for his pancreatitis  5  GERD  Continue pantoprazole 40 mg daily  6  COPD  Continue Spiriva daily, albuterol as needed, and 2 L nasal cannula  7  Constipation  Continue Senokot S twice daily  8  Chronic pain syndrome  Continue Tylenol and oxycodone as needed  9  Cough  Patient may receive Robitussin DM as needed  10  Vitamin-D deficiency  Continue vitamin-D bolus doses for 8 weeks followed by vitamin D3 1000 units daily

## 2021-05-25 NOTE — PROGRESS NOTES
[id number]- Patient napping at beginning of shift  Took shower at approximately 1600  Patient did c/o of generalized back and body pain at score of 7  Oxycodone 5 mg given at  1649  Patient did report after dinner that pain was reduced to 5  Compliant with all other meds and meals  Selectively social with peers  Denies SI and HI   Denied anxiety and depression     Currently in dining room  Q 7 minute checks maintained  Will continue to monitor

## 2021-05-25 NOTE — PLAN OF CARE
Problem: Ineffective Coping  Goal: Participates in unit activities  Description: Interventions:  - Provide therapeutic environment   - Provide required programming   - Redirect inappropriate behaviors   Outcome: Not Progressing   Patient continues to isolate to his room; unwilling to join groups

## 2021-05-25 NOTE — DISCHARGE INSTR - APPOINTMENTS
The treatment team recommends ongoing medication management upon discharge with your current psychiatric provider  A follow up appointment has been made on your behalf with [facility]ui, [address], [place], [place] Phone: [phone]  Your appointment is scheduled for Thurs, June 10th at 3pm with TYE Burdick via [facility]  Please plan to answer your phone approximately 5 minutes prior to your scheduled appointment  This call may appear as CALLER ID BLOCKED or CALLED ID UNKNOWN - PLEASE ANSWER  Your discharge will be faxed to this provider for continuity of care  You have identified Dr Royal Dietrich DO, phone: [phone] as your primary care provider but you have declined a scheduled follow-up appointment on your behalf  Please schedule as needed  Your discharge will be faxed to this provider for continuity of care  Due to you being admitted positive for alcohol use, the treatment team recommends ongoing substance abuse ; you agreed to outpatient services  A referral has been made on your behalf to [address] , [address], [place], [address] [facility]; Phone: [phone]  Your intake appointment is scheduled for    at    with    Your discharge will be faxed to this provider for continuity of care  Anca Olson RN, our [employer], will be calling you after your discharge, on the phone number that you provided  She will be available as an additional support, if needed  If you wish to speak with her, you may contact [place] at [phone]

## 2021-05-26 LAB
GLUCOSE SERPL-MCNC: 154 MG/DL (ref 65–140)
GLUCOSE SERPL-MCNC: 158 MG/DL (ref 65–140)
GLUCOSE SERPL-MCNC: 161 MG/DL (ref 65–140)
GLUCOSE SERPL-MCNC: 94 MG/DL (ref 65–140)

## 2021-05-26 PROCEDURE — 99232 SBSQ HOSP IP/OBS MODERATE 35: CPT | Performed by: PSYCHIATRY & NEUROLOGY

## 2021-05-26 PROCEDURE — 82948 REAGENT STRIP/BLOOD GLUCOSE: CPT

## 2021-05-26 RX ADMIN — PROPRANOLOL HYDROCHLORIDE 10 MG: 10 TABLET ORAL at 08:10

## 2021-05-26 RX ADMIN — LOSARTAN POTASSIUM 25 MG: 25 TABLET, FILM COATED ORAL at 08:10

## 2021-05-26 RX ADMIN — ROPINIROLE HYDROCHLORIDE 1 MG: 1 TABLET, FILM COATED ORAL at 08:09

## 2021-05-26 RX ADMIN — INSULIN GLARGINE 34 UNITS: 100 INJECTION, SOLUTION SUBCUTANEOUS at 11:51

## 2021-05-26 RX ADMIN — SERTRALINE HYDROCHLORIDE 150 MG: 100 TABLET ORAL at 08:10

## 2021-05-26 RX ADMIN — SENNOSIDES AND DOCUSATE SODIUM 1 TABLET: 8.6; 5 TABLET ORAL at 17:01

## 2021-05-26 RX ADMIN — MULTIPLE VITAMINS W/ MINERALS TAB 1 TABLET: TAB ORAL at 08:10

## 2021-05-26 RX ADMIN — METFORMIN HYDROCHLORIDE 500 MG: 500 TABLET, FILM COATED ORAL at 15:38

## 2021-05-26 RX ADMIN — FOLIC ACID 1 MG: 1 TABLET ORAL at 08:10

## 2021-05-26 RX ADMIN — OXYCODONE HYDROCHLORIDE 5 MG: 5 TABLET ORAL at 21:53

## 2021-05-26 RX ADMIN — GABAPENTIN 300 MG: 300 CAPSULE ORAL at 08:09

## 2021-05-26 RX ADMIN — OXYCODONE HYDROCHLORIDE 5 MG: 5 TABLET ORAL at 17:01

## 2021-05-26 RX ADMIN — METFORMIN HYDROCHLORIDE 500 MG: 500 TABLET, FILM COATED ORAL at 07:41

## 2021-05-26 RX ADMIN — TIOTROPIUM BROMIDE 18 MCG: 18 CAPSULE ORAL; RESPIRATORY (INHALATION) at 08:11

## 2021-05-26 RX ADMIN — LORAZEPAM 0.5 MG: 0.5 TABLET ORAL at 18:22

## 2021-05-26 RX ADMIN — QUETIAPINE FUMARATE 200 MG: 200 TABLET ORAL at 21:54

## 2021-05-26 RX ADMIN — GABAPENTIN 300 MG: 300 CAPSULE ORAL at 21:54

## 2021-05-26 RX ADMIN — MAGNESIUM OXIDE TAB 400 MG (241.3 MG ELEMENTAL MG) 400 MG: 400 (241.3 MG) TAB at 08:09

## 2021-05-26 RX ADMIN — ROPINIROLE HYDROCHLORIDE 1 MG: 1 TABLET, FILM COATED ORAL at 21:54

## 2021-05-26 RX ADMIN — INSULIN LISPRO 1 UNITS: 100 INJECTION, SOLUTION INTRAVENOUS; SUBCUTANEOUS at 16:15

## 2021-05-26 RX ADMIN — PANTOPRAZOLE SODIUM 40 MG: 40 TABLET, DELAYED RELEASE ORAL at 05:15

## 2021-05-26 RX ADMIN — SENNOSIDES AND DOCUSATE SODIUM 1 TABLET: 8.6; 5 TABLET ORAL at 08:10

## 2021-05-26 RX ADMIN — GABAPENTIN 300 MG: 300 CAPSULE ORAL at 15:38

## 2021-05-26 RX ADMIN — MAGNESIUM OXIDE TAB 400 MG (241.3 MG ELEMENTAL MG) 400 MG: 400 (241.3 MG) TAB at 17:01

## 2021-05-26 RX ADMIN — CHOLESTYRAMINE 4 G: 4 POWDER, FOR SUSPENSION ORAL at 08:09

## 2021-05-26 RX ADMIN — THIAMINE HCL TAB 100 MG 100 MG: 100 TAB at 08:10

## 2021-05-26 RX ADMIN — CHOLESTYRAMINE 4 G: 4 POWDER, FOR SUSPENSION ORAL at 17:01

## 2021-05-26 RX ADMIN — PROPRANOLOL HYDROCHLORIDE 10 MG: 10 TABLET ORAL at 17:01

## 2021-05-26 RX ADMIN — INSULIN LISPRO 1 UNITS: 100 INJECTION, SOLUTION INTRAVENOUS; SUBCUTANEOUS at 11:41

## 2021-05-26 RX ADMIN — OXYCODONE HYDROCHLORIDE 5 MG: 5 TABLET ORAL at 11:53

## 2021-05-26 RX ADMIN — PIOGLITAZONE 30 MG: 15 TABLET ORAL at 08:10

## 2021-05-26 RX ADMIN — OXYCODONE HYDROCHLORIDE 5 MG: 5 TABLET ORAL at 07:41

## 2021-05-26 RX ADMIN — Medication 1 PATCH: at 08:10

## 2021-05-26 NOTE — PLAN OF CARE
Problem: DISCHARGE PLANNING  Goal: Discharge to home or other facility with appropriate resources  Description: INTERVENTIONS:  - Identify barriers to discharge w/patient and caregiver  - Arrange for needed discharge resources and transportation as appropriate  - Identify discharge learning needs (meds, wound care, etc )  - Arrange for interpretive services to assist at discharge as needed  - Refer to Case Management Department for coordinating discharge planning if the patient needs post-hospital services based on physician/advanced practitioner order or complex needs related to functional status, cognitive ability, or social support system  Outcome: Progressing   PT will D/C early next week  Disposition TBD home or rehab

## 2021-05-26 NOTE — PROGRESS NOTES
Pt c/o depression 3/10 & anxiety 3/10  Pt denies any hallucinations, suicidal or homicidal ideations  Q 7 min checks maintained to monitor pt's behavior & safety  Pt is cooperative & compliant with medications  Pt does socialize with other patients  Pt up ad ziggy & gait is steady  Lungs clear upon auscultation  Pt is dyspneic on exertion @ times  Pt uses Nasal O2 @ 2L/min via cannula prn during the day  O2 sat - 93% on room air  Pt medicated for bilateral leg pain @ 0741 with Oxy-IR po as ordered by MD with relief obtained

## 2021-05-26 NOTE — PROGRESS NOTES
Progress Note - Behavioral Health   Lukasz Mesa 46 y o  male MRN: [id number]  Unit/Bed#: Carissa Shankar 209-01 Encounter: [id number]    Assessment/Plan   Principal Problem:    MDD (major depressive disorder), recurrent episode, severe ([facility] [address] )  Active Problems:    Quiros esophagus    Benign essential hypertension    COPD (chronic obstructive pulmonary disease) (HCC)    Fatty liver    Generalized anxiety disorder    Insomnia    Hyponatremia    GERD (gastroesophageal reflux disease)    History of ETOH abuse    Chronic pancreatitis (HCC)    Intentional overdose of drug in tablet form ([facility] [address] )    DM (diabetes mellitus) ([facility] [address] )    Hypomagnesemia      Behavior over the last 24 hours:  improved, patient attended therapeutic group this morning wiht good participation     Sleep: normal  Appetite: improved appetite today  Denies nausea which was present yesterday  Medication side effects: No  ROS: Continues to report mild leg pain, but states it is improved  Did not keep him awake last night  All other systems are negative for acute changes     Mental Status Evaluation:  Appearance:  age appropriate, casually dressed and good hygiene  Normal eye contact  Behavior:  normal and cooperative  Calm and pleasant  Improved interactions today  Resting in bed    Speech:  normal pitch, normal volume and rate and rhythm  Spontaneous logical interactions  Freely engages in conversation  Mood:  depressed and withdrawn  Reports less depression than yesterday  Affect:  blunted, mood-congruent and   Improved affect with increased interaction and spontaneity  Thought Process:  normal and logical, linear and goal-directed      Thought Content:  No overt delusions   Perceptual Disturbances: None and Visual hallucinations   Risk Potential: Suicidal Ideations none  Homicidal Ideations none  Potential for Aggression No   Sensorium:  person, place, time/date and situation   Memory:  recent and remote memory grossly intact   Consciousness:  alert and awake    Attention: attention span and concentration were age appropriate   Insight:  limited   Judgment: fair   Gait/Station: normal gait/station   Motor Activity: no abnormal movements     Progress Toward Goals: Patient has decided that he will most likely not attend D&A rehab  He talked about how being in the hospital, away from his family and not being able to go outside has affected his mood  His plan is to go home and attend daily AA meetings, follow up with outpatient addiction treatment, see a certified , attend separate outpatient treatment, and attend family therapy with his wife and children  He states he is happy that he has a home and family to return to, and this is often the safety factor that prevents him from harming himself  He reports a 2-year period of sobriety, but stopped going to [facility] which he believes triggered his relapse  Recommended Treatment: Continue with group therapy, milieu therapy and occupational therapy  Risks, benefits and possible side effects of Medications:   Risks, benefits, and possible side effects of medications explained to patient and patient verbalizes understanding  Medications: all current active meds have been reviewed  Labs: I have personally reviewed all pertinent laboratory/tests results     Most Recent Labs:   Lab Results   Component Value Date    WBC 6 80 05/11/2021    RBC 4 89 05/11/2021    HGB 14 7 05/11/2021    HCT 43 8 05/11/2021     05/16/2021    RDW 15 0 (H) 05/11/2021    NEUTROABS 4 10 05/11/2021    SODIUM 129 (L) 05/16/2021    K 4 3 05/16/2021    CL 96 (L) 05/16/2021    CO2 23 05/16/2021    BUN 17 05/16/2021    CREATININE 0 86 05/16/2021    GLUC 239 (H) 05/16/2021    GLUF 219 (H) 07/18/2019    CALCIUM 9 4 05/16/2021    AST 21 05/16/2021    ALT 19 05/16/2021    ALKPHOS 85 05/16/2021    TP 7 3 05/16/2021    ALB 3 7 05/16/2021    TBILI 0 50 05/16/2021    CHOLESTEROL 157 02/21/2019    HDL 44 02/21/2019 TRIG 188 (H) 02/21/2019    LDLCALC 75 02/21/2019    NONHDLC 113 02/21/2019    AMMONIA 50 05/11/2021    [id number] 1 680 05/11/2021    HGBA1C 7 4 (H) 05/11/2021     05/11/2021       Counseling / Coordination of Care  Total floor / unit time spent today 20 minutes  Greater than 50% of total time was spent with the patient and / or family counseling and / or coordination of care

## 2021-05-26 NOTE — PROGRESS NOTES
05/26/21    Team Meeting   Meeting Type Daily Rounds   Team Members Present   Team Members Present Physician;Nurse;;Occupational Therapist   Physician Team Member Dr Nahomy Prakash, MD   Nursing Team Member Hussein Ellsworth RN   Care Management Team Member MS [place], [facility] [place]   [place] Team Member Aria Gould [place]   Patient/Family Present   Patient Present No   Patient's Family Present No   Depression and Anxiety 3/10  D/C some time next week  Wither rehab or home  Uses oxygen at night, lungs are clear  Often withdrawn to room  Does not go to group this week

## 2021-05-26 NOTE — PROGRESS NOTES
Pt medicated for c/o increased anxiety 6/10 @ 1822 with Ativan po as ordered by MD Thierry Sotointer - 15 @ that time  Q 7 min checks maintained to monitor pt's behavior & safety

## 2021-05-26 NOTE — NURSING NOTE
Patient remains in bed sleeping at this time  No acute behaviors noted  No complaints voiced during the overnight period  He appears to have slept through the night  Oxygen in place via 2L NC  No obvious signs of respiratory distress observed  Will CTM via q7 minute safety checks

## 2021-05-26 NOTE — PROGRESS NOTES
Progress Note - Cecily Galan 46 y o  male MRN: [id number]    Unit/Bed#: Earline Wilson 209-01 Encounter: [id number]        Subjective:   Patient seen and examined at bedside after reviewing the chart and discussing the case with the caring staff  Patient examined at bedside  Patient has no acute concerns except feeling tired  Physical Exam   Vitals: Blood pressure 114/55, pulse 76, temperature 98 1 °F (36 7 °C), temperature source Temporal, resp  rate 18, height 6' (1 829 m), weight 88 5 kg (195 lb 1 7 oz), SpO2 93 %  ,Body mass index is 26 46 kg/m²  Constitutional: Patient appears well-developed  HEENT: PERR, EOMI, MMM  Cardiovascular: Normal rate and regular rhythm  Pulmonary/Chest: Effort normal and breath sounds normal    Abdomen: Soft, + BS, NT    Assessment/Plan:  Cecily Galan is a(n) 46 y o  male with MDD      1  Cardiac with history of Hypertension and dyslipidemia  Patient will be continued on Cozaar 25 mg daily  Continue cholestyramine sugar free 2 times daily  2  Diabetes mellitus  Patient's hemoglobin A1c was 7 4 on 05/11/2021  Continue carb controlled diet, fingerstick glucose 4 times daily before meals and at bedtime, Humalog sliding scale, metformin, Actos and Lantus 34 units qAM   3  Tobacco use  Patient has nicotine transdermal patch  4  Alcohol abuse with history of multiple acute pancreatitis  No S/S of withdrawal  Continue folic acid, thiamine, multivitamin  Patient follows up with specialists for his pancreatitis  5  GERD  Continue pantoprazole 40 mg daily  6  COPD  Continue Spiriva daily, albuterol as needed, and 2 L nasal cannula  7  Constipation  Continue Senokot S twice daily  8  Chronic pain syndrome  Continue Tylenol and oxycodone as needed  9  Cough  Patient may receive Robitussin DM as needed  10  Vitamin-D deficiency  Continue vitamin-D bolus doses for 8 weeks followed by vitamin D3 1000 units daily

## 2021-05-26 NOTE — NURSING NOTE
Patient was visible in the milieu this evening  Social with select peers  He did attend group and snack time  He rates anxiety and depression both 7/10, denies SI, HI and hallucinations  Patient is on room air with breathing easy and non-labored  Oxygen saturation was 95% at 2031  He was medication compliant at HS  Patient received scheduled insulin for   He requested and received PRN Ativan 0 5mg for increased anxiety  Romero Scale rating performed with a result of 23  No acute behaviors observed  Will CTM via q7 minute safety checks

## 2021-05-27 LAB
GLUCOSE SERPL-MCNC: 148 MG/DL (ref 65–140)
GLUCOSE SERPL-MCNC: 159 MG/DL (ref 65–140)
GLUCOSE SERPL-MCNC: 195 MG/DL (ref 65–140)
GLUCOSE SERPL-MCNC: 73 MG/DL (ref 65–140)
GLUCOSE SERPL-MCNC: 93 MG/DL (ref 65–140)

## 2021-05-27 PROCEDURE — 99232 SBSQ HOSP IP/OBS MODERATE 35: CPT | Performed by: PSYCHIATRY & NEUROLOGY

## 2021-05-27 PROCEDURE — 82948 REAGENT STRIP/BLOOD GLUCOSE: CPT

## 2021-05-27 RX ADMIN — INSULIN LISPRO 1 UNITS: 100 INJECTION, SOLUTION INTRAVENOUS; SUBCUTANEOUS at 11:56

## 2021-05-27 RX ADMIN — GABAPENTIN 300 MG: 300 CAPSULE ORAL at 21:19

## 2021-05-27 RX ADMIN — METFORMIN HYDROCHLORIDE 500 MG: 500 TABLET, FILM COATED ORAL at 07:50

## 2021-05-27 RX ADMIN — GABAPENTIN 300 MG: 300 CAPSULE ORAL at 16:06

## 2021-05-27 RX ADMIN — LORAZEPAM 0.5 MG: 0.5 TABLET ORAL at 20:09

## 2021-05-27 RX ADMIN — CHOLESTYRAMINE 4 G: 4 POWDER, FOR SUSPENSION ORAL at 17:05

## 2021-05-27 RX ADMIN — INSULIN LISPRO 2 UNITS: 100 INJECTION, SOLUTION INTRAVENOUS; SUBCUTANEOUS at 21:20

## 2021-05-27 RX ADMIN — PANTOPRAZOLE SODIUM 40 MG: 40 TABLET, DELAYED RELEASE ORAL at 05:57

## 2021-05-27 RX ADMIN — METFORMIN HYDROCHLORIDE 500 MG: 500 TABLET, FILM COATED ORAL at 16:06

## 2021-05-27 RX ADMIN — SENNOSIDES AND DOCUSATE SODIUM 1 TABLET: 8.6; 5 TABLET ORAL at 17:06

## 2021-05-27 RX ADMIN — TIOTROPIUM BROMIDE 18 MCG: 18 CAPSULE ORAL; RESPIRATORY (INHALATION) at 08:09

## 2021-05-27 RX ADMIN — INSULIN GLARGINE 34 UNITS: 100 INJECTION, SOLUTION SUBCUTANEOUS at 11:56

## 2021-05-27 RX ADMIN — ROPINIROLE HYDROCHLORIDE 1 MG: 1 TABLET, FILM COATED ORAL at 08:09

## 2021-05-27 RX ADMIN — PROPRANOLOL HYDROCHLORIDE 10 MG: 10 TABLET ORAL at 17:06

## 2021-05-27 RX ADMIN — OXYCODONE HYDROCHLORIDE 5 MG: 5 TABLET ORAL at 17:06

## 2021-05-27 RX ADMIN — Medication 1 PATCH: at 08:10

## 2021-05-27 RX ADMIN — FOLIC ACID 1 MG: 1 TABLET ORAL at 08:08

## 2021-05-27 RX ADMIN — MAGNESIUM OXIDE TAB 400 MG (241.3 MG ELEMENTAL MG) 400 MG: 400 (241.3 MG) TAB at 17:06

## 2021-05-27 RX ADMIN — PROPRANOLOL HYDROCHLORIDE 10 MG: 10 TABLET ORAL at 08:08

## 2021-05-27 RX ADMIN — OXYCODONE HYDROCHLORIDE 5 MG: 5 TABLET ORAL at 11:56

## 2021-05-27 RX ADMIN — SERTRALINE HYDROCHLORIDE 150 MG: 100 TABLET ORAL at 08:08

## 2021-05-27 RX ADMIN — SENNOSIDES AND DOCUSATE SODIUM 1 TABLET: 8.6; 5 TABLET ORAL at 08:09

## 2021-05-27 RX ADMIN — GABAPENTIN 300 MG: 300 CAPSULE ORAL at 08:09

## 2021-05-27 RX ADMIN — QUETIAPINE FUMARATE 200 MG: 200 TABLET ORAL at 21:19

## 2021-05-27 RX ADMIN — MAGNESIUM OXIDE TAB 400 MG (241.3 MG ELEMENTAL MG) 400 MG: 400 (241.3 MG) TAB at 08:09

## 2021-05-27 RX ADMIN — OXYCODONE HYDROCHLORIDE 5 MG: 5 TABLET ORAL at 07:50

## 2021-05-27 RX ADMIN — PIOGLITAZONE 30 MG: 15 TABLET ORAL at 08:09

## 2021-05-27 RX ADMIN — OXYCODONE HYDROCHLORIDE 5 MG: 5 TABLET ORAL at 21:18

## 2021-05-27 RX ADMIN — LOSARTAN POTASSIUM 25 MG: 25 TABLET, FILM COATED ORAL at 08:09

## 2021-05-27 RX ADMIN — LORAZEPAM 0.5 MG: 0.5 TABLET ORAL at 03:57

## 2021-05-27 RX ADMIN — MULTIPLE VITAMINS W/ MINERALS TAB 1 TABLET: TAB ORAL at 08:09

## 2021-05-27 RX ADMIN — CHOLESTYRAMINE 4 G: 4 POWDER, FOR SUSPENSION ORAL at 08:09

## 2021-05-27 RX ADMIN — THIAMINE HCL TAB 100 MG 100 MG: 100 TAB at 08:08

## 2021-05-27 NOTE — PROGRESS NOTES
Pt is flat & cooperative  Pt socializes minimally with other patients  Pt c/o depression 3/10 & anxiety 3/10  Pt denies any hallucinations, suicidal or homicidal ideations  Q 7 min checks maintained to monitor pt's behavior & safety  Pt is dyspneic on exertion @ times  Lungs clear upon auscultation  Pt uses Nasal O2 @ 2L/min via cannula PRN  Pt is compliant with medications

## 2021-05-27 NOTE — PROGRESS NOTES
Accucheck - 74 @ 1630 & pt asymptomatic  Skin warm & dry to touch  Pt given orange juice po & pt tolerated well  Repeat Accucheck - 148 @ 1700  Pt continues to c/o BLE pain & medicated with Oxy-IR po Q 4 hrs prn with relief obtained  Q 7 min checks maintained to monitor pt's behavior & safety

## 2021-05-27 NOTE — PROGRESS NOTES
Progress Note - Behavioral Health   Irene Escalante 46 y o  male MRN: [id number]  Unit/Bed#: Ni Mena 209-01 Encounter: [id number]    Assessment/Plan   Principal Problem:    MDD (major depressive disorder), recurrent episode, severe ([facility] [address] )  Active Problems:    Quiros esophagus    Benign essential hypertension    COPD (chronic obstructive pulmonary disease) (HCC)    Fatty liver    Generalized anxiety disorder    Insomnia    Hyponatremia    GERD (gastroesophageal reflux disease)    History of ETOH abuse    Chronic pancreatitis (HCC)    Intentional overdose of drug in tablet form ([facility] )    DM (diabetes mellitus) ([facility] )    Hypomagnesemia    Behavior over the last 24 hours: Some improvement  Sleep: reports he has nights where he just can't sleep  Unable to fall asleep last night  Appetite: normal  Medication side effects: No  ROS: chronic pain on his leg, all other system are negative    Mental Status Evaluation:  Appearance:  casually dressed   Behavior:  calm and cooperative  Speech:  delayed and decreased rate   Mood:  depressed and reports less anxiety than recently  Affect:  constricted   Thought Process:  goal directed and logical   Thought Content:  no overt delusions   Perceptual Disturbances: None   Risk Potential: Suicidal Ideations none  Homicidal Ideations none  Potential for Aggression No   Sensorium:  person, place, time/date and situation   Memory:  recent and remote memory grossly intact   Consciousness:  alert and awake    Attention: attention span and concentration were age appropriate   Insight:  limited   Judgment: fair   Gait/Station: normal gait/station   Motor Activity: no abnormal movements     Progress Toward Goals: Patient states his mood is improving with reduced anxiety and he has been participating in therapeutic groups for the past two days  Has mostly decided against attending D&A rehab Plans on outpatient addiction counseling, meeting with a CRS and daily AA meetings   Patient states the most important thing he can do for him and his family is to avoid alcohol  Calorie intake and medication has been stable  Recommended Treatment: Continue with group therapy, milieu therapy and occupational therapy  Risks, benefits and possible side effects of Medications:   Risks, benefits, and possible side effects of medications explained to patient and patient verbalizes understanding  Medications: all current active meds have been reviewed  Labs: I have personally reviewed all pertinent laboratory/tests results  Most Recent Labs:   Lab Results   Component Value Date    WBC 6 80 05/11/2021    RBC 4 89 05/11/2021    HGB 14 7 05/11/2021    HCT 43 8 05/11/2021     05/16/2021    RDW 15 0 (H) 05/11/2021    NEUTROABS 4 10 05/11/2021    SODIUM 129 (L) 05/16/2021    K 4 3 05/16/2021    CL 96 (L) 05/16/2021    CO2 23 05/16/2021    BUN 17 05/16/2021    CREATININE 0 86 05/16/2021    GLUC 239 (H) 05/16/2021    GLUF 219 (H) 07/18/2019    CALCIUM 9 4 05/16/2021    AST 21 05/16/2021    ALT 19 05/16/2021    ALKPHOS 85 05/16/2021    TP 7 3 05/16/2021    ALB 3 7 05/16/2021    TBILI 0 50 05/16/2021    CHOLESTEROL 157 02/21/2019    HDL 44 02/21/2019    TRIG 188 (H) 02/21/2019    LDLCALC 75 02/21/2019    NONHDLC 113 02/21/2019    AMMONIA 50 05/11/2021    [id number] 1 680 05/11/2021    HGBA1C 7 4 (H) 05/11/2021     05/11/2021       Counseling / Coordination of Care  Total floor / unit time spent today 20 minutes  Greater than 50% of total time was spent with the patient and / or family counseling and / or coordination of care

## 2021-05-27 NOTE — PROGRESS NOTES
Controlled and visible in the community  Positive for medications and snacks  Denies any suicidal or homicidal ideations  Stated he remains depressed and his anxiety is high  No behavioral issues  Accucheck 158  No change in medical condition (hypo/hyper-glycemia) or complaints voiced  Maintained on q 7 minute checks  No aspiration risks noted  Will continue to monitor

## 2021-05-27 NOTE — PROGRESS NOTES
05/27/21    Team Meeting   Meeting Type Daily Rounds   Team Members Present   Team Members Present Physician;Nurse;   Physician Team Member Dr Samm Zarate MD   Nursing Team Member Roxy Alarcon RN   Care Management Team Member Fiona Damian [facility] [place]   OT Team Member Charly Rodriguez [place]   Patient/Family Present   Patient Present No   Patient's Family Present No   D/C next week  Requesting prns  Rehab vs home with outpatient

## 2021-05-27 NOTE — PROGRESS NOTES
Patient stated he slept poorly and was awake with anxiety most of shift  Ativan 0 5 mg given at 0357 for severe anxiety  Romero Scale 26  No signs of withdrawal   Behaviors are controlled  No suicidal ideations noted  Increase in depression overnight  Maintained on q 7 minute checks  Will continue to monitor

## 2021-05-28 LAB
GLUCOSE SERPL-MCNC: 143 MG/DL (ref 65–140)
GLUCOSE SERPL-MCNC: 159 MG/DL (ref 65–140)
GLUCOSE SERPL-MCNC: 164 MG/DL (ref 65–140)
GLUCOSE SERPL-MCNC: 174 MG/DL (ref 65–140)

## 2021-05-28 PROCEDURE — 99232 SBSQ HOSP IP/OBS MODERATE 35: CPT | Performed by: HOSPITALIST

## 2021-05-28 PROCEDURE — 82948 REAGENT STRIP/BLOOD GLUCOSE: CPT

## 2021-05-28 RX ORDER — ROPINIROLE 1 MG/1
1 TABLET, FILM COATED ORAL 2 TIMES DAILY
Status: DISCONTINUED | OUTPATIENT
Start: 2021-05-28 | End: 2021-06-01 | Stop reason: HOSPADM

## 2021-05-28 RX ORDER — ROPINIROLE 1 MG/1
1 TABLET, FILM COATED ORAL 2 TIMES DAILY
Status: DISCONTINUED | OUTPATIENT
Start: 2021-05-28 | End: 2021-05-28

## 2021-05-28 RX ADMIN — RISPERIDONE 1 MG: 1 TABLET, ORALLY DISINTEGRATING ORAL at 00:16

## 2021-05-28 RX ADMIN — GABAPENTIN 300 MG: 300 CAPSULE ORAL at 21:05

## 2021-05-28 RX ADMIN — LORAZEPAM 0.5 MG: 0.5 TABLET ORAL at 21:14

## 2021-05-28 RX ADMIN — Medication 1 PATCH: at 08:52

## 2021-05-28 RX ADMIN — PROPRANOLOL HYDROCHLORIDE 10 MG: 10 TABLET ORAL at 17:10

## 2021-05-28 RX ADMIN — PIOGLITAZONE 30 MG: 15 TABLET ORAL at 08:54

## 2021-05-28 RX ADMIN — INSULIN LISPRO 1 UNITS: 100 INJECTION, SOLUTION INTRAVENOUS; SUBCUTANEOUS at 12:26

## 2021-05-28 RX ADMIN — SENNOSIDES AND DOCUSATE SODIUM 1 TABLET: 8.6; 5 TABLET ORAL at 08:54

## 2021-05-28 RX ADMIN — TIOTROPIUM BROMIDE 18 MCG: 18 CAPSULE ORAL; RESPIRATORY (INHALATION) at 08:59

## 2021-05-28 RX ADMIN — MAGNESIUM OXIDE TAB 400 MG (241.3 MG ELEMENTAL MG) 400 MG: 400 (241.3 MG) TAB at 17:10

## 2021-05-28 RX ADMIN — INSULIN LISPRO 1 UNITS: 100 INJECTION, SOLUTION INTRAVENOUS; SUBCUTANEOUS at 21:05

## 2021-05-28 RX ADMIN — LOSARTAN POTASSIUM 25 MG: 25 TABLET, FILM COATED ORAL at 08:54

## 2021-05-28 RX ADMIN — MAGNESIUM OXIDE TAB 400 MG (241.3 MG ELEMENTAL MG) 400 MG: 400 (241.3 MG) TAB at 08:54

## 2021-05-28 RX ADMIN — PANTOPRAZOLE SODIUM 40 MG: 40 TABLET, DELAYED RELEASE ORAL at 05:51

## 2021-05-28 RX ADMIN — CHOLESTYRAMINE 4 G: 4 POWDER, FOR SUSPENSION ORAL at 08:52

## 2021-05-28 RX ADMIN — SERTRALINE HYDROCHLORIDE 150 MG: 100 TABLET ORAL at 08:55

## 2021-05-28 RX ADMIN — INSULIN GLARGINE 34 UNITS: 100 INJECTION, SOLUTION SUBCUTANEOUS at 08:59

## 2021-05-28 RX ADMIN — PROPRANOLOL HYDROCHLORIDE 10 MG: 10 TABLET ORAL at 08:55

## 2021-05-28 RX ADMIN — ROPINIROLE 1 MG: 1 TABLET, FILM COATED ORAL at 21:50

## 2021-05-28 RX ADMIN — OXYCODONE HYDROCHLORIDE 5 MG: 5 TABLET ORAL at 17:09

## 2021-05-28 RX ADMIN — GABAPENTIN 300 MG: 300 CAPSULE ORAL at 17:10

## 2021-05-28 RX ADMIN — CHOLESTYRAMINE 4 G: 4 POWDER, FOR SUSPENSION ORAL at 17:10

## 2021-05-28 RX ADMIN — OXYCODONE HYDROCHLORIDE 5 MG: 5 TABLET ORAL at 09:29

## 2021-05-28 RX ADMIN — FOLIC ACID 1 MG: 1 TABLET ORAL at 08:55

## 2021-05-28 RX ADMIN — MULTIPLE VITAMINS W/ MINERALS TAB 1 TABLET: TAB ORAL at 08:55

## 2021-05-28 RX ADMIN — METFORMIN HYDROCHLORIDE 500 MG: 500 TABLET, FILM COATED ORAL at 08:54

## 2021-05-28 RX ADMIN — THIAMINE HCL TAB 100 MG 100 MG: 100 TAB at 08:55

## 2021-05-28 RX ADMIN — METFORMIN HYDROCHLORIDE 500 MG: 500 TABLET, FILM COATED ORAL at 17:10

## 2021-05-28 RX ADMIN — OXYCODONE HYDROCHLORIDE 5 MG: 5 TABLET ORAL at 01:19

## 2021-05-28 RX ADMIN — GABAPENTIN 300 MG: 300 CAPSULE ORAL at 08:54

## 2021-05-28 RX ADMIN — QUETIAPINE FUMARATE 200 MG: 200 TABLET ORAL at 21:05

## 2021-05-28 RX ADMIN — INSULIN LISPRO 1 UNITS: 100 INJECTION, SOLUTION INTRAVENOUS; SUBCUTANEOUS at 08:59

## 2021-05-28 RX ADMIN — SENNOSIDES AND DOCUSATE SODIUM 1 TABLET: 8.6; 5 TABLET ORAL at 17:10

## 2021-05-28 RX ADMIN — ROPINIROLE HYDROCHLORIDE 1 MG: 1 TABLET, FILM COATED ORAL at 12:25

## 2021-05-28 NOTE — PROGRESS NOTES
Progress Note - Thompson Zhao 46 y o  male MRN: [id number]    Unit/Bed#: [facility] 209-01 Encounter: [id number]        Subjective:   Patient seen and examined at bedside after reviewing the chart and discussing the case with the caring staff  Patient examined at bedside  Patient has no acute concerns  Physical Exam   Vitals: Blood pressure 134/74, pulse 90, temperature (!) 97 2 °F (36 2 °C), temperature source Temporal, resp  rate 19, height 6' (1 829 m), weight 91 6 kg (201 lb 15 1 oz), SpO2 96 %  ,Body mass index is 27 39 kg/m²  Constitutional: Patient appears well-developed  HEENT: PERR, EOMI, MMM  Cardiovascular: Normal rate and regular rhythm  Pulmonary/Chest: Effort normal and breath sounds normal    Abdomen: Soft, + BS, NT    Assessment/Plan:  Thompson Zhao is a(n) 46 y o  male with MDD      1  Cardiac with history of Hypertension and dyslipidemia  Patient will be continued on Cozaar 25 mg daily  Continue cholestyramine sugar free 2 times daily  2  Diabetes mellitus  Patient's hemoglobin A1c was 7 4 on 05/11/2021  Continue carb controlled diet, fingerstick glucose 4 times daily before meals and at bedtime, Humalog sliding scale, metformin, Actos and Lantus 34 units qAM   3  Tobacco use  Patient has nicotine transdermal patch  4  Alcohol abuse with history of multiple acute pancreatitis  No S/S of withdrawal  Continue folic acid, thiamine, multivitamin  Patient follows up with specialists for his pancreatitis  5  GERD  Continue pantoprazole 40 mg daily  6  COPD  Continue Spiriva daily, albuterol as needed, and 2 L nasal cannula  7  Constipation  Continue Senokot S twice daily  8  Chronic pain syndrome  Continue Tylenol and oxycodone as needed  9  Cough  Patient may receive Robitussin DM as needed  10  Vitamin-D deficiency  Continue vitamin-D bolus doses for 8 weeks followed by vitamin D3 1000 units daily      The patient was discussed with Dr Alex Agee and he is in agreement with the above note

## 2021-05-28 NOTE — PROGRESS NOTES
Patient remains awake and upset  Stating his legs keep waking him up  Aggression decrease currently, however remains sullen  Requested snack to prevent hypoglycemia   2 grahams give with 2 peanut butters  Will continue to monitor

## 2021-05-28 NOTE — PROGRESS NOTES
Progress Note - Behavioral Health     Samantha Escoto 46 y o  male MRN: [id number]   Unit/Bed#: [facility] 209-01 Encounter: [id number]    Behavior over the last 24 hours: slowly improving  Collin Montiel seen today, reports ongoing anxiety and depression however does feel his mood has improved since admission and he denies any active thoughts of self-harm  Continues to have resistance again going to rehab however discussed the importance of supports needed for him to remain sober  Patient reports he will consider it  Denies suicidal or homicidal ideations  No evidence of psychosis or camilla    Sleep: slept off and on  Appetite: fair  Medication side effects:  denied  ROS: all other systems are negative    Mental Status Evaluation:    Appearance:  casually dressed, marginal hygiene, looks stated age   Behavior:  cooperative   Speech:  slow, soft   Mood:  anxious, somewhat depressed   Affect:  blunted, still constricted   Thought Process:  logical, goal directed   Associations: intact associations   Thought Content:  no overt delusions   Perceptual Disturbances: no auditory hallucinations, no visual hallucinations   Risk Potential: Suicidal ideation - None  Homicidal ideation - None   Sensorium:  oriented to person, place and time/date   Memory:  recent and remote memory grossly intact   Consciousness:  alert and awake   Attention: attention span and concentration are age appropriate   Insight:  limited   Judgment: limited   Gait/Station: normal gait/station   Motor Activity: no abnormal movements     Vital signs in last 24 hours:    Temp:  [97 2 °F (36 2 °C)-97 8 °F (36 6 °C)] 97 4 °F (36 3 °C)  HR:  [77-90] 77  Resp:  [16-19] 16  BP: (132-148)/(68-78) 132/68    Laboratory results: I have personally reviewed all pertinent laboratory/tests results          Assessment/Plan   Principal Problem:    MDD (major depressive disorder), recurrent episode, severe (HCC)  Active Problems:    Quiros esophagus    Benign essential hypertension    COPD (chronic obstructive pulmonary disease) (HCC)    Fatty liver    Generalized anxiety disorder    Insomnia    Hyponatremia    GERD (gastroesophageal reflux disease)    History of ETOH abuse    Chronic pancreatitis (HCC)    Intentional overdose of drug in tablet form ([facility] [address] )    DM (diabetes mellitus) ([facility] )    Hypomagnesemia    Recommended Treatment:     Planned medication and treatment changes:  Continue Neurontin 300 mg 3 times a day  Zoloft 150 mg daily  Seroquel 200 mg at bedtime  All current active medications have been reviewed  Encourage group therapy, milieu therapy and occupational therapy  Behavioral Health checks every 7 minutes  Current Facility-Administered Medications   Medication Dose Route Frequency Provider Last Rate    acetaminophen  650 mg Oral Q4H PRN Brenda Blue, CRNP      albuterol  2 puff Inhalation Q4H PRN Andrea Elena PA-C      benztropine  1 mg Intramuscular Q4H PRN Max 6/day Brenda Blue, TYE      benztropine  0 5 mg Oral Q4H PRN Max 6/day Brenda BlueTYE      [START ON 7/5/2021] cholecalciferol  1,000 Units Oral Daily Aaron Wolf MD      cholestyramine sugar free  4 g Oral BID Bryon Multani,       dextromethorphan-guaiFENesin  10 mL Oral Q4H PRN Andrea Elena PA-C      ergocalciferol  50,000 Units Oral Weekly Aaron Wolf MD      folic acid  1 mg Oral Daily Bryon Multani,       gabapentin  300 mg Oral TID Andrea Elena PA-C      haloperidol lactate  5 mg Intramuscular Q4H PRN Max 4/day Brenda Blue, TYE      hydrOXYzine HCL  25 mg Oral Q6H PRN Max 4/day Brenda Blue, CRNP      hydrOXYzine HCL  50 mg Oral Q6H PRN Max 4/day Brenda BlueTYE      insulin glargine  34 Units Subcutaneous [place], [place]      insulin lispro  1-6 Units Subcutaneous 4x Daily (AC & HS) Andrea Elena PA-C      LORazepam  0 5 mg Intramuscular Q8H PRN Samra Listen, TYE      LORazepam  0 5 mg Oral Q8H PRN Samra Listen, CRNP      losartan  25 mg Oral Daily Talita Koo PA-C      magnesium oxide  400 mg Oral BID Salvador Mccarthy MD      metFORMIN  500 mg Oral BID With Meals Talita Koo PA-C      multivitamin-minerals  1 tablet Oral Daily [place], [place]      naloxone  0 04 mg Intravenous Q1MIN PRN Burke Bowling, DO      nicotine  1 patch Transdermal Daily Karel Prim, CRNP      ondansetron  4 mg Oral Q6H PRN Talitalory Koo PA-C      oxyCODONE  2 5 mg Oral Q4H PRN Burke Bowling, DO      oxyCODONE  5 mg Oral Q4H PRN Burke Bowling, DO      pantoprazole  40 mg Oral Early Morning Bryon Multani, DO      pioglitazone  30 mg Oral Daily [place], [place]      propranolol  10 mg Oral BID Talita Koo PA-C      QUEtiapine  200 mg Oral HS Salvador Mccarthy MD      risperiDONE  0 25 mg Oral Q4H PRN Max 6/day Karel Prim, CRNP      risperiDONE  0 5 mg Oral Q4H PRN Max 3/day Karel Prim, CRNP      risperiDONE  1 mg Oral Q4H PRN Max 3/day Karel Prim, CRNP      rOPINIRole  1 mg Oral BID Talita Koo PA-C      senna-docusate sodium  1 tablet Oral BID Burke Mildred, DO      sertraline  150 mg Oral Daily Salvador Mccarthy MD      thiamine  100 mg Oral Daily Bryon Lorenzana, DO      tiotropium  18 mcg Inhalation Daily Bryon Multani, DO      traZODone  50 mg Oral HS PRN Karel Prim, CRNP         Risks / Benefits of Treatment:    Risks, benefits, and possible side effects of medications explained to patient and patient verbalizes understanding and agreement for treatment  Counseling / Coordination of Care: Total floor / unit time spent today 25 minutes  Greater than 50% of total time was spent with the patient and / or family counseling and / or coordination of care  A description of counseling / coordination of care:  Patient's progress discussed with staff in treatment team meeting  Medications, treatment progress and treatment plan reviewed with patient      Nessa Walker MD 05/28/21

## 2021-05-28 NOTE — PROGRESS NOTES
Patient out for meals and attends groups but otherwise returns to his room  He presents with flat affect  Rates his depression and anxiety 4/10  Denies suicidal thoughts  He was talkative with this RN and discussed how he knows he shouldn't be taking oxycodone and Ativan but states that they do truly help his pain and anxiety  Encouraged to use coping skills and to explore other pain control options with medical doctor  He verbalized understanding  He last received 5mg oxycodone at 0929 and reported minimal relief  Will continue monitoring

## 2021-05-28 NOTE — PROGRESS NOTES
Progress Note - Behavioral Health   Jennifer Escalante 46 y o  male MRN: [id number]  Unit/Bed#: [facility] 209-01 Encounter: [id number]    The patient was seen for continuing care and reviewed with treatment team     Current Mental Status Evaluation:  Appearance:  Adequate hygiene and grooming   Behavior:  calm and cooperative   Mood:  anxious and depressed   Affect: mood-congruent   Speech: Normal rate and Normal volume   Thought Process:  Goal directed and coherent   Thought Content:  Does not verbalize delusional material   Perceptual Disturbances: Denies hallucinations and does not appear to be responding to internal stimuli   Risk Potential: No suicidal or homicidal ideation   Orientation:   Person, place, situation     Progress Toward Goals:  Per RN report he was admit post overdose and on the unit is awaiting rehab placement at [facility] urging  Financial stress and trying for disability  Asks about his medications for pain and anxiety states that he will be attending rehab after this but does not know when he will be leaving  States he was told he would have something for restless legs and he is currently on Neurontin 300 t i d  As well as Requip  Will increase Neurontin to 400 t i d  Will eventually need tapering down of controlled p r n  Prior to rehabilitation  Principal Problem:    MDD (major depressive disorder), recurrent episode, severe ([address] )  Active Problems:    Quiros esophagus    Benign essential hypertension    COPD (chronic obstructive pulmonary disease) (HCC)    Fatty liver    Generalized anxiety disorder    Insomnia    Hyponatremia    GERD (gastroesophageal reflux disease)    History of ETOH abuse    Chronic pancreatitis (HCC)    Intentional overdose of drug in tablet form ([address] )    DM (diabetes mellitus) ([address] )    Hypomagnesemia      Recommended Treatment: Continue with pharmacotherapy, group therapy, milieu therapy and occupational therapy    The patient will be maintained on the following medications:  Current Facility-Administered Medications   Medication Dose Route Frequency Provider Last Rate    acetaminophen  650 mg Oral Q4H PRN Darcy Postal, CRNP      albuterol  2 puff Inhalation Q4H PRN Anna Arroyo PA-C      benztropine  1 mg Intramuscular Q4H PRN Max 6/day Darcy Postal, CRNP      benztropine  0 5 mg Oral Q4H PRN Max 6/day Darcy Postal, CRNP      [START ON 7/5/2021] cholecalciferol  1,000 Units Oral Daily Jamar Carlisle MD      cholestyramine sugar free  4 g Oral BID Gearld Issa, DO      dextromethorphan-guaiFENesin  10 mL Oral Q4H PRN Anna Arroyo PA-C      ergocalciferol  50,000 Units Oral Weekly Jamar Carlisle MD      folic acid  1 mg Oral Daily Bryon Multani,       gabapentin  300 mg Oral TID Anna Arroyo PA-C      haloperidol lactate  5 mg Intramuscular Q4H PRN Max 4/day Darcy Postal, CRNP      hydrOXYzine HCL  25 mg Oral Q6H PRN Max 4/day Darcy Postal, CRNP      hydrOXYzine HCL  50 mg Oral Q6H PRN Max 4/day Darcy Postal, CRNP      insulin glargine  34 Units Subcutaneous [facility] [place]ella [place]      insulin lispro  1-6 Units Subcutaneous 4x Daily (AC & HS) Anna Arroyo PA-C      LORazepam  0 5 mg Intramuscular Q8H PRN TYE Dyson      LORazepam  0 5 mg Oral Q8H PRN TYE Dyson      losartan  25 mg Oral Daily Brittney Patterson      magnesium oxide  400 mg Oral BID Sharon Trevizo MD      metFORMIN  500 mg Oral BID With Meals Anna Arroyo PA-C      multivitamin-minerals  1 tablet Oral Daily Brittney Patterson      naloxone  0 04 mg Intravenous Q1MIN PRN Gearld Issa, DO      nicotine  1 patch Transdermal Daily Darcy Postal, CRNP      ondansetron  4 mg Oral Q6H PRN Anna Arroyo PA-C      oxyCODONE  2 5 mg Oral Q4H PRN Gearld Issa, DO      oxyCODONE  5 mg Oral Q4H PRN Gearld Issa, DO      pantoprazole  40 mg Oral Early Morning Bryon Multani DO      pioglitazone  30 mg Oral Daily Brittney Lechuga      propranolol  10 mg Oral BID Vik Duarte PA-C      QUEtiapine  200 mg Oral HS Radha Smith MD      risperiDONE  0 25 mg Oral Q4H PRN Max 6/day [facility], TYE      risperiDONE  0 5 mg Oral Q4H PRN Max 3/day TYE Bowen      risperiDONE  1 mg Oral Q4H PRN Max 3/day TYE Bowen      rOPINIRole  1 mg Oral BID Brittney Lechuga      senna-docusate sodium  1 tablet Oral BID Lupe Ricketts,       sertraline  150 mg Oral Daily Radha Smith MD      thiamine  100 mg Oral Daily Shaun Cathay Crigler,       tiotropium  18 mcg Inhalation Daily Bryon Multani,       traZODone  50 mg Oral HS PRN TYE Bowen

## 2021-05-28 NOTE — PROGRESS NOTES
05/28/21   Team Meeting   Meeting Type Daily Rounds   Team Members Present   Team Members Present Physician;Nurse;;Occupational Therapist   Physician Team Member Dr Fabiola Hanson MD; TYE Adkins   Nursing Team Member Kimi García RN   Care Management Team Member Siomara Tinoco MS, [facility]   OT Team Member [place], [place]   Patient/Family Present   Patient Present No   Patient's Family Present No   PT requested prns after call with family in the evening  Agitated/irritable  Declining rehab  Tapering off the ativan and pain medications

## 2021-05-28 NOTE — PROGRESS NOTES
Patient OOB, upset over having Requip being discontinued  Also patient in next room screaming and yelling  States he is not able to sleep and is feeling agitated  Risperdal 1 mg given at 0016 PO for severe agitation  1 to 1 provided to de-escalate  Will pass along in report that patient would like this medications reinstated

## 2021-05-28 NOTE — SOCIAL WORK
CM met with PT for PT check in  PT indicated that he has decided he would like to go to rehab and the reasoning for  Much praise and reinforcement provided  PT reflected on thoughts and feelings with regards to the past  Reassurance provided  Rates anxiety and depression 4/10  Denies SI/HI/AH/VH

## 2021-05-28 NOTE — PROGRESS NOTES
Progress Note - Yamel Gonzalez 46 y o  male MRN: [id number]    Unit/Bed#: Augie Saavedra 209-01 Encounter: [id number]        Subjective:   Patient seen and examined at bedside after reviewing the chart and discussing the case with the caring staff  Patient examined at bedside  Patient has no acute concerns  Physical Exam   Vitals: Blood pressure 132/68, pulse 77, temperature (!) 97 4 °F (36 3 °C), temperature source Temporal, resp  rate 16, height 6' (1 829 m), weight 91 6 kg (201 lb 15 1 oz), SpO2 97 %  ,Body mass index is 27 39 kg/m²  Constitutional: Patient appears well-developed  HEENT: PERR, EOMI, MMM  Cardiovascular: Normal rate and regular rhythm  Pulmonary/Chest: Effort normal and breath sounds normal    Abdomen: Soft, + BS, NT    Assessment/Plan:  Yamel Gonzalez is a(n) 46 y o  male with MDD      1  Cardiac with history of Hypertension and dyslipidemia  Patient will be continued on Cozaar 25 mg daily  Continue cholestyramine sugar free 2 times daily  2  Diabetes mellitus  Patient's hemoglobin A1c was 7 4 on 05/11/2021  Continue carb controlled diet, fingerstick glucose 4 times daily before meals and at bedtime, Humalog sliding scale, metformin, Actos and Lantus 34 units qAM   3  Tobacco use  Patient has nicotine transdermal patch  4  Alcohol abuse with history of multiple acute pancreatitis  No S/S of withdrawal  Continue folic acid, thiamine, multivitamin  Patient follows up with specialists for his pancreatitis  5  GERD  Continue pantoprazole 40 mg daily  6  COPD  Continue Spiriva daily, albuterol as needed, and 2 L nasal cannula  7  Constipation  Continue Senokot S twice daily  8  Chronic pain syndrome  Continue Tylenol and oxycodone as needed  9  Cough  Patient may receive Robitussin DM as needed  10  Vitamin-D deficiency  Continue vitamin-D bolus doses for 8 weeks followed by vitamin D3 1000 units daily      The patient was discussed with Dr Joe Pittman and he is in agreement with the above note

## 2021-05-28 NOTE — PROGRESS NOTES
Patient had a upsetting phone call from wife  Poorly verbal to nurse about what conversation was about  Appear on the verge of tears  Complained of severe anxiety  Ativan 0 5 mg given at 2009 PO per patient request   [facility] Scale 25  Appeared sullen and withdrawn the remaining part of evening  Out in community but minimally social   Had snack and compliant with medication  Stated he was not having any suicidal ideations  Admitted to having a increase in both anxiety and depression  Patient agreed to come to staff with any issues or if he needed to talk

## 2021-05-28 NOTE — PROGRESS NOTES
No signs or symptoms of hypo/hyperglycemia  Dee Driscoll was able to finally fall asleep at 0215  Agitation, anxiety, and pain have abated  No signs of withdrawal noted  No suicidal ideation noted overnight  No acute behaviors after 0215  Maintained on q 7 minute checks  Will continue to monitor

## 2021-05-28 NOTE — PLAN OF CARE
Problem: Depression  Goal: Treatment Goal: Demonstrate behavioral control of depressive symptoms, verbalize feelings of improved mood/affect, and adopt new coping skills prior to discharge  Outcome: Progressing  Goal: Refrain from isolation  Description: Interventions:  - Develop a trusting relationship   - Encourage socialization   Outcome: Progressing  Goal: Refrain from self-neglect  Outcome: Progressing

## 2021-05-28 NOTE — PROGRESS NOTES
Patient had 5mg oxycodone PRN at 1709 for 7/10 back and bilateral leg pain   Upon reassessment, rated his pain 5/10

## 2021-05-29 LAB
GLUCOSE SERPL-MCNC: 139 MG/DL (ref 65–140)
GLUCOSE SERPL-MCNC: 141 MG/DL (ref 65–140)
GLUCOSE SERPL-MCNC: 143 MG/DL (ref 65–140)
GLUCOSE SERPL-MCNC: 184 MG/DL (ref 65–140)

## 2021-05-29 PROCEDURE — 82948 REAGENT STRIP/BLOOD GLUCOSE: CPT

## 2021-05-29 PROCEDURE — 99232 SBSQ HOSP IP/OBS MODERATE 35: CPT | Performed by: PSYCHIATRY & NEUROLOGY

## 2021-05-29 RX ORDER — GABAPENTIN 400 MG/1
400 CAPSULE ORAL 3 TIMES DAILY
Status: DISCONTINUED | OUTPATIENT
Start: 2021-05-29 | End: 2021-06-01 | Stop reason: HOSPADM

## 2021-05-29 RX ADMIN — INSULIN LISPRO 1 UNITS: 100 INJECTION, SOLUTION INTRAVENOUS; SUBCUTANEOUS at 16:45

## 2021-05-29 RX ADMIN — SENNOSIDES AND DOCUSATE SODIUM 1 TABLET: 8.6; 5 TABLET ORAL at 17:03

## 2021-05-29 RX ADMIN — PROPRANOLOL HYDROCHLORIDE 10 MG: 10 TABLET ORAL at 08:07

## 2021-05-29 RX ADMIN — FOLIC ACID 1 MG: 1 TABLET ORAL at 08:06

## 2021-05-29 RX ADMIN — METFORMIN HYDROCHLORIDE 500 MG: 500 TABLET, FILM COATED ORAL at 07:51

## 2021-05-29 RX ADMIN — INSULIN GLARGINE 34 UNITS: 100 INJECTION, SOLUTION SUBCUTANEOUS at 08:11

## 2021-05-29 RX ADMIN — PANTOPRAZOLE SODIUM 40 MG: 40 TABLET, DELAYED RELEASE ORAL at 06:03

## 2021-05-29 RX ADMIN — METFORMIN HYDROCHLORIDE 500 MG: 500 TABLET, FILM COATED ORAL at 16:46

## 2021-05-29 RX ADMIN — PROPRANOLOL HYDROCHLORIDE 10 MG: 10 TABLET ORAL at 17:03

## 2021-05-29 RX ADMIN — TIOTROPIUM BROMIDE 18 MCG: 18 CAPSULE ORAL; RESPIRATORY (INHALATION) at 08:11

## 2021-05-29 RX ADMIN — THIAMINE HCL TAB 100 MG 100 MG: 100 TAB at 08:07

## 2021-05-29 RX ADMIN — ROPINIROLE 1 MG: 1 TABLET, FILM COATED ORAL at 08:06

## 2021-05-29 RX ADMIN — OXYCODONE HYDROCHLORIDE 5 MG: 5 TABLET ORAL at 07:51

## 2021-05-29 RX ADMIN — SERTRALINE HYDROCHLORIDE 150 MG: 100 TABLET ORAL at 08:07

## 2021-05-29 RX ADMIN — GABAPENTIN 300 MG: 300 CAPSULE ORAL at 08:07

## 2021-05-29 RX ADMIN — SENNOSIDES AND DOCUSATE SODIUM 1 TABLET: 8.6; 5 TABLET ORAL at 08:06

## 2021-05-29 RX ADMIN — CHOLESTYRAMINE 4 G: 4 POWDER, FOR SUSPENSION ORAL at 08:07

## 2021-05-29 RX ADMIN — LORAZEPAM 0.5 MG: 0.5 TABLET ORAL at 21:36

## 2021-05-29 RX ADMIN — ROPINIROLE 1 MG: 1 TABLET, FILM COATED ORAL at 21:32

## 2021-05-29 RX ADMIN — LOSARTAN POTASSIUM 25 MG: 25 TABLET, FILM COATED ORAL at 08:07

## 2021-05-29 RX ADMIN — MULTIPLE VITAMINS W/ MINERALS TAB 1 TABLET: TAB ORAL at 08:07

## 2021-05-29 RX ADMIN — PIOGLITAZONE 30 MG: 15 TABLET ORAL at 08:06

## 2021-05-29 RX ADMIN — OXYCODONE HYDROCHLORIDE 5 MG: 5 TABLET ORAL at 17:02

## 2021-05-29 RX ADMIN — MAGNESIUM OXIDE TAB 400 MG (241.3 MG ELEMENTAL MG) 400 MG: 400 (241.3 MG) TAB at 08:07

## 2021-05-29 RX ADMIN — Medication 1 PATCH: at 08:06

## 2021-05-29 RX ADMIN — CHOLESTYRAMINE 4 G: 4 POWDER, FOR SUSPENSION ORAL at 17:01

## 2021-05-29 RX ADMIN — GABAPENTIN 400 MG: 400 CAPSULE ORAL at 16:46

## 2021-05-29 RX ADMIN — OXYCODONE HYDROCHLORIDE 5 MG: 5 TABLET ORAL at 12:19

## 2021-05-29 RX ADMIN — MAGNESIUM OXIDE TAB 400 MG (241.3 MG ELEMENTAL MG) 400 MG: 400 (241.3 MG) TAB at 17:01

## 2021-05-29 RX ADMIN — QUETIAPINE FUMARATE 200 MG: 200 TABLET ORAL at 21:32

## 2021-05-29 RX ADMIN — GABAPENTIN 400 MG: 400 CAPSULE ORAL at 21:32

## 2021-05-29 NOTE — NURSING NOTE
Patient withdrawn to his room to read a book  keeps to himself  Patient did come out for snack and group  Upon approach patient's mood and affect is flat and depressed  Patient endorses anxiety and rates 7/10  Denies SI/HI/AHVH/pain  Accucheck was 159  Gave 1 unit of Humalog  Took HS medications without difficulty  Gave 0 5 mg PRN Ativan for restless anxiety  Labadie was "18 " Will continue to monitor safety and behaviors every 7 minutes

## 2021-05-29 NOTE — NURSING NOTE
Maintain 2L O2 via nasal canula while sleeping  Patient slept until 0500  Patient requested a snack  Upon 7 minute safety checks patient is sleeping  Will continue to monitor safety and behaviors

## 2021-05-29 NOTE — PROGRESS NOTES
Progress Note - Behavioral Health   Ruddy Malik 46 y o  male MRN: [id number]  Unit/Bed#: [facility] Solano 209-01 Encounter: [id number]    The patient was seen for continuing care and reviewed with treatment team     Current Mental Status Evaluation:  Appearance:  Adequate hygiene and grooming   Behavior:  calm and cooperative   Mood:  dysphoric   Affect: mood-congruent   Speech: Normal rate and Normal volume   Thought Process:  Goal directed and coherent   Thought Content:  Does not verbalize delusional material   Perceptual Disturbances: Denies hallucinations and does not appear to be responding to internal stimuli   Risk Potential: No suicidal or homicidal ideation   Orientation:   Person, place, situation, improving insight and judgement     Progress Toward Goals: Per RN report he reports 4/10 anxiety and he has been worried about his son  Only has ativan daily and did receive some this AM after experiencing anxiety while thinking of something happy  Principal Problem:    MDD (major depressive disorder), recurrent episode, severe ([facility] )  Active Problems:    Quiros esophagus    Benign essential hypertension    COPD (chronic obstructive pulmonary disease) (HCC)    Fatty liver    Generalized anxiety disorder    Insomnia    Hyponatremia    GERD (gastroesophageal reflux disease)    History of ETOH abuse    Chronic pancreatitis (HCC)    Intentional overdose of drug in tablet form ([facility] )    DM (diabetes mellitus) ([facility] )    Hypomagnesemia      Recommended Treatment: Continue with pharmacotherapy, group therapy, milieu therapy and occupational therapy    The patient will be maintained on the following medications:  Current Facility-Administered Medications   Medication Dose Route Frequency Provider Last Rate    acetaminophen  650 mg Oral Q4H PRN Chares Corporal, CRNP      albuterol  2 puff Inhalation Q4H PRN Kimmy Obando PA-C      benztropine  1 mg Intramuscular Q4H PRN Max 6/day Chares Corporal, CRNP      benztropine 0 5 mg Oral Q4H PRN Max 6/day Karel Prim, CRNP      [START ON 7/5/2021] cholecalciferol  1,000 Units Oral Daily Quyen Metzger MD      cholestyramine sugar free  4 g Oral BID Burke Bowling, DO      dextromethorphan-guaiFENesin  10 mL Oral Q4H PRN Talita Koo PA-C      ergocalciferol  50,000 Units Oral Weekly Quyen Metzger MD      folic acid  1 mg Oral Daily Bryon Multani, DO      gabapentin  400 mg Oral TID Best Bellamy MD      haloperidol lactate  5 mg Intramuscular Q4H PRN Max 4/day Karel Prim, CRNP      hydrOXYzine HCL  25 mg Oral Q6H PRN Max 4/day Karel Prim, CRNP      hydrOXYzine HCL  50 mg Oral Q6H PRN Max 4/day Karel Prim, CRNP      insulin glargine  34 Units Subcutaneous QAM Ngoc Rodriguez      insulin lispro  1-6 Units Subcutaneous 4x Daily (AC & HS) Talita Koo PA-C      LORazepam  0 5 mg Intramuscular Q8H PRN Dannial Pinna, CRNP      LORazepam  0 5 mg Oral Q8H PRN Dannial Pinna, CRNP      losartan  25 mg Oral Daily Ngoc Rodriguez      magnesium oxide  400 mg Oral BID Salvador Mccarthy MD      metFORMIN  500 mg Oral BID With Meals Talita Koo PA-C      multivitamin-minerals  1 tablet Oral Daily Ngoc Rodriguez      naloxone  0 04 mg Intravenous Q1MIN PRN Burke Bowling, DO      nicotine  1 patch Transdermal Daily Karel Prim, CRNP      ondansetron  4 mg Oral Q6H PRN Talita Koo PA-C      oxyCODONE  2 5 mg Oral Q4H PRN Burke Bowling, DO      oxyCODONE  5 mg Oral Q4H PRN Burke Bowling, DO      pantoprazole  40 mg Oral Early Morning Bryon Multani, DO      pioglitazone  30 mg Oral Daily Ngoc Rodriguez      propranolol  10 mg Oral BID Talita Koo PA-C      QUEtiapine  200 mg Oral HS Salvador Mccarthy MD      risperiDONE  0 25 mg Oral Q4H PRN Max 6/day Karel Prim, CRNP      risperiDONE  0 5 mg Oral Q4H PRN Max 3/day Karel Prim, CRNP      risperiDONE  1 mg Oral Q4H PRN Max 3/day TYE De Luna      rOPINIRole  1 mg Oral BID Archana Han, [place]      senna-docusate sodium  1 tablet Oral BID Yvette Stuart, DO      sertraline  150 mg Oral Daily Angela Freedman MD      thiamine  100 mg Oral Daily Bryon Multani, DO      tiotropium  18 mcg Inhalation Daily Yvette Stuart, DO      traZODone  50 mg Oral HS PRN TYE De Luna

## 2021-05-29 NOTE — PROGRESS NOTES
Progress Note - Ciara Ratliff 46 y o  male MRN: [id number]    Unit/Bed#: [facility] 209-01 Encounter: [id number]      Assessment:  1  Cardiac with history of Hypertension and dyslipidemia  controlled on on Cozaar 25 mg daily  Continue cholestyramine b i d    2  Diabetes mellitus  Humalog sliding scale, metformin, Actos and Lantus 34 units qAM   3  Tobacco use   Nicotine patch  4  Alcohol abuse with history of multiple acute pancreatitis   No evidence of withdrawal symptoms    Continue folic acid, thiamine, multivitamin  5  GERD   Controlled with Protonix 40 mg daily  6  COPD  Stable on Spiriva daily, albuterol as needed, supplemental oxygen  7  Constipation   Continue Senokot S twice daily  8  Chronic pain syndrome   Continue Tylenol and oxycodone as needed  9  Cough   Robitussin p r n  10  Vitamin-D deficiency  supplemented    Plan:  As above    Subjective:   None    Objective:     Vitals: Blood pressure 146/77, pulse 93, temperature 98 °F (36 7 °C), temperature source Temporal, resp  rate 18, height 6' (1 829 m), weight 91 6 kg (201 lb 15 1 oz), SpO2 95 %  ,Body mass index is 27 39 kg/m²        Intake/Output Summary (Last 24 hours) at 5/29/2021 1840  Last data filed at 5/29/2021 1713  Gross per 24 hour   Intake 705 ml   Output --   Net 705 ml       Physical Exam: /77 (BP Location: Right arm)   Pulse 93   Temp 98 °F (36 7 °C) (Temporal)   Resp 18   Ht 6' (1 829 m)   Wt 91 6 kg (201 lb 15 1 oz)   SpO2 95%   BMI 27 39 kg/m²     General Appearance:    Alert, cooperative, no distress, appears stated age   Head:    Normocephalic, without obvious abnormality, atraumatic                   Neck:   Supple, symmetrical, trachea midline, no adenopathy;        thyroid:  No enlargement/tenderness/nodules; no carotid    bruit or JVD   Back:     Symmetric, no curvature, ROM normal, no CVA tenderness   Lungs:     Clear to auscultation bilaterally, respirations unlabored   Chest wall:    No tenderness or deformity Heart:    Regular rate and rhythm, S1 and S2 normal, no murmur, rub   or gallop   Abdomen:     Soft, non-tender, bowel sounds active all four quadrants,     no masses, no organomegaly           Extremities:   Extremities normal, atraumatic, no cyanosis or edema   Pulses:   2+ and symmetric all extremities   Skin:   Skin color, texture, turgor normal, no rashes or lesions   Lymph nodes:   Cervical, supraclavicular, and axillary nodes normal   Neurologic:   Cranial nerves intact  No tremor        Invasive Devices     None                 Lab, Imaging and other studies: I have personally reviewed pertinent reports

## 2021-05-29 NOTE — PROGRESS NOTES
Pt uses Nasal O2 @ 2L/min via cannula PRN  Pt is dyspneic on exertion  Lungs clear upon auscultation  Color ceci  Pt up ad ziggy & gait is steady  Pt c/o depression 4/10 & anxiety 4/10  Pt denies any hallucinations, suicidal or homicidal ideations  Q 7 min checks maintained to monitor pt's behavior & safety  Pt is flat & withdrawn  Pt socializes minimally with other patients  Pt is cooperative & compliant with medications

## 2021-05-30 LAB
GLUCOSE SERPL-MCNC: 108 MG/DL (ref 65–140)
GLUCOSE SERPL-MCNC: 160 MG/DL (ref 65–140)
GLUCOSE SERPL-MCNC: 215 MG/DL (ref 65–140)
GLUCOSE SERPL-MCNC: 92 MG/DL (ref 65–140)

## 2021-05-30 PROCEDURE — 99232 SBSQ HOSP IP/OBS MODERATE 35: CPT | Performed by: PSYCHIATRY & NEUROLOGY

## 2021-05-30 PROCEDURE — 82948 REAGENT STRIP/BLOOD GLUCOSE: CPT

## 2021-05-30 RX ADMIN — TIOTROPIUM BROMIDE 18 MCG: 18 CAPSULE ORAL; RESPIRATORY (INHALATION) at 08:11

## 2021-05-30 RX ADMIN — INSULIN GLARGINE 34 UNITS: 100 INJECTION, SOLUTION SUBCUTANEOUS at 11:44

## 2021-05-30 RX ADMIN — PROPRANOLOL HYDROCHLORIDE 10 MG: 10 TABLET ORAL at 17:18

## 2021-05-30 RX ADMIN — MAGNESIUM OXIDE TAB 400 MG (241.3 MG ELEMENTAL MG) 400 MG: 400 (241.3 MG) TAB at 17:18

## 2021-05-30 RX ADMIN — ROPINIROLE 1 MG: 1 TABLET, FILM COATED ORAL at 21:25

## 2021-05-30 RX ADMIN — GABAPENTIN 400 MG: 400 CAPSULE ORAL at 16:15

## 2021-05-30 RX ADMIN — CHOLESTYRAMINE 4 G: 4 POWDER, FOR SUSPENSION ORAL at 08:05

## 2021-05-30 RX ADMIN — GABAPENTIN 400 MG: 400 CAPSULE ORAL at 21:25

## 2021-05-30 RX ADMIN — SENNOSIDES AND DOCUSATE SODIUM 1 TABLET: 8.6; 5 TABLET ORAL at 17:18

## 2021-05-30 RX ADMIN — Medication 1 PATCH: at 08:06

## 2021-05-30 RX ADMIN — OXYCODONE HYDROCHLORIDE 5 MG: 5 TABLET ORAL at 12:33

## 2021-05-30 RX ADMIN — MAGNESIUM OXIDE TAB 400 MG (241.3 MG ELEMENTAL MG) 400 MG: 400 (241.3 MG) TAB at 08:04

## 2021-05-30 RX ADMIN — FOLIC ACID 1 MG: 1 TABLET ORAL at 08:04

## 2021-05-30 RX ADMIN — PROPRANOLOL HYDROCHLORIDE 10 MG: 10 TABLET ORAL at 08:04

## 2021-05-30 RX ADMIN — PANTOPRAZOLE SODIUM 40 MG: 40 TABLET, DELAYED RELEASE ORAL at 06:42

## 2021-05-30 RX ADMIN — SERTRALINE HYDROCHLORIDE 150 MG: 100 TABLET ORAL at 08:04

## 2021-05-30 RX ADMIN — METFORMIN HYDROCHLORIDE 500 MG: 500 TABLET, FILM COATED ORAL at 08:04

## 2021-05-30 RX ADMIN — CHOLESTYRAMINE 4 G: 4 POWDER, FOR SUSPENSION ORAL at 17:19

## 2021-05-30 RX ADMIN — OXYCODONE HYDROCHLORIDE 5 MG: 5 TABLET ORAL at 08:03

## 2021-05-30 RX ADMIN — INSULIN LISPRO 2 UNITS: 100 INJECTION, SOLUTION INTRAVENOUS; SUBCUTANEOUS at 11:44

## 2021-05-30 RX ADMIN — METFORMIN HYDROCHLORIDE 500 MG: 500 TABLET, FILM COATED ORAL at 16:15

## 2021-05-30 RX ADMIN — LOSARTAN POTASSIUM 25 MG: 25 TABLET, FILM COATED ORAL at 08:03

## 2021-05-30 RX ADMIN — INSULIN LISPRO 1 UNITS: 100 INJECTION, SOLUTION INTRAVENOUS; SUBCUTANEOUS at 16:21

## 2021-05-30 RX ADMIN — LORAZEPAM 0.5 MG: 0.5 TABLET ORAL at 21:29

## 2021-05-30 RX ADMIN — THIAMINE HCL TAB 100 MG 100 MG: 100 TAB at 08:04

## 2021-05-30 RX ADMIN — ROPINIROLE 1 MG: 1 TABLET, FILM COATED ORAL at 08:03

## 2021-05-30 RX ADMIN — QUETIAPINE FUMARATE 200 MG: 200 TABLET ORAL at 21:25

## 2021-05-30 RX ADMIN — MULTIPLE VITAMINS W/ MINERALS TAB 1 TABLET: TAB ORAL at 08:04

## 2021-05-30 RX ADMIN — LORAZEPAM 0.5 MG: 0.5 TABLET ORAL at 11:43

## 2021-05-30 RX ADMIN — SENNOSIDES AND DOCUSATE SODIUM 1 TABLET: 8.6; 5 TABLET ORAL at 08:04

## 2021-05-30 RX ADMIN — GABAPENTIN 400 MG: 400 CAPSULE ORAL at 08:05

## 2021-05-30 RX ADMIN — PIOGLITAZONE 30 MG: 15 TABLET ORAL at 08:04

## 2021-05-30 RX ADMIN — OXYCODONE HYDROCHLORIDE 5 MG: 5 TABLET ORAL at 17:18

## 2021-05-30 NOTE — PROGRESS NOTES
Pt medicated for c/o increased anxiety @ 6712 with Ativan po as ordered by MD with moderate relief obtained  [facility] - 15 @ that time  Pt continues to c/o ongoing BLE pain & medicated prn with Oxy-IR po with relief obtained  Q 7 min checks maintained to monitor pt's behavior & safety

## 2021-05-30 NOTE — PROGRESS NOTES
Pt present in the milieu, social and talkative with other peers but was sad, depressed, anxious and tearful in his room during assessment  Pt stated that he was worried for his son who was traveling to [place]  Pt also stated his depression has worsened since he has been unable to find work after being laid off of his job of 30 years and cannot collect disability due to being denied  Pt stated he feels physically and psychologically unable to perform job duties but feels worse about this because he's only 46years old  Pt resents the fact that some people in this country are able to receive benefits and they aren't even legal residents and he cannot  Pt stated that he retained a  who specializes in cases of disability insurance claims and he wants to follow through with this  Pt reported 8/10 anxiety and 5/10 depression and requested PRN ativan 0 5mg which was effective in allowing him to fall asleep for a little while, however he woke up and was restless afterwards  Pt denied ah,vh, si, hi  Compliant with all medications  Continuous visual safety checks performed throughout the shift  Safety precautions maintained  Will continue to monitor

## 2021-05-30 NOTE — PROGRESS NOTES
Progress Note - Jere Hollis 46 y o  male MRN: [id number]    Unit/Bed#: [facility] 209-01 Encounter: [id number]      Assessment:  1  Cardiac with history of Hypertension and dyslipidemia    controlled on on Cozaar 25 mg daily  Continue cholestyramine   2  Diabetes mellitus  Humalog sliding scale, metformin, Actos and Lantus 34 units qAM   3  Tobacco use   Nicotine patch  4  Alcohol abuse with history of multiple acute pancreatitis   No evidence of withdrawal symptoms    Continue folic acid, thiamine, multivitamin  5  GERD   Controlled with Protonix 40 mg daily  6  COPD  Stable on Spiriva daily, albuterol as needed, supplemental oxygen  7  Constipation   Continue Senokot S twice daily  8  Chronic pain syndrome   Continue Tylenol and oxycodone as needed  9  Cough   Robitussin p r n  10  Vitamin-D deficiency  supplemented       Plan:  See above    Subjective:   No subjective complaint    Objective:     Vitals: Blood pressure 136/79, pulse 97, temperature 97 6 °F (36 4 °C), temperature source Temporal, resp  rate 18, height 6' (1 829 m), weight 91 6 kg (201 lb 15 1 oz), SpO2 95 %  ,Body mass index is 27 39 kg/m²        Intake/Output Summary (Last 24 hours) at 5/30/2021 1809  Last data filed at 5/30/2021 1703  Gross per 24 hour   Intake 690 ml   Output --   Net 690 ml       Physical Exam: /79 (BP Location: Left arm)   Pulse 97   Temp 97 6 °F (36 4 °C) (Temporal)   Resp 18   Ht 6' (1 829 m)   Wt 91 6 kg (201 lb 15 1 oz)   SpO2 95%   BMI 27 39 kg/m²     General Appearance:    Alert, cooperative, no distress, appears stated age   Head:    Normocephalic, without obvious abnormality, atraumatic                   Neck:   Supple, symmetrical, trachea midline, no adenopathy;        thyroid:  No enlargement/tenderness/nodules; no carotid    bruit or JVD   Back:     Symmetric, no curvature, ROM normal, no CVA tenderness   Lungs:     Clear to auscultation bilaterally, respirations unlabored   Chest wall:    No tenderness or deformity   Heart:    Regular rate and rhythm, S1 and S2 normal, no murmur, rub   or gallop   Abdomen:     Soft, non-tender, bowel sounds active all four quadrants,     no masses, no organomegaly           Extremities:   Extremities normal, atraumatic, no cyanosis or edema   Pulses:   2+ and symmetric all extremities   Skin:   Skin color, texture, turgor normal, no rashes or lesions   Lymph nodes:   Cervical, supraclavicular, and axillary nodes normal       Invasive Devices     None                 Lab, Imaging and other studies: I have personally reviewed pertinent reports

## 2021-05-30 NOTE — PROGRESS NOTES
Progress Note - Behavioral Health   Everardo Donovan 46 y o  male MRN: [id number]  Unit/Bed#: Marco Grey 209-01 Encounter: [id number]    The patient was seen for continuing care and reviewed with treatment team     Current Mental Status Evaluation:  Appearance:  Adequate hygiene and grooming   Behavior:  friendly   Mood:  anxious and dysphoric   Affect: mood-congruent   Speech: Normal rate and Normal volume   Thought Process:  Goal directed and coherent   Thought Content:  Does not verbalize delusional material   Perceptual Disturbances: Denies hallucinations and does not appear to be responding to internal stimuli   Risk Potential: No suicidal or homicidal ideation   Orientation:   Person, place, situation, if improving insight and judgment     Progress Toward Goals:  More isolative to room this morning due to feeling fatigue from poor sleep last night and reported 7/10 anxiety and 5/10 depression to RN  Says anxiety is worse here on the unit where his depression was more an issue at home  He asks about the rehabilitation referral and says that because he has a spark of hope now he wants to capitalize on that and to do rehab for his family and to get him ready for outpatient  Compliant with medications, talk to his roommate last night and had broken sleep  Principal Problem:    MDD (major depressive disorder), recurrent episode, severe ([address] )  Active Problems:    Quiros esophagus    Benign essential hypertension    COPD (chronic obstructive pulmonary disease) (HCC)    Fatty liver    Generalized anxiety disorder    Insomnia    Hyponatremia    GERD (gastroesophageal reflux disease)    History of ETOH abuse    Chronic pancreatitis (HCC)    Intentional overdose of drug in tablet form ([address] )    DM (diabetes mellitus) ([address] )    Hypomagnesemia      Recommended Treatment: Continue with pharmacotherapy, group therapy, milieu therapy and occupational therapy    The patient will be maintained on the following medications:  Current Facility-Administered Medications   Medication Dose Route Frequency Provider Last Rate    acetaminophen  650 mg Oral Q4H PRN TYE Barajas      albuterol  2 puff Inhalation Q4H PRN Amanda Bernstein PA-C      benztropine  1 mg Intramuscular Q4H PRN Max 6/day TYE Barajas      benztropine  0 5 mg Oral Q4H PRN Max 6/day TYE Barajas      [START ON 7/5/2021] cholecalciferol  1,000 Units Oral Daily Any Colindres MD      cholestyramine sugar free  4 g Oral BID Stephan Allison DO      dextromethorphan-guaiFENesin  10 mL Oral Q4H PRN Amanda Bernstein PA-C      ergocalciferol  50,000 Units Oral Weekly Any Colindres MD      folic acid  1 mg Oral Daily Bryon Multani,       gabapentin  400 mg Oral TID Satnam Bolaños MD      haloperidol lactate  5 mg Intramuscular Q4H PRN Max 4/day TYE Barajas      hydrOXYzine HCL  25 mg Oral Q6H PRN Max 4/day TYE Barajas      hydrOXYzine HCL  50 mg Oral Q6H PRN Max 4/day TYE Barajas      insulin glargine  34 Units Subcutaneous [place], [place]      insulin lispro  1-6 Units Subcutaneous 4x Daily (AC & HS) Amanda Bernstein PA-C      LORazepam  0 5 mg Intramuscular Q8H PRN Aurora Ashley, TYE      LORazepam  0 5 mg Oral Q8H PRN Aurora AshleyTYE      losartan  25 mg Oral Daily Brittney Caruso      magnesium oxide  400 mg Oral BID Franklin Patrick MD      metFORMIN  500 mg Oral BID With Meals Amanda Bernstein PA-C      multivitamin-minerals  1 tablet Oral Daily Brittney Caruso      naloxone  0 04 mg Intravenous Q1MIN PRN Stephan Allison DO      nicotine  1 patch Transdermal Daily TYE Barajas      ondansetron  4 mg Oral Q6H PRN Amanda Bernstein PA-C      oxyCODONE  2 5 mg Oral Q4H PRN Stephan Allison,       oxyCODONE  5 mg Oral Q4H PRN Stephan Allison,       pantoprazole  40 mg Oral Early Morning Bryon Multani,       pioglitazone  30 mg Oral Daily [facility] ZOYA Wayne      propranolol  10 mg Oral BID Talita Koo PA-C      QUEtiapine  200 mg Oral HS Salvador Mccarthy MD      risperiDONE  0 25 mg Oral Q4H PRN Max 6/day Gabe Day [facility], CRNP      risperiDONE  0 5 mg Oral Q4H PRN Max 3/day Karel Prim, CRNP      risperiDONE  1 mg Oral Q4H PRN Max 3/day Karel Prim, CRNP      rOPINIRole  1 mg Oral BID Talita Hayes, [place]      senna-docusate sodium  1 tablet Oral BID Burke Levy,       sertraline  150 mg Oral Daily Salvador Mccarthy MD      thiamine  100 mg Oral Daily Bryon Lorenzana, DO      tiotropium  18 mcg Inhalation Daily Bryon Multani, DO      traZODone  50 mg Oral HS PRN Karel Prim, CRNP

## 2021-05-30 NOTE — PROGRESS NOTES
Pt is flat & withdrawn  Pt is selectively social with other patients  Pt c/o depression 4/10 & anxiety 4/10  Pt denies any hallucinations, suicidal or homicidal ideations  Q 7 min checks maintained to monitor pt's behavior & safety  Pt up ad ziggy & gait is steady  Pt is on Nasal O2 @ 2L/min via cannula prn & uses O2 during the night  Pt is dyspneic on exertion  Decreased breath sounds in rt base, otherwise clear upon auscultation  Color is ceci  Pt is compliant with medications

## 2021-05-31 VITALS
RESPIRATION RATE: 18 BRPM | DIASTOLIC BLOOD PRESSURE: 80 MMHG | HEIGHT: 72 IN | TEMPERATURE: 98.6 F | HEART RATE: 99 BPM | SYSTOLIC BLOOD PRESSURE: 172 MMHG | OXYGEN SATURATION: 93 % | BODY MASS INDEX: 27.35 KG/M2 | WEIGHT: 201.94 LBS

## 2021-05-31 LAB
GLUCOSE SERPL-MCNC: 121 MG/DL (ref 65–140)
GLUCOSE SERPL-MCNC: 147 MG/DL (ref 65–140)
GLUCOSE SERPL-MCNC: 149 MG/DL (ref 65–140)
GLUCOSE SERPL-MCNC: 192 MG/DL (ref 65–140)

## 2021-05-31 PROCEDURE — 99232 SBSQ HOSP IP/OBS MODERATE 35: CPT | Performed by: PSYCHIATRY & NEUROLOGY

## 2021-05-31 PROCEDURE — 82948 REAGENT STRIP/BLOOD GLUCOSE: CPT

## 2021-05-31 RX ORDER — PROPRANOLOL HYDROCHLORIDE 10 MG/1
10 TABLET ORAL 2 TIMES DAILY
Status: CANCELLED | OUTPATIENT
Start: 2021-06-01

## 2021-05-31 RX ORDER — HYDROXYZINE HYDROCHLORIDE 25 MG/1
50 TABLET, FILM COATED ORAL
Status: CANCELLED | OUTPATIENT
Start: 2021-05-31

## 2021-05-31 RX ORDER — OXYCODONE HYDROCHLORIDE 5 MG/1
2.5 TABLET ORAL EVERY 4 HOURS PRN
Status: CANCELLED | OUTPATIENT
Start: 2021-05-31

## 2021-05-31 RX ORDER — PIOGLITAZONEHYDROCHLORIDE 15 MG/1
30 TABLET ORAL DAILY
Status: CANCELLED | OUTPATIENT
Start: 2021-06-01

## 2021-05-31 RX ORDER — QUETIAPINE FUMARATE 200 MG/1
200 TABLET, FILM COATED ORAL
Status: CANCELLED | OUTPATIENT
Start: 2021-06-01

## 2021-05-31 RX ORDER — ALBUTEROL SULFATE 90 UG/1
2 AEROSOL, METERED RESPIRATORY (INHALATION) EVERY 4 HOURS PRN
Status: CANCELLED | OUTPATIENT
Start: 2021-05-31

## 2021-05-31 RX ORDER — LORAZEPAM 2 MG/ML
0.5 INJECTION INTRAMUSCULAR EVERY 8 HOURS PRN
Status: CANCELLED | OUTPATIENT
Start: 2021-05-31

## 2021-05-31 RX ORDER — RISPERIDONE 0.25 MG/1
0.25 TABLET, ORALLY DISINTEGRATING ORAL
Status: CANCELLED | OUTPATIENT
Start: 2021-05-31

## 2021-05-31 RX ORDER — OXYCODONE HYDROCHLORIDE 5 MG/1
5 TABLET ORAL EVERY 4 HOURS PRN
Status: CANCELLED | OUTPATIENT
Start: 2021-05-31

## 2021-05-31 RX ORDER — INSULIN GLARGINE 100 [IU]/ML
34 INJECTION, SOLUTION SUBCUTANEOUS EVERY MORNING
Status: CANCELLED | OUTPATIENT
Start: 2021-06-01

## 2021-05-31 RX ORDER — CHOLESTYRAMINE LIGHT 4 G/5.7G
4 POWDER, FOR SUSPENSION ORAL 2 TIMES DAILY
Status: CANCELLED | OUTPATIENT
Start: 2021-06-01

## 2021-05-31 RX ORDER — LANOLIN ALCOHOL/MO/W.PET/CERES
100 CREAM (GRAM) TOPICAL DAILY
Status: CANCELLED | OUTPATIENT
Start: 2021-06-01

## 2021-05-31 RX ORDER — BENZTROPINE MESYLATE 1 MG/ML
1 INJECTION INTRAMUSCULAR; INTRAVENOUS
Status: CANCELLED | OUTPATIENT
Start: 2021-05-31

## 2021-05-31 RX ORDER — HYDROXYZINE HYDROCHLORIDE 25 MG/1
25 TABLET, FILM COATED ORAL
Status: CANCELLED | OUTPATIENT
Start: 2021-05-31

## 2021-05-31 RX ORDER — BENZTROPINE MESYLATE 0.5 MG/1
0.5 TABLET ORAL
Status: CANCELLED | OUTPATIENT
Start: 2021-05-31

## 2021-05-31 RX ORDER — GABAPENTIN 400 MG/1
400 CAPSULE ORAL 3 TIMES DAILY
Status: CANCELLED | OUTPATIENT
Start: 2021-06-01

## 2021-05-31 RX ORDER — LOSARTAN POTASSIUM 25 MG/1
25 TABLET ORAL DAILY
Status: CANCELLED | OUTPATIENT
Start: 2021-06-01

## 2021-05-31 RX ORDER — HALOPERIDOL 5 MG/ML
5 INJECTION INTRAMUSCULAR
Status: CANCELLED | OUTPATIENT
Start: 2021-05-31

## 2021-05-31 RX ORDER — AMOXICILLIN 250 MG
1 CAPSULE ORAL 2 TIMES DAILY
Status: CANCELLED | OUTPATIENT
Start: 2021-06-01

## 2021-05-31 RX ORDER — GUAIFENESIN/DEXTROMETHORPHAN 100-10MG/5
10 SYRUP ORAL EVERY 4 HOURS PRN
Status: CANCELLED | OUTPATIENT
Start: 2021-05-31

## 2021-05-31 RX ORDER — LORAZEPAM 0.5 MG/1
0.5 TABLET ORAL EVERY 8 HOURS PRN
Status: CANCELLED | OUTPATIENT
Start: 2021-05-31

## 2021-05-31 RX ORDER — RISPERIDONE 1 MG/1
1 TABLET, ORALLY DISINTEGRATING ORAL
Status: CANCELLED | OUTPATIENT
Start: 2021-05-31

## 2021-05-31 RX ORDER — PANTOPRAZOLE SODIUM 40 MG/1
40 TABLET, DELAYED RELEASE ORAL
Status: CANCELLED | OUTPATIENT
Start: 2021-06-01

## 2021-05-31 RX ORDER — RISPERIDONE 0.5 MG/1
0.5 TABLET, ORALLY DISINTEGRATING ORAL
Status: CANCELLED | OUTPATIENT
Start: 2021-05-31

## 2021-05-31 RX ORDER — FOLIC ACID 1 MG/1
1 TABLET ORAL DAILY
Status: CANCELLED | OUTPATIENT
Start: 2021-06-01

## 2021-05-31 RX ORDER — NICOTINE 21 MG/24HR
1 PATCH, TRANSDERMAL 24 HOURS TRANSDERMAL DAILY
Status: CANCELLED | OUTPATIENT
Start: 2021-06-01

## 2021-05-31 RX ORDER — ERGOCALCIFEROL 1.25 MG/1
50000 CAPSULE ORAL WEEKLY
Status: CANCELLED | OUTPATIENT
Start: 2021-06-07 | End: 2021-07-12

## 2021-05-31 RX ORDER — ACETAMINOPHEN 325 MG/1
650 TABLET ORAL EVERY 4 HOURS PRN
Status: CANCELLED | OUTPATIENT
Start: 2021-05-31

## 2021-05-31 RX ORDER — TRAZODONE HYDROCHLORIDE 50 MG/1
50 TABLET ORAL
Status: CANCELLED | OUTPATIENT
Start: 2021-05-31

## 2021-05-31 RX ORDER — ROPINIROLE 1 MG/1
1 TABLET, FILM COATED ORAL 2 TIMES DAILY
Status: CANCELLED | OUTPATIENT
Start: 2021-06-01

## 2021-05-31 RX ORDER — ONDANSETRON 4 MG/1
4 TABLET, ORALLY DISINTEGRATING ORAL EVERY 6 HOURS PRN
Status: CANCELLED | OUTPATIENT
Start: 2021-05-31

## 2021-05-31 RX ORDER — MELATONIN
1000 DAILY
Status: CANCELLED | OUTPATIENT
Start: 2021-07-05

## 2021-05-31 RX ADMIN — LORAZEPAM 0.5 MG: 0.5 TABLET ORAL at 09:59

## 2021-05-31 RX ADMIN — THIAMINE HCL TAB 100 MG 100 MG: 100 TAB at 08:08

## 2021-05-31 RX ADMIN — GABAPENTIN 400 MG: 400 CAPSULE ORAL at 15:39

## 2021-05-31 RX ADMIN — MULTIPLE VITAMINS W/ MINERALS TAB 1 TABLET: TAB ORAL at 08:07

## 2021-05-31 RX ADMIN — SERTRALINE HYDROCHLORIDE 150 MG: 100 TABLET ORAL at 08:07

## 2021-05-31 RX ADMIN — GABAPENTIN 400 MG: 400 CAPSULE ORAL at 21:26

## 2021-05-31 RX ADMIN — MAGNESIUM OXIDE TAB 400 MG (241.3 MG ELEMENTAL MG) 400 MG: 400 (241.3 MG) TAB at 08:08

## 2021-05-31 RX ADMIN — OXYCODONE HYDROCHLORIDE 5 MG: 5 TABLET ORAL at 12:25

## 2021-05-31 RX ADMIN — METFORMIN HYDROCHLORIDE 500 MG: 500 TABLET, FILM COATED ORAL at 15:39

## 2021-05-31 RX ADMIN — ERGOCALCIFEROL 50000 UNITS: 1.25 CAPSULE ORAL at 08:08

## 2021-05-31 RX ADMIN — SENNOSIDES AND DOCUSATE SODIUM 1 TABLET: 8.6; 5 TABLET ORAL at 17:06

## 2021-05-31 RX ADMIN — CHOLESTYRAMINE 4 G: 4 POWDER, FOR SUSPENSION ORAL at 08:09

## 2021-05-31 RX ADMIN — TIOTROPIUM BROMIDE 18 MCG: 18 CAPSULE ORAL; RESPIRATORY (INHALATION) at 08:13

## 2021-05-31 RX ADMIN — GABAPENTIN 400 MG: 400 CAPSULE ORAL at 08:08

## 2021-05-31 RX ADMIN — PROPRANOLOL HYDROCHLORIDE 10 MG: 10 TABLET ORAL at 17:06

## 2021-05-31 RX ADMIN — CHOLESTYRAMINE 4 G: 4 POWDER, FOR SUSPENSION ORAL at 17:06

## 2021-05-31 RX ADMIN — QUETIAPINE FUMARATE 200 MG: 200 TABLET ORAL at 21:26

## 2021-05-31 RX ADMIN — OXYCODONE HYDROCHLORIDE 5 MG: 5 TABLET ORAL at 07:55

## 2021-05-31 RX ADMIN — Medication 1 PATCH: at 08:13

## 2021-05-31 RX ADMIN — PIOGLITAZONE 30 MG: 15 TABLET ORAL at 08:08

## 2021-05-31 RX ADMIN — FOLIC ACID 1 MG: 1 TABLET ORAL at 08:08

## 2021-05-31 RX ADMIN — LOSARTAN POTASSIUM 25 MG: 25 TABLET, FILM COATED ORAL at 08:08

## 2021-05-31 RX ADMIN — INSULIN GLARGINE 34 UNITS: 100 INJECTION, SOLUTION SUBCUTANEOUS at 08:12

## 2021-05-31 RX ADMIN — METFORMIN HYDROCHLORIDE 500 MG: 500 TABLET, FILM COATED ORAL at 07:56

## 2021-05-31 RX ADMIN — SENNOSIDES AND DOCUSATE SODIUM 1 TABLET: 8.6; 5 TABLET ORAL at 08:08

## 2021-05-31 RX ADMIN — PROPRANOLOL HYDROCHLORIDE 10 MG: 10 TABLET ORAL at 08:08

## 2021-05-31 RX ADMIN — ROPINIROLE 1 MG: 1 TABLET, FILM COATED ORAL at 21:26

## 2021-05-31 RX ADMIN — LORAZEPAM 0.5 MG: 0.5 TABLET ORAL at 21:26

## 2021-05-31 RX ADMIN — INSULIN LISPRO 2 UNITS: 100 INJECTION, SOLUTION INTRAVENOUS; SUBCUTANEOUS at 16:56

## 2021-05-31 RX ADMIN — ROPINIROLE 1 MG: 1 TABLET, FILM COATED ORAL at 08:08

## 2021-05-31 RX ADMIN — PANTOPRAZOLE SODIUM 40 MG: 40 TABLET, DELAYED RELEASE ORAL at 06:32

## 2021-05-31 RX ADMIN — OXYCODONE HYDROCHLORIDE 5 MG: 5 TABLET ORAL at 17:07

## 2021-05-31 RX ADMIN — MAGNESIUM OXIDE TAB 400 MG (241.3 MG ELEMENTAL MG) 400 MG: 400 (241.3 MG) TAB at 17:06

## 2021-05-31 NOTE — PROGRESS NOTES
Pt was present in the milieu at time of assessment having a group conversation with other peers on the unit  Pt brightens with conversation and offered good insight into his addiction, recovery, 12 step A  A  program and talked with the RNs about achieving recovery  Pt is remorseful and sad discussing his addiction, but positive and optimistic at times  Pt reported anxiety and was administered PRN ativan which was effective  Pt was heard talking with his roommate for an hour after med pass  Pt was cooperative, rational and polite  Pt exhibited good eye contact and tone and was receptive to feedback as well as providing useful factual information and anecdotes  Pt went to bed after taking HS medications and left his AA blue book out on the counter so RN could read a chapter he was discussing  Pt worries about his family but is happy they are safe tonight  Continuous visual safety checks performed throughout the shift  Safety precautions maintained  Will continue to monitor

## 2021-05-31 NOTE — PROGRESS NOTES
Pt slept well throughout the night  Pt woke once for a snack and went back to bed  Continuous visual safety checks performed throughout the night  No sign of distress or labored breathing  Safety precautions maintained  Will continue to monitor

## 2021-05-31 NOTE — PROGRESS NOTES
Progress Note - Za Pierre 46 y o  male MRN: [id number]    Unit/Bed#: Bella Soler 209-01 Encounter: [id number]        Subjective:   Patient seen and examined at bedside after reviewing the chart and discussing the case with the caring staff  Patient examined at bedside  Patient has no acute concerns  Physical Exam   Vitals: Blood pressure 150/86, pulse 87, temperature 97 6 °F (36 4 °C), temperature source Temporal, resp  rate 18, height 6' (1 829 m), weight 91 6 kg (201 lb 15 1 oz), SpO2 99 %  ,Body mass index is 27 39 kg/m²  Constitutional: Patient appears well-developed  HEENT: PERR, EOMI, MMM  Cardiovascular: Normal rate and regular rhythm  Pulmonary/Chest: Effort normal and breath sounds normal    Abdomen: Soft, + BS, NT    Assessment/Plan:  Za Pierre is a(n) 46 y o  male with MDD      1  Cardiac with history of Hypertension and dyslipidemia  Patient will be continued on Cozaar 25 mg daily  Continue cholestyramine sugar free 2 times daily  2  Diabetes mellitus  Patient's hemoglobin A1c was 7 4 on 05/11/2021  Continue carb controlled diet, fingerstick glucose 4 times daily before meals and at bedtime, Humalog sliding scale, metformin, Actos and Lantus 34 units qAM   3  Tobacco use  Patient has nicotine transdermal patch  4  Alcohol abuse with history of multiple acute pancreatitis  No S/S of withdrawal  Continue folic acid, thiamine, multivitamin  Patient follows up with specialists for his pancreatitis  5  GERD  Continue pantoprazole 40 mg daily  6  COPD  Continue Spiriva daily, albuterol as needed, and 2 L nasal cannula  7  Constipation  Continue Senokot S twice daily  8  Chronic pain syndrome  Continue Tylenol and oxycodone as needed  9  Cough  Patient may receive Robitussin DM as needed  10  Vitamin-D deficiency  Continue vitamin-D bolus doses for 8 weeks followed by vitamin D3 1000 units daily

## 2021-05-31 NOTE — PROGRESS NOTES
Pt medicated for c/o increased anxiety 7/10 @ 0959 with Ativan po as ordered by MD Mitzi Willim - 15 @ that time  Q 7 min checks maintained to monitor pt's behavior & safety  Pt c/o depression 5/10 & anxiety 7/10  Pt denies any hallucinations, suicidal or homicidal ideations  Pt is cooperative & compliant with medications  Pt is flat & socializes minimally with other patients  Pt medicated for bilateral lower leg & back pain @ 0755 with Oxy-IR po as ordered by MD with moderate relief obtained  Pt on Nasal O2 @ 2L/min via cannula prn during the day  Lungs clear upon auscultation  Pt is dyspneic on exertion @ times

## 2021-06-01 ENCOUNTER — HOSPITAL ENCOUNTER (INPATIENT)
Facility: HOSPITAL | Age: 51
LOS: 8 days | DRG: 751 | End: 2021-06-09
Attending: PSYCHIATRY & NEUROLOGY | Admitting: PSYCHIATRY & NEUROLOGY
Payer: COMMERCIAL

## 2021-06-01 DIAGNOSIS — L60.2 OVERGROWN TOENAILS: ICD-10-CM

## 2021-06-01 DIAGNOSIS — F32.A DEPRESSION: ICD-10-CM

## 2021-06-01 DIAGNOSIS — E87.1 HYPONATREMIA: ICD-10-CM

## 2021-06-01 DIAGNOSIS — K85.90 ACUTE PANCREATITIS: Primary | ICD-10-CM

## 2021-06-01 LAB
GLUCOSE SERPL-MCNC: 100 MG/DL (ref 65–140)
GLUCOSE SERPL-MCNC: 131 MG/DL (ref 65–140)
GLUCOSE SERPL-MCNC: 188 MG/DL (ref 65–140)
GLUCOSE SERPL-MCNC: 197 MG/DL (ref 65–140)

## 2021-06-01 PROCEDURE — 99232 SBSQ HOSP IP/OBS MODERATE 35: CPT | Performed by: NURSE PRACTITIONER

## 2021-06-01 PROCEDURE — 82948 REAGENT STRIP/BLOOD GLUCOSE: CPT

## 2021-06-01 RX ORDER — RISPERIDONE 0.5 MG/1
0.5 TABLET, ORALLY DISINTEGRATING ORAL
Status: DISCONTINUED | OUTPATIENT
Start: 2021-06-01 | End: 2021-06-09 | Stop reason: HOSPADM

## 2021-06-01 RX ORDER — GABAPENTIN 400 MG/1
400 CAPSULE ORAL 3 TIMES DAILY
Status: DISCONTINUED | OUTPATIENT
Start: 2021-06-01 | End: 2021-06-09 | Stop reason: HOSPADM

## 2021-06-01 RX ORDER — LORAZEPAM 0.5 MG/1
0.5 TABLET ORAL EVERY 8 HOURS PRN
Status: DISCONTINUED | OUTPATIENT
Start: 2021-06-01 | End: 2021-06-01

## 2021-06-01 RX ORDER — ROPINIROLE 1 MG/1
1 TABLET, FILM COATED ORAL 2 TIMES DAILY
Status: DISCONTINUED | OUTPATIENT
Start: 2021-06-01 | End: 2021-06-09 | Stop reason: HOSPADM

## 2021-06-01 RX ORDER — PANTOPRAZOLE SODIUM 40 MG/1
40 TABLET, DELAYED RELEASE ORAL
Status: DISCONTINUED | OUTPATIENT
Start: 2021-06-01 | End: 2021-06-09 | Stop reason: HOSPADM

## 2021-06-01 RX ORDER — AMOXICILLIN 250 MG
1 CAPSULE ORAL 2 TIMES DAILY
Status: DISCONTINUED | OUTPATIENT
Start: 2021-06-01 | End: 2021-06-09 | Stop reason: HOSPADM

## 2021-06-01 RX ORDER — ALBUTEROL SULFATE 90 UG/1
2 AEROSOL, METERED RESPIRATORY (INHALATION) EVERY 4 HOURS PRN
Status: DISCONTINUED | OUTPATIENT
Start: 2021-06-01 | End: 2021-06-09 | Stop reason: HOSPADM

## 2021-06-01 RX ORDER — INSULIN GLARGINE 100 [IU]/ML
34 INJECTION, SOLUTION SUBCUTANEOUS EVERY MORNING
Status: DISCONTINUED | OUTPATIENT
Start: 2021-06-01 | End: 2021-06-06

## 2021-06-01 RX ORDER — LORAZEPAM 0.5 MG/1
0.25 TABLET ORAL EVERY 12 HOURS PRN
Status: DISCONTINUED | OUTPATIENT
Start: 2021-06-01 | End: 2021-06-02

## 2021-06-01 RX ORDER — HALOPERIDOL 5 MG/ML
5 INJECTION INTRAMUSCULAR
Status: DISCONTINUED | OUTPATIENT
Start: 2021-06-01 | End: 2021-06-09 | Stop reason: HOSPADM

## 2021-06-01 RX ORDER — LORAZEPAM 2 MG/ML
0.5 INJECTION INTRAMUSCULAR EVERY 8 HOURS PRN
Status: DISCONTINUED | OUTPATIENT
Start: 2021-06-01 | End: 2021-06-01

## 2021-06-01 RX ORDER — FOLIC ACID 1 MG/1
1 TABLET ORAL DAILY
Status: DISCONTINUED | OUTPATIENT
Start: 2021-06-01 | End: 2021-06-09 | Stop reason: HOSPADM

## 2021-06-01 RX ORDER — RISPERIDONE 1 MG/1
1 TABLET, ORALLY DISINTEGRATING ORAL
Status: DISCONTINUED | OUTPATIENT
Start: 2021-06-01 | End: 2021-06-09 | Stop reason: HOSPADM

## 2021-06-01 RX ORDER — PIOGLITAZONEHYDROCHLORIDE 15 MG/1
30 TABLET ORAL DAILY
Status: DISCONTINUED | OUTPATIENT
Start: 2021-06-01 | End: 2021-06-09 | Stop reason: HOSPADM

## 2021-06-01 RX ORDER — OXYCODONE HYDROCHLORIDE 5 MG/1
5 TABLET ORAL EVERY 4 HOURS PRN
Status: DISCONTINUED | OUTPATIENT
Start: 2021-06-01 | End: 2021-06-02

## 2021-06-01 RX ORDER — GUAIFENESIN/DEXTROMETHORPHAN 100-10MG/5
10 SYRUP ORAL EVERY 4 HOURS PRN
Status: DISCONTINUED | OUTPATIENT
Start: 2021-06-01 | End: 2021-06-09 | Stop reason: HOSPADM

## 2021-06-01 RX ORDER — ERGOCALCIFEROL 1.25 MG/1
50000 CAPSULE ORAL WEEKLY
Status: DISCONTINUED | OUTPATIENT
Start: 2021-06-07 | End: 2021-06-09 | Stop reason: HOSPADM

## 2021-06-01 RX ORDER — MELATONIN
1000 DAILY
Status: DISCONTINUED | OUTPATIENT
Start: 2021-07-05 | End: 2021-06-09 | Stop reason: HOSPADM

## 2021-06-01 RX ORDER — RISPERIDONE 0.25 MG/1
0.25 TABLET, ORALLY DISINTEGRATING ORAL
Status: DISCONTINUED | OUTPATIENT
Start: 2021-06-01 | End: 2021-06-09 | Stop reason: HOSPADM

## 2021-06-01 RX ORDER — ONDANSETRON 4 MG/1
4 TABLET, ORALLY DISINTEGRATING ORAL EVERY 6 HOURS PRN
Status: DISCONTINUED | OUTPATIENT
Start: 2021-06-01 | End: 2021-06-09 | Stop reason: HOSPADM

## 2021-06-01 RX ORDER — OXYCODONE HYDROCHLORIDE 5 MG/1
2.5 TABLET ORAL EVERY 4 HOURS PRN
Status: DISCONTINUED | OUTPATIENT
Start: 2021-06-01 | End: 2021-06-02

## 2021-06-01 RX ORDER — PROPRANOLOL HYDROCHLORIDE 10 MG/1
10 TABLET ORAL 2 TIMES DAILY
Status: DISCONTINUED | OUTPATIENT
Start: 2021-06-01 | End: 2021-06-09 | Stop reason: HOSPADM

## 2021-06-01 RX ORDER — HYDROXYZINE HYDROCHLORIDE 25 MG/1
25 TABLET, FILM COATED ORAL
Status: DISCONTINUED | OUTPATIENT
Start: 2021-06-01 | End: 2021-06-09 | Stop reason: HOSPADM

## 2021-06-01 RX ORDER — LOSARTAN POTASSIUM 25 MG/1
25 TABLET ORAL DAILY
Status: DISCONTINUED | OUTPATIENT
Start: 2021-06-01 | End: 2021-06-08

## 2021-06-01 RX ORDER — LANOLIN ALCOHOL/MO/W.PET/CERES
100 CREAM (GRAM) TOPICAL DAILY
Status: DISCONTINUED | OUTPATIENT
Start: 2021-06-01 | End: 2021-06-09 | Stop reason: HOSPADM

## 2021-06-01 RX ORDER — CHOLESTYRAMINE LIGHT 4 G/5.7G
4 POWDER, FOR SUSPENSION ORAL 2 TIMES DAILY
Status: DISCONTINUED | OUTPATIENT
Start: 2021-06-01 | End: 2021-06-09 | Stop reason: HOSPADM

## 2021-06-01 RX ORDER — BENZTROPINE MESYLATE 0.5 MG/1
0.5 TABLET ORAL
Status: DISCONTINUED | OUTPATIENT
Start: 2021-06-01 | End: 2021-06-09 | Stop reason: HOSPADM

## 2021-06-01 RX ORDER — ACETAMINOPHEN 325 MG/1
650 TABLET ORAL EVERY 4 HOURS PRN
Status: DISCONTINUED | OUTPATIENT
Start: 2021-06-01 | End: 2021-06-09 | Stop reason: HOSPADM

## 2021-06-01 RX ORDER — HYDROXYZINE HYDROCHLORIDE 25 MG/1
50 TABLET, FILM COATED ORAL
Status: DISCONTINUED | OUTPATIENT
Start: 2021-06-01 | End: 2021-06-09 | Stop reason: HOSPADM

## 2021-06-01 RX ORDER — BENZTROPINE MESYLATE 1 MG/ML
1 INJECTION INTRAMUSCULAR; INTRAVENOUS
Status: DISCONTINUED | OUTPATIENT
Start: 2021-06-01 | End: 2021-06-09 | Stop reason: HOSPADM

## 2021-06-01 RX ORDER — NICOTINE 21 MG/24HR
1 PATCH, TRANSDERMAL 24 HOURS TRANSDERMAL DAILY
Status: DISCONTINUED | OUTPATIENT
Start: 2021-06-01 | End: 2021-06-09 | Stop reason: HOSPADM

## 2021-06-01 RX ORDER — QUETIAPINE FUMARATE 200 MG/1
200 TABLET, FILM COATED ORAL
Status: DISCONTINUED | OUTPATIENT
Start: 2021-06-01 | End: 2021-06-09 | Stop reason: HOSPADM

## 2021-06-01 RX ORDER — TRAZODONE HYDROCHLORIDE 50 MG/1
50 TABLET ORAL
Status: DISCONTINUED | OUTPATIENT
Start: 2021-06-01 | End: 2021-06-09 | Stop reason: HOSPADM

## 2021-06-01 RX ADMIN — SERTRALINE HYDROCHLORIDE 150 MG: 100 TABLET ORAL at 08:48

## 2021-06-01 RX ADMIN — LORAZEPAM 0.25 MG: 0.5 TABLET ORAL at 21:31

## 2021-06-01 RX ADMIN — PROPRANOLOL HYDROCHLORIDE 10 MG: 10 TABLET ORAL at 16:49

## 2021-06-01 RX ADMIN — MAGNESIUM OXIDE TAB 400 MG (241.3 MG ELEMENTAL MG) 400 MG: 400 (241.3 MG) TAB at 08:51

## 2021-06-01 RX ADMIN — INSULIN LISPRO 1 UNITS: 100 INJECTION, SOLUTION INTRAVENOUS; SUBCUTANEOUS at 16:42

## 2021-06-01 RX ADMIN — GABAPENTIN 400 MG: 400 CAPSULE ORAL at 08:49

## 2021-06-01 RX ADMIN — CHOLESTYRAMINE 4 G: 4 POWDER, FOR SUSPENSION ORAL at 08:47

## 2021-06-01 RX ADMIN — INSULIN LISPRO 1 UNITS: 100 INJECTION, SOLUTION INTRAVENOUS; SUBCUTANEOUS at 12:11

## 2021-06-01 RX ADMIN — METFORMIN HYDROCHLORIDE 500 MG: 500 TABLET, FILM COATED ORAL at 08:50

## 2021-06-01 RX ADMIN — LOSARTAN POTASSIUM 25 MG: 25 TABLET, FILM COATED ORAL at 08:50

## 2021-06-01 RX ADMIN — OXYCODONE HYDROCHLORIDE 5 MG: 5 TABLET ORAL at 16:47

## 2021-06-01 RX ADMIN — INSULIN GLARGINE 34 UNITS: 100 INJECTION, SOLUTION SUBCUTANEOUS at 08:59

## 2021-06-01 RX ADMIN — Medication 1 PATCH: at 08:46

## 2021-06-01 RX ADMIN — GABAPENTIN 400 MG: 400 CAPSULE ORAL at 20:56

## 2021-06-01 RX ADMIN — TIOTROPIUM BROMIDE 18 MCG: 18 CAPSULE ORAL; RESPIRATORY (INHALATION) at 08:47

## 2021-06-01 RX ADMIN — CHOLESTYRAMINE 4 G: 4 POWDER, FOR SUSPENSION ORAL at 16:48

## 2021-06-01 RX ADMIN — Medication 1 TABLET: at 08:49

## 2021-06-01 RX ADMIN — GABAPENTIN 400 MG: 400 CAPSULE ORAL at 16:41

## 2021-06-01 RX ADMIN — DOCUSATE SODIUM 50 MG AND SENNOSIDES 8.6 MG 1 TABLET: 8.6; 5 TABLET, FILM COATED ORAL at 08:50

## 2021-06-01 RX ADMIN — METFORMIN HYDROCHLORIDE 500 MG: 500 TABLET, FILM COATED ORAL at 16:40

## 2021-06-01 RX ADMIN — DOCUSATE SODIUM 50 MG AND SENNOSIDES 8.6 MG 1 TABLET: 8.6; 5 TABLET, FILM COATED ORAL at 16:49

## 2021-06-01 RX ADMIN — ROPINIROLE 1 MG: 1 TABLET, FILM COATED ORAL at 20:56

## 2021-06-01 RX ADMIN — QUETIAPINE FUMARATE 200 MG: 400 TABLET ORAL at 20:56

## 2021-06-01 RX ADMIN — OXYCODONE HYDROCHLORIDE 5 MG: 5 TABLET ORAL at 23:21

## 2021-06-01 RX ADMIN — PANTOPRAZOLE SODIUM 40 MG: 40 TABLET, DELAYED RELEASE ORAL at 07:00

## 2021-06-01 RX ADMIN — OXYCODONE HYDROCHLORIDE 5 MG: 5 TABLET ORAL at 12:14

## 2021-06-01 RX ADMIN — PIOGLITAZONE 30 MG: 15 TABLET ORAL at 08:51

## 2021-06-01 RX ADMIN — LORAZEPAM 0.5 MG: 0.5 TABLET ORAL at 08:56

## 2021-06-01 RX ADMIN — PROPRANOLOL HYDROCHLORIDE 10 MG: 10 TABLET ORAL at 08:50

## 2021-06-01 RX ADMIN — ROPINIROLE 1 MG: 1 TABLET, FILM COATED ORAL at 08:50

## 2021-06-01 RX ADMIN — THIAMINE HCL TAB 100 MG 100 MG: 100 TAB at 08:49

## 2021-06-01 RX ADMIN — FOLIC ACID 1 MG: 1 TABLET ORAL at 08:49

## 2021-06-01 RX ADMIN — MAGNESIUM OXIDE TAB 400 MG (241.3 MG ELEMENTAL MG) 400 MG: 400 (241.3 MG) TAB at 16:49

## 2021-06-01 NOTE — PROGRESS NOTES
Progress Note - Olena Iglesias 46 y o  male MRN: [id number]    Unit/Bed#: Catalino Graves 205-02 Encounter: [id number]        Subjective:   Patient seen and examined at bedside after reviewing the chart and discussing the case with the caring staff  Patient examined at bedside  Patient is complaining that he has at times bloody vision  In the past used to get the symptoms when his blood sugars were high  Physical Exam   Vitals: Blood pressure 148/81, pulse 72, temperature (!) 97 °F (36 1 °C), temperature source Temporal, resp  rate 18, height 6' (1 829 m), weight 91 6 kg (201 lb 15 1 oz), SpO2 96 %  ,Body mass index is 27 39 kg/m²  Constitutional: Patient appears well-developed  HEENT: PERR, EOMI, MMM  Cardiovascular: Normal rate and regular rhythm  Pulmonary/Chest: Effort normal and breath sounds normal    Abdomen: Soft, + BS, NT    Assessment/Plan:  Olena Iglesias is a(n) 46 y o  male with MDD      1  Cardiac with history of Hypertension and dyslipidemia  Patient will be continued on Cozaar 25 mg daily  Continue cholestyramine sugar free 2 times daily  2  Diabetes mellitus  Patient's hemoglobin A1c was 7 4 on 05/11/2021  Continue carb controlled diet, fingerstick glucose 4 times daily before meals and at bedtime, Humalog sliding scale, metformin, Actos and Lantus 34 units qAM   3  Tobacco use  Patient has nicotine transdermal patch  4  Alcohol abuse with history of multiple acute pancreatitis  No S/S of withdrawal  Continue folic acid, thiamine, multivitamin  Patient follows up with specialists for his pancreatitis  5  GERD  Continue pantoprazole 40 mg daily  6  COPD  Continue Spiriva daily, albuterol as needed, and 2 L nasal cannula  7  Constipation  Continue Senokot S twice daily  8  Chronic pain syndrome  Continue Tylenol and oxycodone as needed  9  Cough  Patient may receive Robitussin DM as needed  10  Vitamin-D deficiency    Continue vitamin-D bolus doses for 8 weeks followed by vitamin D3 1000 units daily  11  Blurry vision with history of diabetes  Patient needs to see ophthalmologist for possible cataract on outpatient basis

## 2021-06-01 NOTE — PLAN OF CARE
Problem: Alteration in Thoughts and Perception  Goal: Treatment Goal: Gain control of psychotic behaviors/thinking, reduce/eliminate presenting symptoms and demonstrate improved reality functioning upon discharge  Outcome: Progressing  Goal: Verbalize thoughts and feelings  Description: Interventions:  - Promote a nonjudgmental and trusting relationship with the patient through active listening and therapeutic communication  - Assess patient's level of functioning, behavior and potential for risk  - Engage patient in 1 on 1 interactions  - Encourage patient to express fears, feelings, frustrations, and discuss symptoms    - [place] patient to reality, help patient recognize reality-based thinking   - Administer medications as ordered and assess for potential side effects  - Provide the patient education related to the signs and symptoms of the illness and desired effects of prescribed medications  Outcome: Progressing  Goal: Refrain from acting on delusional thinking/internal stimuli  Description: Interventions:  - Monitor patient closely, per order   - Utilize least restrictive measures   - Set reasonable limits, give positive feedback for acceptable   - Administer medications as ordered and monitor of potential side effects  Outcome: Progressing  Goal: Agree to be compliant with medication regime, as prescribed and report medication side effects  Description: Interventions:  - Offer appropriate PRN medication and supervise ingestion; conduct AIMS, as needed   Outcome: Progressing  Goal: Attend and participate in unit activities, including therapeutic, recreational, and educational groups  Description: Interventions:  -Encourage Visitation and family involvement in care  Outcome: Progressing  Goal: Recognize dysfunctional thoughts, communicate reality-based thoughts at the time of discharge  Description: Interventions:  - Provide medication and psycho-education to assist patient in compliance and developing insight into his/her illness   Outcome: Progressing  Goal: Complete daily ADLs, including personal hygiene independently, as able  Description: Interventions:  - Observe, teach, and assist patient with ADLS  - Monitor and promote a balance of rest/activity, with adequate nutrition and elimination   Outcome: Progressing

## 2021-06-01 NOTE — PROGRESS NOTES
Progress Note - Behavioral Health   Isauro Ma 46 y o  male MRN: [id number]  Unit/Bed#: Maya Bernard 205-02 Encounter: [id number]    Assessment/Plan   Principal Problem:    MDD (major depressive disorder), recurrent episode, severe [facility] )  Active Problems:    Quiros esophagus    Benign essential hypertension    COPD (chronic obstructive pulmonary disease) ([facility] )    Fatty liver    Diabetes mellitus type 1 5, managed as type 1 (HCC)    Anxiety and depression    History of ETOH abuse    Discussed plan to taper and discontinue Ativan as all potentially addicting medications need to be discontinued before going to D&A rehab  Verbalized understanding  Believes going to rehab and facing his problems will help with his anxiety and depression  States concern is that he has damaged relationships with his wife and children and he needs to work to repair the relationship  Despite high anxiety and depression, patient is able to sleep throughout the night and his appetite is adequate  He denies any AVH/SI/HI  Behavior over the last 24 hours:  unchanged, Attending therapeutic groups  Nursing denies negative behaviors  Sleep: Normal  Reports improved sleep and return to normal wake time  Appetite: normal  Medication side effects: No  ROS: Continue to report leg pain, otherwise all other systems negative for acute change    Mental Status Evaluation:  Appearance:  casually dressed, older than stated age and Dressed in sweats  Good hygiene  Normal eye contact  Behavior:  normal and Calm, pleasant and cooperative  Conversation focused on financial stressors for depression and anxiety  Focused on trying to receive disability  Speech:  normal pitch, normal volume and normal rate and rhythm  Mood:  Reports depression and anxiety  Anxiety greater than depression     Affect:  blunted   Thought Process:  circumstantial and logical   Thought Content:  no overt delusions   Perceptual Disturbances: None   Risk Potential: Suicidal Ideations none  Homicidal Ideations none  Potential for Aggression No   Sensorium:  person, place, time/date and situation   Memory:  recent and remote memory grossly intact   Consciousness:  alert and awake    Attention: attention span and concentration were age appropriate   Insight:  limited   Judgment: fair   Gait/Station: normal gait/station   Motor Activity: no abnormal movements     Progress Toward Goals: Patient is now agreeable to attend rehab  Agreeable to discontinuation of Ativan and Oxycodone before planned transfer to rehab; will discuss with medical  Otherwise, continue current psychotropic regimen  Discharge disposition and planning are ongoing  No discharge date at this time  Recommended Treatment: Continue with group therapy, milieu therapy and occupational therapy  Risks, benefits and possible side effects of Medications:   Risks, benefits, and possible side effects of medications explained to patient and patient verbalizes understanding  Medications: all current active meds have been reviewed and planned medication changes: Will decrease Lorazpeam 0 25mg PO Q12 hours PRN severe anxiety and discontinue tomorrow  Labs: I have personally reviewed all pertinent laboratory/tests results  Counseling / Coordination of Care  Total floor / unit time spent today 25 minutes  Greater than 50% of total time was spent with the patient and / or family counseling and / or coordination of care

## 2021-06-01 NOTE — PLAN OF CARE
Problem: Alteration in Thoughts and Perception  Goal: Treatment Goal: Gain control of psychotic behaviors/thinking, reduce/eliminate presenting symptoms and demonstrate improved reality functioning upon discharge  Outcome: Progressing  Goal: Refrain from acting on delusional thinking/internal stimuli  Description: Interventions:  - Monitor patient closely, per order   - Utilize least restrictive measures   - Set reasonable limits, give positive feedback for acceptable   - Administer medications as ordered and monitor of potential side effects  Outcome: Progressing  Goal: Agree to be compliant with medication regime, as prescribed and report medication side effects  Description: Interventions:  - Offer appropriate PRN medication and supervise ingestion; conduct AIMS, as needed   Outcome: Progressing     Problem: Ineffective Coping  Goal: Cooperates with admission process  Description: Interventions:   - Complete admission process  Outcome: Progressing  Goal: Identifies healthy coping skills  Outcome: Progressing  Goal: Participates in unit activities  Description: Interventions:  - Provide therapeutic environment   - Provide required programming   - Redirect inappropriate behaviors   Outcome: Progressing  Goal: Patient/Family participate in treatment and DC plans  Description: Interventions:  - Provide therapeutic environment  Outcome: Progressing  Goal: Patient/Family verbalizes awareness of resources  Outcome: Progressing     Problem: Risk for Self Injury/Neglect  Goal: Treatment Goal: Remain safe during length of stay, learn and adopt new coping skills, and be free of self-injurious ideation, impulses and acts at the time of discharge  Outcome: Progressing  Goal: Verbalize thoughts and feelings  Description: Interventions:  - Assess and re-assess patient's lethality and potential for self-injury  - Engage patient in 1:1 interactions, daily, for a minimum of 15 minutes  - Encourage patient to express feelings, fears, frustrations, hopes  - Establish rapport/trust with patient   Outcome: Progressing  Goal: Refrain from harming self  Description: Interventions:  - Monitor patient closely, per order  - Develop a trusting relationship  - Supervise medication ingestion, monitor effects and side effects   Outcome: Progressing     Problem: Depression  Goal: Treatment Goal: Demonstrate behavioral control of depressive symptoms, verbalize feelings of improved mood/affect, and adopt new coping skills prior to discharge  Outcome: Progressing  Goal: Refrain from isolation  Description: Interventions:  - Develop a trusting relationship   - Encourage socialization   Outcome: Progressing  Goal: Complete daily ADLs, including personal hygiene independently, as able  Description: Interventions:  - Observe, teach, and assist patient with ADLS  -  Monitor and promote a balance of rest/activity, with adequate nutrition and elimination   Outcome: Progressing  Goal: Refrain from self-neglect  Outcome: Progressing     Problem: Anxiety  Goal: Anxiety is at manageable level  Description: Interventions:  - Assess and monitor patient's anxiety level  - Monitor for signs and symptoms (heart palpitations, chest pain, shortness of breath, headaches, nausea, feeling jumpy, restlessness, irritable, apprehensive)  - Collaborate with interdisciplinary team and initiate plan and interventions as ordered    - [place] patient to unit/surroundings  - Explain treatment plan  - Encourage participation in care  - Encourage verbalization of concerns/fears  - Identify coping mechanisms  - Assist in developing anxiety-reducing skills  - Administer/offer alternative therapies  - Limit or eliminate stimulants  Outcome: Progressing

## 2021-06-01 NOTE — PROGRESS NOTES
Pt present in his room during time of assessment  Pt was reading his book quietly alone  Pt reported anxiety, minimal depression and talked about his addiction and raya with alcoholism  Pt brightens with conversation and is very forthcoming with his feelings and emotions  Pt reports no ah, vh, si, hi  Pt does believe he is feeling better and enjoys talking with staff whenever possible  No acute concerns or complaints noted  Pt did receive PRN ativan for anxiety of 7/10  Compliant with medications  Continuous visual safety checks performed throughout the shift  Safety precautions maintained  Will continue to monitor

## 2021-06-01 NOTE — SOCIAL WORK
[facility] [id number] - Admission date: 5/17/21*

## 2021-06-01 NOTE — PROGRESS NOTES
Patient visible in milieu, pleasant and cooperative in interaction  Social with staff and select peers  Patient rates anxiety 7/10, depression 4/10, denies SI/HI, hallucinations  PRN Ativan given per request as ordered for increased anxiety, helpful in reduction of anxiety  Remains medication compliant and on 7" checks for safety and behaviors

## 2021-06-01 NOTE — PROGRESS NOTES
06/01/21    Team Meeting   Meeting Type Daily Rounds   Team Members Present   Team Members Present Physician;Nurse;;Occupational Therapist   Physician Team Member Dr Ana Maria Trejo MD; TYE Palacios   Nursing Team Member Peggyann Hodgkins, RN   Care Management Team Member MS Nati, [facility]   OT Team Member [place], [place]   Patient/Family Present   Patient Present No   Patient's Family Present No   Slept, social with select people  Depression is rated as low, but rates anxiety 7/10, prn provided  Will taper off the benzos and narcotics  PT agreeable to rehab

## 2021-06-02 LAB
GLUCOSE SERPL-MCNC: 107 MG/DL (ref 65–140)
GLUCOSE SERPL-MCNC: 124 MG/DL (ref 65–140)
GLUCOSE SERPL-MCNC: 168 MG/DL (ref 65–140)
GLUCOSE SERPL-MCNC: 92 MG/DL (ref 65–140)

## 2021-06-02 PROCEDURE — 82948 REAGENT STRIP/BLOOD GLUCOSE: CPT

## 2021-06-02 PROCEDURE — 99232 SBSQ HOSP IP/OBS MODERATE 35: CPT | Performed by: NURSE PRACTITIONER

## 2021-06-02 RX ORDER — OXYCODONE HYDROCHLORIDE 5 MG/1
2.5 TABLET ORAL EVERY 6 HOURS PRN
Status: DISCONTINUED | OUTPATIENT
Start: 2021-06-02 | End: 2021-06-07

## 2021-06-02 RX ADMIN — CHOLESTYRAMINE 4 G: 4 POWDER, FOR SUSPENSION ORAL at 08:03

## 2021-06-02 RX ADMIN — OXYCODONE HYDROCHLORIDE 2.5 MG: 5 TABLET ORAL at 14:04

## 2021-06-02 RX ADMIN — MAGNESIUM OXIDE TAB 400 MG (241.3 MG ELEMENTAL MG) 400 MG: 400 (241.3 MG) TAB at 08:01

## 2021-06-02 RX ADMIN — FOLIC ACID 1 MG: 1 TABLET ORAL at 08:02

## 2021-06-02 RX ADMIN — THIAMINE HCL TAB 100 MG 100 MG: 100 TAB at 08:05

## 2021-06-02 RX ADMIN — METFORMIN HYDROCHLORIDE 500 MG: 500 TABLET, FILM COATED ORAL at 08:04

## 2021-06-02 RX ADMIN — GABAPENTIN 400 MG: 400 CAPSULE ORAL at 08:03

## 2021-06-02 RX ADMIN — ONDANSETRON 4 MG: 4 TABLET, ORALLY DISINTEGRATING ORAL at 12:37

## 2021-06-02 RX ADMIN — ROPINIROLE 1 MG: 1 TABLET, FILM COATED ORAL at 08:03

## 2021-06-02 RX ADMIN — DOCUSATE SODIUM 50 MG AND SENNOSIDES 8.6 MG 1 TABLET: 8.6; 5 TABLET, FILM COATED ORAL at 08:03

## 2021-06-02 RX ADMIN — QUETIAPINE FUMARATE 200 MG: 400 TABLET ORAL at 21:48

## 2021-06-02 RX ADMIN — GABAPENTIN 400 MG: 400 CAPSULE ORAL at 15:49

## 2021-06-02 RX ADMIN — INSULIN GLARGINE 34 UNITS: 100 INJECTION, SOLUTION SUBCUTANEOUS at 12:31

## 2021-06-02 RX ADMIN — MAGNESIUM OXIDE TAB 400 MG (241.3 MG ELEMENTAL MG) 400 MG: 400 (241.3 MG) TAB at 17:01

## 2021-06-02 RX ADMIN — CHOLESTYRAMINE 4 G: 4 POWDER, FOR SUSPENSION ORAL at 17:01

## 2021-06-02 RX ADMIN — TIOTROPIUM BROMIDE 18 MCG: 18 CAPSULE ORAL; RESPIRATORY (INHALATION) at 08:04

## 2021-06-02 RX ADMIN — PROPRANOLOL HYDROCHLORIDE 10 MG: 10 TABLET ORAL at 08:01

## 2021-06-02 RX ADMIN — HYDROXYZINE HYDROCHLORIDE 50 MG: 25 TABLET, FILM COATED ORAL at 21:52

## 2021-06-02 RX ADMIN — PROPRANOLOL HYDROCHLORIDE 10 MG: 10 TABLET ORAL at 17:01

## 2021-06-02 RX ADMIN — PANTOPRAZOLE SODIUM 40 MG: 40 TABLET, DELAYED RELEASE ORAL at 05:34

## 2021-06-02 RX ADMIN — ROPINIROLE 1 MG: 1 TABLET, FILM COATED ORAL at 21:49

## 2021-06-02 RX ADMIN — Medication 1 PATCH: at 08:04

## 2021-06-02 RX ADMIN — PIOGLITAZONE 30 MG: 15 TABLET ORAL at 08:01

## 2021-06-02 RX ADMIN — Medication 1 TABLET: at 08:03

## 2021-06-02 RX ADMIN — GABAPENTIN 400 MG: 400 CAPSULE ORAL at 21:49

## 2021-06-02 RX ADMIN — OXYCODONE HYDROCHLORIDE 5 MG: 5 TABLET ORAL at 08:01

## 2021-06-02 RX ADMIN — METFORMIN HYDROCHLORIDE 500 MG: 500 TABLET, FILM COATED ORAL at 15:49

## 2021-06-02 RX ADMIN — SERTRALINE HYDROCHLORIDE 150 MG: 100 TABLET ORAL at 08:03

## 2021-06-02 RX ADMIN — LOSARTAN POTASSIUM 25 MG: 25 TABLET, FILM COATED ORAL at 08:01

## 2021-06-02 RX ADMIN — HYDROXYZINE HYDROCHLORIDE 50 MG: 25 TABLET, FILM COATED ORAL at 12:37

## 2021-06-02 RX ADMIN — INSULIN LISPRO 1 UNITS: 100 INJECTION, SOLUTION INTRAVENOUS; SUBCUTANEOUS at 16:29

## 2021-06-02 RX ADMIN — DOCUSATE SODIUM 50 MG AND SENNOSIDES 8.6 MG 1 TABLET: 8.6; 5 TABLET, FILM COATED ORAL at 17:01

## 2021-06-02 RX ADMIN — OXYCODONE HYDROCHLORIDE 2.5 MG: 5 TABLET ORAL at 20:34

## 2021-06-02 NOTE — PROGRESS NOTES
Progress Note - Jennifer Escalante 46 y o  male MRN: [id number]    Unit/Bed#: [facility] 205-02 Encounter: [id number]        Subjective:   Patient seen and examined at bedside after reviewing the chart and discussing the case with the caring staff  Patient examined at bedside  Patient has agreed to go on rehab  It was noticed that patient has been using oxycodone IR 5 mg 3 times daily  I discussed the plan to taper the patient on a lower dose and patient agrees with this plan  Physical Exam   Vitals: Blood pressure 142/81, pulse 77, temperature (!) 96 5 °F (35 8 °C), temperature source Temporal, resp  rate 18, height 6' (1 829 m), weight 92 6 kg (204 lb 2 3 oz), SpO2 95 %  ,Body mass index is 27 69 kg/m²  Constitutional: Patient appears well-developed  HEENT: PERR, EOMI, MMM  Cardiovascular: Normal rate and regular rhythm  Pulmonary/Chest: Effort normal and breath sounds normal    Abdomen: Soft, + BS, NT    Assessment/Plan:  Jennifer Escalante is a(n) 46 y o  male with MDD      1  Cardiac with history of Hypertension and dyslipidemia  Patient will be continued on Cozaar 25 mg daily  Continue cholestyramine sugar free 2 times daily  2  Diabetes mellitus  Patient's hemoglobin A1c was 7 4 on 05/11/2021  Continue carb controlled diet, fingerstick glucose 4 times daily before meals and at bedtime, Humalog sliding scale, metformin, Actos and Lantus 34 units qAM   3  Tobacco use  Patient has nicotine transdermal patch  4  Alcohol abuse with history of multiple acute pancreatitis  No S/S of withdrawal  Continue folic acid, thiamine, multivitamin  Patient follows up with specialists for his pancreatitis  5  GERD  Continue pantoprazole 40 mg daily  6  COPD  Continue Spiriva daily, albuterol as needed, and 2 L nasal cannula  7  Constipation  Continue Senokot S twice daily  8  Cough  Patient may receive Robitussin DM as needed  9  Vitamin-D deficiency    Continue vitamin-D bolus doses for 8 weeks followed by vitamin D3 1000 units daily  10  Blurry vision with history of diabetes  Patient needs to see ophthalmologist for possible cataract on outpatient basis  11  Chronic pain syndrome  I will reduce patient's oxycodone IR to 2 5 mg every 6 hours on as-needed basis along with Tylenol on as needed basis

## 2021-06-02 NOTE — PROGRESS NOTES
Pt medicated for increased anxiety @ 1237 with Atarax 50 mg po as ordered by MD with moderate relief obtained  Pt medicated for c/o intermittent nausea @ 6299 with relief obtained  Q 7 min checks maintained to monitor pt's behavior & safety

## 2021-06-02 NOTE — PLAN OF CARE
Problem: DISCHARGE PLANNING - CARE MANAGEMENT  Goal: Discharge to post-acute care or home with appropriate resources  Description: INTERVENTIONS:  - Conduct assessment to determine patient/family and health care team treatment goals, and need for post-acute services based on payer coverage, community resources, and patient preferences, and barriers to discharge  - Address psychosocial, clinical, and financial barriers to discharge as identified in assessment in conjunction with the patient/family and health care team  - Arrange appropriate level of post-acute services according to patients   needs and preference and payer coverage in collaboration with the physician and health care team  - Communicate with and update the patient/family, physician, and health care team regarding progress on the discharge plan  - Arrange appropriate transportation to post-acute venues  Outcome: Progressing     Pt progressing; awaiting D&A rehab - referrals to be sent today

## 2021-06-02 NOTE — NURSING NOTE
Patient was observed to be in the community this evening  Attended group and snack time  He presents with a flat affect and depressed mood  Calm and appropriate during staff interactions  He continues to rate his anxiety 7/10, depression slightly improved at 3/10, denies SI, HI and hallucinations  Patient was medication compliant at HS; held scheduled sliding scale insulin coverage for   He requested and received PRN Ativan for increased anxiety  Romero Scale rating was 25  Later, he requested and received PRN Oxycodone for 7/10 generalized pain  Will monitor for medication effectiveness  Oxygen in place via 2L NC at HS  No obvious signs of respiratory distress observed  Will CTM via q7 minute safety checks

## 2021-06-02 NOTE — PLAN OF CARE
Problem: Ineffective Coping  Goal: Participates in unit activities  Description: Interventions:  - Provide therapeutic environment   - Provide required programming   - Redirect inappropriate behaviors   Outcome: Not Progressing   Patient is detoxing from medications and had poor sleep

## 2021-06-02 NOTE — NURSING NOTE
Patient was awake intermittently throughout the overnight period  He did spend time reading but has remained quietly in his room  No complaints voiced  No acute behaviors noted  Oxygen in place at 2L NC  No obvious signs of respiratory distress  Will CTM via q7 minute safety checks

## 2021-06-02 NOTE — SOCIAL WORK
SW met with patient in pt room; pt in bed with O2 on; pt states "I'm having another one of those bad days" - pt explained he is feeling "completely wiped out, like I can't do anything because it's physically and mentally exhausting"; pt expressed frustration over not being able to determine what is "wrong" with him; pt fixated on medical concerns and chronic pain at times during conversation; pt inquired about LOS and when he would be referred to D&A rehab - SW explained ativan and oxy tapers and need to refer to D&A facilities that can accommodate oxygen - pt expressed understanding; pt requested SW contact his wife to explain same - SW agreed to contact pt wife; no additional questions or concerns for SW; SW will continue to meet with patient as needed for tx and dc planning

## 2021-06-02 NOTE — PROGRESS NOTES
06/02/21   Team Meeting   Meeting Type Daily Rounds   Team Members Present   Team Members Present Physician;Nurse;; Occupational Therapist   Physician Team Member Dr Love Simmons MD; TYE Jones   Nursing Team Member Kip Epstein RN   [facility]   OT Team Member Fariba Garrett [place]   Patient/Family Present   Patient Present No   Patient's Family Present No     anx 3/10; dep 2/10; prn utilized; ativan taper completed; oxy taper continues; agreeable to D&A rehab

## 2021-06-02 NOTE — PROGRESS NOTES
Progress Note - Behavioral Health   Triston Rodriges 46 y o  male MRN: [id number]  Unit/Bed#: [facility] 205-02 Encounter: [id number]    Assessment/Plan   Principal Problem:    MDD (major depressive disorder), recurrent episode, severe ([place] [address] )  Active Problems:    Quiros esophagus    Benign essential hypertension    COPD (chronic obstructive pulmonary disease) ([place] [address] )    Fatty liver    Diabetes mellitus type 1 5, managed as type 1 ([place] [address] )    Anxiety and depression    History of ETOH abuse      Behavior over the last 24 hours:  Unchanged  Sleep: normal   Appetite: normal  Medication side effects: No  ROS: reports fatigue, otherwise all other systems negative for acute change      Fredrick Beach was seen today for psychiatric follow-up  Patient reports today he is feeling tired but reports an improvement in his anxiety and depression  He rates both as "2- 3/10 " Patient continues to express want for rehab post discharge  He states he is sleeping well through the night and his appetite remains adequate  He denies any AVH/SI/HI  So far, patient is tolerating discontinue of lorazepam well  He was encouraged to utilize p r n  Atarax should he feel anxious  Patient also verbalized understanding of need to discontinue benzodiazepines and narcotics in order to proceed with rehab  Mental Status Evaluation:  Appearance:  age appropriate   Behavior:  evasive and minimal cooperation and verbal interaction  Avoids eye contact   States "tired"   Speech:  soft and clear, slow, minimal   Mood:  sad and reports mild anxiety and depression   Affect:  blunted and irritable edge   Thought Process:  circumstantial   Thought Content:  no overt delusions   Perceptual Disturbances: None   Risk Potential: Suicidal Ideations none  Homicidal Ideations none  Potential for Aggression No   Sensorium:  person, place, time/date and situation   Memory:  recent and remote memory grossly intact   Consciousness:  alert and awake    Attention: attention span and concentration were age appropriate   Insight:  limited   Judgment: fair   Gait/Station: normal gait/station   Motor Activity: no abnormal movements     Progress Toward Goals:  Some improvement; mild decrease in depression and anxiety  Continue with current psychotropic regimen; patient tolerating well  Case management exploring rehab options  CM will contact wife today to discuss plan  No discharge date at this time  Recommended Treatment: Continue with group therapy, milieu therapy and occupational therapy  Risks, benefits and possible side effects of Medications:   Risks, benefits, and possible side effects of medications explained to patient and patient verbalizes understanding        Medications:   all current active meds have been reviewed, continue current psychiatric medications and current meds:   Current Facility-Administered Medications   Medication Dose Route Frequency    acetaminophen (TYLENOL) tablet 650 mg  650 mg Oral Q4H PRN    albuterol (PROVENTIL HFA,VENTOLIN HFA) inhaler 2 puff  2 puff Inhalation Q4H PRN    benztropine (COGENTIN) injection 1 mg  1 mg Intramuscular Q4H PRN Max 6/day    benztropine (COGENTIN) tablet 0 5 mg  0 5 mg Oral Q4H PRN Max 6/day    [START ON 7/5/2021] cholecalciferol (VITAMIN D3) tablet 1,000 Units  1,000 Units Oral Daily    cholestyramine sugar free (QUESTRAN LIGHT) packet 4 g  4 g Oral BID    dextromethorphan-guaiFENesin (ROBITUSSIN DM) oral syrup 10 mL  10 mL Oral Q4H PRN    [START ON 6/7/2021] ergocalciferol (VITAMIN D2) capsule 50,000 Units  50,000 Units Oral Weekly    folic acid (FOLVITE) tablet 1 mg  1 mg Oral Daily    gabapentin (NEURONTIN) capsule 400 mg  400 mg Oral TID    haloperidol lactate (HALDOL) injection 5 mg  5 mg Intramuscular Q4H PRN Max 4/day    hydrOXYzine HCL (ATARAX) tablet 25 mg  25 mg Oral Q6H PRN Max 4/day    hydrOXYzine HCL (ATARAX) tablet 50 mg  50 mg Oral Q6H PRN Max 4/day    insulin glargine (LANTUS) subcutaneous injection 34 Units 0 34 mL  34 Units Subcutaneous QAM    insulin lispro (HumaLOG) 100 units/mL subcutaneous injection 1-6 Units  1-6 Units Subcutaneous 4x Daily (AC & HS)    losartan (COZAAR) tablet 25 mg  25 mg Oral Daily    magnesium oxide (MAG-OX) tablet 400 mg  400 mg Oral BID    metFORMIN (GLUCOPHAGE) tablet 500 mg  500 mg Oral BID With Meals    multivitamin-minerals (CENTRUM) tablet 1 tablet  1 tablet Oral Daily    naloxone (NARCAN) 0 04 mg/mL syringe 0 04 mg  0 04 mg Intravenous Q1MIN PRN    nicotine (NICODERM CQ) 14 mg/24hr TD 24 hr patch 1 patch  1 patch Transdermal Daily    ondansetron (ZOFRAN-ODT) dispersible tablet 4 mg  4 mg Oral Q6H PRN    oxyCODONE (ROXICODONE) IR tablet 2 5 mg  2 5 mg Oral Q6H PRN    pantoprazole (PROTONIX) EC tablet 40 mg  40 mg Oral Early Morning    pioglitazone (ACTOS) tablet 30 mg  30 mg Oral Daily    propranolol (INDERAL) tablet 10 mg  10 mg Oral BID    QUEtiapine (SEROquel) tablet 200 mg  200 mg Oral HS    risperiDONE (RisperDAL M-TAB) disintegrating tablet 0 25 mg  0 25 mg Oral Q4H PRN Max 6/day    risperiDONE (RisperDAL M-TAB) disintegrating tablet 0 5 mg  0 5 mg Oral Q4H PRN Max 3/day    risperiDONE (RisperDAL M-TAB) disintegrating tablet 1 mg  1 mg Oral Q4H PRN Max 3/day    rOPINIRole (REQUIP) tablet 1 mg  1 mg Oral BID    senna-docusate sodium (SENOKOT S) 8 6-50 mg per tablet 1 tablet  1 tablet Oral BID    sertraline (ZOLOFT) tablet 150 mg  150 mg Oral Daily    thiamine tablet 100 mg  100 mg Oral Daily    tiotropium (SPIRIVA) capsule for inhaler 18 mcg  18 mcg Inhalation Daily    traZODone (DESYREL) tablet 50 mg  50 mg Oral HS PRN     Labs: I have personally reviewed all pertinent laboratory/tests results  Counseling / Coordination of Care  Total floor / unit time spent today 25 minutes  Greater than 50% of total time was spent with the patient and / or family counseling and / or coordination of care

## 2021-06-02 NOTE — PROGRESS NOTES
Pt is flat & cooperative  Pt uses Nasal O2 via cannula @ 2L/min via cannula as needed  Lungs clear upon auscultation  Dyspneic on exertion @ times  Q 7 min checks maintained to monitor pt's behavior & safety  Pt c/o depression 2/10 & anxiety 3/10  Pt denies any hallucinations, suicidal or homicidal ideations  Pt medicated for lower back & BLE with Oxy-IR po @ 0801 with relief obtained  Pt up ad ziggy & gait is steady  Pt is compliant with medications

## 2021-06-03 LAB
GLUCOSE SERPL-MCNC: 114 MG/DL (ref 65–140)
GLUCOSE SERPL-MCNC: 199 MG/DL (ref 65–140)
GLUCOSE SERPL-MCNC: 72 MG/DL (ref 65–140)
GLUCOSE SERPL-MCNC: 99 MG/DL (ref 65–140)

## 2021-06-03 PROCEDURE — 99232 SBSQ HOSP IP/OBS MODERATE 35: CPT | Performed by: NURSE PRACTITIONER

## 2021-06-03 PROCEDURE — 82948 REAGENT STRIP/BLOOD GLUCOSE: CPT

## 2021-06-03 RX ADMIN — MAGNESIUM OXIDE TAB 400 MG (241.3 MG ELEMENTAL MG) 400 MG: 400 (241.3 MG) TAB at 17:33

## 2021-06-03 RX ADMIN — SERTRALINE HYDROCHLORIDE 150 MG: 100 TABLET ORAL at 08:08

## 2021-06-03 RX ADMIN — INSULIN GLARGINE 34 UNITS: 100 INJECTION, SOLUTION SUBCUTANEOUS at 11:49

## 2021-06-03 RX ADMIN — DOCUSATE SODIUM 50 MG AND SENNOSIDES 8.6 MG 1 TABLET: 8.6; 5 TABLET, FILM COATED ORAL at 08:09

## 2021-06-03 RX ADMIN — METFORMIN HYDROCHLORIDE 500 MG: 500 TABLET, FILM COATED ORAL at 07:49

## 2021-06-03 RX ADMIN — LOSARTAN POTASSIUM 25 MG: 25 TABLET, FILM COATED ORAL at 08:09

## 2021-06-03 RX ADMIN — PROPRANOLOL HYDROCHLORIDE 10 MG: 10 TABLET ORAL at 08:09

## 2021-06-03 RX ADMIN — CHOLESTYRAMINE 4 G: 4 POWDER, FOR SUSPENSION ORAL at 08:08

## 2021-06-03 RX ADMIN — CHOLESTYRAMINE 4 G: 4 POWDER, FOR SUSPENSION ORAL at 17:33

## 2021-06-03 RX ADMIN — Medication 1 PATCH: at 08:10

## 2021-06-03 RX ADMIN — GABAPENTIN 400 MG: 400 CAPSULE ORAL at 20:52

## 2021-06-03 RX ADMIN — METFORMIN HYDROCHLORIDE 500 MG: 500 TABLET, FILM COATED ORAL at 16:17

## 2021-06-03 RX ADMIN — PIOGLITAZONE 30 MG: 15 TABLET ORAL at 08:08

## 2021-06-03 RX ADMIN — PANTOPRAZOLE SODIUM 40 MG: 40 TABLET, DELAYED RELEASE ORAL at 05:43

## 2021-06-03 RX ADMIN — GABAPENTIN 400 MG: 400 CAPSULE ORAL at 16:18

## 2021-06-03 RX ADMIN — ROPINIROLE 1 MG: 1 TABLET, FILM COATED ORAL at 08:08

## 2021-06-03 RX ADMIN — PROPRANOLOL HYDROCHLORIDE 10 MG: 10 TABLET ORAL at 17:33

## 2021-06-03 RX ADMIN — MAGNESIUM OXIDE TAB 400 MG (241.3 MG ELEMENTAL MG) 400 MG: 400 (241.3 MG) TAB at 08:09

## 2021-06-03 RX ADMIN — OXYCODONE HYDROCHLORIDE 2.5 MG: 5 TABLET ORAL at 13:54

## 2021-06-03 RX ADMIN — TIOTROPIUM BROMIDE 18 MCG: 18 CAPSULE ORAL; RESPIRATORY (INHALATION) at 08:10

## 2021-06-03 RX ADMIN — OXYCODONE HYDROCHLORIDE 2.5 MG: 5 TABLET ORAL at 07:48

## 2021-06-03 RX ADMIN — THIAMINE HCL TAB 100 MG 100 MG: 100 TAB at 08:09

## 2021-06-03 RX ADMIN — QUETIAPINE FUMARATE 200 MG: 400 TABLET ORAL at 20:52

## 2021-06-03 RX ADMIN — GABAPENTIN 400 MG: 400 CAPSULE ORAL at 08:09

## 2021-06-03 RX ADMIN — INSULIN LISPRO 2 UNITS: 100 INJECTION, SOLUTION INTRAVENOUS; SUBCUTANEOUS at 11:49

## 2021-06-03 RX ADMIN — FOLIC ACID 1 MG: 1 TABLET ORAL at 08:08

## 2021-06-03 RX ADMIN — Medication 1 TABLET: at 08:09

## 2021-06-03 RX ADMIN — HYDROXYZINE HYDROCHLORIDE 50 MG: 25 TABLET, FILM COATED ORAL at 21:35

## 2021-06-03 RX ADMIN — DOCUSATE SODIUM 50 MG AND SENNOSIDES 8.6 MG 1 TABLET: 8.6; 5 TABLET, FILM COATED ORAL at 17:33

## 2021-06-03 RX ADMIN — ROPINIROLE 1 MG: 1 TABLET, FILM COATED ORAL at 20:52

## 2021-06-03 NOTE — PROGRESS NOTES
06/03/21   Team Meeting   Meeting Type Daily Rounds   Team Members Present   Team Members Present Physician;Nurse;Occupational Therapist;   Physician Team Member Dr Kalya Moran MD; TYE Mora   Nursing Team Member Arian Montez RN   Social Work Team Member Ayan Garay [facility]   OT Team Member Aria Gould [place]   Patient/Family Present   Patient Present No   Patient's Family Present No     No DC date - oxy taper for inpt D&A referral; dep 6/10, anx 3/10; flat, withdrawn

## 2021-06-03 NOTE — PLAN OF CARE
Problem: DISCHARGE PLANNING - CARE MANAGEMENT  Goal: Discharge to post-acute care or home with appropriate resources  Description: INTERVENTIONS:  - Conduct assessment to determine patient/family and health care team treatment goals, and need for post-acute services based on payer coverage, community resources, and patient preferences, and barriers to discharge  - Address psychosocial, clinical, and financial barriers to discharge as identified in assessment in conjunction with the patient/family and health care team  - Arrange appropriate level of post-acute services according to patients   needs and preference and payer coverage in collaboration with the physician and health care team  - Communicate with and update the patient/family, physician, and health care team regarding progress on the discharge plan  - Arrange appropriate transportation to post-acute venues  Outcome: Progressing     Pt progressing;  No DC date - oxy taper for D&A referrals

## 2021-06-03 NOTE — PROGRESS NOTES
Patient withdrawn to room, pleasant and cooperative in interaction  Affect flat, appears depressed  Rates anxiety/depression as "OK", denies SI/HI, hallucinations  No glycemic reactions  Remains medication compliant and on 7" checks for safety and behaviors

## 2021-06-03 NOTE — PROGRESS NOTES
Progress Note - Jere Hollis 46 y o  male MRN: [id number]    Unit/Bed#: [facility] 205-02 Encounter: [id number]        Subjective:   Patient seen and examined at bedside after reviewing the chart and discussing the case with the caring staff  Patient examined at bedside  Patient has no reported acute issues  Physical Exam   Vitals: Blood pressure 117/76, pulse 75, temperature (!) 96 4 °F (35 8 °C), temperature source Temporal, resp  rate 20, height 6' (1 829 m), weight 92 6 kg (204 lb 2 3 oz), SpO2 99 %  ,Body mass index is 27 69 kg/m²  Constitutional: Patient appears well-developed  HEENT: PERR, EOMI, MMM  Cardiovascular: Normal rate and regular rhythm  Pulmonary/Chest: Effort normal and breath sounds normal    Abdomen: Soft, + BS, NT    Assessment/Plan:  Jere Hollis is a(n) 46 y o  male with MDD      1  Cardiac with history of Hypertension and dyslipidemia  Patient will be continued on Cozaar 25 mg daily  Continue cholestyramine sugar free 2 times daily  2  Diabetes mellitus  Patient's hemoglobin A1c was 7 4 on 05/11/2021  Continue carb controlled diet, fingerstick glucose 4 times daily before meals and at bedtime, Humalog sliding scale, metformin, Actos and Lantus 34 units qAM   3  Tobacco use  Patient has nicotine transdermal patch  4  Alcohol abuse with history of multiple acute pancreatitis  No S/S of withdrawal  Continue folic acid, thiamine, multivitamin  Patient follows up with specialists for his pancreatitis  5  GERD  Continue pantoprazole 40 mg daily  6  COPD  Continue Spiriva daily, albuterol as needed, and 2 L nasal cannula  7  Constipation  Continue Senokot S twice daily  8  Cough  Patient may receive Robitussin DM as needed  9  Vitamin-D deficiency  Continue vitamin-D bolus doses for 8 weeks followed by vitamin D3 1000 units daily  10  Blurry vision with history of diabetes  Patient needs to see ophthalmologist for possible cataract on outpatient basis    11  Chronic pain syndrome  I will reduce patient's oxycodone IR to 2 5 mg every 6 hours on as-needed basis along with Tylenol on as needed basis

## 2021-06-03 NOTE — NURSING NOTE
Patient was present in the milieu this evening  He presents with a flat affect and depressed mood  He rates anxiety 8/10, depression 3/10, denies SI,  HI and hallucinations  Patient was medication compliant at HS  Sliding scale insulin coverage held d/t  at HS  He requested and received PRN Oxycodone at 2034 for 7/10 generalized pain (b/l legs, hips and lower back)  On reassessment, pain was reduced to 4/10  He also requested Ativan tonight however he was informed that PRN Atarax was available to him for anxiety  Performed a Romero Scale rating with a result of 18  Administered PRN Atarax 50mg at 2152 as per order with positive result obtained  No further complaints voiced  Oxygen in place at 2L NC at [facility]  Will CTM via q7 minute safety checks

## 2021-06-03 NOTE — PROGRESS NOTES
Progress Note - Behavioral Health   Juana Newman 46 y o  male MRN: [id number]  Unit/Bed#: Indra Rivera 205-02 Encounter: [id number]    Assessment/Plan   Principal Problem:    MDD (major depressive disorder), recurrent episode, severe ([place] [address] )  Active Problems:    Quiros esophagus    Benign essential hypertension    COPD (chronic obstructive pulmonary disease) ([facility] )    Fatty liver    Diabetes mellitus type 1 5, managed as type 1 ([place] [address] )    Anxiety and depression    History of ETOH abuse      Behavior over the last 24 hours:  improved  Sleep: intermittent  Appetite: normal  Medication side effects: No  ROS:  bilateral leg pain, all other systems negative for acute change    Renan Barboza was seen today for psychiatric follow-up  Reports yesterday was a "rough day" but feels his anxiety and depression has since  Rates anxiety as 3/10 and depression as 6/10  He denies any AVH/SI/HI  He continues to express willingness to go to rehab and understands he needs for first complete oxy taper  Sleep was intermittent last night  Renan Barboza remains compliant with medication regimen and his appetite is adequate  No delusional content was voiced during encounter  Mental Status Evaluation:  Appearance:  casually dressed and older than stated age   Behavior:  cooperative, withdrawn   Speech:  slow   Mood:  depressed   Affect:  blunted   Thought Process:  circumstantial   Thought Content:  no overt delusions   Perceptual Disturbances: None   Risk Potential: Suicidal Ideations none  Homicidal Ideations none  Potential for Aggression No   Sensorium:  person, place, time/date and situation   Memory:  recent and remote memory grossly intact   Consciousness:  alert and awake    Attention: attention span and concentration were age appropriate   Insight:  limited   Judgment: fair   Gait/Station: normal gait/station   Motor Activity: no abnormal movements     Progress Toward Goals: Some improvement  Continue with current psychotropic regimen   Patient tolerating discontinuation of lorazepam and utilizing atrax only as needed  Continues to be in agreement for D&A rehab  Medical team continuing with oxycodone taper  Referrals to be sent for D&A by case management once taper completed  Recommended Treatment: Continue with group therapy, milieu therapy and occupational therapy  Risks, benefits and possible side effects of Medications:   Risks, benefits, and possible side effects of medications explained to patient and patient verbalizes understanding        Medications:   all current active meds have been reviewed, continue current psychiatric medications and current meds:   Current Facility-Administered Medications   Medication Dose Route Frequency    acetaminophen (TYLENOL) tablet 650 mg  650 mg Oral Q4H PRN    albuterol (PROVENTIL HFA,VENTOLIN HFA) inhaler 2 puff  2 puff Inhalation Q4H PRN    benztropine (COGENTIN) injection 1 mg  1 mg Intramuscular Q4H PRN Max 6/day    benztropine (COGENTIN) tablet 0 5 mg  0 5 mg Oral Q4H PRN Max 6/day    [START ON 7/5/2021] cholecalciferol (VITAMIN D3) tablet 1,000 Units  1,000 Units Oral Daily    cholestyramine sugar free (QUESTRAN LIGHT) packet 4 g  4 g Oral BID    dextromethorphan-guaiFENesin (ROBITUSSIN DM) oral syrup 10 mL  10 mL Oral Q4H PRN    [START ON 6/7/2021] ergocalciferol (VITAMIN D2) capsule 50,000 Units  50,000 Units Oral Weekly    folic acid (FOLVITE) tablet 1 mg  1 mg Oral Daily    gabapentin (NEURONTIN) capsule 400 mg  400 mg Oral TID    haloperidol lactate (HALDOL) injection 5 mg  5 mg Intramuscular Q4H PRN Max 4/day    hydrOXYzine HCL (ATARAX) tablet 25 mg  25 mg Oral Q6H PRN Max 4/day    hydrOXYzine HCL (ATARAX) tablet 50 mg  50 mg Oral Q6H PRN Max 4/day    insulin glargine (LANTUS) subcutaneous injection 34 Units 0 34 mL  34 Units Subcutaneous QAM    insulin lispro (HumaLOG) 100 units/mL subcutaneous injection 1-6 Units  1-6 Units Subcutaneous 4x Daily (AC & HS)    losartan (COZAAR) tablet 25 mg  25 mg Oral Daily    magnesium oxide (MAG-OX) tablet 400 mg  400 mg Oral BID    metFORMIN (GLUCOPHAGE) tablet 500 mg  500 mg Oral BID With Meals    multivitamin-minerals (CENTRUM) tablet 1 tablet  1 tablet Oral Daily    naloxone (NARCAN) 0 04 mg/mL syringe 0 04 mg  0 04 mg Intravenous Q1MIN PRN    nicotine (NICODERM CQ) 14 mg/24hr TD 24 hr patch 1 patch  1 patch Transdermal Daily    ondansetron (ZOFRAN-ODT) dispersible tablet 4 mg  4 mg Oral Q6H PRN    oxyCODONE (ROXICODONE) IR tablet 2 5 mg  2 5 mg Oral Q6H PRN    pantoprazole (PROTONIX) EC tablet 40 mg  40 mg Oral Early Morning    pioglitazone (ACTOS) tablet 30 mg  30 mg Oral Daily    propranolol (INDERAL) tablet 10 mg  10 mg Oral BID    QUEtiapine (SEROquel) tablet 200 mg  200 mg Oral HS    risperiDONE (RisperDAL M-TAB) disintegrating tablet 0 25 mg  0 25 mg Oral Q4H PRN Max 6/day    risperiDONE (RisperDAL M-TAB) disintegrating tablet 0 5 mg  0 5 mg Oral Q4H PRN Max 3/day    risperiDONE (RisperDAL M-TAB) disintegrating tablet 1 mg  1 mg Oral Q4H PRN Max 3/day    rOPINIRole (REQUIP) tablet 1 mg  1 mg Oral BID    senna-docusate sodium (SENOKOT S) 8 6-50 mg per tablet 1 tablet  1 tablet Oral BID    sertraline (ZOLOFT) tablet 150 mg  150 mg Oral Daily    thiamine tablet 100 mg  100 mg Oral Daily    tiotropium (SPIRIVA) capsule for inhaler 18 mcg  18 mcg Inhalation Daily    traZODone (DESYREL) tablet 50 mg  50 mg Oral HS PRN       Labs: I have personally reviewed all pertinent laboratory/tests results  Counseling / Coordination of Care  Total floor / unit time spent today 25 minutes  Greater than 50% of total time was spent with the patient and / or family counseling and / or coordination of care

## 2021-06-03 NOTE — NURSING NOTE
Patient had difficulty initiating sleep then was awake intermittently throughout the overnight hours  No complaints voiced  He remains in bed sleeping at this time  Will CTM  Q7 minute safety checks in progress

## 2021-06-03 NOTE — PROGRESS NOTES
Pt up ad ziggy & gait is steady  Pt is flat & withdrawn  Pt socializes minimally with other patients  Pt is compliant with medications  Pt medicated for generalized pain @ 0748 with Oxy-IR po as ordered by MD with moderate relief obtained  Pt c/o depression 6/10 & anxiety 3/10  Pt denies any hallucinations, suicidal or homicidal ideations  Q 7 min checks maintained to monitor pt's behavior & safety  Pt uses Nasal O2 @ 2L/min via cannula as needed for shortness of breath  Pt is dyspneic on exertion @ times  Lungs clear upon auscultation

## 2021-06-04 LAB
GLUCOSE SERPL-MCNC: 131 MG/DL (ref 65–140)
GLUCOSE SERPL-MCNC: 164 MG/DL (ref 65–140)
GLUCOSE SERPL-MCNC: 169 MG/DL (ref 65–140)
GLUCOSE SERPL-MCNC: 250 MG/DL (ref 65–140)

## 2021-06-04 PROCEDURE — 99232 SBSQ HOSP IP/OBS MODERATE 35: CPT | Performed by: NURSE PRACTITIONER

## 2021-06-04 PROCEDURE — 82948 REAGENT STRIP/BLOOD GLUCOSE: CPT

## 2021-06-04 RX ADMIN — PROPRANOLOL HYDROCHLORIDE 10 MG: 10 TABLET ORAL at 08:44

## 2021-06-04 RX ADMIN — INSULIN LISPRO 1 UNITS: 100 INJECTION, SOLUTION INTRAVENOUS; SUBCUTANEOUS at 21:39

## 2021-06-04 RX ADMIN — INSULIN LISPRO 1 UNITS: 100 INJECTION, SOLUTION INTRAVENOUS; SUBCUTANEOUS at 16:38

## 2021-06-04 RX ADMIN — GABAPENTIN 400 MG: 400 CAPSULE ORAL at 21:36

## 2021-06-04 RX ADMIN — PANTOPRAZOLE SODIUM 40 MG: 40 TABLET, DELAYED RELEASE ORAL at 05:51

## 2021-06-04 RX ADMIN — METFORMIN HYDROCHLORIDE 500 MG: 500 TABLET, FILM COATED ORAL at 08:43

## 2021-06-04 RX ADMIN — GABAPENTIN 400 MG: 400 CAPSULE ORAL at 15:45

## 2021-06-04 RX ADMIN — MAGNESIUM OXIDE TAB 400 MG (241.3 MG ELEMENTAL MG) 400 MG: 400 (241.3 MG) TAB at 08:46

## 2021-06-04 RX ADMIN — ROPINIROLE 1 MG: 1 TABLET, FILM COATED ORAL at 21:36

## 2021-06-04 RX ADMIN — LOSARTAN POTASSIUM 25 MG: 25 TABLET, FILM COATED ORAL at 08:44

## 2021-06-04 RX ADMIN — CHOLESTYRAMINE 4 G: 4 POWDER, FOR SUSPENSION ORAL at 08:42

## 2021-06-04 RX ADMIN — PROPRANOLOL HYDROCHLORIDE 10 MG: 10 TABLET ORAL at 17:39

## 2021-06-04 RX ADMIN — CHOLESTYRAMINE 4 G: 4 POWDER, FOR SUSPENSION ORAL at 17:38

## 2021-06-04 RX ADMIN — PIOGLITAZONE 30 MG: 15 TABLET ORAL at 08:43

## 2021-06-04 RX ADMIN — INSULIN LISPRO 3 UNITS: 100 INJECTION, SOLUTION INTRAVENOUS; SUBCUTANEOUS at 12:08

## 2021-06-04 RX ADMIN — MAGNESIUM OXIDE TAB 400 MG (241.3 MG ELEMENTAL MG) 400 MG: 400 (241.3 MG) TAB at 17:39

## 2021-06-04 RX ADMIN — Medication 1 TABLET: at 08:44

## 2021-06-04 RX ADMIN — DOCUSATE SODIUM 50 MG AND SENNOSIDES 8.6 MG 1 TABLET: 8.6; 5 TABLET, FILM COATED ORAL at 17:39

## 2021-06-04 RX ADMIN — Medication 1 PATCH: at 08:41

## 2021-06-04 RX ADMIN — QUETIAPINE FUMARATE 200 MG: 400 TABLET ORAL at 21:36

## 2021-06-04 RX ADMIN — SERTRALINE HYDROCHLORIDE 150 MG: 100 TABLET ORAL at 08:44

## 2021-06-04 RX ADMIN — ROPINIROLE 1 MG: 1 TABLET, FILM COATED ORAL at 08:44

## 2021-06-04 RX ADMIN — METFORMIN HYDROCHLORIDE 500 MG: 500 TABLET, FILM COATED ORAL at 15:45

## 2021-06-04 RX ADMIN — ONDANSETRON 4 MG: 4 TABLET, ORALLY DISINTEGRATING ORAL at 15:45

## 2021-06-04 RX ADMIN — TIOTROPIUM BROMIDE 18 MCG: 18 CAPSULE ORAL; RESPIRATORY (INHALATION) at 08:46

## 2021-06-04 RX ADMIN — GABAPENTIN 400 MG: 400 CAPSULE ORAL at 08:44

## 2021-06-04 RX ADMIN — THIAMINE HCL TAB 100 MG 100 MG: 100 TAB at 08:44

## 2021-06-04 RX ADMIN — INSULIN GLARGINE 34 UNITS: 100 INJECTION, SOLUTION SUBCUTANEOUS at 08:42

## 2021-06-04 RX ADMIN — FOLIC ACID 1 MG: 1 TABLET ORAL at 08:44

## 2021-06-04 RX ADMIN — DOCUSATE SODIUM 50 MG AND SENNOSIDES 8.6 MG 1 TABLET: 8.6; 5 TABLET, FILM COATED ORAL at 08:43

## 2021-06-04 NOTE — PLAN OF CARE
Problem: Alteration in Thoughts and Perception  Goal: Treatment Goal: Gain control of psychotic behaviors/thinking, reduce/eliminate presenting symptoms and demonstrate improved reality functioning upon discharge  Outcome: Progressing  Goal: Refrain from acting on delusional thinking/internal stimuli  Description: Interventions:  - Monitor patient closely, per order   - Utilize least restrictive measures   - Set reasonable limits, give positive feedback for acceptable   - Administer medications as ordered and monitor of potential side effects  Outcome: Progressing  Goal: Agree to be compliant with medication regime, as prescribed and report medication side effects  Description: Interventions:  - Offer appropriate PRN medication and supervise ingestion; conduct AIMS, as needed   Outcome: Progressing     Problem: Ineffective Coping  Goal: Identifies healthy coping skills  Outcome: Progressing  Goal: Participates in unit activities  Description: Interventions:  - Provide therapeutic environment   - Provide required programming   - Redirect inappropriate behaviors   Outcome: Progressing  Goal: Patient/Family participate in treatment and DC plans  Description: Interventions:  - Provide therapeutic environment  Outcome: Progressing  Goal: Patient/Family verbalizes awareness of resources  Outcome: Progressing     Problem: Risk for Self Injury/Neglect  Goal: Treatment Goal: Remain safe during length of stay, learn and adopt new coping skills, and be free of self-injurious ideation, impulses and acts at the time of discharge  Outcome: Progressing  Goal: Verbalize thoughts and feelings  Description: Interventions:  - Assess and re-assess patient's lethality and potential for self-injury  - Engage patient in 1:1 interactions, daily, for a minimum of 15 minutes  - Encourage patient to express feelings, fears, frustrations, hopes  - Establish rapport/trust with patient   Outcome: Progressing  Goal: Refrain from harming self  Description: Interventions:  - Monitor patient closely, per order  - Develop a trusting relationship  - Supervise medication ingestion, monitor effects and side effects   Outcome: Progressing     Problem: Depression  Goal: Treatment Goal: Demonstrate behavioral control of depressive symptoms, verbalize feelings of improved mood/affect, and adopt new coping skills prior to discharge  Outcome: Progressing  Goal: Refrain from isolation  Description: Interventions:  - Develop a trusting relationship   - Encourage socialization   Outcome: Progressing  Goal: Complete daily ADLs, including personal hygiene independently, as able  Description: Interventions:  - Observe, teach, and assist patient with ADLS  -  Monitor and promote a balance of rest/activity, with adequate nutrition and elimination   Outcome: Progressing  Goal: Refrain from self-neglect  Outcome: Progressing     Problem: Anxiety  Goal: Anxiety is at manageable level  Description: Interventions:  - Assess and monitor patient's anxiety level  - Monitor for signs and symptoms (heart palpitations, chest pain, shortness of breath, headaches, nausea, feeling jumpy, restlessness, irritable, apprehensive)  - Collaborate with interdisciplinary team and initiate plan and interventions as ordered    - [place] patient to unit/surroundings  - Explain treatment plan  - Encourage participation in care  - Encourage verbalization of concerns/fears  - Identify coping mechanisms  - Assist in developing anxiety-reducing skills  - Administer/offer alternative therapies  - Limit or eliminate stimulants  Outcome: Progressing

## 2021-06-04 NOTE — PROGRESS NOTES
Patient OOB ad ziggy  Did c/o mild nausea without any vomiting episodes  Gave PRN dissolvable Zofran at 1545 with good results  No s/s hyper/hypo glycemia  Compliant with meds  Appetite good despite earlier nausea  Still reporting " mild" anxiety and depression  Denies SI and HI  Denies pain  Q 7 minute safety checks in place  Will CTM

## 2021-06-04 NOTE — NURSING NOTE
Patient visible in the community  He is withdrawn to self, selectively social at times  He appears flat and depressed  His appetite is good- 100% of meals  Compliant with medications  Reports improved anxiety and depression  Denies SI/HI and hallucinations  Offers no complaints/concerns at this time  Will CTM  Q7 minute safety checks in progress

## 2021-06-04 NOTE — NURSING NOTE
[phone]  Pt found to be resting quietly on most of authors rounds  No acute behavioral issues noted  Q 7 min checks maintained  Fall protocol in place

## 2021-06-04 NOTE — NURSING NOTE
[id number]     Pt present in the milieu; Affect bright on approach  Mood is depressed  Denies SI or HI/  Reports improvement in mood since admission  No acute behavorial issues noted  Q 7 min checks ongoing

## 2021-06-04 NOTE — PROGRESS NOTES
Progress Note - Salud Schneider 46 y o  male MRN: [id number]    Unit/Bed#: [facility] 205-02 Encounter: [id number]        Subjective:   Patient seen and examined at bedside after reviewing the chart and discussing the case with the caring staff  Patient examined at bedside  Patient has no reported acute issues  Physical Exam   Vitals: Blood pressure 133/84, pulse 75, temperature (!) 96 9 °F (36 1 °C), temperature source Temporal, resp  rate 18, height 6' (1 829 m), weight 92 6 kg (204 lb 2 3 oz), SpO2 98 %  ,Body mass index is 27 69 kg/m²  Constitutional: Patient appears well-developed  HEENT: PERR, EOMI, MMM  Cardiovascular: Normal rate and regular rhythm  Pulmonary/Chest: Effort normal and breath sounds normal    Abdomen: Soft, + BS, NT    Assessment/Plan:  Salud Schneider is a(n) 46 y o  male with MDD      1  Cardiac with history of Hypertension and dyslipidemia  Patient will be continued on Cozaar 25 mg daily  Continue cholestyramine sugar free 2 times daily  2  Diabetes mellitus  Patient's hemoglobin A1c was 7 4 on 05/11/2021  Continue carb controlled diet, fingerstick glucose 4 times daily before meals and at bedtime, Humalog sliding scale, metformin, Actos and Lantus 34 units qAM   3  Tobacco use  Patient has nicotine transdermal patch  4  Alcohol abuse with history of multiple acute pancreatitis  No S/S of withdrawal  Continue folic acid, thiamine, multivitamin  Patient follows up with specialists for his pancreatitis  5  GERD  Continue pantoprazole 40 mg daily  6  COPD  Continue Spiriva daily, albuterol as needed, and 2 L nasal cannula  7  Constipation  Continue Senokot S twice daily  8  Cough  Patient may receive Robitussin DM as needed  9  Vitamin-D deficiency  Continue vitamin-D bolus doses for 8 weeks followed by vitamin D3 1000 units daily  10  Blurry vision with history of diabetes  Patient needs to see ophthalmologist for possible cataract on outpatient basis    11  Chronic pain syndrome  I will reduce patient's oxycodone IR to 2 5 mg every 6 hours on as-needed basis along with Tylenol on as needed basis

## 2021-06-04 NOTE — PROGRESS NOTES
06/04/21    Team Meeting   Meeting Type Daily Rounds   Team Members Present   Team Members Present Physician;Nurse;   Physician Team Member Dr Baljinder Day MD; TYE Madrigal   Nursing Team Member Raleigh Son RN   Care Management Team Member MS Nati, [facility]   OT Team Member Roseanna Sparks, [place]   Patient/Family Present   Patient Present No   Patient's Family Present No   Flat, withdrawn, slept well  Ativan taper completed, medical is tapering oxy  Will send referrals rehab once tapers are completed

## 2021-06-04 NOTE — SOCIAL WORK
SW placed phone call to pt wife, Aldo Ness; SW provided update on tx plan and current plan for taper; pt wife thanked SW for update; SW and pt wife discussed rehab referrals and oxygen use; pt wife states pt does not use his O2 at home but does become easily winded with walking or exertion; pt wife states pt has been to [place] in the past; pt wife expressed concerns re: pt minimization of events prior to admission - SW provided support and education on addiction as needed; SW, again, recommended family and individual therapy services - pt wife requested list of local providers emailed to her ([email or web address]) - SW will provide list as requested; SW also advised pt wife if patient is not accepted at any of the D&A rehabs that can manage pt medical complexities, pt would return home with outpt services - pt wife expressed understanding; SW agreed to follow up with pt wife early next week as taper completes with referral updates; no additional questions or concerns; call mutually ended

## 2021-06-04 NOTE — PROGRESS NOTES
Progress Note - Behavioral Health   Ximena Kirby 46 y o  male MRN: [id number]  Unit/Bed#: [facility] 205-02 Encounter: [id number]    Assessment/Plan   Principal Problem:    MDD (major depressive disorder), recurrent episode, severe ([place] [address] )  Active Problems:    Quiros esophagus    Benign essential hypertension    COPD (chronic obstructive pulmonary disease) ([place] [address] )    Fatty liver    Diabetes mellitus type 1 5, managed as type 1 ([place] [address] )    Anxiety and depression    History of ETOH abuse      Behavior over the last 24 hours:  unchanged  Sleep: normal  Appetite: normal  Medication side effects: No  ROS: no complaints and All other systems negative for acute change     Randy Desai was seen today for psychiatric follow-up  Patient appeared slightly brighter upon encounter and reports improved mood, anxiety, and depression  Patient reports he has been tolerating the discontinuation of lorazepam and oxycodone taper well  He continues to voice willingness to go to rehab, but then becomes ambivalent to the idea  Randy Desai denies any AVH/SI/HI  His affect remains flat and blunted and he often keeps to himself in the milieu  His appetite is adequate and he been compliant with medication regimen  Patient reports he is sleeping well throughout the night without any disturbances      Mental Status Evaluation:  Appearance:  age appropriate and casually dressed   Behavior:  withdrawn, quiet   Speech:  soft and tangential   Mood:  Less anxious, less depressed   Affect:  blunted and redirectable   Thought Process:  circumstantial   Thought Content:  No overt delusions   Perceptual Disturbances: None   Risk Potential: Suicidal Ideations none  Homicidal Ideations none  Potential for Aggression No   Sensorium:  person, place, time/date and situation   Memory:  recent and remote memory grossly intact   Consciousness:  alert and awake    Attention: attention span and concentration were age appropriate   Insight:  limited   Judgment: fair   Gait/Station: normal gait/station and normal balance   Motor Activity: no abnormal movements     Progress Toward Goals:  Slight improvement  Patient tolerating oxycodone taper well and exhibits no side effects  Will continue with current psychotropic regimen  Patient continues to be willing to discharge to D&A rehab  Referrals will be sent wants oxycodone taper is complete  No discharge date at this time  Recommended Treatment: Continue with group therapy, milieu therapy and occupational therapy  Risks, benefits and possible side effects of Medications:   Risks, benefits, and possible side effects of medications explained to patient and patient verbalizes understanding        Medications:   all current active meds have been reviewed, continue current psychiatric medications and current meds:   Current Facility-Administered Medications   Medication Dose Route Frequency    acetaminophen (TYLENOL) tablet 650 mg  650 mg Oral Q4H PRN    albuterol (PROVENTIL HFA,VENTOLIN HFA) inhaler 2 puff  2 puff Inhalation Q4H PRN    benztropine (COGENTIN) injection 1 mg  1 mg Intramuscular Q4H PRN Max 6/day    benztropine (COGENTIN) tablet 0 5 mg  0 5 mg Oral Q4H PRN Max 6/day    [START ON 7/5/2021] cholecalciferol (VITAMIN D3) tablet 1,000 Units  1,000 Units Oral Daily    cholestyramine sugar free (QUESTRAN LIGHT) packet 4 g  4 g Oral BID    dextromethorphan-guaiFENesin (ROBITUSSIN DM) oral syrup 10 mL  10 mL Oral Q4H PRN    [START ON 6/7/2021] ergocalciferol (VITAMIN D2) capsule 50,000 Units  50,000 Units Oral Weekly    folic acid (FOLVITE) tablet 1 mg  1 mg Oral Daily    gabapentin (NEURONTIN) capsule 400 mg  400 mg Oral TID    haloperidol lactate (HALDOL) injection 5 mg  5 mg Intramuscular Q4H PRN Max 4/day    hydrOXYzine HCL (ATARAX) tablet 25 mg  25 mg Oral Q6H PRN Max 4/day    hydrOXYzine HCL (ATARAX) tablet 50 mg  50 mg Oral Q6H PRN Max 4/day    insulin glargine (LANTUS) subcutaneous injection 34 Units 0 34 mL  34 Units Subcutaneous QAM    insulin lispro (HumaLOG) 100 units/mL subcutaneous injection 1-6 Units  1-6 Units Subcutaneous 4x Daily (AC & HS)    losartan (COZAAR) tablet 25 mg  25 mg Oral Daily    magnesium oxide (MAG-OX) tablet 400 mg  400 mg Oral BID    metFORMIN (GLUCOPHAGE) tablet 500 mg  500 mg Oral BID With Meals    multivitamin-minerals (CENTRUM) tablet 1 tablet  1 tablet Oral Daily    naloxone (NARCAN) 0 04 mg/mL syringe 0 04 mg  0 04 mg Intravenous Q1MIN PRN    nicotine (NICODERM CQ) 14 mg/24hr TD 24 hr patch 1 patch  1 patch Transdermal Daily    ondansetron (ZOFRAN-ODT) dispersible tablet 4 mg  4 mg Oral Q6H PRN    oxyCODONE (ROXICODONE) IR tablet 2 5 mg  2 5 mg Oral Q6H PRN    pantoprazole (PROTONIX) EC tablet 40 mg  40 mg Oral Early Morning    pioglitazone (ACTOS) tablet 30 mg  30 mg Oral Daily    propranolol (INDERAL) tablet 10 mg  10 mg Oral BID    QUEtiapine (SEROquel) tablet 200 mg  200 mg Oral HS    risperiDONE (RisperDAL M-TAB) disintegrating tablet 0 25 mg  0 25 mg Oral Q4H PRN Max 6/day    risperiDONE (RisperDAL M-TAB) disintegrating tablet 0 5 mg  0 5 mg Oral Q4H PRN Max 3/day    risperiDONE (RisperDAL M-TAB) disintegrating tablet 1 mg  1 mg Oral Q4H PRN Max 3/day    rOPINIRole (REQUIP) tablet 1 mg  1 mg Oral BID    senna-docusate sodium (SENOKOT S) 8 6-50 mg per tablet 1 tablet  1 tablet Oral BID    sertraline (ZOLOFT) tablet 150 mg  150 mg Oral Daily    thiamine tablet 100 mg  100 mg Oral Daily    tiotropium (SPIRIVA) capsule for inhaler 18 mcg  18 mcg Inhalation Daily    traZODone (DESYREL) tablet 50 mg  50 mg Oral HS PRN     Labs: I have personally reviewed all pertinent laboratory/tests results  Counseling / Coordination of Care  Total floor / unit time spent today 25 minutes  Greater than 50% of total time was spent with the patient and / or family counseling and / or coordination of care

## 2021-06-04 NOTE — PLAN OF CARE
Problem: DISCHARGE PLANNING - CARE MANAGEMENT  Goal: Discharge to post-acute care or home with appropriate resources  Description: INTERVENTIONS:  - Conduct assessment to determine patient/family and health care team treatment goals, and need for post-acute services based on payer coverage, community resources, and patient preferences, and barriers to discharge  - Address psychosocial, clinical, and financial barriers to discharge as identified in assessment in conjunction with the patient/family and health care team  - Arrange appropriate level of post-acute services according to patients   needs and preference and payer coverage in collaboration with the physician and health care team  - Communicate with and update the patient/family, physician, and health care team regarding progress on the discharge plan  - Arrange appropriate transportation to post-acute venues  Outcome: Progressing     Pt progressing;  No DC date - D&A rehab referrals to be sent upon completion of OXY taper

## 2021-06-05 LAB
GLUCOSE SERPL-MCNC: 166 MG/DL (ref 65–140)
GLUCOSE SERPL-MCNC: 199 MG/DL (ref 65–140)
GLUCOSE SERPL-MCNC: 208 MG/DL (ref 65–140)
GLUCOSE SERPL-MCNC: 211 MG/DL (ref 65–140)

## 2021-06-05 PROCEDURE — 99232 SBSQ HOSP IP/OBS MODERATE 35: CPT | Performed by: NURSE PRACTITIONER

## 2021-06-05 PROCEDURE — 82948 REAGENT STRIP/BLOOD GLUCOSE: CPT

## 2021-06-05 RX ORDER — IBUPROFEN 600 MG/1
600 TABLET ORAL EVERY 6 HOURS PRN
Status: DISCONTINUED | OUTPATIENT
Start: 2021-06-05 | End: 2021-06-09 | Stop reason: HOSPADM

## 2021-06-05 RX ADMIN — SERTRALINE HYDROCHLORIDE 150 MG: 100 TABLET ORAL at 09:04

## 2021-06-05 RX ADMIN — INSULIN GLARGINE 34 UNITS: 100 INJECTION, SOLUTION SUBCUTANEOUS at 09:02

## 2021-06-05 RX ADMIN — CHOLESTYRAMINE 4 G: 4 POWDER, FOR SUSPENSION ORAL at 17:06

## 2021-06-05 RX ADMIN — GABAPENTIN 400 MG: 400 CAPSULE ORAL at 15:56

## 2021-06-05 RX ADMIN — INSULIN LISPRO 2 UNITS: 100 INJECTION, SOLUTION INTRAVENOUS; SUBCUTANEOUS at 12:18

## 2021-06-05 RX ADMIN — MAGNESIUM OXIDE TAB 400 MG (241.3 MG ELEMENTAL MG) 400 MG: 400 (241.3 MG) TAB at 09:04

## 2021-06-05 RX ADMIN — METFORMIN HYDROCHLORIDE 500 MG: 500 TABLET, FILM COATED ORAL at 15:56

## 2021-06-05 RX ADMIN — LOSARTAN POTASSIUM 25 MG: 25 TABLET, FILM COATED ORAL at 09:04

## 2021-06-05 RX ADMIN — DOCUSATE SODIUM 50 MG AND SENNOSIDES 8.6 MG 1 TABLET: 8.6; 5 TABLET, FILM COATED ORAL at 09:07

## 2021-06-05 RX ADMIN — INSULIN LISPRO 2 UNITS: 100 INJECTION, SOLUTION INTRAVENOUS; SUBCUTANEOUS at 09:05

## 2021-06-05 RX ADMIN — IBUPROFEN 600 MG: 600 TABLET, FILM COATED ORAL at 15:56

## 2021-06-05 RX ADMIN — THIAMINE HCL TAB 100 MG 100 MG: 100 TAB at 09:04

## 2021-06-05 RX ADMIN — DOCUSATE SODIUM 50 MG AND SENNOSIDES 8.6 MG 1 TABLET: 8.6; 5 TABLET, FILM COATED ORAL at 17:05

## 2021-06-05 RX ADMIN — PIOGLITAZONE 30 MG: 15 TABLET ORAL at 09:04

## 2021-06-05 RX ADMIN — GABAPENTIN 400 MG: 400 CAPSULE ORAL at 21:18

## 2021-06-05 RX ADMIN — PROPRANOLOL HYDROCHLORIDE 10 MG: 10 TABLET ORAL at 17:05

## 2021-06-05 RX ADMIN — FOLIC ACID 1 MG: 1 TABLET ORAL at 09:04

## 2021-06-05 RX ADMIN — ACETAMINOPHEN 650 MG: 325 TABLET ORAL at 09:23

## 2021-06-05 RX ADMIN — INSULIN LISPRO 1 UNITS: 100 INJECTION, SOLUTION INTRAVENOUS; SUBCUTANEOUS at 17:04

## 2021-06-05 RX ADMIN — QUETIAPINE FUMARATE 200 MG: 400 TABLET ORAL at 21:18

## 2021-06-05 RX ADMIN — CHOLESTYRAMINE 4 G: 4 POWDER, FOR SUSPENSION ORAL at 09:04

## 2021-06-05 RX ADMIN — GABAPENTIN 400 MG: 400 CAPSULE ORAL at 09:04

## 2021-06-05 RX ADMIN — ONDANSETRON 4 MG: 4 TABLET, ORALLY DISINTEGRATING ORAL at 12:17

## 2021-06-05 RX ADMIN — ROPINIROLE 1 MG: 1 TABLET, FILM COATED ORAL at 09:04

## 2021-06-05 RX ADMIN — TIOTROPIUM BROMIDE 18 MCG: 18 CAPSULE ORAL; RESPIRATORY (INHALATION) at 09:03

## 2021-06-05 RX ADMIN — PROPRANOLOL HYDROCHLORIDE 10 MG: 10 TABLET ORAL at 09:04

## 2021-06-05 RX ADMIN — Medication 1 PATCH: at 09:11

## 2021-06-05 RX ADMIN — ROPINIROLE 1 MG: 1 TABLET, FILM COATED ORAL at 21:18

## 2021-06-05 RX ADMIN — MAGNESIUM OXIDE TAB 400 MG (241.3 MG ELEMENTAL MG) 400 MG: 400 (241.3 MG) TAB at 17:05

## 2021-06-05 RX ADMIN — Medication 1 TABLET: at 09:04

## 2021-06-05 RX ADMIN — PANTOPRAZOLE SODIUM 40 MG: 40 TABLET, DELAYED RELEASE ORAL at 04:53

## 2021-06-05 RX ADMIN — METFORMIN HYDROCHLORIDE 500 MG: 500 TABLET, FILM COATED ORAL at 09:04

## 2021-06-05 NOTE — PROGRESS NOTES
Progress Note - Behavioral Health     Ten Ureña 46 y o  male MRN: [id number]   Unit/Bed#: [facility] 205-02 Encounter: [id number]    Behavior over the last 24 hours: unchanged  Dee Driscoll continues to report high level of anxiety and depression  Today feeling excessive guilt regarding events leading to admission and his relapse on alcohol  Requests increase of his Zoloft states he has been on 200 mg in the past and believes that is the dose he requires  Reports increased pain with decreasing the use of oxycodone, but aware it is necessary for rehab    Limited participation in milieu  Sleep: slept off and on  Appetite: fair  Medication side effects: No   ROS: reports back pain, all other systems are negative    Mental Status Evaluation:    Appearance:  age appropriate   Behavior:  cooperative   Speech:  increased latency of response   Mood:  depressed   Affect:  flat   Thought Process:  goal directed   Associations: perseverative   Thought Content:  no overt delusions   Perceptual Disturbances: none   Risk Potential: Suicidal ideation - None  Homicidal ideation - None  Potential for aggression - No   Sensorium:  oriented to person, place and time/date   Memory:  recent and remote memory grossly intact   Consciousness:  alert and awake   Attention: attention span and concentration appear shorter than expected for age   Insight:  fair   Judgment: limited   Gait/Station: normal gait/station, normal balance   Motor Activity: no abnormal movements     Vital signs in last 24 hours:    Temp:  [97 5 °F (36 4 °C)-97 6 °F (36 4 °C)] 97 5 °F (36 4 °C)  HR:  [74-88] 74  Resp:  [18-20] 20  BP: (132-152)/(67-88) 147/67    Laboratory results: I have personally reviewed all pertinent laboratory/tests results      Suicide/Homicide Risk Assessment:    Risk of Harm to Self:   Current Specific Risk Factors include: recent serious suicide attempt  Protective Factors: no current suicidal ideation, ability to communicate with staff on the unit, able to contract for safety on the unit, taking medications as ordered on the unit  Based on today's assessment, Lynda Marker presents the following risk of harm to self: low    Risk of Harm to Others:  Current Specific Risk Factors include: none  Based on today's assessment, Lynda Marker presents the following risk of harm to others: none    The following interventions are recommended: behavioral checks every 7 minutes, continued hospitalization on locked unit     Progress Toward Goals: progressing    Assessment/Plan   Principal Problem:    MDD (major depressive disorder), recurrent episode, severe ([place] [address] )  Active Problems:    Quiros esophagus    Benign essential hypertension    COPD (chronic obstructive pulmonary disease) ([place] [address] )    Fatty liver    Diabetes mellitus type 1 5, managed as type 1 ([place] [address] )    Anxiety and depression    History of ETOH abuse    Recommended Treatment:     Planned medication and treatment changes:     All current active medications have been reviewed  Encourage group therapy, milieu therapy and occupational therapy  Behavioral Health checks every 7 minutes  Increase Zoloft 200 mg    Current Facility-Administered Medications   Medication Dose Route Frequency Provider Last Rate    acetaminophen  650 mg Oral Q4H PRN Allen Jones MD      albuterol  2 puff Inhalation Q4H PRN Allen Jones MD      benztropine  1 mg Intramuscular Q4H PRN Max 6/day Allen Jones MD      benztropine  0 5 mg Oral Q4H PRN Max 6/day MD Nereyda Thomas Presume ON 7/5/2021] cholecalciferol  1,000 Units Oral Daily Allen Jones MD      cholestyramine sugar free  4 g Oral BID Allen Jones MD      dextromethorphan-guaiFENesin  10 mL Oral Q4H PRN MD Nereyda Thomas Presume ON 6/7/2021] ergocalciferol  50,000 Units Oral Weekly Allen Jones MD      folic acid  1 mg Oral Daily Allen Jones MD      gabapentin  400 mg Oral TID Allen Jones MD      haloperidol lactate  5 mg Intramuscular Q4H PRN Max 4/day MD Jaosn Thomas hydrOXYzine HCL  25 mg Oral Q6H PRN Max 4/day [place] Gottlieb MD      hydrOXYzine HCL  50 mg Oral Q6H PRN Max 4/day Bell Gottlieb MD      insulin glargine  34 Units Subcutaneous QAM Bell Gottlieb MD      insulin lispro  1-6 Units Subcutaneous 4x Daily (AC & HS) Bell Gottlieb MD      losartan  25 mg Oral Daily [place] Gottlieb, MD      magnesium oxide  400 mg Oral BID [place], MD      metFORMIN  500 mg Oral BID With Meals [place] Gottlieb MD      multivitamin-minerals  1 tablet Oral Daily [place], MD      naloxone  0 04 mg Intravenous Q1MIN PRN [place], MD      nicotine  1 patch Transdermal Daily [place] Bull, MD      ondansetron  4 mg Oral Q6H PRN [place], MD      oxyCODONE  2 5 mg Oral Q6H PRN Rakesh Giles MD      pantoprazole  40 mg Oral Early Morning [place], MD      pioglitazone  30 mg Oral Daily [place], MD      propranolol  10 mg Oral BID [place], MD      QUEtiapine  200 mg Oral HS [place], MD      risperiDONE  0 25 mg Oral Q4H PRN Max 6/day [place], MD      risperiDONE  0 5 mg Oral Q4H PRN Max 3/day [place] MD Bull      risperiDONE  1 mg Oral Q4H PRN Max 3/day [place], MD      rOPINIRole  1 mg Oral BID [place], MD      senna-docusate sodium  1 tablet Oral BID [place], MD      sertraline  150 mg Oral Daily [place], MD      thiamine  100 mg Oral Daily [place], MD      tiotropium  18 mcg Inhalation Daily [place], MD      traZODone  50 mg Oral HS PRN [place] Gottlieb MD         Risks / Benefits of Treatment:    Risks, benefits, and possible side effects of medications explained to patient and patient verbalizes understanding and agreement for treatment  Counseling / Coordination of Care:    Patient's progress discussed with staff in treatment team meeting  Medications, treatment progress and treatment plan reviewed with patient      TYE Umanzor 06/05/21

## 2021-06-05 NOTE — PROGRESS NOTES
Progress Note - Eduard Cantu 46 y o  male MRN: [id number]    Unit/Bed#: [facility] 205-02 Encounter: [id number]        Subjective:   Patient seen and examined at bedside after reviewing the chart and discussing the case with the caring staff  Patient examined at bedside  Patient has no reported acute issues  Physical Exam   Vitals: Blood pressure 147/67, pulse 74, temperature 97 5 °F (36 4 °C), temperature source Temporal, resp  rate 20, height 6' (1 829 m), weight 92 6 kg (204 lb 2 3 oz), SpO2 100 %  ,Body mass index is 27 69 kg/m²  Constitutional: Patient appears well-developed  HEENT: PERR, EOMI, MMM  Cardiovascular: Normal rate and regular rhythm  Pulmonary/Chest: Effort normal and breath sounds normal    Abdomen: Soft, + BS, NT    Assessment/Plan:  Eduard Cantu is a(n) 46 y o  male with MDD      1  Cardiac with history of Hypertension and dyslipidemia  Patient will be continued on Cozaar 25 mg daily  Continue cholestyramine sugar free 2 times daily  2  Diabetes mellitus  Patient's hemoglobin A1c was 7 4 on 05/11/2021  Continue carb controlled diet, fingerstick glucose 4 times daily before meals and at bedtime, Humalog sliding scale, metformin, Actos and Lantus 34 units qAM   3  Tobacco use  Patient has nicotine transdermal patch  4  Alcohol abuse with history of multiple acute pancreatitis  No S/S of withdrawal  Continue folic acid, thiamine, multivitamin  Patient follows up with specialists for his pancreatitis  5  GERD  Continue pantoprazole 40 mg daily  6  COPD  Continue Spiriva daily, albuterol as needed, and 2 L nasal cannula  7  Constipation  Continue Senokot S twice daily  8  Cough  Patient may receive Robitussin DM as needed  9  Vitamin-D deficiency  Continue vitamin-D bolus doses for 8 weeks followed by vitamin D3 1000 units daily  10  Blurry vision with history of diabetes  Patient needs to see ophthalmologist for possible cataract on outpatient basis    11  Chronic pain syndrome  Patient is on Oxycodone IR to 2 5 mg every 6 hours on as-needed basis for severe pain, Ibuprofen 600 mg every 6 hours p r n  For moderate pain along with Tylenol on as needed basis for mild pain

## 2021-06-05 NOTE — PROGRESS NOTES
Patient spoke with this RN at length about how he is feeling  He reports feeling guilty about his drinking and his suicide attempt  He also reports leg pain that he feels is not resolving and feels that that is contributing to his depression and anxiety  He understands that he needs to go to rehab but continues to feel anxious about it

## 2021-06-05 NOTE — PROGRESS NOTES
Patient appears flat and depressed  He does endorse feeling anxious, depressed, and "not right " He is also complaining of leg pain  Was given 650mg Tylenol at 5151 and reports minimal relief  Understands and is trying not to take his oxycodone  Will continue monitoring

## 2021-06-05 NOTE — PROGRESS NOTES
No sign or symptoms of hyper/hypoglycemia noted overnight  Slept well during most q 7 minute safety checks  No respiratory distress or changes in medical condition  Sleep has been sound and undisturbed  No suicidal ideations noted  Safety maintained via clutter-free environment, ID band, and given non-skid socks  Will continue to monitor

## 2021-06-06 LAB
GLUCOSE SERPL-MCNC: 121 MG/DL (ref 65–140)
GLUCOSE SERPL-MCNC: 133 MG/DL (ref 65–140)
GLUCOSE SERPL-MCNC: 149 MG/DL (ref 65–140)
GLUCOSE SERPL-MCNC: 160 MG/DL (ref 65–140)

## 2021-06-06 PROCEDURE — 99232 SBSQ HOSP IP/OBS MODERATE 35: CPT | Performed by: NURSE PRACTITIONER

## 2021-06-06 PROCEDURE — 82948 REAGENT STRIP/BLOOD GLUCOSE: CPT

## 2021-06-06 RX ORDER — INSULIN GLARGINE 100 [IU]/ML
36 INJECTION, SOLUTION SUBCUTANEOUS EVERY MORNING
Status: DISCONTINUED | OUTPATIENT
Start: 2021-06-07 | End: 2021-06-09 | Stop reason: HOSPADM

## 2021-06-06 RX ADMIN — Medication 1 TABLET: at 08:28

## 2021-06-06 RX ADMIN — IBUPROFEN 600 MG: 600 TABLET, FILM COATED ORAL at 08:57

## 2021-06-06 RX ADMIN — GABAPENTIN 400 MG: 400 CAPSULE ORAL at 08:28

## 2021-06-06 RX ADMIN — DOCUSATE SODIUM 50 MG AND SENNOSIDES 8.6 MG 1 TABLET: 8.6; 5 TABLET, FILM COATED ORAL at 16:35

## 2021-06-06 RX ADMIN — GABAPENTIN 400 MG: 400 CAPSULE ORAL at 21:42

## 2021-06-06 RX ADMIN — PROPRANOLOL HYDROCHLORIDE 10 MG: 10 TABLET ORAL at 16:34

## 2021-06-06 RX ADMIN — ONDANSETRON 4 MG: 4 TABLET, ORALLY DISINTEGRATING ORAL at 09:31

## 2021-06-06 RX ADMIN — LOSARTAN POTASSIUM 25 MG: 25 TABLET, FILM COATED ORAL at 08:27

## 2021-06-06 RX ADMIN — PROPRANOLOL HYDROCHLORIDE 10 MG: 10 TABLET ORAL at 08:27

## 2021-06-06 RX ADMIN — MAGNESIUM OXIDE TAB 400 MG (241.3 MG ELEMENTAL MG) 400 MG: 400 (241.3 MG) TAB at 08:27

## 2021-06-06 RX ADMIN — METFORMIN HYDROCHLORIDE 500 MG: 500 TABLET, FILM COATED ORAL at 16:34

## 2021-06-06 RX ADMIN — SERTRALINE HYDROCHLORIDE 150 MG: 100 TABLET ORAL at 08:28

## 2021-06-06 RX ADMIN — METFORMIN HYDROCHLORIDE 500 MG: 500 TABLET, FILM COATED ORAL at 08:28

## 2021-06-06 RX ADMIN — MAGNESIUM OXIDE TAB 400 MG (241.3 MG ELEMENTAL MG) 400 MG: 400 (241.3 MG) TAB at 16:33

## 2021-06-06 RX ADMIN — ONDANSETRON 4 MG: 4 TABLET, ORALLY DISINTEGRATING ORAL at 17:38

## 2021-06-06 RX ADMIN — DOCUSATE SODIUM 50 MG AND SENNOSIDES 8.6 MG 1 TABLET: 8.6; 5 TABLET, FILM COATED ORAL at 08:27

## 2021-06-06 RX ADMIN — GABAPENTIN 400 MG: 400 CAPSULE ORAL at 16:33

## 2021-06-06 RX ADMIN — PIOGLITAZONE 30 MG: 15 TABLET ORAL at 08:27

## 2021-06-06 RX ADMIN — ROPINIROLE 1 MG: 1 TABLET, FILM COATED ORAL at 21:42

## 2021-06-06 RX ADMIN — CHOLESTYRAMINE 4 G: 4 POWDER, FOR SUSPENSION ORAL at 08:27

## 2021-06-06 RX ADMIN — OXYCODONE HYDROCHLORIDE 2.5 MG: 5 TABLET ORAL at 09:34

## 2021-06-06 RX ADMIN — INSULIN GLARGINE 34 UNITS: 100 INJECTION, SOLUTION SUBCUTANEOUS at 08:30

## 2021-06-06 RX ADMIN — PANTOPRAZOLE SODIUM 40 MG: 40 TABLET, DELAYED RELEASE ORAL at 06:01

## 2021-06-06 RX ADMIN — THIAMINE HCL TAB 100 MG 100 MG: 100 TAB at 08:28

## 2021-06-06 RX ADMIN — FOLIC ACID 1 MG: 1 TABLET ORAL at 08:27

## 2021-06-06 RX ADMIN — TIOTROPIUM BROMIDE 18 MCG: 18 CAPSULE ORAL; RESPIRATORY (INHALATION) at 08:30

## 2021-06-06 RX ADMIN — CHOLESTYRAMINE 4 G: 4 POWDER, FOR SUSPENSION ORAL at 16:32

## 2021-06-06 RX ADMIN — OXYCODONE HYDROCHLORIDE 2.5 MG: 5 TABLET ORAL at 17:38

## 2021-06-06 RX ADMIN — ROPINIROLE 1 MG: 1 TABLET, FILM COATED ORAL at 08:28

## 2021-06-06 RX ADMIN — Medication 1 PATCH: at 08:27

## 2021-06-06 RX ADMIN — INSULIN LISPRO 1 UNITS: 100 INJECTION, SOLUTION INTRAVENOUS; SUBCUTANEOUS at 21:45

## 2021-06-06 RX ADMIN — QUETIAPINE FUMARATE 200 MG: 400 TABLET ORAL at 21:42

## 2021-06-06 NOTE — PROGRESS NOTES
Progress Note - Irene Maria 46 y o  male MRN: [id number]    Unit/Bed#: [facility]-02 Encounter: [id number]        Subjective:   Patient seen and examined at bedside after reviewing the chart and discussing the case with the caring staff  Patient examined at bedside  Patient has no reported acute issues  Patient is concerned about elevated blood sugars  Patient claims that he was taking Lantus twice a day at home  On review of patient's blood sugars it was found that his blood sugars are on an average around 150  On review of his home meds I did not found Lantus b i d  Physical Exam   Vitals: Blood pressure 149/75, pulse 71, temperature (!) 96 4 °F (35 8 °C), temperature source Temporal, resp  rate 18, height 6' (1 829 m), weight 92 6 kg (204 lb 2 3 oz), SpO2 92 %  ,Body mass index is 27 69 kg/m²  Constitutional: Patient appears well-developed  HEENT: PERR, EOMI, MMM  Cardiovascular: Normal rate and regular rhythm  Pulmonary/Chest: Effort normal and breath sounds normal    Abdomen: Soft, + BS, NT    Assessment/Plan:  Irene Maria is a(n) 46 y o  male with MDD      1  Cardiac with history of Hypertension and dyslipidemia  Patient will be continued on Cozaar 25 mg daily  Continue cholestyramine sugar free 2 times daily  2  Diabetes mellitus  Patient's hemoglobin A1c was 7 4 on 05/11/2021  Continue carb controlled diet, fingerstick glucose 4 times daily before meals and at bedtime, Humalog sliding scale, metformin, Actos and I will increase Lantus 36 units qAM   3  Tobacco use  Patient has nicotine transdermal patch  4  Alcohol abuse with history of multiple acute pancreatitis  No S/S of withdrawal  Continue folic acid, thiamine, multivitamin  Patient follows up with specialists for his pancreatitis  5  GERD  Continue pantoprazole 40 mg daily  6  COPD  Continue Spiriva daily, albuterol as needed, and 2 L nasal cannula  7  Constipation  Continue Senokot S twice daily  8  Cough  Patient may receive Robitussin DM as needed  9  Vitamin-D deficiency  Continue vitamin-D bolus doses for 8 weeks followed by vitamin D3 1000 units daily  10  Blurry vision with history of diabetes  Patient needs to see ophthalmologist for possible cataract on outpatient basis  11  Chronic pain syndrome  Patient is on Oxycodone IR to 2 5 mg every 6 hours on as-needed basis for severe pain, Ibuprofen 600 mg every 6 hours p r n  For moderate pain along with Tylenol on as needed basis for mild pain

## 2021-06-06 NOTE — PROGRESS NOTES
Progress Note - Behavioral Health     Milo Mar 46 y o  male MRN: [id number]   Unit/Bed#: [facility] Encounter: [id number]    Behavior over the last 24 hours: unchanged  Huma Estrada continues flat depressed, perseverative, with persistent guilt  Presents flat and despondent  He is spending more time talking to staff and some improved insight, otherwise isolative on the unit  No suicidal thoughts  Still committed to rehab and tolerating taper off of controlled substances  Limited participation in milieu  Sleep: slept off and on  Appetite: fair  Medication side effects: No   ROS: all other systems are negative    Mental Status Evaluation:    Appearance:  age appropriate   Behavior:  pleasant, cooperative   Speech:  slow   Mood:  depressed   Affect:  flat   Thought Process:  goal directed   Associations: perseverative   Thought Content:  no overt delusions   Perceptual Disturbances: none   Risk Potential: Suicidal ideation - None  Homicidal ideation - None  Potential for aggression - No   Sensorium:  oriented to person, place and time/date   Memory:  recent and remote memory grossly intact   Consciousness:  alert and awake   Attention: decreased concentration and decreased attention span   Insight:  improving   Judgment: fair   Gait/Station: normal gait/station, normal balance   Motor Activity: no abnormal movements     Vital signs in last 24 hours:    Temp:  [96 4 °F (35 8 °C)-97 9 °F (36 6 °C)] 96 4 °F (35 8 °C)  HR:  [71-80] 71  Resp:  [18] 18  BP: (149-159)/(75-88) 149/75    Laboratory results: I have personally reviewed all pertinent laboratory/tests results      Suicide/Homicide Risk Assessment:    Risk of Harm to Self:   Current Specific Risk Factors include: current depressive symptoms  Protective Factors: no current suicidal plan or intent, ability to communicate with staff on the unit, able to contract for safety on the unit, taking medications as ordered on the unit  Based on today's assessment, Alpa Arita presents the following risk of harm to self: low    Risk of Harm to Others:  Current Specific Risk Factors include: none  Based on today's assessment, Alpa Arita presents the following risk of harm to others: none    The following interventions are recommended: behavioral checks every 7 minutes, continued hospitalization on locked unit     Progress Toward Goals: progressing    Assessment/Plan   Principal Problem:    MDD (major depressive disorder), recurrent episode, severe ([place] [address] )  Active Problems:    Quiros esophagus    Benign essential hypertension    COPD (chronic obstructive pulmonary disease) ([place] [address] )    Fatty liver    Diabetes mellitus type 1 5, managed as type 1 ([place] [address] )    Anxiety and depression    History of ETOH abuse    Recommended Treatment:     Planned medication and treatment changes:     All current active medications have been reviewed  Encourage group therapy, milieu therapy and occupational therapy  Behavioral Health checks every 7 minutes  Continue current medications:  Current Facility-Administered Medications   Medication Dose Route Frequency Provider Last Rate    acetaminophen  650 mg Oral Q4H PRN Yoselin Samson MD      albuterol  2 puff Inhalation Q4H PRN Yoselin Samson MD      benztropine  1 mg Intramuscular Q4H PRN Max 6/day Yoselin Samson MD      benztropine  0 5 mg Oral Q4H PRN Max 6/day MD Nereyda George ON 7/5/2021] cholecalciferol  1,000 Units Oral Daily Yoselin Samson MD      cholestyramine sugar free  4 g Oral BID Yoselin Samson MD      dextromethorphan-guaiFENesin  10 mL Oral Q4H PRN MD Nereyda George ON 6/7/2021] ergocalciferol  50,000 Units Oral Weekly Yoselin Samson MD      folic acid  1 mg Oral Daily Yoselin Samson MD      gabapentin  400 mg Oral TID Yoselin Samson MD      haloperidol lactate  5 mg Intramuscular Q4H PRN Max 4/day Yoselin Samson MD      hydrOXYzine HCL  25 mg Oral Q6H PRN Max 4/day Yoselin Samson MD      hydrOXYzine HCL  50 mg Oral Q6H PRN Max 4/day Peewee Kumari MD      ibuprofen  600 mg Oral Q6H PRN Filomena Rogers MD      insulin glargine  34 Units Subcutaneous QAM Peewee Kumari MD      insulin lispro  1-6 Units Subcutaneous 4x Daily [facility]) Peewee Kumari MD      losartan  25 mg Oral Daily Peewee Kumari MD      magnesium oxide  400 mg Oral BID Peewee Kumari MD      metFORMIN  500 mg Oral BID With Meals Peewee Kumari MD      multivitamin-minerals  1 tablet Oral Daily Peewee Kumari MD      naloxone  0 04 mg Intravenous Q1MIN PRN Peewee Kumari MD      nicotine  1 patch Transdermal Daily Peewee Kumari MD      ondansetron  4 mg Oral Q6H PRN Peewee Kumari MD      oxyCODONE  2 5 mg Oral Q6H PRN Filomena Rogers MD      pantoprazole  40 mg Oral Early Morning Peewee Kumari MD      pioglitazone  30 mg Oral Daily Peewee Kumari MD      propranolol  10 mg Oral BID Peewee Kumari MD      QUEtiapine  200 mg Oral HS Peewee Kumari MD      risperiDONE  0 25 mg Oral Q4H PRN Max 6/day Peewee Kumari MD      risperiDONE  0 5 mg Oral Q4H PRN Max 3/day Peewee Kumari MD      risperiDONE  1 mg Oral Q4H PRN Max 3/day Peewee Kumari MD      rOPINIRole  1 mg Oral BID Peewee Kumari MD      senna-docusate sodium  1 tablet Oral BID Peewee Kumari MD      sertraline  150 mg Oral Daily Peewee Kumari MD      thiamine  100 mg Oral Daily Peewee Kumari MD      tiotropium  18 mcg Inhalation Daily Peewee Kumari MD      traZODone  50 mg Oral HS PRN Peewee Kumari MD         Risks / Benefits of Treatment:    Risks, benefits, and possible side effects of medications explained to patient and patient verbalizes understanding and agreement for treatment  Counseling / Coordination of Care:    Patient's progress discussed with staff in treatment team meeting  Medications, treatment progress and treatment plan reviewed with patient      TYE Garza 06/06/21

## 2021-06-06 NOTE — PROGRESS NOTES
Patient rates his depression and anxiety 2/10 today but continues to appear flat and depressed  Denies suicidal thoughts  He is out for meals but did not attend group  Continues to report pain as a problem and feels it is contributing to his depression and anxiety  Was given 600mg ibuprofen at 0857  Reported minimal relief and requested oxycodone 2 5mg PRN at 0934 as well as Zofran for nausea  Upon reassessment, patient resting in bed  Appears comfortable

## 2021-06-06 NOTE — PROGRESS NOTES
Pt present in the small tv room at time of assessment  Pt reports anxiety and depression that come and go but are always present   Pt believes he gets himself into a vicious cycle where his deteriorating health causes him to be incapable of doing or getting work, which leads to worsening depression, which he tries to mask out with alcohol  Pt would like to spend more time with his brothers and family but he is unable to because even doing simple things like walking can make him winded and he doesn't feel he'd have the stamina to hunt or fish anymore  Pt hopes to be able to return to a higher level of activity eventually if he quits smoking  Pt was calm, cooperative and compliant with medications  Pt reports no ah, vh, si  Pt did report anxiety and depression rated 5 /10  Continuous visual safety checks performed throughout the shift  Safety precautions maintained  Will continue to monitor

## 2021-06-06 NOTE — PROGRESS NOTES
After dinner, patient complaining of pain and upset stomach  Requested Zofran and oxycodone  Was encouraged to try ibuprofen instead of oxycodone but states the oxycodone works better for him  Both medications administered at 1738  Upon reassessment, verbalized relief from pain and nausea  Will continue monitoring

## 2021-06-07 LAB
GLUCOSE SERPL-MCNC: 109 MG/DL (ref 65–140)
GLUCOSE SERPL-MCNC: 155 MG/DL (ref 65–140)
GLUCOSE SERPL-MCNC: 177 MG/DL (ref 65–140)
GLUCOSE SERPL-MCNC: 220 MG/DL (ref 65–140)

## 2021-06-07 PROCEDURE — 82948 REAGENT STRIP/BLOOD GLUCOSE: CPT

## 2021-06-07 PROCEDURE — 99232 SBSQ HOSP IP/OBS MODERATE 35: CPT | Performed by: PSYCHIATRY & NEUROLOGY

## 2021-06-07 RX ORDER — OXYCODONE HYDROCHLORIDE 5 MG/1
2.5 TABLET ORAL EVERY 6 HOURS PRN
Status: DISCONTINUED | OUTPATIENT
Start: 2021-06-07 | End: 2021-06-09 | Stop reason: HOSPADM

## 2021-06-07 RX ADMIN — INSULIN LISPRO 1 UNITS: 100 INJECTION, SOLUTION INTRAVENOUS; SUBCUTANEOUS at 16:54

## 2021-06-07 RX ADMIN — MAGNESIUM OXIDE TAB 400 MG (241.3 MG ELEMENTAL MG) 400 MG: 400 (241.3 MG) TAB at 17:07

## 2021-06-07 RX ADMIN — DOCUSATE SODIUM 50 MG AND SENNOSIDES 8.6 MG 1 TABLET: 8.6; 5 TABLET, FILM COATED ORAL at 08:33

## 2021-06-07 RX ADMIN — CHOLESTYRAMINE 4 G: 4 POWDER, FOR SUSPENSION ORAL at 17:06

## 2021-06-07 RX ADMIN — ROPINIROLE 1 MG: 1 TABLET, FILM COATED ORAL at 21:29

## 2021-06-07 RX ADMIN — ERGOCALCIFEROL 50000 UNITS: 1.25 CAPSULE ORAL at 12:21

## 2021-06-07 RX ADMIN — OXYCODONE HYDROCHLORIDE 2.5 MG: 5 TABLET ORAL at 07:45

## 2021-06-07 RX ADMIN — INSULIN LISPRO 1 UNITS: 100 INJECTION, SOLUTION INTRAVENOUS; SUBCUTANEOUS at 12:21

## 2021-06-07 RX ADMIN — MAGNESIUM OXIDE TAB 400 MG (241.3 MG ELEMENTAL MG) 400 MG: 400 (241.3 MG) TAB at 08:33

## 2021-06-07 RX ADMIN — THIAMINE HCL TAB 100 MG 100 MG: 100 TAB at 08:34

## 2021-06-07 RX ADMIN — Medication 1 PATCH: at 08:33

## 2021-06-07 RX ADMIN — LOSARTAN POTASSIUM 25 MG: 25 TABLET, FILM COATED ORAL at 08:34

## 2021-06-07 RX ADMIN — GABAPENTIN 400 MG: 400 CAPSULE ORAL at 21:30

## 2021-06-07 RX ADMIN — PROPRANOLOL HYDROCHLORIDE 10 MG: 10 TABLET ORAL at 17:07

## 2021-06-07 RX ADMIN — METFORMIN HYDROCHLORIDE 500 MG: 500 TABLET, FILM COATED ORAL at 07:45

## 2021-06-07 RX ADMIN — PROPRANOLOL HYDROCHLORIDE 10 MG: 10 TABLET ORAL at 08:34

## 2021-06-07 RX ADMIN — IBUPROFEN 600 MG: 600 TABLET, FILM COATED ORAL at 21:33

## 2021-06-07 RX ADMIN — PANTOPRAZOLE SODIUM 40 MG: 40 TABLET, DELAYED RELEASE ORAL at 06:25

## 2021-06-07 RX ADMIN — INSULIN GLARGINE 36 UNITS: 100 INJECTION, SOLUTION SUBCUTANEOUS at 08:34

## 2021-06-07 RX ADMIN — GABAPENTIN 400 MG: 400 CAPSULE ORAL at 08:34

## 2021-06-07 RX ADMIN — DOCUSATE SODIUM 50 MG AND SENNOSIDES 8.6 MG 1 TABLET: 8.6; 5 TABLET, FILM COATED ORAL at 17:07

## 2021-06-07 RX ADMIN — Medication 1 TABLET: at 08:33

## 2021-06-07 RX ADMIN — QUETIAPINE FUMARATE 200 MG: 400 TABLET ORAL at 21:30

## 2021-06-07 RX ADMIN — SERTRALINE HYDROCHLORIDE 150 MG: 100 TABLET ORAL at 08:34

## 2021-06-07 RX ADMIN — ROPINIROLE 1 MG: 1 TABLET, FILM COATED ORAL at 08:33

## 2021-06-07 RX ADMIN — PIOGLITAZONE 30 MG: 15 TABLET ORAL at 08:33

## 2021-06-07 RX ADMIN — METFORMIN HYDROCHLORIDE 500 MG: 500 TABLET, FILM COATED ORAL at 16:53

## 2021-06-07 RX ADMIN — GABAPENTIN 400 MG: 400 CAPSULE ORAL at 15:06

## 2021-06-07 RX ADMIN — TIOTROPIUM BROMIDE 18 MCG: 18 CAPSULE ORAL; RESPIRATORY (INHALATION) at 08:34

## 2021-06-07 RX ADMIN — CHOLESTYRAMINE 4 G: 4 POWDER, FOR SUSPENSION ORAL at 08:33

## 2021-06-07 RX ADMIN — OXYCODONE HYDROCHLORIDE 2.5 MG: 5 TABLET ORAL at 15:05

## 2021-06-07 RX ADMIN — INSULIN LISPRO 2 UNITS: 100 INJECTION, SOLUTION INTRAVENOUS; SUBCUTANEOUS at 07:45

## 2021-06-07 RX ADMIN — FOLIC ACID 1 MG: 1 TABLET ORAL at 08:34

## 2021-06-07 NOTE — PROGRESS NOTES
Pt up ad ziggy & gait is steady  Pt c/o depression 3/10 & anxiety 2/10  Pt denies any hallucinations, suicidal or homicidal ideations  Q 7 min checks maintained to monitor pt's behavior & safety  Pt is flat & withdrawn to room in between meals & Group  Pt uses Nasal O2 @ 2L/min via cannula prn - shortness of breath  Lungs clear upon auscultation  Pt is cooperative & compliant with medications  Pt medicated for generalized pain @ 0745 with relief obtained

## 2021-06-07 NOTE — PROGRESS NOTES
06/07/21    Team Meeting   Meeting Type Daily Rounds   Team Members Present   Team Members Present Physician;Nurse;;Occupational Therapist   Physician Team Member Dr Samm Zarate MD   Nursing Team Member Roxy Alarcon RN   Care Management Team Member Fiona Damian 85, [facility]   OT Team Member Charly Rodriguez [place]   Patient/Family Present   Patient Present No   Patient's Family Present No   Depression 3/10, anxiety 2/10  Will be for rehab, continues to titrate off oxy  Referrals will be sent once taper completed

## 2021-06-07 NOTE — PROGRESS NOTES
Progress Note - Behavioral Health   Shon Bach 46 y o  male MRN: [id number]  Unit/Bed#: [facility]-02 Encounter: [id number]    Assessment/Plan   Principal Problem:    MDD (major depressive disorder), recurrent episode, severe ([facility] )  Active Problems:    Quiros esophagus    Benign essential hypertension    COPD (chronic obstructive pulmonary disease) ([facility] )    Fatty liver    Diabetes mellitus type 1 5, managed as type 1 ([facility])    Anxiety and depression    History of ETOH abuse      Behavior over the last 24 hours:  Unchanged    Sleep: Difficulty sleeping last night due to racing thoughts  Appetite: normal  Medication side effects: No  ROS: nausea, other systems are negative acute change    Mental Status Evaluation:  Appearance:  age appropriate and casually dressed   Behavior:  cooperative   Speech:  normal pitch   Mood:  depressed   Affect:  constricted   Thought Process:  goal directed   Thought Content:  No overt delusions   Perceptual Disturbances: None   Risk Potential: Suicidal Ideations none  Homicidal Ideations none  Potential for Aggression No   Sensorium:  person, place, time/date and situation   Memory:  recent and remote memory grossly intact   Consciousness:  alert    Attention: attention span and concentration were age appropriate   Insight:  limited   Judgment: fair   Gait/Station: normal gait/station   Motor Activity: no abnormal movements     Progress Toward Goals: Patient reports reduced depressed mood, anxiety with gradual increased in participation in group and milieu therapy  He is more receptive with the idea of going to rehab  Calorie intake and medication compliant has been stable  Recommended Treatment: Continue with group therapy, milieu therapy and occupational therapy  Risks, benefits and possible side effects of Medications:   Risks, benefits, and possible side effects of medications explained to patient and patient verbalizes understanding        Medications: all current active meds have been reviewed  Labs: I have personally reviewed all pertinent laboratory/tests results  Most Recent Labs:   Lab Results   Component Value Date    WBC 6 80 05/11/2021    RBC 4 89 05/11/2021    HGB 14 7 05/11/2021    HCT 43 8 05/11/2021     05/16/2021    RDW 15 0 (H) 05/11/2021    NEUTROABS 4 10 05/11/2021    SODIUM 129 (L) 05/16/2021    K 4 3 05/16/2021    CL 96 (L) 05/16/2021    CO2 23 05/16/2021    BUN 17 05/16/2021    CREATININE 0 86 05/16/2021    GLUC 239 (H) 05/16/2021    GLUF 219 (H) 07/18/2019    CALCIUM 9 4 05/16/2021    AST 21 05/16/2021    ALT 19 05/16/2021    ALKPHOS 85 05/16/2021    TP 7 3 05/16/2021    ALB 3 7 05/16/2021    TBILI 0 50 05/16/2021    CHOLESTEROL 157 02/21/2019    HDL 44 02/21/2019    TRIG 188 (H) 02/21/2019    LDLCALC 75 02/21/2019    NONHDLC 113 02/21/2019    AMMONIA 50 05/11/2021    [id number] 1 680 05/11/2021    HGBA1C 7 4 (H) 05/11/2021     05/11/2021       Counseling / Coordination of Care  Total floor / unit time spent today 20 minutes  Greater than 50% of total time was spent with the patient and / or family counseling and / or coordination of care

## 2021-06-07 NOTE — PROGRESS NOTES
Progress Note - Cecily Galan 46 y o  male MRN: [id number]    Unit/Bed#: [facility] 205-02 Encounter: [id number]        Subjective:   Patient seen and examined at bedside after reviewing the chart and discussing the case with the caring staff  Patient examined at bedside  Patient has no reported acute issues  Physical Exam   Vitals: Blood pressure 137/81, pulse 78, temperature (!) 97 °F (36 1 °C), temperature source Temporal, resp  rate 20, height 6' (1 829 m), weight 92 6 kg (204 lb 2 3 oz), SpO2 99 %  ,Body mass index is 27 69 kg/m²  Constitutional: Patient appears well-developed  HEENT: PERR, EOMI, MMM  Cardiovascular: Normal rate and regular rhythm  Pulmonary/Chest: Effort normal and breath sounds normal    Abdomen: Soft, + BS, NT    Assessment/Plan:  Cecily Galan is a(n) 46 y o  male with MDD      1  Cardiac with history of Hypertension and dyslipidemia  Patient will be continued on Cozaar 25 mg daily  Continue cholestyramine sugar free 2 times daily  2  Diabetes mellitus  Patient's hemoglobin A1c was 7 4 on 05/11/2021  Continue carb controlled diet, fingerstick glucose 4 times daily before meals and at bedtime, Humalog sliding scale, metformin, Actos and I will increase Lantus 36 units qAM   3  Tobacco use  Patient has nicotine transdermal patch  4  Alcohol abuse with history of multiple acute pancreatitis  No S/S of withdrawal  Continue folic acid, thiamine, multivitamin  Patient follows up with specialists for his pancreatitis  5  GERD  Continue pantoprazole 40 mg daily  6  COPD  Continue Spiriva daily, albuterol as needed, and 2 L nasal cannula  7  Constipation  Continue Senokot S twice daily  8  Cough  Patient may receive Robitussin DM as needed  9  Vitamin-D deficiency  Continue vitamin-D bolus doses for 8 weeks followed by vitamin D3 1000 units daily  10  Blurry vision with history of diabetes    Patient needs to see ophthalmologist for possible cataract on outpatient basis   11  Chronic pain syndrome  Patient is on Oxycodone IR to 2 5 mg every 6 hours on as-needed basis for severe pain, Ibuprofen 600 mg every 6 hours p r n  For moderate pain along with Tylenol on as needed basis for mild pain

## 2021-06-07 NOTE — PROGRESS NOTES
Pt woke up once to ask for a sandwich and drink  Pt ate and went back to his room and slept the rest of the night  Continuous visual safety checks performed throughout the night  No sign of distress or labored breathing  Safety precautions maintained  Will continue to monitor

## 2021-06-07 NOTE — PLAN OF CARE
Problem: DISCHARGE PLANNING - CARE MANAGEMENT  Goal: Discharge to post-acute care or home with appropriate resources  Description: INTERVENTIONS:  - Conduct assessment to determine patient/family and health care team treatment goals, and need for post-acute services based on payer coverage, community resources, and patient preferences, and barriers to discharge  - Address psychosocial, clinical, and financial barriers to discharge as identified in assessment in conjunction with the patient/family and health care team  - Arrange appropriate level of post-acute services according to patients   needs and preference and payer coverage in collaboration with the physician and health care team  - Communicate with and update the patient/family, physician, and health care team regarding progress on the discharge plan  - Arrange appropriate transportation to post-acute venues  Outcome: Progressing   Will make referrals to rehab D&A once oxy has been D/C

## 2021-06-07 NOTE — PROGRESS NOTES
Pt present in the milieu talking to another pt at time of assessment  Pt reports pain to which he was upset because he took oxy twice today and was trying to wean himself off the medication completely  Pt was reassured that he was trying his best and doing a good job of trying to cope with the pain without opiates  Pt still reports anxiety and doesn't feel much better today than yesterday  Pt did report feeling better as the day went on  Pt compliant with medications, required 1 unit of insulin, continued his conversation with his roommate and went to bed  No complaints or concerns noted  Continuous visual safety checks performed throughout the shift  Safety precautions maintained  Will continue to monitor

## 2021-06-08 LAB
GLUCOSE SERPL-MCNC: 109 MG/DL (ref 65–140)
GLUCOSE SERPL-MCNC: 161 MG/DL (ref 65–140)
GLUCOSE SERPL-MCNC: 171 MG/DL (ref 65–140)
GLUCOSE SERPL-MCNC: 192 MG/DL (ref 65–140)

## 2021-06-08 PROCEDURE — 82948 REAGENT STRIP/BLOOD GLUCOSE: CPT

## 2021-06-08 PROCEDURE — 99232 SBSQ HOSP IP/OBS MODERATE 35: CPT | Performed by: NURSE PRACTITIONER

## 2021-06-08 RX ORDER — LOSARTAN POTASSIUM 50 MG/1
50 TABLET ORAL DAILY
Status: DISCONTINUED | OUTPATIENT
Start: 2021-06-09 | End: 2021-06-09 | Stop reason: HOSPADM

## 2021-06-08 RX ADMIN — FOLIC ACID 1 MG: 1 TABLET ORAL at 08:11

## 2021-06-08 RX ADMIN — INSULIN LISPRO 2 UNITS: 100 INJECTION, SOLUTION INTRAVENOUS; SUBCUTANEOUS at 11:51

## 2021-06-08 RX ADMIN — GABAPENTIN 400 MG: 400 CAPSULE ORAL at 08:12

## 2021-06-08 RX ADMIN — ROPINIROLE 1 MG: 1 TABLET, FILM COATED ORAL at 21:32

## 2021-06-08 RX ADMIN — METFORMIN HYDROCHLORIDE 500 MG: 500 TABLET, FILM COATED ORAL at 07:47

## 2021-06-08 RX ADMIN — Medication 1 PATCH: at 08:11

## 2021-06-08 RX ADMIN — MAGNESIUM OXIDE TAB 400 MG (241.3 MG ELEMENTAL MG) 400 MG: 400 (241.3 MG) TAB at 08:11

## 2021-06-08 RX ADMIN — QUETIAPINE FUMARATE 200 MG: 400 TABLET ORAL at 21:30

## 2021-06-08 RX ADMIN — INSULIN LISPRO 1 UNITS: 100 INJECTION, SOLUTION INTRAVENOUS; SUBCUTANEOUS at 17:45

## 2021-06-08 RX ADMIN — ONDANSETRON 4 MG: 4 TABLET, ORALLY DISINTEGRATING ORAL at 14:08

## 2021-06-08 RX ADMIN — PIOGLITAZONE 30 MG: 15 TABLET ORAL at 08:11

## 2021-06-08 RX ADMIN — SERTRALINE HYDROCHLORIDE 150 MG: 100 TABLET ORAL at 08:12

## 2021-06-08 RX ADMIN — OXYCODONE HYDROCHLORIDE 2.5 MG: 5 TABLET ORAL at 14:08

## 2021-06-08 RX ADMIN — Medication 1 TABLET: at 08:11

## 2021-06-08 RX ADMIN — THIAMINE HCL TAB 100 MG 100 MG: 100 TAB at 08:12

## 2021-06-08 RX ADMIN — METFORMIN HYDROCHLORIDE 500 MG: 500 TABLET, FILM COATED ORAL at 16:47

## 2021-06-08 RX ADMIN — INSULIN GLARGINE 36 UNITS: 100 INJECTION, SOLUTION SUBCUTANEOUS at 08:10

## 2021-06-08 RX ADMIN — ONDANSETRON 4 MG: 4 TABLET, ORALLY DISINTEGRATING ORAL at 20:18

## 2021-06-08 RX ADMIN — DOCUSATE SODIUM 50 MG AND SENNOSIDES 8.6 MG 1 TABLET: 8.6; 5 TABLET, FILM COATED ORAL at 17:45

## 2021-06-08 RX ADMIN — ROPINIROLE 1 MG: 1 TABLET, FILM COATED ORAL at 08:12

## 2021-06-08 RX ADMIN — GABAPENTIN 400 MG: 400 CAPSULE ORAL at 16:47

## 2021-06-08 RX ADMIN — MAGNESIUM OXIDE TAB 400 MG (241.3 MG ELEMENTAL MG) 400 MG: 400 (241.3 MG) TAB at 17:45

## 2021-06-08 RX ADMIN — OXYCODONE HYDROCHLORIDE 2.5 MG: 5 TABLET ORAL at 20:18

## 2021-06-08 RX ADMIN — PANTOPRAZOLE SODIUM 40 MG: 40 TABLET, DELAYED RELEASE ORAL at 06:35

## 2021-06-08 RX ADMIN — PROPRANOLOL HYDROCHLORIDE 10 MG: 10 TABLET ORAL at 17:45

## 2021-06-08 RX ADMIN — OXYCODONE HYDROCHLORIDE 2.5 MG: 5 TABLET ORAL at 07:47

## 2021-06-08 RX ADMIN — INSULIN LISPRO 1 UNITS: 100 INJECTION, SOLUTION INTRAVENOUS; SUBCUTANEOUS at 21:31

## 2021-06-08 RX ADMIN — CHOLESTYRAMINE 4 G: 4 POWDER, FOR SUSPENSION ORAL at 17:45

## 2021-06-08 RX ADMIN — TIOTROPIUM BROMIDE 18 MCG: 18 CAPSULE ORAL; RESPIRATORY (INHALATION) at 08:09

## 2021-06-08 RX ADMIN — DOCUSATE SODIUM 50 MG AND SENNOSIDES 8.6 MG 1 TABLET: 8.6; 5 TABLET, FILM COATED ORAL at 08:12

## 2021-06-08 RX ADMIN — LOSARTAN POTASSIUM 25 MG: 25 TABLET, FILM COATED ORAL at 08:12

## 2021-06-08 RX ADMIN — CHOLESTYRAMINE 4 G: 4 POWDER, FOR SUSPENSION ORAL at 08:12

## 2021-06-08 RX ADMIN — PROPRANOLOL HYDROCHLORIDE 10 MG: 10 TABLET ORAL at 08:11

## 2021-06-08 RX ADMIN — GABAPENTIN 400 MG: 400 CAPSULE ORAL at 21:30

## 2021-06-08 NOTE — PROGRESS NOTES
Pt present in his room at time of assessment  Pt was talking a lot about his son who plays football in college and brightened with conversation  Pt reports feeling similar today as yesterday reporting some anxiety and depression  Pt asked for ibuprofen for a headache reported as a 6 or 7/10  Pt compliant with medications, no other reported concerns or complaints  Continuous visual safety checks performed throughout the shift  Safety precautions maintained  Will continue to monitor

## 2021-06-08 NOTE — PROGRESS NOTES
06/08/21   Team Meeting   Meeting Type Daily Rounds   Team Members Present   Team Members Present Physician;Nurse;Occupational Therapist;   Physician Team Member Dr Thania Dominguez MD; Rosezetta Rubinstein, CRNP   Nursing Team Member Krishna José RN   Social Work Team Member David Severino [facility]   OT Team Member Cleve Severance, [place]   Patient/Family Present   Patient Present No   Patient's Family Present No     No DC date - awaiting OXY taper for Rehab referrals; dep/anx 2/10; talking more; goal oriented

## 2021-06-08 NOTE — PLAN OF CARE
Chief Complaint   Patient presents with   • Hip Pain     left hip pain       HISTORY OF PRESENT ILLNESS:  Josh Torres is a 81 year old male who is here for followup of medical conditions and medications.  The patient states he continues with severe left hip pain. He states that it does bother him if he has to lay on it, he sitting for a while and has to start getting up and ambulating. He has not fallen down with it. He states Tylenol does not seem to help a lot with it. He does not sleeping like something to help temporarily. He does not feel he has any gout flareups at this time. No chest pain or shortness of breath. No current issues with bleeding or bruising.    REVIEW OF SYSTEMS:  Otherwise negative.    ALLERGIES:  No Known Allergies  Current Outpatient Medications   Medication Sig Dispense Refill   • lisinopril (ZESTRIL) 10 MG tablet TAKE 1 TABLET BY MOUTH DAILY. 90 tablet 1   • predniSONE (DELTASONE) 5 MG tablet TAKE 1 TABLET BY MOUTH DAILY IN THE MORNING AFTER BREAKFAST 30 tablet 5   • metoPROLOL tartrate (LOPRESSOR) 100 MG tablet TAKE 1/2-TABLET TWO TIMES A DAY AFTER A MEAL 90 tablet 1   • warfarin (COUMADIN) 2.5 MG tablet TAKE 2 TABLETS MON/WED/FRI AND 1 TABLET ALL OTHER DAYS, OR AS DIRECTED BY MD. 45 tablet 5   • acetaminophen (TYLENOL) 325 MG tablet Take 650 mg by mouth every 4 hours as needed for Pain.     • traMADol (ULTRAM) 50 MG tablet Take 1 tablet by mouth every 6 hours as needed for Pain. 12 tablet 0     No current facility-administered medications for this visit.          PHYSICAL EXAMINATION:  Visit Vitals  /82 (BP Location: [facility] [place], Patient Position: Sitting, Cuff Size: Regular)   Pulse 66   Resp 18   Ht 6' (1.829 m)   Wt 71.7 kg   BMI 21.43 kg/m²     GENERAL APPEARANCE:  Not in acute distress.   LUNGS:  Clear bilaterally.  CARDIOVASCULAR:  Regular rate and rhythm.  No murmurs, rubs or gallops.  ABDOMEN:  Soft, nontender, non-distended, normoactive bowel sounds.  EXTREMITIES:  No  Problem: Ineffective Coping  Goal: Participates in unit activities  Description: Interventions:  - Provide therapeutic environment   - Provide required programming   - Redirect inappropriate behaviors   Outcome: Progressing edema.  MOOD:  Calm and appropriate.  SKIN:  Dry and warm to touch.  NEUROLOGIC:  Non-focal.    INR was greater than 3.5 on fingerstick INR    ASSESSMENT AND PLAN:  (M25.552,  G89.29) Chronic left hip pain  (primary encounter diagnosis)  Plan: XR HIP 2 VW LEFT        Call with results and recommendation. We'll get short term trial of tramadol as needed    (Z79.01) Long term (current) use of anticoagulants  (I82.4Z1) Deep vein thrombosis (DVT) of distal vein of right lower extremity, unspecified chronicity (CMS/[facility])  (D68.51) Factor V Leiden (CMS/[facility])  Plan: We'll call results of the INR will hold for the next 3 days and recheck INR then    (E78.5) Hyperlipidemia, unspecified hyperlipidemia type  Plan: LIPID PANEL WITH REFLEX            (I10) Essential hypertension  Has associated chronic kidney disease stage III  Plan: BASIC METABOLIC PANEL        Blood pressure is stable    (Z79.899) Encounter for long-term (current) use of medications  Plan: BASIC METABOLIC PANEL, CBC NO DIFFERENTIAL            (M15.0) Primary osteoarthritis involving multiple joints  Plan: Has continued on prednisone 5 mg daily. It seen rheumatology previously and recommended he continue with it since he does overall feel better with it.    Return in 3 months, sooner if needed

## 2021-06-08 NOTE — PROGRESS NOTES
Pt medicated for c/o nausea @ 1408 with Zofran po as ordered by MD  Pt states " I was able to eat lunch & just started to feel nauseated"  Pt medicated for c/o generalized pain @ 1408 with Oxy-IR po as ordered by MD  Q 7 min checks maintained to monitor pt's behavior & safety

## 2021-06-08 NOTE — PLAN OF CARE
Problem: DISCHARGE PLANNING - CARE MANAGEMENT  Goal: Discharge to post-acute care or home with appropriate resources  Description: INTERVENTIONS:  - Conduct assessment to determine patient/family and health care team treatment goals, and need for post-acute services based on payer coverage, community resources, and patient preferences, and barriers to discharge  - Address psychosocial, clinical, and financial barriers to discharge as identified in assessment in conjunction with the patient/family and health care team  - Arrange appropriate level of post-acute services according to patients   needs and preference and payer coverage in collaboration with the physician and health care team  - Communicate with and update the patient/family, physician, and health care team regarding progress on the discharge plan  - Arrange appropriate transportation to post-acute venues  Outcome: Progressing     Pt progressing;  No DC date - awaiting Oxy taper for Rehab referrals

## 2021-06-08 NOTE — PROGRESS NOTES
Progress Note - Behavioral Health   Carlos Saxena 46 y o  male MRN: [id number]  Unit/Bed#: [facility]02 Encounter: [id number]    Assessment/Plan   Principal Problem:    MDD (major depressive disorder), recurrent episode, severe ([place] [address] )  Active Problems:    Quiros esophagus    Benign essential hypertension    COPD (chronic obstructive pulmonary disease) ([place] [address] )    Fatty liver    Diabetes mellitus type 1 5, managed as type 1 ([place] [address] )    Anxiety and depression    History of ETOH abuse      Behavior over the last 24 hours:  unchanged  Sleep: "improving"  Appetite: normal  Medication side effects: No  ROS: abdominal discomfort, leg discomfort, blurry vision, "bone marrow pain in my legs"    Leatha Grubbs was seen today for psychiatric follow-up  Patient was calm, cooperative, and somatically preoccupied throughout interview  He had multiple somatic complaints that "have been going on for a while;" relayed to the medical team   Patient continues Oxy taper  Leatha Grubbs continues to rate his depression and anxiety as 2/10  Denies AVH/SI/HI  He reports he is sleeping better throughout the night and his appetite remains adequate  Leatha Ferraropatricia remains compliant with psychotropic regimen  He has been more visible in the milieu and attending groups       Mental Status Evaluation:  Appearance:  age appropriate and casually dressed   Behavior:  cooperative   Speech:  slow, tangential   Mood:  decreased range   Affect:  blunted   Thought Process:  circumstantial   Thought Content:  somatically preoccupied   Perceptual Disturbances: None   Risk Potential: Suicidal Ideations none  Homicidal Ideations none  Potential for Aggression No   Sensorium:  person, place, time/date and situation   Memory:  recent and remote memory grossly intact   Consciousness:  alert and awake    Attention: attention span and concentration were age appropriate   Insight:  limited   Judgment: limited   Gait/Station: normal gait/station and normal balance   Motor Activity: no abnormal movements     Progress Toward Goals:  No change  Patient continuing Oxy taper which is being managed by the medical team   Once taper is complete, referrals for rehab will be sent by case management  Will continue current psychotropic regimen  No discharge date at this time  Recommended Treatment: Continue with group therapy, milieu therapy and occupational therapy  Risks, benefits and possible side effects of Medications:   Risks, benefits, and possible side effects of medications explained to patient and patient verbalizes understanding        Medications:   all current active meds have been reviewed, continue current psychiatric medications and current meds:   Current Facility-Administered Medications   Medication Dose Route Frequency    acetaminophen (TYLENOL) tablet 650 mg  650 mg Oral Q4H PRN    albuterol (PROVENTIL HFA,VENTOLIN HFA) inhaler 2 puff  2 puff Inhalation Q4H PRN    benztropine (COGENTIN) injection 1 mg  1 mg Intramuscular Q4H PRN Max 6/day    benztropine (COGENTIN) tablet 0 5 mg  0 5 mg Oral Q4H PRN Max 6/day    [START ON 7/5/2021] cholecalciferol (VITAMIN D3) tablet 1,000 Units  1,000 Units Oral Daily    cholestyramine sugar free (QUESTRAN LIGHT) packet 4 g  4 g Oral BID    dextromethorphan-guaiFENesin (ROBITUSSIN DM) oral syrup 10 mL  10 mL Oral Q4H PRN    ergocalciferol (VITAMIN D2) capsule 50,000 Units  50,000 Units Oral Weekly    folic acid (FOLVITE) tablet 1 mg  1 mg Oral Daily    gabapentin (NEURONTIN) capsule 400 mg  400 mg Oral TID    haloperidol lactate (HALDOL) injection 5 mg  5 mg Intramuscular Q4H PRN Max 4/day    hydrOXYzine HCL (ATARAX) tablet 25 mg  25 mg Oral Q6H PRN Max 4/day    hydrOXYzine HCL (ATARAX) tablet 50 mg  50 mg Oral Q6H PRN Max 4/day    ibuprofen (MOTRIN) tablet 600 mg  600 mg Oral Q6H PRN    insulin glargine (LANTUS) subcutaneous injection 36 Units 0 36 mL  36 Units Subcutaneous QAM    insulin lispro (HumaLOG) 100 units/mL subcutaneous injection 1-6 Units  1-6 Units Subcutaneous 4x Daily (AC & HS)    losartan (COZAAR) tablet 25 mg  25 mg Oral Daily    magnesium oxide (MAG-OX) tablet 400 mg  400 mg Oral BID    metFORMIN (GLUCOPHAGE) tablet 500 mg  500 mg Oral BID With Meals    multivitamin-minerals (CENTRUM) tablet 1 tablet  1 tablet Oral Daily    naloxone (NARCAN) 0 04 mg/mL syringe 0 04 mg  0 04 mg Intravenous Q1MIN PRN    nicotine (NICODERM CQ) 14 mg/24hr TD 24 hr patch 1 patch  1 patch Transdermal Daily    ondansetron (ZOFRAN-ODT) dispersible tablet 4 mg  4 mg Oral Q6H PRN    oxyCODONE (ROXICODONE) IR tablet 2 5 mg  2 5 mg Oral Q6H PRN    pantoprazole (PROTONIX) EC tablet 40 mg  40 mg Oral Early Morning    pioglitazone (ACTOS) tablet 30 mg  30 mg Oral Daily    propranolol (INDERAL) tablet 10 mg  10 mg Oral BID    QUEtiapine (SEROquel) tablet 200 mg  200 mg Oral HS    risperiDONE (RisperDAL M-TAB) disintegrating tablet 0 25 mg  0 25 mg Oral Q4H PRN Max 6/day    risperiDONE (RisperDAL M-TAB) disintegrating tablet 0 5 mg  0 5 mg Oral Q4H PRN Max 3/day    risperiDONE (RisperDAL M-TAB) disintegrating tablet 1 mg  1 mg Oral Q4H PRN Max 3/day    rOPINIRole (REQUIP) tablet 1 mg  1 mg Oral BID    senna-docusate sodium (SENOKOT S) 8 6-50 mg per tablet 1 tablet  1 tablet Oral BID    sertraline (ZOLOFT) tablet 150 mg  150 mg Oral Daily    thiamine tablet 100 mg  100 mg Oral Daily    tiotropium (SPIRIVA) capsule for inhaler 18 mcg  18 mcg Inhalation Daily    traZODone (DESYREL) tablet 50 mg  50 mg Oral HS PRN     Labs: I have personally reviewed all pertinent laboratory/tests results  Counseling / Coordination of Care  Total floor / unit time spent today 25 minutes  Greater than 50% of total time was spent with the patient and / or family counseling and / or coordination of care

## 2021-06-08 NOTE — PROGRESS NOTES
Progress Note - Carlos Saxena 46 y o  male MRN: [id number]    Unit/Bed#: [facility] 205-02 Encounter: [id number]        Subjective:   Patient seen and examined at bedside after reviewing the chart and discussing the case with the caring staff  Patient examined at bedside  Patient is obsessed that he is physically not right and is having pain in his feet, legs and lower back  Patient also is obsessed that he needs disability paper for workup to be filled out for him  As per the caring staff nurse patient's systolic blood pressure was high early 8 was 183/77 and a repeat 1 was 175/93  Physical Exam   Vitals: Blood pressure (!) 173/93, pulse 69, temperature (!) 97 1 °F (36 2 °C), temperature source Temporal, resp  rate 18, height 6' (1 829 m), weight 92 6 kg (204 lb 2 3 oz), SpO2 97 %  ,Body mass index is 27 69 kg/m²  Constitutional: Patient appears well-developed  HEENT: PERR, EOMI, MMM  Cardiovascular: Normal rate and regular rhythm  Pulmonary/Chest: Effort normal and breath sounds normal    Abdomen: Soft, + BS, NT    Assessment/Plan:  Carlos Saxena is a(n) 46 y o  male with MDD      1  Cardiac with history of Hypertension and dyslipidemia  As patient's blood pressure is high I will increase patient's Cozaar 50 mg daily  Continue cholestyramine sugar free 2 times daily  2  Diabetes mellitus  Patient's hemoglobin A1c was 7 4 on 05/11/2021  Continue carb controlled diet, fingerstick glucose 4 times daily before meals and at bedtime, Humalog sliding scale, metformin, Actos and I will increase Lantus 36 units qAM   3  Tobacco use  Patient has nicotine transdermal patch  4  Alcohol abuse with history of multiple acute pancreatitis  No S/S of withdrawal  Continue folic acid, thiamine, multivitamin  Patient follows up with specialists for his pancreatitis  5  GERD  Continue pantoprazole 40 mg daily  6  COPD  Continue Spiriva daily, albuterol as needed, and 2 L nasal cannula  7  Constipation  Continue Senokot S twice daily  8  Cough  Patient may receive Robitussin DM as needed  9  Vitamin-D deficiency  Continue vitamin-D bolus doses for 8 weeks followed by vitamin D3 1000 units daily  10  Blurry vision with history of diabetes  Patient needs to see ophthalmologist for possible cataract on outpatient basis  11  Chronic pain syndrome  Patient is on Oxycodone IR to 2 5 mg every 6 hours on as-needed basis for severe pain, Ibuprofen 600 mg every 6 hours p r n  For moderate pain along with Tylenol on as needed basis for mild pain

## 2021-06-09 ENCOUNTER — APPOINTMENT (INPATIENT)
Dept: CT IMAGING | Facility: HOSPITAL | Age: 51
DRG: 751 | End: 2021-06-09
Payer: COMMERCIAL

## 2021-06-09 ENCOUNTER — HOSPITAL ENCOUNTER (INPATIENT)
Facility: HOSPITAL | Age: 51
LOS: 3 days | Discharge: HOSPICE | DRG: 426 | End: 2021-06-12
Attending: HOSPITALIST | Admitting: HOSPITALIST
Payer: COMMERCIAL

## 2021-06-09 VITALS
DIASTOLIC BLOOD PRESSURE: 89 MMHG | OXYGEN SATURATION: 98 % | RESPIRATION RATE: 18 BRPM | HEIGHT: 72 IN | SYSTOLIC BLOOD PRESSURE: 168 MMHG | BODY MASS INDEX: 27.05 KG/M2 | TEMPERATURE: 97.7 F | WEIGHT: 199.74 LBS | HEART RATE: 81 BPM

## 2021-06-09 DIAGNOSIS — F32.A DEPRESSION: ICD-10-CM

## 2021-06-09 DIAGNOSIS — C25.0 MALIGNANT NEOPLASM OF HEAD OF PANCREAS (HCC): ICD-10-CM

## 2021-06-09 DIAGNOSIS — F41.1 GENERALIZED ANXIETY DISORDER: Primary | ICD-10-CM

## 2021-06-09 DIAGNOSIS — E87.1 HYPONATREMIA: ICD-10-CM

## 2021-06-09 DIAGNOSIS — K85.90 ACUTE PANCREATITIS: ICD-10-CM

## 2021-06-09 DIAGNOSIS — L60.2 OVERGROWN TOENAILS: ICD-10-CM

## 2021-06-09 PROBLEM — J96.00 ACUTE RESPIRATORY FAILURE (HCC): Status: RESOLVED | Noted: 2021-05-11 | Resolved: 2021-06-09

## 2021-06-09 PROBLEM — K85.20 ALCOHOL-INDUCED ACUTE PANCREATITIS: Status: ACTIVE | Noted: 2021-06-09

## 2021-06-09 PROBLEM — G47.00 INSOMNIA: Status: RESOLVED | Noted: 2017-09-05 | Resolved: 2021-06-09

## 2021-06-09 LAB
ALBUMIN SERPL BCP-MCNC: 3.9 G/DL (ref 3.5–5.7)
ALP SERPL-CCNC: 94 U/L (ref 40–150)
ALT SERPL W P-5'-P-CCNC: 23 U/L (ref 7–52)
AMORPH URATE CRY URNS QL MICRO: ABNORMAL /HPF
AMYLASE SERPL-CCNC: 417 IU/L (ref 29–103)
ANION GAP SERPL CALCULATED.3IONS-SCNC: 7 MMOL/L (ref 4–13)
ANION GAP SERPL CALCULATED.3IONS-SCNC: 9 MMOL/L (ref 4–13)
ANISOCYTOSIS BLD QL SMEAR: PRESENT
AST SERPL W P-5'-P-CCNC: 23 U/L (ref 13–39)
BACTERIA UR QL AUTO: ABNORMAL /HPF
BASOPHILS # BLD AUTO: 0.1 THOUSANDS/ΜL (ref 0–0.1)
BASOPHILS NFR BLD AUTO: 1 % (ref 0–2)
BILIRUB SERPL-MCNC: 0.4 MG/DL (ref 0.2–1)
BILIRUB UR QL STRIP: NEGATIVE
BUN SERPL-MCNC: 12 MG/DL (ref 7–25)
BUN SERPL-MCNC: 13 MG/DL (ref 7–25)
CALCIUM SERPL-MCNC: 8.8 MG/DL (ref 8.6–10.5)
CALCIUM SERPL-MCNC: 8.8 MG/DL (ref 8.6–10.5)
CHLORIDE SERPL-SCNC: 74 MMOL/L (ref 98–107)
CHLORIDE SERPL-SCNC: 75 MMOL/L (ref 98–107)
CLARITY UR: ABNORMAL
CO2 SERPL-SCNC: 22 MMOL/L (ref 21–31)
CO2 SERPL-SCNC: 24 MMOL/L (ref 21–31)
COLOR UR: YELLOW
CREAT SERPL-MCNC: 0.65 MG/DL (ref 0.7–1.3)
CREAT SERPL-MCNC: 0.71 MG/DL (ref 0.7–1.3)
EOSINOPHIL # BLD AUTO: 0 THOUSAND/ΜL (ref 0–0.61)
EOSINOPHIL NFR BLD AUTO: 0 % (ref 0–5)
ERYTHROCYTE [DISTWIDTH] IN BLOOD BY AUTOMATED COUNT: 13.7 % (ref 11.5–14.5)
GFR SERPL CREATININE-BSD FRML MDRD: 107 ML/MIN/1.73SQ M
GFR SERPL CREATININE-BSD FRML MDRD: 111 ML/MIN/1.73SQ M
GIANT PLATELETS BLD QL SMEAR: PRESENT
GLUCOSE SERPL-MCNC: 120 MG/DL (ref 65–99)
GLUCOSE SERPL-MCNC: 124 MG/DL (ref 65–140)
GLUCOSE SERPL-MCNC: 160 MG/DL (ref 65–140)
GLUCOSE SERPL-MCNC: 169 MG/DL (ref 65–99)
GLUCOSE SERPL-MCNC: 171 MG/DL (ref 65–140)
GLUCOSE SERPL-MCNC: 280 MG/DL (ref 65–140)
GLUCOSE UR STRIP-MCNC: ABNORMAL MG/DL
HCT VFR BLD AUTO: 40 % (ref 42–47)
HGB BLD-MCNC: 14.4 G/DL (ref 14–18)
HGB UR QL STRIP.AUTO: NEGATIVE
KETONES UR STRIP-MCNC: ABNORMAL MG/DL
LEUKOCYTE ESTERASE UR QL STRIP: NEGATIVE
LIPASE SERPL-CCNC: 1497 U/L (ref 11–82)
LYMPHOCYTES # BLD AUTO: 1.1 THOUSANDS/ΜL (ref 0.6–4.47)
LYMPHOCYTES NFR BLD AUTO: 11 % (ref 21–51)
MCH RBC QN AUTO: 29.6 PG (ref 26–34)
MCHC RBC AUTO-ENTMCNC: 35.9 G/DL (ref 31–37)
MCV RBC AUTO: 82 FL (ref 81–99)
MONOCYTES # BLD AUTO: 0.7 THOUSAND/ΜL (ref 0.17–1.22)
MONOCYTES NFR BLD AUTO: 7 % (ref 2–12)
NEUTROPHILS # BLD AUTO: 7.9 THOUSANDS/ΜL (ref 1.4–6.5)
NEUTS SEG NFR BLD AUTO: 81 % (ref 42–75)
NITRITE UR QL STRIP: NEGATIVE
NON-SQ EPI CELLS URNS QL MICRO: ABNORMAL /HPF
PH UR STRIP.AUTO: 7.5 [PH]
PLATELET # BLD AUTO: 151 THOUSANDS/UL (ref 149–390)
PLATELET BLD QL SMEAR: ADEQUATE
PMV BLD AUTO: 7 FL (ref 8.6–11.7)
POLYCHROMASIA BLD QL SMEAR: PRESENT
POTASSIUM SERPL-SCNC: 4.3 MMOL/L (ref 3.5–5.5)
POTASSIUM SERPL-SCNC: 4.4 MMOL/L (ref 3.5–5.5)
PROT SERPL-MCNC: 6.8 G/DL (ref 6.4–8.9)
PROT UR STRIP-MCNC: NEGATIVE MG/DL
RBC # BLD AUTO: 4.86 MILLION/UL (ref 4.3–5.9)
RBC #/AREA URNS AUTO: ABNORMAL /HPF
RBC MORPH BLD: NORMAL
SODIUM SERPL-SCNC: 105 MMOL/L (ref 134–143)
SODIUM SERPL-SCNC: 106 MMOL/L (ref 134–143)
SP GR UR STRIP.AUTO: 1.02 (ref 1–1.03)
UROBILINOGEN UR QL STRIP.AUTO: 0.2 E.U./DL
WBC # BLD AUTO: 9.8 THOUSAND/UL (ref 4.8–10.8)
WBC #/AREA URNS AUTO: ABNORMAL /HPF

## 2021-06-09 PROCEDURE — 70450 CT HEAD/BRAIN W/O DYE: CPT

## 2021-06-09 PROCEDURE — 85025 COMPLETE CBC W/AUTO DIFF WBC: CPT | Performed by: FAMILY MEDICINE

## 2021-06-09 PROCEDURE — 80048 BASIC METABOLIC PNL TOTAL CA: CPT | Performed by: FAMILY MEDICINE

## 2021-06-09 PROCEDURE — 99239 HOSP IP/OBS DSCHRG MGMT >30: CPT | Performed by: NURSE PRACTITIONER

## 2021-06-09 PROCEDURE — 74150 CT ABDOMEN W/O CONTRAST: CPT

## 2021-06-09 PROCEDURE — 82150 ASSAY OF AMYLASE: CPT | Performed by: FAMILY MEDICINE

## 2021-06-09 PROCEDURE — 82948 REAGENT STRIP/BLOOD GLUCOSE: CPT

## 2021-06-09 PROCEDURE — 83935 ASSAY OF URINE OSMOLALITY: CPT | Performed by: PHYSICIAN ASSISTANT

## 2021-06-09 PROCEDURE — 82570 ASSAY OF URINE CREATININE: CPT | Performed by: PHYSICIAN ASSISTANT

## 2021-06-09 PROCEDURE — G1004 CDSM NDSC: HCPCS

## 2021-06-09 PROCEDURE — 84300 ASSAY OF URINE SODIUM: CPT | Performed by: PHYSICIAN ASSISTANT

## 2021-06-09 PROCEDURE — 80053 COMPREHEN METABOLIC PANEL: CPT | Performed by: FAMILY MEDICINE

## 2021-06-09 PROCEDURE — 81001 URINALYSIS AUTO W/SCOPE: CPT | Performed by: PHYSICIAN ASSISTANT

## 2021-06-09 PROCEDURE — 99223 1ST HOSP IP/OBS HIGH 75: CPT | Performed by: PHYSICIAN ASSISTANT

## 2021-06-09 PROCEDURE — 83690 ASSAY OF LIPASE: CPT | Performed by: FAMILY MEDICINE

## 2021-06-09 PROCEDURE — 99232 SBSQ HOSP IP/OBS MODERATE 35: CPT | Performed by: PSYCHIATRY & NEUROLOGY

## 2021-06-09 RX ORDER — PANTOPRAZOLE SODIUM 40 MG/1
40 TABLET, DELAYED RELEASE ORAL
Status: DISCONTINUED | OUTPATIENT
Start: 2021-06-10 | End: 2021-06-12 | Stop reason: HOSPADM

## 2021-06-09 RX ORDER — MAGNESIUM HYDROXIDE/ALUMINUM HYDROXICE/SIMETHICONE 120; 1200; 1200 MG/30ML; MG/30ML; MG/30ML
30 SUSPENSION ORAL EVERY 4 HOURS PRN
Status: CANCELLED | OUTPATIENT
Start: 2021-06-09

## 2021-06-09 RX ORDER — LANOLIN ALCOHOL/MO/W.PET/CERES
100 CREAM (GRAM) TOPICAL DAILY
Status: DISCONTINUED | OUTPATIENT
Start: 2021-06-10 | End: 2021-06-10

## 2021-06-09 RX ORDER — SODIUM CHLORIDE 9 MG/ML
200 INJECTION, SOLUTION INTRAVENOUS CONTINUOUS
Status: DISCONTINUED | OUTPATIENT
Start: 2021-06-09 | End: 2021-06-10

## 2021-06-09 RX ORDER — PIOGLITAZONEHYDROCHLORIDE 15 MG/1
30 TABLET ORAL DAILY
Status: CANCELLED | OUTPATIENT
Start: 2021-06-10

## 2021-06-09 RX ORDER — HYDROMORPHONE HCL/PF 1 MG/ML
0.5 SYRINGE (ML) INJECTION EVERY 4 HOURS PRN
Status: DISCONTINUED | OUTPATIENT
Start: 2021-06-09 | End: 2021-06-09 | Stop reason: HOSPADM

## 2021-06-09 RX ORDER — BENZTROPINE MESYLATE 0.5 MG/1
0.5 TABLET ORAL
Status: CANCELLED | OUTPATIENT
Start: 2021-06-09

## 2021-06-09 RX ORDER — PROPRANOLOL HYDROCHLORIDE 10 MG/1
10 TABLET ORAL 2 TIMES DAILY
Status: DISCONTINUED | OUTPATIENT
Start: 2021-06-10 | End: 2021-06-12 | Stop reason: HOSPADM

## 2021-06-09 RX ORDER — TRAZODONE HYDROCHLORIDE 50 MG/1
50 TABLET ORAL
Status: CANCELLED | OUTPATIENT
Start: 2021-06-09

## 2021-06-09 RX ORDER — ROPINIROLE 1 MG/1
1 TABLET, FILM COATED ORAL 2 TIMES DAILY
Status: DISCONTINUED | OUTPATIENT
Start: 2021-06-10 | End: 2021-06-12 | Stop reason: HOSPADM

## 2021-06-09 RX ORDER — GABAPENTIN 400 MG/1
400 CAPSULE ORAL 3 TIMES DAILY
Status: CANCELLED | OUTPATIENT
Start: 2021-06-09

## 2021-06-09 RX ORDER — TRAZODONE HYDROCHLORIDE 50 MG/1
50 TABLET ORAL
Status: DISCONTINUED | OUTPATIENT
Start: 2021-06-09 | End: 2021-06-12 | Stop reason: HOSPADM

## 2021-06-09 RX ORDER — PIOGLITAZONEHYDROCHLORIDE 15 MG/1
30 TABLET ORAL DAILY
Status: DISCONTINUED | OUTPATIENT
Start: 2021-06-10 | End: 2021-06-09

## 2021-06-09 RX ORDER — MAGNESIUM HYDROXIDE/ALUMINUM HYDROXICE/SIMETHICONE 120; 1200; 1200 MG/30ML; MG/30ML; MG/30ML
30 SUSPENSION ORAL EVERY 4 HOURS PRN
Status: DISCONTINUED | OUTPATIENT
Start: 2021-06-09 | End: 2021-06-09 | Stop reason: HOSPADM

## 2021-06-09 RX ORDER — FOLIC ACID 1 MG/1
1 TABLET ORAL DAILY
Status: CANCELLED | OUTPATIENT
Start: 2021-06-10

## 2021-06-09 RX ORDER — RISPERIDONE 1 MG/1
1 TABLET, ORALLY DISINTEGRATING ORAL
Status: CANCELLED | OUTPATIENT
Start: 2021-06-09

## 2021-06-09 RX ORDER — MAGNESIUM HYDROXIDE/ALUMINUM HYDROXICE/SIMETHICONE 120; 1200; 1200 MG/30ML; MG/30ML; MG/30ML
30 SUSPENSION ORAL EVERY 4 HOURS PRN
Status: DISCONTINUED | OUTPATIENT
Start: 2021-06-09 | End: 2021-06-12 | Stop reason: HOSPADM

## 2021-06-09 RX ORDER — INSULIN GLARGINE 100 [IU]/ML
36 INJECTION, SOLUTION SUBCUTANEOUS EVERY MORNING
Status: DISCONTINUED | OUTPATIENT
Start: 2021-06-10 | End: 2021-06-09

## 2021-06-09 RX ORDER — HYDROXYZINE HYDROCHLORIDE 25 MG/1
25 TABLET, FILM COATED ORAL
Status: DISCONTINUED | OUTPATIENT
Start: 2021-06-09 | End: 2021-06-12 | Stop reason: HOSPADM

## 2021-06-09 RX ORDER — HALOPERIDOL 5 MG/ML
5 INJECTION INTRAMUSCULAR
Status: DISCONTINUED | OUTPATIENT
Start: 2021-06-09 | End: 2021-06-12 | Stop reason: HOSPADM

## 2021-06-09 RX ORDER — HYDROXYZINE HYDROCHLORIDE 25 MG/1
25 TABLET, FILM COATED ORAL
Status: CANCELLED | OUTPATIENT
Start: 2021-06-09

## 2021-06-09 RX ORDER — HYDROXYZINE HYDROCHLORIDE 25 MG/1
50 TABLET, FILM COATED ORAL
Status: CANCELLED | OUTPATIENT
Start: 2021-06-09

## 2021-06-09 RX ORDER — ERGOCALCIFEROL 1.25 MG/1
50000 CAPSULE ORAL WEEKLY
Status: CANCELLED | OUTPATIENT
Start: 2021-06-14 | End: 2021-07-12

## 2021-06-09 RX ORDER — GABAPENTIN 400 MG/1
400 CAPSULE ORAL 3 TIMES DAILY
Status: DISCONTINUED | OUTPATIENT
Start: 2021-06-10 | End: 2021-06-12 | Stop reason: HOSPADM

## 2021-06-09 RX ORDER — HALOPERIDOL 5 MG/ML
5 INJECTION INTRAMUSCULAR
Status: CANCELLED | OUTPATIENT
Start: 2021-06-09

## 2021-06-09 RX ORDER — QUETIAPINE FUMARATE 100 MG/1
200 TABLET, FILM COATED ORAL
Status: DISCONTINUED | OUTPATIENT
Start: 2021-06-10 | End: 2021-06-12 | Stop reason: HOSPADM

## 2021-06-09 RX ORDER — PROPRANOLOL HYDROCHLORIDE 10 MG/1
10 TABLET ORAL 2 TIMES DAILY
Status: CANCELLED | OUTPATIENT
Start: 2021-06-10

## 2021-06-09 RX ORDER — SODIUM CHLORIDE 9 MG/ML
75 INJECTION, SOLUTION INTRAVENOUS CONTINUOUS
Status: CANCELLED | OUTPATIENT
Start: 2021-06-09

## 2021-06-09 RX ORDER — RISPERIDONE 0.5 MG/1
1 TABLET, ORALLY DISINTEGRATING ORAL
Status: DISCONTINUED | OUTPATIENT
Start: 2021-06-09 | End: 2021-06-12 | Stop reason: HOSPADM

## 2021-06-09 RX ORDER — ALBUTEROL SULFATE 90 UG/1
2 AEROSOL, METERED RESPIRATORY (INHALATION) EVERY 4 HOURS PRN
Status: CANCELLED | OUTPATIENT
Start: 2021-06-09

## 2021-06-09 RX ORDER — ACETAMINOPHEN 325 MG/1
650 TABLET ORAL EVERY 4 HOURS PRN
Status: CANCELLED | OUTPATIENT
Start: 2021-06-09

## 2021-06-09 RX ORDER — BENZTROPINE MESYLATE 1 MG/ML
1 INJECTION INTRAMUSCULAR; INTRAVENOUS
Status: CANCELLED | OUTPATIENT
Start: 2021-06-09

## 2021-06-09 RX ORDER — ERGOCALCIFEROL 1.25 MG/1
50000 CAPSULE ORAL WEEKLY
Status: DISCONTINUED | OUTPATIENT
Start: 2021-06-14 | End: 2021-06-12 | Stop reason: HOSPADM

## 2021-06-09 RX ORDER — AMOXICILLIN 250 MG
1 CAPSULE ORAL 2 TIMES DAILY
Status: CANCELLED | OUTPATIENT
Start: 2021-06-10

## 2021-06-09 RX ORDER — RISPERIDONE 0.25 MG/1
0.25 TABLET, ORALLY DISINTEGRATING ORAL
Status: DISCONTINUED | OUTPATIENT
Start: 2021-06-09 | End: 2021-06-12 | Stop reason: HOSPADM

## 2021-06-09 RX ORDER — IBUPROFEN 600 MG/1
600 TABLET ORAL EVERY 6 HOURS PRN
Status: DISCONTINUED | OUTPATIENT
Start: 2021-06-09 | End: 2021-06-10

## 2021-06-09 RX ORDER — IBUPROFEN 600 MG/1
600 TABLET ORAL EVERY 6 HOURS PRN
Status: CANCELLED | OUTPATIENT
Start: 2021-06-09

## 2021-06-09 RX ORDER — GUAIFENESIN/DEXTROMETHORPHAN 100-10MG/5
10 SYRUP ORAL EVERY 4 HOURS PRN
Status: DISCONTINUED | OUTPATIENT
Start: 2021-06-09 | End: 2021-06-12 | Stop reason: HOSPADM

## 2021-06-09 RX ORDER — NICOTINE 21 MG/24HR
1 PATCH, TRANSDERMAL 24 HOURS TRANSDERMAL DAILY
Status: DISCONTINUED | OUTPATIENT
Start: 2021-06-10 | End: 2021-06-12 | Stop reason: HOSPADM

## 2021-06-09 RX ORDER — MELATONIN
1000 DAILY
Status: CANCELLED | OUTPATIENT
Start: 2021-07-05

## 2021-06-09 RX ORDER — RISPERIDONE 0.5 MG/1
0.5 TABLET, ORALLY DISINTEGRATING ORAL
Status: CANCELLED | OUTPATIENT
Start: 2021-06-09

## 2021-06-09 RX ORDER — CHOLESTYRAMINE LIGHT 4 G/5.7G
4 POWDER, FOR SUSPENSION ORAL 2 TIMES DAILY
Status: CANCELLED | OUTPATIENT
Start: 2021-06-10

## 2021-06-09 RX ORDER — ONDANSETRON 4 MG/1
4 TABLET, ORALLY DISINTEGRATING ORAL EVERY 6 HOURS PRN
Status: CANCELLED | OUTPATIENT
Start: 2021-06-09

## 2021-06-09 RX ORDER — INSULIN GLARGINE 100 [IU]/ML
36 INJECTION, SOLUTION SUBCUTANEOUS EVERY MORNING
Status: CANCELLED | OUTPATIENT
Start: 2021-06-10

## 2021-06-09 RX ORDER — BENZTROPINE MESYLATE 1 MG/1
0.5 TABLET ORAL
Status: DISCONTINUED | OUTPATIENT
Start: 2021-06-09 | End: 2021-06-12 | Stop reason: HOSPADM

## 2021-06-09 RX ORDER — NICOTINE 21 MG/24HR
1 PATCH, TRANSDERMAL 24 HOURS TRANSDERMAL DAILY
Status: CANCELLED | OUTPATIENT
Start: 2021-06-10

## 2021-06-09 RX ORDER — MELATONIN
1000 DAILY
Status: DISCONTINUED | OUTPATIENT
Start: 2021-07-05 | End: 2021-06-12 | Stop reason: HOSPADM

## 2021-06-09 RX ORDER — ACETAMINOPHEN 325 MG/1
650 TABLET ORAL EVERY 4 HOURS PRN
Status: DISCONTINUED | OUTPATIENT
Start: 2021-06-09 | End: 2021-06-12 | Stop reason: HOSPADM

## 2021-06-09 RX ORDER — ROPINIROLE 1 MG/1
1 TABLET, FILM COATED ORAL 2 TIMES DAILY
Status: CANCELLED | OUTPATIENT
Start: 2021-06-09

## 2021-06-09 RX ORDER — LANOLIN ALCOHOL/MO/W.PET/CERES
100 CREAM (GRAM) TOPICAL DAILY
Status: CANCELLED | OUTPATIENT
Start: 2021-06-10

## 2021-06-09 RX ORDER — CHOLESTYRAMINE LIGHT 4 G/5.7G
4 POWDER, FOR SUSPENSION ORAL 2 TIMES DAILY
Status: DISCONTINUED | OUTPATIENT
Start: 2021-06-10 | End: 2021-06-12 | Stop reason: HOSPADM

## 2021-06-09 RX ORDER — GUAIFENESIN/DEXTROMETHORPHAN 100-10MG/5
10 SYRUP ORAL EVERY 4 HOURS PRN
Status: CANCELLED | OUTPATIENT
Start: 2021-06-09

## 2021-06-09 RX ORDER — AMOXICILLIN 250 MG
1 CAPSULE ORAL 2 TIMES DAILY
Status: DISCONTINUED | OUTPATIENT
Start: 2021-06-10 | End: 2021-06-12 | Stop reason: HOSPADM

## 2021-06-09 RX ORDER — PANTOPRAZOLE SODIUM 40 MG/1
40 TABLET, DELAYED RELEASE ORAL
Status: CANCELLED | OUTPATIENT
Start: 2021-06-10

## 2021-06-09 RX ORDER — QUETIAPINE FUMARATE 200 MG/1
200 TABLET, FILM COATED ORAL
Status: CANCELLED | OUTPATIENT
Start: 2021-06-09

## 2021-06-09 RX ORDER — ALBUTEROL SULFATE 90 UG/1
2 AEROSOL, METERED RESPIRATORY (INHALATION) EVERY 4 HOURS PRN
Status: DISCONTINUED | OUTPATIENT
Start: 2021-06-09 | End: 2021-06-12 | Stop reason: HOSPADM

## 2021-06-09 RX ORDER — ONDANSETRON 4 MG/1
4 TABLET, ORALLY DISINTEGRATING ORAL EVERY 6 HOURS PRN
Status: DISCONTINUED | OUTPATIENT
Start: 2021-06-09 | End: 2021-06-12 | Stop reason: HOSPADM

## 2021-06-09 RX ORDER — INSULIN GLARGINE 100 [IU]/ML
15 INJECTION, SOLUTION SUBCUTANEOUS EVERY MORNING
Status: DISCONTINUED | OUTPATIENT
Start: 2021-06-10 | End: 2021-06-12 | Stop reason: HOSPADM

## 2021-06-09 RX ORDER — HYDROXYZINE HYDROCHLORIDE 25 MG/1
50 TABLET, FILM COATED ORAL
Status: DISCONTINUED | OUTPATIENT
Start: 2021-06-09 | End: 2021-06-12 | Stop reason: HOSPADM

## 2021-06-09 RX ORDER — RISPERIDONE 0.25 MG/1
0.25 TABLET, ORALLY DISINTEGRATING ORAL
Status: CANCELLED | OUTPATIENT
Start: 2021-06-09

## 2021-06-09 RX ORDER — DEXTROSE AND SODIUM CHLORIDE 5; .45 G/100ML; G/100ML
75 INJECTION, SOLUTION INTRAVENOUS CONTINUOUS
Status: DISCONTINUED | OUTPATIENT
Start: 2021-06-09 | End: 2021-06-09

## 2021-06-09 RX ORDER — RISPERIDONE 0.5 MG/1
0.5 TABLET, ORALLY DISINTEGRATING ORAL
Status: DISCONTINUED | OUTPATIENT
Start: 2021-06-09 | End: 2021-06-12 | Stop reason: HOSPADM

## 2021-06-09 RX ORDER — BENZTROPINE MESYLATE 1 MG/ML
1 INJECTION INTRAMUSCULAR; INTRAVENOUS
Status: DISCONTINUED | OUTPATIENT
Start: 2021-06-09 | End: 2021-06-12 | Stop reason: HOSPADM

## 2021-06-09 RX ORDER — FOLIC ACID 1 MG/1
1 TABLET ORAL DAILY
Status: DISCONTINUED | OUTPATIENT
Start: 2021-06-10 | End: 2021-06-10

## 2021-06-09 RX ORDER — LOSARTAN POTASSIUM 50 MG/1
50 TABLET ORAL DAILY
Status: CANCELLED | OUTPATIENT
Start: 2021-06-10

## 2021-06-09 RX ORDER — OXYCODONE HYDROCHLORIDE 5 MG/1
2.5 TABLET ORAL EVERY 6 HOURS PRN
Status: CANCELLED | OUTPATIENT
Start: 2021-06-09

## 2021-06-09 RX ORDER — DEXTROSE AND SODIUM CHLORIDE 5; .45 G/100ML; G/100ML
75 INJECTION, SOLUTION INTRAVENOUS CONTINUOUS
Status: CANCELLED | OUTPATIENT
Start: 2021-06-09

## 2021-06-09 RX ORDER — OXYCODONE HYDROCHLORIDE 5 MG/1
2.5 TABLET ORAL EVERY 6 HOURS PRN
Status: DISCONTINUED | OUTPATIENT
Start: 2021-06-09 | End: 2021-06-10

## 2021-06-09 RX ADMIN — METFORMIN HYDROCHLORIDE 500 MG: 500 TABLET, FILM COATED ORAL at 08:41

## 2021-06-09 RX ADMIN — MAGNESIUM OXIDE TAB 400 MG (241.3 MG ELEMENTAL MG) 400 MG: 400 (241.3 MG) TAB at 08:42

## 2021-06-09 RX ADMIN — DOCUSATE SODIUM 50 MG AND SENNOSIDES 8.6 MG 1 TABLET: 8.6; 5 TABLET, FILM COATED ORAL at 17:08

## 2021-06-09 RX ADMIN — MAGNESIUM OXIDE TAB 400 MG (241.3 MG ELEMENTAL MG) 400 MG: 400 (241.3 MG) TAB at 17:09

## 2021-06-09 RX ADMIN — PIOGLITAZONE 30 MG: 15 TABLET ORAL at 08:41

## 2021-06-09 RX ADMIN — INSULIN GLARGINE 36 UNITS: 100 INJECTION, SOLUTION SUBCUTANEOUS at 08:52

## 2021-06-09 RX ADMIN — PROPRANOLOL HYDROCHLORIDE 10 MG: 10 TABLET ORAL at 17:08

## 2021-06-09 RX ADMIN — INSULIN LISPRO 4 UNITS: 100 INJECTION, SOLUTION INTRAVENOUS; SUBCUTANEOUS at 17:03

## 2021-06-09 RX ADMIN — PANTOPRAZOLE SODIUM 40 MG: 40 TABLET, DELAYED RELEASE ORAL at 05:58

## 2021-06-09 RX ADMIN — HYDROMORPHONE HYDROCHLORIDE 0.5 MG: 1 INJECTION, SOLUTION INTRAMUSCULAR; INTRAVENOUS; SUBCUTANEOUS at 22:13

## 2021-06-09 RX ADMIN — Medication 1 TABLET: at 08:41

## 2021-06-09 RX ADMIN — TIOTROPIUM BROMIDE 18 MCG: 18 CAPSULE ORAL; RESPIRATORY (INHALATION) at 08:53

## 2021-06-09 RX ADMIN — ROPINIROLE 1 MG: 1 TABLET, FILM COATED ORAL at 08:40

## 2021-06-09 RX ADMIN — INSULIN LISPRO 1 UNITS: 100 INJECTION, SOLUTION INTRAVENOUS; SUBCUTANEOUS at 12:33

## 2021-06-09 RX ADMIN — METFORMIN HYDROCHLORIDE 500 MG: 500 TABLET, FILM COATED ORAL at 15:43

## 2021-06-09 RX ADMIN — GABAPENTIN 400 MG: 400 CAPSULE ORAL at 08:40

## 2021-06-09 RX ADMIN — ALUMINUM HYDROXIDE, MAGNESIUM HYDROXIDE, AND SIMETHICONE 30 ML: 200; 200; 20 SUSPENSION ORAL at 15:44

## 2021-06-09 RX ADMIN — ONDANSETRON 4 MG: 4 TABLET, ORALLY DISINTEGRATING ORAL at 08:51

## 2021-06-09 RX ADMIN — IBUPROFEN 600 MG: 600 TABLET, FILM COATED ORAL at 12:40

## 2021-06-09 RX ADMIN — FOLIC ACID 1 MG: 1 TABLET ORAL at 08:41

## 2021-06-09 RX ADMIN — OXYCODONE HYDROCHLORIDE 2.5 MG: 5 TABLET ORAL at 17:15

## 2021-06-09 RX ADMIN — SERTRALINE HYDROCHLORIDE 150 MG: 100 TABLET ORAL at 08:40

## 2021-06-09 RX ADMIN — OXYCODONE HYDROCHLORIDE 2.5 MG: 5 TABLET ORAL at 08:50

## 2021-06-09 RX ADMIN — PROPRANOLOL HYDROCHLORIDE 10 MG: 10 TABLET ORAL at 08:41

## 2021-06-09 RX ADMIN — CHOLESTYRAMINE 4 G: 4 POWDER, FOR SUSPENSION ORAL at 08:38

## 2021-06-09 RX ADMIN — LOSARTAN POTASSIUM 50 MG: 50 TABLET, FILM COATED ORAL at 08:42

## 2021-06-09 RX ADMIN — DOCUSATE SODIUM 50 MG AND SENNOSIDES 8.6 MG 1 TABLET: 8.6; 5 TABLET, FILM COATED ORAL at 08:41

## 2021-06-09 RX ADMIN — Medication 1 PATCH: at 08:40

## 2021-06-09 RX ADMIN — GABAPENTIN 400 MG: 400 CAPSULE ORAL at 15:43

## 2021-06-09 RX ADMIN — THIAMINE HCL TAB 100 MG 100 MG: 100 TAB at 08:41

## 2021-06-09 NOTE — PLAN OF CARE
Problem: DISCHARGE PLANNING - CARE MANAGEMENT  Goal: Discharge to post-acute care or home with appropriate resources  Description: INTERVENTIONS:  - Conduct assessment to determine patient/family and health care team treatment goals, and need for post-acute services based on payer coverage, community resources, and patient preferences, and barriers to discharge  - Address psychosocial, clinical, and financial barriers to discharge as identified in assessment in conjunction with the patient/family and health care team  - Arrange appropriate level of post-acute services according to patients   needs and preference and payer coverage in collaboration with the physician and health care team  - Communicate with and update the patient/family, physician, and health care team regarding progress on the discharge plan  - Arrange appropriate transportation to post-acute venues  Outcome: Progressing     Pt progressing;  No DC date - awaiting oxy taper for Rehab placement

## 2021-06-09 NOTE — PROGRESS NOTES
Progress Note - Mable Garrett 46 y o  male MRN: [id number]    Unit/Bed#: [facility]-02 Encounter: [id number]        Subjective:   Patient seen and examined at bedside after reviewing the chart and discussing the case with the caring staff  Patient examined at bedside  Patient reportedly had an episode of nausea for which she received Zofran this morning  Lajean Cassette Physical Exam   Vitals: Blood pressure 153/90, pulse 76, temperature (!) 96 8 °F (36 °C), temperature source Temporal, resp  rate 18, height 6' (1 829 m), weight 90 6 kg (199 lb 11 8 oz), SpO2 99 %  ,Body mass index is 27 09 kg/m²  Constitutional: Patient appears well-developed  HEENT: PERR, EOMI, MMM  Cardiovascular: Normal rate and regular rhythm  Pulmonary/Chest: Effort normal and breath sounds normal    Abdomen: Soft, + BS, NT    Assessment/Plan:  Mable Garrett is a(n) 46 y o  male with MDD      1  Cardiac with history of Hypertension and dyslipidemia  As patient's blood pressure is high I will increase patient's Cozaar 50 mg daily  Continue cholestyramine sugar free 2 times daily  2  Diabetes mellitus  Patient's hemoglobin A1c was 7 4 on 05/11/2021  Continue carb controlled diet, fingerstick glucose 4 times daily before meals and at bedtime, Humalog sliding scale, metformin, Actos and I will increase Lantus 36 units qAM   3  Tobacco use  Patient has nicotine transdermal patch  4  Alcohol abuse with history of multiple acute pancreatitis  No S/S of withdrawal  Continue folic acid, thiamine, multivitamin  Patient follows up with specialists for his pancreatitis  5  GERD  Continue pantoprazole 40 mg daily  6  COPD  Continue Spiriva daily, albuterol as needed, and 2 L nasal cannula  7  Constipation  Continue Senokot S twice daily  8  Cough  Patient may receive Robitussin DM as needed  9  Vitamin-D deficiency  Continue vitamin-D bolus doses for 8 weeks followed by vitamin D3 1000 units daily    10  Blurry vision with history of diabetes  Patient needs to see ophthalmologist for possible cataract on outpatient basis  11  Chronic pain syndrome  Patient is on Oxycodone IR to 2 5 mg every 6 hours on as-needed basis for severe pain, Ibuprofen 600 mg every 6 hours p r n  For moderate pain along with Tylenol on as needed basis for mild pain

## 2021-06-09 NOTE — PROGRESS NOTES
Dr Fausto Severino called unit  New order for D5 1/2 NSS at 125 ml/hr, patient aware of new order  20 gauge IV inserted in right antecubital after 2 attempts, tolerated well

## 2021-06-09 NOTE — PROGRESS NOTES
Patient visible on the unit  Pleasant and cooperative  Social with peers  Denies SI, HI, hallucinations  Anxiety and depression low  Had a long discussion about his diabetes  Patient shows good insight  Compliant with meals and medications  Able to make needs known  Q 7 minute checks maintained   Will continue to monitor and access

## 2021-06-09 NOTE — PLAN OF CARE
Problem: Alteration in Thoughts and Perception  Goal: Treatment Goal: Gain control of psychotic behaviors/thinking, reduce/eliminate presenting symptoms and demonstrate improved reality functioning upon discharge  Outcome: Progressing  Goal: Refrain from acting on delusional thinking/internal stimuli  Description: Interventions:  - Monitor patient closely, per order   - Utilize least restrictive measures   - Set reasonable limits, give positive feedback for acceptable   - Administer medications as ordered and monitor of potential side effects  Outcome: Progressing  Goal: Agree to be compliant with medication regime, as prescribed and report medication side effects  Description: Interventions:  - Offer appropriate PRN medication and supervise ingestion; conduct AIMS, as needed   Outcome: Progressing     Problem: Ineffective Coping  Goal: Identifies healthy coping skills  Outcome: Progressing  Goal: Participates in unit activities  Description: Interventions:  - Provide therapeutic environment   - Provide required programming   - Redirect inappropriate behaviors   Outcome: Progressing  Goal: Patient/Family participate in treatment and DC plans  Description: Interventions:  - Provide therapeutic environment  Outcome: Progressing  Goal: Patient/Family verbalizes awareness of resources  Outcome: Progressing     Problem: Risk for Self Injury/Neglect  Goal: Treatment Goal: Remain safe during length of stay, learn and adopt new coping skills, and be free of self-injurious ideation, impulses and acts at the time of discharge  Outcome: Progressing  Goal: Verbalize thoughts and feelings  Description: Interventions:  - Assess and re-assess patient's lethality and potential for self-injury  - Engage patient in 1:1 interactions, daily, for a minimum of 15 minutes  - Encourage patient to express feelings, fears, frustrations, hopes  - Establish rapport/trust with patient   Outcome: Progressing  Goal: Refrain from harming self  Description: Interventions:  - Monitor patient closely, per order  - Develop a trusting relationship  - Supervise medication ingestion, monitor effects and side effects   Outcome: Progressing     Problem: Depression  Goal: Treatment Goal: Demonstrate behavioral control of depressive symptoms, verbalize feelings of improved mood/affect, and adopt new coping skills prior to discharge  Outcome: Progressing  Goal: Refrain from isolation  Description: Interventions:  - Develop a trusting relationship   - Encourage socialization   Outcome: Progressing  Goal: Complete daily ADLs, including personal hygiene independently, as able  Description: Interventions:  - Observe, teach, and assist patient with ADLS  -  Monitor and promote a balance of rest/activity, with adequate nutrition and elimination   Outcome: Progressing  Goal: Refrain from self-neglect  Outcome: Progressing     Problem: Anxiety  Goal: Anxiety is at manageable level  Description: Interventions:  - Assess and monitor patient's anxiety level  - Monitor for signs and symptoms (heart palpitations, chest pain, shortness of breath, headaches, nausea, feeling jumpy, restlessness, irritable, apprehensive)  - Collaborate with interdisciplinary team and initiate plan and interventions as ordered    - [place] patient to unit/surroundings  - Explain treatment plan  - Encourage participation in care  - Encourage verbalization of concerns/fears  - Identify coping mechanisms  - Assist in developing anxiety-reducing skills  - Administer/offer alternative therapies  - Limit or eliminate stimulants  Outcome: Progressing

## 2021-06-09 NOTE — PROGRESS NOTES
Patient visible in milieu, pleasant and cooperative in interaction  Social with staff  Endorses anxiety/depression at 2/10, denies SI/HI, hallucinations  Affect flat  C/O GI upset, PRN Zofran given as ordered, helpful in reduction of GI upset  Dr Radha Rapp aware of same  No glycemic reactions  Remains medication compliant and on 7" checks for safety and behaviors

## 2021-06-09 NOTE — PROGRESS NOTES
D5 1/2 NSS infusing in right antecubital without difficulty  No glycemic reactions  Remains on 7" checks for safety and behaviors

## 2021-06-09 NOTE — NURSING NOTE
Late entry:    Assumed care of this patient from prior shift nurse at [phone]  Patient is currently in bed, appears to be sleeping  Breathing easy and non-labored on 2L NC  No acute behaviors noted  Will CTM via q7 minute safety checks

## 2021-06-09 NOTE — PROGRESS NOTES
Patient C/O worsening abdominal pain  Abdomen soft, nontender, nondistended with hyperactive bowel sounds x 4  Dr Jethro Handy aware, new order noted for CT of abdomen without contrast  Dr Jethro Handy aware of CT results, new orders noted for stat CBC with Diff, CMP, Amylase, Lipase and to keep patient NPO  Patient aware of CT results and new orders  Labs drawn and sent to lab

## 2021-06-09 NOTE — PROGRESS NOTES
Zofran 4 mg and Oxycodone IR 2 5 mg given PO at 2018 for complaint of nausea and # 8 severe pain generalized pain

## 2021-06-09 NOTE — NURSING NOTE
No acute events during the overnight period  Patient appears to have slept through the night without issue  No complaints voiced  No acute behaviors observed  Oxygen maintained via 2L NC; breathing easy and non-labored  Will CTM via q7 minute safety checks

## 2021-06-09 NOTE — PROGRESS NOTES
Progress Note - Behavioral Health   Kendall Cuevas 46 y o  male MRN: [id number]  Unit/Bed#: [facility] 205-02 Encounter: [id number]    Assessment/Plan   Principal Problem:    MDD (major depressive disorder), recurrent episode, severe ([facility] )  Active Problems:    Quiros esophagus    Benign essential hypertension    COPD (chronic obstructive pulmonary disease) ([facility] )    Fatty liver    Diabetes mellitus type 1 5, managed as type 1 (HCC)    Anxiety and depression    History of ETOH abuse      Behavior over the last 24 hours:  Slowly improving   Sleep: slept better  Appetite: normal  Medication side effects: No  ROS: nausea and abdominal pain, all other system are negative for acute changes    Mental Status Evaluation:    Appearance:  age appropriate   Behavior:  cooperative   Speech:  soft, goal directed    Mood:  depressed   Affect:  constricted    Thought Process:  goal directed   Thought Content:  No overt delusions   Perceptual Disturbances: None   Risk Potential: Suicidal Ideations none  Homicidal Ideations none  Potential for Aggression No   Sensorium:  person, place and time/date   Memory:  recent and remote memory grossly intact   Consciousness:  alert and awake    Attention: attention span and concentration were age appropriate   Insight:  limited   Judgment: limited   Gait/Station: normal gait/station   Motor Activity: no abnormal movements     Progress Toward Goals:  Patient reports doing better with improved sleep and appetite  He reports mild GI symptoms but denies any other physical issues  Discussed about the need to stop taking pain medication in order to continue his treatment in rehab  He is agreement with the plan  Recommended Treatment: Continue with group therapy, milieu therapy and occupational therapy  Risks, benefits and possible side effects of Medications:   Risks, benefits, and possible side effects of medications explained to patient and patient verbalizes understanding        Medications: all current active meds have been reviewed  Labs: I have personally reviewed all pertinent laboratory/tests results  Most Recent Labs:   Lab Results   Component Value Date    WBC 6 80 05/11/2021    RBC 4 89 05/11/2021    HGB 14 7 05/11/2021    HCT 43 8 05/11/2021     05/16/2021    RDW 15 0 (H) 05/11/2021    NEUTROABS 4 10 05/11/2021    SODIUM 129 (L) 05/16/2021    K 4 3 05/16/2021    CL 96 (L) 05/16/2021    CO2 23 05/16/2021    BUN 17 05/16/2021    CREATININE 0 86 05/16/2021    GLUC 239 (H) 05/16/2021    GLUF 219 (H) 07/18/2019    CALCIUM 9 4 05/16/2021    AST 21 05/16/2021    ALT 19 05/16/2021    ALKPHOS 85 05/16/2021    TP 7 3 05/16/2021    ALB 3 7 05/16/2021    TBILI 0 50 05/16/2021    CHOLESTEROL 157 02/21/2019    HDL 44 02/21/2019    TRIG 188 (H) 02/21/2019    LDLCALC 75 02/21/2019    NONHDLC 113 02/21/2019    AMMONIA 50 05/11/2021    NET1AJVYVLEL 1 680 05/11/2021    HGBA1C 7 4 (H) 05/11/2021     05/11/2021       Counseling / Coordination of Care  Total floor / unit time spent today 20 minutes  Greater than 50% of total time was spent with the patient and / or family counseling and / or coordination of care

## 2021-06-09 NOTE — PROGRESS NOTES
06/09/21   Team Meeting   Meeting Type Daily Rounds   Team Members Present   Team Members Present Physician;Nurse;; Occupational Therapist   Physician Team Member Dr Willie Bailey MD; [place]   Nursing Team Member Janice Escamilla RN   Social Work Team Member MAAME Valdez   OT Team Member [place], [place]   Patient/Family Present   Patient Present No   Patient's Family Present No     No DC date - awaiting OXY taper for rehab referrals; c/o GI upset; prn zofran; prn Oxy utilized this morning; medical provider aware oxy taper needed - concerns re: medical complexities

## 2021-06-09 NOTE — SOCIAL WORK
TRISTAN received email from pt cicayda requesting pt signature on a charge dispute; TRISTAN reviewed with patient - pt explained he contacted his bank at his wifes request to dispute a charge - pt signed form; TRISTAN scanned and emailed form back to bank securely; bank rep confirmed receipt of form    TRISTAN met with pt to discuss pt goals for discharge; pt states his goal continues to be inpatient rehab; TRISTAN dicussed patient oxy use with patient and explained as long as patient continues to use oxy, referrals cannot be sent; TRISTAN recommended patient utilize other non-narcotic prns to assist with obtaining discharge goal; pt states "I was having a lot of GI issues and I know tylenol and motrin cause stomach irritation but the oxy doesn't which is why I was using it"; pt expressed understanding of need to stop utilizing oxy in order to go to rehab services; no additional questions or concerns at this time

## 2021-06-10 ENCOUNTER — APPOINTMENT (OUTPATIENT)
Dept: MRI IMAGING | Facility: HOSPITAL | Age: 51
DRG: 426 | End: 2021-06-10
Payer: COMMERCIAL

## 2021-06-10 PROBLEM — T50.902A INTENTIONAL OVERDOSE OF DRUG IN TABLET FORM (HCC): Status: RESOLVED | Noted: 2021-05-11 | Resolved: 2021-06-10

## 2021-06-10 PROBLEM — F10.21 ALCOHOL DEPENDENCE IN EARLY, EARLY PARTIAL, SUSTAINED FULL, OR SUSTAINED PARTIAL REMISSION (HCC): Chronic | Status: ACTIVE | Noted: 2019-02-20

## 2021-06-10 PROBLEM — K85.00 IDIOPATHIC ACUTE PANCREATITIS WITHOUT INFECTION OR NECROSIS: Status: ACTIVE | Noted: 2021-06-09

## 2021-06-10 PROBLEM — J44.9 COPD (CHRONIC OBSTRUCTIVE PULMONARY DISEASE) (HCC): Chronic | Status: ACTIVE | Noted: 2017-03-29

## 2021-06-10 PROBLEM — E55.9 VITAMIN D DEFICIENCY: Status: ACTIVE | Noted: 2021-06-10

## 2021-06-10 PROBLEM — F17.200 TOBACCO DEPENDENCE: Chronic | Status: ACTIVE | Noted: 2018-08-13

## 2021-06-10 PROBLEM — E55.9 VITAMIN D DEFICIENCY: Chronic | Status: ACTIVE | Noted: 2021-06-10

## 2021-06-10 LAB
ANION GAP SERPL CALCULATED.3IONS-SCNC: 4 MMOL/L (ref 4–13)
ANION GAP SERPL CALCULATED.3IONS-SCNC: 5 MMOL/L (ref 4–13)
ANION GAP SERPL CALCULATED.3IONS-SCNC: 6 MMOL/L (ref 4–13)
BUN SERPL-MCNC: 10 MG/DL (ref 7–25)
BUN SERPL-MCNC: 11 MG/DL (ref 7–25)
BUN SERPL-MCNC: 12 MG/DL (ref 7–25)
CA-I BLD-SCNC: 1.04 MMOL/L (ref 1.12–1.32)
CA-I BLD-SCNC: 1.09 MMOL/L (ref 1.12–1.32)
CALCIUM SERPL-MCNC: 8.3 MG/DL (ref 8.6–10.5)
CALCIUM SERPL-MCNC: 8.4 MG/DL (ref 8.6–10.5)
CALCIUM SERPL-MCNC: 8.4 MG/DL (ref 8.6–10.5)
CALCIUM SERPL-MCNC: 8.5 MG/DL (ref 8.6–10.5)
CALCIUM SERPL-MCNC: 8.6 MG/DL (ref 8.6–10.5)
CALCIUM SERPL-MCNC: 8.6 MG/DL (ref 8.6–10.5)
CALCIUM SERPL-MCNC: 8.7 MG/DL (ref 8.6–10.5)
CHLORIDE SERPL-SCNC: 76 MMOL/L (ref 98–107)
CHLORIDE SERPL-SCNC: 77 MMOL/L (ref 98–107)
CHLORIDE SERPL-SCNC: 78 MMOL/L (ref 98–107)
CHLORIDE SERPL-SCNC: 79 MMOL/L (ref 98–107)
CHLORIDE SERPL-SCNC: 79 MMOL/L (ref 98–107)
CHOLEST SERPL-MCNC: 121 MG/DL (ref 0–200)
CO2 SERPL-SCNC: 24 MMOL/L (ref 21–31)
CO2 SERPL-SCNC: 25 MMOL/L (ref 21–31)
CO2 SERPL-SCNC: 26 MMOL/L (ref 21–31)
CREAT SERPL-MCNC: 0.54 MG/DL (ref 0.7–1.3)
CREAT SERPL-MCNC: 0.56 MG/DL (ref 0.7–1.3)
CREAT SERPL-MCNC: 0.57 MG/DL (ref 0.7–1.3)
CREAT SERPL-MCNC: 0.58 MG/DL (ref 0.7–1.3)
CREAT SERPL-MCNC: 0.6 MG/DL (ref 0.7–1.3)
CREAT SERPL-MCNC: 0.62 MG/DL (ref 0.7–1.3)
CREAT SERPL-MCNC: 0.65 MG/DL (ref 0.7–1.3)
CREAT UR-MCNC: 82.6 MG/DL
GFR SERPL CREATININE-BSD FRML MDRD: 111 ML/MIN/1.73SQ M
GFR SERPL CREATININE-BSD FRML MDRD: 113 ML/MIN/1.73SQ M
GFR SERPL CREATININE-BSD FRML MDRD: 115 ML/MIN/1.73SQ M
GFR SERPL CREATININE-BSD FRML MDRD: 116 ML/MIN/1.73SQ M
GFR SERPL CREATININE-BSD FRML MDRD: 117 ML/MIN/1.73SQ M
GFR SERPL CREATININE-BSD FRML MDRD: 118 ML/MIN/1.73SQ M
GFR SERPL CREATININE-BSD FRML MDRD: 120 ML/MIN/1.73SQ M
GLUCOSE SERPL-MCNC: 102 MG/DL (ref 65–99)
GLUCOSE SERPL-MCNC: 107 MG/DL (ref 65–140)
GLUCOSE SERPL-MCNC: 107 MG/DL (ref 65–140)
GLUCOSE SERPL-MCNC: 107 MG/DL (ref 65–99)
GLUCOSE SERPL-MCNC: 107 MG/DL (ref 65–99)
GLUCOSE SERPL-MCNC: 110 MG/DL (ref 65–99)
GLUCOSE SERPL-MCNC: 111 MG/DL (ref 65–99)
GLUCOSE SERPL-MCNC: 113 MG/DL (ref 65–99)
GLUCOSE SERPL-MCNC: 114 MG/DL (ref 65–99)
GLUCOSE SERPL-MCNC: 115 MG/DL (ref 65–140)
GLUCOSE SERPL-MCNC: 119 MG/DL (ref 65–140)
GLUCOSE SERPL-MCNC: 119 MG/DL (ref 65–99)
GLUCOSE SERPL-MCNC: 122 MG/DL (ref 65–140)
GLUCOSE SERPL-MCNC: 122 MG/DL (ref 65–99)
GLUCOSE SERPL-MCNC: 99 MG/DL (ref 65–99)
HDLC SERPL-MCNC: 59 MG/DL
LDLC SERPL CALC-MCNC: 46 MG/DL (ref 0–100)
LIPASE SERPL-CCNC: 720 U/L (ref 11–82)
MAGNESIUM SERPL-MCNC: 1.6 MG/DL (ref 1.9–2.7)
MAGNESIUM SERPL-MCNC: 1.7 MG/DL (ref 1.9–2.7)
MAGNESIUM SERPL-MCNC: 2.1 MG/DL (ref 1.9–2.7)
NONHDLC SERPL-MCNC: 62 MG/DL
OSMOLALITY UR: 614 MMOL/KG
POTASSIUM SERPL-SCNC: 4.2 MMOL/L (ref 3.5–5.5)
POTASSIUM SERPL-SCNC: 4.2 MMOL/L (ref 3.5–5.5)
POTASSIUM SERPL-SCNC: 4.3 MMOL/L (ref 3.5–5.5)
POTASSIUM SERPL-SCNC: 4.4 MMOL/L (ref 3.5–5.5)
POTASSIUM SERPL-SCNC: 4.4 MMOL/L (ref 3.5–5.5)
POTASSIUM SERPL-SCNC: 4.5 MMOL/L (ref 3.5–5.5)
POTASSIUM SERPL-SCNC: 4.6 MMOL/L (ref 3.5–5.5)
SODIUM 24H UR-SCNC: 63 MOL/L
SODIUM SERPL-SCNC: 106 MMOL/L (ref 134–143)
SODIUM SERPL-SCNC: 106 MMOL/L (ref 134–143)
SODIUM SERPL-SCNC: 107 MMOL/L (ref 134–143)
SODIUM SERPL-SCNC: 108 MMOL/L (ref 134–143)
SODIUM SERPL-SCNC: 109 MMOL/L (ref 134–143)
SODIUM SERPL-SCNC: 110 MMOL/L (ref 134–143)
TRIGL SERPL-MCNC: 67 MG/DL (ref 44–166)
TRIGL SERPL-MCNC: 82 MG/DL (ref 44–166)

## 2021-06-10 PROCEDURE — 94760 N-INVAS EAR/PLS OXIMETRY 1: CPT

## 2021-06-10 PROCEDURE — 83690 ASSAY OF LIPASE: CPT | Performed by: PHYSICIAN ASSISTANT

## 2021-06-10 PROCEDURE — 82330 ASSAY OF CALCIUM: CPT | Performed by: PHYSICIAN ASSISTANT

## 2021-06-10 PROCEDURE — 82948 REAGENT STRIP/BLOOD GLUCOSE: CPT

## 2021-06-10 PROCEDURE — 99233 SBSQ HOSP IP/OBS HIGH 50: CPT | Performed by: HOSPITALIST

## 2021-06-10 PROCEDURE — 94664 DEMO&/EVAL PT USE INHALER: CPT

## 2021-06-10 PROCEDURE — 84300 ASSAY OF URINE SODIUM: CPT | Performed by: PHYSICIAN ASSISTANT

## 2021-06-10 PROCEDURE — 99291 CRITICAL CARE FIRST HOUR: CPT | Performed by: ANESTHESIOLOGY

## 2021-06-10 PROCEDURE — 83735 ASSAY OF MAGNESIUM: CPT | Performed by: PHYSICIAN ASSISTANT

## 2021-06-10 PROCEDURE — 83935 ASSAY OF URINE OSMOLALITY: CPT | Performed by: PHYSICIAN ASSISTANT

## 2021-06-10 PROCEDURE — G1004 CDSM NDSC: HCPCS

## 2021-06-10 PROCEDURE — 74181 MRI ABDOMEN W/O CONTRAST: CPT

## 2021-06-10 PROCEDURE — 99254 IP/OBS CNSLTJ NEW/EST MOD 60: CPT | Performed by: INTERNAL MEDICINE

## 2021-06-10 PROCEDURE — 84478 ASSAY OF TRIGLYCERIDES: CPT | Performed by: ANESTHESIOLOGY

## 2021-06-10 PROCEDURE — 80048 BASIC METABOLIC PNL TOTAL CA: CPT | Performed by: PHYSICIAN ASSISTANT

## 2021-06-10 PROCEDURE — 80061 LIPID PANEL: CPT | Performed by: INTERNAL MEDICINE

## 2021-06-10 PROCEDURE — 80048 BASIC METABOLIC PNL TOTAL CA: CPT | Performed by: HOSPITALIST

## 2021-06-10 RX ORDER — HYDROMORPHONE HCL/PF 1 MG/ML
1 SYRINGE (ML) INJECTION EVERY 4 HOURS PRN
Status: DISCONTINUED | OUTPATIENT
Start: 2021-06-10 | End: 2021-06-12 | Stop reason: HOSPADM

## 2021-06-10 RX ORDER — OXYCODONE HYDROCHLORIDE 5 MG/1
5 TABLET ORAL EVERY 4 HOURS PRN
Status: DISCONTINUED | OUTPATIENT
Start: 2021-06-10 | End: 2021-06-12 | Stop reason: HOSPADM

## 2021-06-10 RX ORDER — HEPARIN SODIUM 5000 [USP'U]/ML
5000 INJECTION, SOLUTION INTRAVENOUS; SUBCUTANEOUS EVERY 8 HOURS SCHEDULED
Status: DISCONTINUED | OUTPATIENT
Start: 2021-06-10 | End: 2021-06-12 | Stop reason: HOSPADM

## 2021-06-10 RX ORDER — MAGNESIUM SULFATE HEPTAHYDRATE 40 MG/ML
2 INJECTION, SOLUTION INTRAVENOUS ONCE
Status: COMPLETED | OUTPATIENT
Start: 2021-06-10 | End: 2021-06-10

## 2021-06-10 RX ORDER — MAGNESIUM SULFATE HEPTAHYDRATE 40 MG/ML
2 INJECTION, SOLUTION INTRAVENOUS ONCE
Status: COMPLETED | OUTPATIENT
Start: 2021-06-10 | End: 2021-06-11

## 2021-06-10 RX ORDER — CALCIUM GLUCONATE 20 MG/ML
1 INJECTION, SOLUTION INTRAVENOUS ONCE
Status: COMPLETED | OUTPATIENT
Start: 2021-06-10 | End: 2021-06-10

## 2021-06-10 RX ADMIN — CALCIUM GLUCONATE 1 G: 20 INJECTION, SOLUTION INTRAVENOUS at 05:24

## 2021-06-10 RX ADMIN — HYDROMORPHONE HYDROCHLORIDE 1 MG: 1 INJECTION, SOLUTION INTRAMUSCULAR; INTRAVENOUS; SUBCUTANEOUS at 22:27

## 2021-06-10 RX ADMIN — HYDROMORPHONE HYDROCHLORIDE 1 MG: 1 INJECTION, SOLUTION INTRAMUSCULAR; INTRAVENOUS; SUBCUTANEOUS at 01:32

## 2021-06-10 RX ADMIN — HYDROMORPHONE HYDROCHLORIDE 1 MG: 1 INJECTION, SOLUTION INTRAMUSCULAR; INTRAVENOUS; SUBCUTANEOUS at 18:31

## 2021-06-10 RX ADMIN — OXYCODONE HYDROCHLORIDE 5 MG: 5 TABLET ORAL at 20:27

## 2021-06-10 RX ADMIN — HEPARIN SODIUM 5000 UNITS: 5000 INJECTION INTRAVENOUS; SUBCUTANEOUS at 21:20

## 2021-06-10 RX ADMIN — ROPINIROLE 1 MG: 1 TABLET, FILM COATED ORAL at 09:10

## 2021-06-10 RX ADMIN — HEPARIN SODIUM 5000 UNITS: 5000 INJECTION INTRAVENOUS; SUBCUTANEOUS at 13:22

## 2021-06-10 RX ADMIN — HYDROMORPHONE HYDROCHLORIDE 1 MG: 1 INJECTION, SOLUTION INTRAMUSCULAR; INTRAVENOUS; SUBCUTANEOUS at 09:11

## 2021-06-10 RX ADMIN — GABAPENTIN 400 MG: 400 CAPSULE ORAL at 16:25

## 2021-06-10 RX ADMIN — THIAMINE HCL TAB 100 MG 100 MG: 100 TAB at 09:10

## 2021-06-10 RX ADMIN — SERTRALINE HYDROCHLORIDE 150 MG: 50 TABLET ORAL at 09:10

## 2021-06-10 RX ADMIN — FOLIC ACID 1 MG: 1 TABLET ORAL at 09:10

## 2021-06-10 RX ADMIN — MAGNESIUM OXIDE TAB 400 MG (241.3 MG ELEMENTAL MG) 400 MG: 400 (241.3 MG) TAB at 09:10

## 2021-06-10 RX ADMIN — GABAPENTIN 400 MG: 400 CAPSULE ORAL at 09:10

## 2021-06-10 RX ADMIN — INSULIN GLARGINE 15 UNITS: 100 INJECTION, SOLUTION SUBCUTANEOUS at 09:24

## 2021-06-10 RX ADMIN — CHOLESTYRAMINE 4 G: 4 POWDER, FOR SUSPENSION ORAL at 09:08

## 2021-06-10 RX ADMIN — PANTOPRAZOLE SODIUM 40 MG: 40 TABLET, DELAYED RELEASE ORAL at 05:06

## 2021-06-10 RX ADMIN — CHOLESTYRAMINE 4 G: 4 POWDER, FOR SUSPENSION ORAL at 18:31

## 2021-06-10 RX ADMIN — SODIUM CHLORIDE 125 ML/HR: 0.9 INJECTION, SOLUTION INTRAVENOUS at 00:01

## 2021-06-10 RX ADMIN — MAGNESIUM SULFATE HEPTAHYDRATE 2 G: 40 INJECTION, SOLUTION INTRAVENOUS at 22:27

## 2021-06-10 RX ADMIN — HYDROMORPHONE HYDROCHLORIDE 1 MG: 1 INJECTION, SOLUTION INTRAMUSCULAR; INTRAVENOUS; SUBCUTANEOUS at 05:35

## 2021-06-10 RX ADMIN — ONDANSETRON 4 MG: 4 TABLET, ORALLY DISINTEGRATING ORAL at 13:28

## 2021-06-10 RX ADMIN — MAGNESIUM OXIDE TAB 400 MG (241.3 MG ELEMENTAL MG) 400 MG: 400 (241.3 MG) TAB at 18:32

## 2021-06-10 RX ADMIN — QUETIAPINE FUMARATE 200 MG: 100 TABLET ORAL at 20:27

## 2021-06-10 RX ADMIN — ROPINIROLE 1 MG: 1 TABLET, FILM COATED ORAL at 20:27

## 2021-06-10 RX ADMIN — PROPRANOLOL HYDROCHLORIDE 10 MG: 10 TABLET ORAL at 18:32

## 2021-06-10 RX ADMIN — PROPRANOLOL HYDROCHLORIDE 10 MG: 10 TABLET ORAL at 09:10

## 2021-06-10 RX ADMIN — DOCUSATE SODIUM 50 MG AND SENNOSIDES 8.6 MG 1 TABLET: 8.6; 5 TABLET, FILM COATED ORAL at 09:10

## 2021-06-10 RX ADMIN — HEPARIN SODIUM 5000 UNITS: 5000 INJECTION INTRAVENOUS; SUBCUTANEOUS at 09:22

## 2021-06-10 RX ADMIN — SODIUM CHLORIDE 200 ML/HR: 0.9 INJECTION, SOLUTION INTRAVENOUS at 08:25

## 2021-06-10 RX ADMIN — GABAPENTIN 400 MG: 400 CAPSULE ORAL at 20:27

## 2021-06-10 RX ADMIN — DOCUSATE SODIUM 50 MG AND SENNOSIDES 8.6 MG 1 TABLET: 8.6; 5 TABLET, FILM COATED ORAL at 18:32

## 2021-06-10 RX ADMIN — TIOTROPIUM BROMIDE 18 MCG: 18 CAPSULE ORAL; RESPIRATORY (INHALATION) at 09:23

## 2021-06-10 RX ADMIN — Medication 1 TABLET: at 09:10

## 2021-06-10 RX ADMIN — MAGNESIUM SULFATE HEPTAHYDRATE 2 G: 40 INJECTION, SOLUTION INTRAVENOUS at 04:21

## 2021-06-10 RX ADMIN — NICOTINE 1 PATCH: 14 PATCH, EXTENDED RELEASE TRANSDERMAL at 09:09

## 2021-06-10 RX ADMIN — HYDROMORPHONE HYDROCHLORIDE 1 MG: 1 INJECTION, SOLUTION INTRAMUSCULAR; INTRAVENOUS; SUBCUTANEOUS at 13:22

## 2021-06-10 NOTE — PLAN OF CARE
Problem: METABOLIC, FLUID AND ELECTROLYTES - ADULT  Goal: Electrolytes maintained within normal limits  Description: INTERVENTIONS:  - Monitor labs and assess patient for signs and symptoms of electrolyte imbalances  - Administer electrolyte replacement as ordered  - Monitor response to electrolyte replacements, including repeat lab results as appropriate  - Instruct patient on fluid and nutrition as appropriate  6/10/2021 0557 by Mike Rivera RN  Outcome: Progressing  Note: Dx Hyponatremia,AAOx4,Na level on admit to ICU was 105  BMP/lab works q 2h as ordered  Electrolyte replacements as ordered  (see MAR)  Maintained on NS @ 200 ml/hr  Will continue to monitor    6/10/2021 0102 by Mike Rivera RN  Outcome: Progressing  Goal: Fluid balance maintained  Description: INTERVENTIONS:  - Monitor labs   - Monitor I/O and WT  - Instruct patient on fluid and nutrition as appropriate  - Assess for signs & symptoms of volume excess or deficit  6/10/2021 0557 by Mike Rivera RN  Outcome: Progressing  6/10/2021 0102 by Mike Rivera RN  Outcome: Progressing  Goal: Glucose maintained within target range  Description: INTERVENTIONS:  - Monitor Blood Glucose as ordered  - Assess for signs and symptoms of hyperglycemia and hypoglycemia  - Administer ordered medications to maintain glucose within target range  - Assess nutritional intake and initiate nutrition service referral as needed  6/10/2021 0557 by Mike Rivera RN  Outcome: Progressing  6/10/2021 0102 by Mike Rivera RN  Outcome: Progressing

## 2021-06-10 NOTE — CONSULTS
Consultation - Nephrology   Chad Sal 46 y o  male MRN: [id number]  Unit/Bed#: ICU 06 Encounter: [id number]      Assessment & Plan    Hyponatremia, severe without altered mental status or seizures  (POA)  · Presenting serum sodium 105 millimole per L on 06/09/2021  · Urine osmolality is 614 millimole per kg on 06/09/2021  · Urine sodium is 63 on 06/09/2021  · TSH of 1 68 on 05/11/2021  · Fluid restriction 1200 mL per day  · Will complete workup  Hypertension  · Home antihypertensives are recorded as propanolol 20 mg daily, lisinopril 10 mg daily and losartan 25 mg daily  · Note that patient should not be on an ACE inhibitor lisinopril and an ARB losartan  Recommend restarting only 1 upon discharge  Acute on chronic pancreatitis  · Gastroenterology evaluating  · Will hold on IV fluids due to severe hyponatremia present  History of alcohol abuse  · [facility] protocol in place  Thank you for allowing us to participate in the care of your patient  Please feel free to contact us regarding the care of this patient, or any other questions/concerns that may be applicable      Patient Active Problem List   Diagnosis    Quiros esophagus    Benign essential hypertension    COPD (chronic obstructive pulmonary disease) (HCC)    Fatty liver    Fibromyalgia    Generalized anxiety disorder    Hyperlipidemia    Iron deficiency anemia    Recurrent major depressive disorder, in partial remission ([address] )    Nephrolithiasis    Other chronic pain    Sleep disorder    Diabetes mellitus type 1 5, managed as type 1 ([address] )    Hyponatremia    GERD (gastroesophageal reflux disease)    Anxiety and depression    History of ETOH abuse    Transaminitis    Tobacco dependence    Chronic pancreatitis (HCC)    Paresthesia of both lower extremities    Hypotension    Alcohol dependence in early, early partial, sustained full, or sustained partial remission ([address] )    MDD (major depressive disorder), recurrent episode, severe [facility]    Hypomagnesemia    Idiopathic acute pancreatitis without infection or necrosis    Vitamin D deficiency       History of Present Illness   Physician Requesting Consult: Karyn Alexandra MD  Reason for Consult / Principal Problem:  Hyponatremia  Hx and PE limited by: none    HPI: Althea Steiner is a 46y o  year old male who is admitted for acute on chronic pancreatitis with lipase elevated above 700  Intake labs remarkable for severe hyponatremia with intake sodium 105  Sodium did not improve with normal saline, prompting nephrological consultation  Patient reports poor food and water intake prior to admission  He does admit to excessive alcohol intake  He reports prior admissions for hyponatremia  He denies any new medications  He has not had any changes of mental status or seizures  History obtained from chart review and the patient  Review of Systems - Negative except as mentioned above in HPI, more specifics as mentioned below    Review of Systems - General ROS: positive for  - fatigue  negative for - chills or fever  Psychological ROS: positive for - anxiety, irritability and depression  negative for - disorientation, hallucinations or hostility  Hematological and Lymphatic ROS: positive for - pallor  negative for - blood clots, blood transfusions or swollen lymph nodes  Respiratory ROS: no cough, shortness of breath, or wheezing  Cardiovascular ROS: no chest pain or dyspnea on exertion  Gastrointestinal ROS: positive for - abdominal pain, nausea, anorexia  negative for - change in bowel habits  Genito-Urinary ROS: no dysuria, trouble voiding, or hematuria  Musculoskeletal ROS: negative  Neurological ROS: no TIA or stroke symptoms  Dermatological ROS: negative    Historical Information   Past Medical History:   Diagnosis Date    Acute respiratory failure ([facility] ) 5/11/2021    Addiction to drug ([facility] )     Alcohol abuse 2/20/2019    Allergic rhinitis     last assessed: 12/5/2012    Anemia     Anxiety and depression     Quiros esophagus     Cholelithiasis     Chronic kidney disease     Chronic pain     Chronic pain disorder     COPD (chronic obstructive pulmonary disease) (HCC)     Depression     Diabetes mellitus (HCC)     Emphysema lung (HCC)     Generalized anxiety disorder     GERD (gastroesophageal reflux disease)     Hyperlipidemia     Hypertension     Intentional overdose of drug in tablet form ([facility] ) 5/11/2021    Kidney stone     Metatarsalgia     last assessed: 8/11/2014, unspecified laterality, ? cause  PE with changes  To see DPM tomorrow   Pancreatitis     Peripheral neuropathy     Psychiatric disorder     Renal disorder     Shortness of breath     with activity    Sleep difficulties     Substance abuse ([facility]     Suicide attempt ([facility] )     Withdrawal symptoms, alcohol ([facility] )      Past Surgical History:   Procedure Laterality Date    CHOLECYSTECTOMY LAPAROSCOPIC      FOOT SURGERY      B/L great toe joint replacement    KNEE ARTHROSCOPY      (therapeutic) right knee    NH EDG US EXAM SURGICAL ALTER STOM DUODENUM/JEJUNUM N/A 10/17/2018    Procedure: LINEAR ENDOSCOPIC U/S;  Surgeon: Satish Camp MD;  Location: [facility]; Service: Gastroenterology    VASECTOMY      vas deferens     Social History   Social History     Substance and Sexual Activity   Alcohol Use Not Currently    Frequency: 4 or more times a week    Drinks per session: 5 or 6    Binge frequency: Daily or almost daily    Comment: Drank a 5th vodka  6/1/2021     Social History     Substance and Sexual Activity   Drug Use No    Comment: chronic narcotic use     Social History     Tobacco Use   Smoking Status Current Every Day Smoker    Packs/day: 1 00    Types: Cigarettes    Start date: 4/14/1986   Smokeless Tobacco Never Used   Tobacco Comment    Tobacco use [employer]'s "How to Quit" literature given to pat       Family History   Problem Relation Age of Onset    Cancer Mother     Coronary artery disease Mother     Diabetes Mother     Coronary artery disease Father     Prostate cancer Father     Diabetes Sister     Coronary artery disease Family        Meds/Allergies   all current active meds have been reviewed, current meds:   Current Facility-Administered Medications   Medication Dose Route Frequency    acetaminophen (TYLENOL) tablet 650 mg  650 mg Oral Q4H PRN    albuterol (PROVENTIL HFA,VENTOLIN HFA) inhaler 2 puff  2 puff Inhalation Q4H PRN    aluminum-magnesium hydroxide-simethicone (MYLANTA) oral suspension 30 mL  30 mL Oral Q4H PRN    benztropine (COGENTIN) injection 1 mg  1 mg Intramuscular Q4H PRN Max 6/day    benztropine (COGENTIN) tablet 0 5 mg  0 5 mg Oral Q4H PRN Max 6/day    [START ON 7/5/2021] cholecalciferol (VITAMIN D3) tablet 1,000 Units  1,000 Units Oral Daily    cholestyramine sugar free (QUESTRAN LIGHT) packet 4 g  4 g Oral BID    dextromethorphan-guaiFENesin (ROBITUSSIN DM) oral syrup 10 mL  10 mL Oral Q4H PRN    [START ON 6/14/2021] ergocalciferol (VITAMIN D2) capsule 50,000 Units  50,000 Units Oral Weekly    gabapentin (NEURONTIN) capsule 400 mg  400 mg Oral TID    haloperidol lactate (HALDOL) injection 5 mg  5 mg Intramuscular Q4H PRN Max 4/day    heparin (porcine) subcutaneous injection 5,000 Units  5,000 Units Subcutaneous Q8H [facility]    HYDROmorphone (DILAUDID) injection 1 mg  1 mg Intravenous Q4H PRN    hydrOXYzine HCL (ATARAX) tablet 25 mg  25 mg Oral Q6H PRN Max 4/day    hydrOXYzine HCL (ATARAX) tablet 50 mg  50 mg Oral Q6H PRN Max 4/day    insulin glargine (LANTUS) subcutaneous injection 15 Units 0 15 mL  15 Units Subcutaneous QAM    insulin lispro (HumaLOG) 100 units/mL subcutaneous injection 1-6 Units  1-6 Units Subcutaneous 4x Daily (AC & HS)    magnesium oxide (MAG-OX) tablet 400 mg  400 mg Oral BID    naloxone (NARCAN) 0 04 mg/mL syringe 0 04 mg  0 04 mg Intravenous Q1MIN PRN    nicotine (NICODERM CQ) 14 mg/24hr TD 24 hr patch 1 patch  1 patch Transdermal Daily    ondansetron (ZOFRAN-ODT) dispersible tablet 4 mg  4 mg Oral Q6H PRN    oxyCODONE (ROXICODONE) IR tablet 5 mg  5 mg Oral Q4H PRN    pantoprazole (PROTONIX) EC tablet 40 mg  40 mg Oral Early Morning    propranolol (INDERAL) tablet 10 mg  10 mg Oral BID    QUEtiapine (SEROquel) tablet 200 mg  200 mg Oral HS    risperiDONE (RisperDAL M-TAB) disintegrating tablet 0 25 mg  0 25 mg Oral Q4H PRN Max 6/day    risperiDONE (RisperDAL M-TAB) disintegrating tablet 0 5 mg  0 5 mg Oral Q4H PRN Max 3/day    risperiDONE (RisperDAL M-TAB) disintegrating tablet 1 mg  1 mg Oral Q4H PRN Max 3/day    rOPINIRole (REQUIP) tablet 1 mg  1 mg Oral BID    senna-docusate sodium (SENOKOT S) 8 6-50 mg per tablet 1 tablet  1 tablet Oral BID    [START ON 6/11/2021] sertraline (ZOLOFT) tablet 100 mg  100 mg Oral Daily    tiotropium (SPIRIVA) capsule for inhaler 18 mcg  18 mcg Inhalation Daily    traZODone (DESYREL) tablet 50 mg  50 mg Oral HS PRN    and PTA meds:    Medications Prior to Admission   Medication    albuterol (PROVENTIL HFA,VENTOLIN HFA) 90 mcg/act inhaler    cholestyramine sugar free (QUESTRAN LIGHT) 4 g packet    folic acid (FOLVITE) 1 mg tablet    gabapentin (NEURONTIN) 300 mg capsule    Insulin Pen Needle (B-D ULTRAFINE III SHORT PEN) 31G X 8 MM MISC    Insulin Pen Needle (PEN NEEDLES) 29G X 12MM MISC    lisinopril (ZESTRIL) 10 mg tablet    losartan (COZAAR) 25 mg tablet    metFORMIN (GLUCOPHAGE) 500 mg tablet    omeprazole (PriLOSEC) 20 mg delayed release capsule    pioglitazone (ACTOS) 30 mg tablet    propranolol (INDERAL) 10 mg tablet    QUEtiapine (SEROquel) 200 mg tablet    rOPINIRole (REQUIP) 1 mg tablet    sertraline (ZOLOFT) 100 mg tablet    thiamine (VITAMIN B1) 100 mg tablet    tiotropium (SPIRIVA) 18 mcg inhalation capsule         No Known Allergies    Objective     Intake/Output Summary (Last 24 hours) at 6/10/2021 1441  Last data filed at 6/10/2021 1400  Gross per 24 hour   Intake 1285 ml   Output 1550 ml   Net -265 ml       Invasive Devices:   Urethral Catheter 16 Fr  (Active)   Reasons to continue Urinary Catheter  Accurate I&O assessment in critically ill patients (48 hr  max) 06/10/21 0800   Goal for Removal No longer needed- Will place order to discontinue 06/10/21 0800   Site Assessment Clean;Skin intact 06/10/21 0800   Collection Container Standard drainage bag 06/10/21 0800   Securement Method Securing device (Describe) 06/10/21 0800   Output (mL) 175 mL 06/10/21 1400       Physical Exam  Vitals signs and nursing note reviewed  Constitutional:       General: He is not in acute distress  Appearance: Normal appearance  He is not ill-appearing, toxic-appearing or diaphoretic  HENT:      Head: Normocephalic and atraumatic  Nose: Nose normal       Mouth/Throat:      Mouth: Mucous membranes are moist       Pharynx: Oropharynx is clear  Eyes:      Extraocular Movements: Extraocular movements intact  Conjunctiva/sclera: Conjunctivae normal       Pupils: Pupils are equal, round, and reactive to light  Neck:      Musculoskeletal: Normal range of motion and neck supple  Cardiovascular:      Rate and Rhythm: Normal rate and regular rhythm  Heart sounds: No murmur  No friction rub  No gallop  Pulmonary:      Effort: Pulmonary effort is normal       Breath sounds: Normal breath sounds  Abdominal:      General: Abdomen is flat  Bowel sounds are normal       Palpations: Abdomen is soft  Musculoskeletal: Normal range of motion  General: No swelling  Skin:     General: Skin is warm and dry  Neurological:      General: No focal deficit present  Mental Status: He is alert and oriented to person, place, and time  Mental status is at baseline  Psychiatric:         Mood and Affect: Mood normal          Behavior: Behavior normal          Thought Content:  Thought content normal          Judgment: Judgment normal            I/O last 3 completed shifts: In: 607 [I V :865; IV Piggyback:50]  Out: 575 [Urine:575]    Vitals:    06/10/21 1300   BP: (!) 178/100   Pulse: 82   Resp: 15   Temp:    SpO2:        Gen: in NAD, Alert/Awake  HEENT: no sclerous icterus, MMM, neck supple  CV: +S1/S2, RRR  Lungs: CTA bilaterally  Abd: +BS, soft NT/ND  Ext: all four extremities are warm  Skin: no rashes noted  Neuro: CN II-XII intact    Current Weight: Weight - Scale: 91 kg (200 lb 9 9 oz)  First Weight: Weight - Scale: 91 kg (200 lb 9 9 oz)    Lab Results:  I have personally reviewed pertinent labs      CBC:   Lab Results   Component Value Date    WBC 9 80 06/09/2021    HGB 14 4 06/09/2021    HCT 40 0 (L) 06/09/2021    MCV 82 06/09/2021     06/09/2021    MCH 29 6 06/09/2021    MCHC 35 9 06/09/2021    RDW 13 7 06/09/2021    MPV 7 0 (L) 06/09/2021     CMP:   Lab Results   Component Value Date    K 4 3 06/10/2021    CL 77 (L) 06/10/2021    CO2 25 06/10/2021    BUN 10 06/10/2021    CREATININE 0 54 (L) 06/10/2021    CALCIUM 8 7 06/10/2021    AST 23 06/09/2021    ALT 23 06/09/2021    ALKPHOS 94 06/09/2021    EGFR 120 06/10/2021     Phosphorus: No results found for: PHOS  Magnesium:   Lab Results   Component Value Date    MG 2 1 06/10/2021     Urinalysis:   Lab Results   Component Value Date    COLORU Yellow 06/09/2021    CLARITYU Slightly Cloudy (A) 06/09/2021    SPECGRAV 1 020 06/09/2021    PHUR 7 5 06/09/2021    LEUKOCYTESUR Negative 06/09/2021    NITRITE Negative 06/09/2021    GLUCOSEU 1+ (A) 06/09/2021    KETONESU Trace (A) 06/09/2021    BILIRUBINUR Negative 06/09/2021    BLOODU Negative 06/09/2021     Ionized Calcium: No results found for: CAION  Coagulation: No results found for: PT, INR, APTT  Troponin: No results found for: TROPONINI  ABG: No results found for: PHART, DGA9DDH, PO2ART, UQZ7ZPV, J7VGMXZF, BEART, SOURCE    Results from last 7 days   Lab Units 06/10/21  1217 06/10/21  1005 06/10/21  0815  06/09/21  1857   POTASSIUM mmol/L 4 3 4 2 4 3   < > 4 4 CHLORIDE mmol/L 77* 78* 78*   < > 74*   CO2 mmol/L 25 24 25   < > 22   BUN mg/dL 10 10 10   < > 13   CREATININE mg/dL 0 54* 0 54* 0 56*   < > 0 71   CALCIUM mg/dL 8 7 8 3* 8 6   < > 8 8   ALK PHOS U/L  --   --   --   --  94   ALT U/L  --   --   --   --  23   AST U/L  --   --   --   --  23    < > = values in this interval not displayed  Radiology review:  Procedure: Ct Abdomen Wo Contrast    Result Date: 6/9/2021  Narrative: CT - ABDOMEN WITHOUT IV CONTRAST INDICATION:   Abdominal Pain  COMPARISON:  Abdomen and pelvic CT from 7/26/2019  TECHNIQUE: CT examination of the abdomen was performed without intravenous contrast   Axial, sagittal, and coronal 2D reformatted images were created from the source data and submitted for interpretation  Radiation dose length product (DLP) for this visit:  330 6 mGy-cm   This examination, like all CT scans performed in the [facility], was performed utilizing techniques to minimize radiation dose exposure, including the use of iterative reconstruction and automated exposure control  Enteric contrast was administered  FINDINGS: ABDOMEN LOWER CHEST:  Severe emphysema noted at the lung bases  LIVER/BILIARY TREE:  Unremarkable  GALLBLADDER:  Gallbladder is surgically absent  SPLEEN:  Unremarkable  PANCREAS:  There is diffuse peripancreatic fat stranding surrounding a chronically severely atrophic pancreas, consistent with acute pancreatitis  Again seen is a rim calcified mass in the expected location of the pancreatic tail measuring 5 5 x 3 0 cm  This has demonstrated long-term stability and is therefore benign  This is probably an old pancreatic pseodocyst  ADRENAL GLANDS:  Unremarkable  KIDNEYS/URETERS:  Unremarkable  No hydronephrosis  VISUALIZED STOMACH AND BOWEL:  Unremarkable  VISUALIZED ABDOMINAL CAVITY:  No pathologically enlarged periportal, mesenteric or upper retroperitoneal lymph nodes  No ascites or free intraperitoneal air  No fluid collection  VISUALIZED BODY WALL:  Unremarkable  VISUALIZED ABDOMINAL VESSELS:  Unremarkable for patient's age  OSSEOUS STRUCTURES:  No acute fracture or destructive osseous lesion  Impression: There is diffuse peripancreatic fat stranding surrounding a chronically severely atrophic pancreas, consistent with acute pancreatitis  Severe emphysema noted at the lung bases  Workstation performed: [id number]     Procedure: Ct Head Wo Contrast    Result Date: 6/9/2021  Narrative: CT BRAIN - WITHOUT CONTRAST INDICATION:   Dizziness, non-specific SEVERE HYPONATREMIA  COMPARISON:  CT brain dated December 23, 2015  TECHNIQUE:  CT examination of the brain was performed  In addition to axial images, sagittal and coronal 2D reformatted images were created and submitted for interpretation  Radiation dose length product (DLP) for this visit:  912 5 mGy-cm   This examination, like all CT scans performed in the [facility], was performed utilizing techniques to minimize radiation dose exposure, including the use of iterative reconstruction and automated exposure control  IMAGE QUALITY:  Diagnostic  FINDINGS: PARENCHYMA:  No intracranial mass, mass effect or midline shift  No CT signs of acute infarction  No acute parenchymal hemorrhage  VENTRICLES AND EXTRA-AXIAL SPACES:  Normal for the patient's age  VISUALIZED ORBITS AND PARANASAL SINUSES:  Unremarkable  CALVARIUM AND EXTRACRANIAL SOFT TISSUES:  Normal      Impression: No acute intracranial abnormality  Workstation performed: [id number]     Romy Bowling PA-C  Portions of the record may have been created with voice recognition software  Occasional wrong word or "sound a like" substitutions may have occurred due to the inherent limitations of voice recognition software  Read the chart carefully and recognize, using context, where substitutions have occurred

## 2021-06-10 NOTE — ASSESSMENT & PLAN NOTE
· Patient has been hospitalized over the past month in between [facility], and the behavioral health unit here  · Doubt that the patient will have any type of alcohol withdrawal at this time  · DC GIANNA[place] protocol  · DC thiamine, folate, and multivitamin

## 2021-06-10 NOTE — ASSESSMENT & PLAN NOTE
Lab Results   Component Value Date    HGBA1C 7 4 (H) 05/11/2021       Recent Labs     06/09/21  0731 06/09/21  1117 06/09/21  1620 06/09/21 1945   POCGLU 124 171* 280* 160*       Blood Sugar Average: Last 72 hrs:  · will reduce Lantus while NPO  · Monitor Blood sugars

## 2021-06-10 NOTE — ASSESSMENT & PLAN NOTE
Lab Results   Component Value Date    HGBA1C 7 4 (H) 05/11/2021       Recent Labs     06/09/21  1620 06/09/21  1945 06/10/21  0027 06/10/21  0717   POCGLU 280* 160* 122 115       Blood Sugar Average: Last 72 hrs:  · (P) 115   · Continue Lantus, Accu-Cheks AC and HS with sliding scale coverage

## 2021-06-10 NOTE — ASSESSMENT & PLAN NOTE
· Lipase has improved  · Patient has a history of multiple episodes of pancreatitis  · Will check triglycerides, Will consult GI  · Will give the patient a trial of clear liquids  · Will check an MRCP

## 2021-06-10 NOTE — ASSESSMENT & PLAN NOTE
· Unspecified exact etiology  · Differential:  SIADH vs Drug-Induced vs Potomania vs Psychogenic Polydipsia  · Patient is completely asymptomatic  · Sodium levels were normal in the 2nd week of May  · Sodium trend thus far 105, 106, 106, 107, 106  · Sodium has not improved despite being on normal saline at 200 cc an hour  · Case reviewed with Dr Kim Giles  · DC IV fluids, will put on a 1 5 L fluid restriction, continue to monitor BMPs  · Formal nephrology input very shortly, Critical Care eval pending  · Continue management in the ICU

## 2021-06-10 NOTE — UTILIZATION REVIEW
Initial Clinical Review    Admission: Date/Time/Statement:   Admission Orders (From admission, onward)     Ordered        06/09/21 2327  Inpatient Admission  Once                   Orders Placed This Encounter   Procedures    Inpatient Admission     Standing Status:   Standing     Number of Occurrences:   1     Order Specific Question:   Level of Care     Answer:   Critical Care [15]     Order Specific Question:   Estimated length of stay     Answer:   More than 2 Midnights     Order Specific Question:   Certification     Answer:   I certify that inpatient services are medically necessary for this patient for a duration of greater than two midnights  See H&P and MD Progress Notes for additional information about the patient's course of treatment  Initial Presentation: transferred from [address] [place] [address]  46 y o  male who presents with abnormal labs  Patient was admitted to [facility] for suicide attempt with multiple drug overdoses with propanolol and seroquel  Hx of ETOH abuse was sober however recently started drinking  He was manged in the ICU for respiratory failure and drug overdose  He was transferred to [facility] and then was transferred to [facility] on 6/1/2021  Patient noted having increased abdominal pain this morning  Felt similar to the pancreatitis with associated nausea  Noted vision changes started about 1 week ago  Presents with s/s as above, abd tenderness & guarding  Admission work-up showing hyponatremia, hypomagnesemia, elevated lipase & amylase, acute pancreatitis & severe emphysema on imaging  Admitted to inpatient status for hyponatremia, acute pancreatitis  Started on IVF, renal consulted  Date: 6/10/21 Day 2:   Hyponatremia 2nd SIADH vs Drug-Induced vs Potomania vs Psychogenic Polydipsia  Sodium trend thus far 105, 106, 106, 107, 106; has not improved despite being on normal saline at 200 cc/hr   Discussed with renal, plan today to d/c' IVF, placed on fluid restriction & monitor BMP     ED Triage Vitals   Temperature Pulse Respirations Blood Pressure SpO2   06/10/21 0000 06/09/21 2345 06/09/21 2345 06/09/21 2345 06/09/21 2345   (!) 97 1 °F (36 2 °C) 75 14 145/81 95 %      Temp Source Heart Rate Source Patient Position - Orthostatic VS BP Location FiO2 (%)   06/10/21 0000 06/09/21 2345 06/10/21 0000 06/09/21 2345 --   Temporal Monitor Lying Left arm       Pain Score       06/09/21 2345       3          Wt Readings from Last 1 Encounters:   06/09/21 91 kg (200 lb 9 9 oz)     Additional Vital Signs:   Date/Time  Temp  Pulse  Resp  BP  MAP (mmHg)  SpO2  Calculated FIO2 (%) - Nasal Cannula  Nasal Cannula O2 Flow Rate (L/min)  O2 Device  Patient Position - Orthostatic VS   06/10/21 0807  --  76  16  --  --  97 %  28  2 L/min  Nasal cannula  --   06/10/21 0800  --  --  19  --  --  --  --  --  --  --   06/10/21 0700  96 2 °F (35 7 °C)Abnormal   --  --  --  --  --  --  --  --  --   06/10/21 0615  --  72  15  --  --  97 %  32  3 L/min    Nasal cannula  --   Nasal Cannula O2 Flow Rate (L/min): decreased to 2l/m at 06/10/21 0615   06/10/21 0600  --  70  14  158/80  109  97 %  --  --  --  Lying   06/10/21 0500  --  73  16  171/94Abnormal   126  93 %  --  --  --  Lying   06/10/21 0400  97 8 °F (36 6 °C)  74  11Abnormal   157/89  117  96 %  --  --  --  Lying   06/10/21 0300  --  74  11Abnormal   142/86  107  96 %  32  3 L/min  Nasal cannula  --   06/10/21 0200  --  76  13  149/86  110  96 %  --  --  --  Lying   06/10/21 0100  --  80  12  158/82  112  95 %  --  --  --  Lying   06/10/21 0000  97 1 °F (36 2 °C)Abnormal   74  13  163/84  117  96 %  --  --  --  Lying   06/09/21 2345  --  75  14  145/81  106  95 %  --  --  None (Room air)  --     Pertinent Labs/Diagnostic Test Results:   Results from last 7 days   Lab Units 06/09/21  1857   WBC Thousand/uL 9 80   HEMOGLOBIN g/dL 14 4   HEMATOCRIT % 40 0*   PLATELETS Thousands/uL 151   NEUTROS ABS Thousands/µL 7 90*     Results from last 7 days   Lab Units 06/10/21  1459 06/10/21  0407 06/10/21  0219 06/10/21  0215 06/09/21  2136 06/09/21  1857   SODIUM mmol/L 106* 107* 106*  --  106* 105*   POTASSIUM mmol/L 4 2 4 6 4 4  --  4 3 4 4   CHLORIDE mmol/L 77* 76* 77*  --  75* 74*   CO2 mmol/L 24 25 24  --  24 22   ANION GAP mmol/L 5 6 5  --  7 9   BUN mg/dL 11 11 12  --  12 13   CREATININE mg/dL 0 54* 0 57* 0 62*  --  0 65* 0 71   EGFR ml/min/1 73sq m 120 117 113  --  111 107   CALCIUM mg/dL 8 7 8 4* 8 4*  --  8 8 8 8   CALCIUM, IONIZED mmol/L 1 09*  --   --  1 04*  --   --    MAGNESIUM mg/dL 2 1  --  1 6*  --   --   --      Results from last 7 days   Lab Units 06/09/21  1857   AST U/L 23   ALT U/L 23   ALK PHOS U/L 94   TOTAL PROTEIN g/dL 6 8   ALBUMIN g/dL 3 9   TOTAL BILIRUBIN mg/dL 0 40     Results from last 7 days   Lab Units 06/10/21  0717 06/10/21  0027 06/09/21  1945 06/09/21  1620 06/09/21  1117 06/09/21  0731 06/08/21  1938 06/08/21  1653 06/08/21  1123 06/08/21  0802 06/07/21  1917 06/07/21  1617   POC GLUCOSE mg/dl 115 122 160* 280* 171* 124 161* 171* 192* 109 109 155*     Results from last 7 days   Lab Units 06/10/21  0625 06/10/21  0407 06/10/21  0219 06/09/21  2136 06/09/21  1857   GLUCOSE RANDOM mg/dL 110* 122* 119* 120* 169*     Results from last 7 days   Lab Units 06/10/21  0407 06/09/21  1857   LIPASE u/L 720* 1,497*   AMYLASE IU/L  --  417*     Results from last 7 days   Lab Units 06/09/21  2131   CLARITY UA  Slightly Cloudy*   COLOR UA  Yellow   SPEC GRAV UA  1 020   PH UA  7 5   GLUCOSE UA mg/dl 1+*   KETONES UA mg/dl Trace*   BLOOD UA  Negative   PROTEIN UA mg/dl Negative   NITRITE UA  Negative   BILIRUBIN UA  Negative   UROBILINOGEN UA E U /dl 0 2   LEUKOCYTES UA  Negative   WBC UA /hpf 0-1*   RBC UA /hpf None Seen   BACTERIA UA /hpf Occasional   EPITHELIAL CELLS WET PREP /hpf Occasional     6/9  CT a/p=  There is diffuse peripancreatic fat stranding surrounding a chronically severely atrophic pancreas, consistent with acute pancreatitis    Severe emphysema noted at the lung bases  CT head=  No acute intracranial abnormality  Past Medical History:   Diagnosis Date    Acute respiratory failure ([place] [id number] ) 5/11/2021    Addiction to drug ([place] [address] )     Alcohol abuse 2/20/2019    Allergic rhinitis     last assessed: 12/5/2012    Anemia     Anxiety and depression     Quiros esophagus     Cholelithiasis     Chronic kidney disease     Chronic pain     Chronic pain disorder     COPD (chronic obstructive pulmonary disease) (HCC)     Depression     Diabetes mellitus (HCC)     Emphysema lung (HCC)     Generalized anxiety disorder     GERD (gastroesophageal reflux disease)     Hyperlipidemia     Hypertension     Intentional overdose of drug in tablet form ([place] [address] ) 5/11/2021    Kidney stone     Metatarsalgia     last assessed: 8/11/2014, unspecified laterality, ? cause  PE with changes  To see DPM tomorrow      Pancreatitis     Peripheral neuropathy     Psychiatric disorder     Renal disorder     Shortness of breath     with activity    Sleep difficulties     Substance abuse ([place] [address] )     Suicide attempt ([place] [address] )     Withdrawal symptoms, alcohol ([place] [address] )      Present on Admission:   Alcohol dependence in early, early partial, sustained full, or sustained partial remission ([facility])   Anxiety and depression   Benign essential hypertension   GERD (gastroesophageal reflux disease)   Tobacco dependence   Alcohol-induced acute pancreatitis   Hyponatremia   Vitamin D deficiency   COPD (chronic obstructive pulmonary disease) ([place] [address] )   Diabetes mellitus type 1 5, managed as type 1 (HCC)   Hyperlipidemia    Admitting Diagnosis: Hyponatremia [E87 1]  Acute pancreatitis [K85 90]  Age/Sex: 46 y o  male  Admission Orders:  CIWA protocol  Consult podiatry  O2 to keep sats>92%  accuchecks  Consult renal  Crane cath  1500cc fluid restriction  Consult GI    Scheduled Medications:  calcium gluconate 1 g in sodium chloride 0 9% 50 mL x 1 dose  [START ON 7/5/2021] cholecalciferol, 1,000 Units, Oral, Daily  cholestyramine sugar free, 4 g, Oral, BID  [START ON 6/14/2021] ergocalciferol, 50,000 Units, Oral, Weekly  folic acid, 1 mg, Oral, Daily  gabapentin, 400 mg, Oral, TID  heparin (porcine) subcutaneous injection 5,000 Units Q8H  insulin glargine, 15 Units, Subcutaneous, QAM  insulin lispro, 1-6 Units, Subcutaneous, 4x Daily (AC & HS)  magnesium oxide, 400 mg, Oral, BID  magnesium sulfate 2 g/50 mL IVPB 2 g, Once   multivitamin-minerals, 1 tablet, Oral, Daily  nicotine, 1 patch, Transdermal, Daily  pantoprazole, 40 mg, Oral, Early Morning  propranolol, 10 mg, Oral, BID  QUEtiapine, 200 mg, Oral, HS  rOPINIRole, 1 mg, Oral, BID  senna-docusate sodium, 1 tablet, Oral, BID  sertraline, 150 mg, Oral, Daily>100mg daily, start 6/11  thiamine, 100 mg, Oral, Daily  tiotropium, 18 mcg, Inhalation, Daily    Continuous IV Infusions:  sodium chloride, 200 mL/hr, Intravenous, Continuous>d/c'd    PRN Meds:  acetaminophen, 650 mg, Oral, Q4H PRN  albuterol, 2 puff, Inhalation, Q4H PRN  aluminum-magnesium hydroxide-simethicone, 30 mL, Oral, Q4H PRN  benztropine, 1 mg, Intramuscular, Q4H PRN Max 6/day  benztropine, 0 5 mg, Oral, Q4H PRN Max 6/day  dextromethorphan-guaiFENesin, 10 mL, Oral, Q4H PRN  haloperidol lactate, 5 mg, Intramuscular, Q4H PRN Max 4/day  HYDROmorphone, 1 mg, Intravenous, Q4H PRN-used x3, 6/10  hydrOXYzine HCL, 25 mg, Oral, Q6H PRN Max 4/day  hydrOXYzine HCL, 50 mg, Oral, Q6H PRN Max 4/day  naloxone, 0 04 mg, Intravenous, Q1MIN PRN  ondansetron, 4 mg, Oral, Q6H PRN  oxyCODONE, 5 mg, Oral, Q4H PRN  risperiDONE, 0 25 mg, Oral, Q4H PRN Max 6/day  risperiDONE, 0 5 mg, Oral, Q4H PRN Max 3/day  risperiDONE, 1 mg, Oral, Q4H PRN Max 3/day  traZODone, 50 mg, Oral, HS PRN    Network Utilization Review Department  ATTENTION: Please call with any questions or concerns to [phone] and carefully listen to the prompts so that you are directed to the right person   All voicemails are confidential   [facility] all requests for admission clinical reviews, approved or denied determinations and any other requests to dedicated fax number below belonging to the campus where the patient is receiving treatment   List of dedicated fax numbers for the Facilities:  [address] DENIALS (Administrative/Medical Necessity) [phone]   [address] (Maternity/NICU/Pediatrics) [phone]   [address] [place] [address] [facility] [id number] [id number] [place] [address] [place] [facility] [phone]

## 2021-06-10 NOTE — PROGRESS NOTES
Doctor Noé Beltrán made aware  Patient to be transferred to ICU with a diagnosis of Hyponatremia and Acute Pancreatitis  He is to be covered medically by the Hospitalist   Nursing Supervisor and ICU to be call STAT

## 2021-06-10 NOTE — ASSESSMENT & PLAN NOTE
· Reviewed w/ Critical care - will see in AM  · Serial labs  · Normal saline 125ml  · Check urine studies  · Sodium was 129 on 5/16/2021

## 2021-06-10 NOTE — PROGRESS NOTES
Wife Aj Monterroso ([phone]), returned call, made aware of Adrian's condition and current location in hospital (ICU)

## 2021-06-10 NOTE — H&P
[facility]  H&P- Olena Iglesias 1970, 46 y o  male MRN: [id number]  Unit/Bed#: ICU 06 Encounter: [id number]  Primary Care Provider: Kevin Alfaro DO   Date and time admitted to hospital: 6/9/2021 11:22 PM    * Hyponatremia  Assessment & Plan  · Reviewed w/ Critical care - will see in AM  · Serial labs  · Normal saline 125ml  · Check urine studies  · Sodium was 129 on 5/16/2021    Alcohol-induced acute pancreatitis  Assessment & Plan  · NPO  · Acute on chronic pancreatitis  · Pain control  · GI consult    Anxiety and depression  Assessment & Plan  · Continue meds as per psychiatry    Vitamin D deficiency  Assessment & Plan  · Vitamin d weekly for 8 weeks then daily d3 1000 units    Alcohol dependence in early, early partial, sustained full, or sustained partial remission (HCC)  Assessment & Plan  · CIWA protocol  · MVI, thiamine, folic acid    Tobacco dependence  Assessment & Plan  · Nicotine patch    GERD (gastroesophageal reflux disease)  Assessment & Plan  · Continue PPI    Diabetes mellitus type 1 5, managed as type 1 [facility])  Assessment & Plan  Lab Results   Component Value Date    HGBA1C 7 4 (H) 05/11/2021       Recent Labs     06/09/21  0731 06/09/21  1117 06/09/21  1620 06/09/21  1945   POCGLU 124 171* 280* 160*       Blood Sugar Average: Last 72 hrs:  · will reduce Lantus while NPO  · Monitor Blood sugars    Hyperlipidemia  Assessment & Plan  · questran    COPD (chronic obstructive pulmonary disease) (HCC)  Assessment & Plan  · Continue inhaler prn  · Respiratory protcol  · No exacerbation    Benign essential hypertension  Assessment & Plan  · Continue home meds and monitor    VTE Prophylaxis: Enoxaparin (Lovenox)  Code Status: full code  Discussion with patient    Anticipated Length of Stay:  Patient will be admitted on an Inpatient basis with an anticipated length of stay of  > 2 midnights     Justification for Hospital Stay: hyponatremia, pancreatitis      Chief Complaint:   Abnormal labs    History of Present Illness:    Chad Sal is a 46 y o  male who presents with abnormal labs  Patient was admitted to [facility] for suicide attempt with multiple drug overdoses with propanolol and seroquel  Hx of ETOH abuse was sober however recently started drinking  He was manged in the ICU for respiratory failure and drug overdose  He was transferred to [facility] and then was transferred to [facility] on 6/1/2021  Patient noted having increased abdominal pain this morning  Felt similar to the pancreatitis  Had nausea  Noted vision changes started about 1 week ago  Review of Systems:    Review of Systems   Constitutional: Positive for fatigue  Negative for chills and fever  HENT: Negative for rhinorrhea, sore throat and trouble swallowing  Eyes: Positive for visual disturbance  Negative for discharge  Respiratory: Negative for cough and shortness of breath  Cardiovascular: Negative for chest pain and leg swelling  Gastrointestinal: Positive for abdominal pain and nausea  Negative for diarrhea and vomiting  Genitourinary: Negative for dysuria and hematuria  Musculoskeletal: Positive for back pain  Negative for neck pain  Skin: Negative for rash and wound  Neurological: Positive for weakness  Negative for dizziness, numbness and headaches  Psychiatric/Behavioral: Positive for dysphoric mood  Negative for suicidal ideas         Past Medical and Surgical History:     Past Medical History:   Diagnosis Date    Acute respiratory failure ([facility] [address] ) 5/11/2021    Addiction to drug ([facility] )     Alcohol abuse 2/20/2019    Allergic rhinitis     last assessed: 12/5/2012    Anemia     Anxiety and depression     Quiros esophagus     Cholelithiasis     Chronic kidney disease     Chronic pain     Chronic pain disorder     COPD (chronic obstructive pulmonary disease) (HCC)     Depression     Diabetes mellitus (HCC)     Emphysema lung (HCC)     Generalized anxiety disorder  GERD (gastroesophageal reflux disease)     Hyperlipidemia     Hypertension     Intentional overdose of drug in tablet form ([facility] ) 5/11/2021    Kidney stone     Metatarsalgia     last assessed: 8/11/2014, unspecified laterality, ? cause  PE with changes  To see DPM tomorrow   Pancreatitis     Peripheral neuropathy     Psychiatric disorder     Renal disorder     Shortness of breath     with activity    Sleep difficulties     Substance abuse ([facility] )     Suicide attempt ([facility] )     Withdrawal symptoms, alcohol ([facility] )        Past Surgical History:   Procedure Laterality Date    CHOLECYSTECTOMY LAPAROSCOPIC      FOOT SURGERY      B/L great toe joint replacement    KNEE ARTHROSCOPY      (therapeutic) right knee    DC EDG US EXAM SURGICAL ALTER STOM DUODENUM/JEJUNUM N/A 10/17/2018    Procedure: LINEAR ENDOSCOPIC U/S;  Surgeon: Melissa Peunte MD;  Location: [facility]; Service: Gastroenterology    VASECTOMY      vas deferens       Meds/Allergies:    Prior to Admission medications    Medication Sig Start Date End Date Taking? Authorizing Provider   albuterol (PROVENTIL HFA,VENTOLIN HFA) 90 mcg/act inhaler Inhale 2 puffs every 6 (six) hours as needed for wheezing or shortness of breath    Historical Provider, MD   cholestyramine sugar free (QUESTRAN LIGHT) 4 g packet Take 1 packet (4 g total) by mouth 2 (two) times a day 5/14/21 6/13/21  Bryon Multani, DO   folic acid (FOLVITE) 1 mg tablet Take 1 tablet (1 mg total) by mouth daily 5/15/21   Bryon Multani, DO   gabapentin (NEURONTIN) 300 mg capsule Take 300 mg by mouth 3 (three) times a day    Historical Provider, MD   Insulin Pen Needle (B-D ULTRAFINE III SHORT PEN) 31G X 8 MM MISC by Does not apply route 2/16/12   Historical Provider, MD   Insulin Pen Needle (PEN NEEDLES) 29G X 12MM MISC by Does not apply route daily at bedtime Substitute what fits [place] pen and what is covered by insurance   8/14/18   TYE Elizabeth   lisinopril (ZESTRIL) 10 mg tablet Take 10 mg by mouth 2 (two) times a day    Historical Provider, MD   losartan (COZAAR) 25 mg tablet Take 25 mg by mouth daily    Historical Provider, MD   metFORMIN (GLUCOPHAGE) 500 mg tablet Take 500 mg by mouth 2 (two) times a day with meals    Historical Provider, MD   omeprazole (PriLOSEC) 20 mg delayed release capsule Take 20 mg by mouth daily    Historical Provider, MD   pioglitazone (ACTOS) 30 mg tablet Take 30 mg by mouth daily    Historical Provider, MD   propranolol (INDERAL) 10 mg tablet Take 10 mg by mouth 2 (two) times a day    Historical Provider, MD   QUEtiapine (SEROquel) 200 mg tablet Take 250 mg by mouth daily at bedtime    Historical Provider, MD   rOPINIRole (REQUIP) 1 mg tablet Take 1 mg by mouth 3 (three) times a day    Historical Provider, MD   sertraline (ZOLOFT) 100 mg tablet Take 200 mg by mouth daily    Historical Provider, MD   thiamine (VITAMIN B1) 100 mg tablet Take 1 tablet (100 mg total) by mouth daily 5/15/21 6/14/21  Bryon Multani, DO   tiotropium (SPIRIVA) 18 mcg inhalation capsule Place 1 capsule (18 mcg total) into inhaler and inhale daily 5/15/21 6/14/21  Bryon Multani DO     all medications and allergies reviewed    Allergies: No Known Allergies    Social History:     Marital Status: /Civil Union   Occupation: unemployed  Patient Pre-hospital Living Situation: home  Patient Pre-hospital Level of Mobility: no assisted device  Patient Pre-hospital Diet Restrictions: diabetic  Substance Use History:   Social History     Substance and Sexual Activity   Alcohol Use Not Currently    Frequency: 4 or more times a week    Drinks per session: 5 or 6    Binge frequency: Daily or almost daily    Comment: Drank a 5th vodka  6/1/2021     Social History     Tobacco Use   Smoking Status Current Every Day Smoker    Packs/day: 1 00    Types: Cigarettes    Start date: 4/14/1986   Smokeless Tobacco Never Used   Tobacco Comment    Tobacco use [employer] "How to Quit" literature given to mary ellen  Social History     Substance and Sexual Activity   Drug Use No    Comment: chronic narcotic use       Family History:  I have reviewed the patient's family history    Physical Exam:     Vitals:   Blood Pressure: 163/84 (06/10/21 0000)  Pulse: 74 (06/10/21 0000)  Temperature: (!) 97 1 °F (36 2 °C) (06/10/21 0000)  Temp Source: Temporal (06/10/21 0000)  Respirations: 13 (06/10/21 0000)  SpO2: 96 % (06/10/21 0000)    Physical Exam  Vitals signs reviewed  Constitutional:       General: He is not in acute distress  Appearance: Normal appearance  HENT:      Head: Normocephalic and atraumatic  Right Ear: External ear normal       Left Ear: External ear normal       Nose: Nose normal    Eyes:      General:         Right eye: No discharge  Left eye: No discharge  Conjunctiva/sclera: Conjunctivae normal    Neck:      Musculoskeletal: Normal range of motion  Cardiovascular:      Rate and Rhythm: Normal rate and regular rhythm  Heart sounds: Normal heart sounds  No murmur  Pulmonary:      Effort: Pulmonary effort is normal  No respiratory distress  Breath sounds: Normal breath sounds  No wheezing or rales  Abdominal:      General: Bowel sounds are normal  There is no distension  Palpations: Abdomen is soft  Tenderness: There is abdominal tenderness  There is guarding  Musculoskeletal: Normal range of motion  Comments: LE in venadynes   Skin:     General: Skin is warm and dry  Neurological:      Mental Status: He is alert and oriented to person, place, and time  Mental status is at baseline  Psychiatric:         Mood and Affect: Mood normal          Behavior: Behavior normal          Thought Content: Thought content normal          Judgment: Judgment normal       Comments: Denies suicidal ideation         Additional Data:     Lab Results: I have personally reviewed pertinent reports        Results from last 7 days   Lab Units 06/09/21  1857   WBC Thousand/uL 9 80 HEMOGLOBIN g/dL 14 4   HEMATOCRIT % 40 0*   PLATELETS Thousands/uL 151   NEUTROS PCT % 81*   LYMPHS PCT % 11*   MONOS PCT % 7   EOS PCT % 0     Results from last 7 days   Lab Units 06/09/21  2136 06/09/21  1857   SODIUM mmol/L 106* 105*   POTASSIUM mmol/L 4 3 4 4   CHLORIDE mmol/L 75* 74*   CO2 mmol/L 24 22   BUN mg/dL 12 13   CREATININE mg/dL 0 65* 0 71   ANION GAP mmol/L 7 9   CALCIUM mg/dL 8 8 8 8   ALBUMIN g/dL  --  3 9   TOTAL BILIRUBIN mg/dL  --  0 40   ALK PHOS U/L  --  94   ALT U/L  --  23   AST U/L  --  23   GLUCOSE RANDOM mg/dL 120* 169*         Results from last 7 days   Lab Units 06/09/21  1945 06/09/21  1620 06/09/21  1117 06/09/21  0731 06/08/21  1938 06/08/21  1653 06/08/21  1123 06/08/21  0802 06/07/21  1917 06/07/21  1617 06/07/21  1128 06/07/21  0722   POC GLUCOSE mg/dl 160* 280* 171* 124 161* 171* 192* 109 109 155* 177* 220*     Imaging: CT head: no acute intracranial abnormality    [facility] / Care Everywhere Records Reviewed: Yes    ** Please Note: This note has been constructed using a voice recognition system   **

## 2021-06-10 NOTE — NURSING NOTE
Patient discharged to ICU Bed 6 via stretcher  IV at 75 cc right antecubital   Alert and oriented  Will call report to ICU nurse, Mary Orellana

## 2021-06-10 NOTE — PROGRESS NOTES
New orders obtained from [facility]) for UA with microscopic, Na+ Urine and Creatinine Urine  Labs being obtained for BMT

## 2021-06-10 NOTE — PROGRESS NOTES
2018 -Nursing Supervisor - Alisa Sorensen made aware of situation  2021 - ICU made aware of need for bed  PAC called -2024  Dr Lindsey Alvarado made aware of situation  2026  IV held till seen in ICU due to fluid osmolarity  Patient made aware of situation  No overt symptoms except severe abdominal pain

## 2021-06-10 NOTE — CONSULTS
Consultation - GI   Shon Bach 46 y o  male MRN: [id number]  Unit/Bed#: ICU 06 Encounter: [id number]      Assessment/Plan     Assessment:  Due to recurrent pancreatitis, likely secondary to alcohol  Hyponatremia  Status post cholecystectomy in the past  Plan:  NPO except for ice chips for now  Agree with abdominal MRI with MRCP  IV fluids according to Nephrology  IV Protonix  Check lipid panel  History of Present Illness   Physician Requesting Consult: Avril Blancas MD  Reason for Consult / Principal Problem:  Acute pancreatitis  Hx and PE limited by:   HPI: Shon Bach is a 46y o  year old male who presents with severe epigastric pain associated with elevated lipase above 700  Patient has had recurrent episodes of acute pancreatitis thought secondary to alcohol  He has been alcohol free for the past 2 years  He does have a history of hyperlipidemia  Lipids have not been checked recently  He also has type 2 diabetes  Continues to have epigastric pain and has had severe hyponatremia and is being followed by Nephrology  He had been on normal saline but saline was discontinued today when the patient started on clear liquids  However the clear liquids is not being tolerated and the patient does have significant epigastric pain right now  Consults    Review of Systems   Constitutional: Positive for appetite change and fatigue  HENT: Negative  Eyes: Negative  Respiratory: Negative  Gastrointestinal: Positive for abdominal pain (Epigastric with guarding)  Endocrine: Negative  Genitourinary: Negative  Musculoskeletal: Positive for back pain  Neurological: Positive for headaches  Hematological: Negative  Psychiatric/Behavioral: Positive for dysphoric mood         Historical Information   Past Medical History:   Diagnosis Date    [facility] 5/11/2021    [facility] drug ([facility] )     Alcohol abuse 2/20/2019    Allergic rhinitis     last assessed: 12/5/2012    Anemia     Anxiety and depression     Quiros esophagus     Cholelithiasis     Chronic kidney disease     Chronic pain     Chronic pain disorder     COPD (chronic obstructive pulmonary disease) (HCC)     Depression     Diabetes mellitus (HCC)     Emphysema lung (HCC)     Generalized anxiety disorder     GERD (gastroesophageal reflux disease)     Hyperlipidemia     Hypertension     Intentional overdose of drug in tablet form ([facility] ) 5/11/2021    Kidney stone     Metatarsalgia     last assessed: 8/11/2014, unspecified laterality, ? cause  PE with changes  To see DPM tomorrow   Pancreatitis     Peripheral neuropathy     Psychiatric disorder     Renal disorder     Shortness of breath     with activity    Sleep difficulties     Substance abuse ([facility] )     [facility]     Withdrawal symptoms, alcohol ([facility] )      Past Surgical History:   Procedure Laterality Date    CHOLECYSTECTOMY LAPAROSCOPIC      FOOT SURGERY      B/L great toe joint replacement    KNEE ARTHROSCOPY      (therapeutic) right knee    MD EDG US EXAM SURGICAL ALTER STOM DUODENUM/JEJUNUM N/A 10/17/2018    Procedure: LINEAR ENDOSCOPIC U/S;  Surgeon: Raquel Galarza MD;  Location: [facility];   Service: Gastroenterology    VASECTOMY      vas deferens     Social History   Social History     Substance and Sexual Activity   Alcohol Use Not Currently    Frequency: 4 or more times a week    Drinks per session: 5 or 6    Binge frequency: Daily or almost daily    Comment: Drank a 5th vodka  6/1/2021     Social History     Substance and Sexual Activity   Drug Use No    Comment: chronic narcotic use     E-Cigarette/Vaping    E-Cigarette Use Never User      E-Cigarette/Vaping Substances    Nicotine Yes     THC No     CBD No     Flavoring No     Other No     Unknown No      Social History     Tobacco Use   Smoking Status Current Every Day Smoker    Packs/day: 1 00    Types: Cigarettes    Start date: 4/14/1986 Smokeless Tobacco Never Used   Tobacco Comment    Tobacco use [employer] "How to Quit" literature given to pat  Family History: non-contributory    Meds/Allergies   all current active meds have been reviewed    No Known Allergies    Objective       Intake/Output Summary (Last 24 hours) at 6/10/2021 1315  Last data filed at 6/10/2021 1200  Gross per 24 hour   Intake 1165 ml   Output 1375 ml   Net -210 ml       Invasive Devices:   Peripheral IV 06/09/21 Distal;Right;Upper;Ventral (anterior) Arm (Active)   Site Assessment WDL 06/10/21 0800   Dressing Type Transparent 06/10/21 0800   Line Status Infusing 06/10/21 0800   Dressing Status Clean;Dry; Intact 06/10/21 0800   Reason Not Rotated Not due 06/10/21 0800       Urethral Catheter 16 Fr  (Active)   Reasons to continue Urinary Catheter  Accurate I&O assessment in critically ill patients (48 hr  max) 06/10/21 0800   Goal for Removal No longer needed- Will place order to discontinue 06/10/21 0800   Site Assessment Clean;Skin intact 06/10/21 0800   Collection Container Standard drainage bag 06/10/21 0800   Securement Method Securing device (Describe) 06/10/21 0800   Output (mL) 200 mL 06/10/21 1200       Physical Exam  Constitutional:       Appearance: Normal appearance  HENT:      Head: Normocephalic and atraumatic  Mouth/Throat:      Mouth: Mucous membranes are dry  Cardiovascular:      Rate and Rhythm: Normal rate and regular rhythm  Pulmonary:      Effort: Pulmonary effort is normal       Breath sounds: Normal breath sounds  Abdominal:      General: Abdomen is flat  Bowel sounds are normal       Palpations: Abdomen is soft  Tenderness: There is abdominal tenderness (Epigastric)  Neurological:      General: No focal deficit present  Mental Status: He is alert and oriented to person, place, and time  Psychiatric:         Behavior: Behavior normal          Lab Results: I have personally reviewed pertinent reports      Imaging Studies: I have personally reviewed pertinent reports  EKG, Pathology, and Other Studies: I have personally reviewed pertinent reports  VTE Prophylaxis: Heparin    Counseling / Coordination of Care  Total floor / unit time spent today 70 minutes  Greater than 50% of total time was spent with the patient and / or family counseling and / or coordination of care  A description of the counseling / coordination of care:  Case discussed with patient, nursing staff and indirectly with Nephrology

## 2021-06-10 NOTE — PROGRESS NOTES
Accepted patient at 1900  Critical Lab value accepted at 2009 per call  Dr Darrin Shields to be called

## 2021-06-10 NOTE — SOCIAL WORK
TRISTAN sent email to CMs MAAME and Khadra De La Cruz to provide Warm Handoff; pt was psychiatrically cleared for DC from [facility] but was being tapered off Oxy for referral process; TRISTAN provided facilities that accept medically complex patients for D&A rehab and offered assistance as needed

## 2021-06-10 NOTE — RESPIRATORY THERAPY NOTE
RT Protocol Note  Thompson Zhao 46 y o  male MRN: [id number]  Unit/Bed#: ICU 06 Encounter: [id number]    Assessment    Principal Problem:    Hyponatremia  Active Problems:    Benign essential hypertension    COPD (chronic obstructive [facility] disease) ([facility]    Hyperlipidemia    Diabetes mellitus type 1 5, managed as type 1 ([facility])    GERD (gastroesophageal reflux disease)    Anxiety and depression    Tobacco dependence    Alcohol dependence in early, early partial, sustained full, or sustained partial remission ([facility] )    Alcohol-induced acute pancreatitis    Vitamin D deficiency      Home Pulmonary Medications:    Home Devices/Therapy: Home O2, Other (Comment)(mdi's)    Past Medical History:   Diagnosis Date    Acute respiratory failure ([facility] ) 5/11/2021    Addiction to drug ([place] [address] )     Alcohol abuse 2/20/2019    Allergic rhinitis     last assessed: 12/5/2012    Anemia     Anxiety and depression     Quiros esophagus     Cholelithiasis     Chronic kidney disease     Chronic pain     Chronic pain disorder     COPD (chronic obstructive pulmonary disease) ([facility] )     Depression     [facility]     [facility]     Generalized anxiety disorder     GERD (gastroesophageal reflux disease)     Hyperlipidemia     Hypertension     Intentional overdose of drug in tablet form ([facility] ) 5/11/2021    Kidney stone     Metatarsalgia     last assessed: 8/11/2014, unspecified laterality, ? cause  PE with changes  To see DPM tomorrow      Pancreatitis     Peripheral neuropathy     Psychiatric disorder     Renal disorder     Shortness of breath     with activity    Sleep difficulties     Substance [facility]     Suicide attempt ([facility] )     Withdrawal symptoms, alcohol ([place] [address] )      Social History     Socioeconomic History    Marital status: /Civil Union     Spouse name: None    Number of children: None    Years of education: None    Highest education level: None   Occupational History    Occupation: Unemployed   Social Needs    Financial resource strain: None    Food insecurity     Worry: None     Inability: None    Transportation needs     Medical: None     Non-medical: None   Tobacco Use    Smoking status: Current Every Day Smoker     Packs/day: 1 00     Types: Cigarettes     Start date: 4/14/1986    Smokeless tobacco: Never Used    Tobacco comment: Tobacco use [employer] "How to Quit" literature given to pat  Substance and Sexual Activity    Alcohol use: Not Currently     Frequency: 4 or more times a week     Drinks per session: 5 or 6     Binge frequency: Daily or almost daily     Comment: Drank a 5th vodka  6/1/2021    Drug use: No     Comment: chronic narcotic use    Sexual activity: Yes     Partners: Female   Lifestyle    Physical activity     Days per week: None     Minutes per session: None    Stress: None   Relationships    Social connections     Talks on phone: None     Gets together: None     Attends Yazidism service: None     Active member of club or organization: None     Attends meetings of clubs or organizations: None     Relationship status: None    Intimate partner violence     Fear of current or ex partner: None     Emotionally abused: None     Physically abused: None     Forced sexual activity: None   Other Topics Concern    None   Social History Narrative    UNEMPLOYED       Subjective         Objective    Physical Exam:   Assessment Type: Assess only  General Appearance: Alert, Awake  Respiratory Pattern: Dyspnea with exertion  Chest Assessment: Chest expansion symmetrical  Bilateral Breath Sounds: Diminished, Scattered, Rales  Cough: None    Vitals:  Blood pressure 158/80, pulse 70, temperature 97 8 °F (36 6 °C), temperature source Temporal, resp  rate 14, height 6' 1" (1 854 m), weight 91 kg (200 lb 9 9 oz), SpO2 97 %  Imaging and other studies: I have personally reviewed pertinent reports              Plan    Respiratory Plan: Home Bronchodilator Patient pathway        Resp Comments: pt assessed as per protocol, pt states he has o2 at home however does not wear it like he is supposed to, pt also states he uses mdi's, alb prn and spiriva, has not had a need to increase use of the alb prior to admission, pt informed of orders for mdi's, pt denies having home cpap/bipap, denies current sob or distress

## 2021-06-10 NOTE — CONSULTS
Consult - [address] [facility] Angela 46 y o  male MRN: [id number]  Unit/Bed#: ICU 06 Encounter: [id number]     Physician Requesting Consult: Damián Raya MD  Inpatient consult to Otis Lopez performed by: Karla Puente MD  Consult ordered by: Criselda Stauffer PA-C           Reason for Consultation / Chief Complaint: Acute pancreatitis     History of Present Illness:  Kendall Cuevas is a 46 y o  male with a PMH of alcohol dependence, alcoholic pancreatitis, COPD, DM2, GERD, and anxiety and depression who presents following a recent inpatient hospitalization for drug overdose and respiratory failure presented yesterday from inpatient behavioral health with acute abdominal pain consistent with acute pancreatitis and significant hyponatremia  History obtained from patient, patient's chart, and consulting provider  Past Medical History:  Past Medical History:   Diagnosis Date    Acute respiratory failure ([facility] [address] ) 5/11/2021    Addiction to drug ([facility] [address] )     Alcohol abuse 2/20/2019    Allergic rhinitis     last assessed: 12/5/2012    Anemia     Anxiety and depression     Quiros esophagus     Cholelithiasis     Chronic kidney disease     Chronic pain     Chronic pain disorder     COPD (chronic obstructive pulmonary disease) (HCC)     Depression     Diabetes mellitus (HCC)     Emphysema lung (HCC)     Generalized anxiety disorder     GERD (gastroesophageal reflux disease)     Hyperlipidemia     Hypertension     Intentional overdose of drug in tablet form ([address] ) 5/11/2021    Kidney stone     Metatarsalgia     last assessed: 8/11/2014, unspecified laterality, ? cause  PE with changes  To see DPM tomorrow      Pancreatitis     Peripheral neuropathy     Psychiatric disorder     Renal disorder     Shortness of breath     with activity    Sleep difficulties     Substance abuse ([address] )     Suicide attempt ([address] )     Withdrawal symptoms, alcohol ([address] )         Past Surgical History:  Past Surgical History:   Procedure Laterality Date    CHOLECYSTECTOMY LAPAROSCOPIC      FOOT SURGERY      B/L great toe joint replacement    KNEE ARTHROSCOPY      (therapeutic) right knee    NE EDG US EXAM SURGICAL ALTER STOM DUODENUM/JEJUNUM N/A 10/17/2018    Procedure: LINEAR ENDOSCOPIC U/S;  Surgeon: Satish Camp MD;  Location: [facility]; Service: Gastroenterology    VASECTOMY      vas deferens        Past Family History:  Family History   Problem Relation Age of Onset   Mavis Cousin Cancer Mother     Coronary artery disease Mother     Diabetes Mother     Coronary artery disease Father     Prostate cancer Father     Diabetes Sister     Coronary artery disease Family         Social History:  E-Cigarette/Vaping    E-Cigarette Use Never User      E-Cigarette/Vaping Substances    Nicotine Yes     THC No     CBD No     Flavoring No     Other No     Unknown No      Social History     Tobacco Use   Smoking Status Current Every Day Smoker    Packs/day: 1 00    Types: Cigarettes    Start date: 4/14/1986   Smokeless Tobacco Never Used   Tobacco Comment    Tobacco use [employer]'s "How to Quit" literature given to pat  Social History     Substance and Sexual Activity   Alcohol Use Not Currently    Frequency: 4 or more times a week    Drinks per session: 5 or 6    Binge frequency: Daily or almost daily    Comment: Drank a 5th vodka  6/1/2021     Social History     Substance and Sexual Activity   Drug Use No    Comment: chronic narcotic use     Marital Status: /Civil Union       Home Medications:   Prior to Admission medications    Medication Sig Start Date End Date Taking?  Authorizing Provider   albuterol (PROVENTIL HFA,VENTOLIN HFA) 90 mcg/act inhaler Inhale 2 puffs every 6 (six) hours as needed for wheezing or shortness of breath    Historical Provider, MD   cholestyramine sugar free (QUESTRAN LIGHT) 4 g packet Take 1 packet (4 g total) by mouth 2 (two) times a day 5/14/21 6/13/21  Jorge Fernandez DO   folic acid (FOLVITE) 1 mg tablet Take 1 tablet (1 mg total) by mouth daily 5/15/21   Bryon Multani DO   gabapentin (NEURONTIN) 300 mg capsule Take 300 mg by mouth 3 (three) times a day    Historical Provider, MD   Insulin Pen Needle (B-D ULTRAFINE III SHORT PEN) 31G X 8 MM MISC by Does not apply route 2/16/12   Historical Provider, MD   Insulin Pen Needle (PEN NEEDLES) 29G X 12MM MISC by Does not apply route daily at bedtime Substitute what fits [place] pen and what is covered by insurance   8/14/18   TYE Elizabeth   lisinopril (ZESTRIL) 10 mg tablet Take 10 mg by mouth 2 (two) times a day    Historical Provider, MD   losartan (COZAAR) 25 mg tablet Take 25 mg by mouth daily    Historical Provider, MD   metFORMIN (GLUCOPHAGE) 500 mg tablet Take 500 mg by mouth 2 (two) times a day with meals    Historical Provider, MD   omeprazole (PriLOSEC) 20 mg delayed release capsule Take 20 mg by mouth daily    Historical Provider, MD   pioglitazone (ACTOS) 30 mg tablet Take 30 mg by mouth daily    Historical Provider, MD   propranolol (INDERAL) 10 mg tablet Take 10 mg by mouth 2 (two) times a day    Historical Provider, MD   QUEtiapine (SEROquel) 200 mg tablet Take 250 mg by mouth daily at bedtime    Historical Provider, MD   rOPINIRole (REQUIP) 1 mg tablet Take 1 mg by mouth 3 (three) times a day    Historical Provider, MD   sertraline (ZOLOFT) 100 mg tablet Take 200 mg by mouth daily    Historical Provider, MD   thiamine (VITAMIN B1) 100 mg tablet Take 1 tablet (100 mg total) by mouth daily 5/15/21 6/14/21  Bryon Multani DO   tiotropium (SPIRIVA) 18 mcg inhalation capsule Place 1 capsule (18 mcg total) into inhaler and inhale daily 5/15/21 6/14/21  Rita Abernathy DO        Inpatient Medications:  Scheduled Meds:  Current Facility-Administered Medications   Medication Dose Route Frequency Provider Last Rate    acetaminophen  650 mg Oral Q4H PRN Janeal Blue GrassZOYA shelton      albuterol  2 puff Inhalation Q4H PRN Dalia Gip Vin, PA-C      aluminum-magnesium hydroxide-simethicone  30 mL Oral Q4H PRN Thurlow Seen, PA-C      benztropine  1 mg Intramuscular Q4H PRN Max 6/day Thurlow Seen, PA-C      benztropine  0 5 mg Oral Q4H PRN Max 6/day Thurlow Seen, PA-C      [START ON 7/5/2021] cholecalciferol  1,000 Units Oral Daily Thurlow Seen, PA-C      cholestyramine sugar free  4 g Oral BID Thurlow Seen, PA-C      dextromethorphan-guaiFENesin  10 mL Oral Q4H PRN Thurlow Seen, PA-C      [START ON 6/14/2021] ergocalciferol  50,000 Units Oral Weekly Thurlow Seen, PA-C      folic acid  1 mg Oral Daily Thurlow Seen, PA-C      gabapentin  400 mg Oral TID Thurlow Seen, PA-C      haloperidol lactate  5 mg Intramuscular Q4H PRN Max 4/day Thurlow Seen, PA-C      HYDROmorphone  1 mg Intravenous Q4H PRN Thurlow Seen, PA-C      hydrOXYzine HCL  25 mg Oral Q6H PRN Max 4/day Thurlow Seen, PA-C      hydrOXYzine HCL  50 mg Oral Q6H PRN Max 4/day Thurlow Seen, PA-C      insulin glargine  15 Units Subcutaneous QAM Thurlow Seen, PA-C      insulin lispro  1-6 Units Subcutaneous 4x Daily (AC & HS) Thurlow Seen, PA-C      magnesium oxide  400 mg Oral BID Thurlow Seen, PA-C      multivitamin-minerals  1 tablet Oral Daily Thurlow Seen, PA-C      naloxone  0 04 mg Intravenous Q1MIN PRN Thurlow Seen, PA-C      nicotine  1 patch Transdermal Daily Thurlow Seen, PA-C      ondansetron  4 mg Oral Q6H PRN Thurlow Seen, PA-C      oxyCODONE  5 mg Oral Q4H PRN Thurlow Seen, PA-C      pantoprazole  40 mg Oral Early Morning Thurlow Seen, PA-C      propranolol  10 mg Oral BID Thurlow Seen, PA-C      QUEtiapine  200 mg Oral HS Thurlow Seen, PA-C      risperiDONE  0 25 mg Oral Q4H PRN Max 6/day Thurlow Seen, PA-C      risperiDONE  0 5 mg Oral Q4H PRN Max 3/day Thurlow Seen, PA-C      risperiDONE  1 mg Oral Q4H PRN Max 3/day Thurlow Seen, PA-C  rOPINIRole  1 mg Oral BID [facility] ZOYA Pearson      senna-docusate sodium  1 tablet Oral BID [facility]      sertraline  150 mg Oral Daily [facility]      sodium chloride  200 mL/hr Intravenous Continuous Shania Pearson PA-C 200 mL/hr (06/10/21 0825)    thiamine  100 mg Oral Daily [facility]      tiotropium  18 mcg Inhalation Daily [facility] PA-[facility]      traZODone  50 mg Oral HS PRN [facility]C       Continuous Infusions:sodium chloride, 200 mL/hr, Last Rate: 200 mL/hr (06/10/21 0825)      PRN Meds:  acetaminophen, 650 mg, Q4H PRN  albuterol, 2 puff, Q4H PRN  aluminum-magnesium hydroxide-simethicone, 30 mL, Q4H PRN  benztropine, 1 mg, Q4H PRN Max 6/day  benztropine, 0 5 mg, Q4H PRN Max 6/day  dextromethorphan-guaiFENesin, 10 mL, Q4H PRN  haloperidol lactate, 5 mg, Q4H PRN Max 4/day  HYDROmorphone, 1 mg, Q4H PRN  hydrOXYzine HCL, 25 mg, Q6H PRN Max 4/day  hydrOXYzine HCL, 50 mg, Q6H PRN Max 4/day  naloxone, 0 04 mg, Q1MIN PRN  ondansetron, 4 mg, Q6H PRN  oxyCODONE, 5 mg, Q4H PRN  risperiDONE, 0 25 mg, Q4H PRN Max 6/day  risperiDONE, 0 5 mg, Q4H PRN Max 3/day  risperiDONE, 1 mg, Q4H PRN Max 3/day  traZODone, 50 mg, HS PRN         Allergies:  No Known Allergies     ROS:   Review of Systems   Gastrointestinal: Positive for abdominal pain  All other systems reviewed and are negative  Vitals:  Vitals:    06/10/21 0615 06/10/21 0700 06/10/21 0800 06/10/21 0807   BP:       BP Location:       Pulse: 72   76   Resp: 15  19 16   Temp:  (!) 96 2 °F (35 7 °C)     TempSrc:  Temporal     SpO2: 97%   97%   Weight:       Height:         Temperature:   Temp (24hrs), Av 2 °F (36 2 °C), Min:96 2 °F (35 7 °C), Max:97 8 °F (36 6 °C)    Current Temperature: (!) 96 2 °F (35 7 °C)     Weights:   IBW (Ideal Body Weight): 79 9 kg  Body mass index is 26 47 kg/m²       Hemodynamic Monitoring:  N/A     Non-Invasive/Invasive Ventilation Settings:  Respiratory    Lab Data (Last 4 hours)    None         O2/Vent Data (Last 4 hours)    None              No results found for: PHART, LZZ5XMG, PO2ART, XSV0HNJ, X4PMPDJU, BEART, SOURCE  SpO2: SpO2: 97 %     Physical Exam:  Physical Exam  Vitals signs and nursing note reviewed  Constitutional:       Appearance: Normal appearance  HENT:      Head: Normocephalic and atraumatic  Eyes:      Extraocular Movements: Extraocular movements intact  Pupils: Pupils are equal, round, and reactive to light  Neck:      Musculoskeletal: Normal range of motion  Cardiovascular:      Rate and Rhythm: Normal rate and regular rhythm  Pulses: Normal pulses  Heart sounds: Normal heart sounds  Pulmonary:      Effort: Pulmonary effort is normal       Breath sounds: Normal breath sounds  Abdominal:      General: Bowel sounds are normal  There is distension  Tenderness: There is abdominal tenderness  There is guarding  Musculoskeletal: Normal range of motion  Skin:     General: Skin is warm and dry  Neurological:      General: No focal deficit present  Mental Status: He is alert and oriented to person, place, and time  Psychiatric:         Mood and Affect: Mood normal          Behavior: Behavior normal           Labs:  Results from last 7 days   Lab Units 06/09/21  1857   WBC Thousand/uL 9 80   HEMOGLOBIN g/dL 14 4   HEMATOCRIT % 40 0*   PLATELETS Thousands/uL 151   NEUTROS PCT % 81*   MONOS PCT % 7      Results from last 7 days   Lab Units 06/10/21  0625 06/10/21  0407 06/10/21  0219  06/09/21  1857   SODIUM mmol/L 106* 107* 106*   < > 105*   POTASSIUM mmol/L 4 2 4 6 4 4   < > 4 4   CHLORIDE mmol/L 77* 76* 77*   < > 74*   CO2 mmol/L 24 25 24   < > 22   BUN mg/dL 11 11 12   < > 13   CREATININE mg/dL 0 54* 0 57* 0 62*   < > 0 71   CALCIUM mg/dL 8 7 8 4* 8 4*   < > 8 8   ALK PHOS U/L  --   --   --   --  94   ALT U/L  --   --   --   --  23   AST U/L  --   --   --   --  23    < > = values in this interval not displayed  Results from last 7 days   Lab Units 06/10/21  0625 06/10/21  0219   MAGNESIUM mg/dL 2 1 1 6*                  0   Lab Value Date/Time    TROPONINI <0 03 05/11/2021 1842    TROPONINI <0 02 02/19/2019 1509    TROPONINI <0 02 02/19/2019 1259    TROPONINI < 0 03 05/30/2018 1302        Imaging:  I have personally reviewed pertinent reports  and I have personally reviewed pertinent films in PACS     CT abdomen/pelvis:    IMPRESSION:     There is diffuse peripancreatic fat stranding surrounding a chronically severely atrophic pancreas, consistent with acute pancreatitis      Severe emphysema noted at the lung bases  EKG: This was personally reviewed by myself  Micro:  Lab Results   Component Value Date    BLOODCX No Growth After 5 Days  05/11/2021    BLOODCX No Growth After 5 Days  05/11/2021    SPUTUMCULTUR 3+ Growth of  05/12/2021       Assessment: 46 M with acute abdominal pain consistent with acute pancreatitis and severe hyponatremia  Plan:                  Neuro: Monitor serial neurological exams  Continue psych regimen  PO pain meds  Continue MVM and thiamine  CV: No acute concerns  Monitor hemodynamics closely  Lung: No acute concerns  Monitor respiratory status closely given patient's aggressive IVF administration  FEN/GI: Acute pancreatitis  Trend lipase  NPO for now  : Monitor strict I/Os  Trend Mg/iCal  Replete electrolytes as needed  Nephrology consultation for severe euvolemic hyponatremia (patient denies aggressive water intake, and is not currently drinking  Etiologies include SIADH)  Crane in situ  Monitor serial bladder pressures  ID: No acute concerns  Monitor WBC count and fever curve  Heme: Heparin SC                 Endo: Monitor BG closely  Msk/Skin: No acute concerns                    Disposition: ICU     VTE Pharmacologic Prophylaxis: Heparin  VTE Mechanical Prophylaxis: sequential compression device     Invasive lines and devices: Invasive Devices     Peripheral Intravenous Line            Peripheral IV 06/09/21 Distal;Right;Upper;Ventral (anterior) Arm less than 1 day          Drain            Urethral Catheter 16 Fr  less than 1 day                 Code Status: Level 1 - Full Code  POA:    POLST:       Given critical illness, patient length of stay will require greater than two midnights  Counseling / Coordination of Care  Upon my evaluation, this patient had a high probability of imminent or life-threatening deterioration due to acute resp failure, which required my direct attention, intervention, and personal management  I have personally provided 30 minutes (9:51 AM to 9:21 AM) of critical care time, exclusive of procedures, teaching, family meetings, and any prior time recorded by providers other than myself  Portions of the record may have been created with voice recognition software  Occasional wrong word or "sound a like" substitutions may have occurred due to the inherent limitations of voice recognition software  Read the chart carefully and recognize, using context, where substitutions have occurred          Yesenia Duran MD

## 2021-06-10 NOTE — PROGRESS NOTES
[facility]  Progress Note - Isauro Ma 1970, 46 y o  male MRN: [id number]  Unit/Bed#: ICU 06 Encounter: [id number]  Primary Care Provider: Osei Stapleton DO   Date and time admitted to hospital: 6/9/2021 11:22 PM    * Hyponatremia  Assessment & Plan  · Unspecified exact etiology  · Differential:  SIADH vs Drug-Induced vs Potomania vs Psychogenic Polydipsia  · Patient is completely asymptomatic  · Sodium levels were normal in the 2nd week of May  · Sodium trend thus far 105, 106, 106, 107, 106  · Sodium has not improved despite being on normal saline at 200 cc an hour  · Case reviewed with Dr Miky Tobias  · DC IV fluids, will put on a 1 5 L fluid restriction, continue to monitor BMPs  · Formal nephrology input very shortly, Critical Care eval pending  · Continue management in the ICU    Alcohol dependence in early, early partial, sustained full, or sustained partial remission ([facility] [address] )  Assessment & Plan  · Patient has been hospitalized over the past month in between [facility], and the behavioral health unit here  · Doubt that the patient will have any type of alcohol withdrawal at this time  · DC CIWA protocol  · DC thiamine, folate, and multivitamin    Hyperlipidemia  Assessment & Plan  · questran    Idiopathic acute pancreatitis without infection or necrosis  Assessment & Plan  · Lipase has improved  · Patient has a history of multiple episodes of pancreatitis  · Will check triglycerides, Will consult GI  · Will give the patient a trial of clear liquids  · Will check an MRCP    COPD (chronic obstructive pulmonary disease) ([facility] [address] )  Assessment & Plan  · Stable without exacerbation  · Continue respiratory protocol    Diabetes mellitus type 1 5, managed as type 1 [facility])  Assessment & Plan  Lab Results   Component Value Date    HGBA1C 7 4 (H) 05/11/2021       Recent Labs     06/09/21  1620 06/09/21  1945 06/10/21  0027 06/10/21  0717   POCGLU 280* 160* 122 115 Blood Sugar Average: Last 72 hrs:  · (P) 115   · Continue Lantus, Accu-Cheks AC and HS with sliding scale coverage    Benign essential hypertension  Assessment & Plan  · Continue home meds and monitor    Anxiety and depression  Assessment & Plan  · Formal psych consultation pending  · Will ask them to review psych medications and specifically to see which ones can potentially cause pancreatitis, and or hyponatremia    GERD (gastroesophageal reflux disease)  Assessment & Plan  · Continue PPI    Vitamin D deficiency  Assessment & Plan  · Vitamin d weekly for 8 weeks then daily d3 1000 units    Tobacco dependence  Assessment & Plan  · Nicotine patch        VTE Prophylaxis:  Heparin    Patient Centered Rounds: I have performed bedside rounds with nursing staff today  Discussions with Specialists or Other Care Team Provider:  Critical Care, Nephrology, GI, case management, nursing, pharmacy  Education and Discussions with Family / Patient:  Patient was brought up to par with the plan of care for today, he reported that he wants to speak to his wife himself    Current Length of Stay: 1 day(s)    Current Patient Status: Inpatient   Certification Statement: The patient will continue to require additional inpatient hospital stay due to Continued management of hyponatremia    Discharge Plan: To be decided    Code Status: Level 1 - Full Code    Subjective:   Patient seen and examined, resting in bed, no complaints no distress    Objective:     Vitals:   Temp (24hrs), Av 2 °F (36 2 °C), Min:96 2 °F (35 7 °C), Max:97 8 °F (36 6 °C)    Temp:  [96 2 °F (35 7 °C)-97 8 °F (36 6 °C)] 96 2 °F (35 7 °C)  HR:  [70-86] 76  Resp:  [11-19] 16  BP: (142-171)/(80-94) 158/80  SpO2:  [93 %-99 %] 97 %  Body mass index is 26 47 kg/m²  Input and Output Summary (last 24 hours):        Intake/Output Summary (Last 24 hours) at 6/10/2021 0932  Last data filed at 6/10/2021 0800  Gross per 24 hour   Intake 915 ml   Output 925 ml   Net -10 ml       Physical Exam:   Physical Exam  Vitals signs and nursing note reviewed  Constitutional:       General: He is not in acute distress  Appearance: Normal appearance  He is not ill-appearing  HENT:      Head: Normocephalic and atraumatic  Nose: Nose normal    Eyes:      Extraocular Movements: Extraocular movements intact  Pupils: Pupils are equal, round, and reactive to light  Neck:      Musculoskeletal: Normal range of motion and neck supple  No neck rigidity or muscular tenderness  Cardiovascular:      Rate and Rhythm: Normal rate and regular rhythm  Pulses: Normal pulses  Heart sounds: Normal heart sounds  No murmur  No friction rub  No gallop  Pulmonary:      Effort: Pulmonary effort is normal       Breath sounds: Normal breath sounds  Abdominal:      General: There is no distension  Palpations: Abdomen is soft  There is no mass  Tenderness: There is no abdominal tenderness  There is no guarding or rebound  Musculoskeletal: Normal range of motion  General: No swelling or tenderness  Right lower leg: No edema  Left lower leg: No edema  Skin:     General: Skin is warm  Capillary Refill: Capillary refill takes less than 2 seconds  Findings: No erythema or rash  Neurological:      General: No focal deficit present  Mental Status: He is alert and oriented to person, place, and time  Mental status is at baseline     Psychiatric:         Mood and Affect: Mood normal          Behavior: Behavior normal          Additional Data:     Labs:    Results from last 7 days   Lab Units 06/09/21  1857   WBC Thousand/uL 9 80   HEMOGLOBIN g/dL 14 4   HEMATOCRIT % 40 0*   PLATELETS Thousands/uL 151   NEUTROS PCT % 81*   LYMPHS PCT % 11*   MONOS PCT % 7   EOS PCT % 0     Results from last 7 days   Lab Units 06/10/21  0815  06/09/21  1857   SODIUM mmol/L 108*   < > 105*   POTASSIUM mmol/L 4 3   < > 4 4   CHLORIDE mmol/L 78*   < > 74*   CO2 mmol/L 25   < > 22   BUN mg/dL 10   < > 13   CREATININE mg/dL 0 56*   < > 0 71   CALCIUM mg/dL 8 6   < > 8 8   ALK PHOS U/L  --   --  94   ALT U/L  --   --  23   AST U/L  --   --  23    < > = values in this interval not displayed  Results from last 7 days   Lab Units 06/10/21  0717 06/10/21  0027 06/09/21  1945 06/09/21  1620 06/09/21  1117 06/09/21  0731 06/08/21  1938 06/08/21  1653 06/08/21  1123 06/08/21  0802 06/07/21  1917 06/07/21  1617   POC GLUCOSE mg/dl 115 122 160* 280* 171* 124 161* 171* 192* 109 109 155*           * I Have Reviewed All Lab Data Listed Above  * Additional Pertinent Lab Tests Reviewed:  [address] Admission  Reviewed    Imaging:  Imaging Reports Reviewed Today Include:  None    Recent Cultures (last 7 days):           Last 24 Hours Medication List:   Current Facility-Administered Medications   Medication Dose Route Frequency Provider Last Rate    acetaminophen  650 mg Oral Q4H PRN Treyelyn CYN Best-PRICE      albuterol  2 puff Inhalation Q4H PRN Trishan CYN Best-PRICE      aluminum-magnesium hydroxide-simethicone  30 mL Oral Q4H PRN Trishan CYN Best-PRICE      benztropine  1 mg Intramuscular Q4H PRN Max 6/day TreyelyCYN Bartholomew-C      benztropine  0 5 mg Oral Q4H PRN Max 6/day Mayte Best PA-C      [START ON 7/5/2021] cholecalciferol  1,000 Units Oral Daily Treyelyn CYN Best-PRICE      cholestyramine sugar free  4 g Oral BID Treyelyn CYN Best-PRICE      dextromethorphan-guaiFENesin  10 mL Oral Q4H PRN Mayte Best PA-C      [START ON 6/14/2021] ergocalciferol  50,000 Units Oral Weekly CYN Harrington-PRICE      gabapentin  400 mg Oral TID CYN Harrington-PRICE      haloperidol lactate  5 mg Intramuscular Q4H PRN Max 4/day CYN Harrington-PRICE      heparin (porcine)  5,000 Units Subcutaneous Q8H [facility] Linda Moura MD      HYDROmorphone  1 mg Intravenous Q4H PRN Mayte Best PA-C      hydrOXYzine HCL  25 mg Oral Q6H PRN Max 4/day Harika Bright, PA-C      hydrOXYzine HCL  50 mg Oral Q6H PRN Max 4/day Harika Bright, PA-C      insulin glargine  15 Units Subcutaneous QAM Harika Bright, PA-C      insulin lispro  1-6 Units Subcutaneous 4x Daily (AC & HS) Harika Bright, PA-C      magnesium oxide  400 mg Oral BID Harika Bright, PA-C      naloxone  0 04 mg Intravenous Q1MIN PRN Harika Bright, PA-C      nicotine  1 patch Transdermal Daily Harika Bright, PA-C      ondansetron  4 mg Oral Q6H PRN Harika Bright, PA-C      oxyCODONE  5 mg Oral Q4H PRN Harika Bright, PA-C      pantoprazole  40 mg Oral Early Morning Harika Bright, PA-C      propranolol  10 mg Oral BID Harika Bright, PA-C      QUEtiapine  200 mg Oral HS Harika Bright, PA-C      risperiDONE  0 25 mg Oral Q4H PRN Max 6/day Harika Bright, PA-C      risperiDONE  0 5 mg Oral Q4H PRN Max 3/day Harika Bright, PA-C      risperiDONE  1 mg Oral Q4H PRN Max 3/day Harika Bright, PA-C      rOPINIRole  1 mg Oral BID Harika Bright, PA-C      senna-docusate sodium  1 tablet Oral BID Harika Bright, PA-C      sertraline  150 mg Oral Daily Harika Bright, PA-C      tiotropium  18 mcg Inhalation Daily Harika Bright, PA-C      traZODone  50 mg Oral HS PRN Harika Bright, PA-C          Today, Patient Was Seen By: Gerda Cook MD    ** Please Note: Dictation voice to text software may have been used in the creation of this document   **

## 2021-06-10 NOTE — PROGRESS NOTES
On call Psych made aware via [facility]  Hospital made aware of need for admitting orders  Nursing Supervisor talking to Hospitalist to expedite transfer  PAC called  Hospitalist made aware of need for ADT-21, as a direct admit

## 2021-06-10 NOTE — ASSESSMENT & PLAN NOTE
· Formal psych consultation pending  · Will ask them to review psych medications and specifically to see which ones can potentially cause pancreatitis, and or hyponatremia

## 2021-06-11 PROBLEM — C25.0 MALIGNANT NEOPLASM OF HEAD OF PANCREAS (HCC): Status: ACTIVE | Noted: 2021-06-11

## 2021-06-11 PROBLEM — Z71.89 GOALS OF CARE, COUNSELING/DISCUSSION: Status: ACTIVE | Noted: 2021-06-11

## 2021-06-11 LAB
ALBUMIN SERPL BCP-MCNC: 3.8 G/DL (ref 3.5–5.7)
ALP SERPL-CCNC: 99 U/L (ref 40–150)
ALT SERPL W P-5'-P-CCNC: 24 U/L (ref 7–52)
ANION GAP SERPL CALCULATED.3IONS-SCNC: 7 MMOL/L (ref 4–13)
AST SERPL W P-5'-P-CCNC: 25 U/L (ref 13–39)
BASOPHILS # BLD AUTO: 0 THOUSANDS/ΜL (ref 0–0.1)
BASOPHILS NFR BLD AUTO: 0 % (ref 0–2)
BILIRUB SERPL-MCNC: 0.5 MG/DL (ref 0.2–1)
BUN SERPL-MCNC: 14 MG/DL (ref 7–25)
CA-I BLD-SCNC: 1.1 MMOL/L (ref 1.12–1.32)
CALCIUM SERPL-MCNC: 8.8 MG/DL (ref 8.6–10.5)
CHLORIDE SERPL-SCNC: 77 MMOL/L (ref 98–107)
CO2 SERPL-SCNC: 25 MMOL/L (ref 21–31)
CORTIS SERPL-MCNC: 32.1 UG/DL
CREAT SERPL-MCNC: 0.73 MG/DL (ref 0.7–1.3)
EOSINOPHIL # BLD AUTO: 0 THOUSAND/ΜL (ref 0–0.61)
EOSINOPHIL NFR BLD AUTO: 0 % (ref 0–5)
ERYTHROCYTE [DISTWIDTH] IN BLOOD BY AUTOMATED COUNT: 13.7 % (ref 11.5–14.5)
GFR SERPL CREATININE-BSD FRML MDRD: 106 ML/MIN/1.73SQ M
GLUCOSE SERPL-MCNC: 104 MG/DL (ref 65–99)
GLUCOSE SERPL-MCNC: 114 MG/DL (ref 65–140)
GLUCOSE SERPL-MCNC: 118 MG/DL (ref 65–140)
GLUCOSE SERPL-MCNC: 268 MG/DL (ref 65–140)
GLUCOSE SERPL-MCNC: 301 MG/DL (ref 65–140)
HCT VFR BLD AUTO: 39.7 % (ref 42–47)
HGB BLD-MCNC: 14.2 G/DL (ref 14–18)
LIPASE SERPL-CCNC: 217 U/L (ref 11–82)
LYMPHOCYTES # BLD AUTO: 1.2 THOUSANDS/ΜL (ref 0.6–4.47)
LYMPHOCYTES NFR BLD AUTO: 9 % (ref 21–51)
MAGNESIUM SERPL-MCNC: 2.1 MG/DL (ref 1.9–2.7)
MCH RBC QN AUTO: 30 PG (ref 26–34)
MCHC RBC AUTO-ENTMCNC: 35.7 G/DL (ref 31–37)
MCV RBC AUTO: 84 FL (ref 81–99)
MONOCYTES # BLD AUTO: 0.9 THOUSAND/ΜL (ref 0.17–1.22)
MONOCYTES NFR BLD AUTO: 6 % (ref 2–12)
NEUTROPHILS # BLD AUTO: 11.8 THOUSANDS/ΜL (ref 1.4–6.5)
NEUTS SEG NFR BLD AUTO: 85 % (ref 42–75)
OSMOLALITY UR: 506 MMOL/KG
PLATELET # BLD AUTO: 149 THOUSANDS/UL (ref 149–390)
PMV BLD AUTO: 7 FL (ref 8.6–11.7)
POTASSIUM SERPL-SCNC: 4.9 MMOL/L (ref 3.5–5.5)
PROT SERPL-MCNC: 6.9 G/DL (ref 6.4–8.9)
RBC # BLD AUTO: 4.71 MILLION/UL (ref 4.3–5.9)
SODIUM 24H UR-SCNC: 50 MOL/L
SODIUM SERPL-SCNC: 109 MMOL/L (ref 134–143)
WBC # BLD AUTO: 13.9 THOUSAND/UL (ref 4.8–10.8)

## 2021-06-11 PROCEDURE — 99497 ADVNCD CARE PLAN 30 MIN: CPT | Performed by: HOSPITALIST

## 2021-06-11 PROCEDURE — 80053 COMPREHEN METABOLIC PANEL: CPT | Performed by: HOSPITALIST

## 2021-06-11 PROCEDURE — 82533 TOTAL CORTISOL: CPT | Performed by: PHYSICIAN ASSISTANT

## 2021-06-11 PROCEDURE — 82948 REAGENT STRIP/BLOOD GLUCOSE: CPT

## 2021-06-11 PROCEDURE — 82330 ASSAY OF CALCIUM: CPT | Performed by: PHYSICIAN ASSISTANT

## 2021-06-11 PROCEDURE — 99233 SBSQ HOSP IP/OBS HIGH 50: CPT | Performed by: HOSPITALIST

## 2021-06-11 PROCEDURE — 83690 ASSAY OF LIPASE: CPT | Performed by: HOSPITALIST

## 2021-06-11 PROCEDURE — 94664 DEMO&/EVAL PT USE INHALER: CPT

## 2021-06-11 PROCEDURE — 83735 ASSAY OF MAGNESIUM: CPT | Performed by: PHYSICIAN ASSISTANT

## 2021-06-11 PROCEDURE — 94760 N-INVAS EAR/PLS OXIMETRY 1: CPT

## 2021-06-11 PROCEDURE — 99232 SBSQ HOSP IP/OBS MODERATE 35: CPT | Performed by: PHYSICIAN ASSISTANT

## 2021-06-11 PROCEDURE — 99254 IP/OBS CNSLTJ NEW/EST MOD 60: CPT | Performed by: NURSE PRACTITIONER

## 2021-06-11 PROCEDURE — 85025 COMPLETE CBC W/AUTO DIFF WBC: CPT | Performed by: HOSPITALIST

## 2021-06-11 RX ORDER — FLUTICASONE PROPIONATE 50 MCG
1 SPRAY, SUSPENSION (ML) NASAL DAILY
Status: DISCONTINUED | OUTPATIENT
Start: 2021-06-11 | End: 2021-06-12 | Stop reason: HOSPADM

## 2021-06-11 RX ADMIN — HYDROMORPHONE HYDROCHLORIDE 1 MG: 1 INJECTION, SOLUTION INTRAMUSCULAR; INTRAVENOUS; SUBCUTANEOUS at 21:04

## 2021-06-11 RX ADMIN — HEPARIN SODIUM 5000 UNITS: 5000 INJECTION INTRAVENOUS; SUBCUTANEOUS at 05:53

## 2021-06-11 RX ADMIN — INSULIN LISPRO 6 UNITS: 100 INJECTION, SOLUTION INTRAVENOUS; SUBCUTANEOUS at 21:09

## 2021-06-11 RX ADMIN — HYDROMORPHONE HYDROCHLORIDE 1 MG: 1 INJECTION, SOLUTION INTRAMUSCULAR; INTRAVENOUS; SUBCUTANEOUS at 06:45

## 2021-06-11 RX ADMIN — DOCUSATE SODIUM 50 MG AND SENNOSIDES 8.6 MG 1 TABLET: 8.6; 5 TABLET, FILM COATED ORAL at 17:45

## 2021-06-11 RX ADMIN — GABAPENTIN 400 MG: 400 CAPSULE ORAL at 16:26

## 2021-06-11 RX ADMIN — MAGNESIUM OXIDE TAB 400 MG (241.3 MG ELEMENTAL MG) 400 MG: 400 (241.3 MG) TAB at 17:44

## 2021-06-11 RX ADMIN — PROPRANOLOL HYDROCHLORIDE 10 MG: 10 TABLET ORAL at 08:54

## 2021-06-11 RX ADMIN — PROPRANOLOL HYDROCHLORIDE 10 MG: 10 TABLET ORAL at 17:45

## 2021-06-11 RX ADMIN — DOCUSATE SODIUM 50 MG AND SENNOSIDES 8.6 MG 1 TABLET: 8.6; 5 TABLET, FILM COATED ORAL at 08:54

## 2021-06-11 RX ADMIN — CHOLESTYRAMINE 4 G: 4 POWDER, FOR SUSPENSION ORAL at 17:44

## 2021-06-11 RX ADMIN — SERTRALINE HYDROCHLORIDE 100 MG: 50 TABLET ORAL at 08:53

## 2021-06-11 RX ADMIN — INSULIN LISPRO 3 UNITS: 100 INJECTION, SOLUTION INTRAVENOUS; SUBCUTANEOUS at 16:54

## 2021-06-11 RX ADMIN — HYDROMORPHONE HYDROCHLORIDE 1 MG: 1 INJECTION, SOLUTION INTRAMUSCULAR; INTRAVENOUS; SUBCUTANEOUS at 16:52

## 2021-06-11 RX ADMIN — GABAPENTIN 400 MG: 400 CAPSULE ORAL at 08:53

## 2021-06-11 RX ADMIN — GABAPENTIN 400 MG: 400 CAPSULE ORAL at 20:59

## 2021-06-11 RX ADMIN — FLUTICASONE PROPIONATE 1 SPRAY: 50 SPRAY, METERED NASAL at 14:24

## 2021-06-11 RX ADMIN — TIOTROPIUM BROMIDE 18 MCG: 18 CAPSULE ORAL; RESPIRATORY (INHALATION) at 08:55

## 2021-06-11 RX ADMIN — PANTOPRAZOLE SODIUM 40 MG: 40 TABLET, DELAYED RELEASE ORAL at 05:53

## 2021-06-11 RX ADMIN — HYDROMORPHONE HYDROCHLORIDE 1 MG: 1 INJECTION, SOLUTION INTRAMUSCULAR; INTRAVENOUS; SUBCUTANEOUS at 12:34

## 2021-06-11 RX ADMIN — TRAZODONE HYDROCHLORIDE 50 MG: 50 TABLET ORAL at 21:04

## 2021-06-11 RX ADMIN — HEPARIN SODIUM 5000 UNITS: 5000 INJECTION INTRAVENOUS; SUBCUTANEOUS at 14:32

## 2021-06-11 RX ADMIN — MAGNESIUM OXIDE TAB 400 MG (241.3 MG ELEMENTAL MG) 400 MG: 400 (241.3 MG) TAB at 08:53

## 2021-06-11 RX ADMIN — CHOLESTYRAMINE 4 G: 4 POWDER, FOR SUSPENSION ORAL at 08:54

## 2021-06-11 RX ADMIN — ACETAMINOPHEN 650 MG: 325 TABLET ORAL at 19:48

## 2021-06-11 RX ADMIN — INSULIN GLARGINE 15 UNITS: 100 INJECTION, SOLUTION SUBCUTANEOUS at 08:53

## 2021-06-11 RX ADMIN — HEPARIN SODIUM 5000 UNITS: 5000 INJECTION INTRAVENOUS; SUBCUTANEOUS at 21:09

## 2021-06-11 RX ADMIN — OXYCODONE HYDROCHLORIDE 5 MG: 5 TABLET ORAL at 19:48

## 2021-06-11 RX ADMIN — ROPINIROLE 1 MG: 1 TABLET, FILM COATED ORAL at 20:59

## 2021-06-11 RX ADMIN — OXYCODONE HYDROCHLORIDE 5 MG: 5 TABLET ORAL at 11:44

## 2021-06-11 RX ADMIN — ROPINIROLE 1 MG: 1 TABLET, FILM COATED ORAL at 08:53

## 2021-06-11 RX ADMIN — Medication 15 MG: at 09:30

## 2021-06-11 RX ADMIN — NICOTINE 1 PATCH: 14 PATCH, EXTENDED RELEASE TRANSDERMAL at 08:54

## 2021-06-11 RX ADMIN — QUETIAPINE FUMARATE 200 MG: 100 TABLET ORAL at 20:57

## 2021-06-11 NOTE — ASSESSMENT & PLAN NOTE
· MRCP abdomen with the following results:-Numerous new hepatic lesions are highly suspicious for metastatic disease   Subtle signal abnormality in the visualized osseous structures is suspicious for widespread osseous metastatic disease     Focal prominence of tissue at junction of pancreatic head and body, though incompletely characterized on noncontrast examination is suspicious for pancreatic adenocarcinoma in this patient with these new hepatic and skeletal findings      Further characterization of the pancreas with contrast enhanced triple phase pancreatic CT is recommended   This should be performed along with chest CT in order to evaluate for possible primary or metastatic tumor in the chest    · See the goals of care counseling discussion below

## 2021-06-11 NOTE — QUICK NOTE
Renal quick note:  Patient received Tolvaptan 15 mg at 0930  He then received upsetting news and is refusing all further care  Concern is of course for unidentified overcorrection of hyponatremia  Plan:  Attempt BMP draw if possible  Ensure patient has access to free water  Monitor for increased urination, greater than 175 mL per hour or more than 4 liters negative in 24 hours is concerning and renal should be alerted so D5W can be infused  Bedside RN and primary team aware     -EPIFANIO Espinal

## 2021-06-11 NOTE — ASSESSMENT & PLAN NOTE
Lab Results   Component Value Date    HGBA1C 7 4 (H) 05/11/2021       Recent Labs     06/10/21  1534 06/10/21  2037 06/11/21  0629 06/11/21  1110   POCGLU 107 107 118 114       Blood Sugar Average: Last 72 hrs:  · (P) 244 [id number]   · Continue Lantus, Accu-Cheks AC and HS with sliding scale coverage

## 2021-06-11 NOTE — ASSESSMENT & PLAN NOTE
· Formal psych consultation pending  · Patient to be evaluated for medical capacity making decisions especially more so in light of the new diagnosis metastatic pancreatic carcinoma

## 2021-06-11 NOTE — CONSULTS
Progress Note - Behavioral Health     Dot Mick 46 y o  male MRN: [id number]   Unit/Bed#: ICU 06 Encounter: [id number]         Raphael Martinez was seen today as psychiatric consult per request from medical team   Patient AAO x4 and tearful during encounter  Wife at bedside  Patient requesting hospice care after discussing prognosis of pancreatic cancer with metastases with Dr Curtis Payne  Patient's mood appeared depressed and anxious and his affect was congruent regarding circumstances pertaining to medical diagnosis  Patient did not voice delusional content, nor did he appear to be responding to internal stimuli  His thoughts were linear goal-directed  He continues to deny any SI/HI  Mental Status Evaluation:    Appearance:  age appropriate, wearing hospital clothes   Behavior:  cooperative, tearful   Speech:  clear, slow, delayed responses at times   Mood:  depressed, somewhat anxious   Affect:  tearful, appropriate     Thought Process:  coherent, goal directed   Associations: intact associations   Thought Content:  no overt delusions   Perceptual Disturbances: none   Risk Potential: Suicidal ideation - None  Homicidal ideation - None  Potential for aggression - No   Sensorium:  oriented to person, place, time/date and situation   Memory:  recent and remote memory grossly intact   Consciousness:  alert and awake   Attention/Concentration: attention span and concentration are age appropriate   Insight:  fair   Judgment: fair   Gait/Station: normal gait/station, normal balance   Motor Activity: no abnormal movements     Vital signs in last 24 hours:    Temp:  [96 9 °F (36 1 °C)-97 7 °F (36 5 °C)] 97 4 °F (36 3 °C)  HR:  [67-83] 83  Resp:  [13-47] 23  BP: (106-178)/() 155/83    Laboratory results: I have personally reviewed all pertinent laboratory/tests results    Imaging Studies: Ct Abdomen Wo Contrast    Result Date: 6/9/2021  Narrative: CT - ABDOMEN WITHOUT IV CONTRAST INDICATION:   Abdominal Pain   COMPARISON: Abdomen and pelvic CT from 7/26/2019  TECHNIQUE: CT examination of the abdomen was performed without intravenous contrast   Axial, sagittal, and coronal 2D reformatted images were created from the source data and submitted for interpretation  Radiation dose length product (DLP) for this visit:  330 6 mGy-cm   This examination, like all CT scans performed in the [facility], was performed utilizing techniques to minimize radiation dose exposure, including the use of iterative reconstruction and automated exposure control  Enteric contrast was administered  FINDINGS: ABDOMEN LOWER CHEST:  Severe emphysema noted at the lung bases  LIVER/BILIARY TREE:  Unremarkable  GALLBLADDER:  Gallbladder is surgically absent  SPLEEN:  Unremarkable  PANCREAS:  There is diffuse peripancreatic fat stranding surrounding a chronically severely atrophic pancreas, consistent with acute pancreatitis  Again seen is a rim calcified mass in the expected location of the pancreatic tail measuring 5 5 x 3 0 cm  This has demonstrated long-term stability and is therefore benign  This is probably an old pancreatic pseodocyst  ADRENAL GLANDS:  Unremarkable  KIDNEYS/URETERS:  Unremarkable  No hydronephrosis  VISUALIZED STOMACH AND BOWEL:  Unremarkable  VISUALIZED ABDOMINAL CAVITY:  No pathologically enlarged periportal, mesenteric or upper retroperitoneal lymph nodes  No ascites or free intraperitoneal air  No fluid collection  VISUALIZED BODY WALL:  Unremarkable  VISUALIZED ABDOMINAL VESSELS:  Unremarkable for patient's age  OSSEOUS STRUCTURES:  No acute fracture or destructive osseous lesion  Impression: There is diffuse peripancreatic fat stranding surrounding a chronically severely atrophic pancreas, consistent with acute pancreatitis  Severe emphysema noted at the lung bases   Workstation performed: [id number]     Ct Head Wo Contrast    Result Date: 6/9/2021  Narrative: CT BRAIN - WITHOUT CONTRAST INDICATION:   Dizziness, non-specific SEVERE HYPONATREMIA  COMPARISON:  CT brain dated December 23, 2015  TECHNIQUE:  CT examination of the brain was performed  In addition to axial images, sagittal and coronal 2D reformatted images were created and submitted for interpretation  Radiation dose length product (DLP) for this visit:  912 5 mGy-cm   This examination, like all CT scans performed in the [facility], was performed utilizing techniques to minimize radiation dose exposure, including the use of iterative reconstruction and automated exposure control  IMAGE QUALITY:  Diagnostic  FINDINGS: PARENCHYMA:  No intracranial mass, mass effect or midline shift  No CT signs of acute infarction  No acute parenchymal hemorrhage  VENTRICLES AND EXTRA-AXIAL SPACES:  Normal for the patient's age  VISUALIZED ORBITS AND PARANASAL SINUSES:  Unremarkable  CALVARIUM AND EXTRACRANIAL SOFT TISSUES:  Normal      Impression: No acute intracranial abnormality  Workstation performed: [id number]     Mri Abdomen Wo Contrast And Mrcp    Result Date: 6/11/2021  Narrative: MRI OF THE ABDOMEN WITHOUT CONTRAST WITH MRCP INDICATION: Abdominal pain Pancreatitis  Lipase at 6 a m  on June 11, 2021 is 217  Normal bilirubin  COMPARISON:  Multiple prior examinations including most recently noncontrast CT of the abdomen performed June 9, 2021  TECHNIQUE:  MRI of the abdomen was performed without the administration of contrast using the following  using sequences: Axial T1 weighted in/out of phase images, multiplanar T2 weighted images, diffusion weighted and fat suppressed T1 weighted images  3D MRCP images were obtained with radial thick slabs and projections  3D rendering was performed from the acquisition scanner  FINDINGS: LOWER CHEST:   Unremarkable   LIVER:  There are multiple nodular foci of T2 signal abnormality scattered throughout the liver which are indeterminate but are most suspicious for hepatic metastatic disease in this patient who had no liver lesions present on contrast CT of July 26, 2019  A representative mass in the dome of segment 7 on image 10 of series 6001 measures 17 x 16 mm  A representative mass in segment IVb adjacent to the fissure for the falciform ligament on image 15 of series 6001 measures 17 x 14 mm  Smaller lesions are scattered throughout every segment of the liver  Normal hepatic contours  No intrahepatic biliary dilatation  BILE DUCTS:  No intrahepatic or extrahepatic bile duct dilation  Common bile duct is normal in caliber  No choledocholithiasis, biliary stricture or suspicious biliary mass  GALLBLADDER:  Surgically removed  PANCREAS:  Pancreatic parenchyma is atrophic and peripancreatic inflammatory edema surrounds the pancreatic head and body  There is focal prominence of the pancreas at the junction of the head and body adjacent to an resulting in narrowing of the lower main portal vein without associated diffusion restriction or T2 signal abnormality but in this patient with suspected hepatic metastatic disease, mildly elevated lipase, and peripancreatic inflammatory stranding, the finding is suspicious for possible pancreatic adenocarcinoma  A 5 5 cm mass with dense peripheral calcification in the proximal pancreatic tail, better characterized on recent noncontrast CT is unchanged as far back as July 2019 and is most consistent with chronic pseudocyst  ADRENAL GLANDS:  Normal  SPLEEN:  Normal  KIDNEYS/PROXIMAL URETERS:  No hydroureteronephrosis  No suspicious renal mass  BOWEL:  No dilated loops of bowel  PERITONEAL CAVITY/RETROPERITONEUM:  No ascites  No mass  LYMPH NODES:  No abdominal lymphadenopathy  VASCULAR STRUCTURES:  Perigastric varices are noted probably related to chronic remote prior splenic vein thrombosis  ABDOMINAL WALL:  Unremarkable   OSSEOUS STRUCTURES:  There is heterogeneous nodular T2 signal abnormality throughout the visualized osseous structures such as the T2 vertebral body lesion well demonstrated on image 26 of series 8001  These do not demonstrate associated lytic lesions on recent CT but are nonetheless suspicious for metastatic disease in this patient with numerous hepatic lesions and probable pancreatic mass  Impression: Numerous new hepatic lesions are highly suspicious for metastatic disease  Subtle signal abnormality in the visualized osseous structures is suspicious for widespread osseous metastatic disease  Focal prominence of tissue at junction of pancreatic head and body, though incompletely characterized on noncontrast examination is suspicious for pancreatic adenocarcinoma in this patient with these new hepatic and skeletal findings  Further characterization of the pancreas with contrast enhanced triple phase pancreatic CT is recommended  This should be performed along with chest CT in order to evaluate for possible primary or metastatic tumor in the chest  This examination was marked "immediate notification" in Epic in order to begin the standard process by which the radiology reading room liaison alerts the referring practitioner  Workstation performed: [id number]       Recommended Treatment:     Planned medication and treatment changes:     All current active medications have been reviewed      Current Facility-Administered Medications   Medication Dose Route Frequency Provider Last Rate    acetaminophen  650 mg Oral Q4H PRN Thurlow Seen, PA-C      albuterol  2 puff Inhalation Q4H PRN Thurlow Seen, PA-C      aluminum-magnesium hydroxide-simethicone  30 mL Oral Q4H PRN Thurlow Seen, PA-C      benztropine  1 mg Intramuscular Q4H PRN Max 6/day Thurlow Seen, PA-C      benztropine  0 5 mg Oral Q4H PRN Max 6/day Thurlow Seen, PA-C      [START ON 7/5/2021] cholecalciferol  1,000 Units Oral Daily Thurlow Seen, PA-C      cholestyramine sugar free  4 g Oral BID Thurlow Seen, PA-C      dextromethorphan-guaiFENesin  10 mL Oral Q4H PRN Becki Ahr ZOYA Banuelos      [START ON 6/14/2021] ergocalciferol  50,000 Units Oral Weekly Windle Stare, PA-PRICE      gabapentin  400 mg Oral TID Windle Stare, PA-PRICE      haloperidol lactate  5 mg Intramuscular Q4H PRN Max 4/day Windle Stare, PA-C      heparin (porcine)  5,000 Units Subcutaneous Q8H [address] Carlitos Marshall MD      HYDROmorphone  1 mg Intravenous Q4H PRN Windle Stare, ZOYA      hydrOXYzine HCL  25 mg Oral Q6H PRN Max 4/day Windle Stare, PA-C      hydrOXYzine HCL  50 mg Oral Q6H PRN Max 4/day Windle Stare, PA-PRICE      insulin glargine  15 Units Subcutaneous QAM Richie Stare, PA-C      insulin lispro  1-6 Units Subcutaneous 4x Daily (AC & HS) Richie Startaz, PA-C      magnesium oxide  400 mg Oral BID Windle Stare, PA-C      naloxone  0 04 mg Intravenous Q1MIN PRN Windle Stare, PA-C      nicotine  1 patch Transdermal Daily Richie Stare, PA-C      ondansetron  4 mg Oral Q6H PRN Windle Stare, PA-C      oxyCODONE  5 mg Oral Q4H PRN Windle Stare, PA-C      pantoprazole  40 mg Oral Early Morning Windle Stare, PA-C      propranolol  10 mg Oral BID Windle Stare, PA-C      QUEtiapine  200 mg Oral HS Richie Stare, PA-C      risperiDONE  0 25 mg Oral Q4H PRN Max 6/day Windle Stare, PA-C      risperiDONE  0 5 mg Oral Q4H PRN Max 3/day Windle Stare, PA-C      risperiDONE  1 mg Oral Q4H PRN Max 3/day Windle Stare, PA-C      rOPINIRole  1 mg Oral BID Windle Stare, PA-C      senna-docusate sodium  1 tablet Oral BID Windle Stare, PA-C      sertraline  100 mg Oral Daily TYE Tolliver      tiotropium  18 mcg Inhalation Daily Windle Stare, PA-C      traZODone  50 mg Oral HS PRN Windle Startaz, ZOYA         Counseling / Coordination of Care: Total floor / unit time spent today 35 minutes  Greater than 50% of total time was spent with the patient and / or family counseling and / or coordination of care   A description of counseling / coordination of care:  Patient's diagnosis and treatment indicated reviewed with patient  Recent stressors discussed with patient including health issues and medical problems  Supportive therapy provided to patient  Reassurance and supportive therapy provided  Case discussed with attending psychiatrist, Dr Brigido Hardy who was also present at patient's bedside during encounter      TYE Leong 06/11/21

## 2021-06-11 NOTE — HOSPICE NOTE
RECEIVED REFERRAL AND APPROVED FOR ROUTINE LOC BY DR Roman Canchola  MET WITH PATIENT AND WIFE AT BEDSIDE TO REVIEW SERVICES AND THEY WERE BOTH AGREEABLE  HE SIGNED HIS OWN CONSENTS  I TOUCHED BASE WITH DR BARTON AND NATHAN CASTRO  THE PLAN IS THAT HE STAYS IN HOSPITAL UNTIL SODIUM LEVEL IS WITHIN RANGE

## 2021-06-11 NOTE — PROGRESS NOTES
Progress Note - Nephrology   Carlos Saxena 46 y o  male MRN: [id number]  Unit/Bed#: ICU 06 Encounter: [id number]    Assessment & Plan     Hyponatremia, severe without altered mental status or seizures  (POA)  · Presenting serum sodium 105 millimole per L on 06/09/2021  · Urine osmolality is significantly elevated at 614 millimole per kg on 06/09/2021 and 506 on 06/10/2021, consistent with inappropriate ADH  · Urine sodium is 63 on 06/09/2021  · No evidence of hypothyroidism with TSH of 1 68 on 05/11/2021   · No evidence of adrenal insufficiency with cortisol 32 1 ug/dL on 06/11/2021  · Likely secondary to SIADH in the setting of cancer, over hydration and medications  · Serum sodium not improved at 109 millimole per L on 06/11/2021, despite 1200 mL fluid restriction  · Will trial tolvaptan 15 mg and re-evaluate serum sodium to monitor for over-correction  · Note that patient is refusing all further interventions including lab draw to re-evaluate sodium after administration of tolvaptan  Patient has ordered the Crane catheter be removed  Patient is drinking free water  Patient was asked to use urinal to require urinary output  Spoke with nurse, who will encourage urinal use and see if patient is amenable to lab draw  Psych has been consulted to speak with patient after receiving notification of metastatic pancreatic carcinoma  Hospice has been consulted  Patient is refusing further evaluation treatment at this time  SIADH  · Cause of hyponatremia  See hyponatremia above  Hypertension  · Home antihypertensives are recorded as propanolol 20 mg daily, lisinopril 10 mg daily and losartan 25 mg daily  · Note that patient should not be on an ACE inhibitor lisinopril and an ARB losartan  Acute on chronic pancreatitis  · Gastroenterology evaluating  · Will hold on IV fluids due to severe hyponatremia present       Pancreatic lesion on MRI 06/10/2021  · "Numerous new hepatic lesions are highly suspicious for metastatic disease  Subtle signal abnormality in the visualized osseous structures is suspicious for widespread osseous metastatic disease  Focal prominence of tissue at junction of pancreatic head and body, though incompletely characterized on noncontrast examination is suspicious for pancreatic adenocarcinoma in this patient with these new hepatic and skeletal findings "    Follow up reason for today's visit:  Severe hyponatremia    Hyponatremia    Patient Active Problem List   Diagnosis    Uqiros esophagus    Benign essential hypertension    COPD [facility] obstructive [facility] disease) [facility]    Fatty liver    Fibromyalgia    Generalized anxiety disorder    Hyperlipidemia    Iron deficiency anemia    Recurrent major depressive disorder, in partial remission ([facility]    Nephrolithiasis    Other chronic pain    Sleep disorder    Diabetes mellitus type 1 5, managed as type 1 ([facility]    Hyponatremia    GERD (gastroesophageal reflux disease)    Anxiety and depression    History of ETOH abuse    Transaminitis    Tobacco dependence    Chronic pancreatitis (HCC)    Paresthesia of both lower extremities    Hypotension    Alcohol dependence in early, early partial, sustained full, or sustained partial remission ([facility]    MDD (major depressive disorder), recurrent episode, severe ([facility] )    Hypomagnesemia    Idiopathic acute pancreatitis without infection or necrosis    Vitamin D deficiency         Subjective:   Edith Benavides is a 46y o  year old male who is admitted for acute and nausea but no vomiting  on chronic pancreatitis and severe hyponatremia with intake sodium 105  Patient is seen and evaluated in room  He states he feels much better than yesterday however he persists with and pain in his legs  He denies numbness or tingling, he reports the pain is "deep" and he feels if it is in his "bone marrow " Has had some mild abdominal discomfort and nausea but no vomiting    He denies chest pain, dyspnea or swelling  He remains severely hyponatremic with a sodium 109 millimole per L on 06/11/2021  Trial tolvaptan  Dr Rosalia Rodriguez has advised patient of MRI findings demonstrated pancreatic, liver and bone lesions  Objective:     Vitals: Blood pressure 155/83, pulse 83, temperature (!) 97 4 °F (36 3 °C), temperature source Temporal, resp  rate (!) 23, height 6' 1" (1 854 m), weight 91 kg (200 lb 9 9 oz), SpO2 97 %  ,Body mass index is 26 47 kg/m²  Weight (last 2 days)     Date/Time   Weight    06/09/21 2345   91 (200 62)                Intake/Output Summary (Last 24 hours) at 6/11/2021 1101  Last data filed at 6/11/2021 1000  Gross per 24 hour   Intake 663 33 ml   Output 1720 ml   Net -1056 67 ml     I/O last 3 completed shifts: In: 1828 3 [P O :360; I V :1318 3; IV Piggyback:150]  Out: 2620 [Urine:2620]    Urethral Catheter 16 Fr   (Active)   Reasons to continue Urinary Catheter  Accurate I&O assessment in critically ill patients (48 hr  max) 06/10/21 2000   Goal for Removal No longer needed- Will place order to discontinue 06/10/21 2000   Site Assessment Clean;Skin intact 06/10/21 2000   Collection Container Standard drainage bag 06/10/21 2000   Securement Method Securing device (Describe) 06/10/21 2000   Output (mL) 125 mL 06/11/21 1000       Physical Exam: /83 (BP Location: Left arm)   Pulse 83   Temp (!) 97 4 °F (36 3 °C) (Temporal)   Resp (!) 23   Ht 6' 1" (1 854 m)   Wt 91 kg (200 lb 9 9 oz)   SpO2 97%   BMI 26 47 kg/m²     General Appearance:    Alert, cooperative, no distress, appears stated age   Head:    Normocephalic, without obvious abnormality, atraumatic   Eyes:    Conjunctiva/corneas clear   Ears:    Normal external ears   Nose:   Nares normal, septum midline, mucosa normal, no drainage    or sinus tenderness   Throat:   Lips, mucosa, and tongue normal; teeth and gums normal       Back:     Symmetric, no curvature, ROM normal, no CVA tenderness   Lungs: Clear to auscultation bilaterally, respirations unlabored   Chest wall:    No tenderness or deformity   Heart:    Regular rate and rhythm, S1 and S2 normal, no murmur, rub   or gallop   Abdomen:     Soft, non-tender, bowel sounds active  Crane catheter with clear, yellow urine  Extremities:   Extremities normal, atraumatic, no cyanosis or edema   Skin:   Skin color, texture, turgor normal, no rashes or lesions   Lymph nodes:   Cervical normal   Neurologic:   CNII-XII intact            Lab, Imaging and other studies: I have personally reviewed pertinent labs  CBC:   Lab Results   Component Value Date    WBC 13 90 (H) 06/11/2021    HGB 14 2 06/11/2021    HCT 39 7 (L) 06/11/2021    MCV 84 06/11/2021     06/11/2021    MCH 30 0 06/11/2021    MCHC 35 7 06/11/2021    RDW 13 7 06/11/2021    MPV 7 0 (L) 06/11/2021     CMP:   Lab Results   Component Value Date    K 4 9 06/11/2021    CL 77 (L) 06/11/2021    CO2 25 06/11/2021    BUN 14 06/11/2021    CREATININE 0 73 06/11/2021    CALCIUM 8 8 06/11/2021    AST 25 06/11/2021    ALT 24 06/11/2021    ALKPHOS 99 06/11/2021    EGFR 106 06/11/2021         Results from last 7 days   Lab Units 06/11/21  0608 06/10/21  2021 06/10/21  1828  06/09/21  1857   POTASSIUM mmol/L 4 9 4 5 4 4   < > 4 4   CHLORIDE mmol/L 77* 79* 78*   < > 74*   CO2 mmol/L 25 25 25   < > 22   BUN mg/dL 14 11 11   < > 13   CREATININE mg/dL 0 73 0 65* 0 58*   < > 0 71   CALCIUM mg/dL 8 8 8 5* 8 7   < > 8 8   ALK PHOS U/L 99  --   --   --  94   ALT U/L 24  --   --   --  23   AST U/L 25  --   --   --  23    < > = values in this interval not displayed           Phosphorus: No results found for: PHOS  Magnesium:   Lab Results   Component Value Date    MG 2 1 06/11/2021     Urinalysis: No results found for: Susie Olga, SPECGRAV, PHUR, LEUKOCYTESUR, NITRITE, PROTEINUA, GLUCOSEU, KETONESU, BILIRUBINUR, BLOODU  Ionized Calcium: No results found for: CESAR  Coagulation: No results found for: PT, INR, APTT  Troponin: No results found for: TROPONINI  ABG: No results found for: PHART, ESO0WYW, PO2ART, LBH5EVK, N3GTQBJA, BEART, SOURCE  Radiology review:     IMAGING  Procedure: Ct Abdomen Wo Contrast    Result Date: 6/9/2021  Narrative: CT - ABDOMEN WITHOUT IV CONTRAST INDICATION:   Abdominal Pain  COMPARISON:  Abdomen and pelvic CT from 7/26/2019  TECHNIQUE: CT examination of the abdomen was performed without intravenous contrast   Axial, sagittal, and coronal 2D reformatted images were created from the source data and submitted for interpretation  Radiation dose length product (DLP) for this visit:  330 6 mGy-cm   This examination, like all CT scans performed in the [facility], was performed utilizing techniques to minimize radiation dose exposure, including the use of iterative reconstruction and automated exposure control  Enteric contrast was administered  FINDINGS: ABDOMEN LOWER CHEST:  Severe emphysema noted at the lung bases  LIVER/BILIARY TREE:  Unremarkable  GALLBLADDER:  Gallbladder is surgically absent  SPLEEN:  Unremarkable  PANCREAS:  There is diffuse peripancreatic fat stranding surrounding a chronically severely atrophic pancreas, consistent with acute pancreatitis  Again seen is a rim calcified mass in the expected location of the pancreatic tail measuring 5 5 x 3 0 cm  This has demonstrated long-term stability and is therefore benign  This is probably an old pancreatic pseodocyst  ADRENAL GLANDS:  Unremarkable  KIDNEYS/URETERS:  Unremarkable  No hydronephrosis  VISUALIZED STOMACH AND BOWEL:  Unremarkable  VISUALIZED ABDOMINAL CAVITY:  No pathologically enlarged periportal, mesenteric or upper retroperitoneal lymph nodes  No ascites or free intraperitoneal air  No fluid collection  VISUALIZED BODY WALL:  Unremarkable  VISUALIZED ABDOMINAL VESSELS:  Unremarkable for patient's age  OSSEOUS STRUCTURES:  No acute fracture or destructive osseous lesion       Impression: There is diffuse peripancreatic fat stranding surrounding a chronically severely atrophic pancreas, consistent with acute pancreatitis  Severe emphysema noted at the lung bases  Workstation performed: [id number]     Procedure: Ct Head Wo Contrast    Result Date: 6/9/2021  Narrative: CT BRAIN - WITHOUT CONTRAST INDICATION:   Dizziness, non-specific SEVERE HYPONATREMIA  COMPARISON:  CT brain dated December 23, 2015  TECHNIQUE:  CT examination of the brain was performed  In addition to axial images, sagittal and coronal 2D reformatted images were created and submitted for interpretation  Radiation dose length product (DLP) for this visit:  912 5 mGy-cm   This examination, like all CT scans performed in the [facility], was performed utilizing techniques to minimize radiation dose exposure, including the use of iterative reconstruction and automated exposure control  IMAGE QUALITY:  Diagnostic  FINDINGS: PARENCHYMA:  No intracranial mass, mass effect or midline shift  No CT signs of acute infarction  No acute parenchymal hemorrhage  VENTRICLES AND EXTRA-AXIAL SPACES:  Normal for the patient's age  VISUALIZED ORBITS AND PARANASAL SINUSES:  Unremarkable  CALVARIUM AND EXTRACRANIAL SOFT TISSUES:  Normal      Impression: No acute intracranial abnormality  Workstation performed: [id number]     Procedure: Mri Abdomen Wo Contrast And Mrcp    Result Date: 6/11/2021  Narrative: MRI OF THE ABDOMEN WITHOUT CONTRAST WITH MRCP INDICATION: Abdominal pain Pancreatitis  Lipase at 6 a m  on June 11, 2021 is 217  Normal bilirubin  COMPARISON:  Multiple prior examinations including most recently noncontrast CT of the abdomen performed June 9, 2021  TECHNIQUE:  MRI of the abdomen was performed without the administration of contrast using the following  using sequences: Axial T1 weighted in/out of phase images, multiplanar T2 weighted images, diffusion weighted and fat suppressed T1 weighted images    3D MRCP images were obtained with radial thick slabs and projections  3D rendering was performed from the acquisition scanner  FINDINGS: LOWER CHEST:   Unremarkable  LIVER:  There are multiple nodular foci of T2 signal abnormality scattered throughout the liver which are indeterminate but are most suspicious for hepatic metastatic disease in this patient who had no liver lesions present on contrast CT of July 26, 2019  A representative mass in the dome of segment 7 on image 10 of series 6001 measures 17 x 16 mm  A representative mass in segment IVb adjacent to the fissure for the falciform ligament on image 15 of series 6001 measures 17 x 14 mm  Smaller lesions are scattered throughout every segment of the liver  Normal hepatic contours  No intrahepatic biliary dilatation  BILE DUCTS:  No intrahepatic or extrahepatic bile duct dilation  Common bile duct is normal in caliber  No choledocholithiasis, biliary stricture or suspicious biliary mass  GALLBLADDER:  Surgically removed  PANCREAS:  Pancreatic parenchyma is atrophic and peripancreatic inflammatory edema surrounds the pancreatic head and body  There is focal prominence of the pancreas at the junction of the head and body adjacent to an resulting in narrowing of the lower main portal vein without associated diffusion restriction or T2 signal abnormality but in this patient with suspected hepatic metastatic disease, mildly elevated lipase, and peripancreatic inflammatory stranding, the finding is suspicious for possible pancreatic adenocarcinoma  A 5 5 cm mass with dense peripheral calcification in the proximal pancreatic tail, better characterized on recent noncontrast CT is unchanged as far back as July 2019 and is most consistent with chronic pseudocyst  ADRENAL GLANDS:  Normal  SPLEEN:  Normal  KIDNEYS/PROXIMAL URETERS:  No hydroureteronephrosis  No suspicious renal mass  BOWEL:  No dilated loops of bowel  PERITONEAL CAVITY/RETROPERITONEUM:  No ascites  No mass   LYMPH NODES:  No abdominal lymphadenopathy  VASCULAR STRUCTURES:  Perigastric varices are noted probably related to chronic remote prior splenic vein thrombosis  ABDOMINAL WALL:  Unremarkable  OSSEOUS STRUCTURES:  There is heterogeneous nodular T2 signal abnormality throughout the visualized osseous structures such as the T2 vertebral body lesion well demonstrated on image 26 of series 8001  These do not demonstrate associated lytic lesions on recent CT but are nonetheless suspicious for metastatic disease in this patient with numerous hepatic lesions and probable pancreatic mass  Impression: Numerous new hepatic lesions are highly suspicious for metastatic disease  Subtle signal abnormality in the visualized osseous structures is suspicious for widespread osseous metastatic disease  Focal prominence of tissue at junction of pancreatic head and body, though incompletely characterized on noncontrast examination is suspicious for pancreatic adenocarcinoma in this patient with these new hepatic and skeletal findings  Further characterization of the pancreas with contrast enhanced triple phase pancreatic CT is recommended  This should be performed along with chest CT in order to evaluate for possible primary or metastatic tumor in the chest  This examination was marked "immediate notification" in Epic in order to begin the standard process by which the radiology reading room liaison alerts the referring practitioner     Workstation performed: [id number]       Current Facility-Administered Medications   Medication Dose Route Frequency    acetaminophen (TYLENOL) tablet 650 mg  650 mg Oral Q4H PRN    albuterol (PROVENTIL HFA,VENTOLIN HFA) inhaler 2 puff  2 puff Inhalation Q4H PRN    aluminum-magnesium hydroxide-simethicone (MYLANTA) oral suspension 30 mL  30 mL Oral Q4H PRN    benztropine (COGENTIN) injection 1 mg  1 mg Intramuscular Q4H PRN Max 6/day    benztropine (COGENTIN) tablet 0 5 mg  0 5 mg Oral Q4H PRN Max 6/day    [START ON 7/5/2021] cholecalciferol (VITAMIN D3) tablet 1,000 Units  1,000 Units Oral Daily    cholestyramine sugar free (QUESTRAN LIGHT) packet 4 g  4 g Oral BID    dextromethorphan-guaiFENesin (ROBITUSSIN DM) oral syrup 10 mL  10 mL Oral Q4H PRN    [START ON 6/14/2021] ergocalciferol (VITAMIN D2) capsule 50,000 Units  50,000 Units Oral Weekly    gabapentin (NEURONTIN) capsule 400 mg  400 mg Oral TID    haloperidol lactate (HALDOL) injection 5 mg  5 mg Intramuscular Q4H PRN Max 4/day    heparin (porcine) subcutaneous injection 5,000 Units  5,000 Units Subcutaneous Q8H [facility]    HYDROmorphone (DILAUDID) injection 1 mg  1 mg Intravenous Q4H PRN    hydrOXYzine HCL (ATARAX) tablet 25 mg  25 mg Oral Q6H PRN Max 4/day    hydrOXYzine HCL (ATARAX) tablet 50 mg  50 mg Oral Q6H PRN Max 4/day    insulin glargine (LANTUS) subcutaneous injection 15 Units 0 15 mL  15 Units Subcutaneous QAM    insulin lispro (HumaLOG) 100 units/mL subcutaneous injection 1-6 Units  1-6 Units Subcutaneous 4x Daily (AC & HS)    magnesium oxide (MAG-OX) tablet 400 mg  400 mg Oral BID    naloxone (NARCAN) 0 04 mg/mL syringe 0 04 mg  0 04 mg Intravenous Q1MIN PRN    nicotine (NICODERM CQ) 14 mg/24hr TD 24 hr patch 1 patch  1 patch Transdermal Daily    ondansetron (ZOFRAN-ODT) dispersible tablet 4 mg  4 mg Oral Q6H PRN    oxyCODONE (ROXICODONE) IR tablet 5 mg  5 mg Oral Q4H PRN    pantoprazole (PROTONIX) EC tablet 40 mg  40 mg Oral Early Morning    propranolol (INDERAL) tablet 10 mg  10 mg Oral BID    QUEtiapine (SEROquel) tablet 200 mg  200 mg Oral HS    risperiDONE (RisperDAL M-TAB) disintegrating tablet 0 25 mg  0 25 mg Oral Q4H PRN Max 6/day    risperiDONE (RisperDAL M-TAB) disintegrating tablet 0 5 mg  0 5 mg Oral Q4H PRN Max 3/day    risperiDONE (RisperDAL M-TAB) disintegrating tablet 1 mg  1 mg Oral Q4H PRN Max 3/day    rOPINIRole (REQUIP) tablet 1 mg  1 mg Oral BID    senna-docusate sodium (SENOKOT S) 8 6-50 mg per tablet 1 tablet  1 tablet Oral BID    sertraline (ZOLOFT) tablet 100 mg  100 mg Oral Daily    tiotropium (SPIRIVA) capsule for inhaler 18 mcg  18 mcg Inhalation Daily    traZODone (DESYREL) tablet 50 mg  50 mg Oral HS PRN     Medications Discontinued During This Encounter   Medication Reason    metFORMIN (GLUCOPHAGE) tablet 500 mg     pioglitazone (ACTOS) tablet 30 mg     insulin glargine (LANTUS) subcutaneous injection 36 Units 0 36 mL     ibuprofen (MOTRIN) tablet 600 mg     oxyCODONE (ROXICODONE) IR tablet 2 5 mg     folic acid (FOLVITE) tablet 1 mg     multivitamin-minerals (CENTRUM) tablet 1 tablet     thiamine tablet 100 mg     sodium chloride 0 9 % infusion     sertraline (ZOLOFT) tablet 150 mg        Samantha Almodovar PA-C      Portions of the record may have been created with voice recognition software  Occasional wrong word or "sound a like" substitutions may have occurred due to the inherent limitations of voice recognition software  Read the chart carefully and recognize, using context, where substitutions have occurred

## 2021-06-11 NOTE — ASSESSMENT & PLAN NOTE
· Patient has been hospitalized over the past month in between [facility], and the behavioral health unit here  · Doubt that the patient will have any type of alcohol withdrawal at this time  · DC GIANNAWA protocol  · DC thiamine, folate, and multivitamin

## 2021-06-11 NOTE — DISCHARGE SUMMARY
Discharge Summary - Keisha Monroyker 46 y o  male MRN: [id number]  Unit/Bed#: [facility] 659-82 Encounter: [id number]     Admission Date: 5/14/21       Discharge Date: 6/9/2021 11:20 PM    Attending Psychiatrist: Dr Tarsha Patel    Reason for Admission/HPI: MDD (major depressive disorder), recurrent episode ([facility] [address] ) [F33 9]  MDD (major depressive disorder), recurrent episode, severe ([facility] [address] ) [F33 2]    According to H&P by Dr Sesar Gamboa 5/15/21:    History of Present Illness this is a 76-year-old white gentleman with a history of depression alcohol dependence unemployed for some time now father of 2 children, reported he has been feeling sad depressed stressed guilty that he is not working and not finding shortly contributing to the family, had a argument with his wife as he has started drinking alcohol again and that argument led him to get angry upset and impulsively took bunch of his medications and overdosed  Looking at the record he has taken Propanol Seroquel and Xanax which was prescribed to him 5 years ago, in an impulsive act to commit suicide  This was quite sees because he has a COPD and ended up staying for 5 days at the [facility] and then he was stable and sign 201 to come over here at the [facility] unit for treatment of depression  Patient stated he has been seeing as a psychiatric provider seen the Gustavo Sommers, and he has been taking Zoloft 100 mg a day and Seroquel 100 mg at bedtime  Reports some difficulty with the sleep but feeling sad depressed hopeless helpless guilty as unable to contribute to the family financially  He also indicated wife's get upset when he starts drinking he has a long history of problem with the alcohol  He has a history of 1 dui and multiple rehabs for alcohol problem  The longest time he has been sober is 11 years, currently has been so drinking alcohol as I stated earlier    He has a history of chronic pancreatitis which he dwelled due to alcohol problem as well as 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  He indicated he has no issues at present or any plans to hurt himself  He indicated in the past he has taken affects are sad Axe prescribed  He has been hospitalized at the [facility] in the past at least twice  History of depression for the past 20+ years, no previous suicide attempt just thought about our feeling but did not carry out any suicidal act  Patient stated that he was given Vivitrol injection, during his last rehab as he was leaving the rehab, but did not get any further injections of Vivitrol      Hospital Course: The patient was admitted to the inpatient psychiatric unit and started on every 7 minutes precautions  During the hospitalization the patient was attending individual therapy, group therapy, milieu therapy and occupational therapy  Psychiatric medications were titrated over the hospital stay  To address depressive symptoms, impulsivity, potential alcohol withdrawal symptoms and s/p suicide attempt via polypharmacy overdose the patient was started on antidepressant Zoloft, mood stabilizer Neurontin, antipsychotic medication Seroquel and anxiolytic medication Propranolol  Medication doses were titrated during the hospital course  Prior to beginning of treatment medications risks and benefits and possible side effects including risk of parkinsonian symptoms, Tardive Dyskinesia and metabolic syndrome related to treatment with antipsychotic medications, risk of cardiovascular events in elderly related to treatment with antipsychotic medications and risk of suicidality and serotonin syndrome related to treatment with antidepressants were reviewed with the patient  The patient verbalized understanding and agreement for treatment  Patient's symptoms improved gradually over the hospital course    He voiced improvement with his anxiety and depression and rated each as 2/10 and no longer endorsed suicidal ideations  He also denied any AVH/HI  However, patient was found to be hyponatremic with a sodium level of 105 and diagnosed with acute pancreatitis  Tristin Colin was transferred to Intensive Care Unit on 06/09/2021 at 11:20 p m  for further medical stabilization  Tristin Colin is going to be followed on Psychiatric Consultation service while on medical service  MSE completed by Dr Ирина Early 6/9/21:  Mental Status Evaluation:      Appearance:  age appropriate   Behavior:  cooperative   Speech:  soft, goal directed    Mood:  depressed   Affect:  constricted    Thought Process:  goal directed   Thought Content:  No overt delusions   Perceptual Disturbances: None   Risk Potential: Suicidal Ideations none  Homicidal Ideations none  Potential for Aggression No   Sensorium:  person, place and time/date   Memory:  recent and remote memory grossly intact   Consciousness:  alert and awake    Attention: attention span and concentration were age appropriate   Insight:  limited   Judgment: limited   Gait/Station: normal gait/station   Motor Activity: no abnormal movements       Admission Diagnosis:MDD (major depressive disorder), recurrent episode ([facility] [address] ) [F33 9]  MDD (major depressive disorder), recurrent episode, severe ([facility] [address] ) [F33 2]    Discharge Diagnosis:   Principal Problem:    MDD (major depressive disorder), recurrent episode, severe ([address] )  Active Problems:    Quiros esophagus    Benign essential hypertension    COPD (chronic obstructive pulmonary disease) ([facility] [address] )    Fatty liver    Diabetes mellitus type 1 5, managed as type 1 ([facility] [address] )    Anxiety and depression    History of ETOH abuse  Resolved Problems:    * No resolved hospital problems   *        Lab results:  Admission on 06/09/2021   Component Date Value    Osmolality, Ur 06/09/2021 614     Magnesium 06/10/2021 1 6*    Calcium, Ionized 06/10/2021 1 04*    Sodium 06/10/2021 106*    Potassium 06/10/2021 4 4     Chloride 06/10/2021 77*    CO2 06/10/2021 24     ANION GAP 06/10/2021 5     BUN 06/10/2021 12     Creatinine 06/10/2021 0 62*    Glucose 06/10/2021 119*    Calcium 06/10/2021 8 4*    eGFR 06/10/2021 113     Sodium 06/10/2021 107*    Potassium 06/10/2021 4 6     Chloride 06/10/2021 76*    CO2 06/10/2021 25     ANION GAP 06/10/2021 6     BUN 06/10/2021 11     Creatinine 06/10/2021 0 57*    Glucose 06/10/2021 122*    Calcium 06/10/2021 8 4*    eGFR 06/10/2021 117     Lipase 06/10/2021 720*    Sodium 06/10/2021 106*    Potassium 06/10/2021 4 2     Chloride 06/10/2021 77*    CO2 06/10/2021 24     ANION GAP 06/10/2021 5     BUN 06/10/2021 11     Creatinine 06/10/2021 0 54*    Glucose 06/10/2021 110*    Calcium 06/10/2021 8 7     eGFR 06/10/2021 120     Sodium 06/10/2021 108*    Potassium 06/10/2021 4 3     Chloride 06/10/2021 78*    CO2 06/10/2021 25     ANION GAP 06/10/2021 5     BUN 06/10/2021 10     Creatinine 06/10/2021 0 56*    Glucose 06/10/2021 111*    Calcium 06/10/2021 8 6     eGFR 06/10/2021 118     Magnesium 06/10/2021 2 1     Calcium, Ionized 06/10/2021 1 09*    POC Glucose 06/10/2021 115     Sodium 06/10/2021 108*    Potassium 06/10/2021 4 2     Chloride 06/10/2021 78*    CO2 06/10/2021 24     ANION GAP 06/10/2021 6     BUN 06/10/2021 10     Creatinine 06/10/2021 0 54*    Glucose 06/10/2021 113*    Calcium 06/10/2021 8 3*    eGFR 06/10/2021 120     Sodium 06/10/2021 107*    Potassium 06/10/2021 4 3     Chloride 06/10/2021 77*    CO2 06/10/2021 25     ANION GAP 06/10/2021 5     BUN 06/10/2021 10     Creatinine 06/10/2021 0 54*    Glucose 06/10/2021 114*    Calcium 06/10/2021 8 7     eGFR 06/10/2021 120     Triglycerides 06/10/2021 67     POC Glucose 06/10/2021 119     Sodium 06/10/2021 107*    Potassium 06/10/2021 4 3     Chloride 06/10/2021 77*    CO2 06/10/2021 26     ANION GAP 06/10/2021 4     BUN 06/10/2021 10     Creatinine 06/10/2021 0 54*    Glucose 06/10/2021 107*    Calcium 06/10/2021 8 7  eGFR 06/10/2021 120     Cholesterol 06/10/2021 121     Triglycerides 06/10/2021 82     HDL, Direct 06/10/2021 59     LDL Calculated 06/10/2021 46     Non-HDL-Chol (CHOL-HDL) 06/10/2021 62     Sodium, Ur 06/10/2021 50     Osmolality, Ur 06/10/2021 506     Sodium 06/10/2021 109*    Potassium 06/10/2021 4 3     Chloride 06/10/2021 79*    CO2 06/10/2021 24     ANION GAP 06/10/2021 6     BUN 06/10/2021 10     Creatinine 06/10/2021 0 60*    Glucose 06/10/2021 99     Calcium 06/10/2021 8 6     eGFR 06/10/2021 115     POC Glucose 06/10/2021 107     Sodium 06/10/2021 108*    Potassium 06/10/2021 4 4     Chloride 06/10/2021 78*    CO2 06/10/2021 25     ANION GAP 06/10/2021 5     BUN 06/10/2021 11     Creatinine 06/10/2021 0 58*    Glucose 06/10/2021 107*    Calcium 06/10/2021 8 7     eGFR 06/10/2021 116     Sodium 06/10/2021 110*    Potassium 06/10/2021 4 5     Chloride 06/10/2021 79*    CO2 06/10/2021 25     ANION GAP 06/10/2021 6     BUN 06/10/2021 11     Creatinine 06/10/2021 0 65*    Glucose 06/10/2021 102*    Calcium 06/10/2021 8 5*    eGFR 06/10/2021 111     POC Glucose 06/10/2021 107     Magnesium 06/10/2021 1 7*    Sodium 06/11/2021 109*    Potassium 06/11/2021 4 9     Chloride 06/11/2021 77*    CO2 06/11/2021 25     ANION GAP 06/11/2021 7     BUN 06/11/2021 14     Creatinine 06/11/2021 0 73     Glucose 06/11/2021 104*    Calcium 06/11/2021 8 8     AST 06/11/2021 25     ALT 06/11/2021 24     Alkaline Phosphatase 06/11/2021 99     Total Protein 06/11/2021 6 9     Albumin 06/11/2021 3 8     Total Bilirubin 06/11/2021 0 50     eGFR 06/11/2021 106     WBC 06/11/2021 13 90*    RBC 06/11/2021 4 71     Hemoglobin 06/11/2021 14 2     Hematocrit 06/11/2021 39 7*    MCV 06/11/2021 84     MCH 06/11/2021 30 0     MCHC 06/11/2021 35 7     RDW 06/11/2021 13 7     MPV 06/11/2021 7 0*    Platelets 59/96/5591 149     Neutrophils Relative 06/11/2021 85*    Lymphocytes Relative 06/11/2021 9*    Monocytes Relative 06/11/2021 6     Eosinophils Relative 06/11/2021 0     Basophils Relative 06/11/2021 0     Neutrophils Absolute 06/11/2021 11 80*    Lymphocytes Absolute 06/11/2021 1 20     Monocytes Absolute 06/11/2021 0 90     Eosinophils Absolute 06/11/2021 0 00     Basophils Absolute 06/11/2021 0 00     Lipase 06/11/2021 217*    Cortisol, Random 06/11/2021 32 1     Magnesium 06/11/2021 2 1     Calcium, Ionized 06/11/2021 1 10*    POC Glucose 06/11/2021 118     POC Glucose 06/11/2021 114    Admission on 06/01/2021, Discharged on 06/09/2021   Component Date Value    POC Glucose 06/01/2021 100     POC Glucose 06/01/2021 188*    POC Glucose 06/01/2021 197*    POC Glucose 06/01/2021 131     POC Glucose 06/02/2021 92     POC Glucose 06/02/2021 124     POC Glucose 06/02/2021 168*    POC Glucose 06/02/2021 107     POC Glucose 06/03/2021 72     POC Glucose 06/03/2021 199*    POC Glucose 06/03/2021 99     POC Glucose 06/03/2021 114     POC Glucose 06/04/2021 131     POC Glucose 06/04/2021 250*    POC Glucose 06/04/2021 169*    POC Glucose 06/04/2021 164*    POC Glucose 06/05/2021 208*    POC Glucose 06/05/2021 211*    POC Glucose 06/05/2021 166*    POC Glucose 06/05/2021 199*    POC Glucose 06/06/2021 121     POC Glucose 06/06/2021 149*    POC Glucose 06/06/2021 133     POC Glucose 06/06/2021 160*    POC Glucose 06/07/2021 220*    POC Glucose 06/07/2021 177*    POC Glucose 06/07/2021 155*    POC Glucose 06/07/2021 109     POC Glucose 06/08/2021 109     POC Glucose 06/08/2021 192*    POC Glucose 06/08/2021 171*    POC Glucose 06/08/2021 161*    POC Glucose 06/09/2021 124     POC Glucose 06/09/2021 171*    POC Glucose 06/09/2021 280*    WBC 06/09/2021 9 80     RBC 06/09/2021 4 86     Hemoglobin 06/09/2021 14 4     Hematocrit 06/09/2021 40 0*    MCV 06/09/2021 82     MCH 06/09/2021 29 6     MCHC 06/09/2021 35 9     RDW 06/09/2021 13 7     MPV 06/09/2021 7 0*    Platelets 83/10/6010 151     Neutrophils Relative 06/09/2021 81*    Lymphocytes Relative 06/09/2021 11*    Monocytes Relative 06/09/2021 7     Eosinophils Relative 06/09/2021 0     Basophils Relative 06/09/2021 1     Neutrophils Absolute 06/09/2021 7 90*    Lymphocytes Absolute 06/09/2021 1 10     Monocytes Absolute 06/09/2021 0 70     Eosinophils Absolute 06/09/2021 0 00     Basophils Absolute 06/09/2021 0 10     Sodium 06/09/2021 105*    Potassium 06/09/2021 4 4     Chloride 06/09/2021 74*    CO2 06/09/2021 22     ANION GAP 06/09/2021 9     BUN 06/09/2021 13     Creatinine 06/09/2021 0 71     Glucose 06/09/2021 169*    Calcium 06/09/2021 8 8     AST 06/09/2021 23     ALT 06/09/2021 23     Alkaline Phosphatase 06/09/2021 94     Total Protein 06/09/2021 6 8     Albumin 06/09/2021 3 9     Total Bilirubin 06/09/2021 0 40     eGFR 06/09/2021 107     Lipase 06/09/2021 1,497*    Amylase 06/09/2021 417*    RBC Morphology 06/09/2021 abnormal     Anisocytosis 06/09/2021 Present     Polychromasia 06/09/2021 Present     Platelet Estimate 18/42/7002 Adequate     Giant PLTs 06/09/2021 Present     POC Glucose 06/09/2021 160*    Sodium 06/09/2021 106*    Potassium 06/09/2021 4 3     Chloride 06/09/2021 75*    CO2 06/09/2021 24     ANION GAP 06/09/2021 7     BUN 06/09/2021 12     Creatinine 06/09/2021 0 65*    Glucose 06/09/2021 120*    Calcium 06/09/2021 8 8     eGFR 06/09/2021 111     Clarity, UA 06/09/2021 Slightly Cloudy*    Color, UA 06/09/2021 Yellow     Specific Gravity, UA 06/09/2021 1 020     pH, UA 06/09/2021 7 5     Glucose, UA 06/09/2021 1+*    Ketones, UA 06/09/2021 Trace*    Blood, UA 06/09/2021 Negative     Protein, UA 06/09/2021 Negative     Nitrite, UA 06/09/2021 Negative     Bilirubin, UA 06/09/2021 Negative     Urobilinogen, UA 06/09/2021 0 2     Leukocytes, UA 06/09/2021 Negative     WBC, UA 06/09/2021 0-1*    RBC, UA 06/09/2021 None Seen     Bacteria, UA 06/09/2021 Occasional     AMORPH URATES 06/09/2021 Occasional     Epithelial Cells 06/09/2021 Occasional     Sodium, Ur 06/09/2021 63     Creatinine, Ur 06/09/2021 82 6     POC Glucose 06/10/2021 122        Discharge Medications:  Discharge Medication List as of 6/9/2021 11:21 PM         Discharge Medication List as of 6/9/2021 11:21 PM         Discharge Medication List as of 6/9/2021 11:21 PM         Discharge Medication List as of 6/9/2021 11:21 PM      CONTINUE these medications which have NOT CHANGED    Details   albuterol (PROVENTIL HFA,VENTOLIN HFA) 90 mcg/act inhaler Inhale 2 puffs every 6 (six) hours as needed for wheezing or shortness of breath, Historical Med      cholestyramine sugar free (QUESTRAN LIGHT) 4 g packet Take 1 packet (4 g total) by mouth 2 (two) times a day, Starting Fri 5/14/2021, Until Sun 1/57/6163, Normal      folic acid (FOLVITE) 1 mg tablet Take 1 tablet (1 mg total) by mouth daily, Starting Sat 5/15/2021, Normal      gabapentin (NEURONTIN) 300 mg capsule Take 300 mg by mouth 3 (three) times a day, Historical Med      !! Insulin Pen Needle (B-D ULTRAFINE III SHORT PEN) 31G X 8 MM MISC by Does not apply route, Starting u 2/16/2012, Historical Med      !!  Insulin Pen Needle (PEN NEEDLES) 29G X 12MM MISC by Does not apply route daily at bedtime Substitute what fits [employer] pen and what is covered by insurance , Starting Tue 8/14/2018, Print      lisinopril (ZESTRIL) 10 mg tablet Take 10 mg by mouth 2 (two) times a day, Historical Med      losartan (COZAAR) 25 mg tablet Take 25 mg by mouth daily, Historical Med      metFORMIN (GLUCOPHAGE) 500 mg tablet Take 500 mg by mouth 2 (two) times a day with meals, Historical Med      omeprazole (PriLOSEC) 20 mg delayed release capsule Take 20 mg by mouth daily, Historical Med      pioglitazone (ACTOS) 30 mg tablet Take 30 mg by mouth daily, Historical Med      propranolol (INDERAL) 10 mg tablet Take 10 mg by mouth 2 (two) times a day, Historical Med      QUEtiapine (SEROquel) 200 mg tablet Take 250 mg by mouth daily at bedtime, Historical Med      rOPINIRole (REQUIP) 1 mg tablet Take 1 mg by mouth 3 (three) times a day, Historical Med      sertraline (ZOLOFT) 100 mg tablet Take 200 mg by mouth daily, Historical Med      thiamine (VITAMIN B1) 100 mg tablet Take 1 tablet (100 mg total) by mouth daily, Starting Sat 5/15/2021, Until Mon 6/14/2021, Normal      tiotropium (SPIRIVA) 18 mcg inhalation capsule Place 1 capsule (18 mcg total) into inhaler and inhale daily, Starting Sat 5/15/2021, Until Mon 6/14/2021, Normal       !! - Potential duplicate medications found  Please discuss with provider             TYE Retana 06/11/21

## 2021-06-11 NOTE — CASE MANAGEMENT
LOS: 1 day 32 YELLOW pt is a 30 day readmission and not a bundle pt, pt was transferred to [facility] on 5/11/2021 for intentional overdose and was then sent to [facility], pt was sent to ICU on 6/9/2021 for pancreatitis and hyponatremia, pt lives with his wife in a 3 story home with a basement, 12-14 steps inside, 4 steps outside pt is unemployed, DME: oxygen he is not sure of the company he uses it at night and as needed, hx of [facility] he is not sure of the agency,pt stated he quit smoking an drinks daily, pt had a mri, pt was found to have pancreatic cancer, hospice eval was ordered, cm met again with pt and his wife at 12:55 to make them aware of my role as a cm and I gave them a list of hospice agencies, they did request [facility] [place] [facility] hosp[facility], referral was made, I spoke with Jennifer and I made her aware the wife is at the  Hospital and they would like her to met with both of them , I made the pt and his wife aware that Skye ParsonsBrent would be a the [facility] at 3 pm to meet with them, pt was willing to go for drug/alcohol rehab and now the d/c plan is home with hospice care, cm will continue to follow and work on a safe d/c plan

## 2021-06-11 NOTE — ASSESSMENT & PLAN NOTE
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

## 2021-06-11 NOTE — NURSING NOTE
Patient weepy at this time, Dr Dioni Cisneros just at bedside and discussed MRI results with him  He phoned his wife and informed her to please come in ASAP  He is refusing to be on the monitor at this time and requests to be allowed to eat and wants the catheter out  Dr Dioni Cisneros made aware of patients wishes  Dr Dioni Cisneros will speak to patients wife when she arrives regarding test results

## 2021-06-11 NOTE — NURSING NOTE
Assumed pt care  Pt continues to refuse monitoring  He did allow vital signs to be taken  Pt c/o pain medicated with Tylenol and oxycontin per orders  Pt is anxious and pacing around unit  Questions answered to the best of my knowledge

## 2021-06-11 NOTE — NURSING NOTE
Patient asking if he is going to be discharged tonight, as hospice nurse just in to see he  Tiger text sent to Dr Nimco Denton who confirms hospice can't see patient to admit him to home until Monday, and he is not able to be discharged home as his sodium was 109 this am  He will have to leave against medical advice if he chooses to  Leave  Patient aware of this and is going to speak with his wife

## 2021-06-11 NOTE — ASSESSMENT & PLAN NOTE
· Secondary to the new diagnosis of pancreatic cancer as outlined above  · Will give the patient a trial of clear liquids

## 2021-06-11 NOTE — ACP (ADVANCE CARE PLANNING)
Serious Illness Conversation    1  What is your understanding now of where you are with your illness? Prognostic Understanding: appropriate understanding of prognosis  Patient has a new diagnosis of metastatic pancreatic adenocarcinoma     2  How much information about what is likely to be ahead with your illness would you like to have? Information: patient wants to be fully informed     3  What did you (clinician) communicate to the patient? Prognostic Communication: Time - I wish we were not in this situation, but I am worried that time may be as short as months to a year  4  If your health situation worsens, what are your most important goals? Goals: have my medical decisions respected, be at home, be physically comfortable, be spiritually and emotionally at peace     5  What are the biggest fears and worries about the future and your health? Fears/Worries: being an emotional burden, being dependent, being a financial burden, being a physical burden, burdening others, loss of dignity, pain     6  What abilities are so critical to your life that you cannot imagine living without them? Unacceptable Function: not being able to care for myself, including toileting and feeding, being in chronic severe pain, being in pain or very uncomfortable     7  What gives you strength as you think about the future with your illness? The support from my wife and kids     6  If you become sicker, how much are you willing to go through for the possibility of gaining more time? 9  How much does your proxy and family know about your priorities and wishes? Discussion: wants clinician to talk with family     Samantha heard you say that being pain free is really important to you  Keeping that in mind, and what we know about your illness, I recommend that we obtain a hospice evaluation  This will help us make sure that your treatment plans reflect whats important to you  How does this plan sound to you?  I will do everything I can to help you through this    Patient verbalized understanding of the plan     I have spent 35 minutes speaking with my patient on advanced care planning today or during this visit     Advanced directives  Five Wishes: Patient does not have Five Wishes- would not like information

## 2021-06-11 NOTE — PROGRESS NOTES
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  · Continue fluid restriction  · Will start a clear liquid diet  · Of note, patient is now refusing additional blood work in view of the new diagnosis pancreatic carcinoma  · See below    Malignant neoplasm of head of pancreas ([facility] [address] )  Assessment & Plan  · MRCP abdomen with the following results:-Numerous new hepatic lesions are highly suspicious for metastatic disease   Subtle signal abnormality in the visualized osseous structures is suspicious for widespread osseous metastatic disease  Focal prominence of tissue at junction of pancreatic head and body, though incompletely characterized on noncontrast examination is suspicious for pancreatic adenocarcinoma in this patient with these new hepatic and skeletal findings      Further characterization of the pancreas with contrast enhanced triple phase pancreatic CT is recommended   This should be performed along with chest CT in order to evaluate for possible primary or metastatic tumor in the chest    · See the goals of care counseling discussion below    Goals of care, counseling/discussion  Assessment & Plan  · In the presence of the patient's RN Laila, and my physician assistant student in training Jennie, I had a long discussion with the patient after breaking the news of the finding as outlined above  · Patient understands that metastatic pancreatic cancer carries a bad prognosis with and/or without treatment  · We reviewed quality of life and quantity of life    · He wants some time to think about this and would like to talk to his wife, however he reports that he is greater than 90% in favor of getting a hospice evaluation because he wants to focus on quality of life and not quantity of life    · He is not interested in any further testing, treatment, and/or escalation in care at this point  · He is open to the idea of a hospice evaluation, and would like to meet with the hospice liaison  · Will consult hospice  · Initially, the patient took then use surprisingly well, however a few minutes later the nurse reported that he was very emotional, and does not want a Crane catheter anymore, does not want further blood draws, and wants to be removed off of the monitor  · Will re consult psychiatry    Idiopathic acute pancreatitis without infection or necrosis  Assessment & Plan  · Secondary to the new diagnosis of pancreatic cancer as outlined above  · Will give the patient a trial of clear liquids    COPD (chronic obstructive pulmonary disease) ([facility]  Assessment & Plan  · Stable without exacerbation  · Continue respiratory protocol    Diabetes mellitus type 1 5, managed as type 1 [facility])  Assessment & Plan  Lab Results   Component Value Date    HGBA1C 7 4 (H) 05/11/2021       Recent Labs     06/10/21  1534 06/10/21  2037 06/11/21  0629 06/11/21  1110   POCGLU 107 107 118 114       Blood Sugar Average: Last 72 hrs:  · (P) 227 [id number]   · Continue Lantus, Accu-Cheks AC and HS with sliding scale coverage    Benign essential hypertension  Assessment & Plan  · Continue home meds and monitor    Anxiety and depression  Assessment & Plan  · Formal psych consultation pending  · Patient to be evaluated for medical capacity making decisions especially more so in light of the new diagnosis metastatic pancreatic carcinoma    GERD (gastroesophageal reflux disease)  Assessment & Plan  · Continue PPI    Alcohol dependence in early, early partial, sustained full, or sustained partial remission ([facility] [address] )  Assessment & Plan  · Patient has been hospitalized over the past month in between , and the behavioral health unit here  · Doubt that the patient will have any type of alcohol withdrawal at this time  · DC CIWA protocol  · DC thiamine, folate, and multivitamin    Tobacco dependence  Assessment & Plan  · Continue Nicotine patch    Hyperlipidemia  Assessment & Plan  · questran        VTE Prophylaxis:  Heparin    Patient Centered Rounds: I have performed bedside rounds with nursing staff today  Discussions with Specialists or Other Care Team Provider:  Psychiatry, Nephrology, GI, Critical Care, case management, nursing, pharmacy  Education and Discussions with Family / Patient:  Patient was brought up to par with the plan of care today, upon his wife's arrival into the hospital we will update his    Current Length of Stay: 2 day(s)    Current Patient Status: Inpatient   Certification Statement: The patient will continue to require additional inpatient hospital stay due to Management of hyponatremia    Discharge Plan: To be decided    Code Status: Level 1 - Full Code    Subjective:   Patient seen, lying in bed watching TV, appears comfortable, complains of nonspecific aches and pains    Objective:     Vitals:   Temp (24hrs), Av 3 °F (36 3 °C), Min:96 9 °F (36 1 °C), Max:97 7 °F (36 5 °C)    Temp:  [96 9 °F (36 1 °C)-97 7 °F (36 5 °C)] 97 4 °F (36 3 °C)  HR:  [67-83] 83  Resp:  [13-47] 23  BP: (106-178)/() 155/83  SpO2:  [72 %-98 %] 97 %  Body mass index is 26 47 kg/m²  Input and Output Summary (last 24 hours): Intake/Output Summary (Last 24 hours) at 2021 1213  Last data filed at 2021 1145  Gross per 24 hour   Intake 290 ml   Output 1670 ml   Net -1380 ml       Physical Exam:   Physical Exam  Vitals signs and nursing note reviewed  Constitutional:       General: He is not in acute distress  Appearance: Normal appearance  He is not ill-appearing  HENT:      Head: Normocephalic and atraumatic  Nose: Nose normal    Eyes:      Extraocular Movements: Extraocular movements intact  Pupils: Pupils are equal, round, and reactive to light  Neck:      Musculoskeletal: Normal range of motion and neck supple  No neck rigidity or muscular tenderness  Cardiovascular:      Rate and Rhythm: Normal rate and regular rhythm  Pulses: Normal pulses  Heart sounds: Normal heart sounds  No murmur  No friction rub  No gallop  Pulmonary:      Effort: Pulmonary effort is normal       Breath sounds: Normal breath sounds  Abdominal:      General: There is no distension  Palpations: Abdomen is soft  There is no mass  Tenderness: There is no abdominal tenderness  There is no guarding or rebound  Musculoskeletal: Normal range of motion  General: No swelling or tenderness  Right lower leg: No edema  Left lower leg: No edema  Skin:     General: Skin is warm  Capillary Refill: Capillary refill takes less than 2 seconds  Findings: No erythema or rash  Neurological:      General: No focal deficit present  Mental Status: He is alert and oriented to person, place, and time  Mental status is at baseline     Psychiatric:         Mood and Affect: Mood normal          Behavior: Behavior normal          Additional Data:     Labs:    Results from last 7 days   Lab Units 06/11/21  0608   WBC Thousand/uL 13 90*   HEMOGLOBIN g/dL 14 2   HEMATOCRIT % 39 7*   PLATELETS Thousands/uL 149   NEUTROS PCT % 85*   LYMPHS PCT % 9*   MONOS PCT % 6   EOS PCT % 0     Results from last 7 days   Lab Units 06/11/21  0608   SODIUM mmol/L 109*   POTASSIUM mmol/L 4 9   CHLORIDE mmol/L 77*   CO2 mmol/L 25   BUN mg/dL 14   CREATININE mg/dL 0 73   CALCIUM mg/dL 8 8   ALK PHOS U/L 99   ALT U/L 24   AST U/L 25         Results from last 7 days   Lab Units 06/11/21  1110 06/11/21  0629 06/10/21  2037 06/10/21  1534 06/10/21  1021 06/10/21  0717 06/10/21  0027 06/09/21  1945 06/09/21  1620 06/09/21  1117 06/09/21  0731 06/08/21  1938   POC GLUCOSE mg/dl 114 118 107 107 119 115 122 160* 280* 171* 124 161*           * I Have Reviewed All Lab Data Listed Above  * Additional Pertinent Lab Tests Reviewed:  [facility] [address] Admission  Reviewed    Imaging:  Imaging Reports Reviewed Today Include:  None    Recent Cultures (last 7 days):           Last 24 Hours Medication List:   Current Facility-Administered Medications   Medication Dose Route Frequency Provider Last Rate    acetaminophen  650 mg Oral Q4H PRN Kely Bidding, PA-C      albuterol  2 puff Inhalation Q4H PRN Kely Bidding, PA-C      aluminum-magnesium hydroxide-simethicone  30 mL Oral Q4H PRN Kely Bidding, PA-C      benztropine  1 mg Intramuscular Q4H PRN Max 6/day Kely Bidding, PA-C      benztropine  0 5 mg Oral Q4H PRN Max 6/day Kely Bidding, PA-C      [START ON 7/5/2021] cholecalciferol  1,000 Units Oral Daily Kely Bidding, PA-C      cholestyramine sugar free  4 g Oral BID Kely Bidding, PA-C      dextromethorphan-guaiFENesin  10 mL Oral Q4H PRN Kely Bidding, PA-C      [START ON 6/14/2021] ergocalciferol  50,000 Units Oral Weekly Kely Bidding, PA-C      gabapentin  400 mg Oral TID Kely Bidding, PA-C      haloperidol lactate  5 mg Intramuscular Q4H PRN Max 4/day Kely Bidding, PA-C      heparin (porcine)  5,000 Units Subcutaneous Q8H [facility] Anna Godinez MD      HYDROmorphone  1 mg Intravenous Q4H PRN Kely Bidding, PA-C      hydrOXYzine HCL  25 mg Oral Q6H PRN Max 4/day Kely Bidding, PA-C      hydrOXYzine HCL  50 mg Oral Q6H PRN Max 4/day Kely Bidding, PA-C      insulin glargine  15 Units Subcutaneous QAM Kely Bidding, PA-C      insulin lispro  1-6 Units Subcutaneous 4x Daily (AC & HS) Kely Bidding, PA-C      magnesium oxide  400 mg Oral BID Kely Bidding, PA-C      naloxone  0 04 mg Intravenous Q1MIN PRN Kely Bidding, PA-C      nicotine  1 patch Transdermal Daily Kely Bidding, PA-C      ondansetron  4 mg Oral Q6H PRN Kely Bidding, PA-C      oxyCODONE  5 mg Oral Q4H PRN Bernardine Mehdi, PA-C      pantoprazole  40 mg Oral Early Morning Bernardine [facility], PA-C      propranolol  10 mg Oral BID [facility], PA-C      QUEtiapine  200 mg Oral HS [facility], [facility]      risperiDONE  0 25 mg Oral Q4H PRN Max 6/day Bernardine [facility], PA-C      risperiDONE  0 5 mg Oral Q4H PRN Max 3/day [facility], PA-C      risperiDONE  1 mg Oral Q4H PRN Max 3/day [facility], PA-C      rOPINIRole  1 mg Oral BID [facility], PA-C      senna-docusate sodium  1 tablet Oral BID [facility], PA-C      sertraline  100 mg Oral Daily TYE Betts      tiotropium  18 mcg Inhalation Daily [facility], PA-C      traZODone  50 mg Oral HS PRN [facility], ZOYA          Today, Patient Was Seen By: Karyn Alexandra MD    ** Please Note: Dictation voice to text software may have been used in the creation of this document   **

## 2021-06-11 NOTE — ASSESSMENT & PLAN NOTE
· In the presence of the patient's RN Laila, and my physician assistant student in training Jennie, I had a long discussion with the patient after breaking the news of the finding as outlined above  · Patient understands that metastatic pancreatic cancer carries a bad prognosis with and/or without treatment  · We reviewed quality of life and quantity of life  · He wants some time to think about this and would like to talk to his wife, however he reports that he is greater than 90% in favor of getting a hospice evaluation because he wants to focus on quality of life and not quantity of life    · He is not interested in any further testing, treatment, and/or escalation in care at this point  · He is open to the idea of a hospice evaluation, and would like to meet with the hospice liaison  · Will consult hospice  · Initially, the patient took then use surprisingly well, however a few minutes later the nurse reported that he was very emotional, and does not want a Crane catheter anymore, does not want further blood draws, and wants to be removed off of the monitor  · Will re consult psychiatry

## 2021-06-12 VITALS
HEIGHT: 73 IN | SYSTOLIC BLOOD PRESSURE: 116 MMHG | TEMPERATURE: 97 F | HEART RATE: 84 BPM | BODY MASS INDEX: 26.59 KG/M2 | WEIGHT: 200.62 LBS | OXYGEN SATURATION: 93 % | DIASTOLIC BLOOD PRESSURE: 65 MMHG | RESPIRATION RATE: 20 BRPM

## 2021-06-12 DIAGNOSIS — R52 PAIN: ICD-10-CM

## 2021-06-12 DIAGNOSIS — Z51.5 HOSPICE CARE PATIENT: Primary | ICD-10-CM

## 2021-06-12 DIAGNOSIS — R06.02 SHORTNESS OF BREATH: ICD-10-CM

## 2021-06-12 DIAGNOSIS — F11.90 OPIOID USE: ICD-10-CM

## 2021-06-12 DIAGNOSIS — C25.0 MALIGNANT NEOPLASM OF HEAD OF PANCREAS (HCC): ICD-10-CM

## 2021-06-12 LAB
ANION GAP SERPL CALCULATED.3IONS-SCNC: 5 MMOL/L (ref 4–13)
BUN SERPL-MCNC: 16 MG/DL (ref 7–25)
CALCIUM SERPL-MCNC: 9.4 MG/DL (ref 8.6–10.5)
CHLORIDE SERPL-SCNC: 83 MMOL/L (ref 98–107)
CO2 SERPL-SCNC: 28 MMOL/L (ref 21–31)
CREAT SERPL-MCNC: 0.91 MG/DL (ref 0.7–1.3)
GFR SERPL CREATININE-BSD FRML MDRD: 96 ML/MIN/1.73SQ M
GLUCOSE SERPL-MCNC: 230 MG/DL (ref 65–99)
GLUCOSE SERPL-MCNC: 259 MG/DL (ref 65–140)
POTASSIUM SERPL-SCNC: 4.6 MMOL/L (ref 3.5–5.5)
SODIUM SERPL-SCNC: 116 MMOL/L (ref 134–143)

## 2021-06-12 PROCEDURE — 94760 N-INVAS EAR/PLS OXIMETRY 1: CPT

## 2021-06-12 PROCEDURE — 80048 BASIC METABOLIC PNL TOTAL CA: CPT | Performed by: PHYSICIAN ASSISTANT

## 2021-06-12 PROCEDURE — 99239 HOSP IP/OBS DSCHRG MGMT >30: CPT | Performed by: HOSPITALIST

## 2021-06-12 PROCEDURE — 82948 REAGENT STRIP/BLOOD GLUCOSE: CPT

## 2021-06-12 RX ORDER — NALOXONE HYDROCHLORIDE 4 MG/.1ML
SPRAY NASAL
Qty: 1 EACH | Refills: 0 | Status: SHIPPED | OUTPATIENT
Start: 2021-06-12

## 2021-06-12 RX ORDER — OXYCODONE HYDROCHLORIDE 5 MG/1
5 TABLET ORAL EVERY 4 HOURS PRN
Qty: 30 TABLET | Refills: 0 | Status: SHIPPED | OUTPATIENT
Start: 2021-06-12

## 2021-06-12 RX ORDER — FENTANYL 12 UG/H
1 PATCH TRANSDERMAL
Qty: 5 PATCH | Refills: 0 | Status: SHIPPED | OUTPATIENT
Start: 2021-06-12

## 2021-06-12 RX ADMIN — SERTRALINE HYDROCHLORIDE 100 MG: 50 TABLET ORAL at 09:22

## 2021-06-12 RX ADMIN — HEPARIN SODIUM 5000 UNITS: 5000 INJECTION INTRAVENOUS; SUBCUTANEOUS at 05:20

## 2021-06-12 RX ADMIN — HYDROMORPHONE HYDROCHLORIDE 1 MG: 1 INJECTION, SOLUTION INTRAMUSCULAR; INTRAVENOUS; SUBCUTANEOUS at 04:37

## 2021-06-12 RX ADMIN — HYDROMORPHONE HYDROCHLORIDE 1 MG: 1 INJECTION, SOLUTION INTRAMUSCULAR; INTRAVENOUS; SUBCUTANEOUS at 09:26

## 2021-06-12 RX ADMIN — PANTOPRAZOLE SODIUM 40 MG: 40 TABLET, DELAYED RELEASE ORAL at 05:20

## 2021-06-12 RX ADMIN — TIOTROPIUM BROMIDE 18 MCG: 18 CAPSULE ORAL; RESPIRATORY (INHALATION) at 09:21

## 2021-06-12 RX ADMIN — INSULIN GLARGINE 15 UNITS: 100 INJECTION, SOLUTION SUBCUTANEOUS at 09:21

## 2021-06-12 RX ADMIN — PROPRANOLOL HYDROCHLORIDE 10 MG: 10 TABLET ORAL at 09:22

## 2021-06-12 RX ADMIN — DOCUSATE SODIUM 50 MG AND SENNOSIDES 8.6 MG 1 TABLET: 8.6; 5 TABLET, FILM COATED ORAL at 09:22

## 2021-06-12 RX ADMIN — ROPINIROLE 1 MG: 1 TABLET, FILM COATED ORAL at 09:23

## 2021-06-12 RX ADMIN — MAGNESIUM OXIDE TAB 400 MG (241.3 MG ELEMENTAL MG) 400 MG: 400 (241.3 MG) TAB at 09:23

## 2021-06-12 RX ADMIN — INSULIN LISPRO 3 UNITS: 100 INJECTION, SOLUTION INTRAVENOUS; SUBCUTANEOUS at 06:17

## 2021-06-12 NOTE — NURSING NOTE
Order received for discharge home  Pt aware of same  Iv removed and covered with dsd  Discharge instructions reviewed with pt and questions addressed  Copy of same provided to pt  Awaiting family member for

## 2021-06-12 NOTE — DISCHARGE INSTR - AVS FIRST PAGE
Dear Mable Garrett,     It was our pleasure to care for you here at [facility]  It is our hope that we were always able to exceed the expected standards for your care during your stay  You were hospitalized due to low sodium levels and because of a new diagnosis of suspected pancreatic adenocarcinoma  You were cared for on the medical/surgical floor by Ynoathan Banuelos MD with the [facility] who covers for your primary care physician (PCP), Bonner Paget, DO, while you were hospitalized  You were additionally seen by the following providers:-1  Critical Care, 2  Gastroenterology, 3  Nephrology, 4  Psychiatry  If you have any questions or concerns related to this hospitalization, you may contact us at [phone]  For follow up as well as any medication refills, we recommend that you follow up with your primary care physician  A registered nurse will reach out to you by phone within a few days after your discharge to answer any additional questions that you may have after going home  However, at this time we provide for you here, the most important instructions / recommendations at discharge:     · Notable Medication Adjustments -   · Okay to resume all other pre-admit meds at the pre-admit dosages  · Testing Required after Discharge -   · To be determined in the outpatient setting by your primary care provider  · Important follow up information -   · Please follow-up with the hospice team  · Other Instructions -   · Hospice will be providing you with pain meds on 06/14/2021    · Please review this entire after visit summary as additional general instructions including medication list, appointments, activity, diet, any pertinent wound care, and other additional recommendations from your care team that may be provided for you        Sincerely,     Yonathan Banuelos MD

## 2021-06-12 NOTE — PROGRESS NOTES
Patient being discharge with list of belongings    Green bag  2 blue t-shirts  1 black t-shirt  1 blue sweat shirt  Red eye glass case  Bucky Levin framed eye glasses  2 grey t-shirts  2 pairs grey sweat pants  3 pairs black underwear  1 pair grey underwear  2 pairs black socks  2 novels

## 2021-06-12 NOTE — ASSESSMENT & PLAN NOTE
· Status post a hospice evaluation last evening  · Patient has signed on to home hospice  · DC home today on Saturday 06/12/2021, the 1st visit from hospice to his home will be on 06/14/2021

## 2021-06-12 NOTE — DISCHARGE INSTRUCTIONS
Anxiety   WHAT YOU SHOULD KNOW:   Anxiety is a condition that causes you to feel excessive worry, uneasiness, or fear  Family or work stress, smoking, caffeine, and alcohol can increase your risk for anxiety  Certain medicines or health conditions can also increase your risk  Anxiety may begin gradually, and can become a long-term condition if it is not managed or treated  AFTER YOU LEAVE:   Medicines:   · Medicines  can help you feel more calm and relaxed, and decrease your symptoms  · Take your medicine as directed  Contact your healthcare provider if you think your medicine is not helping or if you have side effects  Tell him if you are allergic to any medicine  Keep a list of the medicines, vitamins, and herbs you take  Include the amounts, and when and why you take them  Bring the list or the pill bottles to follow-up visits  Carry your medicine list with you in case of an emergency  Follow up with your healthcare provider within 2 weeks or as directed:  Write down your questions so you remember to ask them during your visits  Manage anxiety:   · Go to counseling as directed  Cognitive behavioral therapy can help you understand and change how you react to events that trigger your symptoms  · Find ways to manage your symptoms  Activities such as exercise, meditation, or listening to music can help you relax  · Practice deep breathing  Breathing can change how your body reacts to stress  Focus on taking slow, deep breaths several times a day, or during an anxiety attack  Breathe in through your nose, and out through your mouth  · Avoid caffeine  Caffeine can make your symptoms worse  Avoid foods or drinks that are meant to increase your energy level  · Limit or avoid alcohol  Ask your healthcare provider if alcohol is safe for you  You may not be able to drink alcohol if you take certain anxiety or depression medicines  Limit alcohol to 1 drink per day if you are a woman   Limit alcohol to 2 drinks per day if you are a man  A drink of alcohol is 12 ounces of beer, 5 ounces of wine, or 1½ ounces of liquor  Contact your healthcare provider if:   · Your symptoms get worse or do not get better with treatment  · You think your medicine may be causing side effects  · Your anxiety keeps you from doing your regular daily activities  · You have new symptoms since your last visit  · You have questions or concerns about your condition or care  Seek care immediately or call 911 if:   · You have chest pain, tightness, or heaviness that may spread to your shoulders, arms, jaw, neck, or back  · You feel like hurting yourself or someone else  · You feel dizzy, lightheaded, or faint  © 2014 [address] is for End User's use only and may not be sold, redistributed or otherwise used for commercial purposes  All illustrations and images included in CareNotes® are the copyrighted property of A D A M , Inc  or [employer]  The above information is an  only  It is not intended as medical advice for individual conditions or treatments  Talk to your doctor, nurse or pharmacist before following any medical regimen to see if it is safe and effective for you  Pancreatitis   WHAT YOU NEED TO KNOW:   Pancreatitis is inflammation of your pancreas  The pancreas is an organ that makes insulin  It also makes enzymes (digestive juices) that help your body digest food  Pancreatitis may be an acute (short-term) problem that happens only once  It may become a chronic (long-term) problem that comes and goes over time  DISCHARGE INSTRUCTIONS:   Call your doctor if:   · You have severe pain in your abdomen and you are vomiting  · You have a fever  · You continue to lose weight without trying  · Your skin or the whites of your eyes turn yellow  · You have questions or concerns about your condition or care  Medicines:   You may need any of the following:  · Prescription pain medicine  may be given  Ask your healthcare provider how to take this medicine safely  Some prescription pain medicines contain acetaminophen  Do not take other medicines that contain acetaminophen without talking to your healthcare provider  Too much acetaminophen may cause liver damage  Prescription pain medicine may cause constipation  Ask your healthcare provider how to prevent or treat constipation  · Antibiotics  may be given to treat a bacterial infection  · Take your medicine as directed  Contact your healthcare provider if you think your medicine is not helping or if you have side effects  Tell him or her if you are allergic to any medicine  Keep a list of the medicines, vitamins, and herbs you take  Include the amounts, and when and why you take them  Bring the list or the pill bottles to follow-up visits  Carry your medicine list with you in case of an emergency  Self-care:   · Rest  when you feel it is needed  Slowly start to do more each day  Return to your usual activities as directed  · Do not drink any alcohol  If you need help to stop drinking, contact the following organization:   ? Alcoholics Anonymous  Web Address: http://Sociocast/      Ask your healthcare provider or dietitian about the best foods to eat  You may need to eat foods that are low in fat if you have chronic pancreatitis  Do not smoke  Nicotine and other chemicals in cigarettes and cigars can cause damage  Ask your healthcare provider for information if you currently smoke and need help to quit  E-cigarettes or smokeless tobacco still contain nicotine  Talk to your healthcare provider before you use these products  Follow up with your healthcare provider as directed:  Write down your questions so you remember to ask them during your visits     © Copyright [address] Information is for End User's use only and may not be sold, redistributed or otherwise used for commercial purposes  All illustrations and images included in CareNotes® are the copyrighted property of A D A M , Inc  or [address]  The above information is an  only  It is not intended as medical advice for individual conditions or treatments  Talk to your doctor, nurse or pharmacist before following any medical regimen to see if it is safe and effective for you  MRCP (Magnetic Resonance Cholangiopancreatography)   WHAT YOU NEED TO KNOW:   MRCP is a type of MRI used to take pictures of your gallbladder, bile duct, and pancreas  DISCHARGE INSTRUCTIONS:   Call your local emergency number [phone] in the [address]) if:   · You have a medical device that has stopped working or is not working as it did before the MRI  Call your doctor or specialist if:   · You have new or increased abdominal pain or other symptoms  · You have questions or concerns about your condition or care  Drink liquids as directed:  Liquids will help flush the contrast liquid out of your body  Ask how much liquid to drink, and which liquids are best  Some foods, such as soup and fruit, also provide liquid  Follow up with your doctor or specialist as directed:  Write down your questions so you remember to ask them during your visits  © Copyright [address] Information is for End User's use only and may not be sold, redistributed or otherwise used for commercial purposes  All illustrations and images included in CareNotes® are the copyrighted property of A D A M , Inc  or [address]  The above information is an  only  It is not intended as medical advice for individual conditions or treatments  Talk to your doctor, nurse or pharmacist before following any medical regimen to see if it is safe and effective for you

## 2021-06-12 NOTE — NURSING NOTE
Pt asking to talk to MD stating he has questions about his sodium level  Dr MULTANI on unit to answer his questions  Pt allowed me to assess him but cont to refuse monitors  Pt is voiding on his own in toilet refuses measuring  Pt cont to c/o abd pain rated 9 of 10  Medicated with prn dilaudid  Pt stated the only reason he is staying is for pain control  Will cont to monitor

## 2021-06-12 NOTE — NURSING NOTE
Dr Dioni Cisneros at bedside with myself to speak with patient regarding discharge  Pt will be starting hospice on Monday  Pt agreeable with same  Discharge instructions to be started  Pt aware of same

## 2021-06-12 NOTE — ASSESSMENT & PLAN NOTE
· Secondary to the new diagnosis of pancreatic cancer as outlined above  · Patient feels well  · Wants a full breakfast after which he will go home

## 2021-06-12 NOTE — ASSESSMENT & PLAN NOTE
· Sodium is up to 116  · Patient has remained asymptomatic throughout this entire process, he is currently asymptomatic at this time also  · Appreciate Nephrology input  · Most likely secondary to SIADH  · In light of his new diagnosis of suspected metastatic pancreatic adenocarcinoma, the patient has signed on to home hospice  · Patient would like to be discharged  · Patient does note tested in any further testing, treatment, and or workup  · Status post a psych evaluation  · Okay for discharge home with home hospice

## 2021-06-12 NOTE — ASSESSMENT & PLAN NOTE
· Status post a psych evaluation  · Patient retains full medical capacity to make his decision  · Okay for DC home  · Of note, prior to discharge, he adamantly denied any suicidal or homicidal ideation, these questions were asked in the presence of the patient's RN Mini  · DC home on pre-admit meds at pre-admit dosages

## 2021-06-12 NOTE — ASSESSMENT & PLAN NOTE
· MRCP abdomen with the following results:-Numerous new hepatic lesions are highly suspicious for metastatic disease   Subtle signal abnormality in the visualized osseous structures is suspicious for widespread osseous metastatic disease     Focal prominence of tissue at junction of pancreatic head and body, though incompletely characterized on noncontrast examination is suspicious for pancreatic adenocarcinoma in this patient with these new hepatic and skeletal findings      Further characterization of the pancreas with contrast enhanced triple phase pancreatic CT is recommended   This should be performed along with chest CT in order to evaluate for possible primary or metastatic tumor in the chest    · Patient is no longer interested in any additional testing, treatment, and or workup  · Patient has chosen quality of life over quantity of life  · Patient's wife is in agreement with this plan  · DC home with home hospice

## 2021-06-12 NOTE — ASSESSMENT & PLAN NOTE
Lab Results   Component Value Date    HGBA1C 7 4 (H) 05/11/2021       Recent Labs     06/11/21  1110 06/11/21  1629 06/11/21  2108 06/12/21  0523   POCGLU 114 268* 301* 259*       Blood Sugar Average: Last 72 hrs:  · (P) 602 [id number]   · DC home on pre-admit meds at pre-admit dosages

## 2021-06-12 NOTE — DISCHARGE SUMMARY
[address] [facility]  Discharge- Irene Maria 1970, 46 y o  male MRN: [id number]  Unit/Bed#: ICU 06 Encounter: [id number]  Primary Care Provider: Herbert Villalpando DO   Date and time admitted to hospital: 6/9/2021 11:22 PM    * Hyponatremia  Assessment & Plan  · Sodium is up to 116  · Patient has remained asymptomatic throughout this entire process, he is currently asymptomatic at this time also  · Appreciate Nephrology input  · Most likely secondary to SIADH  · In light of his new diagnosis of suspected metastatic pancreatic adenocarcinoma, the patient has signed on to home hospice  · Patient would like to be discharged  · Patient does note tested in any further testing, treatment, and or workup  · Status post a psych evaluation  · Okay for discharge home with home hospice    Malignant neoplasm of head of pancreas ([address] )  Assessment & Plan  · MRCP abdomen with the following results:-Numerous new hepatic lesions are highly suspicious for metastatic disease   Subtle signal abnormality in the visualized osseous structures is suspicious for widespread osseous metastatic disease     Focal prominence of tissue at junction of pancreatic head and body, though incompletely characterized on noncontrast examination is suspicious for pancreatic adenocarcinoma in this patient with these new hepatic and skeletal findings      Further characterization of the pancreas with contrast enhanced triple phase pancreatic CT is recommended   This should be performed along with chest CT in order to evaluate for possible primary or metastatic tumor in the chest    · Patient is no longer interested in any additional testing, treatment, and or workup  · Patient has chosen quality of life over quantity of life  · Patient's wife is in agreement with this plan  · DC home with home hospice    Goals of care, counseling/discussion  Assessment & Plan  · Status post a hospice evaluation last evening  · Patient has signed on to home hospice  · DC home today on Saturday 06/12/2021, the 1st visit from hospice to his home will be on 06/14/2021    Idiopathic acute pancreatitis without infection or necrosis  Assessment & Plan  · Secondary to the new diagnosis of pancreatic cancer as outlined above  · Patient feels well  · Wants a full breakfast after which he will go home    COPD (chronic obstructive pulmonary disease) ([address] )  Assessment & Plan  · Stable without exacerbation  · DC home on pre-admit meds at pre-admit dosages    Diabetes mellitus type 1 5, managed as type 1 [facility])  Assessment & Plan  Lab Results   Component Value Date    HGBA1C 7 4 (H) 05/11/2021       Recent Labs     06/11/21  1110 06/11/21  1629 06/11/21  2108 06/12/21  0523   POCGLU 114 268* 301* 259*       Blood Sugar Average: Last 72 hrs:  · (P) 560 [id number]   · DC home on pre-admit meds at pre-admit dosages    Benign essential hypertension  Assessment & Plan  · DC home on pre-admit meds at pre-admit dosages    Anxiety and depression  Assessment & Plan  · Status post a psych evaluation  · Patient retains full medical capacity to make his decision  · Okay for DC home  · Of note, prior to discharge, he adamantly denied any suicidal or homicidal ideation, these questions were asked in the presence of the patient's RN Mini  · DC home on pre-admit meds at pre-admit dosages    GERD (gastroesophageal reflux disease)  Assessment & Plan  · Continue PPI post discharge    Alcohol dependence in early, early partial, sustained full, or sustained partial remission ([address] )  Assessment & Plan  · Cessation counseling provided    Tobacco dependence  Assessment & Plan  · Status post treatment with a nicotine patch while here in house-cessation counseling provided    Hyperlipidemia  Assessment & Plan  · DC home on pre-admit meds at pre-admit dosages        Discharging Physician / Practitioner: Bee Hanks MD  PCP: Jaylyn Levy DO  Admission Date:   Admission Orders (From admission, onward)     Ordered        06/09/21 2327  Inpatient Admission  Once                   Discharge Date: 06/12/21    Resolved Problems  Date Reviewed: 6/10/2021    None          Consultations During Hospital Stay:  · [address]  · Psychiatry  · Nephrology  · Gastroenterology  · Critical care    Procedures Performed:   · None    Significant Findings / Test Results:   · CT abdomen without contrast:-There is diffuse peripancreatic fat stranding surrounding a chronically severely atrophic pancreas, consistent with acute pancreatitis  Severe emphysema noted at the lung bases  · CT brain:-no acute intercranial abnormality  · MRI abdomen without contrast and MRCP:-Numerous new hepatic lesions are highly suspicious for metastatic disease   Subtle signal abnormality in the visualized osseous structures is suspicious for widespread osseous metastatic disease  Focal prominence of tissue at junction of pancreatic head and body, though incompletely characterized on noncontrast examination is suspicious for pancreatic adenocarcinoma in this patient with these new hepatic and skeletal findings    Further characterization of the pancreas with contrast enhanced triple phase pancreatic CT is recommended   This should be performed along with chest CT in order to evaluate for possible primary or metastatic tumor in the chest      Incidental Findings:   · None     Test Results Pending at Discharge (will require follow up): · None     Outpatient Tests Requested:  · None    Complications:     None    Reason for Admission:  Hyponatremia/pancreatitis    Hospital Course:     Samantha Escoto is a 46 y o  male patient who originally presented to the hospital on 6/9/2021 due to abdominal pain  Please refer to the initial history and physical examination completed by Nadir Sim for the initial presenting features and complaints    In brief, the patient is a 49-year-old male, who was transferred down to to the ICU from the older aged behavioral health unit after he complained of abdominal pain, and was found to be severely hyponatremic  The patient was recently discharged from the [facility] to our older aged behavioral health unit after he had been treated for a suicide attempt with medication overdoses  After he complained of abdominal pain on the older age behavioral health unit, he was diagnosed with a pancreatitis as manifested by an elevated lipase level and his clinical symptoms  He was found to have a low sodium  Patient had a CT scan of the brain which yielded no acute intercranial abnormality, and a CT scan of the abdomen without contrast with the results as outlined above  A nephrology and critical care evaluation were obtained in view of his hyponatremia  The patient was initially treated with IV fluids, however due to no significant improvement in his sodium level, he was switched to a fluid restriction process  Ultimately he was diagnosed with an SIADH as the cause of his hyponatremia  His sodium levels improved to 116 by time of discharge  He was otherwise completely asymptomatic from a hyponatremic standpoint  In view of his abdominal pain, and in view of having repetitive episodes of pancreatitis, a GI consultation was obtained after an MRCP was ordered  The MRCP was concerning for metastatic pancreatic adenocarcinoma and or the possibility of a primary or metastatic tumor in the chest   See the results of the MRCP above  Once these findings were reviewed with the patient, he was given the option of potentially being transferred to a higher level of care for further evaluation, testing and treatment, and also was concomitantly given the option of being evaluated by hospice  He opted for the latter  A family meeting was held with his wife  All parties involved, inclusive of the patient himself opted to proceed with the focus on quality of life rather than quantity of life  He opted for no further testing, treatment, and or workup  He was seen again by Psychiatry to ensure that he retains the full medical capacity in regards to making these decisions, and he indeed was seen by Psychiatry who cleared him  Patient was discharged on 06/12/2021, and will sign on to home hospice  He was discharged on all of his other pre-admission medications at the preadmission dosages, and will be making a decision in the near future in regards to whether or not he wants to continue these meds  Please refer to the assessment/plan portion of this discharge summary for the remainder of the details  Additionally, over the past 24 hours the patient has contemplated multiple times in regards to leaving against medical advice but then agreed to stay to this morning  Patient is being discharged directly from the intensive care level status since he has made a decision to just simply go home with home hospice    Please see above list of diagnoses and related plan for additional information  Condition at Discharge: fair     Discharge Day Visit / Exam:     Subjective:  Patient seen and examined, is already dressed to go home  Vitals: Blood Pressure: (!) 176/84 (06/12/21 0435)  Pulse: 84 (06/12/21 0435)  Temperature: (!) 97 °F (36 1 °C) (06/12/21 0746)  Temp Source: Temporal (06/12/21 0746)  Respirations: 18 (06/12/21 0435)  Height: 6' 1" (185 4 cm) (06/09/21 2345)  Weight - Scale: 91 kg (200 lb 9 9 oz) (06/09/21 2345)  SpO2: 93 % (06/12/21 0746)  Exam:   Physical Exam  Vitals signs and nursing note reviewed  Constitutional:       General: He is not in acute distress  Appearance: Normal appearance  He is not ill-appearing  HENT:      Head: Normocephalic and atraumatic  Nose: Nose normal    Eyes:      Extraocular Movements: Extraocular movements intact  Pupils: Pupils are equal, round, and reactive to light  Neck:      Musculoskeletal: Normal range of motion and neck supple   No neck rigidity or muscular tenderness  Cardiovascular:      Rate and Rhythm: Normal rate and regular rhythm  Pulses: Normal pulses  Heart sounds: Normal heart sounds  No murmur  No friction rub  No gallop  Pulmonary:      Effort: Pulmonary effort is normal       Breath sounds: Normal breath sounds  Abdominal:      General: There is no distension  Palpations: Abdomen is soft  There is no mass  Tenderness: There is no abdominal tenderness  There is no guarding or rebound  Musculoskeletal: Normal range of motion  General: No swelling or tenderness  Right lower leg: No edema  Left lower leg: No edema  Skin:     General: Skin is warm  Capillary Refill: Capillary refill takes less than 2 seconds  Findings: No erythema or rash  Neurological:      General: No focal deficit present  Mental Status: He is alert and oriented to person, place, and time  Mental status is at baseline  Psychiatric:         Mood and Affect: Mood normal          Behavior: Behavior normal          Discussion with Family:  Patient's wife was brought up to par last evening, all questions answered to her satisfaction    Discharge instructions/Information to patient and family:   See after visit summary for information provided to patient and family  Provisions for Follow-Up Care:  See after visit summary for information related to follow-up care and any pertinent home health orders  Disposition:     Home with home hospice      Planned Readmission:    None     Discharge Statement:  I spent 45 minutes discharging the patient  This time was spent on the day of discharge  I had direct contact with the patient on the day of discharge  Greater than 50% of the total time was spent examining patient, answering all patient questions, arranging and discussing plan of care with patient as well as directly providing post-discharge instructions    Additional time then spent on discharge activities  Discharge Medications:  See after visit summary for reconciled discharge medications provided to patient and family        ** Please Note: This note has been constructed using a voice recognition system **

## 2021-06-14 ENCOUNTER — DOCUMENTATION (OUTPATIENT)
Dept: PALLIATIVE MEDICINE | Facility: HOSPITAL | Age: 51
End: 2021-06-14

## 2022-11-12 NOTE — TELEPHONE ENCOUNTER
You were going to write a letter for Disability for me can you please call me with the status  I will come in and  the letter if its ready      Thank you   Joanna Quiñones 66

## 2023-03-26 NOTE — ASSESSMENT & PLAN NOTE
Pt would benefit from cont OT services in order to maximize functional independence. Recommending return to NH upon d/c. OT chris performed this date with PT, pt agreeable to therapy. Pt limited this date by increased dizziness & increased HR seated EOB. BP taken at 124/60, & -165 during session. Will progress as able.     Problem: Occupational Therapy  Goal: Occupational Therapy Goal  Description: Goals to be met by: 4/26/2023     Patient will increase functional independence with ADLs by performing:    Grooming while seated with Set-up Assistance.  Toileting from bedside commode with Minimal Assistance for hygiene and clothing management.   Supine to sit with Stand-by Assistance.  Step transfer with Minimal Assistance & appropriate AD.  Toilet transfer to bedside commode with Minimal Assistance & appropriate AD.    3/26/2023 1230 by COOKIE Wagoner OT  Outcome: Ongoing, Progressing      · Resolved -  continue to monitor

## 2023-09-23 NOTE — PROGRESS NOTES
Patient visible on the unit and positive for meals  Good eye contact but flat affect  Patient cooperative with medication schedule  Patient verbalized "feeling better," but appears sad  Patient does not socialize with other peers  Will continue to monitor  Him/He

## 2024-03-25 NOTE — PROGRESS NOTES
Assessment/Plan:    No problem-specific Assessment & Plan notes found for this encounter  Diagnoses and all orders for this visit:    Current severe episode of major depressive disorder without psychotic features without prior episode ([facility] )    Alcohol abuse    Diabetes mellitus type 1 5, managed as type 1 ([facility] )  -     Ambulatory referral to Endocrinology; Future      A/p: Seems a little better  Needs to f/u with psych and NA ASAP  Reports having a NA mtg later today  ??sugar issues  Recommend he go back to endo and see them again  ??an insulin pump may be better  Discussed his carb input and exercising  Refer to endo  COntinue current treatment and RTC six weeks for f/u and routine  Will call in one week for sugars  Subjective:      Patient ID: Salud Schneider is a 44 y o  male  WM RTC for f/u uncontrolled sugars, continued ETOH use, and suicidal ideations/depression  Pt sent to the ER and then transferred to adult behavioral unit where he stayed for about a week  Meds adjusted and pt d/c'd to home to f/u with an OP program  Doing ok and no further suicidal ideations and depression a little better  Tolerating meds  Reports low blood sugars in the hospital and at home despite eating three meals and snacks  No using ETOH  Hasn't f/u as an OP yet for psych or NA  NO abdominal pain  No new c/o's  The following portions of the patient's history were reviewed and updated as appropriate:   He  has a past medical history of Alcohol abuse (2/20/2019), Allergic rhinitis, Anemia, Anxiety and depression, Quiros esophagus, Cholelithiasis, Chronic pain, COPD (chronic obstructive pulmonary disease) ([facility] ), Depression, Diabetes mellitus ([facility] ), Emphysema lung ([facility] ), Generalized anxiety disorder, GERD (gastroesophageal reflux disease), Hyperlipidemia, Hypertension, Kidney stone, Metatarsalgia, Pancreatitis, Psychiatric disorder, Renal disorder, and Shortness of breath    He   Patient Active Problem List    Diagnosis Date Noted    Alcohol abuse 02/20/2019    Paresthesia of both lower extremities     Chronic pancreatitis ([facility] ) 09/13/2018    Transaminitis 08/13/2018    Tobacco dependence 08/13/2018    GERD (gastroesophageal reflux disease) 08/09/2018    H/O alcohol abuse 08/09/2018    Insomnia 09/05/2017    COPD (chronic obstructive pulmonary disease) ([place] [address] ) 03/29/2017    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    Iron deficiency anemia 04/05/2016    Diabetes mellitus type 1 5, managed as type 1 ([place] [address] ) 04/05/2016    Fibromyalgia 07/29/2013    Fatty liver 12/05/2012    Nephrolithiasis 12/05/2012    Hyperlipidemia 09/11/2012    Benign essential hypertension 07/02/2012    Current severe episode of major depressive disorder without psychotic features without prior episode ([place] [address] ) 07/02/2012    Other chronic pain 07/02/2012    Sleep disorder 07/02/2012    Generalized anxiety disorder 06/13/2012     He  has a past surgical history that includes CHOLECYSTECTOMY LAPAROSCOPIC; Vasectomy; Foot surgery; Knee arthroscopy; and pr edg us exam surgical alter stom duodenum/jejunum (N/A, 10/17/2018)  His family history includes Cancer in his mother; Coronary artery disease in his family, father, and mother; Diabetes in his mother and sister; Prostate cancer in his father  He  reports that he has been smoking  He has been smoking about 1 00 pack per day  He has never used smokeless tobacco  He reports that he drinks alcohol  He reports that he does not use drugs    Current Outpatient Medications   Medication Sig Dispense Refill    ARIPiprazole (ABILIFY) 5 mg tablet Take 1 tablet (5 mg total) by mouth daily for 30 days 30 tablet 0    fenofibrate (TRICOR) 145 mg tablet Take 1 tablet (145 mg total) by mouth daily 30 tablet 5    glucose blood (ONE TOUCH ULTRA TEST) test strip by In Vitro route 3 (three) times a day      hydrOXYzine HCL (ATARAX) 25 mg tablet Take 1 tablet (25 mg total) by mouth 3 (three) times a day for 30 days 90 tablet 0 [FreeTextEntry3] :  I personally obtained a history,performed a physical and outlined a plan of care  insulin glargine (BASAGLAR KWIKPEN) 100 units/mL injection pen 10 units sq q AM  70 units sq q PM 5 pen 1    Insulin Pen Needle (B-D ULTRAFINE III SHORT PEN) 31G X 8 MM MISC by Does not apply route      Insulin Pen Needle (PEN NEEDLES) 29G X 12MM MISC by Does not apply route daily at bedtime Substitute what fits [place] pen and what is covered by insurance  45 each 0    lisinopril (ZESTRIL) 20 mg tablet Take 1 tablet (20 mg total) by mouth daily 30 tablet 5    omeprazole (PriLOSEC) 20 mg delayed release capsule Take 20 mg by mouth daily       QUEtiapine (SEROquel) 100 mg tablet Take 1 tablet (100 mg total) by mouth daily at bedtime for 30 days 30 tablet 0    rosuvastatin (CRESTOR) 10 MG tablet Take 1 tablet (10 mg total) by mouth daily 90 tablet 0    sertraline (ZOLOFT) 100 mg tablet Take 1 tablet (100 mg total) by mouth daily for 30 days 30 tablet 0    umeclidinium-vilanterol (ANORO ELLIPTA) 62 5-25 MCG/INH inhaler Inhale 1 puff daily 3 Inhaler 3     No current facility-administered medications for this visit  Current Outpatient Medications on File Prior to Visit   Medication Sig    ARIPiprazole (ABILIFY) 5 mg tablet Take 1 tablet (5 mg total) by mouth daily for 30 days    fenofibrate (TRICOR) 145 mg tablet Take 1 tablet (145 mg total) by mouth daily    glucose blood (ONE TOUCH ULTRA TEST) test strip by In Vitro route 3 (three) times a day    hydrOXYzine HCL (ATARAX) 25 mg tablet Take 1 tablet (25 mg total) by mouth 3 (three) times a day for 30 days    insulin glargine (BASAGLAR KWIKPEN) 100 units/mL injection pen 10 units sq q AM  70 units sq q PM    Insulin Pen Needle (B-D ULTRAFINE III SHORT PEN) 31G X 8 MM MISC by Does not apply route    Insulin Pen Needle (PEN NEEDLES) 29G X 12MM MISC by Does not apply route daily at bedtime Substitute what fits [place] pen and what is covered by insurance      lisinopril (ZESTRIL) 20 mg tablet Take 1 tablet (20 mg total) by mouth daily    omeprazole (PriLOSEC) 20 mg delayed release capsule Take 20 mg by mouth daily     QUEtiapine (SEROquel) 100 mg tablet Take 1 tablet (100 mg total) by mouth daily at bedtime for 30 days    rosuvastatin (CRESTOR) 10 MG tablet Take 1 tablet (10 mg total) by mouth daily    sertraline (ZOLOFT) 100 mg tablet Take 1 tablet (100 mg total) by mouth daily for 30 days    umeclidinium-vilanterol (ANORO ELLIPTA) 62 5-25 MCG/INH inhaler Inhale 1 puff daily    [DISCONTINUED] gabapentin (NEURONTIN) 100 mg capsule Take 1 capsule (100 mg total) by mouth 3 (three) times a day for 30 days (Patient not taking: Reported on 3/4/2019)    [DISCONTINUED] traZODone (DESYREL) 50 mg tablet Take 1 tablet (50 mg total) by mouth daily at bedtime as needed for sleep for up to 30 days (Patient not taking: Reported on 3/4/2019)     No current facility-administered medications on file prior to visit  He has No Known Allergies       Review of Systems   Constitutional: Negative for activity change, chills, diaphoresis, fatigue and fever  Respiratory: Negative for cough, chest tightness, shortness of breath and wheezing  Cardiovascular: Negative for chest pain, palpitations and leg swelling  Gastrointestinal: Negative for abdominal pain, constipation, diarrhea, nausea and vomiting  Genitourinary: Negative for difficulty urinating, dysuria and frequency  Musculoskeletal: Negative for arthralgias, gait problem and myalgias  Neurological: Negative for dizziness, seizures, syncope, weakness, light-headedness, numbness and headaches  Psychiatric/Behavioral: Positive for dysphoric mood and sleep disturbance  Negative for agitation, behavioral problems, confusion and suicidal ideas  The patient is nervous/anxious            Objective:      /60   Pulse 84   Temp 97 7 °F (36 5 °C) (Tympanic)   Resp 18   Ht 6' (1 829 m)   Wt 88 9 kg (196 lb)   BMI 26 58 kg/m²          Physical Exam   Constitutional: He is oriented to person, place, and time  He appears well-developed and well-nourished  No distress  HENT:   Head: Normocephalic and atraumatic  Mouth/Throat: Oropharynx is clear and moist    Eyes: Pupils are equal, round, and reactive to light  Conjunctivae and EOM are normal    Neck: Normal range of motion  Neck supple  No JVD present  Cardiovascular: Normal rate, regular rhythm and normal heart sounds  Pulmonary/Chest: Effort normal and breath sounds normal  No respiratory distress  He has no wheezes  He has no rales  Abdominal: Soft  Bowel sounds are normal  He exhibits no distension  There is no tenderness  Neurological: He is alert and oriented to person, place, and time  Psychiatric: His behavior is normal  Judgment and thought content normal    Flat affect and poor eye contact  Nursing note and vitals reviewed

## (undated) DEVICE — ENDOSCOPIC ULTRASOUND ASPIRATION NEEDLE: Brand: EXPECT SLIMLINE SL